# Patient Record
Sex: FEMALE | Race: WHITE | NOT HISPANIC OR LATINO | Employment: UNEMPLOYED | URBAN - METROPOLITAN AREA
[De-identification: names, ages, dates, MRNs, and addresses within clinical notes are randomized per-mention and may not be internally consistent; named-entity substitution may affect disease eponyms.]

---

## 2017-01-02 ENCOUNTER — AMBULATORY - HEALTHEAST (OUTPATIENT)
Dept: GERIATRICS | Facility: CLINIC | Age: 52
End: 2017-01-02

## 2017-01-05 ENCOUNTER — COMMUNICATION - HEALTHEAST (OUTPATIENT)
Dept: ADMINISTRATIVE | Facility: CLINIC | Age: 52
End: 2017-01-05

## 2017-01-06 ENCOUNTER — OFFICE VISIT - HEALTHEAST (OUTPATIENT)
Dept: GERIATRICS | Facility: CLINIC | Age: 52
End: 2017-01-06

## 2017-01-06 DIAGNOSIS — M06.9 CHRONIC RHEUMATIC ARTHRITIS (H): ICD-10-CM

## 2017-01-06 DIAGNOSIS — J02.9 ACUTE PHARYNGITIS: ICD-10-CM

## 2017-01-10 ENCOUNTER — AMBULATORY - HEALTHEAST (OUTPATIENT)
Dept: GERIATRICS | Facility: CLINIC | Age: 52
End: 2017-01-10

## 2017-01-24 ENCOUNTER — OFFICE VISIT - HEALTHEAST (OUTPATIENT)
Dept: GERIATRICS | Facility: CLINIC | Age: 52
End: 2017-01-24

## 2017-01-24 DIAGNOSIS — J06.9 UPPER RESPIRATORY INFECTION: ICD-10-CM

## 2017-01-24 DIAGNOSIS — R09.81 SINUS CONGESTION: ICD-10-CM

## 2017-01-25 ENCOUNTER — AMBULATORY - HEALTHEAST (OUTPATIENT)
Dept: GERIATRICS | Facility: CLINIC | Age: 52
End: 2017-01-25

## 2017-01-30 ENCOUNTER — AMBULATORY - HEALTHEAST (OUTPATIENT)
Dept: LAB | Facility: CLINIC | Age: 52
End: 2017-01-30

## 2017-01-30 DIAGNOSIS — M05.79 SEROPOSITIVE RHEUMATOID ARTHRITIS OF MULTIPLE SITES (H): ICD-10-CM

## 2017-01-30 LAB
ALT SERPL W P-5'-P-CCNC: 10 U/L (ref 0–45)
CREAT SERPL-MCNC: 0.73 MG/DL (ref 0.6–1.1)
GFR SERPL CREATININE-BSD FRML MDRD: >60 ML/MIN/1.73M2

## 2017-02-02 ENCOUNTER — AMBULATORY - HEALTHEAST (OUTPATIENT)
Dept: GERIATRICS | Facility: CLINIC | Age: 52
End: 2017-02-02

## 2017-02-13 ENCOUNTER — AMBULATORY - HEALTHEAST (OUTPATIENT)
Dept: GERIATRICS | Facility: CLINIC | Age: 52
End: 2017-02-13

## 2017-02-17 ENCOUNTER — OFFICE VISIT - HEALTHEAST (OUTPATIENT)
Dept: GERIATRICS | Facility: CLINIC | Age: 52
End: 2017-02-17

## 2017-02-17 DIAGNOSIS — L03.116 CELLULITIS OF LEFT HIP: ICD-10-CM

## 2017-02-17 DIAGNOSIS — M06.9 RHEUMATOID ARTHRITIS (H): ICD-10-CM

## 2017-02-17 DIAGNOSIS — R09.89 RESPIRATORY SYMPTOMS: ICD-10-CM

## 2017-02-28 ENCOUNTER — AMBULATORY - HEALTHEAST (OUTPATIENT)
Dept: GERIATRICS | Facility: CLINIC | Age: 52
End: 2017-02-28

## 2017-03-24 ENCOUNTER — AMBULATORY - HEALTHEAST (OUTPATIENT)
Dept: LAB | Facility: CLINIC | Age: 52
End: 2017-03-24

## 2017-03-24 ENCOUNTER — OFFICE VISIT - HEALTHEAST (OUTPATIENT)
Dept: GERIATRICS | Facility: CLINIC | Age: 52
End: 2017-03-24

## 2017-03-24 DIAGNOSIS — M05.79 SEROPOSITIVE RHEUMATOID ARTHRITIS OF MULTIPLE SITES (H): ICD-10-CM

## 2017-03-24 DIAGNOSIS — M17.11 RIGHT KNEE DJD: ICD-10-CM

## 2017-03-24 DIAGNOSIS — R52 PAIN MANAGEMENT: ICD-10-CM

## 2017-03-24 DIAGNOSIS — Z96.642 H/O TOTAL HIP ARTHROPLASTY, LEFT: ICD-10-CM

## 2017-03-24 DIAGNOSIS — R60.9 EDEMA: ICD-10-CM

## 2017-03-24 LAB
ALT SERPL W P-5'-P-CCNC: 12 U/L (ref 0–45)
CREAT SERPL-MCNC: 0.65 MG/DL (ref 0.6–1.1)
GFR SERPL CREATININE-BSD FRML MDRD: >60 ML/MIN/1.73M2

## 2017-03-28 ENCOUNTER — AMBULATORY - HEALTHEAST (OUTPATIENT)
Dept: GERIATRICS | Facility: CLINIC | Age: 52
End: 2017-03-28

## 2017-04-18 ENCOUNTER — OFFICE VISIT - HEALTHEAST (OUTPATIENT)
Dept: GERIATRICS | Facility: CLINIC | Age: 52
End: 2017-04-18

## 2017-04-18 ENCOUNTER — OFFICE VISIT - HEALTHEAST (OUTPATIENT)
Dept: RHEUMATOLOGY | Facility: CLINIC | Age: 52
End: 2017-04-18

## 2017-04-18 DIAGNOSIS — Z96.642 H/O TOTAL HIP ARTHROPLASTY, LEFT: ICD-10-CM

## 2017-04-18 DIAGNOSIS — M16.12 PRIMARY OSTEOARTHRITIS OF LEFT HIP: ICD-10-CM

## 2017-04-18 DIAGNOSIS — I10 ESSENTIAL HYPERTENSION WITH GOAL BLOOD PRESSURE LESS THAN 140/90: ICD-10-CM

## 2017-04-18 DIAGNOSIS — M05.79 SEROPOSITIVE RHEUMATOID ARTHRITIS OF MULTIPLE SITES (H): ICD-10-CM

## 2017-04-18 DIAGNOSIS — M17.11 PRIMARY OSTEOARTHRITIS OF RIGHT KNEE: ICD-10-CM

## 2017-04-18 DIAGNOSIS — R76.8 CYCLIC CITRULLINATED PEPTIDE (CCP) ANTIBODY POSITIVE: ICD-10-CM

## 2017-04-18 ASSESSMENT — MIFFLIN-ST. JEOR: SCORE: 2030.58

## 2017-04-25 ENCOUNTER — AMBULATORY - HEALTHEAST (OUTPATIENT)
Dept: GERIATRICS | Facility: CLINIC | Age: 52
End: 2017-04-25

## 2017-05-15 ENCOUNTER — OFFICE VISIT - HEALTHEAST (OUTPATIENT)
Dept: GERIATRICS | Facility: CLINIC | Age: 52
End: 2017-05-15

## 2017-05-15 DIAGNOSIS — M17.11 PRIMARY OSTEOARTHRITIS OF RIGHT KNEE: ICD-10-CM

## 2017-05-15 DIAGNOSIS — R52 PAIN MANAGEMENT: ICD-10-CM

## 2017-05-15 DIAGNOSIS — I10 ESSENTIAL HYPERTENSION WITH GOAL BLOOD PRESSURE LESS THAN 140/90: ICD-10-CM

## 2017-05-15 DIAGNOSIS — M16.12 PRIMARY OSTEOARTHRITIS OF LEFT HIP: ICD-10-CM

## 2017-05-22 ENCOUNTER — AMBULATORY - HEALTHEAST (OUTPATIENT)
Dept: GERIATRICS | Facility: CLINIC | Age: 52
End: 2017-05-22

## 2017-06-05 ENCOUNTER — COMMUNICATION - HEALTHEAST (OUTPATIENT)
Dept: RHEUMATOLOGY | Facility: CLINIC | Age: 52
End: 2017-06-05

## 2017-06-05 DIAGNOSIS — M05.79 SEROPOSITIVE RHEUMATOID ARTHRITIS OF MULTIPLE SITES (H): ICD-10-CM

## 2017-06-21 ENCOUNTER — AMBULATORY - HEALTHEAST (OUTPATIENT)
Dept: GERIATRICS | Facility: CLINIC | Age: 52
End: 2017-06-21

## 2017-06-27 ENCOUNTER — OFFICE VISIT - HEALTHEAST (OUTPATIENT)
Dept: GERIATRICS | Facility: CLINIC | Age: 52
End: 2017-06-27

## 2017-06-27 DIAGNOSIS — E66.9 OBESITY: ICD-10-CM

## 2017-06-27 DIAGNOSIS — M05.20 RHEUMATOID ARTERITIS (H): ICD-10-CM

## 2017-06-27 DIAGNOSIS — R05.9 COUGH: ICD-10-CM

## 2017-06-27 DIAGNOSIS — M17.11 PRIMARY OSTEOARTHRITIS OF RIGHT KNEE: ICD-10-CM

## 2017-07-19 ENCOUNTER — AMBULATORY - HEALTHEAST (OUTPATIENT)
Dept: GERIATRICS | Facility: CLINIC | Age: 52
End: 2017-07-19

## 2017-07-21 ENCOUNTER — OFFICE VISIT - HEALTHEAST (OUTPATIENT)
Dept: RHEUMATOLOGY | Facility: CLINIC | Age: 52
End: 2017-07-21

## 2017-07-21 DIAGNOSIS — M25.50 POLYARTHRALGIA: ICD-10-CM

## 2017-07-21 DIAGNOSIS — R76.8 CYCLIC CITRULLINATED PEPTIDE (CCP) ANTIBODY POSITIVE: ICD-10-CM

## 2017-07-21 DIAGNOSIS — M05.79 SEROPOSITIVE RHEUMATOID ARTHRITIS OF MULTIPLE SITES (H): ICD-10-CM

## 2017-07-28 ENCOUNTER — OFFICE VISIT - HEALTHEAST (OUTPATIENT)
Dept: GERIATRICS | Facility: CLINIC | Age: 52
End: 2017-07-28

## 2017-07-28 DIAGNOSIS — M17.11 PRIMARY OSTEOARTHRITIS OF RIGHT KNEE: ICD-10-CM

## 2017-07-28 DIAGNOSIS — I10 ESSENTIAL HYPERTENSION WITH GOAL BLOOD PRESSURE LESS THAN 140/90: ICD-10-CM

## 2017-07-28 DIAGNOSIS — L85.3 DRY SKIN: ICD-10-CM

## 2017-07-28 DIAGNOSIS — M25.50 POLYARTHRALGIA: ICD-10-CM

## 2017-08-07 ENCOUNTER — OFFICE VISIT - HEALTHEAST (OUTPATIENT)
Dept: GERIATRICS | Facility: CLINIC | Age: 52
End: 2017-08-07

## 2017-08-07 DIAGNOSIS — M17.11 PRIMARY OSTEOARTHRITIS OF RIGHT KNEE: ICD-10-CM

## 2017-08-07 DIAGNOSIS — M25.50 POLYARTHRALGIA: ICD-10-CM

## 2017-08-07 DIAGNOSIS — M16.12 PRIMARY OSTEOARTHRITIS OF LEFT HIP: ICD-10-CM

## 2017-08-07 DIAGNOSIS — I10 ESSENTIAL HYPERTENSION WITH GOAL BLOOD PRESSURE LESS THAN 140/90: ICD-10-CM

## 2017-08-16 ENCOUNTER — AMBULATORY - HEALTHEAST (OUTPATIENT)
Dept: GERIATRICS | Facility: CLINIC | Age: 52
End: 2017-08-16

## 2017-08-18 ENCOUNTER — AMBULATORY - HEALTHEAST (OUTPATIENT)
Dept: LAB | Facility: CLINIC | Age: 52
End: 2017-08-18

## 2017-08-18 DIAGNOSIS — M05.79 SEROPOSITIVE RHEUMATOID ARTHRITIS OF MULTIPLE SITES (H): ICD-10-CM

## 2017-08-18 LAB
ALT SERPL W P-5'-P-CCNC: 12 U/L (ref 0–45)
CREAT SERPL-MCNC: 0.7 MG/DL (ref 0.6–1.1)
GFR SERPL CREATININE-BSD FRML MDRD: >60 ML/MIN/1.73M2

## 2017-09-01 ENCOUNTER — COMMUNICATION - HEALTHEAST (OUTPATIENT)
Dept: RHEUMATOLOGY | Facility: CLINIC | Age: 52
End: 2017-09-01

## 2017-09-14 ENCOUNTER — AMBULATORY - HEALTHEAST (OUTPATIENT)
Dept: GERIATRICS | Facility: CLINIC | Age: 52
End: 2017-09-14

## 2017-09-20 ENCOUNTER — AMBULATORY - HEALTHEAST (OUTPATIENT)
Dept: LAB | Facility: CLINIC | Age: 52
End: 2017-09-20

## 2017-09-20 DIAGNOSIS — M05.79 SEROPOSITIVE RHEUMATOID ARTHRITIS OF MULTIPLE SITES (H): ICD-10-CM

## 2017-09-20 LAB
ALT SERPL W P-5'-P-CCNC: 18 U/L (ref 0–45)
CREAT SERPL-MCNC: 0.65 MG/DL (ref 0.6–1.1)
GFR SERPL CREATININE-BSD FRML MDRD: >60 ML/MIN/1.73M2

## 2017-09-22 ENCOUNTER — OFFICE VISIT - HEALTHEAST (OUTPATIENT)
Dept: GERIATRICS | Facility: CLINIC | Age: 52
End: 2017-09-22

## 2017-09-22 DIAGNOSIS — I82.403 DVT OF LOWER EXTREMITY, BILATERAL (H): ICD-10-CM

## 2017-09-22 DIAGNOSIS — E66.9 OBESITY: ICD-10-CM

## 2017-09-22 DIAGNOSIS — M19.90 INFLAMMATORY ARTHRITIS: ICD-10-CM

## 2017-09-25 ENCOUNTER — RECORDS - HEALTHEAST (OUTPATIENT)
Dept: ADMINISTRATIVE | Facility: OTHER | Age: 52
End: 2017-09-25

## 2017-09-25 ENCOUNTER — RECORDS - HEALTHEAST (OUTPATIENT)
Dept: GENERAL RADIOLOGY | Age: 52
End: 2017-09-25

## 2017-09-25 ENCOUNTER — OFFICE VISIT - HEALTHEAST (OUTPATIENT)
Dept: RHEUMATOLOGY | Facility: CLINIC | Age: 52
End: 2017-09-25

## 2017-09-25 DIAGNOSIS — M05.79 SEROPOSITIVE RHEUMATOID ARTHRITIS OF MULTIPLE SITES (H): ICD-10-CM

## 2017-09-25 DIAGNOSIS — M25.50 POLYARTHRALGIA: ICD-10-CM

## 2017-09-25 DIAGNOSIS — R76.8 CYCLIC CITRULLINATED PEPTIDE (CCP) ANTIBODY POSITIVE: ICD-10-CM

## 2017-09-25 DIAGNOSIS — M25.50 PAIN IN UNSPECIFIED JOINT: ICD-10-CM

## 2017-09-25 DIAGNOSIS — E66.01 MORBID OBESITY (H): ICD-10-CM

## 2017-09-25 DIAGNOSIS — M05.79 RHEUMATOID ARTHRITIS WITH RHEUMATOID FACTOR OF MULTIPLE SITES WITHOUT ORGAN OR SYSTEMS INVOLVEMENT (H): ICD-10-CM

## 2017-09-25 RX ORDER — HYDROCHLOROTHIAZIDE 25 MG/1
50 TABLET ORAL DAILY
Status: SHIPPED | COMMUNITY
Start: 2017-09-25

## 2017-09-26 ENCOUNTER — AMBULATORY - HEALTHEAST (OUTPATIENT)
Dept: RHEUMATOLOGY | Facility: CLINIC | Age: 52
End: 2017-09-26

## 2017-09-26 DIAGNOSIS — M05.9 SEROPOSITIVE RHEUMATOID ARTHRITIS (H): ICD-10-CM

## 2017-09-30 ENCOUNTER — AMBULATORY - HEALTHEAST (OUTPATIENT)
Dept: GERIATRICS | Facility: CLINIC | Age: 52
End: 2017-09-30

## 2017-10-25 ENCOUNTER — RECORDS - HEALTHEAST (OUTPATIENT)
Dept: ADMINISTRATIVE | Facility: OTHER | Age: 52
End: 2017-10-25

## 2017-10-26 ENCOUNTER — AMBULATORY - HEALTHEAST (OUTPATIENT)
Dept: GERIATRICS | Facility: CLINIC | Age: 52
End: 2017-10-26

## 2017-11-14 ENCOUNTER — HOSPITAL ENCOUNTER (OUTPATIENT)
Dept: MAMMOGRAPHY | Facility: CLINIC | Age: 52
Discharge: HOME | End: 2017-11-14
Attending: INTERNAL MEDICINE

## 2017-11-14 DIAGNOSIS — N64.4 BREAST PAIN, LEFT: ICD-10-CM

## 2017-11-14 DIAGNOSIS — N64.4 BREAST PAIN, RIGHT: ICD-10-CM

## 2017-11-27 ENCOUNTER — AMBULATORY - HEALTHEAST (OUTPATIENT)
Dept: LAB | Facility: CLINIC | Age: 52
End: 2017-11-27

## 2017-11-27 DIAGNOSIS — M05.9 SEROPOSITIVE RHEUMATOID ARTHRITIS (H): ICD-10-CM

## 2017-11-27 LAB
ALT SERPL W P-5'-P-CCNC: 17 U/L (ref 0–45)
CREAT SERPL-MCNC: 0.83 MG/DL (ref 0.6–1.1)
GFR SERPL CREATININE-BSD FRML MDRD: >60 ML/MIN/1.73M2

## 2017-11-28 ENCOUNTER — OFFICE VISIT - HEALTHEAST (OUTPATIENT)
Dept: GERIATRICS | Facility: CLINIC | Age: 52
End: 2017-11-28

## 2017-11-28 DIAGNOSIS — M25.50 POLYARTHRALGIA: ICD-10-CM

## 2017-11-28 DIAGNOSIS — R52 PAIN MANAGEMENT: ICD-10-CM

## 2017-11-28 DIAGNOSIS — R53.81 PHYSICAL DEBILITY: ICD-10-CM

## 2017-11-28 DIAGNOSIS — E66.01 MORBID OBESITY (H): ICD-10-CM

## 2017-11-30 ENCOUNTER — AMBULATORY - HEALTHEAST (OUTPATIENT)
Dept: GERIATRICS | Facility: CLINIC | Age: 52
End: 2017-11-30

## 2017-12-19 ENCOUNTER — OFFICE VISIT - HEALTHEAST (OUTPATIENT)
Dept: GERIATRICS | Facility: CLINIC | Age: 52
End: 2017-12-19

## 2017-12-19 DIAGNOSIS — M05.20 RHEUMATOID ARTERITIS (H): ICD-10-CM

## 2017-12-19 DIAGNOSIS — I10 ESSENTIAL HYPERTENSION WITH GOAL BLOOD PRESSURE LESS THAN 140/90: ICD-10-CM

## 2017-12-19 DIAGNOSIS — E66.01 MORBID OBESITY (H): ICD-10-CM

## 2017-12-19 DIAGNOSIS — M25.50 POLYARTHRALGIA: ICD-10-CM

## 2017-12-19 DIAGNOSIS — R53.81 PHYSICAL DEBILITY: ICD-10-CM

## 2017-12-19 DIAGNOSIS — E55.9 VITAMIN D DEFICIENCY DISEASE: ICD-10-CM

## 2017-12-28 ENCOUNTER — AMBULATORY - HEALTHEAST (OUTPATIENT)
Dept: GERIATRICS | Facility: CLINIC | Age: 52
End: 2017-12-28

## 2018-01-25 ENCOUNTER — COMMUNICATION - HEALTHEAST (OUTPATIENT)
Dept: RHEUMATOLOGY | Facility: CLINIC | Age: 53
End: 2018-01-25

## 2018-01-25 ENCOUNTER — AMBULATORY - HEALTHEAST (OUTPATIENT)
Dept: GERIATRICS | Facility: CLINIC | Age: 53
End: 2018-01-25

## 2018-01-25 ENCOUNTER — RECORDS - HEALTHEAST (OUTPATIENT)
Dept: LAB | Facility: CLINIC | Age: 53
End: 2018-01-25

## 2018-01-25 DIAGNOSIS — M05.79 SEROPOSITIVE RHEUMATOID ARTHRITIS OF MULTIPLE SITES (H): ICD-10-CM

## 2018-01-25 LAB — INR PPP: 2.05 (ref 0.9–1.1)

## 2018-01-29 ENCOUNTER — COMMUNICATION - HEALTHEAST (OUTPATIENT)
Dept: ADMINISTRATIVE | Facility: CLINIC | Age: 53
End: 2018-01-29

## 2018-02-09 ENCOUNTER — AMBULATORY - HEALTHEAST (OUTPATIENT)
Dept: LAB | Facility: CLINIC | Age: 53
End: 2018-02-09

## 2018-02-09 DIAGNOSIS — M05.9 SEROPOSITIVE RHEUMATOID ARTHRITIS (H): ICD-10-CM

## 2018-02-09 LAB
ALBUMIN SERPL-MCNC: 3.5 G/DL (ref 3.5–5)
ALT SERPL W P-5'-P-CCNC: 16 U/L (ref 0–45)
CREAT SERPL-MCNC: 0.68 MG/DL (ref 0.6–1.1)
ERYTHROCYTE [DISTWIDTH] IN BLOOD BY AUTOMATED COUNT: 13.2 % (ref 11–14.5)
GFR SERPL CREATININE-BSD FRML MDRD: >60 ML/MIN/1.73M2
HCT VFR BLD AUTO: 42.1 % (ref 35–47)
HGB BLD-MCNC: 13.7 G/DL (ref 12–16)
MCH RBC QN AUTO: 29 PG (ref 27–34)
MCHC RBC AUTO-ENTMCNC: 32.5 G/DL (ref 32–36)
MCV RBC AUTO: 89 FL (ref 80–100)
PLATELET # BLD AUTO: 225 THOU/UL (ref 140–440)
PMV BLD AUTO: 7.4 FL (ref 7–10)
RBC # BLD AUTO: 4.71 MILL/UL (ref 3.8–5.4)
WBC: 4.5 THOU/UL (ref 4–11)

## 2018-02-13 ENCOUNTER — OFFICE VISIT - HEALTHEAST (OUTPATIENT)
Dept: RHEUMATOLOGY | Facility: CLINIC | Age: 53
End: 2018-02-13

## 2018-02-13 DIAGNOSIS — M25.50 POLYARTHRALGIA: ICD-10-CM

## 2018-02-13 DIAGNOSIS — M05.79 SEROPOSITIVE RHEUMATOID ARTHRITIS OF MULTIPLE SITES (H): ICD-10-CM

## 2018-02-13 DIAGNOSIS — R76.8 CYCLIC CITRULLINATED PEPTIDE (CCP) ANTIBODY POSITIVE: ICD-10-CM

## 2018-02-13 DIAGNOSIS — Z79.899 HIGH RISK MEDICATION USE: ICD-10-CM

## 2018-02-13 ASSESSMENT — MIFFLIN-ST. JEOR: SCORE: 2130.37

## 2018-02-22 ENCOUNTER — AMBULATORY - HEALTHEAST (OUTPATIENT)
Dept: GERIATRICS | Facility: CLINIC | Age: 53
End: 2018-02-22

## 2018-02-23 ENCOUNTER — RECORDS - HEALTHEAST (OUTPATIENT)
Dept: LAB | Facility: CLINIC | Age: 53
End: 2018-02-23

## 2018-02-23 LAB — INR PPP: 2.21 (ref 0.9–1.1)

## 2018-03-09 ENCOUNTER — OFFICE VISIT - HEALTHEAST (OUTPATIENT)
Dept: GERIATRICS | Facility: CLINIC | Age: 53
End: 2018-03-09

## 2018-03-09 DIAGNOSIS — R53.81 PHYSICAL DEBILITY: ICD-10-CM

## 2018-03-09 DIAGNOSIS — R52 PAIN MANAGEMENT: ICD-10-CM

## 2018-03-09 DIAGNOSIS — M25.50 POLYARTHRALGIA: ICD-10-CM

## 2018-03-09 DIAGNOSIS — M05.79 SEROPOSITIVE RHEUMATOID ARTHRITIS OF MULTIPLE SITES (H): ICD-10-CM

## 2018-03-23 ENCOUNTER — AMBULATORY - HEALTHEAST (OUTPATIENT)
Dept: GERIATRICS | Facility: CLINIC | Age: 53
End: 2018-03-23

## 2018-03-23 ENCOUNTER — RECORDS - HEALTHEAST (OUTPATIENT)
Dept: LAB | Facility: CLINIC | Age: 53
End: 2018-03-23

## 2018-03-23 LAB — INR PPP: 2.83 (ref 0.9–1.1)

## 2018-04-20 ENCOUNTER — RECORDS - HEALTHEAST (OUTPATIENT)
Dept: LAB | Facility: CLINIC | Age: 53
End: 2018-04-20

## 2018-04-20 LAB — INR PPP: 2.81 (ref 0.9–1.1)

## 2018-04-27 ENCOUNTER — OFFICE VISIT - HEALTHEAST (OUTPATIENT)
Dept: GERIATRICS | Facility: CLINIC | Age: 53
End: 2018-04-27

## 2018-04-27 DIAGNOSIS — E66.9 OBESITY: ICD-10-CM

## 2018-04-27 DIAGNOSIS — I10 ESSENTIAL HYPERTENSION WITH GOAL BLOOD PRESSURE LESS THAN 140/90: ICD-10-CM

## 2018-04-27 DIAGNOSIS — M06.9 RHEUMATOID ARTHRITIS (H): ICD-10-CM

## 2018-04-27 DIAGNOSIS — R05.9 COUGH: ICD-10-CM

## 2018-04-27 DIAGNOSIS — I82.403 DVT OF LOWER EXTREMITY, BILATERAL (H): ICD-10-CM

## 2018-05-10 ENCOUNTER — AMBULATORY - HEALTHEAST (OUTPATIENT)
Dept: LAB | Facility: CLINIC | Age: 53
End: 2018-05-10

## 2018-05-10 DIAGNOSIS — M05.9 SEROPOSITIVE RHEUMATOID ARTHRITIS (H): ICD-10-CM

## 2018-05-10 LAB
ALBUMIN SERPL-MCNC: 3.4 G/DL (ref 3.5–5)
ALT SERPL W P-5'-P-CCNC: 16 U/L (ref 0–45)
CREAT SERPL-MCNC: 0.69 MG/DL (ref 0.6–1.1)
ERYTHROCYTE [DISTWIDTH] IN BLOOD BY AUTOMATED COUNT: 11.6 % (ref 11–14.5)
GFR SERPL CREATININE-BSD FRML MDRD: >60 ML/MIN/1.73M2
HCT VFR BLD AUTO: 42.6 % (ref 35–47)
HGB BLD-MCNC: 13.7 G/DL (ref 12–16)
MCH RBC QN AUTO: 29.6 PG (ref 27–34)
MCHC RBC AUTO-ENTMCNC: 32.1 G/DL (ref 32–36)
MCV RBC AUTO: 92 FL (ref 80–100)
PLATELET # BLD AUTO: 220 THOU/UL (ref 140–440)
PMV BLD AUTO: 7 FL (ref 7–10)
RBC # BLD AUTO: 4.63 MILL/UL (ref 3.8–5.4)
WBC: 4.4 THOU/UL (ref 4–11)

## 2018-05-14 ENCOUNTER — OFFICE VISIT - HEALTHEAST (OUTPATIENT)
Dept: RHEUMATOLOGY | Facility: CLINIC | Age: 53
End: 2018-05-14

## 2018-05-14 DIAGNOSIS — M25.50 POLYARTHRALGIA: ICD-10-CM

## 2018-05-14 DIAGNOSIS — R76.8 CYCLIC CITRULLINATED PEPTIDE (CCP) ANTIBODY POSITIVE: ICD-10-CM

## 2018-05-14 DIAGNOSIS — M05.79 SEROPOSITIVE RHEUMATOID ARTHRITIS OF MULTIPLE SITES (H): ICD-10-CM

## 2018-05-14 DIAGNOSIS — Z79.899 HIGH RISK MEDICATION USE: ICD-10-CM

## 2018-05-21 ENCOUNTER — RECORDS - HEALTHEAST (OUTPATIENT)
Dept: LAB | Facility: CLINIC | Age: 53
End: 2018-05-21

## 2018-05-21 ENCOUNTER — AMBULATORY - HEALTHEAST (OUTPATIENT)
Dept: GERIATRICS | Facility: CLINIC | Age: 53
End: 2018-05-21

## 2018-05-21 LAB — INR PPP: 2.63 (ref 0.9–1.1)

## 2018-06-11 ENCOUNTER — AMBULATORY - HEALTHEAST (OUTPATIENT)
Dept: LAB | Facility: CLINIC | Age: 53
End: 2018-06-11

## 2018-06-11 DIAGNOSIS — M05.9 SEROPOSITIVE RHEUMATOID ARTHRITIS (H): ICD-10-CM

## 2018-06-11 LAB
ALBUMIN SERPL-MCNC: 3.6 G/DL (ref 3.5–5)
ALT SERPL W P-5'-P-CCNC: 19 U/L (ref 0–45)
CREAT SERPL-MCNC: 0.99 MG/DL (ref 0.6–1.1)
ERYTHROCYTE [DISTWIDTH] IN BLOOD BY AUTOMATED COUNT: 12.1 % (ref 11–14.5)
GFR SERPL CREATININE-BSD FRML MDRD: 58 ML/MIN/1.73M2
HCT VFR BLD AUTO: 40.6 % (ref 35–47)
HGB BLD-MCNC: 13.4 G/DL (ref 12–16)
MCH RBC QN AUTO: 30.3 PG (ref 27–34)
MCHC RBC AUTO-ENTMCNC: 32.9 G/DL (ref 32–36)
MCV RBC AUTO: 92 FL (ref 80–100)
PLATELET # BLD AUTO: 220 THOU/UL (ref 140–440)
PMV BLD AUTO: 7.2 FL (ref 7–10)
RBC # BLD AUTO: 4.41 MILL/UL (ref 3.8–5.4)
WBC: 4.2 THOU/UL (ref 4–11)

## 2018-06-19 ENCOUNTER — AMBULATORY - HEALTHEAST (OUTPATIENT)
Dept: GERIATRICS | Facility: CLINIC | Age: 53
End: 2018-06-19

## 2018-06-19 ENCOUNTER — RECORDS - HEALTHEAST (OUTPATIENT)
Dept: LAB | Facility: CLINIC | Age: 53
End: 2018-06-19

## 2018-06-19 LAB — INR PPP: 2.38 (ref 0.9–1.1)

## 2018-06-29 ENCOUNTER — OFFICE VISIT - HEALTHEAST (OUTPATIENT)
Dept: GERIATRICS | Facility: CLINIC | Age: 53
End: 2018-06-29

## 2018-06-29 DIAGNOSIS — R52 PAIN MANAGEMENT: ICD-10-CM

## 2018-06-29 DIAGNOSIS — M05.79 SEROPOSITIVE RHEUMATOID ARTHRITIS OF MULTIPLE SITES (H): ICD-10-CM

## 2018-06-29 DIAGNOSIS — Z79.01 WARFARIN ANTICOAGULATION: ICD-10-CM

## 2018-06-29 DIAGNOSIS — M25.50 POLYARTHRALGIA: ICD-10-CM

## 2018-07-09 ENCOUNTER — AMBULATORY - HEALTHEAST (OUTPATIENT)
Dept: LAB | Facility: CLINIC | Age: 53
End: 2018-07-09

## 2018-07-09 DIAGNOSIS — M05.9 SEROPOSITIVE RHEUMATOID ARTHRITIS (H): ICD-10-CM

## 2018-07-09 LAB
ALBUMIN SERPL-MCNC: 3.7 G/DL (ref 3.5–5)
ALT SERPL W P-5'-P-CCNC: 16 U/L (ref 0–45)
CREAT SERPL-MCNC: 0.73 MG/DL (ref 0.6–1.1)
ERYTHROCYTE [DISTWIDTH] IN BLOOD BY AUTOMATED COUNT: 12.1 % (ref 11–14.5)
GFR SERPL CREATININE-BSD FRML MDRD: >60 ML/MIN/1.73M2
HCT VFR BLD AUTO: 42.1 % (ref 35–47)
HGB BLD-MCNC: 13.6 G/DL (ref 12–16)
MCH RBC QN AUTO: 30 PG (ref 27–34)
MCHC RBC AUTO-ENTMCNC: 32.3 G/DL (ref 32–36)
MCV RBC AUTO: 93 FL (ref 80–100)
PLATELET # BLD AUTO: 234 THOU/UL (ref 140–440)
PMV BLD AUTO: 7.4 FL (ref 7–10)
RBC # BLD AUTO: 4.52 MILL/UL (ref 3.8–5.4)
WBC: 4.9 THOU/UL (ref 4–11)

## 2018-07-10 ENCOUNTER — COMMUNICATION - HEALTHEAST (OUTPATIENT)
Dept: RHEUMATOLOGY | Facility: CLINIC | Age: 53
End: 2018-07-10

## 2018-07-10 DIAGNOSIS — M05.79 SEROPOSITIVE RHEUMATOID ARTHRITIS OF MULTIPLE SITES (H): ICD-10-CM

## 2018-07-17 ENCOUNTER — RECORDS - HEALTHEAST (OUTPATIENT)
Dept: LAB | Facility: CLINIC | Age: 53
End: 2018-07-17

## 2018-07-17 ENCOUNTER — COMMUNICATION - HEALTHEAST (OUTPATIENT)
Dept: GERIATRICS | Facility: CLINIC | Age: 53
End: 2018-07-17

## 2018-07-17 LAB — INR PPP: 2.27 (ref 0.9–1.1)

## 2018-07-27 ENCOUNTER — OFFICE VISIT - HEALTHEAST (OUTPATIENT)
Dept: GERIATRICS | Facility: CLINIC | Age: 53
End: 2018-07-27

## 2018-07-27 DIAGNOSIS — M05.79 SEROPOSITIVE RHEUMATOID ARTHRITIS OF MULTIPLE SITES (H): ICD-10-CM

## 2018-07-27 DIAGNOSIS — M17.11 PRIMARY OSTEOARTHRITIS OF RIGHT KNEE: ICD-10-CM

## 2018-07-27 DIAGNOSIS — E66.01 MORBID OBESITY (H): ICD-10-CM

## 2018-07-27 DIAGNOSIS — M25.50 POLYARTHRALGIA: ICD-10-CM

## 2018-08-14 ENCOUNTER — COMMUNICATION - HEALTHEAST (OUTPATIENT)
Dept: GERIATRICS | Facility: CLINIC | Age: 53
End: 2018-08-14

## 2018-08-16 ENCOUNTER — RECORDS - HEALTHEAST (OUTPATIENT)
Dept: LAB | Facility: CLINIC | Age: 53
End: 2018-08-16

## 2018-08-16 ENCOUNTER — COMMUNICATION - HEALTHEAST (OUTPATIENT)
Dept: GERIATRICS | Facility: CLINIC | Age: 53
End: 2018-08-16

## 2018-08-16 LAB — INR PPP: 2.04 (ref 0.9–1.1)

## 2018-08-31 ENCOUNTER — OFFICE VISIT - HEALTHEAST (OUTPATIENT)
Dept: GERIATRICS | Facility: CLINIC | Age: 53
End: 2018-08-31

## 2018-08-31 DIAGNOSIS — M25.50 POLYARTHRALGIA: ICD-10-CM

## 2018-08-31 DIAGNOSIS — R52 PAIN MANAGEMENT: ICD-10-CM

## 2018-08-31 DIAGNOSIS — E66.01 MORBID OBESITY (H): ICD-10-CM

## 2018-08-31 DIAGNOSIS — R53.81 PHYSICAL DEBILITY: ICD-10-CM

## 2018-09-13 ENCOUNTER — RECORDS - HEALTHEAST (OUTPATIENT)
Dept: LAB | Facility: CLINIC | Age: 53
End: 2018-09-13

## 2018-09-13 LAB
ANION GAP SERPL CALCULATED.3IONS-SCNC: 9 MMOL/L (ref 5–18)
BUN SERPL-MCNC: 16 MG/DL (ref 8–22)
CALCIUM SERPL-MCNC: 9.7 MG/DL (ref 8.5–10.5)
CHLORIDE BLD-SCNC: 101 MMOL/L (ref 98–107)
CO2 SERPL-SCNC: 34 MMOL/L (ref 22–31)
CREAT SERPL-MCNC: 0.75 MG/DL (ref 0.6–1.1)
GFR SERPL CREATININE-BSD FRML MDRD: >60 ML/MIN/1.73M2
GLUCOSE BLD-MCNC: 96 MG/DL (ref 70–125)
INR PPP: 2.39 (ref 0.9–1.1)
POTASSIUM BLD-SCNC: 4.3 MMOL/L (ref 3.5–5)
SODIUM SERPL-SCNC: 144 MMOL/L (ref 136–145)

## 2018-09-14 ENCOUNTER — OFFICE VISIT - HEALTHEAST (OUTPATIENT)
Dept: GERIATRICS | Facility: CLINIC | Age: 53
End: 2018-09-14

## 2018-09-14 ENCOUNTER — COMMUNICATION - HEALTHEAST (OUTPATIENT)
Dept: GERIATRICS | Facility: CLINIC | Age: 53
End: 2018-09-14

## 2018-09-14 DIAGNOSIS — E66.01 MORBID OBESITY (H): ICD-10-CM

## 2018-09-14 DIAGNOSIS — M06.9 RHEUMATOID ARTHRITIS (H): ICD-10-CM

## 2018-09-14 DIAGNOSIS — Z86.718 HISTORY OF DVT (DEEP VEIN THROMBOSIS): ICD-10-CM

## 2018-09-14 DIAGNOSIS — I10 HIGH BLOOD PRESSURE: ICD-10-CM

## 2018-09-14 DIAGNOSIS — M53.3 SACROILIAC JOINT DYSFUNCTION OF RIGHT SIDE: ICD-10-CM

## 2018-09-14 DIAGNOSIS — Z79.01 WARFARIN ANTICOAGULATION: ICD-10-CM

## 2018-09-20 ENCOUNTER — COMMUNICATION - HEALTHEAST (OUTPATIENT)
Dept: GERIATRICS | Facility: CLINIC | Age: 53
End: 2018-09-20

## 2018-09-24 ENCOUNTER — OFFICE VISIT - HEALTHEAST (OUTPATIENT)
Dept: GERIATRICS | Facility: CLINIC | Age: 53
End: 2018-09-24

## 2018-09-24 DIAGNOSIS — B02.9 SHINGLES OUTBREAK: ICD-10-CM

## 2018-09-24 DIAGNOSIS — B01.9 DISSEMINATED VARICELLA: ICD-10-CM

## 2018-10-05 ENCOUNTER — COMMUNICATION - HEALTHEAST (OUTPATIENT)
Dept: LAB | Facility: CLINIC | Age: 53
End: 2018-10-05

## 2018-10-11 ENCOUNTER — RECORDS - HEALTHEAST (OUTPATIENT)
Dept: LAB | Facility: CLINIC | Age: 53
End: 2018-10-11

## 2018-10-11 ENCOUNTER — COMMUNICATION - HEALTHEAST (OUTPATIENT)
Dept: GERIATRICS | Facility: CLINIC | Age: 53
End: 2018-10-11

## 2018-10-11 LAB — INR PPP: 2.29 (ref 0.9–1.1)

## 2018-10-15 ENCOUNTER — COMMUNICATION - HEALTHEAST (OUTPATIENT)
Dept: LAB | Facility: CLINIC | Age: 53
End: 2018-10-15

## 2018-10-15 ENCOUNTER — AMBULATORY - HEALTHEAST (OUTPATIENT)
Dept: RHEUMATOLOGY | Facility: CLINIC | Age: 53
End: 2018-10-15

## 2018-10-15 ENCOUNTER — AMBULATORY - HEALTHEAST (OUTPATIENT)
Dept: LAB | Facility: CLINIC | Age: 53
End: 2018-10-15

## 2018-10-15 DIAGNOSIS — M05.79 SEROPOSITIVE RHEUMATOID ARTHRITIS OF MULTIPLE SITES (H): ICD-10-CM

## 2018-10-15 LAB
ALBUMIN SERPL-MCNC: 3.6 G/DL (ref 3.5–5)
ALT SERPL W P-5'-P-CCNC: 16 U/L (ref 0–45)
CREAT SERPL-MCNC: 0.87 MG/DL (ref 0.6–1.1)
ERYTHROCYTE [DISTWIDTH] IN BLOOD BY AUTOMATED COUNT: 12.5 % (ref 11–14.5)
GFR SERPL CREATININE-BSD FRML MDRD: >60 ML/MIN/1.73M2
HCT VFR BLD AUTO: 41.2 % (ref 35–47)
HGB BLD-MCNC: 13.8 G/DL (ref 12–16)
MCH RBC QN AUTO: 31.6 PG (ref 27–34)
MCHC RBC AUTO-ENTMCNC: 33.6 G/DL (ref 32–36)
MCV RBC AUTO: 94 FL (ref 80–100)
PLATELET # BLD AUTO: 208 THOU/UL (ref 140–440)
PMV BLD AUTO: 7 FL (ref 7–10)
RBC # BLD AUTO: 4.38 MILL/UL (ref 3.8–5.4)
WBC: 4.2 THOU/UL (ref 4–11)

## 2018-10-18 ENCOUNTER — OFFICE VISIT - HEALTHEAST (OUTPATIENT)
Dept: RHEUMATOLOGY | Facility: CLINIC | Age: 53
End: 2018-10-18

## 2018-10-18 DIAGNOSIS — M77.8 RIGHT SHOULDER TENDINITIS: ICD-10-CM

## 2018-10-18 DIAGNOSIS — M17.11 PRIMARY OSTEOARTHRITIS OF RIGHT KNEE: ICD-10-CM

## 2018-10-18 DIAGNOSIS — M25.50 POLYARTHRALGIA: ICD-10-CM

## 2018-10-18 DIAGNOSIS — M05.79 SEROPOSITIVE RHEUMATOID ARTHRITIS OF MULTIPLE SITES (H): ICD-10-CM

## 2018-10-25 ENCOUNTER — AMBULATORY - HEALTHEAST (OUTPATIENT)
Dept: GERIATRICS | Facility: CLINIC | Age: 53
End: 2018-10-25

## 2018-11-08 ENCOUNTER — COMMUNICATION - HEALTHEAST (OUTPATIENT)
Dept: GERIATRICS | Facility: CLINIC | Age: 53
End: 2018-11-08

## 2018-11-08 ENCOUNTER — RECORDS - HEALTHEAST (OUTPATIENT)
Dept: LAB | Facility: CLINIC | Age: 53
End: 2018-11-08

## 2018-11-08 LAB — INR PPP: 3.37 (ref 0.9–1.1)

## 2018-11-15 ENCOUNTER — COMMUNICATION - HEALTHEAST (OUTPATIENT)
Dept: GERIATRICS | Facility: CLINIC | Age: 53
End: 2018-11-15

## 2018-11-15 ENCOUNTER — RECORDS - HEALTHEAST (OUTPATIENT)
Dept: LAB | Facility: CLINIC | Age: 53
End: 2018-11-15

## 2018-11-15 LAB — INR PPP: 2.44 (ref 0.9–1.1)

## 2018-11-27 ENCOUNTER — AMBULATORY - HEALTHEAST (OUTPATIENT)
Dept: GERIATRICS | Facility: CLINIC | Age: 53
End: 2018-11-27

## 2018-11-27 DIAGNOSIS — B02.9 SHINGLES OUTBREAK: ICD-10-CM

## 2018-11-28 ENCOUNTER — OFFICE VISIT - HEALTHEAST (OUTPATIENT)
Dept: GERIATRICS | Facility: CLINIC | Age: 53
End: 2018-11-28

## 2018-11-28 DIAGNOSIS — M05.79 SEROPOSITIVE RHEUMATOID ARTHRITIS OF MULTIPLE SITES (H): ICD-10-CM

## 2018-11-28 DIAGNOSIS — R53.81 PHYSICAL DEBILITY: ICD-10-CM

## 2018-11-28 DIAGNOSIS — M25.50 POLYARTHRALGIA: ICD-10-CM

## 2018-11-28 DIAGNOSIS — Z86.718 HISTORY OF DVT OF LOWER EXTREMITY: ICD-10-CM

## 2018-11-29 ENCOUNTER — RECORDS - HEALTHEAST (OUTPATIENT)
Dept: LAB | Facility: CLINIC | Age: 53
End: 2018-11-29

## 2018-11-29 LAB — INR PPP: 2.99 (ref 0.9–1.1)

## 2018-12-11 ENCOUNTER — COMMUNICATION - HEALTHEAST (OUTPATIENT)
Dept: GERIATRICS | Facility: CLINIC | Age: 53
End: 2018-12-11

## 2018-12-11 ENCOUNTER — RECORDS - HEALTHEAST (OUTPATIENT)
Dept: LAB | Facility: CLINIC | Age: 53
End: 2018-12-11

## 2018-12-11 LAB — INR PPP: 2.91 (ref 0.9–1.1)

## 2018-12-20 ENCOUNTER — RECORDS - HEALTHEAST (OUTPATIENT)
Dept: LAB | Facility: CLINIC | Age: 53
End: 2018-12-20

## 2018-12-20 LAB — INR PPP: 2.27 (ref 0.9–1.1)

## 2018-12-21 ENCOUNTER — OFFICE VISIT - HEALTHEAST (OUTPATIENT)
Dept: GERIATRICS | Facility: CLINIC | Age: 53
End: 2018-12-21

## 2018-12-21 DIAGNOSIS — R53.81 PHYSICAL DEBILITY: ICD-10-CM

## 2018-12-21 DIAGNOSIS — M17.11 PRIMARY OSTEOARTHRITIS OF RIGHT KNEE: ICD-10-CM

## 2018-12-21 DIAGNOSIS — E66.01 MORBID OBESITY (H): ICD-10-CM

## 2018-12-21 DIAGNOSIS — M25.50 POLYARTHRALGIA: ICD-10-CM

## 2018-12-27 ENCOUNTER — RECORDS - HEALTHEAST (OUTPATIENT)
Dept: LAB | Facility: CLINIC | Age: 53
End: 2018-12-27

## 2018-12-27 ENCOUNTER — COMMUNICATION - HEALTHEAST (OUTPATIENT)
Dept: GERIATRICS | Facility: CLINIC | Age: 53
End: 2018-12-27

## 2018-12-27 LAB — INR PPP: 2.21 (ref 0.9–1.1)

## 2019-01-08 ENCOUNTER — COMMUNICATION - HEALTHEAST (OUTPATIENT)
Dept: LAB | Facility: CLINIC | Age: 54
End: 2019-01-08

## 2019-01-08 DIAGNOSIS — M05.79 SEROPOSITIVE RHEUMATOID ARTHRITIS OF MULTIPLE SITES (H): ICD-10-CM

## 2019-01-10 ENCOUNTER — COMMUNICATION - HEALTHEAST (OUTPATIENT)
Dept: GERIATRICS | Facility: CLINIC | Age: 54
End: 2019-01-10

## 2019-01-11 ENCOUNTER — RECORDS - HEALTHEAST (OUTPATIENT)
Dept: LAB | Facility: CLINIC | Age: 54
End: 2019-01-11

## 2019-01-11 ENCOUNTER — COMMUNICATION - HEALTHEAST (OUTPATIENT)
Dept: GERIATRICS | Facility: CLINIC | Age: 54
End: 2019-01-11

## 2019-01-11 LAB — INR PPP: 3.02 (ref 0.9–1.1)

## 2019-01-24 ENCOUNTER — OFFICE VISIT - HEALTHEAST (OUTPATIENT)
Dept: GERIATRICS | Facility: CLINIC | Age: 54
End: 2019-01-24

## 2019-01-24 DIAGNOSIS — M05.79 SEROPOSITIVE RHEUMATOID ARTHRITIS OF MULTIPLE SITES (H): ICD-10-CM

## 2019-01-24 DIAGNOSIS — E66.01 MORBID OBESITY (H): ICD-10-CM

## 2019-01-24 DIAGNOSIS — R60.0 LOCALIZED EDEMA: ICD-10-CM

## 2019-01-24 DIAGNOSIS — E55.9 VITAMIN D DEFICIENCY: ICD-10-CM

## 2019-01-24 DIAGNOSIS — R53.81 PHYSICAL DEBILITY: ICD-10-CM

## 2019-01-24 DIAGNOSIS — Z79.01 WARFARIN ANTICOAGULATION: ICD-10-CM

## 2019-01-24 DIAGNOSIS — B02.9 SHINGLES OUTBREAK: ICD-10-CM

## 2019-01-25 ENCOUNTER — COMMUNICATION - HEALTHEAST (OUTPATIENT)
Dept: RHEUMATOLOGY | Facility: CLINIC | Age: 54
End: 2019-01-25

## 2019-01-25 DIAGNOSIS — M05.79 SEROPOSITIVE RHEUMATOID ARTHRITIS OF MULTIPLE SITES (H): ICD-10-CM

## 2019-02-07 ENCOUNTER — COMMUNICATION - HEALTHEAST (OUTPATIENT)
Dept: GERIATRICS | Facility: CLINIC | Age: 54
End: 2019-02-07

## 2019-02-07 ENCOUNTER — RECORDS - HEALTHEAST (OUTPATIENT)
Dept: LAB | Facility: CLINIC | Age: 54
End: 2019-02-07

## 2019-02-07 LAB — INR PPP: 2.54 (ref 0.9–1.1)

## 2019-03-07 ENCOUNTER — COMMUNICATION - HEALTHEAST (OUTPATIENT)
Dept: GERIATRICS | Facility: CLINIC | Age: 54
End: 2019-03-07

## 2019-03-07 ENCOUNTER — RECORDS - HEALTHEAST (OUTPATIENT)
Dept: LAB | Facility: CLINIC | Age: 54
End: 2019-03-07

## 2019-03-07 LAB — INR PPP: 2.22 (ref 0.9–1.1)

## 2019-03-08 ENCOUNTER — OFFICE VISIT - HEALTHEAST (OUTPATIENT)
Dept: GERIATRICS | Facility: CLINIC | Age: 54
End: 2019-03-08

## 2019-03-08 ENCOUNTER — COMMUNICATION - HEALTHEAST (OUTPATIENT)
Dept: GERIATRICS | Facility: CLINIC | Age: 54
End: 2019-03-08

## 2019-03-08 DIAGNOSIS — R52 PAIN MANAGEMENT: ICD-10-CM

## 2019-03-08 DIAGNOSIS — M25.50 POLYARTHRALGIA: ICD-10-CM

## 2019-03-08 DIAGNOSIS — M16.12 PRIMARY OSTEOARTHRITIS OF LEFT HIP: ICD-10-CM

## 2019-04-04 ENCOUNTER — RECORDS - HEALTHEAST (OUTPATIENT)
Dept: LAB | Facility: CLINIC | Age: 54
End: 2019-04-04

## 2019-04-04 LAB — INR PPP: 2.28 (ref 0.9–1.1)

## 2019-04-25 ENCOUNTER — OFFICE VISIT - HEALTHEAST (OUTPATIENT)
Dept: GERIATRICS | Facility: CLINIC | Age: 54
End: 2019-04-25

## 2019-04-25 DIAGNOSIS — M25.50 POLYARTHRALGIA: ICD-10-CM

## 2019-04-25 DIAGNOSIS — R53.81 PHYSICAL DEBILITY: ICD-10-CM

## 2019-04-25 DIAGNOSIS — M05.79 SEROPOSITIVE RHEUMATOID ARTHRITIS OF MULTIPLE SITES (H): ICD-10-CM

## 2019-04-25 DIAGNOSIS — R52 PAIN MANAGEMENT: ICD-10-CM

## 2019-04-25 DIAGNOSIS — E66.01 MORBID OBESITY (H): ICD-10-CM

## 2019-04-26 ENCOUNTER — AMBULATORY - HEALTHEAST (OUTPATIENT)
Dept: LAB | Facility: CLINIC | Age: 54
End: 2019-04-26

## 2019-04-26 ENCOUNTER — OFFICE VISIT - HEALTHEAST (OUTPATIENT)
Dept: RHEUMATOLOGY | Facility: CLINIC | Age: 54
End: 2019-04-26

## 2019-04-26 DIAGNOSIS — M15.0 PRIMARY OSTEOARTHRITIS INVOLVING MULTIPLE JOINTS: ICD-10-CM

## 2019-04-26 DIAGNOSIS — M05.79 SEROPOSITIVE RHEUMATOID ARTHRITIS OF MULTIPLE SITES (H): ICD-10-CM

## 2019-04-26 DIAGNOSIS — M25.50 POLYARTHRALGIA: ICD-10-CM

## 2019-04-26 DIAGNOSIS — Z79.899 HIGH RISK MEDICATION USE: ICD-10-CM

## 2019-04-26 DIAGNOSIS — R76.8 CYCLIC CITRULLINATED PEPTIDE (CCP) ANTIBODY POSITIVE: ICD-10-CM

## 2019-04-26 LAB
ALBUMIN SERPL-MCNC: 3.6 G/DL (ref 3.5–5)
ALT SERPL W P-5'-P-CCNC: 15 U/L (ref 0–45)
CREAT SERPL-MCNC: 0.79 MG/DL (ref 0.6–1.1)
ERYTHROCYTE [DISTWIDTH] IN BLOOD BY AUTOMATED COUNT: 12.3 % (ref 11–14.5)
GFR SERPL CREATININE-BSD FRML MDRD: >60 ML/MIN/1.73M2
HCT VFR BLD AUTO: 43.5 % (ref 35–47)
HGB BLD-MCNC: 13.9 G/DL (ref 12–16)
MCH RBC QN AUTO: 29.9 PG (ref 27–34)
MCHC RBC AUTO-ENTMCNC: 32 G/DL (ref 32–36)
MCV RBC AUTO: 93 FL (ref 80–100)
PLATELET # BLD AUTO: 279 THOU/UL (ref 140–440)
PMV BLD AUTO: 7.8 FL (ref 7–10)
RBC # BLD AUTO: 4.66 MILL/UL (ref 3.8–5.4)
WBC: 4.3 THOU/UL (ref 4–11)

## 2019-04-26 RX ORDER — ACETAMINOPHEN 325 MG/1
650 TABLET ORAL EVERY 6 HOURS PRN
Status: SHIPPED | COMMUNITY
Start: 2019-04-26 | End: 2022-11-16

## 2019-05-03 ENCOUNTER — COMMUNICATION - HEALTHEAST (OUTPATIENT)
Dept: GERIATRICS | Facility: CLINIC | Age: 54
End: 2019-05-03

## 2019-05-03 ENCOUNTER — RECORDS - HEALTHEAST (OUTPATIENT)
Dept: LAB | Facility: CLINIC | Age: 54
End: 2019-05-03

## 2019-05-03 LAB — INR PPP: 1.88 (ref 0.9–1.1)

## 2019-05-28 ENCOUNTER — OFFICE VISIT - HEALTHEAST (OUTPATIENT)
Dept: GERIATRICS | Facility: CLINIC | Age: 54
End: 2019-05-28

## 2019-05-28 DIAGNOSIS — Z79.899 HIGH RISK MEDICATION USE: ICD-10-CM

## 2019-05-28 DIAGNOSIS — Z96.649 HISTORY OF TOTAL HIP REPLACEMENT, UNSPECIFIED LATERALITY: ICD-10-CM

## 2019-05-28 DIAGNOSIS — Z79.01 WARFARIN ANTICOAGULATION: ICD-10-CM

## 2019-05-28 DIAGNOSIS — M05.79 SEROPOSITIVE RHEUMATOID ARTHRITIS OF MULTIPLE SITES (H): ICD-10-CM

## 2019-05-28 DIAGNOSIS — I10 ESSENTIAL HYPERTENSION WITH GOAL BLOOD PRESSURE LESS THAN 140/90: ICD-10-CM

## 2019-05-28 DIAGNOSIS — M15.0 PRIMARY OSTEOARTHRITIS INVOLVING MULTIPLE JOINTS: ICD-10-CM

## 2019-05-28 DIAGNOSIS — E55.9 VITAMIN D DEFICIENCY: ICD-10-CM

## 2019-05-28 DIAGNOSIS — M16.9 OSTEOARTHRITIS OF HIP, UNSPECIFIED LATERALITY, UNSPECIFIED OSTEOARTHRITIS TYPE: ICD-10-CM

## 2019-05-28 DIAGNOSIS — Z96.652 HISTORY OF TOTAL LEFT KNEE REPLACEMENT (TKR): ICD-10-CM

## 2019-05-28 DIAGNOSIS — R53.81 PHYSICAL DEBILITY: ICD-10-CM

## 2019-05-28 DIAGNOSIS — E66.01 MORBID OBESITY (H): ICD-10-CM

## 2019-05-30 ENCOUNTER — COMMUNICATION - HEALTHEAST (OUTPATIENT)
Dept: GERIATRICS | Facility: CLINIC | Age: 54
End: 2019-05-30

## 2019-05-30 ENCOUNTER — RECORDS - HEALTHEAST (OUTPATIENT)
Dept: LAB | Facility: CLINIC | Age: 54
End: 2019-05-30

## 2019-05-30 LAB — INR PPP: 2.2 (ref 0.9–1.1)

## 2019-06-25 ENCOUNTER — COMMUNICATION - HEALTHEAST (OUTPATIENT)
Dept: RHEUMATOLOGY | Facility: CLINIC | Age: 54
End: 2019-06-25

## 2019-06-25 DIAGNOSIS — M05.79 SEROPOSITIVE RHEUMATOID ARTHRITIS OF MULTIPLE SITES (H): ICD-10-CM

## 2019-06-27 ENCOUNTER — RECORDS - HEALTHEAST (OUTPATIENT)
Dept: LAB | Facility: CLINIC | Age: 54
End: 2019-06-27

## 2019-06-27 LAB — INR PPP: 2.78 (ref 0.9–1.1)

## 2019-06-28 ENCOUNTER — COMMUNICATION - HEALTHEAST (OUTPATIENT)
Dept: GERIATRICS | Facility: CLINIC | Age: 54
End: 2019-06-28

## 2019-07-16 ENCOUNTER — COMMUNICATION - HEALTHEAST (OUTPATIENT)
Dept: RHEUMATOLOGY | Facility: CLINIC | Age: 54
End: 2019-07-16

## 2019-07-17 ENCOUNTER — AMBULATORY - HEALTHEAST (OUTPATIENT)
Dept: ADMINISTRATIVE | Facility: CLINIC | Age: 54
End: 2019-07-17

## 2019-07-17 ENCOUNTER — OFFICE VISIT - HEALTHEAST (OUTPATIENT)
Dept: GERIATRICS | Facility: CLINIC | Age: 54
End: 2019-07-17

## 2019-07-17 ENCOUNTER — RECORDS - HEALTHEAST (OUTPATIENT)
Dept: ADMINISTRATIVE | Facility: OTHER | Age: 54
End: 2019-07-17

## 2019-07-17 ENCOUNTER — COMMUNICATION - HEALTHEAST (OUTPATIENT)
Dept: RHEUMATOLOGY | Facility: CLINIC | Age: 54
End: 2019-07-17

## 2019-07-17 ENCOUNTER — AMBULATORY - HEALTHEAST (OUTPATIENT)
Dept: GERIATRICS | Facility: CLINIC | Age: 54
End: 2019-07-17

## 2019-07-17 DIAGNOSIS — I10 ESSENTIAL HYPERTENSION WITH GOAL BLOOD PRESSURE LESS THAN 140/90: ICD-10-CM

## 2019-07-17 DIAGNOSIS — M15.0 PRIMARY OSTEOARTHRITIS INVOLVING MULTIPLE JOINTS: ICD-10-CM

## 2019-07-17 DIAGNOSIS — E55.9 VITAMIN D DEFICIENCY: ICD-10-CM

## 2019-07-17 DIAGNOSIS — Z96.642 HISTORY OF TOTAL LEFT HIP REPLACEMENT: ICD-10-CM

## 2019-07-17 DIAGNOSIS — Z96.652 HISTORY OF TOTAL LEFT KNEE REPLACEMENT (TKR): ICD-10-CM

## 2019-07-17 DIAGNOSIS — E66.01 MORBID OBESITY (H): ICD-10-CM

## 2019-07-17 DIAGNOSIS — Z79.899 HIGH RISK MEDICATION USE: ICD-10-CM

## 2019-07-17 DIAGNOSIS — M05.79 SEROPOSITIVE RHEUMATOID ARTHRITIS OF MULTIPLE SITES (H): ICD-10-CM

## 2019-07-17 DIAGNOSIS — Z79.01 WARFARIN ANTICOAGULATION: ICD-10-CM

## 2019-07-18 ENCOUNTER — COMMUNICATION - HEALTHEAST (OUTPATIENT)
Dept: GERIATRICS | Facility: CLINIC | Age: 54
End: 2019-07-18

## 2019-07-18 ENCOUNTER — RECORDS - HEALTHEAST (OUTPATIENT)
Dept: LAB | Facility: CLINIC | Age: 54
End: 2019-07-18

## 2019-07-18 LAB
ALP SERPL-CCNC: 85 U/L (ref 45–120)
ALT SERPL W P-5'-P-CCNC: 27 U/L (ref 0–45)
AST SERPL W P-5'-P-CCNC: 19 U/L (ref 0–40)
BASOPHILS # BLD AUTO: 0.1 THOU/UL (ref 0–0.2)
BASOPHILS NFR BLD AUTO: 1 % (ref 0–2)
BILIRUB SERPL-MCNC: 0.3 MG/DL (ref 0–1)
BUN SERPL-MCNC: 11 MG/DL (ref 8–22)
C REACTIVE PROTEIN LHE: 4.9 MG/DL (ref 0–0.8)
CREAT SERPL-MCNC: 0.7 MG/DL (ref 0.6–1.1)
EOSINOPHIL # BLD AUTO: 0.2 THOU/UL (ref 0–0.4)
EOSINOPHIL NFR BLD AUTO: 3 % (ref 0–6)
ERYTHROCYTE [DISTWIDTH] IN BLOOD BY AUTOMATED COUNT: 15 % (ref 11–14.5)
GFR SERPL CREATININE-BSD FRML MDRD: >60 ML/MIN/1.73M2
HCT VFR BLD AUTO: 40.1 % (ref 35–47)
HGB BLD-MCNC: 12.7 G/DL (ref 12–16)
INR PPP: 2.79 (ref 0.9–1.1)
LYMPHOCYTES # BLD AUTO: 1.9 THOU/UL (ref 0.8–4.4)
LYMPHOCYTES NFR BLD AUTO: 30 % (ref 20–40)
MCH RBC QN AUTO: 29.6 PG (ref 27–34)
MCHC RBC AUTO-ENTMCNC: 31.7 G/DL (ref 32–36)
MCV RBC AUTO: 94 FL (ref 80–100)
MONOCYTES # BLD AUTO: 0.5 THOU/UL (ref 0–0.9)
MONOCYTES NFR BLD AUTO: 7 % (ref 2–10)
NEUTROPHILS # BLD AUTO: 3.7 THOU/UL (ref 2–7.7)
NEUTROPHILS NFR BLD AUTO: 59 % (ref 50–70)
PLAT MORPH BLD: NORMAL
PLATELET # BLD AUTO: 257 THOU/UL (ref 140–440)
PMV BLD AUTO: 10.4 FL (ref 8.5–12.5)
POLYCHROMASIA BLD QL SMEAR: ABNORMAL
RBC # BLD AUTO: 4.29 MILL/UL (ref 3.8–5.4)
REACTIVE LYMPHS: ABNORMAL
WBC: 6.5 THOU/UL (ref 4–11)

## 2019-07-22 ENCOUNTER — COMMUNICATION - HEALTHEAST (OUTPATIENT)
Dept: GERIATRICS | Facility: CLINIC | Age: 54
End: 2019-07-22

## 2019-07-25 ENCOUNTER — RECORDS - HEALTHEAST (OUTPATIENT)
Dept: LAB | Facility: CLINIC | Age: 54
End: 2019-07-25

## 2019-07-25 ENCOUNTER — COMMUNICATION - HEALTHEAST (OUTPATIENT)
Dept: GERIATRICS | Facility: CLINIC | Age: 54
End: 2019-07-25

## 2019-07-25 LAB
ALP SERPL-CCNC: 69 U/L (ref 45–120)
ALT SERPL W P-5'-P-CCNC: 22 U/L (ref 0–45)
AST SERPL W P-5'-P-CCNC: 23 U/L (ref 0–40)
BASOPHILS # BLD AUTO: 0.1 THOU/UL (ref 0–0.2)
BASOPHILS NFR BLD AUTO: 2 % (ref 0–2)
BILIRUB DIRECT SERPL-MCNC: 0.1 MG/DL
BILIRUB SERPL-MCNC: 0.3 MG/DL (ref 0–1)
BUN SERPL-MCNC: 15 MG/DL (ref 8–22)
C REACTIVE PROTEIN LHE: 6.6 MG/DL (ref 0–0.8)
CREAT SERPL-MCNC: 0.63 MG/DL (ref 0.6–1.1)
EOSINOPHIL # BLD AUTO: 0.2 THOU/UL (ref 0–0.4)
EOSINOPHIL NFR BLD AUTO: 3 % (ref 0–6)
ERYTHROCYTE [DISTWIDTH] IN BLOOD BY AUTOMATED COUNT: 14.6 % (ref 11–14.5)
GFR SERPL CREATININE-BSD FRML MDRD: >60 ML/MIN/1.73M2
HCT VFR BLD AUTO: 41.1 % (ref 35–47)
HGB BLD-MCNC: 12.6 G/DL (ref 12–16)
INR PPP: 2.23 (ref 0.9–1.1)
LYMPHOCYTES # BLD AUTO: 1.8 THOU/UL (ref 0.8–4.4)
LYMPHOCYTES NFR BLD AUTO: 38 % (ref 20–40)
MCH RBC QN AUTO: 29.1 PG (ref 27–34)
MCHC RBC AUTO-ENTMCNC: 30.7 G/DL (ref 32–36)
MCV RBC AUTO: 95 FL (ref 80–100)
MONOCYTES # BLD AUTO: 0.5 THOU/UL (ref 0–0.9)
MONOCYTES NFR BLD AUTO: 11 % (ref 2–10)
NEUTROPHILS # BLD AUTO: 2.2 THOU/UL (ref 2–7.7)
NEUTROPHILS NFR BLD AUTO: 46 % (ref 50–70)
PLATELET # BLD AUTO: 321 THOU/UL (ref 140–440)
PMV BLD AUTO: 10 FL (ref 8.5–12.5)
RBC # BLD AUTO: 4.33 MILL/UL (ref 3.8–5.4)
WBC: 4.8 THOU/UL (ref 4–11)

## 2019-08-08 ENCOUNTER — COMMUNICATION - HEALTHEAST (OUTPATIENT)
Dept: GERIATRICS | Facility: CLINIC | Age: 54
End: 2019-08-08

## 2019-08-09 ENCOUNTER — COMMUNICATION - HEALTHEAST (OUTPATIENT)
Dept: GERIATRICS | Facility: CLINIC | Age: 54
End: 2019-08-09

## 2019-08-09 ENCOUNTER — RECORDS - HEALTHEAST (OUTPATIENT)
Dept: LAB | Facility: CLINIC | Age: 54
End: 2019-08-09

## 2019-08-09 LAB — INR PPP: 2.79 (ref 0.9–1.1)

## 2019-08-29 ENCOUNTER — OFFICE VISIT - HEALTHEAST (OUTPATIENT)
Dept: GERIATRICS | Facility: CLINIC | Age: 54
End: 2019-08-29

## 2019-08-29 DIAGNOSIS — I73.9 PAD (PERIPHERAL ARTERY DISEASE) (H): ICD-10-CM

## 2019-08-29 DIAGNOSIS — M16.9 OSTEOARTHRITIS OF HIP, UNSPECIFIED LATERALITY, UNSPECIFIED OSTEOARTHRITIS TYPE: ICD-10-CM

## 2019-08-29 DIAGNOSIS — M05.79 SEROPOSITIVE RHEUMATOID ARTHRITIS OF MULTIPLE SITES (H): ICD-10-CM

## 2019-08-29 DIAGNOSIS — M54.10 RADICULOPATHY OF LEG: ICD-10-CM

## 2019-08-29 DIAGNOSIS — M25.50 POLYARTHRALGIA: ICD-10-CM

## 2019-09-06 ENCOUNTER — COMMUNICATION - HEALTHEAST (OUTPATIENT)
Dept: GERIATRICS | Facility: CLINIC | Age: 54
End: 2019-09-06

## 2019-09-06 ENCOUNTER — RECORDS - HEALTHEAST (OUTPATIENT)
Dept: LAB | Facility: CLINIC | Age: 54
End: 2019-09-06

## 2019-09-06 LAB — INR PPP: 3.15 (ref 0.9–1.1)

## 2019-09-09 ENCOUNTER — RECORDS - HEALTHEAST (OUTPATIENT)
Dept: ADMINISTRATIVE | Facility: OTHER | Age: 54
End: 2019-09-09

## 2019-09-10 ENCOUNTER — AMBULATORY - HEALTHEAST (OUTPATIENT)
Dept: LAB | Facility: CLINIC | Age: 54
End: 2019-09-10

## 2019-09-10 DIAGNOSIS — M05.79 SEROPOSITIVE RHEUMATOID ARTHRITIS OF MULTIPLE SITES (H): ICD-10-CM

## 2019-09-10 LAB
ALBUMIN SERPL-MCNC: 3.4 G/DL (ref 3.5–5)
ALT SERPL W P-5'-P-CCNC: 20 U/L (ref 0–45)
CREAT SERPL-MCNC: 0.69 MG/DL (ref 0.6–1.1)
ERYTHROCYTE [DISTWIDTH] IN BLOOD BY AUTOMATED COUNT: 12.9 % (ref 11–14.5)
GFR SERPL CREATININE-BSD FRML MDRD: >60 ML/MIN/1.73M2
HCT VFR BLD AUTO: 41.6 % (ref 35–47)
HGB BLD-MCNC: 14 G/DL (ref 12–16)
MCH RBC QN AUTO: 29.6 PG (ref 27–34)
MCHC RBC AUTO-ENTMCNC: 33.6 G/DL (ref 32–36)
MCV RBC AUTO: 88 FL (ref 80–100)
PLATELET # BLD AUTO: 229 THOU/UL (ref 140–440)
PMV BLD AUTO: 7.6 FL (ref 7–10)
RBC # BLD AUTO: 4.72 MILL/UL (ref 3.8–5.4)
WBC: 4.7 THOU/UL (ref 4–11)

## 2019-09-12 ENCOUNTER — OFFICE VISIT - HEALTHEAST (OUTPATIENT)
Dept: RHEUMATOLOGY | Facility: CLINIC | Age: 54
End: 2019-09-12

## 2019-09-12 DIAGNOSIS — R76.8 CYCLIC CITRULLINATED PEPTIDE (CCP) ANTIBODY POSITIVE: ICD-10-CM

## 2019-09-12 DIAGNOSIS — M25.50 POLYARTHRALGIA: ICD-10-CM

## 2019-09-12 DIAGNOSIS — M05.79 SEROPOSITIVE RHEUMATOID ARTHRITIS OF MULTIPLE SITES (H): ICD-10-CM

## 2019-09-12 DIAGNOSIS — M15.0 PRIMARY OSTEOARTHRITIS INVOLVING MULTIPLE JOINTS: ICD-10-CM

## 2019-09-20 ENCOUNTER — COMMUNICATION - HEALTHEAST (OUTPATIENT)
Dept: GERIATRICS | Facility: CLINIC | Age: 54
End: 2019-09-20

## 2019-09-20 DIAGNOSIS — B02.9 SHINGLES OUTBREAK: ICD-10-CM

## 2019-09-21 RX ORDER — OXYCODONE HYDROCHLORIDE 5 MG/1
CAPSULE ORAL
Qty: 60 TABLET | Refills: 0 | Status: SHIPPED | OUTPATIENT
Start: 2019-09-21 | End: 2022-05-17

## 2019-09-23 ENCOUNTER — OFFICE VISIT - HEALTHEAST (OUTPATIENT)
Dept: GERIATRICS | Facility: CLINIC | Age: 54
End: 2019-09-23

## 2019-09-23 DIAGNOSIS — E66.01 MORBID OBESITY (H): ICD-10-CM

## 2019-09-23 DIAGNOSIS — M25.50 POLYARTHRALGIA: ICD-10-CM

## 2019-09-23 DIAGNOSIS — R52 PAIN MANAGEMENT: ICD-10-CM

## 2019-09-23 DIAGNOSIS — E55.9 VITAMIN D DEFICIENCY: ICD-10-CM

## 2019-09-23 DIAGNOSIS — I82.403 DEEP VEIN THROMBOSIS (DVT) OF BOTH LOWER EXTREMITIES, UNSPECIFIED CHRONICITY, UNSPECIFIED VEIN (H): ICD-10-CM

## 2019-09-23 DIAGNOSIS — M16.9 OSTEOARTHRITIS OF HIP, UNSPECIFIED LATERALITY, UNSPECIFIED OSTEOARTHRITIS TYPE: ICD-10-CM

## 2019-09-23 DIAGNOSIS — Z79.01 WARFARIN ANTICOAGULATION: ICD-10-CM

## 2019-09-23 DIAGNOSIS — M05.79 SEROPOSITIVE RHEUMATOID ARTHRITIS OF MULTIPLE SITES (H): ICD-10-CM

## 2019-09-23 DIAGNOSIS — Z79.899 HIGH RISK MEDICATION USE: ICD-10-CM

## 2019-09-23 DIAGNOSIS — I10 ESSENTIAL HYPERTENSION WITH GOAL BLOOD PRESSURE LESS THAN 140/90: ICD-10-CM

## 2019-09-24 RX ORDER — NYSTATIN 100000/G
POWDER (GRAM) TOPICAL
Qty: 15 G | Refills: 0 | Status: SHIPPED | COMMUNITY
Start: 2019-09-24 | End: 2022-11-16 | Stop reason: DRUGHIGH

## 2019-09-26 ENCOUNTER — OFFICE VISIT - HEALTHEAST (OUTPATIENT)
Dept: GERIATRICS | Facility: CLINIC | Age: 54
End: 2019-09-26

## 2019-09-26 DIAGNOSIS — M25.562 ACUTE PAIN OF LEFT KNEE: ICD-10-CM

## 2019-09-26 DIAGNOSIS — R29.898 WEAKNESS OF LEFT LOWER EXTREMITY: ICD-10-CM

## 2019-09-26 DIAGNOSIS — M25.462 KNEE EFFUSION, LEFT: ICD-10-CM

## 2019-09-26 DIAGNOSIS — M25.552 PAIN OF LEFT HIP JOINT: ICD-10-CM

## 2019-10-03 ENCOUNTER — COMMUNICATION - HEALTHEAST (OUTPATIENT)
Dept: GERIATRICS | Facility: CLINIC | Age: 54
End: 2019-10-03

## 2019-10-04 ENCOUNTER — RECORDS - HEALTHEAST (OUTPATIENT)
Dept: LAB | Facility: CLINIC | Age: 54
End: 2019-10-04

## 2019-10-04 LAB — INR PPP: 2.81 (ref 0.9–1.1)

## 2019-10-07 ENCOUNTER — RECORDS - HEALTHEAST (OUTPATIENT)
Dept: LAB | Facility: CLINIC | Age: 54
End: 2019-10-07

## 2019-10-07 LAB — INR PPP: 2.31 (ref 0.9–1.1)

## 2019-10-17 ENCOUNTER — OFFICE VISIT - HEALTHEAST (OUTPATIENT)
Dept: GERIATRICS | Facility: CLINIC | Age: 54
End: 2019-10-17

## 2019-10-17 DIAGNOSIS — R52 PAIN MANAGEMENT: ICD-10-CM

## 2019-10-17 DIAGNOSIS — R53.81 PHYSICAL DEBILITY: ICD-10-CM

## 2019-10-17 DIAGNOSIS — I10 ESSENTIAL HYPERTENSION WITH GOAL BLOOD PRESSURE LESS THAN 140/90: ICD-10-CM

## 2019-10-17 DIAGNOSIS — M25.50 POLYARTHRALGIA: ICD-10-CM

## 2019-11-07 ENCOUNTER — COMMUNICATION - HEALTHEAST (OUTPATIENT)
Dept: GERIATRICS | Facility: CLINIC | Age: 54
End: 2019-11-07

## 2019-11-08 ENCOUNTER — RECORDS - HEALTHEAST (OUTPATIENT)
Dept: LAB | Facility: CLINIC | Age: 54
End: 2019-11-08

## 2019-11-08 ENCOUNTER — COMMUNICATION - HEALTHEAST (OUTPATIENT)
Dept: GERIATRICS | Facility: CLINIC | Age: 54
End: 2019-11-08

## 2019-11-08 LAB — INR PPP: 2.5 (ref 0.9–1.1)

## 2019-11-15 ENCOUNTER — COMMUNICATION - HEALTHEAST (OUTPATIENT)
Dept: GERIATRICS | Facility: CLINIC | Age: 54
End: 2019-11-15

## 2019-11-26 ENCOUNTER — OFFICE VISIT - HEALTHEAST (OUTPATIENT)
Dept: GERIATRICS | Facility: CLINIC | Age: 54
End: 2019-11-26

## 2019-11-26 DIAGNOSIS — M25.50 POLYARTHRALGIA: ICD-10-CM

## 2019-11-26 DIAGNOSIS — I73.9 PAD (PERIPHERAL ARTERY DISEASE) (H): ICD-10-CM

## 2019-11-26 DIAGNOSIS — R52 PAIN MANAGEMENT: ICD-10-CM

## 2019-11-26 DIAGNOSIS — M05.79 SEROPOSITIVE RHEUMATOID ARTHRITIS OF MULTIPLE SITES (H): ICD-10-CM

## 2019-12-05 ENCOUNTER — AMBULATORY - HEALTHEAST (OUTPATIENT)
Dept: LAB | Facility: CLINIC | Age: 54
End: 2019-12-05

## 2019-12-05 ENCOUNTER — RECORDS - HEALTHEAST (OUTPATIENT)
Dept: LAB | Facility: CLINIC | Age: 54
End: 2019-12-05

## 2019-12-05 DIAGNOSIS — M05.79 SEROPOSITIVE RHEUMATOID ARTHRITIS OF MULTIPLE SITES (H): ICD-10-CM

## 2019-12-05 DIAGNOSIS — I82.409 DEEP VENOUS THROMBOSIS OF LOWER EXTREMITY (H): ICD-10-CM

## 2019-12-05 LAB
ALBUMIN SERPL-MCNC: 3.4 G/DL (ref 3.5–5)
ALT SERPL W P-5'-P-CCNC: 22 U/L (ref 0–45)
CREAT SERPL-MCNC: 0.75 MG/DL (ref 0.6–1.1)
ERYTHROCYTE [DISTWIDTH] IN BLOOD BY AUTOMATED COUNT: 14.5 % (ref 11–14.5)
FLUAV AG SPEC QL IA: NORMAL
FLUBV AG SPEC QL IA: NORMAL
GFR SERPL CREATININE-BSD FRML MDRD: >60 ML/MIN/1.73M2
HCT VFR BLD AUTO: 43.1 % (ref 35–47)
HGB BLD-MCNC: 13.9 G/DL (ref 12–16)
INR PPP: 2.62 (ref 0.9–1.1)
MCH RBC QN AUTO: 29 PG (ref 27–34)
MCHC RBC AUTO-ENTMCNC: 32.2 G/DL (ref 32–36)
MCV RBC AUTO: 90 FL (ref 80–100)
PLATELET # BLD AUTO: 193 THOU/UL (ref 140–440)
PMV BLD AUTO: 7.2 FL (ref 7–10)
RBC # BLD AUTO: 4.79 MILL/UL (ref 3.8–5.4)
WBC: 2.5 THOU/UL (ref 4–11)

## 2019-12-06 ENCOUNTER — COMMUNICATION - HEALTHEAST (OUTPATIENT)
Dept: GERIATRICS | Facility: CLINIC | Age: 54
End: 2019-12-06

## 2019-12-09 ENCOUNTER — COMMUNICATION - HEALTHEAST (OUTPATIENT)
Dept: RHEUMATOLOGY | Facility: CLINIC | Age: 54
End: 2019-12-09

## 2019-12-11 ENCOUNTER — COMMUNICATION - HEALTHEAST (OUTPATIENT)
Dept: ADMINISTRATIVE | Facility: CLINIC | Age: 54
End: 2019-12-11

## 2019-12-11 DIAGNOSIS — D72.819 LEUKOPENIA: ICD-10-CM

## 2019-12-12 ENCOUNTER — OFFICE VISIT - HEALTHEAST (OUTPATIENT)
Dept: RHEUMATOLOGY | Facility: CLINIC | Age: 54
End: 2019-12-12

## 2019-12-12 ENCOUNTER — AMBULATORY - HEALTHEAST (OUTPATIENT)
Dept: LAB | Facility: CLINIC | Age: 54
End: 2019-12-12

## 2019-12-12 DIAGNOSIS — M05.79 SEROPOSITIVE RHEUMATOID ARTHRITIS OF MULTIPLE SITES (H): ICD-10-CM

## 2019-12-12 DIAGNOSIS — M25.50 POLYARTHRALGIA: ICD-10-CM

## 2019-12-12 DIAGNOSIS — M15.0 PRIMARY OSTEOARTHRITIS INVOLVING MULTIPLE JOINTS: ICD-10-CM

## 2019-12-12 DIAGNOSIS — R76.8 CYCLIC CITRULLINATED PEPTIDE (CCP) ANTIBODY POSITIVE: ICD-10-CM

## 2019-12-12 DIAGNOSIS — Z79.899 HIGH RISK MEDICATION USE: ICD-10-CM

## 2019-12-12 DIAGNOSIS — D72.819 LEUKOPENIA: ICD-10-CM

## 2019-12-12 LAB
ERYTHROCYTE [DISTWIDTH] IN BLOOD BY AUTOMATED COUNT: 14.7 % (ref 11–14.5)
HCT VFR BLD AUTO: 44.2 % (ref 35–47)
HGB BLD-MCNC: 14.5 G/DL (ref 12–16)
MCH RBC QN AUTO: 29.2 PG (ref 27–34)
MCHC RBC AUTO-ENTMCNC: 32.8 G/DL (ref 32–36)
MCV RBC AUTO: 89 FL (ref 80–100)
PLATELET # BLD AUTO: 214 THOU/UL (ref 140–440)
PMV BLD AUTO: 7.4 FL (ref 7–10)
RBC # BLD AUTO: 4.97 MILL/UL (ref 3.8–5.4)
WBC: 4.3 THOU/UL (ref 4–11)

## 2019-12-12 RX ORDER — DULOXETIN HYDROCHLORIDE 20 MG/1
20 CAPSULE, DELAYED RELEASE ORAL DAILY
Qty: 90 CAPSULE | Refills: 2 | Status: SHIPPED | OUTPATIENT
Start: 2019-12-12 | End: 2024-06-25

## 2019-12-12 ASSESSMENT — MIFFLIN-ST. JEOR: SCORE: 2180.27

## 2019-12-26 ENCOUNTER — OFFICE VISIT - HEALTHEAST (OUTPATIENT)
Dept: GERIATRICS | Facility: CLINIC | Age: 54
End: 2019-12-26

## 2019-12-26 DIAGNOSIS — I10 ESSENTIAL HYPERTENSION WITH GOAL BLOOD PRESSURE LESS THAN 140/90: ICD-10-CM

## 2019-12-26 DIAGNOSIS — M15.0 PRIMARY OSTEOARTHRITIS INVOLVING MULTIPLE JOINTS: ICD-10-CM

## 2019-12-26 DIAGNOSIS — I73.9 PAD (PERIPHERAL ARTERY DISEASE) (H): ICD-10-CM

## 2019-12-26 DIAGNOSIS — R52 PAIN MANAGEMENT: ICD-10-CM

## 2020-01-02 ENCOUNTER — COMMUNICATION - HEALTHEAST (OUTPATIENT)
Dept: GERIATRICS | Facility: CLINIC | Age: 55
End: 2020-01-02

## 2020-01-03 ENCOUNTER — COMMUNICATION - HEALTHEAST (OUTPATIENT)
Dept: GERIATRICS | Facility: CLINIC | Age: 55
End: 2020-01-03

## 2020-01-06 ENCOUNTER — RECORDS - HEALTHEAST (OUTPATIENT)
Dept: LAB | Facility: CLINIC | Age: 55
End: 2020-01-06

## 2020-01-06 ENCOUNTER — COMMUNICATION - HEALTHEAST (OUTPATIENT)
Dept: GERIATRICS | Facility: CLINIC | Age: 55
End: 2020-01-06

## 2020-01-06 LAB — INR PPP: 1.87 (ref 0.9–1.1)

## 2020-01-13 ENCOUNTER — COMMUNICATION - HEALTHEAST (OUTPATIENT)
Dept: RHEUMATOLOGY | Facility: CLINIC | Age: 55
End: 2020-01-13

## 2020-01-13 DIAGNOSIS — M05.79 SEROPOSITIVE RHEUMATOID ARTHRITIS OF MULTIPLE SITES (H): ICD-10-CM

## 2020-01-14 ENCOUNTER — OFFICE VISIT - HEALTHEAST (OUTPATIENT)
Dept: GERIATRICS | Facility: CLINIC | Age: 55
End: 2020-01-14

## 2020-01-14 DIAGNOSIS — E66.01 MORBID OBESITY (H): ICD-10-CM

## 2020-01-14 DIAGNOSIS — M05.79 SEROPOSITIVE RHEUMATOID ARTHRITIS OF MULTIPLE SITES (H): ICD-10-CM

## 2020-01-14 DIAGNOSIS — R76.8 CYCLIC CITRULLINATED PEPTIDE (CCP) ANTIBODY POSITIVE: ICD-10-CM

## 2020-01-14 DIAGNOSIS — M15.0 PRIMARY OSTEOARTHRITIS INVOLVING MULTIPLE JOINTS: ICD-10-CM

## 2020-01-14 DIAGNOSIS — I82.403 DEEP VEIN THROMBOSIS (DVT) OF BOTH LOWER EXTREMITIES, UNSPECIFIED CHRONICITY, UNSPECIFIED VEIN (H): ICD-10-CM

## 2020-01-14 DIAGNOSIS — Z79.01 WARFARIN ANTICOAGULATION: ICD-10-CM

## 2020-01-15 ENCOUNTER — COMMUNICATION - HEALTHEAST (OUTPATIENT)
Dept: ADMINISTRATIVE | Facility: CLINIC | Age: 55
End: 2020-01-15

## 2020-01-23 ENCOUNTER — AMBULATORY - HEALTHEAST (OUTPATIENT)
Dept: LAB | Facility: CLINIC | Age: 55
End: 2020-01-23

## 2020-01-23 DIAGNOSIS — M05.79 SEROPOSITIVE RHEUMATOID ARTHRITIS OF MULTIPLE SITES (H): ICD-10-CM

## 2020-01-23 LAB
ALBUMIN SERPL-MCNC: 3.6 G/DL (ref 3.5–5)
ALT SERPL W P-5'-P-CCNC: 22 U/L (ref 0–45)
CREAT SERPL-MCNC: 0.7 MG/DL (ref 0.6–1.1)
ERYTHROCYTE [DISTWIDTH] IN BLOOD BY AUTOMATED COUNT: 13.2 % (ref 11–14.5)
GFR SERPL CREATININE-BSD FRML MDRD: >60 ML/MIN/1.73M2
HCT VFR BLD AUTO: 43.4 % (ref 35–47)
HGB BLD-MCNC: 14.1 G/DL (ref 12–16)
MCH RBC QN AUTO: 30.3 PG (ref 27–34)
MCHC RBC AUTO-ENTMCNC: 32.5 G/DL (ref 32–36)
MCV RBC AUTO: 93 FL (ref 80–100)
PLATELET # BLD AUTO: 215 THOU/UL (ref 140–440)
PMV BLD AUTO: 7.7 FL (ref 7–10)
RBC # BLD AUTO: 4.66 MILL/UL (ref 3.8–5.4)
WBC: 4.6 THOU/UL (ref 4–11)

## 2020-02-03 ENCOUNTER — COMMUNICATION - HEALTHEAST (OUTPATIENT)
Dept: GERIATRICS | Facility: CLINIC | Age: 55
End: 2020-02-03

## 2020-02-04 ENCOUNTER — COMMUNICATION - HEALTHEAST (OUTPATIENT)
Dept: GERIATRICS | Facility: CLINIC | Age: 55
End: 2020-02-04

## 2020-02-04 ENCOUNTER — RECORDS - HEALTHEAST (OUTPATIENT)
Dept: LAB | Facility: CLINIC | Age: 55
End: 2020-02-04

## 2020-02-04 LAB — INR PPP: 2.12 (ref 0.9–1.1)

## 2020-02-27 ENCOUNTER — OFFICE VISIT - HEALTHEAST (OUTPATIENT)
Dept: GERIATRICS | Facility: CLINIC | Age: 55
End: 2020-02-27

## 2020-02-27 DIAGNOSIS — I10 ESSENTIAL HYPERTENSION WITH GOAL BLOOD PRESSURE LESS THAN 140/90: ICD-10-CM

## 2020-02-27 DIAGNOSIS — R53.81 PHYSICAL DEBILITY: ICD-10-CM

## 2020-02-27 DIAGNOSIS — M15.0 PRIMARY OSTEOARTHRITIS INVOLVING MULTIPLE JOINTS: ICD-10-CM

## 2020-02-27 DIAGNOSIS — M05.79 SEROPOSITIVE RHEUMATOID ARTHRITIS OF MULTIPLE SITES (H): ICD-10-CM

## 2020-03-03 ENCOUNTER — COMMUNICATION - HEALTHEAST (OUTPATIENT)
Dept: GERIATRICS | Facility: CLINIC | Age: 55
End: 2020-03-03

## 2020-03-03 ENCOUNTER — RECORDS - HEALTHEAST (OUTPATIENT)
Dept: LAB | Facility: CLINIC | Age: 55
End: 2020-03-03

## 2020-03-03 LAB — INR PPP: 2.55 (ref 0.9–1.1)

## 2020-03-12 ENCOUNTER — AMBULATORY - HEALTHEAST (OUTPATIENT)
Dept: RHEUMATOLOGY | Facility: CLINIC | Age: 55
End: 2020-03-12

## 2020-03-12 ENCOUNTER — AMBULATORY - HEALTHEAST (OUTPATIENT)
Dept: LAB | Facility: CLINIC | Age: 55
End: 2020-03-12

## 2020-03-12 DIAGNOSIS — M05.79 SEROPOSITIVE RHEUMATOID ARTHRITIS OF MULTIPLE SITES (H): ICD-10-CM

## 2020-03-12 LAB
ALBUMIN SERPL-MCNC: 3.7 G/DL (ref 3.5–5)
ALT SERPL W P-5'-P-CCNC: 24 U/L (ref 0–45)
CREAT SERPL-MCNC: 0.82 MG/DL (ref 0.6–1.1)
ERYTHROCYTE [DISTWIDTH] IN BLOOD BY AUTOMATED COUNT: 12.8 % (ref 11–14.5)
GFR SERPL CREATININE-BSD FRML MDRD: >60 ML/MIN/1.73M2
HCT VFR BLD AUTO: 43.5 % (ref 35–47)
HGB BLD-MCNC: 14.8 G/DL (ref 12–16)
MCH RBC QN AUTO: 31.4 PG (ref 27–34)
MCHC RBC AUTO-ENTMCNC: 34 G/DL (ref 32–36)
MCV RBC AUTO: 92 FL (ref 80–100)
PLATELET # BLD AUTO: 220 THOU/UL (ref 140–440)
PMV BLD AUTO: 7.4 FL (ref 7–10)
RBC # BLD AUTO: 4.71 MILL/UL (ref 3.8–5.4)
WBC: 4.5 THOU/UL (ref 4–11)

## 2020-03-16 ENCOUNTER — COMMUNICATION - HEALTHEAST (OUTPATIENT)
Dept: ADMINISTRATIVE | Facility: CLINIC | Age: 55
End: 2020-03-16

## 2020-03-23 ENCOUNTER — OFFICE VISIT - HEALTHEAST (OUTPATIENT)
Dept: RHEUMATOLOGY | Facility: CLINIC | Age: 55
End: 2020-03-23

## 2020-03-23 ENCOUNTER — COMMUNICATION - HEALTHEAST (OUTPATIENT)
Dept: RHEUMATOLOGY | Facility: CLINIC | Age: 55
End: 2020-03-23

## 2020-03-23 DIAGNOSIS — M25.50 POLYARTHRALGIA: ICD-10-CM

## 2020-03-23 DIAGNOSIS — M15.0 PRIMARY OSTEOARTHRITIS INVOLVING MULTIPLE JOINTS: ICD-10-CM

## 2020-03-23 DIAGNOSIS — Z79.899 HIGH RISK MEDICATION USE: ICD-10-CM

## 2020-03-23 DIAGNOSIS — M05.79 SEROPOSITIVE RHEUMATOID ARTHRITIS OF MULTIPLE SITES (H): ICD-10-CM

## 2020-03-23 DIAGNOSIS — R76.8 CYCLIC CITRULLINATED PEPTIDE (CCP) ANTIBODY POSITIVE: ICD-10-CM

## 2020-03-23 RX ORDER — FOLIC ACID 1 MG/1
1 TABLET ORAL DAILY
Qty: 90 TABLET | Refills: 0 | Status: SHIPPED | OUTPATIENT
Start: 2020-03-23 | End: 2020-06-21

## 2020-04-01 ENCOUNTER — COMMUNICATION - HEALTHEAST (OUTPATIENT)
Dept: GERIATRICS | Facility: CLINIC | Age: 55
End: 2020-04-01

## 2020-04-02 ENCOUNTER — OFFICE VISIT - HEALTHEAST (OUTPATIENT)
Dept: GERIATRICS | Facility: CLINIC | Age: 55
End: 2020-04-02

## 2020-04-02 DIAGNOSIS — M25.50 POLYARTHRALGIA: ICD-10-CM

## 2020-04-02 DIAGNOSIS — M05.79 SEROPOSITIVE RHEUMATOID ARTHRITIS OF MULTIPLE SITES (H): ICD-10-CM

## 2020-04-02 DIAGNOSIS — R52 PAIN MANAGEMENT: ICD-10-CM

## 2020-04-02 DIAGNOSIS — Z79.01 WARFARIN ANTICOAGULATION: ICD-10-CM

## 2020-04-03 ENCOUNTER — RECORDS - HEALTHEAST (OUTPATIENT)
Dept: LAB | Facility: CLINIC | Age: 55
End: 2020-04-03

## 2020-04-03 ENCOUNTER — COMMUNICATION - HEALTHEAST (OUTPATIENT)
Dept: GERIATRICS | Facility: CLINIC | Age: 55
End: 2020-04-03

## 2020-04-03 LAB — INR PPP: 1.36 (ref 0.9–1.1)

## 2020-04-06 ENCOUNTER — AMBULATORY - HEALTHEAST (OUTPATIENT)
Dept: GERIATRICS | Facility: CLINIC | Age: 55
End: 2020-04-06

## 2020-04-20 ENCOUNTER — COMMUNICATION - HEALTHEAST (OUTPATIENT)
Dept: GERIATRICS | Facility: CLINIC | Age: 55
End: 2020-04-20

## 2020-04-30 ENCOUNTER — OFFICE VISIT - HEALTHEAST (OUTPATIENT)
Dept: GERIATRICS | Facility: CLINIC | Age: 55
End: 2020-04-30

## 2020-04-30 DIAGNOSIS — M05.79 SEROPOSITIVE RHEUMATOID ARTHRITIS OF MULTIPLE SITES (H): ICD-10-CM

## 2020-04-30 DIAGNOSIS — E55.9 VITAMIN D DEFICIENCY: ICD-10-CM

## 2020-04-30 DIAGNOSIS — M15.0 PRIMARY OSTEOARTHRITIS INVOLVING MULTIPLE JOINTS: ICD-10-CM

## 2020-04-30 DIAGNOSIS — M25.50 POLYARTHRALGIA: ICD-10-CM

## 2020-06-15 ENCOUNTER — COMMUNICATION - HEALTHEAST (OUTPATIENT)
Dept: GERIATRICS | Facility: CLINIC | Age: 55
End: 2020-06-15

## 2020-06-23 ENCOUNTER — COMMUNICATION - HEALTHEAST (OUTPATIENT)
Dept: ADMINISTRATIVE | Facility: CLINIC | Age: 55
End: 2020-06-23

## 2020-06-24 ENCOUNTER — OFFICE VISIT - HEALTHEAST (OUTPATIENT)
Dept: GERIATRICS | Facility: CLINIC | Age: 55
End: 2020-06-24

## 2020-06-24 DIAGNOSIS — E66.01 MORBID OBESITY (H): ICD-10-CM

## 2020-06-24 DIAGNOSIS — M05.79 SEROPOSITIVE RHEUMATOID ARTHRITIS OF MULTIPLE SITES (H): ICD-10-CM

## 2020-06-24 DIAGNOSIS — M25.50 POLYARTHRALGIA: ICD-10-CM

## 2020-06-24 DIAGNOSIS — I10 ESSENTIAL HYPERTENSION WITH GOAL BLOOD PRESSURE LESS THAN 140/90: ICD-10-CM

## 2020-06-24 DIAGNOSIS — R53.81 PHYSICAL DEBILITY: ICD-10-CM

## 2020-06-24 DIAGNOSIS — R52 PAIN MANAGEMENT: ICD-10-CM

## 2020-06-24 DIAGNOSIS — I82.403 DEEP VEIN THROMBOSIS (DVT) OF BOTH LOWER EXTREMITIES, UNSPECIFIED CHRONICITY, UNSPECIFIED VEIN (H): ICD-10-CM

## 2020-06-24 DIAGNOSIS — E55.9 VITAMIN D DEFICIENCY: ICD-10-CM

## 2020-06-25 ENCOUNTER — OFFICE VISIT - HEALTHEAST (OUTPATIENT)
Dept: RHEUMATOLOGY | Facility: CLINIC | Age: 55
End: 2020-06-25

## 2020-06-25 DIAGNOSIS — R76.8 CYCLIC CITRULLINATED PEPTIDE (CCP) ANTIBODY POSITIVE: ICD-10-CM

## 2020-06-25 DIAGNOSIS — M05.79 SEROPOSITIVE RHEUMATOID ARTHRITIS OF MULTIPLE SITES (H): ICD-10-CM

## 2020-06-25 DIAGNOSIS — M25.50 POLYARTHRALGIA: ICD-10-CM

## 2020-06-25 DIAGNOSIS — M15.0 PRIMARY OSTEOARTHRITIS INVOLVING MULTIPLE JOINTS: ICD-10-CM

## 2020-06-25 DIAGNOSIS — Z79.899 HIGH RISK MEDICATION USE: ICD-10-CM

## 2020-06-30 ENCOUNTER — COMMUNICATION - HEALTHEAST (OUTPATIENT)
Dept: RHEUMATOLOGY | Facility: CLINIC | Age: 55
End: 2020-06-30

## 2020-06-30 DIAGNOSIS — M05.79 SEROPOSITIVE RHEUMATOID ARTHRITIS OF MULTIPLE SITES (H): ICD-10-CM

## 2020-07-01 ENCOUNTER — AMBULATORY - HEALTHEAST (OUTPATIENT)
Dept: LAB | Facility: CLINIC | Age: 55
End: 2020-07-01

## 2020-07-01 DIAGNOSIS — M05.79 SEROPOSITIVE RHEUMATOID ARTHRITIS OF MULTIPLE SITES (H): ICD-10-CM

## 2020-07-01 LAB
ALBUMIN SERPL-MCNC: 3.3 G/DL (ref 3.5–5)
ALT SERPL W P-5'-P-CCNC: 19 U/L (ref 0–45)
CREAT SERPL-MCNC: 0.66 MG/DL (ref 0.6–1.1)
ERYTHROCYTE [DISTWIDTH] IN BLOOD BY AUTOMATED COUNT: 12.4 % (ref 11–14.5)
GFR SERPL CREATININE-BSD FRML MDRD: >60 ML/MIN/1.73M2
HCT VFR BLD AUTO: 43.3 % (ref 35–47)
HGB BLD-MCNC: 14.9 G/DL (ref 12–16)
MCH RBC QN AUTO: 31.7 PG (ref 27–34)
MCHC RBC AUTO-ENTMCNC: 34.3 G/DL (ref 32–36)
MCV RBC AUTO: 92 FL (ref 80–100)
PLATELET # BLD AUTO: 185 THOU/UL (ref 140–440)
PMV BLD AUTO: 8.1 FL (ref 7–10)
RBC # BLD AUTO: 4.69 MILL/UL (ref 3.8–5.4)
WBC: 4.5 THOU/UL (ref 4–11)

## 2020-08-18 ENCOUNTER — OFFICE VISIT - HEALTHEAST (OUTPATIENT)
Dept: GERIATRICS | Facility: CLINIC | Age: 55
End: 2020-08-18

## 2020-08-18 DIAGNOSIS — M25.50 POLYARTHRALGIA: ICD-10-CM

## 2020-08-18 DIAGNOSIS — R53.81 PHYSICAL DEBILITY: ICD-10-CM

## 2020-08-18 DIAGNOSIS — M05.79 SEROPOSITIVE RHEUMATOID ARTHRITIS OF MULTIPLE SITES (H): ICD-10-CM

## 2020-08-18 DIAGNOSIS — M15.0 PRIMARY OSTEOARTHRITIS INVOLVING MULTIPLE JOINTS: ICD-10-CM

## 2020-09-22 ENCOUNTER — OFFICE VISIT - HEALTHEAST (OUTPATIENT)
Dept: GERIATRICS | Facility: CLINIC | Age: 55
End: 2020-09-22

## 2020-09-22 DIAGNOSIS — M25.50 POLYARTHRALGIA: ICD-10-CM

## 2020-09-22 DIAGNOSIS — E66.01 MORBID OBESITY (H): ICD-10-CM

## 2020-09-22 DIAGNOSIS — M05.79 SEROPOSITIVE RHEUMATOID ARTHRITIS OF MULTIPLE SITES (H): ICD-10-CM

## 2020-09-22 DIAGNOSIS — R53.81 PHYSICAL DEBILITY: ICD-10-CM

## 2020-09-24 ENCOUNTER — COMMUNICATION - HEALTHEAST (OUTPATIENT)
Dept: ADMINISTRATIVE | Facility: CLINIC | Age: 55
End: 2020-09-24

## 2020-09-28 ENCOUNTER — AMBULATORY - HEALTHEAST (OUTPATIENT)
Dept: LAB | Facility: CLINIC | Age: 55
End: 2020-09-28

## 2020-09-28 DIAGNOSIS — M05.79 SEROPOSITIVE RHEUMATOID ARTHRITIS OF MULTIPLE SITES (H): ICD-10-CM

## 2020-09-28 LAB
ALBUMIN SERPL-MCNC: 3.6 G/DL (ref 3.5–5)
ALT SERPL W P-5'-P-CCNC: 23 U/L (ref 0–45)
CREAT SERPL-MCNC: 0.69 MG/DL (ref 0.6–1.1)
ERYTHROCYTE [DISTWIDTH] IN BLOOD BY AUTOMATED COUNT: 11.5 % (ref 11–14.5)
GFR SERPL CREATININE-BSD FRML MDRD: >60 ML/MIN/1.73M2
HCT VFR BLD AUTO: 46.7 % (ref 35–47)
HGB BLD-MCNC: 15.4 G/DL (ref 12–16)
MCH RBC QN AUTO: 31.5 PG (ref 27–34)
MCHC RBC AUTO-ENTMCNC: 33 G/DL (ref 32–36)
MCV RBC AUTO: 95 FL (ref 80–100)
PLATELET # BLD AUTO: 212 THOU/UL (ref 140–440)
PMV BLD AUTO: 7.7 FL (ref 7–10)
RBC # BLD AUTO: 4.9 MILL/UL (ref 3.8–5.4)
WBC: 5.2 THOU/UL (ref 4–11)

## 2020-10-01 ENCOUNTER — OFFICE VISIT - HEALTHEAST (OUTPATIENT)
Dept: RHEUMATOLOGY | Facility: CLINIC | Age: 55
End: 2020-10-01

## 2020-10-01 DIAGNOSIS — R76.8 CYCLIC CITRULLINATED PEPTIDE (CCP) ANTIBODY POSITIVE: ICD-10-CM

## 2020-10-01 DIAGNOSIS — M15.0 PRIMARY OSTEOARTHRITIS INVOLVING MULTIPLE JOINTS: ICD-10-CM

## 2020-10-01 DIAGNOSIS — M05.79 SEROPOSITIVE RHEUMATOID ARTHRITIS OF MULTIPLE SITES (H): ICD-10-CM

## 2020-10-01 DIAGNOSIS — Z79.899 HIGH RISK MEDICATION USE: ICD-10-CM

## 2020-10-27 ENCOUNTER — OFFICE VISIT - HEALTHEAST (OUTPATIENT)
Dept: GERIATRICS | Facility: CLINIC | Age: 55
End: 2020-10-27

## 2020-10-27 DIAGNOSIS — M15.0 PRIMARY OSTEOARTHRITIS INVOLVING MULTIPLE JOINTS: ICD-10-CM

## 2020-10-27 DIAGNOSIS — Z00.00 ROUTINE GENERAL MEDICAL EXAMINATION AT A HEALTH CARE FACILITY: ICD-10-CM

## 2020-10-27 DIAGNOSIS — M16.9 OSTEOARTHRITIS OF HIP, UNSPECIFIED LATERALITY, UNSPECIFIED OSTEOARTHRITIS TYPE: ICD-10-CM

## 2020-10-27 DIAGNOSIS — R52 PAIN MANAGEMENT: ICD-10-CM

## 2020-10-27 DIAGNOSIS — M05.79 SEROPOSITIVE RHEUMATOID ARTHRITIS OF MULTIPLE SITES (H): ICD-10-CM

## 2020-11-25 ENCOUNTER — RECORDS - HEALTHEAST (OUTPATIENT)
Dept: LAB | Facility: CLINIC | Age: 55
End: 2020-11-25

## 2020-11-25 ENCOUNTER — COMMUNICATION - HEALTHEAST (OUTPATIENT)
Dept: GERIATRICS | Facility: CLINIC | Age: 55
End: 2020-11-25

## 2020-11-25 LAB
FLUAV AG SPEC QL IA: NORMAL
FLUBV AG SPEC QL IA: NORMAL

## 2020-11-27 ENCOUNTER — COMMUNICATION - HEALTHEAST (OUTPATIENT)
Dept: GERIATRICS | Facility: CLINIC | Age: 55
End: 2020-11-27

## 2020-12-02 ENCOUNTER — COMMUNICATION - HEALTHEAST (OUTPATIENT)
Dept: ADMINISTRATIVE | Facility: CLINIC | Age: 55
End: 2020-12-02

## 2020-12-19 ENCOUNTER — RECORDS - HEALTHEAST (OUTPATIENT)
Dept: LAB | Facility: CLINIC | Age: 55
End: 2020-12-19

## 2020-12-22 LAB
BASOPHILS # BLD AUTO: 0.1 THOU/UL (ref 0–0.2)
BASOPHILS NFR BLD AUTO: 1 % (ref 0–2)
C REACTIVE PROTEIN LHE: 1.3 MG/DL (ref 0–0.8)
EOSINOPHIL # BLD AUTO: 0.2 THOU/UL (ref 0–0.4)
EOSINOPHIL NFR BLD AUTO: 5 % (ref 0–6)
ERYTHROCYTE [DISTWIDTH] IN BLOOD BY AUTOMATED COUNT: 13.6 % (ref 11–14.5)
HCT VFR BLD AUTO: 42.1 % (ref 35–47)
HGB BLD-MCNC: 13.6 G/DL (ref 12–16)
IMM GRANULOCYTES # BLD: 0 THOU/UL
IMM GRANULOCYTES NFR BLD: 0 %
LYMPHOCYTES # BLD AUTO: 1.6 THOU/UL (ref 0.8–4.4)
LYMPHOCYTES NFR BLD AUTO: 35 % (ref 20–40)
MCH RBC QN AUTO: 30.9 PG (ref 27–34)
MCHC RBC AUTO-ENTMCNC: 32.3 G/DL (ref 32–36)
MCV RBC AUTO: 96 FL (ref 80–100)
MONOCYTES # BLD AUTO: 0.4 THOU/UL (ref 0–0.9)
MONOCYTES NFR BLD AUTO: 9 % (ref 2–10)
NEUTROPHILS # BLD AUTO: 2.3 THOU/UL (ref 2–7.7)
NEUTROPHILS NFR BLD AUTO: 50 % (ref 50–70)
PLATELET # BLD AUTO: 184 THOU/UL (ref 140–440)
PMV BLD AUTO: 10 FL (ref 8.5–12.5)
RBC # BLD AUTO: 4.4 MILL/UL (ref 3.8–5.4)
WBC: 4.6 THOU/UL (ref 4–11)

## 2020-12-23 ENCOUNTER — COMMUNICATION - HEALTHEAST (OUTPATIENT)
Dept: GERIATRICS | Facility: CLINIC | Age: 55
End: 2020-12-23

## 2021-01-26 ENCOUNTER — OFFICE VISIT - HEALTHEAST (OUTPATIENT)
Dept: GERIATRICS | Facility: CLINIC | Age: 56
End: 2021-01-26

## 2021-01-26 DIAGNOSIS — M05.79 SEROPOSITIVE RHEUMATOID ARTHRITIS OF MULTIPLE SITES (H): ICD-10-CM

## 2021-01-26 DIAGNOSIS — M15.0 PRIMARY OSTEOARTHRITIS INVOLVING MULTIPLE JOINTS: ICD-10-CM

## 2021-01-26 DIAGNOSIS — I73.9 PAD (PERIPHERAL ARTERY DISEASE) (H): ICD-10-CM

## 2021-01-26 DIAGNOSIS — R53.81 PHYSICAL DEBILITY: ICD-10-CM

## 2021-01-27 ENCOUNTER — COMMUNICATION - HEALTHEAST (OUTPATIENT)
Dept: GERIATRICS | Facility: CLINIC | Age: 56
End: 2021-01-27

## 2021-02-19 ENCOUNTER — COMMUNICATION - HEALTHEAST (OUTPATIENT)
Dept: GERIATRICS | Facility: CLINIC | Age: 56
End: 2021-02-19

## 2021-03-09 ENCOUNTER — COMMUNICATION - HEALTHEAST (OUTPATIENT)
Dept: RHEUMATOLOGY | Facility: CLINIC | Age: 56
End: 2021-03-09

## 2021-03-09 DIAGNOSIS — M05.79 SEROPOSITIVE RHEUMATOID ARTHRITIS OF MULTIPLE SITES (H): ICD-10-CM

## 2021-03-11 ENCOUNTER — COMMUNICATION - HEALTHEAST (OUTPATIENT)
Dept: GERIATRICS | Facility: CLINIC | Age: 56
End: 2021-03-11

## 2021-03-12 ENCOUNTER — COMMUNICATION - HEALTHEAST (OUTPATIENT)
Dept: GERIATRICS | Facility: CLINIC | Age: 56
End: 2021-03-12

## 2021-03-31 ENCOUNTER — COMMUNICATION - HEALTHEAST (OUTPATIENT)
Dept: RHEUMATOLOGY | Facility: CLINIC | Age: 56
End: 2021-03-31

## 2021-04-19 ENCOUNTER — COMMUNICATION - HEALTHEAST (OUTPATIENT)
Dept: ADMINISTRATIVE | Facility: CLINIC | Age: 56
End: 2021-04-19

## 2021-04-19 ENCOUNTER — RECORDS - HEALTHEAST (OUTPATIENT)
Dept: LAB | Facility: CLINIC | Age: 56
End: 2021-04-19

## 2021-04-19 DIAGNOSIS — M05.79 SEROPOSITIVE RHEUMATOID ARTHRITIS OF MULTIPLE SITES (H): ICD-10-CM

## 2021-04-20 LAB
ALBUMIN SERPL-MCNC: 3 G/DL (ref 3.5–5)
ALT SERPL W P-5'-P-CCNC: 18 U/L (ref 0–45)
CREAT SERPL-MCNC: 0.71 MG/DL (ref 0.6–1.1)
ERYTHROCYTE [DISTWIDTH] IN BLOOD BY AUTOMATED COUNT: 13.9 % (ref 11–14.5)
GFR SERPL CREATININE-BSD FRML MDRD: >60 ML/MIN/1.73M2
HCT VFR BLD AUTO: 42.6 % (ref 35–47)
HGB BLD-MCNC: 13.9 G/DL (ref 12–16)
MCH RBC QN AUTO: 30.6 PG (ref 27–34)
MCHC RBC AUTO-ENTMCNC: 32.6 G/DL (ref 32–36)
MCV RBC AUTO: 94 FL (ref 80–100)
PLATELET # BLD AUTO: 194 THOU/UL (ref 140–440)
PMV BLD AUTO: 10.1 FL (ref 8.5–12.5)
RBC # BLD AUTO: 4.54 MILL/UL (ref 3.8–5.4)
WBC: 5.1 THOU/UL (ref 4–11)

## 2021-04-22 ENCOUNTER — COMMUNICATION - HEALTHEAST (OUTPATIENT)
Dept: ADMINISTRATIVE | Facility: CLINIC | Age: 56
End: 2021-04-22

## 2021-05-26 ENCOUNTER — OFFICE VISIT - HEALTHEAST (OUTPATIENT)
Dept: GERIATRICS | Facility: CLINIC | Age: 56
End: 2021-05-26

## 2021-05-26 VITALS
DIASTOLIC BLOOD PRESSURE: 80 MMHG | RESPIRATION RATE: 20 BRPM | OXYGEN SATURATION: 96 % | TEMPERATURE: 97.5 F | SYSTOLIC BLOOD PRESSURE: 170 MMHG | HEART RATE: 80 BPM

## 2021-05-26 DIAGNOSIS — M25.50 POLYARTHRALGIA: ICD-10-CM

## 2021-05-26 DIAGNOSIS — R53.81 PHYSICAL DEBILITY: ICD-10-CM

## 2021-05-26 DIAGNOSIS — M15.0 PRIMARY OSTEOARTHRITIS INVOLVING MULTIPLE JOINTS: ICD-10-CM

## 2021-05-26 DIAGNOSIS — M05.79 SEROPOSITIVE RHEUMATOID ARTHRITIS OF MULTIPLE SITES (H): ICD-10-CM

## 2021-05-27 VITALS
DIASTOLIC BLOOD PRESSURE: 71 MMHG | RESPIRATION RATE: 20 BRPM | SYSTOLIC BLOOD PRESSURE: 121 MMHG | OXYGEN SATURATION: 93 % | HEART RATE: 68 BPM | TEMPERATURE: 97.8 F

## 2021-05-28 NOTE — PROGRESS NOTES
[facility]    Facility:   [facility] [[id number]]   Code Status: FULL CODE      CHIEF COMPLAINT/REASON FOR VISIT:  Chief Complaint   Patient presents with     Review Of Multiple Medical Conditions       HISTORY:      HPI: María Elena is a 53 y.o. female who was seen secondary to monthly review of chronic medical conditions.  She did refuse to go out and get her laboratory studies performed yesterday because they did not bring the what she felt was the most appropriate transportation for her to undergo in a wheelchair but they did bring a gurney so she did not get her labs yesterday for her rheumatology visit that she is having tomorrow on April 26.  She does have polyarthritis as well as rheumatoid issues in multiple joints.  She usually stays isolated in her room.  She does have a number of pain medications and she usually takes about 1 oxycodone as needed for days she can also have Tylenol as needed usually takes about 1 dose per day and also can have ibuprofen and the last and only dose the last 2 weeks was on April 23.  She otherwise does need assistance with all ADLs.  She is getting her warfarin checks usually monthly.    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  RD reports that she is not willing to commit to the program outlined.  See scanned document.  12/15/2015 - Marcio Quinonez MD        Obesity - although she was not weighed today, she is clearly obese on inspection. 4/10/2015     LOPEZ (obstructive sleep apnea) - abnormal  overnight pulse oximetry 5/22/2015     Osteoarthritis      Peripheral vascular disease (H)      Pleural effusion      Pressure ulcer of foot      Rheumatoid arthritis (H) 12/30/2014    seronegative     Seropositive rheumatoid arthritis (H) 1/19/2016     Vitamin D deficiency 8/26/2015             Family History   Problem Relation Age of Onset     Multiple myeloma Father      Deep vein thrombosis Father      Cataracts Father      Snoring Father      Other Brother         Blood withdrawn from time to time.  Sounds like hemochromatosis.     Sleep apnea Brother      Cancer Mother         Uterine CA     Arthritis Mother      Cataracts Mother      Breast cancer Mother 54     No Medical Problems Sister      Rheum arthritis Maternal Grandmother      Breast cancer Maternal Grandmother 50     Heart disease Maternal Grandmother      Rheum arthritis Paternal Aunt      Breast cancer Maternal Aunt 54     Breast cancer Cousin      Clotting disorder Neg Hx      Social History     Socioeconomic History     Marital status: Single     Spouse name: Not on file     Number of children: Not on file     Years of education: Not on file     Highest education level: Not on file   Occupational History     Not on file   Social Needs     Financial resource strain: Not on file     Food insecurity:     Worry: Not on file     Inability: Not on file     Transportation needs:     Medical: Not on file     Non-medical: Not on file   Tobacco Use     Smoking status: Former Smoker     Packs/day: 1.00     Years: 30.00     Pack years: 30.00     Types: Cigarettes     Smokeless tobacco: Never Used   Substance and Sexual Activity     Alcohol use: Yes     Comment: 3-4 times a year she will have 1 or 2.     Drug use: No     Sexual activity: Never     Partners: Female   Lifestyle     Physical activity:     Days per week: Not on file     Minutes per session: Not on file     Stress: Not on file   Relationships     Social connections:     Talks on phone: Not on file      Gets together: Not on file     Attends Faith service: Not on file     Active member of club or organization: Not on file     Attends meetings of clubs or organizations: Not on file     Relationship status: Not on file     Intimate partner violence:     Fear of current or ex partner: Not on file     Emotionally abused: Not on file     Physically abused: Not on file     Forced sexual activity: Not on file   Other Topics Concern     Not on file   Social History Narrative    Long term  At [facility] to rehab to [facility] allowing surgery on deteriorated joints  LTC12/2015         Review of Systems  No reports of any URI symptoms postnasal drip fever chills fatigue flulike symptoms nausea vomiting diarrhea dysuria rashes stiff neck swollen glands. Does have bilateral DVT sleep apnea obesity edema vitamin D deficiency PAD rheumatoid arthritis multiple sites chronic pain.  .    Current Outpatient Medications:      artificial tears,hypromellose, (GENTEAL) 0.3 % Drop, Administer 1 drop to both eyes 4 (four) times a day as needed., Disp: , Rfl:      ascorbic acid, vitamin C, (ASCORBIC ACID WITH KAEL HIPS) 500 MG tablet, Take 500 mg by mouth 2 (two) times a day., Disp: , Rfl:      cholecalciferol, vitamin D3, (VITAMIN D3) 2,000 unit Tab, Take 6,000 Units by mouth daily., Disp: , Rfl:      folic acid (FOLVITE) 1 MG tablet, Take 1 tablet (1 mg total) by mouth daily., Disp: , Rfl: 0     HUMIRA PEN 40 mg/0.8 mL PnKt, INJECT 0.8 ML (40 MG TOTAL) UNDER THE SKIN EVERY 14 DAYS, Disp: 1 kit, Rfl: 4     hydroCHLOROthiazide (HYDRODIURIL) 25 MG tablet, Take 25 mg by mouth daily., Disp: , Rfl:      hydroxychloroquine (PLAQUENIL) 200 mg tablet, Take 200 mg by mouth 2 (two) times a day., Disp: , Rfl:      ibuprofen (ADVIL,MOTRIN) 200 MG tablet, Take 400 mg by mouth 3 (three) times a day as needed for pain. , Disp: , Rfl:      methotrexate 2.5 MG tablet, Take 4 tablets (10 mg total) by mouth once a week., Disp: 16 tablet, Rfl: 0      methotrexate 2.5 MG tablet, Take 10 mg by mouth once a week. q Friday, Disp: , Rfl:      multivitamin therapeutic (THERAGRAN) tablet, Take 1 tablet by mouth daily., Disp: , Rfl:      nystatin (MYCOSTATIN) powder, Apply 1 application topically 2 (two) times a day. Wash well between applications. Apply under breasts., Disp: , Rfl:      oxyCODONE (OXY-IR) 5 mg capsule, Take 1-2 tablets every 4 hours orally as needed for pain relief.  Use 1 tablet for 1-5/10 pain, take 2 tablets for 6-10/10 pain., Disp: , Rfl: 0     senna-docusate (SENNOSIDES-DOCUSATE SODIUM) 8.6-50 mg tablet, Take 1 tablet by mouth daily., Disp: , Rfl:      sulfaSALAzine (AZULFIDINE ENTAB) 500 MG EC tablet, Take 1,500 mg by mouth 2 (two) times a day., Disp: , Rfl:      WARFARIN SODIUM (WARFARIN ORAL), Take by mouth daily. 3/7/19 INR 2.22  Cont 9mg on Wed and Sun and 8mg AOD.  Next INR 4/4/19.   2/7/19 INR 2.54  Cont 9mg on Wed and Sun and 8mg AOD.  Next INR 3/7/19.   1/11/19 INR 3.02  Take 9mg on Wed and Sun and 8mg AOD.  Next INR 2/7/19.   12/11/18 INR 2.91 Cont 9mg M-W-F, 8mg AOD. 11/29/18 INR 2.99 9mg M-W-F, 8mg AOD. 11/15/18 INR 2.44 Cont 8mg M-W-F, 9mg AOD> Next INR 11/29 11/8/18 INR 3.37 Decrease to 8mg M-W-F, 9mg AOD. Next INR 11/15.   , Disp: , Rfl:     .There were no vitals filed for this visit.  Blood pressure has not been evaluated in well over a month.  Her last recorded weight on February 12 was 336 pounds.  Physical Exam  Cardiovascular: Normal rate, regular rhythm and normal heart sounds.   Chronic edema .   Pulmonary/Chest: Breath sounds normal.   inder   Abdominal.  Protuberant  Musculoskeletal:   Chronic pain. Chronic edema. Rheumatoid arthritis multiple sites including hands. Left knee scar. Left hip OA. Psychiatric: She has a normal mood and affect. Her behavior is normal.        LABS:     Lab Results   Component Value Date    WBC 4.2 10/15/2018    HGB 13.8 10/15/2018    HCT 41.2 10/15/2018    MCV 94 10/15/2018     10/15/2018      Lab Results   Component Value Date    ALT 16 10/15/2018    AST 16 05/15/2015    ALKPHOS 82 04/07/2015    BILITOT 0.3 04/07/2015       Results for orders placed or performed in visit on 09/13/18   Basic Metabolic Panel   Result Value Ref Range    Sodium 144 136 - 145 mmol/L    Potassium 4.3 3.5 - 5.0 mmol/L    Chloride 101 98 - 107 mmol/L    CO2 34 (H) 22 - 31 mmol/L    Anion Gap, Calculation 9 5 - 18 mmol/L    Glucose 96 70 - 125 mg/dL    Calcium 9.7 8.5 - 10.5 mg/dL    BUN 16 8 - 22 mg/dL    Creatinine 0.75 0.60 - 1.10 mg/dL    GFR MDRD Af Amer >60 >60 mL/min/1.73m2    GFR MDRD Non Af Amer >60 >60 mL/min/1.73m2         ASSESSMENT:      ICD-10-CM    1. Seropositive rheumatoid arthritis of multiple sites (H) M05.79    2. Physical debility R53.81    3. Polyarthralgia M25.50    4. Pain management R52    5. Morbid obesity (H) E66.01        PLAN:    She does have a rheumatology visit tomorrow.  She will need to obviously update her laboratory studies for the rheumatologist but otherwise per staff there is been no new physical or other health issues.      Electronically signed by: Michael Duane Johnson, CNP

## 2021-05-28 NOTE — PROGRESS NOTES
ASSESSMENT AND PLAN:  María Elena Saab 53 y.o. female is here for follow-up.  She has stable seropositive rheumatoid arthritis high titer anti-CCP antibody doing well with Humira hydroxychloroquine combination and methotrexate.  She is to discontinue sulfasalazine.  Her residual pain is very likely due to osteoarthritis primary and secondary to RA.  She is going to try duloxetine.  This would be low-dose at 20 mg daily.  She just did her labs.  In view of the above discontinue sulfasalazine.  Paperwork for nursing home completed.  Will meet here in 3 months.                Diagnoses and all orders for this visit:    Seropositive rheumatoid arthritis of multiple sites (H)    Cyclic citrullinated peptide (CCP) antibody positive    High risk medication use    Polyarthralgia    Primary osteoarthritis involving multiple joints  -     DULoxetine (CYMBALTA) 20 MG capsule; Take 1 capsule (20 mg total) by mouth daily.  Dispense: 30 capsule; Refill: 2           HISTORY OF PRESENTING ILLNESS:    María Elena Saab 53 y.o. female  is here for followup.  She has severe severe seropositive erosive deforming rheumatoid arthritis.  She had been doing better with the current combination of DMARDs and Humira.  She has noted pain.  This is in her right hand more so than others, she has noted pain in her knee on the right side she is status post replacement on the left.  She reports the pain level to be 5.0/10 typically worse in the morning for 15 minutes or so the more she uses the hands worse it gets particularly MCP 2 and 3 on the right side, x-rays in the past have shown significant erosions then.  She is back on Humira after having held it for the duration of herpes zoster duration..Further historical information, including ROS as noted in the multidimensional health assessment questionnaire scanned in the EMR and in the assessment and plan section. Rheumatoid Arthritis Disease Activity Measure used: RAPID3, reviewed  today and  "scanned in the chart.        Following is the excerpt from a recent note:    She has noted significant improvement in her joint pains she's been on Humira for the past of 4 doses, however recently she did have increased pain in her left hand which subsided since.  Now she has leukopenia.  Her decubitus ulcer is noted not to be infected anymore.  I have emphasized the importance of keeping close eye.  She is awaiting left hip replacement.  She is aware that I'm not able to give her the more \"aggressive\"  DMARD still her pressure ulcer heals and is no longer considered infected.  This patient has high titer anti-CCP antibody.  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  She is wheelchair-bound.  This happened this past year in fall.  She was due to have left hip replacement and then subsequent to that right knee replacement but before she could have that done she developed pneumonia and pulmonary embolism.  Prior to hospitalization she reports that she was able to ambulate with the help of a walker.  Since then she could simply not go back to ambulation and is now awaiting surgery at the nursing home.  This morning she told me she was informed that she has a infected decubitus ulcer.  Unless as she heals there the orthopedic surgeons obviously are not prepared to proceed with in the surgical intervention.  Meanwhile her rheumatoid arthritis is appearing to be a bit better today.  She noted that she could move her finger better.  She had trigger finger injection on her previous visit.  I have outlined to her that we will proceed with sulfasalazine.  I cannot at this point given her methotrexate or other more aggressive DMARD still her infected decubitus ulcer has been under control or healed.       has longstanding seropositive anti-CCP antibody severe deforming, erosive rheumatoid arthritis.   In the past she was said to have infected decubitus ulcer of the right lower extremity posteriorly, she understands " that that is now healed or at least not infected anymore.  Her recent evaluation of the vascular clinic shows that there is no residual active infection in her lower extremity ulceration.  In view of this and the fact that even with only hydroxychloroquine and sulfasalazine she has had intermittent neutropenia, she is an unlikely candidate for methotrexate, or leflunomide.    She is now on Humira for the past 2 months.  She is aware that it is going to be an important part of her observations at her primary physician at her home and herself to keep a close eye in case it is evidence of infection.  Will continue current implant.  Recent labs within acceptable range.  Her recent left hip arthroplasty, uneventfully she's not get back on Humira I have asked her to go back this will be 5 weeks out of surgery.  She did hold her Humira 2 weeks prior.  She also has a right ring finger triggering.  She would like to proceed with local injection.  20 mg of methylprednisolone and Marcaine injected in the flexor tendon sheath right ring finger flexor tendon.  Return for follow-up here in 4 months.  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  RD reports that she is not willing to commit to the program outlined.  See scanned document.  12/15/2015 - Marcio Quinonez MD        Obesity -  although she was not weighed today, she is clearly obese on inspection. 4/10/2015     LOPEZ (obstructive sleep apnea) - abnormal overnight pulse oximetry 5/22/2015     Osteoarthritis      Peripheral vascular disease (H)      Pleural effusion      Pressure ulcer of foot      Rheumatoid arthritis (H) 12/30/2014    seronegative     Seropositive rheumatoid arthritis (H) 1/19/2016     Vitamin D deficiency 8/26/2015     Social History     Tobacco Use   Smoking Status Former Smoker     Packs/day: 1.00     Years: 30.00     Pack years: 30.00     Types: Cigarettes   Smokeless Tobacco Never Used     Patient Active Problem List   Diagnosis     DVT of lower extremity, bilateral - residual clot disease on repeat US in NEW location - after six months of warfarin.     Morbid obesity (H)     Warfarin anticoagulation - long-term because of unresolved DVT that first appeared in October, 2014     LOPEZ (obstructive sleep apnea) - abnormal overnight pulse oximetry - SLEEP visit in November, 2015, she states.     Nocturnal hypoxemia - probable sleep apnea - had overnight pulse oximetry, April, 2015.     Edema - swelling of the legs after blood clots     Vitamin D deficiency     Cyclic citrullinated peptide (CCP) antibody positive     Right shoulder tendinitis     Seropositive rheumatoid arthritis of multiple sites (H)     PAD (peripheral artery disease) (H)     Pain management     Radiculopathy of leg     Sleep apnea     Degenerative joint disease (DJD) of hip     Essential hypertension with goal blood pressure less than 140/90     History of DVT of lower extremity     Anemia     H/O total hip arthroplasty     Right knee DJD     Trigger finger, right ring finger     Polyarthralgia     Physical debility     High risk medication use     Current Outpatient Prescriptions   Medication Sig Dispense Refill     ascorbic acid (VITAMIN C) 250 MG tablet Take 250 mg by mouth 2 (two) times a day.         enoxaparin (LOVENOX) 80 mg/0.8 mL Syrg Inject 80  mg under the skin 2 (two) times a day.         food supplement, lactose-free (BOOST BREEZE NUTRITIONAL) 0.04-1.05 gram-kcal/mL Liqd Use As Directed.         hydroxychloroquine (PLAQUENIL) 200 mg tablet Take 200 mg by mouth 2 (two) times a day.         ibuprofen (ADVIL,MOTRIN) 400 MG tablet Take 400 mg by mouth every 6 (six) hours as needed for pain.         loratadine (CLARITIN) 10 mg tablet Take 10 mg by mouth daily.         magnesium hydroxide (MAGNESIUM HYDROXIDE) 400 mg/5 mL Susp suspension Take by mouth as needed. (ML)         multivitamin Chew Chew 1 tablet daily.         nalOXone (NARCAN) 0.4 mg/mL injection Inject as directed as needed. (ML)         naproxen (NAPROSYN) 500 MG tablet Take 500 mg by mouth 2 (two) times a day as needed.         oxyCODONE (ROXICODONE) 5 MG immediate release tablet Take 5 mg by mouth 4 (four) times a day as needed.         senna-docusate (PERICOLACE) 8.6-50 mg tablet Take 0.5 tablets by mouth daily.          warfarin (COUMADIN) 10 MG tablet          No current facility-administered medications for this visit.     DETAILED EXAMINATION  04/26/19  :  Vitals:    04/26/19 1447   BP: 138/78   Pulse: 72     Alert oriented. Head including the face is examined for malar rash, heliotropes, scarring, lupus pernio. Eyes examined for redness such as in episcleritis/scleritis, periorbital lesions.   Neck/ Face examined for parotid gland swelling, range of motion of neck.  Left upper and lower and right upper and lower extremities examined for tenderness, swelling, warmth of the appendicular joints, range of motion, edema, rash.  Some of the important findings included: She is tender MCP 2 and 3 on the right side, scattered PIP tenderness, TK  LAB / IMAGING DATA:  ALT   Date Value Ref Range Status   10/15/2018 16 0 - 45 U/L Final   07/09/2018 16 0 - 45 U/L Final   06/11/2018 19 0 - 45 U/L Final     Albumin   Date Value Ref Range Status   10/15/2018 3.6 3.5 - 5.0 g/dL Final   07/09/2018 3.7 3.5  - 5.0 g/dL Final   06/11/2018 3.6 3.5 - 5.0 g/dL Final     Creatinine   Date Value Ref Range Status   10/15/2018 0.87 0.60 - 1.10 mg/dL Final   09/13/2018 0.75 0.60 - 1.10 mg/dL Final   07/09/2018 0.73 0.60 - 1.10 mg/dL Final       WBC   Date Value Ref Range Status   10/15/2018 4.2 4.0 - 11.0 thou/uL Final   07/09/2018 4.9 4.0 - 11.0 thou/uL Final   09/14/2015 4.0 4.0 - 11.0 thou/uL Final   06/30/2015 3.7 (L) 4.0 - 11.0 thou/uL Final     Hemoglobin   Date Value Ref Range Status   10/15/2018 13.8 12.0 - 16.0 g/dL Final   07/09/2018 13.6 12.0 - 16.0 g/dL Final   06/11/2018 13.4 12.0 - 16.0 g/dL Final     Platelets   Date Value Ref Range Status   10/15/2018 208 140 - 440 thou/uL Final   07/09/2018 234 140 - 440 thou/uL Final   06/11/2018 220 140 - 440 thou/uL Final       Lab Results   Component Value Date    RF 25.7 10/15/2014    SEDRATE 46 (H) 02/08/2017

## 2021-05-28 NOTE — TELEPHONE ENCOUNTER
[facility] Nurse Triage Anticoagulation Note      Provider: DELPHINE Perry  Facility: [facility]    Facility Type: [facility]    Caller: Toya  Call Back Number:  [phone]    Reason for call: INR    Today s INR: 1.88  Previous INR: 4/4 2.28(9mg Wed and Sun and 8mg AOD), 3/7 2.22(9mg Wed and Sun and 8mg AOD)    Diagnosis/Goal: DVT  Heparin/Lovenox: No   Currently on ABX: No  Other interacting Medications: None  Missed or refused doses: No    Verbal Order/Direction given by Provider: Warfarin 9mg M-W-F and 8mg all other days.  Next INR 5/30/19.      Provider giving order: DELPHINE Perry    Verbal order given to: Toya Easton RN

## 2021-05-29 ENCOUNTER — RECORDS - HEALTHEAST (OUTPATIENT)
Dept: ADMINISTRATIVE | Facility: CLINIC | Age: 56
End: 2021-05-29

## 2021-05-29 NOTE — PROGRESS NOTES
[facility]    Facility:   [facility] [[id number]]   Code Status: FULL CODE    Recent medical history/chief concerns: Patient is seen by me for review of multiple medical issues.  At time of exam, patient had no acute chief concerns.  She was seen by her rheumatologist on 04/26/2019, at which time her treatment using sulfasalazine was discontinued.  She was started on a new treatment using duloxetine.  She thinks her discomfort related to her arthritis is about the same with these changes.  She told me she uses her oxycodone usually only once daily at time of bedtime.  She thinks she is doing well overall.  She feels like her mood is good.  She is eating and drinking well.  No new problems with urination or bowel movements.  Her pain management on current medications is adequate.  She is walking a couple times per day in the hallway, which I saw her doing on day of exam.  She does still though needs significant assistance with her ADLs because of her arthritis.    Review of systems: See history of present illness, all others negative.     Current Outpatient Medications   Medication Sig Dispense Refill     acetaminophen (TYLENOL) 325 MG tablet Take 650 mg by mouth every 4 (four) hours as needed for pain.       artificial tears,hypromellose, (GENTEAL) 0.3 % Drop Administer 1 drop to both eyes 4 (four) times a day as needed.       ascorbic acid, vitamin C, (ASCORBIC ACID WITH KAEL HIPS) 500 MG tablet Take 500 mg by mouth 2 (two) times a day.       cephalexin (KEFLEX) 500 MG capsule Take 4 capsules (2,000 mg total) by mouth once for 1 dose. To be given 1 hour prior to dental appointments PRN. 4 capsule 0     cholecalciferol, vitamin D3, (VITAMIN D3) 2,000 unit Tab Take 6,000 Units by mouth daily.       DULoxetine (CYMBALTA) 20 MG capsule Take 1 capsule (20 mg total) by mouth daily. 30 capsule 2     folic acid (FOLVITE) 1 MG tablet Take 1 tablet (1 mg total) by mouth daily.  0      HUMIRA PEN 40 mg/0.8 mL PnKt INJECT 0.8 ML (40 MG TOTAL) UNDER THE SKIN EVERY 14 DAYS 1 kit 4     hydroCHLOROthiazide (HYDRODIURIL) 25 MG tablet Take 25 mg by mouth daily.       hydroxychloroquine (PLAQUENIL) 200 mg tablet Take 200 mg by mouth 2 (two) times a day.       ibuprofen (ADVIL,MOTRIN) 200 MG tablet Take 400 mg by mouth 3 (three) times a day as needed for pain.        methotrexate 2.5 MG tablet Take 4 tablets (10 mg total) by mouth once a week. 16 tablet 0     multivitamin therapeutic (THERAGRAN) tablet Take 1 tablet by mouth daily.       nystatin (MYCOSTATIN) powder Apply 1 application topically 2 (two) times a day. Wash well between applications. Apply under breasts.       oxyCODONE (OXY-IR) 5 mg capsule Take 1-2 tablets every 4 hours orally as needed for pain relief.  Use 1 tablet for 1-5/10 pain, take 2 tablets for 6-10/10 pain.  0     senna-docusate (SENNOSIDES-DOCUSATE SODIUM) 8.6-50 mg tablet Take 1 tablet by mouth daily.       WARFARIN SODIUM (WARFARIN ORAL) Take by mouth daily. 5/3/19 INR 1.88  Take 9mg M-W-F and 8mg AOD.  Next INR 5/30/19.    4/4/19 INR 2.28  Cont 9mg on Wed and Sun and 8mg AOD.  Next INR 5/3/19.  3/7/19 INR 2.22  Cont 9mg on Wed and Sun and 8mg AOD.  Next INR 4/4/19.    2/7/19 INR 2.54  Cont 9mg on Wed and Sun and 8mg AOD.  Next INR 3/7/19.    1/11/19 INR 3.02  Take 9mg on Wed and Sun and 8mg AOD.  Next INR 2/7/19.    12/11/18 INR 2.91 Cont 9mg M-W-F, 8mg AOD.  11/29/18 INR 2.99 9mg M-W-F, 8mg AOD.             No current facility-administered medications for this visit.        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  RD reports that she is not willing to commit to the program outlined.  See scanned document.  12/15/2015 - Marcio Quinonez MD        Obesity - although she was not weighed today, she is clearly obese on inspection. 4/10/2015     LOPEZ (obstructive sleep apnea) - abnormal overnight pulse oximetry 5/22/2015     Osteoarthritis      Peripheral vascular disease (H)      Pleural effusion      Pressure ulcer of foot      Rheumatoid arthritis (H) 12/30/2014    seronegative     Seropositive rheumatoid arthritis (H) 1/19/2016     Vitamin D deficiency 8/26/2015      Past Surgical History:   Procedure Laterality Date     JOINT REPLACEMENT      knee     TOTAL HIP ARTHROPLASTY Left 10/18/2016    Procedure: LEFT HIP TOTAL ARTHROPLASTY;  Surgeon: London Goss MD;  Location: [facility];  Service:      TOTAL KNEE ARTHROPLASTY Left 2006      Social History     Socioeconomic History     Marital status: Single     Spouse name: Not on file     Number of children: Not on file     Years of education: Not on file     Highest education level: Not on file   Occupational History     Not on file   Social Needs     Financial resource strain: Not on file     Food insecurity:     Worry: Not on file     Inability: Not on file     Transportation needs:     Medical: Not on file     Non-medical: Not on file   Tobacco Use     Smoking status: Former Smoker     Packs/day: 1.00     Years: 30.00     Pack years: 30.00     Types: Cigarettes     Smokeless tobacco: Never Used   Substance and Sexual Activity     Alcohol use: Yes     Comment: 3-4 times a year she will have 1 or 2.     Drug use: No     Sexual activity: Never     Partners: Female   Lifestyle     Physical activity:     Days per week: Not on file     Minutes per session: Not on file     Stress: Not on file   Relationships     Social connections:     Talks on phone: Not on file     Gets together: Not on file      Attends Christianity service: Not on file     Active member of club or organization: Not on file     Attends meetings of clubs or organizations: Not on file     Relationship status: Not on file     Intimate partner violence:     Fear of current or ex partner: Not on file     Emotionally abused: Not on file     Physically abused: Not on file     Forced sexual activity: Not on file   Other Topics Concern     Not on file   Social History Narrative    Long term  At [facility] to rehab to level allowing surgery on deteriorated joints  LTC12/2015       Social History     Tobacco Use   Smoking Status Former Smoker     Packs/day: 1.00     Years: 30.00     Pack years: 30.00     Types: Cigarettes   Smokeless Tobacco Never Used        Exam:   Vitals:    05/02/19 1419 05/26/19 0956   BP:  130/76   Pulse:  72   Resp:  18   Temp:  98.2  F (36.8  C)   SpO2:  96%   Weight: (!) 337 lb 6.4 oz (153 kg)        EXAM:   General: Vital signs reviewed.  Patient is in no acute appearing distress weight is a 30 room while she sat in her wheelchair.  Breathing appears nonlabored.  Patient is alert and oriented ×3.  HEENT exam: No abnormal nasal or ear drainage.  No intraoral plaques suggestive of oral Candida, or other abnormal lesions.  Patient has normal-appearing sclera and corneas are clear.  No pharyngeal injection.  Neck: Supple without JVD.  Heart: Heart rate is regular without murmur.  Lungs: Lungs are clear to auscultation with good airflow bilaterally.  Skin/extremities: Overall warm and dry.  The feet were examined, with no open skin areas noted.  Her prior left heel ulcer has intact thickened skin in this area with no erythema, or tenderness noted.    I reviewed the labs ordered by rheumatology this past April which included a CBC study, serum creatinine, ALT, and albumin level, all of which were normal.    Approximately 25 minutes was spent on patient management, with greater than 50% of this time being spent on medication  reconciliation between electronic medical records, review of past medical history and current medical management, and discussion of management with patient and care staff.  Assessment/Plan   1. Warfarin anticoagulation - long-term because of unresolved DVT that first appeared in October, 2014     2. Seropositive rheumatoid arthritis of multiple sites (H)     3. Morbid obesity (H)     4. Essential hypertension with goal blood pressure less than 140/90     5. Vitamin D deficiency     6. Osteoarthritis of hip, unspecified laterality, unspecified osteoarthritis type     7. History of total hip replacement, unspecified laterality     8. Physical debility     9. High risk medication use     10. Primary osteoarthritis involving multiple joints     11. History of total left knee replacement (TKR)         There are no Patient Instructions on file for this visit.   Luis Enrique Lindsey DO

## 2021-05-29 NOTE — TELEPHONE ENCOUNTER
[facility] Nurse Triage Anticoagulation Note      Provider: DELPHINE Perry  Facility: [facility] Type: [facility]    Caller: Rozina  Call Back Number:  [phone]    Reason for call: INR    Today s INR: 2.20  Previous INR: 5/3/19 INR 1.88 9mg M-W-F, 8mg AOD.    Diagnosis/Goal: DVT  Heparin/Lovenox: No   Currently on ABX: No  Other interacting Medications: None  Missed or refused doses: No    Verbal Order/Direction given by Provider: Continue 9mg M-W-F, 8mg AOD. Next INR 6/27.    Provider giving order: DELPHINE Perry    Verbal order given to: Rozina Worthy RN

## 2021-05-30 VITALS — BODY MASS INDEX: 44.41 KG/M2 | HEIGHT: 68 IN | WEIGHT: 293 LBS

## 2021-05-30 NOTE — PATIENT INSTRUCTIONS - HE
Add INR to lab draw for tomorrow.  I left the Humira and methotrexate on her [facility] electronic medical record medication list, due to this possibly being restarted after treatment of her acute illness.  If her stool should become more liquid, we will need to assess her for possible C. difficile infection.  I will speak to nursing staff about her antifungal treatment under her breasts, and between her legs, and doing appropriate in order for this treatment.  Otherwise, no change in her current management.

## 2021-05-30 NOTE — TELEPHONE ENCOUNTER
[facility] Nurse Triage Anticoagulation Note      Provider: DELPHINE Perry  Facility: [facility]    Facility Type: [facility]    Caller: Alysia  Call Back Number:  [phone]    Reason for call: INR    Today s INR: 2.23  Previous INR: 7/18 2.79(9mg M-W-F and 8mg AOD)    Diagnosis/Goal: DVT  Heparin/Lovenox: No   Currently on ABX: Yes; last dose of IV Rocephin today.  Other interacting Medications: None  Missed or refused doses: No    Verbal Order/Direction given by Provider: Continue Warfarin 9mg M-W-F and 8mg all other days.  Next INR 8/8/19.      Provider giving order: DELPHINE Perry    Verbal order given to: Alysia Easton RN

## 2021-05-30 NOTE — TELEPHONE ENCOUNTER
[facility] Nurse Triage Anticoagulation Note      Provider: Luis Enrique Lindsey DO  Facility: [facility]    Facility Type: [facility]    Caller: Theresa Van Patton  Call Back Number:  [phone]    Reason for call: INR    Today s INR: missed  Previous INR: 7/18 2.79(9mg M-W-F and 8mg AOD)    Diagnosis/Goal: DVT  Heparin/Lovenox: No   Currently on ABX: Yes; IV Rocephin  Other interacting Medications: None  Missed or refused doses: No    Verbal Order/Direction given by Provider: Continue Warfarin 9mg M-W-F and 8mg AOD.  Next INR 7/25/19 with other infectious disease labs.      Provider giving order: Luis Enrique Lindsey DO    Verbal order given to: Corbin Easton RN

## 2021-05-30 NOTE — TELEPHONE ENCOUNTER
[facility] Nurse Triage Anticoagulation Note      Provider: DELPHINE Perry  Facility: [facility] Type: [facility]    Caller: Silas   Call Back Number:  [phone]    Reason for call: INR    Today s INR: 6/27/19 2.78  Previous INR: 5/30/19 9mg M, W, F and 8mg AOD    Diagnosis/Goal: DVT  Heparin/Lovenox: No   Currently on ABX: No  Other interacting Medications: None  Missed or refused doses: No    Verbal Order/Direction given by Provider:9mg M, W, F and 8mg AOD Next INR 7/25    Provider giving order: DELPHINE Perry    Verbal order given to: Silas Blue RN

## 2021-05-30 NOTE — TELEPHONE ENCOUNTER
[facility] Nurse Triage Anticoagulation Note      Provider: DELPHINE Perry  Facility: [facility]    Facility Type: [facility]    Caller: Toya  Call Back Number:  [phone]    Reason for call: INR    Today s INR: 2.79  Previous INR:? More recent in hospital ? 6/28/19 INR 2.78 9mg M-W-F, 8mg AOD.    Diagnosis/Goal: DVT  Heparin/Lovenox: No   Currently on ABX: Yes  IV thru 7/25  Other interacting Medications: None  Missed or refused doses: No    Verbal Order/Direction given by Provider: Continue 9mg M-W-F, 8mg AOD. Next INR 7/22.    Provider giving order: DELPHINE Perry    Verbal order given to: Toya Worthy RN

## 2021-05-30 NOTE — TELEPHONE ENCOUNTER
Medication: Humira 40mg/0.8ml  Qty: 2 pens for 28 days  Effective Dates: 08/01/2019 - 07/16/2021  Pharmacy: Restricted to [facility]  Copay Amount: Unknown  Copay Assistance: NA  Patient Notified? Yes, pharmacy to notify  Pharmacy Notiftied: Yes, will phone call  Additional Information: PA case# [id number]

## 2021-05-31 VITALS — BODY MASS INDEX: 50.18 KG/M2 | WEIGHT: 293 LBS

## 2021-05-31 NOTE — TELEPHONE ENCOUNTER
[facility] Nurse Triage Anticoagulation Note      Provider: DELPHINE Perry  Facility: [facility] Type: [facility]    Caller: Darío   Call Back Number:  [phone]    Reason for call: INR    Today s INR: 2.79  Previous INR: 7/25 2.23 9mg M , W, F and 8mg AOD.     Diagnosis/Goal: AFIB  Heparin/Lovenox: No   Currently on ABX: No  Other interacting Medications: None  Missed or refused doses: No    Verbal Order/Direction given by Provider: 9mg M , W, F and 8mg AOD Next INR 9/6    Provider giving order: DELPHINE Perry    Verbal order given to: Darío Blue RN

## 2021-05-31 NOTE — PROGRESS NOTES
[facility]    Facility:   [facility]] [[id number]]   Code Status: FULL CODE      CHIEF COMPLAINT/REASON FOR VISIT:  Chief Complaint   Patient presents with     FVP Care Coordination - Regulatory       HISTORY:      HPI: María Elena is a 54 y.o. female who was seen secondary to routine/regulatory visit.  Seems to be in good spirits today.  Had a chance to talk about the chronic medical conditions.  She was hospitalized last July 11 through July 15 secondary to sepsis from group B strep bacteremia with right lower extremity cellulitis and a history of rheumatoid arthritis.  During that time her rheumatoid arthritis meds were put on hold and she does have a visit with rheumatology coming up in the next week or 2.  She also did participate with therapy services.  In looking through her medication list her moods have been stable and for her pain she can take oxycodone as needed usually takes about 1 dose per day but did not take a dose anywhere from August 23 August 25 also can have Tylenol as needed has not required any and also ibuprofen as needed has not required any.  Otherwise they do not do regular vital signs on the unit going all the way back to July 16 her blood pressure was 133 over 81 with a pulse of 70 respirations of 16.  Her last weight was 325 pounds on August 5.  She does need assistance with all ADLs.  Usually does stay in her room most of the time.    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  RD reports that she is not willing to commit to the program outlined.  See scanned document.  12/15/2015 - Marcio Quinonez MD        Obesity - although she was not weighed today, she is clearly obese on inspection. 4/10/2015     LOPEZ (obstructive sleep apnea) - abnormal overnight pulse oximetry 5/22/2015     Osteoarthritis      Peripheral vascular disease (H)      Pleural effusion      Pressure ulcer of foot      Rheumatoid arthritis (H) 12/30/2014    seronegative     Sepsis (H)      Seropositive rheumatoid arthritis (H) 1/19/2016     Vitamin D deficiency 8/26/2015             Family History   Problem Relation Age of Onset     Multiple myeloma Father      Deep vein thrombosis Father      Cataracts Father      Snoring Father      Other Brother         Blood withdrawn from time to time.  Sounds like hemochromatosis.     Sleep apnea Brother      Cancer Mother         Uterine CA     Arthritis Mother      Cataracts Mother      Breast cancer Mother 54     No Medical Problems Sister      Rheum arthritis Maternal Grandmother      Breast cancer Maternal Grandmother 50     Heart disease Maternal Grandmother      Rheum arthritis Paternal Aunt      Breast cancer Maternal Aunt 54     Breast cancer Cousin      Clotting disorder Neg Hx      Social History     Socioeconomic History     Marital status: Single     Spouse name: Not on file     Number of children: Not on file     Years of education: Not on file     Highest education level: Not on file   Occupational History     Not on file   Social Needs     Financial resource strain: Not on file     Food insecurity:     Worry: Not on file     Inability: Not on file     Transportation needs:     Medical: Not on file     Non-medical: Not on file   Tobacco Use     Smoking status: Former Smoker     Packs/day: 1.00     Years: 30.00     Pack years: 30.00     Types: Cigarettes     Smokeless tobacco: Never Used   Substance and Sexual Activity      Alcohol use: Yes     Comment: 3-4 times a year she will have 1 or 2.     Drug use: No     Sexual activity: Never     Partners: Female   Lifestyle     Physical activity:     Days per week: Not on file     Minutes per session: Not on file     Stress: Not on file   Relationships     Social connections:     Talks on phone: Not on file     Gets together: Not on file     Attends Scientology service: Not on file     Active member of club or organization: Not on file     Attends meetings of clubs or organizations: Not on file     Relationship status: Not on file     Intimate partner violence:     Fear of current or ex partner: Not on file     Emotionally abused: Not on file     Physically abused: Not on file     Forced sexual activity: Not on file   Other Topics Concern     Not on file   Social History Narrative    Long term  At [facility] to rehab to [facility] allowing surgery on deteriorated joints  LTC12/2015         Review of Systems  No reports of any URI symptoms postnasal drip fever chills fatigue flulike symptoms nausea vomiting diarrhea dysuria rashes stiff neck swollen glands. Does have bilateral DVT sleep apnea obesity edema vitamin D deficiency PAD rheumatoid arthritis multiple sites chronic pain.    Current Outpatient Medications   Medication Sig     acetaminophen (TYLENOL) 325 MG tablet Take 650 mg by mouth every 6 (six) hours as needed for pain.            ascorbic acid, vitamin C, (ASCORBIC ACID WITH KAEL HIPS) 500 MG tablet Take 500 mg by mouth 2 (two) times a day.     cholecalciferol, vitamin D3, (VITAMIN D3) 2,000 unit Tab Take 6,000 Units by mouth daily.     DULoxetine (CYMBALTA) 20 MG capsule Take 1 capsule (20 mg total) by mouth daily.     folic acid (FOLVITE) 1 MG tablet Take 1 tablet (1 mg total) by mouth daily.     hydroCHLOROthiazide (HYDRODIURIL) 25 MG tablet Take 25 mg by mouth daily.     hydroxychloroquine (PLAQUENIL) 200 mg tablet Take 200 mg by mouth 2 (two) times a day.     ibuprofen  (ADVIL,MOTRIN) 200 MG tablet Take 400 mg by mouth every 8 (eight) hours as needed for pain.            methotrexate 2.5 MG tablet Take 4 tablets (10 mg total) by mouth once a week.     multivitamin therapeutic (THERAGRAN) tablet Take 1 tablet by mouth daily.     oxyCODONE (OXY-IR) 5 mg capsule Take 1-2 tablets every 4 hours orally as needed for pain relief.  Use 1 tablet for 1-5/10 pain, take 2 tablets for 6-10/10 pain.     WARFARIN SODIUM (WARFARIN ORAL) Take by mouth daily. 8/9/19 INR 2.79 Take 9mg M, W, F and 8mg AOD Next INR 9/6 8/8/19 INR missed.  Give 8mg on 8/8.  Next INR 8/9/19.    7/25/19 INR 2.23  Cont 9mg M-W-F and 8mg AOD.  Next INR 8/8/19.    7/22/19 INR missed.  Cont 9mg M-W-F and 8mg AOD.  Next INR 7/25/19.    7/18/19 INR 2.79  Cont 9mg M-W-F and 8mg AOD.  Next INR 7/22/19 6/28/19 INR 2.78  Cont 9mg M-W-F, 8mg AOD Next INR 7/25 5/30/19  INR 2.20 Cont 9mg M-W-F, 8mg AOD. Next INR 6/27.             .There were no vitals filed for this visit.  No recent vital signs as listed above her last blood pressure was in July.  Physical Exam  Cardiovascular: Normal rate, regular rhythm and normal heart sounds.   Chronic edema .   Pulmonary/Chest: Breath sounds normal.   inder   Abdominal.  Protuberant  Musculoskeletal:   Chronic pain. Chronic edema. Rheumatoid arthritis multiple sites including hands. Left knee scar. Left hip OA. Psychiatric: She has a normal mood and affect. Her behavior is normal.      LABS:   Lab Results   Component Value Date    WBC 4.8 07/25/2019    HGB 12.6 07/25/2019    HCT 41.1 07/25/2019    MCV 95 07/25/2019     07/25/2019     Results for orders placed or performed in visit on 09/13/18   Basic Metabolic Panel   Result Value Ref Range    Sodium 144 136 - 145 mmol/L    Potassium 4.3 3.5 - 5.0 mmol/L    Chloride 101 98 - 107 mmol/L    CO2 34 (H) 22 - 31 mmol/L    Anion Gap, Calculation 9 5 - 18 mmol/L    Glucose 96 70 - 125 mg/dL    Calcium 9.7 8.5 - 10.5 mg/dL    BUN 16 8 - 22 mg/dL     Creatinine 0.75 0.60 - 1.10 mg/dL    GFR MDRD Af Amer >60 >60 mL/min/1.73m2    GFR MDRD Non Af Amer >60 >60 mL/min/1.73m2       Lab Results   Component Value Date    ALT 22 07/25/2019    AST 23 07/25/2019    ALKPHOS 69 07/25/2019    BILITOT 0.3 07/25/2019     No results found for: AMISHA  Lab Results   Component Value Date    CHOL 209 (H) 04/13/2015    CHOL 227 (H) 04/07/2015     Lab Results   Component Value Date    HDL 61 04/13/2015    HDL 65 04/07/2015     Lab Results   Component Value Date    LDLCALC 138 (H) 04/13/2015    LDLCALC 139 (H) 04/07/2015     Lab Results   Component Value Date    TRIG 48 04/13/2015    TRIG 113 04/07/2015     No components found for: CHOLHDL  Lab Results   Component Value Date    ALBUMIN 3.6 04/26/2019       Case Management:  I have reviewed the facility/SNF care plan/MDS which was done Today, including the falls risk, nutrition and pain screening. I also reviewed the current immunizations, and preventive care.. Future cancer screening indicated is Breast cancer and provider and pt will formulate a POC.   Patient's desire to return to the community is present, but is not able due to care needs .    Information reviewed:  Medications, vital signs, orders, and nursing notes.    ASSESSMENT:      ICD-10-CM    1. Seropositive rheumatoid arthritis of multiple sites (H) M05.79    2. Radiculopathy of leg M54.10    3. Polyarthralgia M25.50    4. PAD (peripheral artery disease) (H) I73.9    5. Osteoarthritis of hip, unspecified laterality, unspecified osteoarthritis type M16.9        PLAN:    She will continue to see her specialist regarding her polyarthritis and rheumatoid arthritis.  The meantime the staff do need to give her assistance with all ADLs.  She does have a visit with the rheumatologist coming up in another week or 2.  She did not have any other further questions.    .    Electronically signed by: Michael Duane Johnson, CNP

## 2021-05-31 NOTE — TELEPHONE ENCOUNTER
[facility] Nurse Triage Telephone Note      Provider: DELPHINE Perry  Facility: [facility]    Facility Type: [facility]    Caller: Theresa  Call Back Number:  [phone]    Allergies: Adhesive; Banana; Grape; Morphine (pf); Latex, natural rubber; and Other food allergy    Reason for call: Nurse reporting that INR was missed for today.  Previous INR on 7/25 was 2.23(9mg M-W-F and 8mg AOD).      Verbal Order/Direction given by Provider: Give 8mg today.  Next INR 8/9/19.      Provider giving order: DELPHINE Perry    Verbal order given to: Abraham Easton RN

## 2021-06-01 VITALS — HEIGHT: 68 IN | BODY MASS INDEX: 44.41 KG/M2 | WEIGHT: 293 LBS

## 2021-06-01 VITALS — BODY MASS INDEX: 50.18 KG/M2 | HEIGHT: 68 IN

## 2021-06-01 NOTE — PATIENT INSTRUCTIONS - HE
Will start weekly blood pressures. If BP remains elevated, we will need to consider adding something to her current HCTZ treatment.  Otherwise, continue current management.

## 2021-06-01 NOTE — PROGRESS NOTES
ASSESSMENT AND PLAN:  María Elena Saab 54 y.o. female is here for follow-up of seropositive severe rheumatoid arthritis, deforming, had been holding Humira and methotrexate since her July admission to the hospital for cellulitis leading to strep bacteremia, on hydroxychloroquine only.  She is experiencing increasing pain and has synovitis such as in her MCPs, elbow joints.  She is going to resume the combination of methotrexate and Humira as before along with hydroxychloroquine.  She is going to monitor her symptoms carefully for recurrence of infection.  Will meet here this time sooner in 3 months.         Diagnoses and all orders for this visit:    Seropositive rheumatoid arthritis of multiple sites (H)  -     methotrexate 2.5 MG tablet; Take 4 tablets (10 mg total) by mouth once a week.  Dispense: 16 tablet; Refill: 0    Primary osteoarthritis involving multiple joints    Cyclic citrullinated peptide (CCP) antibody positive    Polyarthralgia           HISTORY OF PRESENTING ILLNESS:    María Elena Saab 54 y.o. female  is here for followup.  She has severe severe seropositive erosive deforming rheumatoid arthritis.  She had been doing better with the current combination of DMARDs and Humira.  However she had to hold the methotrexate and Humira after hospitalization in July.  This was necessitated by lower extremity cellulitis, bacteremia.  Over the past 2 months has been only on hydroxychloroquine.  She is complaining of pain.  Is in the hands, elbows, moderately severe, interfering with some of the day-to-day activities, milder pain elsewhere as noted, stiffness in the morning 15 minutes.  No recurrence of fevers, weight loss, she has had no eye redness she has had mouth ulcers some cough no rash. .Further historical information, including ROS as noted in the multidimensional health assessment questionnaire scanned in the EMR and in the assessment and plan section. Rheumatoid Arthritis Disease Activity Measure  "used: RAPID3, reviewed  today and scanned in the chart.        Following is the excerpt from a recent note:    She has noted significant improvement in her joint pains she's been on Humira for the past of 4 doses, however recently she did have increased pain in her left hand which subsided since.  Now she has leukopenia.  Her decubitus ulcer is noted not to be infected anymore.  I have emphasized the importance of keeping close eye.  She is awaiting left hip replacement.  She is aware that I'm not able to give her the more \"aggressive\"  DMARD still her pressure ulcer heals and is no longer considered infected.  This patient has high titer anti-CCP antibody.  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  She is wheelchair-bound.  This happened this past year in fall.  She was due to have left hip replacement and then subsequent to that right knee replacement but before she could have that done she developed pneumonia and pulmonary embolism.  Prior to hospitalization she reports that she was able to ambulate with the help of a walker.  Since then she could simply not go back to ambulation and is now awaiting surgery at the nursing home.  This morning she told me she was informed that she has a infected decubitus ulcer.  Unless as she heals there the orthopedic surgeons obviously are not prepared to proceed with in the surgical intervention.  Meanwhile her rheumatoid arthritis is appearing to be a bit better today.  She noted that she could move her finger better.  She had trigger finger injection on her previous visit.  I have outlined to her that we will proceed with sulfasalazine.  I cannot at this point given her methotrexate or other more aggressive DMARD still her infected decubitus ulcer has been under control or healed.       has longstanding seropositive anti-CCP antibody severe deforming, erosive rheumatoid arthritis.   In the past she was said to have infected decubitus ulcer of the right lower " extremity posteriorly, she understands that that is now healed or at least not infected anymore.  Her recent evaluation of the vascular clinic shows that there is no residual active infection in her lower extremity ulceration.  In view of this and the fact that even with only hydroxychloroquine and sulfasalazine she has had intermittent neutropenia, she is an unlikely candidate for methotrexate, or leflunomide.    She is now on Humira for the past 2 months.  She is aware that it is going to be an important part of her observations at her primary physician at her home and herself to keep a close eye in case it is evidence of infection.  Will continue current implant.  Recent labs within acceptable range.  Her recent left hip arthroplasty, uneventfully she's not get back on Humira I have asked her to go back this will be 5 weeks out of surgery.  She did hold her Humira 2 weeks prior.  She also has a right ring finger triggering.  She would like to proceed with local injection.  20 mg of methylprednisolone and Marcaine injected in the flexor tendon sheath right ring finger flexor tendon.  Return for follow-up here in 4 months.  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  RD reports that she  is not willing to commit to the program outlined.  See scanned document.  12/15/2015 - Marcio Quinonez MD        Obesity - although she was not weighed today, she is clearly obese on inspection. 4/10/2015     LOPEZ (obstructive sleep apnea) - abnormal overnight pulse oximetry 5/22/2015     Osteoarthritis      Peripheral vascular disease (H)      Pleural effusion      Pressure ulcer of foot      Rheumatoid arthritis (H) 12/30/2014    seronegative     Sepsis (H)      Seropositive rheumatoid arthritis (H) 1/19/2016     Vitamin D deficiency 8/26/2015     Social History     Tobacco Use   Smoking Status Former Smoker     Packs/day: 1.00     Years: 30.00     Pack years: 30.00     Types: Cigarettes   Smokeless Tobacco Never Used     Patient Active Problem List   Diagnosis     DVT of lower extremity, bilateral - residual clot disease on repeat US in NEW location - after six months of warfarin.     Morbid obesity (H)     Warfarin anticoagulation - long-term because of unresolved DVT that first appeared in October, 2014     LOPEZ (obstructive sleep apnea) - abnormal overnight pulse oximetry - SLEEP visit in November, 2015, she states.     Nocturnal hypoxemia - probable sleep apnea - had overnight pulse oximetry, April, 2015.     Edema - swelling of the legs after blood clots     Vitamin D deficiency     Cyclic citrullinated peptide (CCP) antibody positive     Right shoulder tendinitis     Seropositive rheumatoid arthritis of multiple sites (H)     PAD (peripheral artery disease) (H)     Pain management     Radiculopathy of leg     Sleep apnea     Degenerative joint disease (DJD) of hip     Essential hypertension with goal blood pressure less than 140/90     History of DVT of lower extremity     Anemia     H/O total hip arthroplasty     Polyarthralgia     Physical debility     High risk medication use     Primary osteoarthritis involving multiple joints     History of total left knee replacement (TKR)     Current Outpatient  Prescriptions   Medication Sig Dispense Refill     ascorbic acid (VITAMIN C) 250 MG tablet Take 250 mg by mouth 2 (two) times a day.         enoxaparin (LOVENOX) 80 mg/0.8 mL Syrg Inject 80 mg under the skin 2 (two) times a day.         food supplement, lactose-free (BOOST BREEZE NUTRITIONAL) 0.04-1.05 gram-kcal/mL Liqd Use As Directed.         hydroxychloroquine (PLAQUENIL) 200 mg tablet Take 200 mg by mouth 2 (two) times a day.         ibuprofen (ADVIL,MOTRIN) 400 MG tablet Take 400 mg by mouth every 6 (six) hours as needed for pain.         loratadine (CLARITIN) 10 mg tablet Take 10 mg by mouth daily.         magnesium hydroxide (MAGNESIUM HYDROXIDE) 400 mg/5 mL Susp suspension Take by mouth as needed. (ML)         multivitamin Chew Chew 1 tablet daily.         nalOXone (NARCAN) 0.4 mg/mL injection Inject as directed as needed. (ML)         naproxen (NAPROSYN) 500 MG tablet Take 500 mg by mouth 2 (two) times a day as needed.         oxyCODONE (ROXICODONE) 5 MG immediate release tablet Take 5 mg by mouth 4 (four) times a day as needed.         senna-docusate (PERICOLACE) 8.6-50 mg tablet Take 0.5 tablets by mouth daily.          warfarin (COUMADIN) 10 MG tablet          No current facility-administered medications for this visit.     DETAILED EXAMINATION  09/12/19  :  Vitals:    09/12/19 1455   BP: 124/76   Patient Site: Right Arm   Patient Position: Sitting   Cuff Size: Adult Large   Pulse: 76   Weight: (!) 341 lb (154.7 kg)     Alert oriented. Head including the face is examined for malar rash, heliotropes, scarring, lupus pernio. Eyes examined for redness such as in episcleritis/scleritis, periorbital lesions.   Neck/ Face examined for parotid gland swelling, range of motion of neck.  Left upper and lower and right upper and lower extremities examined for tenderness, swelling, warmth of the appendicular joints, range of motion, edema, rash.  Some of the important findings included: She has synovitis in multiple  MCPs more so on the right side, synovitis of the elbows, left TKA.        LAB / IMAGING DATA:  ALT   Date Value Ref Range Status   09/10/2019 20 0 - 45 U/L Final   07/25/2019 22 0 - 45 U/L Final   07/18/2019 27 0 - 45 U/L Final     Albumin   Date Value Ref Range Status   09/10/2019 3.4 (L) 3.5 - 5.0 g/dL Final   04/26/2019 3.6 3.5 - 5.0 g/dL Final   10/15/2018 3.6 3.5 - 5.0 g/dL Final     Creatinine   Date Value Ref Range Status   09/10/2019 0.69 0.60 - 1.10 mg/dL Final   07/25/2019 0.63 0.60 - 1.10 mg/dL Final   07/18/2019 0.70 0.60 - 1.10 mg/dL Final       WBC   Date Value Ref Range Status   09/10/2019 4.7 4.0 - 11.0 thou/uL Final   07/25/2019 4.8 4.0 - 11.0 thou/uL Final   09/14/2015 4.0 4.0 - 11.0 thou/uL Final   06/30/2015 3.7 (L) 4.0 - 11.0 thou/uL Final     Hemoglobin   Date Value Ref Range Status   09/10/2019 14.0 12.0 - 16.0 g/dL Final   07/25/2019 12.6 12.0 - 16.0 g/dL Final   07/18/2019 12.7 12.0 - 16.0 g/dL Final     Platelets   Date Value Ref Range Status   09/10/2019 229 140 - 440 thou/uL Final   07/25/2019 321 140 - 440 thou/uL Final   07/18/2019 257 140 - 440 thou/uL Final       Lab Results   Component Value Date    RF 25.7 10/15/2014    SEDRATE 46 (H) 02/08/2017

## 2021-06-01 NOTE — PROGRESS NOTES
[facility]    [facility][id number][facility]   Code Status: FULL CODE    Recent medical history/chief concerns: Patient is seen by me for review of multiple medical issues.  Since my last visit with resident when I readmitted her to long-term care after hospitalization for sepsis treatment, patient has had a follow-up visit with her rheumatologist.  After her most recent hospitalization, she had her treatment for arthritis temporarily changed by discontinuing treatment with methotrexate and Humira.  She continued her treatment using hydroxychloroquine.  Her arthritis symptoms worsened, so she was restarted on her treatment using methotrexate and Humira.  Despite the worsening of arthritis symptoms, resident states her pain reliever medication management has been adequate, not needing modification.  At time of my visit with her, she had no new concerns.  She had me look at her left heel, which has a chronic area of tender tissue over the posterior heel region.  I suggested wearing a foam boot at night to keep pressure off of this area, which she declined.  She preferred to speak to nursing staff about putting a pillow underneath her left leg to keep weight off of the heel.  She denies having any recent fevers or chills, or problems with urination or bowel movements.  No new breathing problems, or problems with eating or drinking.  She feels like her mood is good.  My review of facility documentation showed no full set of vital signs have been done in over 1-1/2 months, and the nursing staff got these for me on day of my visit.    Review of systems: See history of present illness, all others negative.     Current Outpatient Medications   Medication Sig Dispense Refill     acetaminophen (TYLENOL) 325 MG tablet Take 650 mg by mouth every 6 (six) hours as needed for pain.              adalimumab (HUMIRA) 40 mg/0.8 mL injection Inject 0.8 mL (40 mg total) under the skin  every 14 (fourteen) days. 0.8 mL 0     ascorbic acid, vitamin C, (ASCORBIC ACID WITH KAEL HIPS) 500 MG tablet Take 500 mg by mouth 2 (two) times a day.       cholecalciferol, vitamin D3, (VITAMIN D3) 2,000 unit Tab Take 6,000 Units by mouth daily.       DULoxetine (CYMBALTA) 20 MG capsule Take 1 capsule (20 mg total) by mouth daily. 30 capsule 2     folic acid (FOLVITE) 1 MG tablet Take 1 tablet (1 mg total) by mouth daily.  0     hydroCHLOROthiazide (HYDRODIURIL) 25 MG tablet Take 25 mg by mouth daily.       hydroxychloroquine (PLAQUENIL) 200 mg tablet Take 200 mg by mouth 2 (two) times a day.       ibuprofen (ADVIL,MOTRIN) 200 MG tablet Take 400 mg by mouth every 8 (eight) hours as needed for pain.              methotrexate 2.5 MG tablet Take 4 tablets (10 mg total) by mouth once a week. 16 tablet 0     multivitamin therapeutic (THERAGRAN) tablet Take 1 tablet by mouth daily.       nystatin (MYCOSTATIN) powder Apply to rash areas twice daily. 15 g 0     oxyCODONE (OXY-IR) 5 mg capsule Take 1-2 tablets every 4 hours orally as needed for pain relief.  Use 1 tablet for 1-5/10 pain, take 2 tablets for 6-10/10 pain. 60 tablet 0     WARFARIN SODIUM (WARFARIN ORAL) Take by mouth daily. 9/6/19 INR 3.15 Decrease to 9mg M & TH, 8mg AOD. Next INR 10/3.  8/9/19 INR 2.79 Take 9mg M, W, F and 8mg AOD Next INR 9/6  8/8/19 INR missed.  Give 8mg on 8/8.  Next INR 8/9/19.    7/25/19 INR 2.23  Cont 9mg M-W-F and 8mg AOD.  Next INR 8/8/19.    7/22/19 INR missed.  Cont 9mg M-W-F and 8mg AOD.  Next INR 7/25/19.    7/18/19 INR 2.79  Cont 9mg M-W-F and 8mg AOD.  Next INR 7/22/19             No current facility-administered medications for this visit.        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  RD reports that she is not willing to commit to the program outlined.  See scanned document.  12/15/2015 - Marcio Quinonez MD        Obesity - although she was not weighed today, she is clearly obese on inspection. 4/10/2015     LOPEZ (obstructive sleep apnea) - abnormal overnight pulse oximetry 5/22/2015     Osteoarthritis      Peripheral vascular disease (H)      Pleural effusion      Pressure ulcer of foot      Rheumatoid arthritis (H) 12/30/2014    seronegative     Sepsis (H)      Seropositive rheumatoid arthritis (H) 1/19/2016     Vitamin D deficiency 8/26/2015      Past Surgical History:   Procedure Laterality Date     JOINT REPLACEMENT      knee     PERIPHERALLY INSERTED CENTRAL CATHETER INSERTION Right 07/14/2019    4fr/44cm/Rt Cephalic.lowSVC.MGlav     TOTAL HIP ARTHROPLASTY Left 10/18/2016    Procedure: LEFT HIP TOTAL ARTHROPLASTY;  Surgeon: London Goss MD;  Location: [facility];  Service:      TOTAL KNEE ARTHROPLASTY Left 2006      Social History     Socioeconomic History     Marital status: Single     Spouse name: Not on file     Number of children: Not on file     Years of education: Not on file     Highest education level: Not on file   Occupational History     Not on file   Social Needs     Financial resource strain: Not on file     Food insecurity:     Worry: Not on file     Inability: Not on file     Transportation needs:     Medical: Not on file     Non-medical: Not on file   Tobacco Use     Smoking status: Former Smoker     Packs/day: 1.00     Years: 30.00     Pack years: 30.00     Types: Cigarettes     Smokeless tobacco: Never Used   Substance and Sexual Activity     Alcohol use: Yes     Comment: 3-4 times  a year she will have 1 or 2.     Drug use: No     Sexual activity: Never     Partners: Female   Lifestyle     Physical activity:     Days per week: Not on file     Minutes per session: Not on file     Stress: Not on file   Relationships     Social connections:     Talks on phone: Not on file     Gets together: Not on file     Attends Sabianism service: Not on file     Active member of club or organization: Not on file     Attends meetings of clubs or organizations: Not on file     Relationship status: Not on file     Intimate partner violence:     Fear of current or ex partner: Not on file     Emotionally abused: Not on file     Physically abused: Not on file     Forced sexual activity: Not on file   Other Topics Concern     Not on file   Social History Narrative    Long term  At [facility] to rehab to level allowing surgery on deteriorated joints  LTC12/2015       Social History     Tobacco Use   Smoking Status Former Smoker     Packs/day: 1.00     Years: 30.00     Pack years: 30.00     Types: Cigarettes   Smokeless Tobacco Never Used        Exam:   Vitals:    09/23/19 1400   BP: 170/80   Pulse: 80   Resp: 20   Temp: 97.5  F (36.4  C)   SpO2: 96%       EXAM:   General: Vital signs reviewed.  She is noted to have a moderately elevated systolic blood pressure of 170.  Patient is in no acute appearing distress.  Breathing appears nonlabored.  Patient is alert and oriented ×3.  HEENT exam: No abnormal nasal or ear drainage.  No intraoral plaques, vesicles, or ulcers.  Dentition is good.  Neck: Supple  Heart: Heart rate is regular without murmur.  Lungs: Lungs are clear to auscultation with good airflow bilaterally.  Skin/extremities: Overall warm and dry, with no rashes noted.  Her lower extremity edema is about at her baseline amount, and she was wearing her bilateral compression garments on lower extremities.  Focal exam of left heel shows a callused area over her posterior heel, with no areas of erythema or  other discoloration, or increased temperature.  She is a little tender in this area of foot.    Recent studies: Several labs were done on 09/10/2019 through rheumatology.  Her albumin was slightly low at 3.4.  Otherwise, her CBC, ALT, and renal function were all good.  Patient is on warfarin therapy dosed with periodic INR tests, with her last INR being slightly above therapeutic range at 3.15, with the warfarin dosage slightly decreased in the next INR being scheduled for 10/03/2019..    Approximately 25 minutes was spent on patient management, with greater than 50% of this time being spent on discussion of concerns and management with resident, and care staff, involving her medication management of arthritis, and her blood pressure.  The remainder of the time was spent on medication and vital sign data reconciliation between electronic medical records, and review of past medical history and current medical management.  Assessment/Plan   1. Deep vein thrombosis (DVT) of both lower extremities, unspecified chronicity, unspecified vein (H)     2. Essential hypertension with goal blood pressure less than 140/90     3. Morbid obesity (H)     4. Warfarin anticoagulation - long-term because of unresolved DVT that first appeared in October, 2014     5. Vitamin D deficiency     6. Seropositive rheumatoid arthritis of multiple sites (H)     7. Pain management     8. Osteoarthritis of hip, unspecified laterality, unspecified osteoarthritis type     9. Polyarthralgia     10. High risk medication use         Patient Instructions   Will start weekly blood pressures. If BP remains elevated, we will need to consider adding something to her current HCTZ treatment.  Otherwise, continue current management.     Luis Enrique Lindsey,

## 2021-06-01 NOTE — TELEPHONE ENCOUNTER
[facility] Nurse Triage Anticoagulation Note      Provider: DELPHINE Perry  Facility: [facility] Type: [facility]    Caller: Stanley  Call Back Number:  [phone]    Reason for call: INR    Today s INR: 3.15  Previous INR: 8/9/19 2.7 9mg M-W-F, 8mg AOD.    Diagnosis/Goal: AFIB  Heparin/Lovenox: No   Currently on ABX: No  Other interacting Medications: None  Missed or refused doses: No    Verbal Order/Direction given by Provider: Decrease to 9mg M & TH, 8mg AOD. Next INR 10/3.    Provider giving order: DELPHINE Perry    Verbal order given to: Rozina Worthy RN

## 2021-06-01 NOTE — PROGRESS NOTES
[facility]    Facility:   [facility] [[id number]]   Code Status: FULL CODE      CHIEF COMPLAINT/REASON FOR VISIT:  Chief Complaint   Patient presents with     Problem Visit     asked to see       HISTORY:      HPI: María Elena is a 54 y.o. female who I was asked to see secondary to about a 2-day history of left leg pain without any injury.  Also today she was having some issues with standing and transferring using her left leg.  She does have a history of rheumatoid arthritis and she states that she was sitting in her wheelchair next to the bedside stand and had her knee bent at a 90 degree angle for an extended period of time probably an hour or so and then with that she did feel some uncomfortable feeling into the left hip area and then the following day had difficulty with flexion extension and inability to independently transfer from her wheelchair to the commode.  She normally does have edematous thighs I do not feel there is any issue right now with a clot or DVT as I am palpating her left hip around the trochanteric bursa area down through the IT band which she states is tender to palpation and that also she does have a history of left knee replacement she does have a slight effusion.  Negative to the popliteal area.  Does have her lymphedema wraps bilaterally.  We did talk about the the knee itself as well as the various tendons ligaments and connective tissue and how they do crossover joints and that she does not have any numbness or tingling and it was not an injury and she can have as were doing today a passive range of motion it is stiff but she is able to perform flexion and extension with the knee.  Quadricep is unremarkable to exam.  After talking further she does agree that it is more of a muscular issue and so therefore we did talk about various adjunct of cares including cool packs and stretching exercises.  She also does feel that her wheelchair cushion  needs to be reevaluated because it does seem to perhaps even contribute to the left leg issue today but she states that it is not wide enough for her chair and her buttock seems to sit on the crease of the pad rather than extend to the outside of the thigh.  Usually she does require assistance with basic ADLs including assistance on the commode as well as ADL care.  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  RD reports that she is not willing to commit to the program outlined.  See scanned document.  12/15/2015 - Marcio Quinonez MD        Obesity - although she was not weighed today, she is clearly obese on inspection. 4/10/2015     LOPEZ (obstructive sleep apnea) - abnormal overnight pulse oximetry 5/22/2015     Osteoarthritis      Peripheral vascular disease (H)      Pleural effusion      Pressure ulcer of foot      Rheumatoid arthritis (H) 12/30/2014    seronegative     Sepsis (H)      Seropositive rheumatoid arthritis (H) 1/19/2016     Vitamin D deficiency 8/26/2015             Family History   Problem Relation Age of Onset     Multiple myeloma Father      Deep vein thrombosis Father      Cataracts Father      Snoring Father      Other Brother         Blood withdrawn from time to time.  Sounds like hemochromatosis.     Sleep apnea Brother      Cancer Mother         Uterine CA     Arthritis Mother      Cataracts Mother      Breast cancer Mother 54     No Medical Problems Sister      Rheum arthritis  Maternal Grandmother      Breast cancer Maternal Grandmother 50     Heart disease Maternal Grandmother      Rheum arthritis Paternal Aunt      Breast cancer Maternal Aunt 54     Breast cancer Cousin      Clotting disorder Neg Hx      Social History     Socioeconomic History     Marital status: Single     Spouse name: Not on file     Number of children: Not on file     Years of education: Not on file     Highest education level: Not on file   Occupational History     Not on file   Social Needs     Financial resource strain: Not on file     Food insecurity:     Worry: Not on file     Inability: Not on file     Transportation needs:     Medical: Not on file     Non-medical: Not on file   Tobacco Use     Smoking status: Former Smoker     Packs/day: 1.00     Years: 30.00     Pack years: 30.00     Types: Cigarettes     Smokeless tobacco: Never Used   Substance and Sexual Activity     Alcohol use: Yes     Comment: 3-4 times a year she will have 1 or 2.     Drug use: No     Sexual activity: Never     Partners: Female   Lifestyle     Physical activity:     Days per week: Not on file     Minutes per session: Not on file     Stress: Not on file   Relationships     Social connections:     Talks on phone: Not on file     Gets together: Not on file     Attends Confucianist service: Not on file     Active member of club or organization: Not on file     Attends meetings of clubs or organizations: Not on file     Relationship status: Not on file     Intimate partner violence:     Fear of current or ex partner: Not on file     Emotionally abused: Not on file     Physically abused: Not on file     Forced sexual activity: Not on file   Other Topics Concern     Not on file   Social History Narrative    Long term  At [facility] to rehab to level allowing surgery on deteriorated joints  LTC12/2015         Review of Systems  No reports of any URI symptoms postnasal drip fever chills fatigue flulike symptoms nausea vomiting diarrhea  dysuria rashes stiff neck swollen glands. Does have bilateral DVT sleep apnea obesity edema vitamin D deficiency PAD rheumatoid arthritis multiple sites chronic pain.    Current Outpatient Medications:      acetaminophen (TYLENOL) 325 MG tablet, Take 650 mg by mouth every 6 (six) hours as needed for pain.    , Disp: , Rfl:      adalimumab (HUMIRA) 40 mg/0.8 mL injection, Inject 0.8 mL (40 mg total) under the skin every 14 (fourteen) days., Disp: 0.8 mL, Rfl: 0     ascorbic acid, vitamin C, (ASCORBIC ACID WITH KAEL HIPS) 500 MG tablet, Take 500 mg by mouth 2 (two) times a day., Disp: , Rfl:      cholecalciferol, vitamin D3, (VITAMIN D3) 2,000 unit Tab, Take 6,000 Units by mouth daily., Disp: , Rfl:      DULoxetine (CYMBALTA) 20 MG capsule, Take 1 capsule (20 mg total) by mouth daily., Disp: 30 capsule, Rfl: 2     folic acid (FOLVITE) 1 MG tablet, Take 1 tablet (1 mg total) by mouth daily., Disp: , Rfl: 0     hydroCHLOROthiazide (HYDRODIURIL) 25 MG tablet, Take 25 mg by mouth daily., Disp: , Rfl:      hydroxychloroquine (PLAQUENIL) 200 mg tablet, Take 200 mg by mouth 2 (two) times a day., Disp: , Rfl:      ibuprofen (ADVIL,MOTRIN) 200 MG tablet, Take 400 mg by mouth every 8 (eight) hours as needed for pain.    , Disp: , Rfl:      methotrexate 2.5 MG tablet, Take 4 tablets (10 mg total) by mouth once a week., Disp: 16 tablet, Rfl: 0     multivitamin therapeutic (THERAGRAN) tablet, Take 1 tablet by mouth daily., Disp: , Rfl:      nystatin (MYCOSTATIN) powder, Apply to rash areas twice daily., Disp: 15 g, Rfl: 0     oxyCODONE (OXY-IR) 5 mg capsule, Take 1-2 tablets every 4 hours orally as needed for pain relief.  Use 1 tablet for 1-5/10 pain, take 2 tablets for 6-10/10 pain., Disp: 60 tablet, Rfl: 0     WARFARIN SODIUM (WARFARIN ORAL), Take by mouth daily. 9/6/19 INR 3.15 Decrease to 9mg M & TH, 8mg AOD. Next INR 10/3. 8/9/19 INR 2.79 Take 9mg M, W, F and 8mg AOD Next INR 9/6 8/8/19 INR missed.  Give 8mg on 8/8.  Next  INR 8/9/19.   7/25/19 INR 2.23  Cont 9mg M-W-F and 8mg AOD.  Next INR 8/8/19.   7/22/19 INR missed.  Cont 9mg M-W-F and 8mg AOD.  Next INR 7/25/19.   7/18/19 INR 2.79  Cont 9mg M-W-F and 8mg AOD.  Next INR 7/22/19   , Disp: , Rfl:     There were no vitals filed for this visit.  Vital signs on September 23 of the only time this month blood pressure 170/80 pulse 80 respirations 20 temperature 97.5  Physical Exam  The left quadricep thigh trochanteric bursa and knee as mentioned above.  She already is on warfarin and she is getting a pro time coming up in the next couple of days.  She can have narcotics she did take an oxycodone on September 24 otherwise can have ibuprofen to which she does not take any as needed.  She can also have Tylenol as needed and has not received any.  She is on methotrexate by her rheumatologist.  LABS:   No results found for: HGBA1C  No results found for: TSH, I6KLVVJ, F8KXJHS, THYROIDAB  Lab Results   Component Value Date    CHOL 209 (H) 04/13/2015    CHOL 227 (H) 04/07/2015     Lab Results   Component Value Date    HDL 61 04/13/2015    HDL 65 04/07/2015     Lab Results   Component Value Date    LDLCALC 138 (H) 04/13/2015    LDLCALC 139 (H) 04/07/2015     Lab Results   Component Value Date    TRIG 48 04/13/2015    TRIG 113 04/07/2015     No components found for: CHOLHDL  Lab Results   Component Value Date    WBC 4.7 09/10/2019    HGB 14.0 09/10/2019    HCT 41.6 09/10/2019    MCV 88 09/10/2019     09/10/2019   \  Lab Results   Component Value Date    ALT 20 09/10/2019    AST 23 07/25/2019    ALKPHOS 69 07/25/2019    BILITOT 0.3 07/25/2019         ASSESSMENT:      ICD-10-CM    1. Acute pain of left knee M25.562    2. Pain of left hip joint M25.552    3. Knee effusion, left M25.462    4. Weakness of left lower extremity R29.898        PLAN:    Cool packs to the left knee as well as trochanteric bursa for the next few days as well as some passive stretching exercises and then also therapy  to look at her wheelchair question in her wheelchair positioning.  After further conversation she does agree that it is probably more muscular and soft tissue.  Therefore we will keep an INR that I also explained that to the staff member.  They will keep me updated for any changes problems or other issues.  She did not have any other concerns or questions with this visit.    For documentation purposes total visit 35 minutes which over 50% was spent with the patient going over injury the explanation the anatomy physiology of the quadricep as well as the knee but also treatment modalities and coordination of care.      Electronically signed by: Michael Duane Johnson, CNP

## 2021-06-02 VITALS — BODY MASS INDEX: 50.18 KG/M2 | WEIGHT: 293 LBS

## 2021-06-02 NOTE — PROGRESS NOTES
[facility]    Facility:   [facility] [[id number]]   Code Status: FULL CODE      CHIEF COMPLAINT/REASON FOR VISIT:  Chief Complaint   Patient presents with     Review Of Multiple Medical Conditions       HISTORY:      HPI: María Elena is a 54 y.o. female who was seen secondary to review of chronic medical conditions.  She does have a history of rheumatoid issues and chronic pain.  She has not taken any Tylenol as needed has only taken 2 ibuprofen this month and then oxycodone has not taken any PRN.  She is on Cymbalta 20 mg Plaquenil methotrexate and some multivitamins and minerals.  She is also on Coumadin which we are checking usually monthly.  She has been normotensive and afebrile.  Also last time I saw her I did order a wheelchair question for her work as she was having some posterior leg discomfort which now has been helpful for her with decreased leg discomfort.  She did not have any other questions.  Her next rheumatology visit is in December.    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  RD reports that she is not willing to commit to the program outlined.  See scanned document.  12/15/2015 - Marcio Quinonez MD        Obesity - although she was not weighed today, she is clearly obese on inspection. 4/10/2015     LOPEZ (obstructive sleep apnea) - abnormal overnight pulse oximetry 5/22/2015     Osteoarthritis      Peripheral vascular disease (H)       Pleural effusion      Pressure ulcer of foot      Rheumatoid arthritis (H) 12/30/2014    seronegative     Sepsis (H)      Seropositive rheumatoid arthritis (H) 1/19/2016     Vitamin D deficiency 8/26/2015             Family History   Problem Relation Age of Onset     Multiple myeloma Father      Deep vein thrombosis Father      Cataracts Father      Snoring Father      Other Brother         Blood withdrawn from time to time.  Sounds like hemochromatosis.     Sleep apnea Brother      Cancer Mother         Uterine CA     Arthritis Mother      Cataracts Mother      Breast cancer Mother 54     No Medical Problems Sister      Rheum arthritis Maternal Grandmother      Breast cancer Maternal Grandmother 50     Heart disease Maternal Grandmother      Rheum arthritis Paternal Aunt      Breast cancer Maternal Aunt 54     Breast cancer Cousin      Clotting disorder Neg Hx      Social History     Socioeconomic History     Marital status: Single     Spouse name: Not on file     Number of children: Not on file     Years of education: Not on file     Highest education level: Not on file   Occupational History     Not on file   Social Needs     Financial resource strain: Not on file     Food insecurity:     Worry: Not on file     Inability: Not on file     Transportation needs:     Medical: Not on file     Non-medical: Not on file   Tobacco Use     Smoking status: Former Smoker     Packs/day: 1.00     Years: 30.00     Pack years: 30.00     Types: Cigarettes     Smokeless tobacco: Never Used   Substance and Sexual Activity     Alcohol use: Yes     Comment: 3-4 times a year she will have 1 or 2.     Drug use: No     Sexual activity: Never     Partners: Female   Lifestyle     Physical activity:     Days per week: Not on file     Minutes per session: Not on file     Stress: Not on file   Relationships     Social connections:     Talks on phone: Not on file     Gets together: Not on file     Attends Shinto service: Not on file      Active member of club or organization: Not on file     Attends meetings of clubs or organizations: Not on file     Relationship status: Not on file     Intimate partner violence:     Fear of current or ex partner: Not on file     Emotionally abused: Not on file     Physically abused: Not on file     Forced sexual activity: Not on file   Other Topics Concern     Not on file   Social History Narrative    Long term  At [facility] to rehab to level allowing surgery on deteriorated joints  LTC12/2015         Review of Systems  No reports of any URI symptoms postnasal drip fever chills fatigue flulike symptoms nausea vomiting diarrhea dysuria rashes stiff neck swollen glands. Does have bilateral DVT sleep apnea obesity edema vitamin D deficiency PAD rheumatoid arthritis multiple sites chronic pain.    Current Outpatient Medications:      acetaminophen (TYLENOL) 325 MG tablet, Take 650 mg by mouth every 6 (six) hours as needed for pain.    , Disp: , Rfl:      adalimumab (HUMIRA) 40 mg/0.8 mL injection, Inject 0.8 mL (40 mg total) under the skin every 14 (fourteen) days., Disp: 0.8 mL, Rfl: 0     ascorbic acid, vitamin C, (ASCORBIC ACID WITH KAEL HIPS) 500 MG tablet, Take 500 mg by mouth 2 (two) times a day., Disp: , Rfl:      cholecalciferol, vitamin D3, (VITAMIN D3) 2,000 unit Tab, Take 6,000 Units by mouth daily., Disp: , Rfl:      DULoxetine (CYMBALTA) 20 MG capsule, Take 1 capsule (20 mg total) by mouth daily., Disp: 30 capsule, Rfl: 2     folic acid (FOLVITE) 1 MG tablet, Take 1 tablet (1 mg total) by mouth daily., Disp: , Rfl: 0     hydroCHLOROthiazide (HYDRODIURIL) 25 MG tablet, Take 25 mg by mouth daily., Disp: , Rfl:      hydroxychloroquine (PLAQUENIL) 200 mg tablet, Take 200 mg by mouth 2 (two) times a day., Disp: , Rfl:      ibuprofen (ADVIL,MOTRIN) 200 MG tablet, Take 400 mg by mouth every 8 (eight) hours as needed for pain.    , Disp: , Rfl:      methotrexate 2.5 MG tablet, Take 4 tablets (10 mg total) by  mouth once a week., Disp: 16 tablet, Rfl: 0     multivitamin therapeutic (THERAGRAN) tablet, Take 1 tablet by mouth daily., Disp: , Rfl:      nystatin (MYCOSTATIN) powder, Apply to rash areas twice daily., Disp: 15 g, Rfl: 0     oxyCODONE (OXY-IR) 5 mg capsule, Take 1-2 tablets every 4 hours orally as needed for pain relief.  Use 1 tablet for 1-5/10 pain, take 2 tablets for 6-10/10 pain., Disp: 60 tablet, Rfl: 0     WARFARIN SODIUM (WARFARIN ORAL), Take by mouth daily. 10/3/19 refused draw. Give 9mg tonight, Check INR tomorrow. 9/6/19 INR 3.15 Decrease to 9mg M & TH, 8mg AOD. Next INR 10/3. 8/9/19 INR 2.79 Take 9mg M, W, F and 8mg AOD Next INR 9/6 8/8/19 INR missed.  Give 8mg on 8/8.  Next INR 8/9/19.   7/25/19 INR 2.23  Cont 9mg M-W-F and 8mg AOD.  Next INR 8/8/19.   7/22/19 INR missed.  Cont 9mg M-W-F and 8mg AOD.  Next INR 7/25/19.   7/18/19 INR 2.79  Cont 9mg M-W-F and 8mg AOD.  Next INR 7/22/19   , Disp: , Rfl:     There were no vitals filed for this visit.  Blood pressure 135/81 pulse 83 respirations 18 temperature 98.5 saturation room air 95%  Physical Exam  Cardiovascular: Normal rate, regular rhythm and normal heart sounds.   Chronic edema .   Pulmonary/Chest: Breath sounds normal.   inder   Abdominal.  Protuberant  Musculoskeletal:   Chronic pain. Chronic edema. Rheumatoid arthritis multiple sites including hands. Left knee scar. Left hip OA. Psychiatric: She has a normal mood and affect. Her behavior is normal.        LABS:   Lab Results   Component Value Date    WBC 4.7 09/10/2019    HGB 14.0 09/10/2019    HCT 41.6 09/10/2019    MCV 88 09/10/2019     09/10/2019     Lab Results   Component Value Date    ALT 20 09/10/2019    AST 23 07/25/2019    ALKPHOS 69 07/25/2019    BILITOT 0.3 07/25/2019     Results for orders placed or performed in visit on 09/13/18   Basic Metabolic Panel   Result Value Ref Range    Sodium 144 136 - 145 mmol/L    Potassium 4.3 3.5 - 5.0 mmol/L    Chloride 101 98 - 107 mmol/L     CO2 34 (H) 22 - 31 mmol/L    Anion Gap, Calculation 9 5 - 18 mmol/L    Glucose 96 70 - 125 mg/dL    Calcium 9.7 8.5 - 10.5 mg/dL    BUN 16 8 - 22 mg/dL    Creatinine 0.75 0.60 - 1.10 mg/dL    GFR MDRD Af Amer >60 >60 mL/min/1.73m2    GFR MDRD Non Af Amer >60 >60 mL/min/1.73m2           ASSESSMENT:      ICD-10-CM    1. Physical debility R53.81    2. Polyarthralgia M25.50    3. Essential hypertension with goal blood pressure less than 140/90 I10    4. Pain management R52        PLAN:    No other changes at this time.  Staff do not have any particular concerns.  We did get her new wheelchair questions month.  Does see rheumatology the next month or 2.        Electronically signed by: Michael Duane Johnson, CNP

## 2021-06-02 NOTE — TELEPHONE ENCOUNTER
"[facility] Nurse Triage Telephone Note      Provider: DELPHINE Perry  Facility: [facility]    Facility Type: [facility]    Caller: Kathie  Call Back Number:  [phone]    Allergies: Adhesive; Banana; Grape; Morphine (pf); Latex, natural rubber; and Other food allergy    Reason for call: Pt refused lab draw INR this am \"to early 5-6am\" Last INR 9/6/19 3.15 on 9mg M & TH, 8mg AOD.     Verbal Order/Direction given by Provider: Give scheduled 9mg tonight, draw INR tomorrow & ask lab NOT to attempt so early, maybe wait til last draw at facility.    Provider giving order: DELPHINE Perry    Verbal order given to: Kathie Worthy RN      "

## 2021-06-03 VITALS — WEIGHT: 293 LBS | BODY MASS INDEX: 51.86 KG/M2

## 2021-06-03 VITALS — BODY MASS INDEX: 51.3 KG/M2 | WEIGHT: 293 LBS

## 2021-06-03 VITALS
HEART RATE: 76 BPM | SYSTOLIC BLOOD PRESSURE: 124 MMHG | BODY MASS INDEX: 51.85 KG/M2 | WEIGHT: 293 LBS | DIASTOLIC BLOOD PRESSURE: 76 MMHG

## 2021-06-03 NOTE — PROGRESS NOTES
[facility]    Facility:   [facility] [[id number]]   Code Status: FULL CODE      CHIEF COMPLAINT/REASON FOR VISIT:  Chief Complaint   Patient presents with     FVP Care Coordination - Regulatory       HISTORY:      HPI: María Elena is a 54 y.o. female who was seen secondary to review of chronic medical conditions.  She does see rheumatology coming up now in December for rheumatoid arthritis.  So far seems to be pretty well controlled.  Still needs assistance with all ADLs including transfers.  Did take 1 ibuprofen on November 23 is not taking any oxycodone as needed for the pain.  She has been normotensive and afebrile and also on room air.  Appetite is good.  We are also watching her warfarin not doing a pro time until the beginning of December.  In the middle of November she was seen secondary to left lower extremity cellulitis.  Looking at her left leg today besides having a history of PAD there is no obvious cellulitis.    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  RD reports that she is not willing to commit to the program outlined.  See scanned document.  12/15/2015 - Marcio Quinonez MD        Obesity - although she was not weighed today, she is clearly obese on inspection. 4/10/2015     LOPEZ (obstructive sleep apnea) - abnormal overnight pulse oximetry 5/22/2015     Osteoarthritis      Peripheral vascular disease (H)      Pleural effusion      Pressure  ulcer of foot      Rheumatoid arthritis (H) 12/30/2014    seronegative     Sepsis (H)      Seropositive rheumatoid arthritis (H) 1/19/2016     Vitamin D deficiency 8/26/2015             Family History   Problem Relation Age of Onset     Multiple myeloma Father      Deep vein thrombosis Father      Cataracts Father      Snoring Father      Other Brother         Blood withdrawn from time to time.  Sounds like hemochromatosis.     Sleep apnea Brother      Cancer Mother         Uterine CA     Arthritis Mother      Cataracts Mother      Breast cancer Mother 54     No Medical Problems Sister      Rheum arthritis Maternal Grandmother      Breast cancer Maternal Grandmother 50     Heart disease Maternal Grandmother      Rheum arthritis Paternal Aunt      Breast cancer Maternal Aunt 54     Breast cancer Cousin      Clotting disorder Neg Hx      Social History     Socioeconomic History     Marital status: Single     Spouse name: Not on file     Number of children: Not on file     Years of education: Not on file     Highest education level: Not on file   Occupational History     Not on file   Social Needs     Financial resource strain: Not on file     Food insecurity:     Worry: Not on file     Inability: Not on file     Transportation needs:     Medical: Not on file     Non-medical: Not on file   Tobacco Use     Smoking status: Former Smoker     Packs/day: 1.00     Years: 30.00     Pack years: 30.00     Types: Cigarettes     Smokeless tobacco: Never Used   Substance and Sexual Activity     Alcohol use: Yes     Comment: 3-4 times a year she will have 1 or 2.     Drug use: No     Sexual activity: Never     Partners: Female   Lifestyle     Physical activity:     Days per week: Not on file     Minutes per session: Not on file     Stress: Not on file   Relationships     Social connections:     Talks on phone: Not on file     Gets together: Not on file     Attends [employer] service: Not on file     Active member of club or  organization: Not on file     Attends meetings of clubs or organizations: Not on file     Relationship status: Not on file     Intimate partner violence:     Fear of current or ex partner: Not on file     Emotionally abused: Not on file     Physically abused: Not on file     Forced sexual activity: Not on file   Other Topics Concern     Not on file   Social History Narrative    Long term  At [facility] to rehab to level allowing surgery on deteriorated joints  LTC12/2015         Review of Systems  No reports of any URI symptoms postnasal drip fever chills fatigue flulike symptoms nausea vomiting diarrhea dysuria rashes stiff neck swollen glands. Does have bilateral DVT sleep apnea obesity edema vitamin D deficiency PAD rheumatoid arthritis multiple sites chronic pain.  Current Outpatient Medications   Medication Sig     acetaminophen (TYLENOL) 325 MG tablet Take 650 mg by mouth every 6 (six) hours as needed for pain.            adalimumab (HUMIRA) 40 mg/0.8 mL injection Inject 0.8 mL (40 mg total) under the skin every 14 (fourteen) days.     ascorbic acid, vitamin C, (ASCORBIC ACID WITH KAEL HIPS) 500 MG tablet Take 500 mg by mouth 2 (two) times a day.     cholecalciferol, vitamin D3, (VITAMIN D3) 2,000 unit Tab Take 6,000 Units by mouth daily.     DULoxetine (CYMBALTA) 20 MG capsule Take 1 capsule (20 mg total) by mouth daily.     folic acid (FOLVITE) 1 MG tablet Take 1 tablet (1 mg total) by mouth daily.     hydroCHLOROthiazide (HYDRODIURIL) 25 MG tablet Take 25 mg by mouth daily.     hydroxychloroquine (PLAQUENIL) 200 mg tablet Take 200 mg by mouth 2 (two) times a day.     ibuprofen (ADVIL,MOTRIN) 200 MG tablet Take 400 mg by mouth every 8 (eight) hours as needed for pain.            methotrexate 2.5 MG tablet Take 4 tablets (10 mg total) by mouth once a week.     multivitamin therapeutic (THERAGRAN) tablet Take 1 tablet by mouth daily.     nystatin (MYCOSTATIN) powder Apply to rash areas twice daily.      oxyCODONE (OXY-IR) 5 mg capsule Take 1-2 tablets every 4 hours orally as needed for pain relief.  Use 1 tablet for 1-5/10 pain, take 2 tablets for 6-10/10 pain.     WARFARIN SODIUM (WARFARIN ORAL) Take by mouth daily. 11/8/19 INR 2.50  Cont 9mg M-W-F and 8mg AOD.  Next INR 12/5/19.    11/7/19 INR missed.  Give 8mg tonight.  Check INR in AM.    10/3/19 refused draw. Give 9mg tonight, Check INR tomorrow.  9/6/19 INR 3.15 Decrease to 9mg M & TH, 8mg AOD. Next INR 10/3.  8/9/19 INR 2.79 Take 9mg M, W, F and 8mg AOD Next INR 9/6 8/8/19 INR missed.  Give 8mg on 8/8.  Next INR 8/9/19.    7/25/19 INR 2.23  Cont 9mg M-W-F and 8mg AOD.  Next INR 8/8/19.             There were no vitals filed for this visit.  Blood pressure on November 21, 2019 130/75 pulse 72 respirations 18 temperature 98.2  Physical Exam   Cardiovascular: Normal rate, regular rhythm and normal heart sounds.   Chronic edema .  Catie wraps.  Pulmonary/Chest: Breath sounds normal.   inder   Abdominal.  Protuberant  Musculoskeletal:   Chronic pain. Chronic edema. Rheumatoid arthritis multiple sites including hands. Left knee scar. Left hip OA. Psychiatric: She has a normal mood and affect. Her behavior is normal.         LABS:   Lab Results   Component Value Date    WBC 4.7 09/10/2019    HGB 14.0 09/10/2019    HCT 41.6 09/10/2019    MCV 88 09/10/2019     09/10/2019     Results for orders placed or performed in visit on 09/13/18   Basic Metabolic Panel   Result Value Ref Range    Sodium 144 136 - 145 mmol/L    Potassium 4.3 3.5 - 5.0 mmol/L    Chloride 101 98 - 107 mmol/L    CO2 34 (H) 22 - 31 mmol/L    Anion Gap, Calculation 9 5 - 18 mmol/L    Glucose 96 70 - 125 mg/dL    Calcium 9.7 8.5 - 10.5 mg/dL    BUN 16 8 - 22 mg/dL    Creatinine 0.75 0.60 - 1.10 mg/dL    GFR MDRD Af Amer >60 >60 mL/min/1.73m2    GFR MDRD Non Af Amer >60 >60 mL/min/1.73m2       Lab Results   Component Value Date    ALT 20 09/10/2019    AST 23 07/25/2019    ALKPHOS 69 07/25/2019     NICOLEITOT 0.3 07/25/2019       Case Management:  I have reviewed the facility/SNF care plan/MDS which was done Today, including the falls risk, nutrition and pain screening. I also reviewed the current immunizations, and preventive care.. Future cancer screening indicated is Breast and provider and pt will formulate a POC.   Patient's desire to return to the community is present, but is not able due to care needs .    Information reviewed:  Medications, vital signs, orders, and nursing notes.    ASSESSMENT:      ICD-10-CM    1. Seropositive rheumatoid arthritis of multiple sites (H) M05.79    2. Pain management R52    3. PAD (peripheral artery disease) (H) I73.9    4. Polyarthralgia M25.50        PLAN:    No further changes at this time.  Her left leg looks good she is on warfarin which were monitoring again the beginning of December.  She also does have an appoint with rheumatology in December.      Electronically signed by: Michael Duane Johnson, CNP

## 2021-06-03 NOTE — TELEPHONE ENCOUNTER
[facility] Nurse Triage Telephone Note      Provider: DELPHINE Perry  Facility: [facility]    Facility Type: [facility]    Caller: Silas  Call Back Number:  [phone]    Allergies: Adhesive; Banana; Grape; Morphine (pf); Latex, natural rubber; and Other food allergy    Reason for call: Nurse reporting that patient has a history of cellulitis.  Nurse states that patient has an area of redness on her left lateral calf which is about the size of an orange that is slightly raised and slightly red.  No warmth or pain noted.  Afebrile.       Verbal Order/Direction given by Provider: Outline the area of redness.  Call if redness goes beyond the outline or if further symptoms develop.      Provider giving order: DELPHINE Perry    Verbal order given to: Rozina Easton RN

## 2021-06-03 NOTE — TELEPHONE ENCOUNTER
[facility] Nurse Triage Telephone Note      Provider: DELPHINE Perry  Facility: [facility]    Facility Type: [facility]    Caller: Edgar  Call Back Number:  [phone]    Allergies: Adhesive; Banana; Grape; Morphine (pf); Latex, natural rubber; and Other food allergy    Reason for call: Patient's INR was missed today.  Last INR on 10/7/19 was 2.31(9mg M-W-F and 8mg AOD).       Verbal Order/Direction given by Provider: Give 8mg today.  Next INR tomorrow, 11/8/19.      Provider giving order: DELPHINE Perry    Verbal order given to: Edgar Easton RN

## 2021-06-03 NOTE — TELEPHONE ENCOUNTER
[facility] Nurse Triage Anticoagulation Note      Provider: DELPHINE Perry  Facility: [facility]    Facility Type: [facility]    Caller: Edgar  Call Back Number:  [phone]    Reason for call: INR    Today s INR: 2.50  Previous INR: 10/7 2.31(9mg M-W-F and 8mg AOD)    Diagnosis/Goal: DVT  Heparin/Lovenox: No   Currently on ABX: No  Other interacting Medications: None  Missed or refused doses: No    Verbal Order/Direction given by Provider: Continue Warfarin 9mg M-W-F and 8mg all other days.  Next INR 12/5/19.      Provider giving order: DELPHINE Perry    Verbal order given to: Edgar Easton RN

## 2021-06-04 VITALS
HEART RATE: 70 BPM | SYSTOLIC BLOOD PRESSURE: 129 MMHG | DIASTOLIC BLOOD PRESSURE: 75 MMHG | WEIGHT: 293 LBS | RESPIRATION RATE: 16 BRPM | OXYGEN SATURATION: 99 % | TEMPERATURE: 97.4 F | BODY MASS INDEX: 52.9 KG/M2

## 2021-06-04 VITALS
DIASTOLIC BLOOD PRESSURE: 82 MMHG | WEIGHT: 293 LBS | SYSTOLIC BLOOD PRESSURE: 124 MMHG | HEART RATE: 74 BPM | HEIGHT: 68 IN | BODY MASS INDEX: 44.41 KG/M2

## 2021-06-04 NOTE — TELEPHONE ENCOUNTER
----- Message from PEDRO Chen sent at 12/9/2019  5:36 PM CST -----  Please check labs again as soon as possible.(low white cell count)

## 2021-06-04 NOTE — TELEPHONE ENCOUNTER
Theresa altamirano nurse, María Elena lives with is requesting a order to have María Elena's white count checked tomorrow.  Please call her once the order is placed.

## 2021-06-04 NOTE — PROGRESS NOTES
ASSESSMENT AND PLAN:  María Elena Saab 54 y.o. female is here for follow-up.  She has severe seropositive rheumatoid arthritis deforming now under good control with Humira and methotrexate and that she is back on after having been off it for a few months when she developed cellulitis and bacteremia.  Hydroxychloroquine is being discontinued.  She is at [facility] and may be there long-term due to her inability to self-care, which she feels is secondary to bilateral knee pain where she is due to have knee replacement which evidently will be done only after she loses significant weight.  She had leukopenia repeat CBC shows normal white cell count now.  Stay the course except discontinuation of hydroxychloroquine.  Follow-up in 3 months labs every 6 weeks                Diagnoses and all orders for this visit:    Seropositive rheumatoid arthritis of multiple sites (H)  -     folic acid (FOLVITE) 1 MG tablet; Take 1 tablet (1 mg total) by mouth daily.  Dispense: 90 tablet; Refill: 0  -     methotrexate 2.5 MG tablet; Take 4 tablets (10 mg total) by mouth once a week.  Dispense: 48 tablet; Refill: 0    Primary osteoarthritis involving multiple joints  -     DULoxetine (CYMBALTA) 20 MG capsule; Take 1 capsule (20 mg total) by mouth daily.  Dispense: 90 capsule; Refill: 2    Cyclic citrullinated peptide (CCP) antibody positive    High risk medication use  -     folic acid (FOLVITE) 1 MG tablet; Take 1 tablet (1 mg total) by mouth daily.  Dispense: 90 tablet; Refill: 0    Polyarthralgia           HISTORY OF PRESENTING ILLNESS:    María Elena Saab 54 y.o. female  is here for followup.  She has  severe seropositive erosive deforming rheumatoid arthritis.  After resumption of her Humira and methotrexate she has noted improvement in pain.  She has residual pain such as the right shoulder left elbow and the knees and the neck lower back.  This interferes with her day-to-day activities.  She is at the long-term facility at  "[facility] care and expects to be there for long-term.  This is on account of not been able to her day-to-day activities independently.  Her knee replacement is been deferred till she loses weight which she finds hard to do given the kind of diet she feels they are fed.  On her previous visit she was only on hydroxychloroquine.  She was started back on methotrexate and Humira which were held because of bacteremia episode prior.  These were resumed back in September this year.  She has noted stiffness in the morning 15 minutes.  No recurrence of fevers, weight loss, she has had no eye redness she has had mouth ulcers some cough no rash. .Further historical information, including ROS as noted in the multidimensional health assessment questionnaire scanned in the EMR and in the assessment and plan section. Rheumatoid Arthritis Disease Activity Measure used: RAPID3, reviewed  today and scanned in the chart.        Following is the excerpt from a recent note:    She has noted significant improvement in her joint pains she's been on Humira for the past of 4 doses, however recently she did have increased pain in her left hand which subsided since.  Now she has leukopenia.  Her decubitus ulcer is noted not to be infected anymore.  I have emphasized the importance of keeping close eye.  She is awaiting left hip replacement.  She is aware that I'm not able to give her the more \"aggressive\"  DMARD still her pressure ulcer heals and is no longer considered infected.  This patient has high titer anti-CCP antibody.  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  She is wheelchair-bound.  This happened this past year in fall.  She was due to have left hip replacement and then subsequent to that right knee replacement but before she could have that done she developed pneumonia and pulmonary embolism.  Prior to hospitalization she reports that she was able to ambulate with the help of a walker.  Since then she could simply " not go back to ambulation and is now awaiting surgery at the nursing home.  This morning she told me she was informed that she has a infected decubitus ulcer.  Unless as she heals there the orthopedic surgeons obviously are not prepared to proceed with in the surgical intervention.  Meanwhile her rheumatoid arthritis is appearing to be a bit better today.  She noted that she could move her finger better.  She had trigger finger injection on her previous visit.  I have outlined to her that we will proceed with sulfasalazine.  I cannot at this point given her methotrexate or other more aggressive DMARD still her infected decubitus ulcer has been under control or healed.       has longstanding seropositive anti-CCP antibody severe deforming, erosive rheumatoid arthritis.   In the past she was said to have infected decubitus ulcer of the right lower extremity posteriorly, she understands that that is now healed or at least not infected anymore.  Her recent evaluation of the vascular clinic shows that there is no residual active infection in her lower extremity ulceration.  In view of this and the fact that even with only hydroxychloroquine and sulfasalazine she has had intermittent neutropenia, she is an unlikely candidate for methotrexate, or leflunomide.    She is now on Humira for the past 2 months.  She is aware that it is going to be an important part of her observations at her primary physician at her home and herself to keep a close eye in case it is evidence of infection.  Will continue current implant.  Recent labs within acceptable range.  Her recent left hip arthroplasty, uneventfully she's not get back on Humira I have asked her to go back this will be 5 weeks out of surgery.  She did hold her Humira 2 weeks prior.  She also has a right ring finger triggering.  She would like to proceed with local injection.  20 mg of methylprednisolone and Marcaine injected in the flexor tendon sheath right ring finger  flexor tendon.  Return for follow-up here in 4 months.  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  RD reports that she is not willing to commit to the program outlined.  See scanned document.  12/15/2015 - Marcio Quinonez MD        Obesity - although she was not weighed today, she is clearly obese on inspection. 4/10/2015     LOPEZ (obstructive sleep apnea) - abnormal overnight pulse oximetry 5/22/2015     Osteoarthritis      Peripheral vascular disease (H)      Pleural effusion      Pressure ulcer of foot      Rheumatoid arthritis (H) 12/30/2014    seronegative     Sepsis (H)      Seropositive rheumatoid arthritis (H) 1/19/2016     Vitamin D deficiency 8/26/2015     Social History     Tobacco Use   Smoking Status Former Smoker     Packs/day: 1.00     Years: 30.00     Pack years: 30.00     Types: Cigarettes   Smokeless Tobacco Never Used     Patient Active Problem List   Diagnosis     DVT of lower extremity, bilateral - residual clot disease on repeat US in NEW location - after six months of warfarin.     Morbid obesity (H)     Warfarin anticoagulation - long-term because of unresolved DVT that first appeared in October, 2014     LOPEZ (obstructive sleep apnea) - abnormal overnight pulse  oximetry - SLEEP visit in November, 2015, she states.     Nocturnal hypoxemia - probable sleep apnea - had overnight pulse oximetry, April, 2015.     Edema - swelling of the legs after blood clots     Vitamin D deficiency     Cyclic citrullinated peptide (CCP) antibody positive     Right shoulder tendinitis     Seropositive rheumatoid arthritis of multiple sites (H)     PAD (peripheral artery disease) (H)     Pain management     Radiculopathy of leg     Sleep apnea     Degenerative joint disease (DJD) of hip     Essential hypertension with goal blood pressure less than 140/90     History of DVT of lower extremity     Anemia     H/O total hip arthroplasty     Polyarthralgia     Physical debility     High risk medication use     Primary osteoarthritis involving multiple joints     History of total left knee replacement (TKR)     Current Outpatient Prescriptions   Medication Sig Dispense Refill     ascorbic acid (VITAMIN C) 250 MG tablet Take 250 mg by mouth 2 (two) times a day.         enoxaparin (LOVENOX) 80 mg/0.8 mL Syrg Inject 80 mg under the skin 2 (two) times a day.         food supplement, lactose-free (BOOST BREEZE NUTRITIONAL) 0.04-1.05 gram-kcal/mL Liqd Use As Directed.         hydroxychloroquine (PLAQUENIL) 200 mg tablet Take 200 mg by mouth 2 (two) times a day.         ibuprofen (ADVIL,MOTRIN) 400 MG tablet Take 400 mg by mouth every 6 (six) hours as needed for pain.         loratadine (CLARITIN) 10 mg tablet Take 10 mg by mouth daily.         magnesium hydroxide (MAGNESIUM HYDROXIDE) 400 mg/5 mL Susp suspension Take by mouth as needed. (ML)         multivitamin Chew Chew 1 tablet daily.         nalOXone (NARCAN) 0.4 mg/mL injection Inject as directed as needed. (ML)         naproxen (NAPROSYN) 500 MG tablet Take 500 mg by mouth 2 (two) times a day as needed.         oxyCODONE (ROXICODONE) 5 MG immediate release tablet Take 5 mg by mouth 4 (four) times a day as needed.         senna-docusate (PERICOLACE)  "8.6-50 mg tablet Take 0.5 tablets by mouth daily.          warfarin (COUMADIN) 10 MG tablet          No current facility-administered medications for this visit.     DETAILED EXAMINATION  12/12/19  :  Vitals:    12/12/19 1525   BP: 124/82   Patient Position: Sitting   Cuff Size: Adult Regular   Pulse: 74   Weight: (!) 341 lb (154.7 kg)   Height: 5' 8\" (1.727 m)     Alert oriented. Head including the face is examined for malar rash, heliotropes, scarring, lupus pernio. Eyes examined for redness such as in episcleritis/scleritis, periorbital lesions.   Neck/ Face examined for parotid gland swelling, range of motion of neck.  Left upper and lower and right upper and lower extremities examined for tenderness, swelling, warmth of the appendicular joints, range of motion, edema, rash.  Some of the important findings included: She no longer has a synovitis noted previously in the MCPs, where she has ulnar deviation, boutonniere, she has synovitis thickening however.  She has impingement of the shoulder especially on the left side, marked JLT of the knee on the right, left TKA.       LAB / IMAGING DATA:  ALT   Date Value Ref Range Status   12/05/2019 22 0 - 45 U/L Final   09/10/2019 20 0 - 45 U/L Final   07/25/2019 22 0 - 45 U/L Final     Albumin   Date Value Ref Range Status   12/05/2019 3.4 (L) 3.5 - 5.0 g/dL Final   09/10/2019 3.4 (L) 3.5 - 5.0 g/dL Final   04/26/2019 3.6 3.5 - 5.0 g/dL Final     Creatinine   Date Value Ref Range Status   12/05/2019 0.75 0.60 - 1.10 mg/dL Final   09/10/2019 0.69 0.60 - 1.10 mg/dL Final   07/25/2019 0.63 0.60 - 1.10 mg/dL Final       WBC   Date Value Ref Range Status   12/12/2019 4.3 4.0 - 11.0 thou/uL Final   12/05/2019 2.5 (L) 4.0 - 11.0 thou/uL Final   09/14/2015 4.0 4.0 - 11.0 thou/uL Final   06/30/2015 3.7 (L) 4.0 - 11.0 thou/uL Final     Hemoglobin   Date Value Ref Range Status   12/12/2019 14.5 12.0 - 16.0 g/dL Final   12/05/2019 13.9 12.0 - 16.0 g/dL Final   09/10/2019 14.0 12.0 - " 16.0 g/dL Final     Platelets   Date Value Ref Range Status   12/12/2019 214 140 - 440 thou/uL Final   12/05/2019 193 140 - 440 thou/uL Final   09/10/2019 229 140 - 440 thou/uL Final       Lab Results   Component Value Date    RF 25.7 10/15/2014    SEDRATE 46 (H) 02/08/2017

## 2021-06-04 NOTE — TELEPHONE ENCOUNTER
Medication Request  Medication name:    Disp Refills Start End    adalimumab (HUMIRA) 40 mg/0.8 mL injection 0.8 mL 0 9/24/2019     Sig - Route: Inject 0.8 mL (40 mg total) under the skin every 14 (fourteen) days. - Subcutaneous    Class: Historical Med        Pharmacy Name and Location: [facility]  Reason for request:   When did you use medication last?:  Couple days ago.   Patient offered appointment:  n/a  Okay to leave a detailed message: yes  [phone]

## 2021-06-04 NOTE — TELEPHONE ENCOUNTER
Talked to Arina's nurse Zeus    Relayed provider's message below and provided phone # to call back for making a lab appt.    Ambar Merchant CMA MPW Rheumatology 12/9/2019 6:19 PM

## 2021-06-04 NOTE — TELEPHONE ENCOUNTER
[facility] Nurse Triage Telephone Note      Provider: DELPHINE Perry  Facility: [facility]    Facility Type: LTC    Caller: Theresa   Call Back Number:  [phone]    Allergies: Adhesive; Banana; Grape; Morphine (pf); Latex, natural rubber; and Other food allergy    Reason for call: Pt has the nurse manager draw her INR and the facility is out of INR tubes and can't get any tubes until 1/7/20 and last INR was on 12/6/19 at 2.62 on 9mg M, W, F and 8mg AOD     Verbal Order/Direction given by Provider: cont same warfarin and recheck the INR on 1/7    Provider giving order: DELPHINE Perry    Verbal order given to: Lizzette Blue RN

## 2021-06-04 NOTE — PROGRESS NOTES
[facility]    Facility:   [facility] [[id number]]   Code Status: FULL CODE      CHIEF COMPLAINT/REASON FOR VISIT:  Chief Complaint   Patient presents with     Review Of Multiple Medical Conditions       HISTORY:      HPI: María Elena is a 54 y.o. female was seen secondary to review of chronic medical conditions.  Recently did see her rheumatologist and they did take her off the Plaquenil and continue with the Humira as well as methotrexate.  Her pain seems to be managed.  Not requiring any oxycodone as needed.  Appetite good.  Does need assistance with all ADLs.  Is on warfarin.  Rechecking a pro time at the beginning of the year.  She did not have any other questions or other concerns about her chronic medical conditions.    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  RD reports that she is not willing to commit to the program outlined.  See scanned document.  12/15/2015 - Marcio Quinonez MD        Obesity - although she was not weighed today, she is clearly obese on inspection. 4/10/2015     LOPEZ (obstructive sleep apnea) - abnormal overnight pulse oximetry 5/22/2015     Osteoarthritis      Peripheral vascular disease (H)      Pleural effusion      Pressure ulcer of foot      Rheumatoid arthritis (H) 12/30/2014    seronegative     Sepsis (H)      Seropositive rheumatoid arthritis (H) 1/19/2016     Vitamin D deficiency 8/26/2015             Family History    Problem Relation Age of Onset     Multiple myeloma Father      Deep vein thrombosis Father      Cataracts Father      Snoring Father      Other Brother         Blood withdrawn from time to time.  Sounds like hemochromatosis.     Sleep apnea Brother      Cancer Mother         Uterine CA     Arthritis Mother      Cataracts Mother      Breast cancer Mother 54     No Medical Problems Sister      Rheum arthritis Maternal Grandmother      Breast cancer Maternal Grandmother 50     Heart disease Maternal Grandmother      Rheum arthritis Paternal Aunt      Breast cancer Maternal Aunt 54     Breast cancer Cousin      Clotting disorder Neg Hx      Social History     Socioeconomic History     Marital status: Single     Spouse name: Not on file     Number of children: Not on file     Years of education: Not on file     Highest education level: Not on file   Occupational History     Not on file   Social Needs     Financial resource strain: Not on file     Food insecurity:     Worry: Not on file     Inability: Not on file     Transportation needs:     Medical: Not on file     Non-medical: Not on file   Tobacco Use     Smoking status: Former Smoker     Packs/day: 1.00     Years: 30.00     Pack years: 30.00     Types: Cigarettes     Smokeless tobacco: Never Used   Substance and Sexual Activity     Alcohol use: Yes     Comment: 3-4 times a year she will have 1 or 2.     Drug use: No     Sexual activity: Never     Partners: Female   Lifestyle     Physical activity:     Days per week: Not on file     Minutes per session: Not on file     Stress: Not on file   Relationships     Social connections:     Talks on phone: Not on file     Gets together: Not on file     Attends Sabianism service: Not on file     Active member of club or organization: Not on file     Attends meetings of clubs or organizations: Not on file     Relationship status: Not on file     Intimate partner violence:     Fear of current or ex partner: Not on file      Emotionally abused: Not on file     Physically abused: Not on file     Forced sexual activity: Not on file   Other Topics Concern     Not on file   Social History Narrative    Long term  At [facility] to [facility] to [facility] allowing surgery on deteriorated joints  LTC12/2015         Review of Systems  No reports of any URI symptoms postnasal drip fever chills fatigue flulike symptoms nausea vomiting diarrhea dysuria rashes stiff neck swollen glands. Does have bilateral DVT sleep apnea obesity edema vitamin D deficiency PAD rheumatoid arthritis multiple sites chronic pain.    Current Outpatient Medications   Medication Sig     acetaminophen (TYLENOL) 325 MG tablet Take 650 mg by mouth every 6 (six) hours as needed for pain.            adalimumab (HUMIRA) 40 mg/0.8 mL injection Inject 0.8 mL (40 mg total) under the skin every 14 (fourteen) days.     ascorbic acid, vitamin C, (ASCORBIC ACID WITH KAEL HIPS) 500 MG tablet Take 500 mg by mouth 2 (two) times a day.     cholecalciferol, vitamin D3, (VITAMIN D3) 2,000 unit Tab Take 6,000 Units by mouth daily.     DULoxetine (CYMBALTA) 20 MG capsule Take 1 capsule (20 mg total) by mouth daily.     folic acid (FOLVITE) 1 MG tablet Take 1 tablet (1 mg total) by mouth daily.     hydroCHLOROthiazide (HYDRODIURIL) 25 MG tablet Take 25 mg by mouth daily.     ibuprofen (ADVIL,MOTRIN) 200 MG tablet Take 400 mg by mouth every 8 (eight) hours as needed for pain.            methotrexate 2.5 MG tablet Take 4 tablets (10 mg total) by mouth once a week.     multivitamin therapeutic (THERAGRAN) tablet Take 1 tablet by mouth daily.     nystatin (MYCOSTATIN) powder Apply to rash areas twice daily.     oseltamivir (TAMIFLU) 75 MG capsule      oxyCODONE (OXY-IR) 5 mg capsule Take 1-2 tablets every 4 hours orally as needed for pain relief.  Use 1 tablet for 1-5/10 pain, take 2 tablets for 6-10/10 pain.     WARFARIN SODIUM (WARFARIN ORAL) Take by mouth daily. 12/5/19 INR 2.62 Take 9mg M, W, F and  8mg AOD. Next INR 1/2  11/8/19 INR 2.50  Cont 9mg M-W-F and 8mg AOD.  Next INR 12/5/19.    11/7/19 INR missed.  Give 8mg tonight.  Check INR in AM.    10/3/19 refused draw. Give 9mg tonight, Check INR tomorrow.  9/6/19 INR 3.15 Decrease to 9mg M & TH, 8mg AOD. Next INR 10/3.  8/9/19 INR 2.79 Take 9mg M, W, F and 8mg AOD Next INR 9/6       There were no vitals filed for this visit.  Blood pressure 125/73 pulse 69 respirations 18 temperature 98.0  Physical Exam  Cardiovascular: Normal rate, regular rhythm and normal heart sounds.   Chronic edema .  Catie wraps.  Pulmonary/Chest: Breath sounds normal.   inder   Abdominal.  Protuberant  Musculoskeletal:   Chronic pain. Chronic edema. Rheumatoid arthritis multiple sites including hands. Left knee scar. Left hip OA. Psychiatric: She has a normal mood and affect. Her behavior is normal.         LABS:   Lab Results   Component Value Date    WBC 4.3 12/12/2019    HGB 14.5 12/12/2019    HCT 44.2 12/12/2019    MCV 89 12/12/2019     12/12/2019         ASSESSMENT:      ICD-10-CM    1. Primary osteoarthritis involving multiple joints M15.0    2. Pain management R52    3. Essential hypertension with goal blood pressure less than 140/90 I10    4. PAD (peripheral artery disease) (H) I73.9        PLAN:    She does need assistance with ADLs.  Does have her legs and the Farrow wraps.  Recently did see rheumatology and they did discontinue her Plaquenil and continue with Humira as well as methotrexate.  She did not have any other questions or concerns.        Electronically signed by: Michael Duane Johnson, CNP

## 2021-06-04 NOTE — TELEPHONE ENCOUNTER
[facility] Nurse Triage Anticoagulation Note      Provider: DELPHINE Perry  Facility: [facility]    Facility Type: [facility]    Caller: Miguel  Call Back Number:  [phone]    Reason for call: INR    Today s INR: 12/5 2.62  Previous INR: 11/8 2.5 9mg M, W, F and 8mg AOD.     Diagnosis/Goal: DVT  Heparin/Lovenox: No   Currently on ABX: No  Other interacting Medications: None  Missed or refused doses: No    Verbal Order/Direction given by Provider: Cont 9mg M, W, F and 8mg AOD. Next INR 1/2    Provider giving order: DELPHINE Perry    Verbal order given to: Miguel Blue RN

## 2021-06-04 NOTE — TELEPHONE ENCOUNTER
[facility] Nurse Triage Telephone Note      Provider: DELPHINE Perry  Facility: [facility] Type: [facility]    Caller: Silvino  Call Back Number:  [phone]    Allergies: Adhesive; Banana; Grape; Morphine (pf); Latex, natural rubber; and Other food allergy    Reason for call: Due for weekly Methotrexate 10mg today, no med here to give.     Verbal Order/Direction given by Provider: Re-order from pharmacy & administer it tomorrow.    Provider giving order: DELPHINE Perry    Verbal order given to: Silvino Worthy RN

## 2021-06-04 NOTE — TELEPHONE ENCOUNTER
[facility] Nurse Triage Telephone Note      Provider: DELPHINE Perry  Facility: [facility] Type: [facility]    Caller: Silvino  Call Back Number:  [phone]    Allergies: Adhesive; Banana; Grape; Morphine (pf); Latex, natural rubber; and Other food allergy    Reason for call: Missed INR draw today, can we give usual dose & check in am?     Verbal Order/Direction given by Provider: Yes    Provider giving order: DELPHINE Perry    Verbal order given to: Silvino Worthy RN

## 2021-06-05 NOTE — PROGRESS NOTES
[facility]    Facility:   [facility] [[id number]]   Code Status: FULL CODE    Recent medical history/chief concerns: Patient is seen by me for review of multiple medical issues.  Since having hydroxychloroquine discontinued, she is thinking of using the ibuprofen a little more for pain relief. She has not been using the oxycodone.  She told me she has gained a little weight, and chart review shows she has gained about 6 pounds from December 2019.  Overall, she seems to be doing well, voicing no new concerns other than her recent weight gain to me.  She has occasional left heel pain.  I again offered to try to arrange to have a protective foam boot fitted for her to use at night to treat this, which she would prefer not to do at this time.  I reviewed the last visit note from her rheumatologist.  Treatment of her arthritis using hydroxychloroquine was discontinued.  She was continued on the methotrexate and Humira patient/resident told me she has not required any narcotic for pain relief.  She would like to keep her medication management the way it currently is.  She is trying to avoid using narcotics.  No recent fevers or chills.  She states she occasionally has a red area underneath her lymphedema wrapping in her left lower extremity, which seems to get better, then reappear.  No sustained areas of redness suggestive of recurrence of her cellulitis.  She is eating and drinking well as reflected in her weight gain.  No problems with bowel movements or urination.  She feels like her mood is good.    Review of systems: See history of present illness, all others negative.     Current Outpatient Medications   Medication Sig Dispense Refill     acetaminophen (TYLENOL) 325 MG tablet Take 650 mg by mouth every 6 (six) hours as needed for pain.              adalimumab (HUMIRA) 40 mg/0.8 mL injection Inject 0.8 mL (40 mg total) under the skin every 14 (fourteen) days. 1.6 mL 3      ascorbic acid, vitamin C, (ASCORBIC ACID WITH KAEL HIPS) 500 MG tablet Take 500 mg by mouth 2 (two) times a day.       cholecalciferol, vitamin D3, (VITAMIN D3) 2,000 unit Tab Take 6,000 Units by mouth daily.       DULoxetine (CYMBALTA) 20 MG capsule Take 1 capsule (20 mg total) by mouth daily. 90 capsule 2     folic acid (FOLVITE) 1 MG tablet Take 1 tablet (1 mg total) by mouth daily. 90 tablet 0     hydroCHLOROthiazide (HYDRODIURIL) 25 MG tablet Take 25 mg by mouth daily.       ibuprofen (ADVIL,MOTRIN) 200 MG tablet Take 400 mg by mouth every 8 (eight) hours as needed for pain.              methotrexate 2.5 MG tablet Take 4 tablets (10 mg total) by mouth once a week. 48 tablet 0     multivitamin therapeutic (THERAGRAN) tablet Take 1 tablet by mouth daily.       nystatin (MYCOSTATIN) powder Apply to rash areas twice daily. 15 g 0     oseltamivir (TAMIFLU) 75 MG capsule        oxyCODONE (OXY-IR) 5 mg capsule Take 1-2 tablets every 4 hours orally as needed for pain relief.  Use 1 tablet for 1-5/10 pain, take 2 tablets for 6-10/10 pain. 60 tablet 0     WARFARIN SODIUM (WARFARIN ORAL) Take by mouth daily. 1/6/20 INR 1.87  Take 8mg Tues-Thurs-Sat and 9mg AOD.  Next INR 2/3/20.    1/3/20 INR was to be done today but no INR tubes cont  9mg M, W, F and 8mg AOD INR 1/7 12/5/19 INR 2.62 Take 9mg M, W, F and 8mg AOD. Next INR 1/2 11/8/19 INR 2.50  Cont 9mg M-W-F and 8mg AOD.  Next INR 12/5/19.    11/7/19 INR missed.  Give 8mg tonight.  Check INR in AM.    10/3/19 refused draw. Give 9mg tonight, Check INR tomorrow.  9/6/19 INR 3.15 Decrease to 9mg M & TH, 8mg AOD. Next INR 10/3.  8/9/19 INR 2.79 Take 9mg M, W, F and 8mg AOD Next INR 9/6       No current facility-administered medications for this visit.        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.  RD reports that she is not willing to commit to the program outlined.  See scanned document.  12/15/2015 - Marcio Quinonez MD        Obesity - although she was not weighed today, she is clearly obese on inspection. 4/10/2015     LOPEZ (obstructive sleep apnea) - abnormal overnight pulse oximetry 5/22/2015     Osteoarthritis      Peripheral vascular disease (H)      Pleural effusion      Pressure ulcer of foot      Rheumatoid arthritis (H) 12/30/2014    seronegative     Sepsis (H)      Seropositive rheumatoid arthritis (H) 1/19/2016     Vitamin D deficiency 8/26/2015      Past Surgical History:   Procedure Laterality Date     JOINT REPLACEMENT      knee     PERIPHERALLY INSERTED CENTRAL CATHETER INSERTION Right 07/14/2019    4fr/44cm/Rt Cephalic.lowSVC.MGlav     TOTAL HIP ARTHROPLASTY Left 10/18/2016    Procedure: LEFT HIP TOTAL ARTHROPLASTY;  Surgeon: London Goss MD;  Location: [facility];  Service:      TOTAL KNEE ARTHROPLASTY Left 2006      Social History     Socioeconomic History     Marital status: Single     Spouse name: Not on file     Number of children: Not on file     Years of education: Not on file     Highest education level: Not on file   Occupational History     Not on file   Social Needs     Financial resource strain: Not on file     Food insecurity:     Worry: Not on file     Inability: Not on file     Transportation needs:     Medical: Not on file     Non-medical: Not on file   Tobacco Use     Smoking status: Former Smoker     Packs/day: 1.00     Years: 30.00     Pack years: 30.00     Types: Cigarettes     Smokeless tobacco: Never Used    Substance and Sexual Activity     Alcohol use: Yes     Comment: 3-4 times a year she will have 1 or 2.     Drug use: No     Sexual activity: Never     Partners: Female   Lifestyle     Physical activity:     Days per week: Not on file     Minutes per session: Not on file     Stress: Not on file   Relationships     Social connections:     Talks on phone: Not on file     Gets together: Not on file     Attends Judaism service: Not on file     Active member of club or organization: Not on file     Attends meetings of clubs or organizations: Not on file     Relationship status: Not on file     Intimate partner violence:     Fear of current or ex partner: Not on file     Emotionally abused: Not on file     Physically abused: Not on file     Forced sexual activity: Not on file   Other Topics Concern     Not on file   Social History Narrative    Long term  At [facility] to rehab to [facility] allowing surgery on deteriorated joints  LTC12/2015       Social History     Tobacco Use   Smoking Status Former Smoker     Packs/day: 1.00     Years: 30.00     Pack years: 30.00     Types: Cigarettes   Smokeless Tobacco Never Used        Exam:   Vitals:    01/01/20 1058 01/09/20 2202   BP:  129/75   Pulse:  70   Resp:  16   Temp:  97.4  F (36.3  C)   SpO2:  99%   Weight: (!) 347 lb 14.4 oz (157.8 kg)        EXAM:   General: Vital signs reviewed.  Patient is in no acute appearing distress.  Breathing appears nonlabored.  Patient is alert and oriented ×3.  She is very pleasant, with clear fluid speech.  She makes good eye contact.  She was sitting in her wheelchair at time of my exam.  HEENT exam: No scleral discoloration.  No abnormal ear drainage or rhinorrhea.  No abnormal intraoral plaques or other lesions.  Neck: Supple with no JVD.  Heart: Heart rate is regular without murmur.  Lungs: Lungs are clear to auscultation with good airflow bilaterally.  Skin/extremities: Warm and dry.  I left her lymphedema wraps in place on her  lower extremities.    Recent studies: INR from 1/6/2020 was near therapeutic range at 1.87.  CBC from 12/12/2019 showed a normal hemoglobin and platelet level, and normal overall white blood cell count.  ALT and serum creatinine were normal on 12/5/2019.    Assessment/Plan   1. Deep vein thrombosis (DVT) of both lower extremities, unspecified chronicity, unspecified vein (H)     2. Morbid obesity (H)     3. Warfarin anticoagulation - long-term because of unresolved DVT that first appeared in October, 2014     4. Cyclic citrullinated peptide (CCP) antibody positive     5. Seropositive rheumatoid arthritis of multiple sites (H)     6. Primary osteoarthritis involving multiple joints         Patient Instructions   No change in patient management.  We have discussed her issues with obesity in the past.  Exercise is very difficult for her to facilitate weight loss.  I think she is getting ideal management at this time.  She definitely benefits from being in the skilled nursing facility given her significant physical debility from her arthritis.     Luis Enrique Lindsey, DO

## 2021-06-05 NOTE — PATIENT INSTRUCTIONS - HE
No change in patient management.  We have discussed her issues with obesity in the past.  Exercise is very difficult for her to facilitate weight loss.  I think she is getting ideal management at this time.  She definitely benefits from being in the skilled nursing facility given her significant physical debility from her arthritis.

## 2021-06-05 NOTE — TELEPHONE ENCOUNTER
Called [facility] pharmacy to clarify rx regarding Humira pen or prefilled syringe, pt has received pens in the past, pharmacy will send Humira pens to pt.

## 2021-06-05 NOTE — TELEPHONE ENCOUNTER
Kam    In regards of:adalimumab (HUMIRA) 40 mg/0.8 mL injection     [facility] calling for med clarification please call them back @ [phone] option 0

## 2021-06-05 NOTE — TELEPHONE ENCOUNTER
[facility] Nurse Triage Telephone Note      Provider: DELPHINE Perry  Facility: [facility]    Facility Type: [facility]    Caller: Zeus  Call Back Number:  [phone]    Allergies: Adhesive; Banana; Grape; Morphine (pf); Latex, natural rubber; and Other food allergy    Reason for call: Nurse reporting that patient missed her INR today.  Last INR was on 1/6/20 and it was 1.87 and she was ordered 8mg Tues-Thurs-Sat and 9mg all other days with an INR due for today.       Verbal Order/Direction given by Provider: Take regular dose for today(9mg) and check INR on 2/4/20.      Provider giving order: DELPHINE Perry    Verbal order given to: Zeus Easton RN

## 2021-06-05 NOTE — TELEPHONE ENCOUNTER
[facility] Nurse Triage Anticoagulation Note      Provider: DELPHINE Perry  Facility: [facility] Type: [facility]    Caller: Zeus  Call Back Number:  [phone]    Reason for call: INR    Today s INR: 2.12  Previous INR: 1/6/2020  8mg T-Th-Sat, 9mg AOD.    Diagnosis/Goal: DVT  Heparin/Lovenox: No   Currently on ABX: No  Other interacting Medications: None  Missed or refused doses: No    Verbal Order/Direction given by Provider: Cont 8mg T-Th-Sat, 9mg AOD. Next INR 3/3.    Provider giving order: DELPHINE Perry    Verbal order given to: Zeus Worthy RN

## 2021-06-06 NOTE — PROGRESS NOTES
[facility]    Facility:   [facility] [[id number]]   Code Status: FULL CODE      CHIEF COMPLAINT/REASON FOR VISIT:  Chief Complaint   Patient presents with     Review Of Multiple Medical Conditions       HISTORY:      HPI: María Elena is a 54 y.o. female who was seen secondary to review of chronic medical conditions.  Our last visit together was in December 2019.  She does have an upcoming visit with rheumatology in March.  She does need assistance with all ADLs.  Does have multiple sites of osteoarthrosis.  She can for the pain take Tylenol as needed ibuprofen and oxycodone to which she is not taking any of those 3 medications as needed.  She is on Humira and methotrexate.  She also is on warfarin rechecking her pro time next week.  She has been normotensive and afebrile.  Appetite good.  Denies any colds or flus.  For mood she like to stay on the Cymbalta 20 mg.  Otherwise we did talk about some current events as well as some of the old time we have that she likes to watch including ghost busters.    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  RD reports that she is not willing to commit to the program outlined.  See scanned document.  12/15/2015 - Marcio Quinonez MD        Obesity - although she was not weighed today, she is clearly obese on inspection. 4/10/2015     LOPEZ (obstructive sleep apnea) - abnormal overnight pulse oximetry 5/22/2015      Osteoarthritis      Peripheral vascular disease (H)      Pleural effusion      Pressure ulcer of foot      Rheumatoid arthritis (H) 12/30/2014    seronegative     Sepsis (H)      Seropositive rheumatoid arthritis (H) 1/19/2016     Vitamin D deficiency 8/26/2015             Family History   Problem Relation Age of Onset     Multiple myeloma Father      Deep vein thrombosis Father      Cataracts Father      Snoring Father      Other Brother         Blood withdrawn from time to time.  Sounds like hemochromatosis.     Sleep apnea Brother      Cancer Mother         Uterine CA     Arthritis Mother      Cataracts Mother      Breast cancer Mother 54     No Medical Problems Sister      Rheum arthritis Maternal Grandmother      Breast cancer Maternal Grandmother 50     Heart disease Maternal Grandmother      Rheum arthritis Paternal Aunt      Breast cancer Maternal Aunt 54     Breast cancer Cousin      Clotting disorder Neg Hx      Social History     Socioeconomic History     Marital status: Single     Spouse name: Not on file     Number of children: Not on file     Years of education: Not on file     Highest education level: Not on file   Occupational History     Not on file   Social Needs     Financial resource strain: Not on file     Food insecurity:     Worry: Not on file     Inability: Not on file     Transportation needs:     Medical: Not on file     Non-medical: Not on file   Tobacco Use     Smoking status: Former Smoker     Packs/day: 1.00     Years: 30.00     Pack years: 30.00     Types: Cigarettes     Smokeless tobacco: Never Used   Substance and Sexual Activity     Alcohol use: Yes     Comment: 3-4 times a year she will have 1 or 2.     Drug use: No     Sexual activity: Never     Partners: Female   Lifestyle     Physical activity:     Days per week: Not on file     Minutes per session: Not on file     Stress: Not on file   Relationships     Social connections:     Talks on phone: Not on file     Gets together: Not  on file     Attends Cheondoism service: Not on file     Active member of club or organization: Not on file     Attends meetings of clubs or organizations: Not on file     Relationship status: Not on file     Intimate partner violence:     Fear of current or ex partner: Not on file     Emotionally abused: Not on file     Physically abused: Not on file     Forced sexual activity: Not on file   Other Topics Concern     Not on file   Social History Narrative    Long term  At [facility] to rehab to level allowing surgery on deteriorated joints  LTC12/2015         Review of Systems  No reports of any URI symptoms postnasal drip fever chills fatigue flulike symptoms nausea vomiting diarrhea dysuria rashes stiff neck swollen glands. Does have bilateral DVT sleep apnea obesity edema vitamin D deficiency PAD rheumatoid arthritis multiple sites chronic pain.      Current Outpatient Medications:      acetaminophen (TYLENOL) 325 MG tablet, Take 650 mg by mouth every 6 (six) hours as needed for pain.    , Disp: , Rfl:      adalimumab (HUMIRA) 40 mg/0.8 mL injection, Inject 0.8 mL (40 mg total) under the skin every 14 (fourteen) days., Disp: 1.6 mL, Rfl: 3     ascorbic acid, vitamin C, (ASCORBIC ACID WITH KAEL HIPS) 500 MG tablet, Take 500 mg by mouth 2 (two) times a day., Disp: , Rfl:      cholecalciferol, vitamin D3, (VITAMIN D3) 2,000 unit Tab, Take 6,000 Units by mouth daily., Disp: , Rfl:      DULoxetine (CYMBALTA) 20 MG capsule, Take 1 capsule (20 mg total) by mouth daily., Disp: 90 capsule, Rfl: 2     folic acid (FOLVITE) 1 MG tablet, Take 1 tablet (1 mg total) by mouth daily., Disp: 90 tablet, Rfl: 0     hydroCHLOROthiazide (HYDRODIURIL) 25 MG tablet, Take 25 mg by mouth daily., Disp: , Rfl:      ibuprofen (ADVIL,MOTRIN) 200 MG tablet, Take 400 mg by mouth every 8 (eight) hours as needed for pain.    , Disp: , Rfl:      methotrexate 2.5 MG tablet, Take 4 tablets (10 mg total) by mouth once a week., Disp: 48 tablet,  Rfl: 0     multivitamin therapeutic (THERAGRAN) tablet, Take 1 tablet by mouth daily., Disp: , Rfl:      nystatin (MYCOSTATIN) powder, Apply to rash areas twice daily., Disp: 15 g, Rfl: 0     oseltamivir (TAMIFLU) 75 MG capsule, , Disp: , Rfl:      oxyCODONE (OXY-IR) 5 mg capsule, Take 1-2 tablets every 4 hours orally as needed for pain relief.  Use 1 tablet for 1-5/10 pain, take 2 tablets for 6-10/10 pain., Disp: 60 tablet, Rfl: 0     WARFARIN SODIUM (WARFARIN ORAL), Take by mouth daily. 2/4/20 INR 2.12 Cont 8mg T-Th-Sat, 9mg AOD. Next INR 3/3. 1/6/20 INR 1.87  Take 8mg Tues-Thurs-Sat and 9mg AOD.  Next INR 2/3/20.   1/3/20 INR was to be done today but no INR tubes cont  9mg M, W, F and 8mg AOD INR 1/7 12/5/19 INR 2.62 Take 9mg M, W, F and 8mg AOD. Next INR 1/2 11/8/19 INR 2.50  Cont 9mg M-W-F and 8mg AOD.  Next INR 12/5/19.   11/7/19 INR missed.  Give 8mg tonight.  Check INR in AM.   10/3/19 refused draw. Give 9mg tonight, Check INR tomorrow. 9/6/19 INR 3.15 Decrease to 9mg M & TH, 8mg AOD. Next INR 10/3. 8/9/19 INR 2.79 Take 9mg M, W, F and 8mg AOD Next INR 9/6, Disp: , Rfl:     There were no vitals filed for this visit.  Vital signs on February 20 blood pressure is 136/69 pulse 72 respirations 20 temperature 97.2  Physical Exam  Cardiovascular: Normal rate, regular rhythm and normal heart sounds.   Chronic edema .  Catie wraps.  Pulmonary/Chest: Breath sounds normal.   inder   Abdominal.  Protuberant  Musculoskeletal:   Chronic pain. Chronic edema. Rheumatoid arthritis multiple sites including hands. Left knee scar. Left hip OA. Psychiatric: She has a normal mood and affect. Her behavior is normal.           LABS:   Lab Results   Component Value Date    WBC 4.6 01/23/2020    HGB 14.1 01/23/2020    HCT 43.4 01/23/2020    MCV 93 01/23/2020     01/23/2020     Results for orders placed or performed in visit on 09/13/18   Basic Metabolic Panel   Result Value Ref Range    Sodium 144 136 - 145 mmol/L     Potassium 4.3 3.5 - 5.0 mmol/L    Chloride 101 98 - 107 mmol/L    CO2 34 (H) 22 - 31 mmol/L    Anion Gap, Calculation 9 5 - 18 mmol/L    Glucose 96 70 - 125 mg/dL    Calcium 9.7 8.5 - 10.5 mg/dL    BUN 16 8 - 22 mg/dL    Creatinine 0.75 0.60 - 1.10 mg/dL    GFR MDRD Af Amer >60 >60 mL/min/1.73m2    GFR MDRD Non Af Amer >60 >60 mL/min/1.73m2           ASSESSMENT:      ICD-10-CM    1. Primary osteoarthritis involving multiple joints M15.0    2. Seropositive rheumatoid arthritis of multiple sites (H) M05.79    3. Physical debility R53.81    4. Essential hypertension with goal blood pressure less than 140/90 I10        PLAN:    No other new changes at this time.  She does have an upcoming visit with rheumatology in March.  Otherwise has been quite stable.  Continue to manage and follow.      Electronically signed by: Michael Duane Johnson, CNP

## 2021-06-06 NOTE — TELEPHONE ENCOUNTER
Date: 3/23/2020 Status: Salty   Time: 12:20 PM Length: 20   Visit Type: TELEPHONE VISIT [[id number]] Copay: $0.00   Provider: Indigo Humphrey MBBS Department: [facility]   Referring Provider: JOHNSON, MICHAEL DUANE CSN: [id number]   Notes: phone visit, call @ [phone], Follow Up, (noitified to be available from 11am to Noon)

## 2021-06-06 NOTE — TELEPHONE ENCOUNTER
Dr Humphrey    Had to cancel appt today due to no transportation. Will reschedule, but wondering if it needs to be in person, telephone, or push out?    Please call [facility] @ [phone]

## 2021-06-06 NOTE — TELEPHONE ENCOUNTER
[facility] Nurse Triage Anticoagulation Note      Provider: DELPHINE Perry  Facility: [facility]    Facility Type: [facility]    Caller: Yohan  Call Back Number:  [phone]    Reason for call: INR    Today s INR: 2.55  Previous INR: 2/4/20 INR 2.12  9mg M-W-F-Cruz, 8mg Tu-Th-Sat    Diagnosis/Goal: DVT  Heparin/Lovenox: No   Currently on ABX: No  Other interacting Medications: None  Missed or refused doses: No    Verbal Order/Direction given by Provider: Cont 9mg M-W-F-Cruz, 8mg Tu-Th-Sat. Next INR 3/31    Provider giving order: DELPHINE Perry    Verbal order given to: Yohan Worthy RN

## 2021-06-07 ENCOUNTER — COMMUNICATION - HEALTHEAST (OUTPATIENT)
Dept: GERIATRICS | Facility: CLINIC | Age: 56
End: 2021-06-07

## 2021-06-07 RX ORDER — DOXYCYCLINE HYCLATE 100 MG
100 TABLET ORAL 2 TIMES DAILY
Status: SHIPPED | COMMUNITY
Start: 2021-06-07 | End: 2021-06-17

## 2021-06-07 NOTE — PROGRESS NOTES
"María Elena Saab is a 54 y.o. female who is being evaluated via a billable telephone visit.      The patient has been notified of following:     \"This telephone visit will be conducted via a call between you and your physician/provider. We have found that certain health care needs can be provided without the need for a physical exam.  This service lets us provide the care you need with a short phone conversation.  If a prescription is necessary we can send it directly to your pharmacy.  If lab work is needed we can place an order for that and you can then stop by our lab to have the test done at a later time.    If during the course of the call the physician/provider feels a telephone visit is not appropriate, you will not be charged for this service.\"     María Elena Saab complains of    Chief Complaint   Patient presents with     Follow-up       I have reviewed and updated the patient's Past Medical History, Social History, Family History and Medication List.    ALLERGIES  Adhesive; Banana; Grape; Morphine (pf); Latex, natural rubber; and Other food allergy    Steve Merchant [facility]      ASSESSMENT AND PLAN:    Diagnoses and all orders for this visit:    Seropositive rheumatoid arthritis of multiple sites (H)  -     adalimumab (HUMIRA) 40 mg/0.8 mL injection; Inject 0.8 mL (40 mg total) under the skin every 14 (fourteen) days.  Dispense: 1.6 mL; Refill: 3  -     methotrexate 2.5 MG tablet; Take 4 tablets (10 mg total) by mouth once a week.  Dispense: 48 tablet; Refill: 0  -     folic acid (FOLVITE) 1 MG tablet; Take 1 tablet (1 mg total) by mouth daily.  Dispense: 90 tablet; Refill: 0    High risk medication use  -     folic acid (FOLVITE) 1 MG tablet; Take 1 tablet (1 mg total) by mouth daily.  Dispense: 90 tablet; Refill: 0    Primary osteoarthritis involving multiple joints    Cyclic citrullinated peptide (CCP) antibody positive    Polyarthralgia          HISTORY OF PRESENTING ILLNESS:  María Elena Saab 54 y.o. " is evaluated here via phone visit.  She is a resident of the local nursing home, her rheumatoid arthritis is not flared up.  There are no visitors allowed anymore with the current pandemic.  Doctor visits have been curtailed.  The local staff is been monitored carefully for fever.  She feels that her hand especially the right side troubles her when she tries to use it.  First thing in the morning she does not have any significant pain or stiffness.  During the day such activities as putting a  on her tablet for example pushing it in can be painful for her right middle and index finger.  We discussed the findings of erosions.  We discussed options.  For now she would like to stay the course.  Follow-up in person in 3 to 4 months.  Recent labs reviewed.  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  RD reports that she is not willing to commit to the program outlined.  See scanned document.  12/15/2015 - Marcio Quinonez MD        Obesity - although she was not weighed today, she is clearly obese on inspection. 4/10/2015     LOPEZ (obstructive sleep apnea) - abnormal overnight pulse oximetry 5/22/2015     Osteoarthritis      Peripheral vascular disease (H)      Pleural effusion      Pressure  ulcer of foot      Rheumatoid arthritis (H) 12/30/2014    seronegative     Sepsis (H)      Seropositive rheumatoid arthritis (H) 1/19/2016     Vitamin D deficiency 8/26/2015     Social History     Tobacco Use   Smoking Status Former Smoker     Packs/day: 1.00     Years: 30.00     Pack years: 30.00     Types: Cigarettes   Smokeless Tobacco Never Used     Patient Active Problem List   Diagnosis     DVT of lower extremity, bilateral - residual clot disease on repeat US in NEW location - after six months of warfarin.     Morbid obesity (H)     Warfarin anticoagulation - long-term because of unresolved DVT that first appeared in October, 2014     LOPEZ (obstructive sleep apnea) - abnormal overnight pulse oximetry - SLEEP visit in November, 2015, she states.     Nocturnal hypoxemia - probable sleep apnea - had overnight pulse oximetry, April, 2015.     Edema - swelling of the legs after blood clots     Vitamin D deficiency     Cyclic citrullinated peptide (CCP) antibody positive     Right shoulder tendinitis     Seropositive rheumatoid arthritis of multiple sites (H)     PAD (peripheral artery disease) (H)     Pain management     Radiculopathy of leg     Sleep apnea     Degenerative joint disease (DJD) of hip     Essential hypertension with goal blood pressure less than 140/90     History of DVT of lower extremity     Anemia     H/O total hip arthroplasty     Polyarthralgia     Physical debility     High risk medication use     Primary osteoarthritis involving multiple joints     History of total left knee replacement (TKR)     Current Outpatient Medications   Medication Sig Dispense Refill     acetaminophen (TYLENOL) 325 MG tablet Take 650 mg by mouth every 6 (six) hours as needed for pain.              adalimumab (HUMIRA) 40 mg/0.8 mL injection Inject 0.8 mL (40 mg total) under the skin every 14 (fourteen) days. 1.6 mL 3     ascorbic acid, vitamin C, (ASCORBIC ACID WITH KAEL HIPS) 500 MG tablet Take 500 mg by mouth 2  (two) times a day.       cholecalciferol, vitamin D3, (VITAMIN D3) 2,000 unit Tab Take 6,000 Units by mouth daily.       hydroCHLOROthiazide (HYDRODIURIL) 25 MG tablet Take 25 mg by mouth daily.       ibuprofen (ADVIL,MOTRIN) 200 MG tablet Take 400 mg by mouth every 8 (eight) hours as needed for pain.              multivitamin therapeutic (THERAGRAN) tablet Take 1 tablet by mouth daily.       nystatin (MYCOSTATIN) powder Apply to rash areas twice daily. 15 g 0     oxyCODONE (OXY-IR) 5 mg capsule Take 1-2 tablets every 4 hours orally as needed for pain relief.  Use 1 tablet for 1-5/10 pain, take 2 tablets for 6-10/10 pain. 60 tablet 0     WARFARIN SODIUM (WARFARIN ORAL) Take by mouth daily. 3/3/20 INR 2.55 9mg M-W-F-Cruz, 8mg Tu-Th-Sat. Next INR 3/31  2/4/20 INR 2.12 Cont 8mg T-Th-Sat, 9mg AOD. Next INR 3/3.  1/6/20 INR 1.87  Take 8mg Tues-Thurs-Sat and 9mg AOD.  Next INR 2/3/20.    1/3/20 INR was to be done today but no INR tubes cont  9mg M, W, F and 8mg AOD INR 1/7 12/5/19 INR 2.62 Take 9mg M, W, F and 8mg AOD. Next INR 1/2  11/8/19 INR 2.50  Cont 9mg M-W-F and 8mg AOD.  Next INR 12/5/19.    11/7/19 INR missed.  Give 8mg tonight.  Check INR in AM.    10/3/19 refused draw. Give 9mg tonight, Check INR tomorrow.  9/6/19 INR 3.15 Decrease to 9mg M & TH, 8mg AOD. Next INR 10/3.  8/9/19 INR 2.79 Take 9mg M, W, F and 8mg AOD Next INR 9/6       DULoxetine (CYMBALTA) 20 MG capsule Take 1 capsule (20 mg total) by mouth daily. 90 capsule 2     folic acid (FOLVITE) 1 MG tablet Take 1 tablet (1 mg total) by mouth daily. 90 tablet 0     methotrexate 2.5 MG tablet Take 4 tablets (10 mg total) by mouth once a week. 48 tablet 0     oseltamivir (TAMIFLU) 75 MG capsule        No current facility-administered medications for this visit.          EXAMINATION: None, phone visit.      LAB / IMAGING DATA:  ALT   Date Value Ref Range Status   03/12/2020 24 0 - 45 U/L Final   01/23/2020 22 0 - 45 U/L Final   12/05/2019 22 0 - 45 U/L Final      Albumin   Date Value Ref Range Status   03/12/2020 3.7 3.5 - 5.0 g/dL Final   01/23/2020 3.6 3.5 - 5.0 g/dL Final   12/05/2019 3.4 (L) 3.5 - 5.0 g/dL Final     Creatinine   Date Value Ref Range Status   03/12/2020 0.82 0.60 - 1.10 mg/dL Final   01/23/2020 0.70 0.60 - 1.10 mg/dL Final   12/05/2019 0.75 0.60 - 1.10 mg/dL Final       WBC   Date Value Ref Range Status   03/12/2020 4.5 4.0 - 11.0 thou/uL Final   01/23/2020 4.6 4.0 - 11.0 thou/uL Final   09/14/2015 4.0 4.0 - 11.0 thou/uL Final   06/30/2015 3.7 (L) 4.0 - 11.0 thou/uL Final     Hemoglobin   Date Value Ref Range Status   03/12/2020 14.8 12.0 - 16.0 g/dL Final   01/23/2020 14.1 12.0 - 16.0 g/dL Final   12/12/2019 14.5 12.0 - 16.0 g/dL Final     Platelets   Date Value Ref Range Status   03/12/2020 220 140 - 440 thou/uL Final   01/23/2020 215 140 - 440 thou/uL Final   12/12/2019 214 140 - 440 thou/uL Final       Lab Results   Component Value Date    RF 25.7 10/15/2014    SEDRATE 46 (H) 02/08/2017     Duration of the call: 7: 02 minutes

## 2021-06-07 NOTE — TELEPHONE ENCOUNTER
[facility] Nurse Triage Telephone Note      Provider: DELPHINE Perry  Facility: [facility]    Facility Type: [facility]      Call Back Number:  [phone]    Allergies: Adhesive; Banana; Grape; Morphine (pf); Pseudoephedrine cold/allergy [chlorpheniramine-pseudoephed]; Latex, natural rubber; and Other food allergy    Reason for call: Upon inquiry of the staff, patient's INR was not drawn today.  Warfarin has been on hold the last 2 days for possible transition to Eliquis.  Last INR was on 3/3/20 and it was 2.55 and was ordered to continue 9mg M-W-F-Sun and 8mg all other days.       Verbal Order/Direction given by Provider: DC Warfarin.  Do an INR STAT today.  If INR is greater than or equal to 2.5, hold Warfarin x 3 days and check INR on 4/6/20.  If INR is 2.49 to 2.00, hold Warfarin x 2 day and start Eliquis 2.5mg two times a day on 4/5/20.  If INR is less than 2.0, start Eliquis 2.5mg two times a day.      Provider giving order: DELPHINE Perry    Verbal order given to: Theresa/Consuelo Easton RN

## 2021-06-07 NOTE — PROGRESS NOTES
[facility]    Facility:   [facility] [[id number]]   Code Status: FULL CODE      CHIEF COMPLAINT/REASON FOR VISIT:  Chief Complaint   Patient presents with     FVP Care Coordination - Regulatory       HISTORY:      HPI: María Elena is a 54 y.o. female who I had the opportunity to visit with secondary to review of her chronic medical conditions.  Regarding nursing notes and any other issues there have been no new concerns or other issues.  She did start Eliquis which eventually got switched to Xarelto due to insurance coverage a couple of weeks ago secondary to her history of DVT and we can take her off the warfarin.  At any rate no problems.  She has been normotensive and afebrile and also on room air.  Does have rheumatoid arthritis along with osteoarthrosis of major joints has not required any ibuprofen oxycodone or Tylenol as needed.  She is on Cymbalta 20 mg and does see dermatology.  She does spend most the time in her room.  Does need assistance with basic ADLs.    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  RD reports that she is not willing to commit to the program outlined.  See scanned document.  12/15/2015 - Marcio Quinonez MD        Obesity - although she was not weighed today, she is clearly obese on inspection. 4/10/2015     LOPEZ (obstructive sleep apnea) - abnormal overnight pulse oximetry 5/22/2015     Osteoarthritis      Peripheral vascular  disease (H)      Pleural effusion      Pressure ulcer of foot      Rheumatoid arthritis (H) 12/30/2014    seronegative     Sepsis (H)      Seropositive rheumatoid arthritis (H) 1/19/2016     Vitamin D deficiency 8/26/2015             Family History   Problem Relation Age of Onset     Multiple myeloma Father      Deep vein thrombosis Father      Cataracts Father      Snoring Father      Other Brother         Blood withdrawn from time to time.  Sounds like hemochromatosis.     Sleep apnea Brother      Cancer Mother         Uterine CA     Arthritis Mother      Cataracts Mother      Breast cancer Mother 54     No Medical Problems Sister      Rheum arthritis Maternal Grandmother      Breast cancer Maternal Grandmother 50     Heart disease Maternal Grandmother      Rheum arthritis Paternal Aunt      Breast cancer Maternal Aunt 54     Breast cancer Cousin      Clotting disorder Neg Hx      Social History     Socioeconomic History     Marital status: Single     Spouse name: Not on file     Number of children: Not on file     Years of education: Not on file     Highest education level: Not on file   Occupational History     Not on file   Social Needs     Financial resource strain: Not on file     Food insecurity     Worry: Not on file     Inability: Not on file     Transportation needs     Medical: Not on file     Non-medical: Not on file   Tobacco Use     Smoking status: Former Smoker     Packs/day: 1.00     Years: 30.00     Pack years: 30.00     Types: Cigarettes     Smokeless tobacco: Never Used   Substance and Sexual Activity     Alcohol use: Yes     Comment: 3-4 times a year she will have 1 or 2.     Drug use: No     Sexual activity: Never     Partners: Female   Lifestyle     Physical activity     Days per week: Not on file     Minutes per session: Not on file     Stress: Not on file   Relationships     Social connections     Talks on phone: Not on file     Gets together: Not on file     Attends [employer] service: Not  on file     Active member of club or organization: Not on file     Attends meetings of clubs or organizations: Not on file     Relationship status: Not on file     Intimate partner violence     Fear of current or ex partner: Not on file     Emotionally abused: Not on file     Physically abused: Not on file     Forced sexual activity: Not on file   Other Topics Concern     Not on file   Social History Narrative    Long term  At [facility] to rehab to level allowing surgery on deteriorated joints  LTC12/2015         Review of Systems  No reports of any URI symptoms postnasal drip fever chills fatigue flulike symptoms nausea vomiting diarrhea dysuria rashes stiff neck swollen glands. Does have bilateral DVT sleep apnea obesity edema vitamin D deficiency PAD rheumatoid arthritis multiple sites chronic pain.     Current Outpatient Medications   Medication Sig     acetaminophen (TYLENOL) 325 MG tablet Take 650 mg by mouth every 6 (six) hours as needed for pain.            adalimumab (HUMIRA) 40 mg/0.8 mL injection Inject 0.8 mL (40 mg total) under the skin every 14 (fourteen) days.     ascorbic acid, vitamin C, (ASCORBIC ACID WITH KAEL HIPS) 500 MG tablet Take 500 mg by mouth 2 (two) times a day.     cholecalciferol, vitamin D3, (VITAMIN D3) 2,000 unit Tab Take 6,000 Units by mouth daily.     DULoxetine (CYMBALTA) 20 MG capsule Take 1 capsule (20 mg total) by mouth daily.     folic acid (FOLVITE) 1 MG tablet Take 1 tablet (1 mg total) by mouth daily.     hydroCHLOROthiazide (HYDRODIURIL) 25 MG tablet Take 25 mg by mouth daily.     ibuprofen (ADVIL,MOTRIN) 200 MG tablet Take 400 mg by mouth every 8 (eight) hours as needed for pain.            methotrexate 2.5 MG tablet Take 4 tablets (10 mg total) by mouth once a week.     multivitamin therapeutic (THERAGRAN) tablet Take 1 tablet by mouth daily.     nystatin (MYCOSTATIN) powder Apply to rash areas twice daily.     oseltamivir (TAMIFLU) 75 MG capsule      oxyCODONE  (OXY-IR) 5 mg capsule Take 1-2 tablets every 4 hours orally as needed for pain relief.  Use 1 tablet for 1-5/10 pain, take 2 tablets for 6-10/10 pain.     rivaroxaban ANTICOAGULANT (XARELTO) 10 mg tablet Take 10 mg by mouth daily.       There were no vitals filed for this visit.  Blood pressure 143/78 pulse 68 respirations 18 temperature 97.8 saturation room air 96%  Physical Exam: Head is normocephalic.  Cardiovascular: Normal rate, regular rhythm and normal heart sounds.   Chronic edema .  Catie wraps.  Pulmonary/Chest: Breath sounds normal.   inder   Abdominal.  Protuberant  Musculoskeletal:   Chronic pain. Chronic edema. Rheumatoid arthritis multiple sites including hands. Left knee scar. Left hip OA. Psychiatric: She has a normal mood and affect. Her behavior is normal.         LABS:   Lab Results   Component Value Date    WBC 4.5 03/12/2020    HGB 14.8 03/12/2020    HCT 43.5 03/12/2020    MCV 92 03/12/2020     03/12/2020     Vitamin D, Total (25-Hydroxy)   Date Value Ref Range Status   12/28/2017 48.3 30.0 - 80.0 ng/mL Final     Results for orders placed or performed in visit on 09/13/18   Basic Metabolic Panel   Result Value Ref Range    Sodium 144 136 - 145 mmol/L    Potassium 4.3 3.5 - 5.0 mmol/L    Chloride 101 98 - 107 mmol/L    CO2 34 (H) 22 - 31 mmol/L    Anion Gap, Calculation 9 5 - 18 mmol/L    Glucose 96 70 - 125 mg/dL    Calcium 9.7 8.5 - 10.5 mg/dL    BUN 16 8 - 22 mg/dL    Creatinine 0.75 0.60 - 1.10 mg/dL    GFR MDRD Af Amer >60 >60 mL/min/1.73m2    GFR MDRD Non Af Amer >60 >60 mL/min/1.73m2         Case Management:  I have reviewed the facility/SNF care plan/MDS which was done Today, including the falls risk, nutrition and pain screening. I also reviewed the current immunizations, and preventive care.. Future cancer screening is not clinically indicated secondary to age/goals of care.   Patient's desire to return to the community is present, but is not able due to care needs  .    Information reviewed:  Medications, vital signs, orders, and nursing notes.    ASSESSMENT:      ICD-10-CM    1. Seropositive rheumatoid arthritis of multiple sites (H)  M05.79    2. Vitamin D deficiency  E55.9    3. Primary osteoarthritis involving multiple joints  M15.0    4. Polyarthralgia  M25.50        PLAN:    She is due for vitamin D level since she is on vitamin D 6000 units daily.  Her pain is also well managed.  Her appetite is good.  Has remained cold and flu free..  Continue to manage and follow her other chronic medical conditions.      Electronically signed by: Michael Duane Johnson, CNP

## 2021-06-07 NOTE — TELEPHONE ENCOUNTER
[facility] Nurse Triage Telephone Note      Provider: DELPHINE Perry  Facility: [facility]    Facility Type: [facility]    Caller: Aixa  Call Back Number:  [phone]    Allergies: Adhesive; Banana; Grape; Morphine (pf); Pseudoephedrine cold/allergy [chlorpheniramine-pseudoephed]; Latex, natural rubber; and Other food allergy    Reason for call: Nurse reporting that patient's Eliquis is not covered by insurance.  Patient has been receiving it as the facility has been paying for it up until now.       Verbal Order/Direction given by Provider: SAMREEN Eliquis.  Start Xarelto 10mg daily.      Provider giving order: DELPHINE Perry    Verbal order given to: Aixa Easton RN

## 2021-06-07 NOTE — PROGRESS NOTES
[facility]    Facility:   [facility] [[id number]]   Code Status: FULL CODE      CHIEF COMPLAINT/REASON FOR VISIT:  Chief Complaint   Patient presents with     Problem Visit     asked to see. warfarin vs eliquis, RA, labs,        HISTORY:      HPI: María Elena is a 54 y.o. female who I was asked to see secondary to being on warfarin and switching overEliquis.  She has been on warfarin for quite some time secondary to an acute embolism and thrombus for unresolved DVT of the lower extremity despite having a repeat ultrasound.  She also is the most part wheelchair-bound needing assistance with all ADLs.  She also has a history of polyarthralgia and osteoarthrosis involving multiple joints but also rheumatoid arthritis and is being seen by rheumatology.  She has been on warfarin and there are many times where she has refused the laboratory studies and eventually will allow the lab to draw her pro time with appropriate INR treatment sometimes it could be delayed up to a few days.  Had a chance to address Eliquis with her as well as perhaps getting her off warfarin altogether.  We did talk about any potential side effects but also the effectiveness of the Eliquis but also no further blood values and laboratory studies.  She is willing to give the Eliquis and discontinue the warfarin.  We will wait for her warfarin to go below 2 and so we will try to reach check her out tomorrow of her level since she is been on hold for 2 days and perhaps even on Monday we can start Eliquis.  Otherwise she has been cold and flu 3 she has been normotensive and afebrile and also on room air.  For the chronic pain she is also being treated has not required any oxycodone as needed.    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  RD reports that she is not willing to commit to the program outlined.  See scanned document.  12/15/2015 - Marcio Quinonez MD        Obesity - although she was not weighed today, she is clearly obese on inspection. 4/10/2015     LOPEZ (obstructive sleep apnea) - abnormal overnight pulse oximetry 5/22/2015     Osteoarthritis      Peripheral vascular disease (H)      Pleural effusion      Pressure ulcer of foot      Rheumatoid arthritis (H) 12/30/2014    seronegative     Sepsis (H)      Seropositive rheumatoid arthritis (H) 1/19/2016     Vitamin D deficiency 8/26/2015             Family History   Problem Relation Age of Onset     Multiple myeloma Father      Deep vein thrombosis Father      Cataracts Father      Snoring Father      Other Brother         Blood withdrawn from time to time.  Sounds like hemochromatosis.     Sleep apnea Brother      Cancer Mother         Uterine CA     Arthritis Mother      Cataracts Mother      Breast cancer Mother 54     No Medical Problems Sister      Rheum arthritis Maternal Grandmother      Breast cancer Maternal Grandmother 50     Heart disease Maternal Grandmother      Rheum arthritis Paternal Aunt      Breast cancer Maternal Aunt 54     Breast cancer Cousin      Clotting disorder Neg Hx      Social History     Socioeconomic History     Marital status: Single     Spouse name: Not on file     Number of children: Not on file     Years of education: Not on file     Highest education level: Not on file   Occupational History     Not on file   Social Needs     Financial resource strain: Not on file     Food insecurity     Worry: Not on file      Inability: Not on file     Transportation needs     Medical: Not on file     Non-medical: Not on file   Tobacco Use     Smoking status: Former Smoker     Packs/day: 1.00     Years: 30.00     Pack years: 30.00     Types: Cigarettes     Smokeless tobacco: Never Used   Substance and Sexual Activity     Alcohol use: Yes     Comment: 3-4 times a year she will have 1 or 2.     Drug use: No     Sexual activity: Never     Partners: Female   Lifestyle     Physical activity     Days per week: Not on file     Minutes per session: Not on file     Stress: Not on file   Relationships     Social connections     Talks on phone: Not on file     Gets together: Not on file     Attends [employer] service: Not on file     Active member of club or organization: Not on file     Attends meetings of clubs or organizations: Not on file     Relationship status: Not on file     Intimate partner violence     Fear of current or ex partner: Not on file     Emotionally abused: Not on file     Physically abused: Not on file     Forced sexual activity: Not on file   Other Topics Concern     Not on file   Social History Narrative    Long term  At [facility] to rehab to [facility] allowing surgery on deteriorated joints  LTC12/2015         Review of Systems  No reports of any URI symptoms postnasal drip fever chills fatigue flulike symptoms nausea vomiting diarrhea dysuria rashes stiff neck swollen glands. Does have bilateral DVT sleep apnea obesity edema vitamin D deficiency PAD rheumatoid arthritis multiple sites chronic pain.      Current Outpatient Medications:      acetaminophen (TYLENOL) 325 MG tablet, Take 650 mg by mouth every 6 (six) hours as needed for pain.    , Disp: , Rfl:      adalimumab (HUMIRA) 40 mg/0.8 mL injection, Inject 0.8 mL (40 mg total) under the skin every 14 (fourteen) days., Disp: 1.6 mL, Rfl: 3     ascorbic acid, vitamin C, (ASCORBIC ACID WITH KAEL HIPS) 500 MG tablet, Take 500 mg by mouth 2 (two) times a day., Disp: , Rfl:       cholecalciferol, vitamin D3, (VITAMIN D3) 2,000 unit Tab, Take 6,000 Units by mouth daily., Disp: , Rfl:      DULoxetine (CYMBALTA) 20 MG capsule, Take 1 capsule (20 mg total) by mouth daily., Disp: 90 capsule, Rfl: 2     folic acid (FOLVITE) 1 MG tablet, Take 1 tablet (1 mg total) by mouth daily., Disp: 90 tablet, Rfl: 0     hydroCHLOROthiazide (HYDRODIURIL) 25 MG tablet, Take 25 mg by mouth daily., Disp: , Rfl:      ibuprofen (ADVIL,MOTRIN) 200 MG tablet, Take 400 mg by mouth every 8 (eight) hours as needed for pain.    , Disp: , Rfl:      methotrexate 2.5 MG tablet, Take 4 tablets (10 mg total) by mouth once a week., Disp: 48 tablet, Rfl: 0     multivitamin therapeutic (THERAGRAN) tablet, Take 1 tablet by mouth daily., Disp: , Rfl:      nystatin (MYCOSTATIN) powder, Apply to rash areas twice daily., Disp: 15 g, Rfl: 0     oseltamivir (TAMIFLU) 75 MG capsule, , Disp: , Rfl:      oxyCODONE (OXY-IR) 5 mg capsule, Take 1-2 tablets every 4 hours orally as needed for pain relief.  Use 1 tablet for 1-5/10 pain, take 2 tablets for 6-10/10 pain., Disp: 60 tablet, Rfl: 0     WARFARIN SODIUM (WARFARIN ORAL), Take by mouth daily. 4/1/20 INR missed on 3/31.  Hold x 2 days.  Next INR 4/3/20 with plans to change to Eliquis when INR is <2.   3/3/20 INR 2.55 9mg M-W-F-Cruz, 8mg Tu-Th-Sat. Next INR 3/31 2/4/20 INR 2.12 Cont 8mg T-Th-Sat, 9mg AOD. Next INR 3/3. 1/6/20 INR 1.87  Take 8mg Tues-Thurs-Sat and 9mg AOD.  Next INR 2/3/20.   1/3/20 INR was to be done today but no INR tubes cont  9mg M, W, F and 8mg AOD INR 1/7 12/5/19 INR 2.62 Take 9mg M, W, F and 8mg AOD. Next INR 1/2 11/8/19 INR 2.50  Cont 9mg M-W-F and 8mg AOD.  Next INR 12/5/19.   11/7/19 INR missed.  Give 8mg tonight.  Check INR in AM.   10/3/19 refused draw. Give 9mg tonight, Check INR tomorrow. 9/6/19 INR 3.15 Decrease to 9mg M & TH, 8mg AOD. Next INR 10/3. 8/9/19 INR 2.79 Take 9mg M, W, F and 8mg AOD Next INR 9/6, Disp: , Rfl:     There were no vitals filed for  this visit.  Blood pressure 130/80 pulse 78 respirations 18 temperature 97.4 saturation room air 96%  Physical Exam  She is in no acute distress.  Does have chronic lower extremity edema.  Lung sounds are clear.  Gastrointestinal protuberant.  History of rheumatoid arthritis in multiple sites.  LABS:   Lab Results   Component Value Date    WBC 4.5 03/12/2020    HGB 14.8 03/12/2020    HCT 43.5 03/12/2020    MCV 92 03/12/2020     03/12/2020     Results for orders placed or performed in visit on 09/13/18   Basic Metabolic Panel   Result Value Ref Range    Sodium 144 136 - 145 mmol/L    Potassium 4.3 3.5 - 5.0 mmol/L    Chloride 101 98 - 107 mmol/L    CO2 34 (H) 22 - 31 mmol/L    Anion Gap, Calculation 9 5 - 18 mmol/L    Glucose 96 70 - 125 mg/dL    Calcium 9.7 8.5 - 10.5 mg/dL    BUN 16 8 - 22 mg/dL    Creatinine 0.75 0.60 - 1.10 mg/dL    GFR MDRD Af Amer >60 >60 mL/min/1.73m2    GFR MDRD Non Af Amer >60 >60 mL/min/1.73m2           ASSESSMENT:      ICD-10-CM    1. Warfarin anticoagulation - long-term because of unresolved DVT that first appeared in October, 2014  Z79.01    2. Seropositive rheumatoid arthritis of multiple sites (H)  M05.79    3. Polyarthralgia  M25.50    4. Pain management  R52        PLAN:    We will check her pro time again tomorrow and then perhaps by Monday we can put her on Eliquis pending her kidney function tests.  I think this will benefit her as well as preventing any further laboratory sticks and laboratory studies.  Continue to manage and follow.        Electronically signed by: Michael Duane Johnson, CNP

## 2021-06-07 NOTE — TELEPHONE ENCOUNTER
[facility] Nurse Triage Telephone Note      Provider: DELPHINE Perry  Facility: [facility]    Facility Type: [facility]    Caller: Zeus  Call Back Number:  [phone]    Allergies: Adhesive; Banana; Grape; Morphine (pf); Pseudoephedrine cold/allergy [chlorpheniramine-pseudoephed]; Latex, natural rubber; and Other food allergy    Reason for call: Nurse reporting that patient was supposed to have an INR yesterday, but it wasn't done.  Patient refuses regular lab draw by the phlebotomist because they come too early in the morning, therefore the nurse manager normally draws her later in the day.  The nurse manager wasn't at work yesterday and staff didn't follow up on INR draw.  Staff also did not call to let anyone know that the INR was missed and they also gave Warfarin per her usual schedule, without consulting a provider.  Patient is on Warfarin due to a history of DVT/PE.           Verbal Order/Direction given by Provider: Hold Warfarin x 2 days and check INR on 4/3/20.  The plan is to transition to Eliquis once INR is less than 2.      Provider giving order: DELPHINE Perry    Verbal order given to: Zeus Easton RN

## 2021-06-08 NOTE — TELEPHONE ENCOUNTER
[facility] Nurse Triage Telephone Note      Provider: DELPHINE Perry  Facility: [facility]    Facility Type: [facility]    Caller: Aixa  Call Back Number:  [phone]    Allergies: Adhesive; Banana; Grape; Morphine (pf); Pseudoephedrine cold/allergy [chlorpheniramine-pseudoephed]; Latex, natural rubber; and Other food allergy    Reason for call: Left leg has area of redness & warmth. (We marked area). No C/O pain behind knee, minimal pain in calf. Is on coumadin.     Verbal Order/Direction given by Provider: She has PAD & it depends on if she is elevating her legs or if dependant.    Provider giving order: DELPHINE Perry    Verbal order given to: Aixa Worthy RN

## 2021-06-08 NOTE — PROGRESS NOTES
[facility]    Facility:   [facility] [[id number]]   Code Status: FULL CODE      CHIEF COMPLAINT/REASON FOR VISIT:  Chief Complaint   Patient presents with     Problem Visit     upper respiratory symtpoms       HISTORY:      HPI: María Elena is a 51 y.o. female was seen today as requested by RN staff.   María Elena has been sick for 8 days. It started with Right ear pain for 1-2 days, then went on to affect the left ear as well. This was accompanied by sore throat and fevers, upto 101 on patient's account but highest temp noted in EHR was 99. RN staff has verified however that there was one day where she had the fevers and did not look well.   She has been having cough, with slight sputum. Headaches as well. SInuses are not congested. No SOB. No chills, no nausea or vomiting.   She requested Ibuprofen because tylenol has historically not worked for her.   She has been refusing her Humira bec of her illness. Last admin 3 weeks ago.    Appetite is fair.   Not liking Cherry flavored Robitussin.   Continues to work with PT     Past Medical History   Diagnosis Date     Aseptic necrosis of femoral head      LEFT     DJD (degenerative joint disease)      DVT, bilateral lower limbs 10/2014     Edema - swelling of the legs after blood clots 5/22/2015     Essential hypertension with goal blood pressure less than 140/90      Failure to thrive      History of blood clots      Hypokalemia 10/15/2014     Lung mass 10/18/2014     Morbid obesity 4/10/2015     Had formal nutrition consult at [facility].  RD reports that she is not willing to commit to the program outlined.  See scanned document.  12/15/2015 - Marcio Quinonez MD        Obesity - although she was not weighed today, she is clearly obese on inspection. 4/10/2015     LOPEZ (obstructive sleep apnea) - abnormal overnight pulse oximetry 5/22/2015     Osteoarthritis      Peripheral vascular disease      Pleural effusion      Pressure ulcer of  foot      Rheumatoid arthritis 12/30/2014     seronegative     Seropositive rheumatoid arthritis 1/19/2016     Vitamin D deficiency 8/26/2015             Family History   Problem Relation Age of Onset     Multiple myeloma Father      Deep vein thrombosis Father      Cataracts Father      Snoring Father      Other Brother      Blood withdrawn from time to time.  Sounds like hemochromatosis.     Sleep apnea Brother      Breast cancer Mother      Cancer Mother      Uterine CA     Arthritis Mother      Cataracts Mother      No Medical Problems Sister      Rheum arthritis Maternal Grandmother      Breast cancer Maternal Grandmother 50     Heart disease Maternal Grandmother      Rheum arthritis Paternal Aunt      Clotting disorder Neg Hx      Social History     Social History     Marital status: Single     Spouse name: N/A     Number of children: N/A     Years of education: N/A     Social History Main Topics     Smoking status: Former Smoker     Packs/day: 1.00     Years: 30.00     Types: Cigarettes     Smokeless tobacco: Never Used     Alcohol use Yes      Comment: 3-4 times a year she will have 1 or 2.     Drug use: No     Sexual activity: No     Other Topics Concern     Not on file     Social History Narrative    Long term  At [facility] to rehab to [facility] allowing surgery on deteriorated joints  LTC12/2015         Review of Systems  As above otherwise neg  .There were no vitals filed for this visit.    Physical Exam    VS noted. Afebrile  Rest is stable.   Saturation 96% RA HR 87  Patient is alert, awake, oriented to time, place and person, not in acute cardiorespiratory distress  Skin: Warm, and moist,  no lesions,   Head: atraumatic, normocephalic,   R EAC clear, non inflammed. L is inflammed though, no tragal tenderness but slightly tender on insertion of otoscope, reddish EAC  Erythema noted on pharynx. No exudates. No cervical lymph nodes noted.   Eyes: EOM's intact and conjugate, pink palpebral conjunctivae,  anicteric sclerae, pupils reactive  Lungs : Clear to auscultation , no crackles, wheezes or rhonchi  Heart : Nornal first and second heart sounds, No murmurs, heaves, or thrills  Abdomen: Soft, non tender, non distended, no hepatosplenomegaly, no ascites  Extremities: No edema, pulses are full and equal      LABS:       ASSESSMENT:      ICD-10-CM    1. Acute pharyngitis J02.9    2. Chronic rheumatic arthritis M06.9        PLAN:    8 day duratrion not improved. LIkely turning bacterial.   Ok for Ibup upto 400 mg BID with food.   Start amox 500 TID for 5 days  She refused to take mucinex or tessalon perles for cough, refused RObitussin bec of flavor  Suggested Cepacol for soothing effect  Hold Humira until recovered.         Total 25 minutes of which 55% was spent counseling and coordination of care of the above plan.    Electronically signed by: Uvaldo Brunner MD

## 2021-06-08 NOTE — PROGRESS NOTES
[facility]    Facility:   [facility] [[id number]]   Code Status: POLST AVAILABLE      CHIEF COMPLAINT/REASON FOR VISIT:  Chief Complaint   Patient presents with     Review Of Multiple Medical Conditions       HISTORY:      HPI: María Elena is a 51 y.o. female who was seen today in close ff up of her multiple recent issues.   SHe has been having respiratory symptoms since CHristmas. WAS mildly febrile, had Bilat ear pain, and severe sore throat. ALso had a nagging cough with hard to expectorate phlegm. Evals include bloodwork, CXR and throat swabs. Her TMs were cloudy and Red virgie on L. . Pharynx was very red. No tonsillar enlargement. SHe had 1 course of Amoxicilin. It took a long while before symptoms start to get get better. SHe took ibuprofen for fevers and pain. Took cough syrup. SLowly getting better/   Held her HUmira while sick. Jus tas she was gtting ready to get back to [facility] , she develpoped redness, swelling,and itching on her recent ZAHRA hip.   Went to see her Ortho Doctor the next day.   US done, no abscess. No fluid. NO dehiscense. No drainage.   Was given antibiotic for which she is close to finishing .   Hip is doing better.   SHe is back to walking about 175 feet with some difficulty.   Still not on Humira, Joints are still manageable.   Appetite back.   No other symptoms.     Past Medical History:   Diagnosis Date     Aseptic necrosis of femoral head     LEFT     DJD (degenerative joint disease)      DVT, bilateral lower limbs 10/2014     Edema - swelling of the legs after blood clots 5/22/2015     Essential hypertension with goal blood pressure less than 140/90      Failure to thrive      History of blood clots      Hypokalemia 10/15/2014     Lung mass 10/18/2014     Morbid obesity 4/10/2015    Had formal nutrition consult at [facility].  RD reports that she is not willing to commit to the program outlined.  See scanned document.  12/15/2015 - Marcio Quinonez,  MD        Obesity - although she was not weighed today, she is clearly obese on inspection. 4/10/2015     LOPEZ (obstructive sleep apnea) - abnormal overnight pulse oximetry 5/22/2015     Osteoarthritis      Peripheral vascular disease      Pleural effusion      Pressure ulcer of foot      Rheumatoid arthritis 12/30/2014    seronegative     Seropositive rheumatoid arthritis 1/19/2016     Vitamin D deficiency 8/26/2015             Family History   Problem Relation Age of Onset     Multiple myeloma Father      Deep vein thrombosis Father      Cataracts Father      Snoring Father      Other Brother      Blood withdrawn from time to time.  Sounds like hemochromatosis.     Sleep apnea Brother      Breast cancer Mother      Cancer Mother      Uterine CA     Arthritis Mother      Cataracts Mother      No Medical Problems Sister      Rheum arthritis Maternal Grandmother      Breast cancer Maternal Grandmother 50     Heart disease Maternal Grandmother      Rheum arthritis Paternal Aunt      Clotting disorder Neg Hx      Social History     Social History     Marital status: Single     Spouse name: N/A     Number of children: N/A     Years of education: N/A     Social History Main Topics     Smoking status: Former Smoker     Packs/day: 1.00     Years: 30.00     Types: Cigarettes     Smokeless tobacco: Never Used     Alcohol use Yes      Comment: 3-4 times a year she will have 1 or 2.     Drug use: No     Sexual activity: No     Other Topics Concern     Not on file     Social History Narrative    Long term  At [facility] to rehab to [place] allowing surgery on deteriorated joints  LTC12/2015         Review of Systems  As above  .There were no vitals filed for this visit.    Physical Exam    No fever, T 97-98, BP stable.   Voice is back to baseline, not hoarse anymore.   No rash, no jaundice. No coryza.   Edema is at baseline  LABS:       ASSESSMENT:      ICD-10-CM    1. Cellulitis of left hip L03.116    2. Rheumatoid arthritis  M06.9    3. Respiratory symptoms R09.89        PLAN:    This visit was essentially a close check on her symptoms, and to ask her how she is feeling/doing. She is not feeling well enough yet tto restart HUmira. I support her decision.   I recommended to continue with her walking program.   Cntinue to drink lots of water.   Ibuprofen PRN, and DC when able  Let me know if any of the symptoms recur.   She has close ff up with her Rheum .           Electronically signed by: Uvaldo Brunner MD

## 2021-06-09 NOTE — PROGRESS NOTES
[facility]    Facility:   [facility] [[id number]]   Code Status: FULL CODE      CHIEF COMPLAINT/REASON FOR VISIT:  Chief Complaint   Patient presents with     Review Of Multiple Medical Conditions       HISTORY:      HPI: María Elena is a 51 y.o. female who was seen secondary to monthly review of chronic medical conditions.  She has had issues with her left hip including surgery.  Does complain of right knee pain including degenerative joint disease.  She wants to walk with staff but she states it hurts.  She wants to get an appointment with the surgeon to get her right knee work done next.  She also has chronic back pain.  She is on a number of medications for pain.  She is also being treated for rheumatoid arthritis.  She has been normotensive and afebrile.  Oxycodone as needed usually about 1 a day.  Not requiring any Vistaril will discontinue that.  She is getting some ibuprofen type products.  She is also on Coumadin.  Normotensive.  She does sit in her wheelchair most of the day.  Usually isolates herself in her room.. Chronic edema.    Past Medical History:   Diagnosis Date     Aseptic necrosis of femoral head     LEFT     DJD (degenerative joint disease)      DVT, bilateral lower limbs 10/2014     Edema - swelling of the legs after blood clots 5/22/2015     Essential hypertension with goal blood pressure less than 140/90      Failure to thrive      History of blood clots      Hypokalemia 10/15/2014     Lung mass 10/18/2014     Morbid obesity 4/10/2015    Had formal nutrition consult at [facility].  RD reports that she is not willing to commit to the program outlined.  See scanned document.  12/15/2015 - Marcio Quinonez MD        Obesity - although she was not weighed today, she is clearly obese on inspection. 4/10/2015     LOPEZ (obstructive sleep apnea) - abnormal overnight pulse oximetry 5/22/2015     Osteoarthritis      Peripheral vascular disease      Pleural  effusion      Pressure ulcer of foot      Rheumatoid arthritis 12/30/2014    seronegative     Seropositive rheumatoid arthritis 1/19/2016     Vitamin D deficiency 8/26/2015             Family History   Problem Relation Age of Onset     Multiple myeloma Father      Deep vein thrombosis Father      Cataracts Father      Snoring Father      Other Brother      Blood withdrawn from time to time.  Sounds like hemochromatosis.     Sleep apnea Brother      Breast cancer Mother      Cancer Mother      Uterine CA     Arthritis Mother      Cataracts Mother      No Medical Problems Sister      Rheum arthritis Maternal Grandmother      Breast cancer Maternal Grandmother 50     Heart disease Maternal Grandmother      Rheum arthritis Paternal Aunt      Clotting disorder Neg Hx      Social History     Social History     Marital status: Single     Spouse name: N/A     Number of children: N/A     Years of education: N/A     Social History Main Topics     Smoking status: Former Smoker     Packs/day: 1.00     Years: 30.00     Types: Cigarettes     Smokeless tobacco: Never Used     Alcohol use Yes      Comment: 3-4 times a year she will have 1 or 2.     Drug use: No     Sexual activity: No     Other Topics Concern     Not on file     Social History Narrative    Long term  At [facility] to rehab to [facility] allowing surgery on deteriorated joints  LTC12/2015         Review of Systems  Denies any URI symptoms postnasal drip fever chills fatigue flulike symptoms nausea vomiting diarrhea dysuria rashes stiff neck swollen glands. Does have bilateral DVT sleep apnea obesity edema vitamin D deficiency PAD rheumatoid arthritis multiple sites chronic pain.      Current Outpatient Prescriptions:      adalimumab (HUMIRA PEN) 40 mg/0.8 mL PnKt, Inject 40 mg under the skin every 14 (fourteen) days., Disp: 1 kit, Rfl: 11     artificial tears,hypromellose, (GENTEAL) 0.3 % Drop, Administer 1 drop to both eyes 4 (four) times a day as needed., Disp: ,  Rfl:      ascorbic acid, vitamin C, (ASCORBIC ACID WITH KAEL HIPS) 500 MG tablet, Take 500 mg by mouth 2 (two) times a day., Disp: , Rfl:      cholecalciferol, vitamin D3, (VITAMIN D3) 2,000 unit Tab, Take 6,000 Units by mouth daily., Disp: , Rfl:      ibuprofen (ADVIL,MOTRIN) 200 MG tablet, Take 400 mg by mouth 3 (three) times a day as needed for pain. , Disp: , Rfl:      multivitamin therapeutic (THERAGRAN) tablet, Take 1 tablet by mouth daily., Disp: , Rfl:      nystatin (MYCOSTATIN) powder, Apply 1 application topically 2 (two) times a day. Wash well between applications. Apply under breasts., Disp: , Rfl:      oxyCODONE (ROXICODONE) 5 MG immediate release tablet, Take 5 mg by mouth every 4 (four) hours as needed for pain., Disp: , Rfl:      sulfaSALAzine (AZULFIDINE) 500 mg tablet, Take 1,500 mg by mouth 2 (two) times a day. , Disp: , Rfl:      WARFARIN SODIUM (WARFARIN ORAL), Take by mouth daily. 2/28/17 INR 2.47. Take 8mg Mon, Thur and 9mg all other days of the week. Next INR 3/28/17 2/13/17 INR 2.39  Continue taking 8mg on Mondays and Thursdays and 9mg all other days.  Next INR 2/28/17. 2/1/17 INR 2.47 Cont 8mg M & TH, 9 mg all other days. Next INR 2/16. 1/25/17 INR 2.59  Continue taking 8mg on Mondays and Thursdays and 9mg all other days.  Next INR 2/1/17., Disp: , Rfl:     .  Vitals:    03/24/17 1608   BP: 113/80   Pulse: 79   Resp: 16   Temp: 98  F (36.7  C)   SpO2: 94%       Physical Exam  Constitutional: She is oriented to person, place, and time. She appears well-developed and well-nourished. No distress.   obese   HENT:   Head: Normocephalic.   Eyes: Pupils are equal, round, and reactive to light.   Neck: Neck supple. No thyromegaly present.   Cardiovascular: Normal rate, regular rhythm and normal heart sounds.   Chronic edema   Pulmonary/Chest: Breath sounds normal.   inder   Abdominal: Bowel sounds are normal. There is no tenderness. There is no guarding.   Musculoskeletal:   Chronic pain. Chronic  edema. Rheumatoid arthritis multiple sites including hands. Left knee scar. Left hip OA. Left hip scar  Lymphadenopathy:   She has no cervical adenopathy.   Neurological: She is oriented to person, place, and time.   Skin: Skin is warm and dry. No rash noted.   Psychiatric: She has a normal mood and affect. Her behavior is normal.       LABS:   Results for orders placed or performed in visit on 10/11/16   Basic Metabolic Panel   Result Value Ref Range    Sodium 143 136 - 145 mmol/L    Potassium 5.1 (H) 3.5 - 5.0 mmol/L    Chloride 105 98 - 107 mmol/L    CO2 28 22 - 31 mmol/L    Anion Gap, Calculation 10 5 - 18 mmol/L    Glucose 89 70 - 125 mg/dL    Calcium 9.3 8.5 - 10.5 mg/dL    BUN 13 8 - 22 mg/dL    Creatinine 0.80 0.60 - 1.10 mg/dL    GFR MDRD Af Amer >60 >60 mL/min/1.73m2    GFR MDRD Non Af Amer >60 >60 mL/min/1.73m2       Lab Results   Component Value Date    WBC 10.0 03/24/2017    HGB 12.2 03/24/2017    HCT 38.0 03/24/2017    MCV 77 (L) 03/24/2017     03/24/2017         ASSESSMENT:      ICD-10-CM    1. Right knee DJD M17.9    2. H/O total hip arthroplasty, left Z96.642    3. Pain management R52    4. Edema R60.9        PLAN:    At this time no further intervention is necessary.  She wants to take the steps to have another surgery otherwise only ambulates that she can talk a staff member and doing that otherwise is on a number of pain medications.      Electronically signed by: Michael Duane Johnson, CNP

## 2021-06-09 NOTE — TELEPHONE ENCOUNTER
Lab orders printed and faxed to # given below. Fax successfully went via.    DES[facility] 6/25/2020 8:26 AM

## 2021-06-09 NOTE — PROGRESS NOTES
Talked to Aixa-  Staff @ [facility]. She said pt is not ready for a pre-call at this moment. Will try again shortly     Ambar Merchant CMA [facility] 6/25/2020 1:21 PM

## 2021-06-09 NOTE — TELEPHONE ENCOUNTER
Consuelo greer/ [facility] [place] called.     She had called last week requesting for lab orders, but did not receive any orders.  Please fax lab orders asap.     Patient has an appointment with Dr. Humphrey scheduled for next Thursday 06.25.2020    Fax @ [phone]    Phone @ [phone]

## 2021-06-09 NOTE — PROGRESS NOTES
"María Elena Saab is a 55 y.o. female who is being evaluated via a billable telephone visit.      The patient has been notified of following:     \"This telephone visit will be conducted via a call between you and your physician/provider. We have found that certain health care needs can be provided without the need for a physical exam.  This service lets us provide the care you need with a short phone conversation.  If a prescription is necessary we can send it directly to your pharmacy.  If lab work is needed we can place an order for that and you can then stop by our lab to have the test done at a later time.    Telephone visits are billed at different rates depending on your insurance coverage. During this emergency period, for some insurers they may be billed the same as an in-person visit.  Please reach out to your insurance provider with any questions.    If during the course of the call the physician/provider feels a telephone visit is not appropriate, you will not be charged for this service.\"    Patient has given verbal consent to a Telephone visit? Yes    What phone number would you like to be contacted at? [phone]    Patient would like to receive their AVS by AVS Preference: Alex. declined       ASSESSMENT AND PLAN:    Diagnoses and all orders for this visit:    Seropositive rheumatoid arthritis of multiple sites (H)    Primary osteoarthritis involving multiple joints    Polyarthralgia    High risk medication use    Cyclic citrullinated peptide (CCP) antibody positive          HISTORY OF PRESENTING ILLNESS:  María Elena Saab 55 y.o. is evaluated here via phone link.  She is currently a resident of the local home.  She is evaluate today for follow-up for rheumatoid arthritis.  She has fortunately not had flareup.  The phone was connected finally after some effort as we were playing phone tag with each other, in the evening.  She is due for labs most recently done in March.  She plans to come in the " next few days.  Orders are put in.  She reports that overall she has felt so much better compared to the previous several months.  She continues to get Humira every 2 weeks, she is on methotrexate 10 mg once a week.  She is to stay the course.  ROS enquiry held for fever, ocular symptoms, rash, headache,  GI issues.  We will meet here in 3 months or sooner with labs in the next few days and prior to her next visit.  Today we also discussed the issues related to the current pandemic, the pros and cons of the current treatment plan, the CDC guidelines such as social distancing washing the hands covering the cough.  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  RD reports that she is not willing to commit to the program outlined.  See scanned document.  12/15/2015 - Marcio Quinonez MD        Obesity - although she was not weighed today, she is clearly obese on inspection. 4/10/2015     LOPEZ (obstructive sleep apnea) - abnormal overnight pulse oximetry 5/22/2015     Osteoarthritis      Peripheral vascular disease (H)      Pleural effusion      Pressure ulcer of foot      Rheumatoid arthritis (H) 12/30/2014    seronegative     Sepsis (H)      Seropositive rheumatoid arthritis (H) 1/19/2016      Vitamin D deficiency 8/26/2015     Social History     Tobacco Use   Smoking Status Former Smoker     Packs/day: 1.00     Years: 30.00     Pack years: 30.00     Types: Cigarettes   Smokeless Tobacco Never Used     Patient Active Problem List   Diagnosis     DVT of lower extremity, bilateral - residual clot disease on repeat US in NEW location - after six months of warfarin.     Morbid obesity (H)     Warfarin anticoagulation - long-term because of unresolved DVT that first appeared in October, 2014     LOPEZ (obstructive sleep apnea) - abnormal overnight pulse oximetry - SLEEP visit in November, 2015, she states.     Nocturnal hypoxemia - probable sleep apnea - had overnight pulse oximetry, April, 2015.     Edema - swelling of the legs after blood clots     Vitamin D deficiency     Cyclic citrullinated peptide (CCP) antibody positive     Right shoulder tendinitis     Seropositive rheumatoid arthritis of multiple sites (H)     PAD (peripheral artery disease) (H)     Pain management     Radiculopathy of leg     Sleep apnea     Degenerative joint disease (DJD) of hip     Essential hypertension with goal blood pressure less than 140/90     History of DVT of lower extremity     Anemia     H/O total hip arthroplasty     Polyarthralgia     Physical debility     High risk medication use     Primary osteoarthritis involving multiple joints     History of total left knee replacement (TKR)     Current Outpatient Medications   Medication Sig Dispense Refill     acetaminophen (TYLENOL) 325 MG tablet Take 650 mg by mouth every 6 (six) hours as needed for pain.              adalimumab (HUMIRA) 40 mg/0.8 mL injection Inject 0.8 mL (40 mg total) under the skin every 14 (fourteen) days. 1.6 mL 3     ascorbic acid, vitamin C, (ASCORBIC ACID WITH KAEL HIPS) 500 MG tablet Take 500 mg by mouth 2 (two) times a day.       cholecalciferol, vitamin D3, (VITAMIN D3) 2,000 unit Tab Take 6,000 Units by mouth daily.       hydroCHLOROthiazide  (HYDRODIURIL) 25 MG tablet Take 25 mg by mouth daily.       ibuprofen (ADVIL,MOTRIN) 200 MG tablet Take 400 mg by mouth every 8 (eight) hours as needed for pain.              methotrexate 10 MG tablet Take 10 mg by mouth once a week.       multivitamin therapeutic (THERAGRAN) tablet Take 1 tablet by mouth daily.       nystatin (MYCOSTATIN) powder Apply to rash areas twice daily. 15 g 0     oxyCODONE (OXY-IR) 5 mg capsule Take 1-2 tablets every 4 hours orally as needed for pain relief.  Use 1 tablet for 1-5/10 pain, take 2 tablets for 6-10/10 pain. 60 tablet 0     rivaroxaban ANTICOAGULANT (XARELTO) 10 mg tablet Take 10 mg by mouth daily.       DULoxetine (CYMBALTA) 20 MG capsule Take 1 capsule (20 mg total) by mouth daily. 90 capsule 2     folic acid (FOLVITE) 1 MG tablet Take 1 tablet (1 mg total) by mouth daily. 90 tablet 0     methotrexate 2.5 MG tablet Take 4 tablets (10 mg total) by mouth once a week. 48 tablet 0     No current facility-administered medications for this visit.          EXAMINATION: Sounded comfortable on the phone, fluent speech, alert oriented.    LAB / IMAGING DATA:  ALT   Date Value Ref Range Status   03/12/2020 24 0 - 45 U/L Final   01/23/2020 22 0 - 45 U/L Final   12/05/2019 22 0 - 45 U/L Final     Albumin   Date Value Ref Range Status   03/12/2020 3.7 3.5 - 5.0 g/dL Final   01/23/2020 3.6 3.5 - 5.0 g/dL Final   12/05/2019 3.4 (L) 3.5 - 5.0 g/dL Final     Creatinine   Date Value Ref Range Status   03/12/2020 0.82 0.60 - 1.10 mg/dL Final   01/23/2020 0.70 0.60 - 1.10 mg/dL Final   12/05/2019 0.75 0.60 - 1.10 mg/dL Final       WBC   Date Value Ref Range Status   03/12/2020 4.5 4.0 - 11.0 thou/uL Final   01/23/2020 4.6 4.0 - 11.0 thou/uL Final   09/14/2015 4.0 4.0 - 11.0 thou/uL Final   06/30/2015 3.7 (L) 4.0 - 11.0 thou/uL Final     Hemoglobin   Date Value Ref Range Status   03/12/2020 14.8 12.0 - 16.0 g/dL Final   01/23/2020 14.1 12.0 - 16.0 g/dL Final   12/12/2019 14.5 12.0 - 16.0 g/dL  Final     Platelets   Date Value Ref Range Status   03/12/2020 220 140 - 440 thou/uL Final   01/23/2020 215 140 - 440 thou/uL Final   12/12/2019 214 140 - 440 thou/uL Final       Lab Results   Component Value Date    RF 25.7 10/15/2014    SEDRATE 46 (H) 02/08/2017     Duration of the call:8  Minutes  Call start: 816  pm  Call end:   824 pm

## 2021-06-10 NOTE — PROGRESS NOTES
[facility]    Facility:   [facility] [[id number]]   Code Status: FULL CODE      CHIEF COMPLAINT/REASON FOR VISIT:  Chief Complaint   Patient presents with     Review Of Multiple Medical Conditions       HISTORY:      HPI: María Elena is a 51 y.o. female who actually seems to be doing pretty good today.  No major complaints.  Did have a complete blood count February see results below.  She does see her rheumatologist.  Also being treated.  She is on Coumadin next pro time is May 23.  No ibuprofen as needed.  Her bowels have been regular no senna as needed.  For oxycodone she can have that as needed and she did take one dose on the 10th another dose on the 14th but otherwise not regularly.  She is on a few vitamins and minerals.  Also regarding her knee she is supposed to see orthopedics in June possibly for another evaluation and she had leg surgery secondary to degenerative joint disease.  Appetite good.  Normotensive and afebrile.    Past Medical History:   Diagnosis Date     Aseptic necrosis of femoral head     LEFT     DJD (degenerative joint disease)      DVT, bilateral lower limbs 10/2014     Edema - swelling of the legs after blood clots 5/22/2015     Essential hypertension with goal blood pressure less than 140/90      Failure to thrive      History of blood clots      Hypokalemia 10/15/2014     Lung mass 10/18/2014     Morbid obesity 4/10/2015    Had formal nutrition consult at [facility].  RD reports that she is not willing to commit to the program outlined.  See scanned document.  12/15/2015 - Marcio Quinonez MD        Obesity - although she was not weighed today, she is clearly obese on inspection. 4/10/2015     LOPEZ (obstructive sleep apnea) - abnormal overnight pulse oximetry 5/22/2015     Osteoarthritis      Peripheral vascular disease      Pleural effusion      Pressure ulcer of foot      Rheumatoid arthritis 12/30/2014    seronegative     Seropositive rheumatoid  arthritis 1/19/2016     Vitamin D deficiency 8/26/2015             Family History   Problem Relation Age of Onset     Multiple myeloma Father      Deep vein thrombosis Father      Cataracts Father      Snoring Father      Other Brother      Blood withdrawn from time to time.  Sounds like hemochromatosis.     Sleep apnea Brother      Breast cancer Mother      Cancer Mother      Uterine CA     Arthritis Mother      Cataracts Mother      No Medical Problems Sister      Rheum arthritis Maternal Grandmother      Breast cancer Maternal Grandmother 50     Heart disease Maternal Grandmother      Rheum arthritis Paternal Aunt      Clotting disorder Neg Hx      Social History     Social History     Marital status: Single     Spouse name: N/A     Number of children: N/A     Years of education: N/A     Social History Main Topics     Smoking status: Former Smoker     Packs/day: 1.00     Years: 30.00     Types: Cigarettes     Smokeless tobacco: Never Used     Alcohol use Yes      Comment: 3-4 times a year she will have 1 or 2.     Drug use: No     Sexual activity: No     Other Topics Concern     Not on file     Social History Narrative    Long term  At [facility] to rehab to level allowing surgery on deteriorated joints  LTC12/2015         Review of Systems  Denies any URI symptoms postnasal drip fever chills fatigue flulike symptoms nausea vomiting diarrhea dysuria rashes stiff neck swollen glands. Does have bilateral DVT sleep apnea obesity edema vitamin D deficiency PAD rheumatoid arthritis multiple sites chronic pain.         Current Outpatient Prescriptions:      adalimumab (HUMIRA PEN) 40 mg/0.8 mL PnKt, Inject 40 mg under the skin every 14 (fourteen) days., Disp: 1 kit, Rfl: 11     artificial tears,hypromellose, (GENTEAL) 0.3 % Drop, Administer 1 drop to both eyes 4 (four) times a day as needed., Disp: , Rfl:      ascorbic acid, vitamin C, (ASCORBIC ACID WITH KAEL HIPS) 500 MG tablet, Take 500 mg by mouth 2 (two)  times a day., Disp: , Rfl:      cholecalciferol, vitamin D3, (VITAMIN D3) 2,000 unit Tab, Take 6,000 Units by mouth daily., Disp: , Rfl:      ibuprofen (ADVIL,MOTRIN) 200 MG tablet, Take 400 mg by mouth 3 (three) times a day as needed for pain. , Disp: , Rfl:      multivitamin therapeutic (THERAGRAN) tablet, Take 1 tablet by mouth daily., Disp: , Rfl:      nystatin (MYCOSTATIN) powder, Apply 1 application topically 2 (two) times a day. Wash well between applications. Apply under breasts., Disp: , Rfl:      oxyCODONE (ROXICODONE) 5 MG immediate release tablet, Take 5 mg by mouth every 4 (four) hours as needed for pain., Disp: , Rfl:      sulfaSALAzine (AZULFIDINE) 500 mg tablet, Take 1,500 mg by mouth 2 (two) times a day. , Disp: , Rfl:      WARFARIN SODIUM (WARFARIN ORAL), Take by mouth daily. 4/24/17 INR 2.0 Cont 8mg Mon, Thur, 9mg all other days of the week. Next INR 5/23 3/28/17 INR 2.72 Cont 8mg M & TH, 9mg all others. Next INR 4/25/17. 2/28/17 INR 2.47. Take 8mg Mon, Thur and 9mg all other days of the week. Next INR 3/28/17 2/13/17 INR 2.39  Continue taking 8mg on Mondays and Thursdays and 9mg all other days.  Next INR 2/28/17. 2/1/17 INR 2.47 Cont 8mg M & TH, 9 mg all other days. Next INR 2/16., Disp: , Rfl:     .  Vitals:    05/15/17 1332   BP: 137/82   Pulse: 82   Resp: 16       Physical Exam   Constitutional: She is oriented to person, place, and time. She appears well-developed and well-nourished. No distress.   obese   HENT:   Head: Normocephalic.   Eyes: Pupils are equal, round, and reactive to light.   Neck: Neck supple. No thyromegaly present.   Cardiovascular: Normal rate, regular rhythm and normal heart sounds.   Chronic edema . Farrow wraps  Pulmonary/Chest: Breath sounds normal.   inder   Abdominal: Bowel sounds are normal. There is no tenderness. There is no guarding.   Musculoskeletal:   Chronic pain. Chronic edema. Rheumatoid arthritis multiple sites including hands. Left knee scar. Left hip OA.  Left hip scar  Lymphadenopathy:   She has no cervical adenopathy.   Neurological: She is oriented to person, place, and time.   Skin: Skin is warm and dry. No rash noted.   Psychiatric: She has a normal mood and affect. Her behavior is normal.      LABS:   Lab Results   Component Value Date    WBC 10.0 03/24/2017    HGB 12.2 03/24/2017    HCT 38.0 03/24/2017    MCV 77 (L) 03/24/2017     03/24/2017     Lab Results   Component Value Date    CHOL 209 (H) 04/13/2015    CHOL 227 (H) 04/07/2015     Lab Results   Component Value Date    HDL 61 04/13/2015    HDL 65 04/07/2015     Lab Results   Component Value Date    LDLCALC 138 (H) 04/13/2015    LDLCALC 139 (H) 04/07/2015     Lab Results   Component Value Date    TRIG 48 04/13/2015    TRIG 113 04/07/2015     No components found for: CHOLHDL  Results for orders placed or performed in visit on 10/11/16   Basic Metabolic Panel   Result Value Ref Range    Sodium 143 136 - 145 mmol/L    Potassium 5.1 (H) 3.5 - 5.0 mmol/L    Chloride 105 98 - 107 mmol/L    CO2 28 22 - 31 mmol/L    Anion Gap, Calculation 10 5 - 18 mmol/L    Glucose 89 70 - 125 mg/dL    Calcium 9.3 8.5 - 10.5 mg/dL    BUN 13 8 - 22 mg/dL    Creatinine 0.80 0.60 - 1.10 mg/dL    GFR MDRD Af Amer >60 >60 mL/min/1.73m2    GFR MDRD Non Af Amer >60 >60 mL/min/1.73m2           ASSESSMENT:      ICD-10-CM    1. Primary osteoarthritis of left hip M16.12    2. Essential hypertension with goal blood pressure less than 140/90 I10    3. Pain management R52    4. Primary osteoarthritis of right knee M17.11        PLAN:    She does see her rheumatologist.  Currently being treated.  In June she like to follow-up with orthopedics possibly to have that left knee looked at.  And also May 23 is the next pro time.      Electronically signed by: Michael Duane Johnson, CNP

## 2021-06-10 NOTE — PROGRESS NOTES
[facility]    Facility:   [facility] [[id number]]   Code Status: FULL CODE      CHIEF COMPLAINT/REASON FOR VISIT:  Chief Complaint   Patient presents with     Review Of Multiple Medical Conditions       HISTORY:      HPI: María Elena is a 51 y.o. female who I had a chance to visit with secondary to monthly review of chronic medical conditions.  She is going to see her rheumatologist today.  Did have a complete blood count performed in February.  His chronic edema does have her Farrow wraps.  Does sit in a wheelchair most of the day.  A history of hip surgery also have multiple sites arthropathy.  She does complain of gaining weight and not doing any exercise we did talk about her portion sizes.  For pain has not required any ibuprofen is as needed.  For the oxycodone as needed does take about 1 or 2 doses per day.  Also on Coumadin rechecking her pro time on the 25th.  Normotensive and afebrile.    Past Medical History:   Diagnosis Date     Aseptic necrosis of femoral head     LEFT     DJD (degenerative joint disease)      DVT, bilateral lower limbs 10/2014     Edema - swelling of the legs after blood clots 5/22/2015     Essential hypertension with goal blood pressure less than 140/90      Failure to thrive      History of blood clots      Hypokalemia 10/15/2014     Lung mass 10/18/2014     Morbid obesity 4/10/2015    Had formal nutrition consult at [facility].  RD reports that she is not willing to commit to the program outlined.  See scanned document.  12/15/2015 - Marcio Quinonez MD        Obesity - although she was not weighed today, she is clearly obese on inspection. 4/10/2015     LOPEZ (obstructive sleep apnea) - abnormal overnight pulse oximetry 5/22/2015     Osteoarthritis      Peripheral vascular disease      Pleural effusion      Pressure ulcer of foot      Rheumatoid arthritis 12/30/2014    seronegative     Seropositive rheumatoid arthritis 1/19/2016     Vitamin D  deficiency 8/26/2015             Family History   Problem Relation Age of Onset     Multiple myeloma Father      Deep vein thrombosis Father      Cataracts Father      Snoring Father      Other Brother      Blood withdrawn from time to time.  Sounds like hemochromatosis.     Sleep apnea Brother      Breast cancer Mother      Cancer Mother      Uterine CA     Arthritis Mother      Cataracts Mother      No Medical Problems Sister      Rheum arthritis Maternal Grandmother      Breast cancer Maternal Grandmother 50     Heart disease Maternal Grandmother      Rheum arthritis Paternal Aunt      Clotting disorder Neg Hx      Social History     Social History     Marital status: Single     Spouse name: N/A     Number of children: N/A     Years of education: N/A     Social History Main Topics     Smoking status: Former Smoker     Packs/day: 1.00     Years: 30.00     Types: Cigarettes     Smokeless tobacco: Never Used     Alcohol use Yes      Comment: 3-4 times a year she will have 1 or 2.     Drug use: No     Sexual activity: No     Other Topics Concern     Not on file     Social History Narrative    Long term  At [facility] to rehab to level allowing surgery on deteriorated joints  LTC12/2015         Review of Systems  Denies any URI symptoms postnasal drip fever chills fatigue flulike symptoms nausea vomiting diarrhea dysuria rashes stiff neck swollen glands. Does have bilateral DVT sleep apnea obesity edema vitamin D deficiency PAD rheumatoid arthritis multiple sites chronic pain.      Current Outpatient Prescriptions:      adalimumab (HUMIRA PEN) 40 mg/0.8 mL PnKt, Inject 40 mg under the skin every 14 (fourteen) days., Disp: 1 kit, Rfl: 11     artificial tears,hypromellose, (GENTEAL) 0.3 % Drop, Administer 1 drop to both eyes 4 (four) times a day as needed., Disp: , Rfl:      ascorbic acid, vitamin C, (ASCORBIC ACID WITH KAEL HIPS) 500 MG tablet, Take 500 mg by mouth 2 (two) times a day., Disp: , Rfl:       cholecalciferol, vitamin D3, (VITAMIN D3) 2,000 unit Tab, Take 6,000 Units by mouth daily., Disp: , Rfl:      ibuprofen (ADVIL,MOTRIN) 200 MG tablet, Take 400 mg by mouth 3 (three) times a day as needed for pain. , Disp: , Rfl:      multivitamin therapeutic (THERAGRAN) tablet, Take 1 tablet by mouth daily., Disp: , Rfl:      nystatin (MYCOSTATIN) powder, Apply 1 application topically 2 (two) times a day. Wash well between applications. Apply under breasts., Disp: , Rfl:      oxyCODONE (ROXICODONE) 5 MG immediate release tablet, Take 5 mg by mouth every 4 (four) hours as needed for pain., Disp: , Rfl:      sulfaSALAzine (AZULFIDINE) 500 mg tablet, Take 1,500 mg by mouth 2 (two) times a day. , Disp: , Rfl:      WARFARIN SODIUM (WARFARIN ORAL), Take by mouth daily. 3/28/17 INR 2.72 Cont 8mg M & TH, 9mg all others. Next INR 4/25/17. 2/28/17 INR 2.47. Take 8mg Mon, Thur and 9mg all other days of the week. Next INR 3/28/17 2/13/17 INR 2.39  Continue taking 8mg on Mondays and Thursdays and 9mg all other days.  Next INR 2/28/17. 2/1/17 INR 2.47 Cont 8mg M & TH, 9 mg all other days. Next INR 2/16. 1/25/17 INR 2.59  Continue taking 8mg on Mondays and Thursdays and 9mg all other days.  Next INR 2/1/17., Disp: , Rfl:     .  Vitals:    04/18/17 1603   BP: 168/80   Pulse: 83   Resp: 18   Temp: 98.9  F (37.2  C)       Physical Exam  Constitutional: She is oriented to person, place, and time. She appears well-developed and well-nourished. No distress.   obese   HENT:   Head: Normocephalic.   Eyes: Pupils are equal, round, and reactive to light.   Neck: Neck supple. No thyromegaly present.   Cardiovascular: Normal rate, regular rhythm and normal heart sounds.   Chronic edema . Farrow wraps  Pulmonary/Chest: Breath sounds normal.   inder   Abdominal: Bowel sounds are normal. There is no tenderness. There is no guarding.   Musculoskeletal:   Chronic pain. Chronic edema. Rheumatoid arthritis multiple sites including hands. Left knee  scar. Left hip OA. Left hip scar  Lymphadenopathy:   She has no cervical adenopathy.   Neurological: She is oriented to person, place, and time.   Skin: Skin is warm and dry. No rash noted.   Psychiatric: She has a normal mood and affect. Her behavior is normal.       LABS:   Lab Results   Component Value Date    WBC 10.0 03/24/2017    HGB 12.2 03/24/2017    HCT 38.0 03/24/2017    MCV 77 (L) 03/24/2017     03/24/2017     Results for orders placed or performed in visit on 10/11/16   Basic Metabolic Panel   Result Value Ref Range    Sodium 143 136 - 145 mmol/L    Potassium 5.1 (H) 3.5 - 5.0 mmol/L    Chloride 105 98 - 107 mmol/L    CO2 28 22 - 31 mmol/L    Anion Gap, Calculation 10 5 - 18 mmol/L    Glucose 89 70 - 125 mg/dL    Calcium 9.3 8.5 - 10.5 mg/dL    BUN 13 8 - 22 mg/dL    Creatinine 0.80 0.60 - 1.10 mg/dL    GFR MDRD Af Amer >60 >60 mL/min/1.73m2    GFR MDRD Non Af Amer >60 >60 mL/min/1.73m2       Lab Results   Component Value Date    ALBUMIN 3.3 (L) 03/24/2017         ASSESSMENT:      ICD-10-CM    1. Seropositive rheumatoid arthritis of multiple sites M05.79    2. Primary osteoarthritis of left hip M16.12    3. H/O total hip arthroplasty, left Z96.642    4. Essential hypertension with goal blood pressure less than 140/90 I10        PLAN:    Talked about her obesity and her diet and portion sizes also try to get in some exercise.  At some point she states that she now wants to get her left knee work done with an arthroplasty.  Otherwise no other new changes      Electronically signed by: Michael Duane Johnson, CNP

## 2021-06-10 NOTE — PROGRESS NOTES
[facility]    Facility:   [facility] [[id number]]   Code Status: FULL CODE      CHIEF COMPLAINT/REASON FOR VISIT:  Chief Complaint   Patient presents with     FVP Care Coordination - Regulatory       HISTORY:      HPI: María Elena is a 55 y.o. female who is in a long-term care facility secondary to multiple chronic conditions.  Primarily to follow-up with rheumatology secondary to rheumatoid arthritis and are causing her pain and also performing her laboratory studies.  She has been normotensive and afebrile and on room air.  Her appetite is good.  Pain she does take methotrexate.  Does not taking and no oxycodone as needed and did take 1 dose ibuprofen on August 15.  She also did have a care conference on August 13.  She does need assistance with all ADLs.  Does spend the days in her room.  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  RD reports that she is not willing to commit to the program outlined.  See scanned document.  12/15/2015 - Marcio Quinonez MD        Obesity - although she was not weighed today, she is clearly obese on inspection. 4/10/2015     LOPEZ (obstructive sleep apnea) - abnormal overnight pulse oximetry 5/22/2015     Osteoarthritis      Peripheral vascular disease (H)      Pleural effusion      Pressure ulcer of foot      Rheumatoid arthritis (H) 12/30/2014    seronegative     Sepsis (H)      Seropositive rheumatoid arthritis (H) 1/19/2016      Vitamin D deficiency 8/26/2015             Family History   Problem Relation Age of Onset     Multiple myeloma Father      Deep vein thrombosis Father      Cataracts Father      Snoring Father      Other Brother         Blood withdrawn from time to time.  Sounds like hemochromatosis.     Sleep apnea Brother      Cancer Mother         Uterine CA     Arthritis Mother      Cataracts Mother      Breast cancer Mother 54     No Medical Problems Sister      Rheum arthritis Maternal Grandmother      Breast cancer Maternal Grandmother 50     Heart disease Maternal Grandmother      Rheum arthritis Paternal Aunt      Breast cancer Maternal Aunt 54     Breast cancer Cousin      Clotting disorder Neg Hx      Social History     Socioeconomic History     Marital status: Single     Spouse name: Not on file     Number of children: Not on file     Years of education: Not on file     Highest education level: Not on file   Occupational History     Not on file   Social Needs     Financial resource strain: Not on file     Food insecurity     Worry: Not on file     Inability: Not on file     Transportation needs     Medical: Not on file     Non-medical: Not on file   Tobacco Use     Smoking status: Former Smoker     Packs/day: 1.00     Years: 30.00     Pack years: 30.00     Types: Cigarettes     Smokeless tobacco: Never Used   Substance and Sexual Activity     Alcohol use: Yes     Comment: 3-4 times a year she will have 1 or 2.     Drug use: No     Sexual activity: Never     Partners: Female   Lifestyle     Physical activity     Days per week: Not on file     Minutes per session: Not on file     Stress: Not on file   Relationships     Social connections     Talks on phone: Not on file     Gets together: Not on file     Attends Hinduism service: Not on file     Active member of club or organization: Not on file     Attends meetings of clubs or organizations: Not on file     Relationship status: Not on file     Intimate partner  violence     Fear of current or ex partner: Not on file     Emotionally abused: Not on file     Physically abused: Not on file     Forced sexual activity: Not on file   Other Topics Concern     Not on file   Social History Narrative    Long term  At [facility] to rehab to level allowing surgery on deteriorated joints  LTC12/2015         Review of Systems  No reports of any URI symptoms postnasal drip fever chills fatigue flulike symptoms nausea vomiting diarrhea dysuria rashes stiff neck swollen glands. Does have bilateral DVT sleep apnea obesity edema vitamin D deficiency PAD rheumatoid arthritis multiple sites chronic pain.    Current Outpatient Medications   Medication Sig Note     acetaminophen (TYLENOL) 325 MG tablet Take 650 mg by mouth every 6 (six) hours as needed for pain.             adalimumab (HUMIRA) 40 mg/0.8 mL injection Inject 0.8 mL (40 mg total) under the skin every 14 (fourteen) days.      ascorbic acid, vitamin C, (ASCORBIC ACID WITH KAEL HIPS) 500 MG tablet Take 500 mg by mouth 2 (two) times a day.      cholecalciferol, vitamin D3, (VITAMIN D3) 2,000 unit Tab Take 6,000 Units by mouth daily.      DULoxetine (CYMBALTA) 20 MG capsule Take 1 capsule (20 mg total) by mouth daily. 6/24/2020: Currently takeing     folic acid (FOLVITE) 1 MG tablet Take 1 tablet (1 mg total) by mouth daily. 6/24/2020: Currently taking     HUMIRA PEN 40 mg/0.8 mL PnKt INJECT 0.8 ML (40 MG) UNDER THE SKIN EVERY 14 DAYS      hydroCHLOROthiazide (HYDRODIURIL) 25 MG tablet Take 25 mg by mouth daily.      ibuprofen (ADVIL,MOTRIN) 200 MG tablet Take 400 mg by mouth every 8 (eight) hours as needed for pain.             methotrexate 10 MG tablet Take 10 mg by mouth once a week.      methotrexate 2.5 MG tablet Take 4 tablets (10 mg total) by mouth once a week.      multivitamin therapeutic (THERAGRAN) tablet Take 1 tablet by mouth daily.      nystatin (MYCOSTATIN) powder Apply to rash areas twice daily.      oxyCODONE  (OXY-IR) 5 mg capsule Take 1-2 tablets every 4 hours orally as needed for pain relief.  Use 1 tablet for 1-5/10 pain, take 2 tablets for 6-10/10 pain.      rivaroxaban ANTICOAGULANT (XARELTO) 10 mg tablet Take 10 mg by mouth daily.        There were no vitals filed for this visit.  Blood pressure 121/81, pulse 68, respirations 20, temperature 97.9, saturation room air 94%  Physical Exam  Head is normocephalic.  Cardiovascular: Normal rate, regular rhythm and normal heart sounds.   Chronic edema .  Catie wraps.  Pulmonary/Chest: Breath sounds normal.   inder   Abdominal.  Protuberant  Musculoskeletal:   Chronic pain. Chronic edema. Rheumatoid arthritis multiple sites including hands. Left knee scar. Left hip OA. Psychiatric: She has a normal mood and affect. Her behavior is normal.           LABS:   Lab Results   Component Value Date    WBC 4.5 07/01/2020    HGB 14.9 07/01/2020    HCT 43.3 07/01/2020    MCV 92 07/01/2020     07/01/2020     Results for orders placed or performed in visit on 09/13/18   Basic Metabolic Panel   Result Value Ref Range    Sodium 144 136 - 145 mmol/L    Potassium 4.3 3.5 - 5.0 mmol/L    Chloride 101 98 - 107 mmol/L    CO2 34 (H) 22 - 31 mmol/L    Anion Gap, Calculation 9 5 - 18 mmol/L    Glucose 96 70 - 125 mg/dL    Calcium 9.7 8.5 - 10.5 mg/dL    BUN 16 8 - 22 mg/dL    Creatinine 0.75 0.60 - 1.10 mg/dL    GFR MDRD Af Amer >60 >60 mL/min/1.73m2    GFR MDRD Non Af Amer >60 >60 mL/min/1.73m2       Lab Results   Component Value Date    SEDRATE 46 (H) 02/08/2017     Lab Results   Component Value Date    ALT 19 07/01/2020    AST 23 07/25/2019    ALKPHOS 69 07/25/2019    BILITOT 0.3 07/25/2019       Case Management:  I have reviewed the facility/SNF care plan/MDS which was done Today, including the falls risk, nutrition and pain screening. I also reviewed the current immunizations, and preventive care.. Future cancer screening is not clinically indicated secondary to age/goals of care.    Patient's desire to return to the community is present, but is not able due to care needs .    Information reviewed:  Medications, vital signs, orders, and nursing notes.    ASSESSMENT:      ICD-10-CM    1. Seropositive rheumatoid arthritis of multiple sites (H)  M05.79    2. Primary osteoarthritis involving multiple joints  M89.49    3. Polyarthralgia  M25.50    4. Physical debility  R53.81        PLAN:    At this time no other changes are needed.  She is getting her laboratory studies done by the rheumatology group.  Did have a care conference performed on August 13.  Pain is managed.  .    Electronically signed by: Michael Duane Johnson, CNP

## 2021-06-10 NOTE — PROGRESS NOTES
ASSESSMENT AND PLAN:  María Elena Saab 51 y.o. female is here for follow-up for severe seropositive high titer anti-CCP,  antibody rheumatoid arthritis.  She has not had Humira for nearly 3 months as one after the other she had several respiratory tract infections.  She has started just 2 doses ago again.  She took prior to that before Christmas couple of doses.  I have asked her to continue Humira.  She is to discontinue sulfasalazine.  She is not on methotrexate/leflunomide.  For now we will leave her on hydroxychloroquine.  I have asked her to return for follow-up here in 3 months.  Since she is not on sulfasalazine the frequency of the labs will be less now.  Diagnoses and all orders for this visit:    Seropositive rheumatoid arthritis of multiple sites    Primary osteoarthritis of right knee    H/O total hip arthroplasty, left    Cyclic citrullinated peptide (CCP) antibody positive           HISTORY OF PRESENTING ILLNESS:    María Elena Saab 51 y.o. female  is here for followup of sero-positive Rheumatoid Arthritis.  She is not doing well.  She is hurting especially in her hands.  She had one infection after the other as she puts it since Christmas.  She had been off Humira.  She rates the pain as moderately severe to severe.  4.5/10.  Interfering with a variety of day-to-day activities.  She has restarted Humira 1 month ago.Further historical information, including ROS as noted in the multidimensional health assessment questionnaire scanned in the EMR and in the assessment and plan section. Rheumatoid Arthritis Disease Activity Measure used: RAPID3, reviewed  today and scanned in the chart.        Following is the excerpt from a recent note:    She has noted significant improvement in her joint pains she's been on Humira for the past of 4 doses, however recently she did have increased pain in her left hand which subsided since.  Now she has leukopenia.  Her decubitus ulcer is noted not to be infected anymore.   "I have emphasized the importance of keeping close eye.  She is awaiting left hip replacement.  She is aware that I'm not able to give her the more \"aggressive\"  DMARD still her pressure ulcer heals and is no longer considered infected.  This patient has high titer anti-CCP antibody.  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  She is wheelchair-bound.  This happened this past year in fall.  She was due to have left hip replacement and then subsequent to that right knee replacement but before she could have that done she developed pneumonia and pulmonary embolism.  Prior to hospitalization she reports that she was able to ambulate with the help of a walker.  Since then she could simply not go back to ambulation and is now awaiting surgery at the nursing home.  This morning she told me she was informed that she has a infected decubitus ulcer.  Unless as she heals there the orthopedic surgeons obviously are not prepared to proceed with in the surgical intervention.  Meanwhile her rheumatoid arthritis is appearing to be a bit better today.  She noted that she could move her finger better.  She had trigger finger injection on her previous visit.  I have outlined to her that we will proceed with sulfasalazine.  I cannot at this point given her methotrexate or other more aggressive DMARD still her infected decubitus ulcer has been under control or healed.       has longstanding seropositive anti-CCP antibody severe deforming, erosive rheumatoid arthritis.   In the past she was said to have infected decubitus ulcer of the right lower extremity posteriorly, she understands that that is now healed or at least not infected anymore.  Her recent evaluation of the vascular clinic shows that there is no residual active infection in her lower extremity ulceration.  In view of this and the fact that even with only hydroxychloroquine and sulfasalazine she has had intermittent neutropenia, she is an unlikely candidate for " methotrexate, or leflunomide.    She is now on Humira for the past 2 months.  She is aware that it is going to be an important part of her observations at her primary physician at her home and herself to keep a close eye in case it is evidence of infection.  Will continue current implant.  Recent labs within acceptable range.  Her recent left hip arthroplasty, uneventfully she's not get back on Humira I have asked her to go back this will be 5 weeks out of surgery.  She did hold her Humira 2 weeks prior.  She also has a right ring finger triggering.  She would like to proceed with local injection.  20 mg of methylprednisolone and Marcaine injected in the flexor tendon sheath right ring finger flexor tendon.  Return for follow-up here in 4 months.  See back here in 3 months and regular the  likely this  ALLERGIES:Adhesive; Banana; Grape; Morphine (pf); Other food allergy; and Latex, natural rubber  PAST MEDICAL/ACTIVE PROBLEMS/MEDICATION/SOCIAL DATA  Past Medical History:   Diagnosis Date     Aseptic necrosis of femoral head     LEFT     DJD (degenerative joint disease)      DVT, bilateral lower limbs 10/2014     Edema - swelling of the legs after blood clots 5/22/2015     Essential hypertension with goal blood pressure less than 140/90      Failure to thrive      History of blood clots      Hypokalemia 10/15/2014     Lung mass 10/18/2014     Morbid obesity 4/10/2015    Had formal nutrition consult at [facility].  RD reports that she is not willing to commit to the program outlined.  See scanned document.  12/15/2015 - Marcio Quinonez MD        Obesity - although she was not weighed today, she is clearly obese on inspection. 4/10/2015     LOPEZ (obstructive sleep apnea) - abnormal overnight pulse oximetry 5/22/2015     Osteoarthritis      Peripheral vascular disease      Pleural effusion      Pressure ulcer of foot      Rheumatoid arthritis 12/30/2014    seronegative     Seropositive rheumatoid arthritis  1/19/2016     Vitamin D deficiency 8/26/2015     History   Smoking Status     Former Smoker     Packs/day: 1.00     Years: 30.00     Types: Cigarettes   Smokeless Tobacco     Never Used     Patient Active Problem List   Diagnosis     DVT of lower extremity, bilateral - residual clot disease on repeat US in NEW location - after six months of warfarin.     Morbid obesity     Warfarin anticoagulation - long-term because of unresolved DVT that first appeared in October, 2014     LOPEZ (obstructive sleep apnea) - abnormal overnight pulse oximetry - SLEEP visit in November, 2015, she states.     Nocturnal hypoxemia - probable sleep apnea - had overnight pulse oximetry, April, 2015.     Edema - swelling of the legs after blood clots     Vitamin D deficiency     Cyclic citrullinated peptide (CCP) antibody positive     Right shoulder tendinitis     Seropositive rheumatoid arthritis of multiple sites     PAD (peripheral artery disease)     Pain management     Radiculopathy of leg     Sleep apnea     Degenerative joint disease (DJD) of hip     Essential hypertension with goal blood pressure less than 140/90     History of DVT of lower extremity     Anemia     H/O total hip arthroplasty     Right knee DJD     Trigger finger, right ring finger     Current Outpatient Prescriptions   Medication Sig Dispense Refill     ascorbic acid (VITAMIN C) 250 MG tablet Take 250 mg by mouth 2 (two) times a day.         enoxaparin (LOVENOX) 80 mg/0.8 mL Syrg Inject 80 mg under the skin 2 (two) times a day.         food supplement, lactose-free (BOOST BREEZE NUTRITIONAL) 0.04-1.05 gram-kcal/mL Liqd Use As Directed.         hydroxychloroquine (PLAQUENIL) 200 mg tablet Take 200 mg by mouth 2 (two) times a day.         ibuprofen (ADVIL,MOTRIN) 400 MG tablet Take 400 mg by mouth every 6 (six) hours as needed for pain.         loratadine (CLARITIN) 10 mg tablet Take 10 mg by mouth daily.         magnesium hydroxide (MAGNESIUM HYDROXIDE) 400 mg/5 mL  "Susp suspension Take by mouth as needed. (ML)         multivitamin Chew Chew 1 tablet daily.         nalOXone (NARCAN) 0.4 mg/mL injection Inject as directed as needed. (ML)         naproxen (NAPROSYN) 500 MG tablet Take 500 mg by mouth 2 (two) times a day as needed.         oxyCODONE (ROXICODONE) 5 MG immediate release tablet Take 5 mg by mouth 4 (four) times a day as needed.         senna-docusate (PERICOLACE) 8.6-50 mg tablet Take 0.5 tablets by mouth daily.          warfarin (COUMADIN) 10 MG tablet          No current facility-administered medications for this visit.     DETAILED EXAMINATION  04/18/17  :  Vitals:    04/18/17 1455   Pulse: 80   Weight: (!) 308 lb (139.7 kg)   Height: 5' 8\" (1.727 m)     Alert oriented. Head including the face is examined for malar rash, heliotropes, scarring, lupus pernio. Eyes examined for redness such as in episcleritis/scleritis, periorbital lesions.   Neck examined  for lymph nodes, range of motion Both upper and lower extremities (all four) examined for swollen, warm &/or  tender joints, range of motion, rash, muscle weakness, edema. The salient normal / abnormal findings are appended.   Ulnar deviation in both her hands worse on the right side, where she has have active synovitis in several of the MCPs and PIPs JLT and warmth of the right knee, status post TKA of the left knee, flexion deformities both her elbows, tenderness and swelling in her wrists has improved, ankles with edema bilaterally. She does not have rheumatoid nodules.    She has subluxation at the MCP #2 and 3 right side more so than the left.  She has persistent impingement of the right shoulder able to abduct around 90   .  Her right ring finger has triggering with a 1 nodularity tenderness.  She is in a wheelchair.      LAB / IMAGING DATA:  ALT   Date Value Ref Range Status   03/24/2017 12 0 - 45 U/L Final   01/30/2017 10 0 - 45 U/L Final   11/25/2016 10 0 - 45 U/L Final     Albumin   Date Value Ref Range " Status   03/24/2017 3.3 (L) 3.5 - 5.0 g/dL Final   01/30/2017 3.2 (L) 3.5 - 5.0 g/dL Final   11/25/2016 3.4 (L) 3.5 - 5.0 g/dL Final     Creatinine   Date Value Ref Range Status   03/24/2017 0.65 0.60 - 1.10 mg/dL Final   01/30/2017 0.73 0.60 - 1.10 mg/dL Final   11/25/2016 0.60 0.60 - 1.10 mg/dL Final       WBC   Date Value Ref Range Status   03/24/2017 10.0 4.0 - 11.0 thou/uL Final   02/08/2017 4.6 4.0 - 11.0 thou/uL Final   09/14/2015 4.0 4.0 - 11.0 thou/uL Final   06/30/2015 3.7 (L) 4.0 - 11.0 thou/uL Final     Hemoglobin   Date Value Ref Range Status   03/24/2017 12.2 12.0 - 16.0 g/dL Final   02/08/2017 11.5 (L) 12.0 - 16.0 g/dL Final   01/30/2017 11.1 (L) 12.0 - 16.0 g/dL Final     Platelets   Date Value Ref Range Status   03/24/2017 295 140 - 440 thou/uL Final   02/08/2017 370 140 - 440 thou/uL Final   01/30/2017 388 140 - 440 thou/uL Final       Lab Results   Component Value Date    RF 25.7 10/15/2014    SEDRATE 46 (H) 02/08/2017

## 2021-06-11 NOTE — TELEPHONE ENCOUNTER
Please fax lab orders to [facility] attention Consuelo @ [phone].

## 2021-06-11 NOTE — TELEPHONE ENCOUNTER
Lab orders printed and faxed to # given below. Fax successfully went via.    DES[facility] 9/24/2020 3:15 PM

## 2021-06-11 NOTE — PROGRESS NOTES
[facility]    Facility:   [facility] [[id number]]   Code Status: FULL CODE      CHIEF COMPLAINT/REASON FOR VISIT:  Chief Complaint   Patient presents with     Problem Visit     candie flores, RA       HISTORY:      HPI: María Elena is a 55 y.o. female who was seen secondary to medication management of multiple chronic medical conditions.  She does see rheumatology and they also perform her laboratory studies.  She has not required any oxycodone as needed.  No Tylenol as needed and no ibuprofen.  She has been normotensive and afebrile and also on room air.  Spends most the time in her room.  Then assistance with all ADLs.  Appetite good.  Denies any bowel or bladder problems.    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  RD reports that she is not willing to commit to the program outlined.  See scanned document.  12/15/2015 - Marcio Quinonez MD        Obesity - although she was not weighed today, she is clearly obese on inspection. 4/10/2015     LOPEZ (obstructive sleep apnea) - abnormal overnight pulse oximetry 5/22/2015     Osteoarthritis      Peripheral vascular disease (H)      Pleural effusion      Pressure ulcer of foot      Rheumatoid arthritis (H) 12/30/2014    seronegative     Sepsis (H)      Seropositive rheumatoid arthritis (H) 1/19/2016     Vitamin D deficiency 8/26/2015             Family History   Problem Relation Age of Onset     Multiple myeloma  Father      Deep vein thrombosis Father      Cataracts Father      Snoring Father      Other Brother         Blood withdrawn from time to time.  Sounds like hemochromatosis.     Sleep apnea Brother      Cancer Mother         Uterine CA     Arthritis Mother      Cataracts Mother      Breast cancer Mother 54     No Medical Problems Sister      Rheum arthritis Maternal Grandmother      Breast cancer Maternal Grandmother 50     Heart disease Maternal Grandmother      Rheum arthritis Paternal Aunt      Breast cancer Maternal Aunt 54     Breast cancer Cousin      Clotting disorder Neg Hx      Social History     Socioeconomic History     Marital status: Single     Spouse name: Not on file     Number of children: Not on file     Years of education: Not on file     Highest education level: Not on file   Occupational History     Not on file   Social Needs     Financial resource strain: Not on file     Food insecurity     Worry: Not on file     Inability: Not on file     Transportation needs     Medical: Not on file     Non-medical: Not on file   Tobacco Use     Smoking status: Former Smoker     Packs/day: 1.00     Years: 30.00     Pack years: 30.00     Types: Cigarettes     Smokeless tobacco: Never Used   Substance and Sexual Activity     Alcohol use: Yes     Comment: 3-4 times a year she will have 1 or 2.     Drug use: No     Sexual activity: Never     Partners: Female   Lifestyle     Physical activity     Days per week: Not on file     Minutes per session: Not on file     Stress: Not on file   Relationships     Social connections     Talks on phone: Not on file     Gets together: Not on file     Attends Yarsanism service: Not on file     Active member of club or organization: Not on file     Attends meetings of clubs or organizations: Not on file     Relationship status: Not on file     Intimate partner violence     Fear of current or ex partner: Not on file     Emotionally abused: Not on file     Physically abused: Not on  file     Forced sexual activity: Not on file   Other Topics Concern     Not on file   Social History Narrative    Long term  At [facility] to rehab to level allowing surgery on deteriorated joints  LTC12/2015         Review of Systems  No reports of any URI symptoms postnasal drip fever chills fatigue flulike symptoms nausea vomiting diarrhea dysuria rashes stiff neck swollen glands. Does have bilateral DVT sleep apnea obesity edema vitamin D deficiency PAD rheumatoid arthritis multiple sites chronic pain.       Current Outpatient Medications:      acetaminophen (TYLENOL) 325 MG tablet, Take 650 mg by mouth every 6 (six) hours as needed for pain.    , Disp: , Rfl:      adalimumab (HUMIRA) 40 mg/0.8 mL injection, Inject 0.8 mL (40 mg total) under the skin every 14 (fourteen) days., Disp: 1.6 mL, Rfl: 3     ascorbic acid, vitamin C, (ASCORBIC ACID WITH KAEL HIPS) 500 MG tablet, Take 500 mg by mouth 2 (two) times a day., Disp: , Rfl:      cholecalciferol, vitamin D3, (VITAMIN D3) 2,000 unit Tab, Take 6,000 Units by mouth daily., Disp: , Rfl:      DULoxetine (CYMBALTA) 20 MG capsule, Take 1 capsule (20 mg total) by mouth daily., Disp: 90 capsule, Rfl: 2     folic acid (FOLVITE) 1 MG tablet, Take 1 tablet (1 mg total) by mouth daily., Disp: 90 tablet, Rfl: 0     HUMIRA PEN 40 mg/0.8 mL PnKt, INJECT 0.8 ML (40 MG) UNDER THE SKIN EVERY 14 DAYS, Disp: 1 kit, Rfl: 5     hydroCHLOROthiazide (HYDRODIURIL) 25 MG tablet, Take 25 mg by mouth daily., Disp: , Rfl:      ibuprofen (ADVIL,MOTRIN) 200 MG tablet, Take 400 mg by mouth every 8 (eight) hours as needed for pain.    , Disp: , Rfl:      methotrexate 10 MG tablet, Take 10 mg by mouth once a week., Disp: , Rfl:      methotrexate 2.5 MG tablet, Take 4 tablets (10 mg total) by mouth once a week., Disp: 48 tablet, Rfl: 0     multivitamin therapeutic (THERAGRAN) tablet, Take 1 tablet by mouth daily., Disp: , Rfl:      nystatin (MYCOSTATIN) powder, Apply to rash areas twice  daily., Disp: 15 g, Rfl: 0     oxyCODONE (OXY-IR) 5 mg capsule, Take 1-2 tablets every 4 hours orally as needed for pain relief.  Use 1 tablet for 1-5/10 pain, take 2 tablets for 6-10/10 pain., Disp: 60 tablet, Rfl: 0     rivaroxaban ANTICOAGULANT (XARELTO) 10 mg tablet, Take 10 mg by mouth daily., Disp: , Rfl:     There were no vitals filed for this visit.  Blood pressure 128/71, pulse 72, respirations 18, temperature 98.2  Physical Exam   Head is normocephalic.  Cardiovascular: Normal rate, regular rhythm and normal heart sounds.   Chronic edema .  Catie wraps.  Pulmonary/Chest: Breath sounds normal.   inder   Abdominal.  Protuberant  Musculoskeletal:   Chronic pain. Chronic edema. Rheumatoid arthritis multiple sites including hands. Left knee scar. Left hip OA. Psychiatric: She has a normal mood and affect. Her behavior is normal.            LABS:   Lab Results   Component Value Date    WBC 4.5 07/01/2020    HGB 14.9 07/01/2020    HCT 43.3 07/01/2020    MCV 92 07/01/2020     07/01/2020     Results for orders placed or performed in visit on 09/13/18   Basic Metabolic Panel   Result Value Ref Range    Sodium 144 136 - 145 mmol/L    Potassium 4.3 3.5 - 5.0 mmol/L    Chloride 101 98 - 107 mmol/L    CO2 34 (H) 22 - 31 mmol/L    Anion Gap, Calculation 9 5 - 18 mmol/L    Glucose 96 70 - 125 mg/dL    Calcium 9.7 8.5 - 10.5 mg/dL    BUN 16 8 - 22 mg/dL    Creatinine 0.75 0.60 - 1.10 mg/dL    GFR MDRD Af Amer >60 >60 mL/min/1.73m2    GFR MDRD Non Af Amer >60 >60 mL/min/1.73m2       Lab Results   Component Value Date    CHOL 209 (H) 04/13/2015    CHOL 227 (H) 04/07/2015     Lab Results   Component Value Date    HDL 61 04/13/2015    HDL 65 04/07/2015     Lab Results   Component Value Date    LDLCALC 138 (H) 04/13/2015    LDLCALC 139 (H) 04/07/2015     Lab Results   Component Value Date    TRIG 48 04/13/2015    TRIG 113 04/07/2015     No components found for: CHOLHDL  Lab Results   Component Value Date    ALT 19  07/01/2020    AST 23 07/25/2019    ALKPHOS 69 07/25/2019    BILITOT 0.3 07/25/2019         ASSESSMENT:      ICD-10-CM    1. Seropositive rheumatoid arthritis of multiple sites (H)  M05.79    2. Polyarthralgia  M25.50    3. Physical debility  R53.81    4. Morbid obesity (H)  E66.01        PLAN:    Her pain seems to be fairly well controlled with her current medications and does see rheumatology for her medications as well as her laboratory studies.  She did not have any other questions.      Electronically signed by: Michael Duane Johnson, CNP

## 2021-06-11 NOTE — PROGRESS NOTES
"María Elena Saab is a 55 y.o. female who is being evaluated via a billable telephone visit.      The patient has been notified of following:     \"This telephone visit will be conducted via a call between you and your physician/provider. We have found that certain health care needs can be provided without the need for a physical exam.  This service lets us provide the care you need with a short phone conversation.  If a prescription is necessary we can send it directly to your pharmacy.  If lab work is needed we can place an order for that and you can then stop by our lab to have the test done at a later time.    Telephone visits are billed at different rates depending on your insurance coverage. During this emergency period, for some insurers they may be billed the same as an in-person visit.  Please reach out to your insurance provider with any questions.    If during the course of the call the physician/provider feels a telephone visit is not appropriate, you will not be charged for this service.\"    Patient has given verbal consent to a Telephone visit? Yes    What phone number would you like to be contacted at? [phone]    Patient would like to receive their AVS by AVS Preference: Alex.        ASSESSMENT AND PLAN:    Diagnoses and all orders for this visit:    Seropositive rheumatoid arthritis of multiple sites (H)  -     methotrexate 10 MG tablet; Take 1 tablet (10 mg total) by mouth once a week.  Dispense: 16 tablet; Refill: 0    Primary osteoarthritis involving multiple joints    Cyclic citrullinated peptide (CCP) antibody positive    High risk medication use          HISTORY OF PRESENTING ILLNESS:  María Elena Saab 55 y.o. is evaluated here via phone link.  This is for follow-up.  She has  rheumatoid arthritis, longstanding, deforming, seropositive, anti-CCP antibody positive,,, Humira, methotrexate with folic acid.  Not on prednisone.  In the past she was on sulfasalazine as well.  She has noted " worsening pain.  This is in her right hand, she wonders if the right knee has been swollen and she thinks the MCP areas look like that not so on the left side, there is mild to moderate discomfort, no pain in the right knee is more significant moderately severe to severe.  She is status post left TKA.  Because of her weight related comorbidities she has been deemed not a candidate for arthroplasty of the right knee detail and the weight is at the level.  She has noted this pain to be moderately severe worse with activity.  She has crepitus.  She is not sure if it is swollen.  We discussed options.  This will get together in person.  Meanwhile continue, methotrexate, consider going back on sulfasalazine if she has evidence of active synovitis on her hands.  Another option would be to change a biologic.  ROS enquiry held for fever, ocular symptoms, rash, headache,  GI issues.  Recent labs are reviewed within normal range.  Today we also discussed the issues related to the current pandemic, the pros and cons of the current treatment plan, the CDC guidelines such as social distancing washing the hands covering the cough.  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  RD reports that she is not  willing to commit to the program outlined.  See scanned document.  12/15/2015 - Marcio Quinonez MD        Obesity - although she was not weighed today, she is clearly obese on inspection. 4/10/2015     LOPEZ (obstructive sleep apnea) - abnormal overnight pulse oximetry 5/22/2015     Osteoarthritis      Peripheral vascular disease (H)      Pleural effusion      Pressure ulcer of foot      Rheumatoid arthritis (H) 12/30/2014    seronegative     Sepsis (H)      Seropositive rheumatoid arthritis (H) 1/19/2016     Vitamin D deficiency 8/26/2015     Social History     Tobacco Use   Smoking Status Former Smoker     Packs/day: 1.00     Years: 30.00     Pack years: 30.00     Types: Cigarettes   Smokeless Tobacco Never Used     Patient Active Problem List   Diagnosis     DVT of lower extremity, bilateral - residual clot disease on repeat US in NEW location - after six months of warfarin.     Morbid obesity (H)     Warfarin anticoagulation - long-term because of unresolved DVT that first appeared in October, 2014     LOPEZ (obstructive sleep apnea) - abnormal overnight pulse oximetry - SLEEP visit in November, 2015, she states.     Nocturnal hypoxemia - probable sleep apnea - had overnight pulse oximetry, April, 2015.     Edema - swelling of the legs after blood clots     Vitamin D deficiency     Cyclic citrullinated peptide (CCP) antibody positive     Right shoulder tendinitis     Seropositive rheumatoid arthritis of multiple sites (H)     PAD (peripheral artery disease) (H)     Pain management     Radiculopathy of leg     Sleep apnea     Degenerative joint disease (DJD) of hip     Essential hypertension with goal blood pressure less than 140/90     History of DVT of lower extremity     Anemia     H/O total hip arthroplasty     Polyarthralgia     Physical debility     High risk medication use     Primary osteoarthritis involving multiple joints     History of total left knee replacement (TKR)     Current Outpatient Medications    Medication Sig Dispense Refill     acetaminophen (TYLENOL) 325 MG tablet Take 650 mg by mouth every 6 (six) hours as needed for pain.              adalimumab (HUMIRA) 40 mg/0.8 mL injection Inject 0.8 mL (40 mg total) under the skin every 14 (fourteen) days. 1.6 mL 3     ascorbic acid, vitamin C, (ASCORBIC ACID WITH KAEL HIPS) 500 MG tablet Take 500 mg by mouth 2 (two) times a day.       cholecalciferol, vitamin D3, (VITAMIN D3) 2,000 unit Tab Take 6,000 Units by mouth daily.       HUMIRA PEN 40 mg/0.8 mL PnKt INJECT 0.8 ML (40 MG) UNDER THE SKIN EVERY 14 DAYS 1 kit 5     hydroCHLOROthiazide (HYDRODIURIL) 25 MG tablet Take 25 mg by mouth daily.       ibuprofen (ADVIL,MOTRIN) 200 MG tablet Take 400 mg by mouth every 8 (eight) hours as needed for pain.              methotrexate 10 MG tablet Take 10 mg by mouth once a week.       multivitamin therapeutic (THERAGRAN) tablet Take 1 tablet by mouth daily.       multivitamin/folic acid/zinc (MULTIVITAMIN-FA-ZINC ORAL) Take 1 tablet by mouth.       nystatin (MYCOSTATIN) powder Apply to rash areas twice daily. 15 g 0     oxyCODONE (OXY-IR) 5 mg capsule Take 1-2 tablets every 4 hours orally as needed for pain relief.  Use 1 tablet for 1-5/10 pain, take 2 tablets for 6-10/10 pain. 60 tablet 0     rivaroxaban ANTICOAGULANT (XARELTO) 10 mg tablet Take 10 mg by mouth daily.       DULoxetine (CYMBALTA) 20 MG capsule Take 1 capsule (20 mg total) by mouth daily. 90 capsule 2     folic acid (FOLVITE) 1 MG tablet Take 1 tablet (1 mg total) by mouth daily. 90 tablet 0     methotrexate 2.5 MG tablet Take 4 tablets (10 mg total) by mouth once a week. 48 tablet 0     No current facility-administered medications for this visit.          EXAMINATION: She sounded comfortable, alert, oriented, her speech was fluent, she did not sound that she was in pain.  Her memory and recall appeared intact.      LAB / IMAGING DATA:  ALT   Date Value Ref Range Status   09/28/2020 23 0 - 45 U/L Final    07/01/2020 19 0 - 45 U/L Final   03/12/2020 24 0 - 45 U/L Final     Albumin   Date Value Ref Range Status   09/28/2020 3.6 3.5 - 5.0 g/dL Final   07/01/2020 3.3 (L) 3.5 - 5.0 g/dL Final   03/12/2020 3.7 3.5 - 5.0 g/dL Final     Creatinine   Date Value Ref Range Status   09/28/2020 0.69 0.60 - 1.10 mg/dL Final   07/01/2020 0.66 0.60 - 1.10 mg/dL Final   03/12/2020 0.82 0.60 - 1.10 mg/dL Final       WBC   Date Value Ref Range Status   09/28/2020 5.2 4.0 - 11.0 thou/uL Final   07/01/2020 4.5 4.0 - 11.0 thou/uL Final   09/14/2015 4.0 4.0 - 11.0 thou/uL Final   06/30/2015 3.7 (L) 4.0 - 11.0 thou/uL Final     Hemoglobin   Date Value Ref Range Status   09/28/2020 15.4 12.0 - 16.0 g/dL Final   07/01/2020 14.9 12.0 - 16.0 g/dL Final   03/12/2020 14.8 12.0 - 16.0 g/dL Final     Platelets   Date Value Ref Range Status   09/28/2020 212 140 - 440 thou/uL Final   07/01/2020 185 140 - 440 thou/uL Final   03/12/2020 220 140 - 440 thou/uL Final       Lab Results   Component Value Date    RF 25.7 10/15/2014    SEDRATE 46 (H) 02/08/2017     Duration of the call:7  Minutes  Call start: 325  pm  Call end:   332pm

## 2021-06-11 NOTE — PROGRESS NOTES
[facility]    Facility:   [facility] [[id number]]   Code Status: FULL CODE      CHIEF COMPLAINT/REASON FOR VISIT:  Chief Complaint   Patient presents with     Review Of Multiple Medical Conditions       HISTORY:      HPI: María Elena is a 52 y.o. female was seen today in the management of multiple medical issues.  She has medical history significant for inflammatory arthropathy CCP positive, osteoarthritis and several sites status post left hip arthroplasty in October 2016 history of DVT on anticoagulation and obstructive sleep apnea not on CPAP.  She saw Dr. Indigo Humphrey rheumatologist on April 18, at that point sulfasalazine was permanently discontinued and she was maintained on Humira and hydroxychloroquine 200 mg twice daily for her inflammatory arthropathy.  She notes that over time, her hand joints are starting to become more deformed not necessarily more painful however.  She is not on methotrexate nor leflunomide.  She is to see Dr. Indigo Humprhey again in 4-6 months.  Recently saw orthopedic doctor Dr. Goss on June 5 for a follow-up of her recent left hip arthroplasty.  Surgeon was happy with her progress.  Arina brought up her increasing pain in the right knee and wants to have it replaced as well.  She is being asked to lose more weight before another surgery could be considered.  She will be seeing him back in a few months.  She is on a functional maintenance program of ambulation with assistance at least twice a day.  I had a chance to see her walk along the hallway but was not able to make that same distance walk back to her room.  She says she has her good days and bad days.  Now mainly affecting her right knee.  She is trying to lose weight but then also finds it very difficult to be more active because of worsening right knee pain.  There is water therapy at [facility] location and staff is working to get her in to the program.    She has been  coughing for the last 1-1/2-2 weeks now.  No fevers no chills.  Some coryza.  No phlegm.  She has been sucking on hard candy.  Really does not like cough syrup because of the taste.  Denies any ear pain denies any sore throat no sinus congestion.  Her last INR was therapeutic and she remains on her constant/regular dose of Coumadin.    Past Medical History:   Diagnosis Date     Aseptic necrosis of femoral head     LEFT     DJD (degenerative joint disease)      DVT, bilateral lower limbs 10/2014     Edema - swelling of the legs after blood clots 5/22/2015     Essential hypertension with goal blood pressure less than 140/90      Failure to thrive      History of blood clots      Hypokalemia 10/15/2014     Lung mass 10/18/2014     Morbid obesity 4/10/2015    Had formal nutrition consult at [facility].  RD reports that she is not willing to commit to the program outlined.  See scanned document.  12/15/2015 - Marcio Quinonez MD        Obesity - although she was not weighed today, she is clearly obese on inspection. 4/10/2015     LOPEZ (obstructive sleep apnea) - abnormal overnight pulse oximetry 5/22/2015     Osteoarthritis      Peripheral vascular disease      Pleural effusion      Pressure ulcer of foot      Rheumatoid arthritis 12/30/2014    seronegative     Seropositive rheumatoid arthritis 1/19/2016     Vitamin D deficiency 8/26/2015             Family History   Problem Relation Age of Onset     Multiple myeloma Father      Deep vein thrombosis Father      Cataracts Father      Snoring Father      Other Brother      Blood withdrawn from time to time.  Sounds like hemochromatosis.     Sleep apnea Brother      Breast cancer Mother      Cancer Mother      Uterine CA     Arthritis Mother      Cataracts Mother      No Medical Problems Sister      Rheum arthritis Maternal Grandmother      Breast cancer Maternal Grandmother 50     Heart disease Maternal Grandmother      Rheum arthritis Paternal Aunt      Clotting disorder Neg  Hx      Social History     Social History     Marital status: Single     Spouse name: N/A     Number of children: N/A     Years of education: N/A     Social History Main Topics     Smoking status: Former Smoker     Packs/day: 1.00     Years: 30.00     Types: Cigarettes     Smokeless tobacco: Never Used     Alcohol use Yes      Comment: 3-4 times a year she will have 1 or 2.     Drug use: No     Sexual activity: No     Other Topics Concern     Not on file     Social History Narrative    Long term  At [facility] to rehab to level allowing surgery on deteriorated joints  LTC12/2015         Review of Systems  Unremarkable except for those above  .There were no vitals filed for this visit.    Physical Exam  Vital signs noted and stable.  Weight has been stable.  Patient is alert, awake, oriented to time, place and person, not in acute cardiorespiratory distress  Skin: Warm, and moist,  no lesions,   Head: atraumatic, normocephalic,   Eyes: EOM's intact and conjugate, pink palpebral conjunctivae, anicteric sclerae, pupils reactive  Lungs : Decreased but clear to auscultation , no crackles, wheezes or rhonchi  Heart : Nornal first and second heart sounds, No murmurs, heaves, or thrills  Abdomen: Soft, non tender, non distended, no hepatosplenomegaly, no ascites  Extremities: No change in bipedal edema, pulses are full and equal      LABS:   Lab Results   Component Value Date    WBC 10.0 03/24/2017    HGB 12.2 03/24/2017    HCT 38.0 03/24/2017    MCV 77 (L) 03/24/2017     03/24/2017     Results for orders placed or performed in visit on 10/11/16   Basic Metabolic Panel   Result Value Ref Range    Sodium 143 136 - 145 mmol/L    Potassium 5.1 (H) 3.5 - 5.0 mmol/L    Chloride 105 98 - 107 mmol/L    CO2 28 22 - 31 mmol/L    Anion Gap, Calculation 10 5 - 18 mmol/L    Glucose 89 70 - 125 mg/dL    Calcium 9.3 8.5 - 10.5 mg/dL    BUN 13 8 - 22 mg/dL    Creatinine 0.80 0.60 - 1.10 mg/dL    GFR MDRD Af Amer >60 >60  mL/min/1.73m2    GFR MDRD Non Af Amer >60 >60 mL/min/1.73m2           ASSESSMENT:      ICD-10-CM    1. Cough R05    2. Rheumatoid arteritis I00    3. Primary osteoarthritis of right knee M17.11    4. Obesity E66.9        PLAN:    We can try Mucinex on 600 mg p.o. twice daily for 10 days.  Her cough is a dry hacking nonproductive she is a previous smoker but not anymore.  I wonder if she is reacting to a specific allergen in the ear.  She also have the same set of symptoms back in December  Continue with hydroxychloroquine and Humira every 2 weeks.  Follow-up with rheumatology.  I agree with water therapy at [facility].  She is contemplating having her right knee replaced.  It will be a bad idea to spend a few months at [facility] as that she will also be able to use the water therapy more frequently and with less hassle.  Her ultimate plan is still to leave the facility to move to her own home, she says she will wait until after her right knee is replaced.  We also talked about seeing an endocrinologist in the outpatient setting for formal workup for osteoporosis.  She would like to hold off on this 1 but does realize the need    Total 25 minutes of which 55% was spent counseling and coordination of care of the above plan.    Electronically signed by: Uvaldo Brunner MD

## 2021-06-12 NOTE — PROGRESS NOTES
[facility]    Facility:   [facility] [[id number]]   Code Status: FULL CODE      CHIEF COMPLAINT/REASON FOR VISIT:  Chief Complaint   Patient presents with     Review Of Multiple Medical Conditions       HISTORY:      HPI: María Elena is a 52 y.o. female who I had a chance to visit with secondary to monthly review of chronic medical conditions.  Really no changes since her last visit.  Next Coumadin and INR is August 16.  She does not regularly get vital signs the last was on July 30 blood pressure 112/65.  She occasionally does take ibuprofen for hip and knee pain her last dose was on August 6 but otherwise not regularly.  She does have polyarthritis and does take Humira.  Not requiring any oxycodone as needed but she does not want to taken off her medication list she wants it just in case.  No bowel changes no stool softeners as needed.  Appetite good.  Pretty much stays in her room all day.  She was currently playing computer games when I visited with her today.  Last month she did have some dry skin the Aquaphor not covered they wanted to give her a petroleum jelly-based lotion but she does not want it she will decide at some point if she wants to treat her dry skin or not.    Past Medical History:   Diagnosis Date     Aseptic necrosis of femoral head     LEFT     DJD (degenerative joint disease)      DVT, bilateral lower limbs 10/2014     Edema - swelling of the legs after blood clots 5/22/2015     Essential hypertension with goal blood pressure less than 140/90      Failure to thrive      History of blood clots      Hypokalemia 10/15/2014     Lung mass 10/18/2014     Morbid obesity 4/10/2015    Had formal nutrition consult at [facility].  RD reports that she is not willing to commit to the program outlined.  See scanned document.  12/15/2015 - Marcio Quinonez MD        Obesity - although she was not weighed today, she is clearly obese on inspection. 4/10/2015     LOPEZ  (obstructive sleep apnea) - abnormal overnight pulse oximetry 5/22/2015     Osteoarthritis      Peripheral vascular disease      Pleural effusion      Pressure ulcer of foot      Rheumatoid arthritis 12/30/2014    seronegative     Seropositive rheumatoid arthritis 1/19/2016     Vitamin D deficiency 8/26/2015             Family History   Problem Relation Age of Onset     Multiple myeloma Father      Deep vein thrombosis Father      Cataracts Father      Snoring Father      Other Brother      Blood withdrawn from time to time.  Sounds like hemochromatosis.     Sleep apnea Brother      Breast cancer Mother      Cancer Mother      Uterine CA     Arthritis Mother      Cataracts Mother      No Medical Problems Sister      Rheum arthritis Maternal Grandmother      Breast cancer Maternal Grandmother 50     Heart disease Maternal Grandmother      Rheum arthritis Paternal Aunt      Clotting disorder Neg Hx      Social History     Social History     Marital status: Single     Spouse name: N/A     Number of children: N/A     Years of education: N/A     Social History Main Topics     Smoking status: Former Smoker     Packs/day: 1.00     Years: 30.00     Types: Cigarettes     Smokeless tobacco: Never Used     Alcohol use Yes      Comment: 3-4 times a year she will have 1 or 2.     Drug use: No     Sexual activity: No     Other Topics Concern     Not on file     Social History Narrative    Long term  At [facility] to rehab to [facility] allowing surgery on deteriorated joints  LTC12/2015         Review of Systems  Denies any URI symptoms postnasal drip fever chills fatigue flulike symptoms nausea vomiting diarrhea dysuria rashes stiff neck swollen glands. Does have bilateral DVT sleep apnea obesity edema vitamin D deficiency PAD rheumatoid arthritis multiple sites chronic pain.              Current Outpatient Prescriptions:      adalimumab (HUMIRA PEN) 40 mg/0.8 mL PnKt, Inject 40 mg under the skin every 14 (fourteen) days., Disp:  1 kit, Rfl: 11     artificial tears,hypromellose, (GENTEAL) 0.3 % Drop, Administer 1 drop to both eyes 4 (four) times a day as needed., Disp: , Rfl:      ascorbic acid, vitamin C, (ASCORBIC ACID WITH KAEL HIPS) 500 MG tablet, Take 500 mg by mouth 2 (two) times a day., Disp: , Rfl:      cholecalciferol, vitamin D3, (VITAMIN D3) 2,000 unit Tab, Take 6,000 Units by mouth daily., Disp: , Rfl:      ibuprofen (ADVIL,MOTRIN) 200 MG tablet, Take 400 mg by mouth 3 (three) times a day as needed for pain. , Disp: , Rfl:      multivitamin therapeutic (THERAGRAN) tablet, Take 1 tablet by mouth daily., Disp: , Rfl:      nystatin (MYCOSTATIN) powder, Apply 1 application topically 2 (two) times a day. Wash well between applications. Apply under breasts., Disp: , Rfl:      oxyCODONE (ROXICODONE) 5 MG immediate release tablet, Take 5 mg by mouth every 4 (four) hours as needed for pain., Disp: , Rfl:      sulfaSALAzine (AZULFIDINE) 500 mg tablet, Take 1,500 mg by mouth 2 (two) times a day. , Disp: , Rfl:      WARFARIN SODIUM (WARFARIN ORAL), Take by mouth daily. 4/24/17 INR 2.0 Cont 8mg Mon, Thur, 9mg all other days of the week. Next INR 5/23 3/28/17 INR 2.72 Cont 8mg M & TH, 9mg all others. Next INR 4/25/17. 2/28/17 INR 2.47. Take 8mg Mon, Thur and 9mg all other days of the week. Next INR 3/28/17 2/13/17 INR 2.39  Continue taking 8mg on Mondays and Thursdays and 9mg all other days.  Next INR 2/28/17. 2/1/17 INR 2.47 Cont 8mg M & TH, 9 mg all other days. Next INR 2/16., Disp: , Rfl:   .There were no vitals filed for this visit.  July 30 blood pressure 112/65 pulse 81  Physical Exam   Constitutional: She is oriented to person, place, and time. She appears well-developed and well-nourished. No distress.   obese   HENT:   Head: Normocephalic.   Eyes: Pupils are equal, round, and reactive to light.   Neck: Neck supple. No thyromegaly present.   Cardiovascular: Normal rate, regular rhythm and normal heart sounds.   Chronic edema . Farrow  wraps  Pulmonary/Chest: Breath sounds normal.   inder   Abdominal: Bowel sounds are normal. There is no tenderness. There is no guarding.   Musculoskeletal:   Chronic pain. Chronic edema. Rheumatoid arthritis multiple sites including hands. Left knee scar. Left hip OA. Left hip scar  Lymphadenopathy:   She has no cervical adenopathy.   Neurological: She is oriented to person, place, and time.   Skin: Skin is warm and dry. No rash noted.   Psychiatric: She has a normal mood and affect. Her behavior is normal.        LABS:   Lab Results   Component Value Date    WBC 10.0 03/24/2017    HGB 12.2 03/24/2017    HCT 38.0 03/24/2017    MCV 77 (L) 03/24/2017     03/24/2017     Results for orders placed or performed in visit on 10/11/16   Basic Metabolic Panel   Result Value Ref Range    Sodium 143 136 - 145 mmol/L    Potassium 5.1 (H) 3.5 - 5.0 mmol/L    Chloride 105 98 - 107 mmol/L    CO2 28 22 - 31 mmol/L    Anion Gap, Calculation 10 5 - 18 mmol/L    Glucose 89 70 - 125 mg/dL    Calcium 9.3 8.5 - 10.5 mg/dL    BUN 13 8 - 22 mg/dL    Creatinine 0.80 0.60 - 1.10 mg/dL    GFR MDRD Af Amer >60 >60 mL/min/1.73m2    GFR MDRD Non Af Amer >60 >60 mL/min/1.73m2           ASSESSMENT:      ICD-10-CM    1. Polyarthralgia M25.50    2. Primary osteoarthritis of left hip M16.12    3. Essential hypertension with goal blood pressure less than 140/90 I10    4. Primary osteoarthritis of right knee M17.11        PLAN:    No labs are necessary with this visit.  Currently on Coumadin rechecking that INR again on August 16.  Not requiring any as needed narcotics but once again she like to keep with the medication on her MAR just in case she might need it some day.    Electronically signed by: Michael Duane Johnson, CNP

## 2021-06-12 NOTE — PROGRESS NOTES
[facility]    Facility:   [facility] [[id number]]   Code Status: FULL CODE      CHIEF COMPLAINT/REASON FOR VISIT:  Chief Complaint   Patient presents with     Review Of Multiple Medical Conditions       HISTORY:      HPI: María Elena is a 52 y.o. female who was seen secondary to monthly review of chronic medical conditions.  Last month when she was seen we were going over her rheumatoid arthritis as well as her knees and hips and all her specialists follows.  She did end up with a cough and that did resolve.  At this time regarding her knees she does have right knee degenerative joint disease she wants to get that worked sometime in the future.  Recently had a left hip arthroplasty.  She does sit in the wheelchair most of the day.  Usually stays in her room.  Has been normotensive and afebrile.  Not having any pain in particular has not required any oxycodone as needed and also regarding ibuprofen did have a dose on the 24th on the 26th.  His also seeing a rheumatologist.  She is on Coumadin her next pro time is August 15.  She also does visit with rheumatologist at regular intervals.  She did not have any other particular issues except that she does have some dry skin we did talk about lotion as well as body cleansing and hygiene and then adding some Aquaphor as needed.    Past Medical History:   Diagnosis Date     Aseptic necrosis of femoral head     LEFT     DJD (degenerative joint disease)      DVT, bilateral lower limbs 10/2014     Edema - swelling of the legs after blood clots 5/22/2015     Essential hypertension with goal blood pressure less than 140/90      Failure to thrive      History of blood clots      Hypokalemia 10/15/2014     Lung mass 10/18/2014     Morbid obesity 4/10/2015    Had formal nutrition consult at [facility].  RD reports that she is not willing to commit to the program outlined.  See scanned document.  12/15/2015 - Marcio Quinonez MD        Obesity  - although she was not weighed today, she is clearly obese on inspection. 4/10/2015     LOPEZ (obstructive sleep apnea) - abnormal overnight pulse oximetry 5/22/2015     Osteoarthritis      Peripheral vascular disease      Pleural effusion      Pressure ulcer of foot      Rheumatoid arthritis 12/30/2014    seronegative     Seropositive rheumatoid arthritis 1/19/2016     Vitamin D deficiency 8/26/2015             Family History   Problem Relation Age of Onset     Multiple myeloma Father      Deep vein thrombosis Father      Cataracts Father      Snoring Father      Other Brother      Blood withdrawn from time to time.  Sounds like hemochromatosis.     Sleep apnea Brother      Breast cancer Mother      Cancer Mother      Uterine CA     Arthritis Mother      Cataracts Mother      No Medical Problems Sister      Rheum arthritis Maternal Grandmother      Breast cancer Maternal Grandmother 50     Heart disease Maternal Grandmother      Rheum arthritis Paternal Aunt      Clotting disorder Neg Hx      Social History     Social History     Marital status: Single     Spouse name: N/A     Number of children: N/A     Years of education: N/A     Social History Main Topics     Smoking status: Former Smoker     Packs/day: 1.00     Years: 30.00     Types: Cigarettes     Smokeless tobacco: Never Used     Alcohol use Yes      Comment: 3-4 times a year she will have 1 or 2.     Drug use: No     Sexual activity: No     Other Topics Concern     Not on file     Social History Narrative    Long term  At [facility] to rehab to level allowing surgery on deteriorated joints  LTC12/2015         Review of Systems  Denies any URI symptoms postnasal drip fever chills fatigue flulike symptoms nausea vomiting diarrhea dysuria rashes stiff neck swollen glands. Does have bilateral DVT sleep apnea obesity edema vitamin D deficiency PAD rheumatoid arthritis multiple sites chronic pain.            Current Outpatient Prescriptions:      adalimumab  (HUMIRA PEN) 40 mg/0.8 mL PnKt, Inject 40 mg under the skin every 14 (fourteen) days., Disp: 1 kit, Rfl: 11     artificial tears,hypromellose, (GENTEAL) 0.3 % Drop, Administer 1 drop to both eyes 4 (four) times a day as needed., Disp: , Rfl:      ascorbic acid, vitamin C, (ASCORBIC ACID WITH KAEL HIPS) 500 MG tablet, Take 500 mg by mouth 2 (two) times a day., Disp: , Rfl:      cholecalciferol, vitamin D3, (VITAMIN D3) 2,000 unit Tab, Take 6,000 Units by mouth daily., Disp: , Rfl:      ibuprofen (ADVIL,MOTRIN) 200 MG tablet, Take 400 mg by mouth 3 (three) times a day as needed for pain. , Disp: , Rfl:      multivitamin therapeutic (THERAGRAN) tablet, Take 1 tablet by mouth daily., Disp: , Rfl:      nystatin (MYCOSTATIN) powder, Apply 1 application topically 2 (two) times a day. Wash well between applications. Apply under breasts., Disp: , Rfl:      oxyCODONE (ROXICODONE) 5 MG immediate release tablet, Take 5 mg by mouth every 4 (four) hours as needed for pain., Disp: , Rfl:      sulfaSALAzine (AZULFIDINE) 500 mg tablet, Take 1,500 mg by mouth 2 (two) times a day. , Disp: , Rfl:      WARFARIN SODIUM (WARFARIN ORAL), Take by mouth daily. 4/24/17 INR 2.0 Cont 8mg Mon, Thur, 9mg all other days of the week. Next INR 5/23 3/28/17 INR 2.72 Cont 8mg M & TH, 9mg all others. Next INR 4/25/17. 2/28/17 INR 2.47. Take 8mg Mon, Thur and 9mg all other days of the week. Next INR 3/28/17 2/13/17 INR 2.39  Continue taking 8mg on Mondays and Thursdays and 9mg all other days.  Next INR 2/28/17. 2/1/17 INR 2.47 Cont 8mg M & TH, 9 mg all other days. Next INR 2/16., Disp: , Rfl:   .There were no vitals filed for this visit.  Blood pressure 134/72 pulse 80 respirations 18 temperature 97.0 saturation room air 99%  Physical Exam   Constitutional: She is oriented to person, place, and time. She appears well-developed and well-nourished. No distress.   obese   HENT:   Head: Normocephalic.   Eyes: Pupils are equal, round, and reactive to light.    Neck: Neck supple. No thyromegaly present.   Cardiovascular: Normal rate, regular rhythm and normal heart sounds.   Chronic edema . Farrow wraps  Pulmonary/Chest: Breath sounds normal.   inder   Abdominal: Bowel sounds are normal. There is no tenderness. There is no guarding.   Musculoskeletal:   Chronic pain. Chronic edema. Rheumatoid arthritis multiple sites including hands. Left knee scar. Left hip OA. Left hip scar  Lymphadenopathy:   She has no cervical adenopathy.   Neurological: She is oriented to person, place, and time.   Skin: Skin is warm and dry. No rash noted.   Psychiatric: She has a normal mood and affect. Her behavior is normal.       LABS:   Lab Results   Component Value Date    WBC 10.0 03/24/2017    HGB 12.2 03/24/2017    HCT 38.0 03/24/2017    MCV 77 (L) 03/24/2017     03/24/2017     Results for orders placed or performed in visit on 10/11/16   Basic Metabolic Panel   Result Value Ref Range    Sodium 143 136 - 145 mmol/L    Potassium 5.1 (H) 3.5 - 5.0 mmol/L    Chloride 105 98 - 107 mmol/L    CO2 28 22 - 31 mmol/L    Anion Gap, Calculation 10 5 - 18 mmol/L    Glucose 89 70 - 125 mg/dL    Calcium 9.3 8.5 - 10.5 mg/dL    BUN 13 8 - 22 mg/dL    Creatinine 0.80 0.60 - 1.10 mg/dL    GFR MDRD Af Amer >60 >60 mL/min/1.73m2    GFR MDRD Non Af Amer >60 >60 mL/min/1.73m2       Lab Results   Component Value Date    CRP 2.7 (H) 02/08/2017         ASSESSMENT:      ICD-10-CM    1. Polyarthralgia M25.50    2. Dry skin L85.3    3. Essential hypertension with goal blood pressure less than 140/90 I10    4. Primary osteoarthritis of right knee M17.11        PLAN:    At this time no further changes except for giving her Aquaphor for the dry skin along with hygiene products for assistance and cleansing.  Otherwise she will have her other common appointments with her rheumatologist and orthopedic doctor.      Electronically signed by: Michael Duane Johnson, CNP

## 2021-06-12 NOTE — PROGRESS NOTES
Catarina moise call pt to inform recent labs are abnormal and ask the pt to rechk the labs within 2 weeks

## 2021-06-12 NOTE — PATIENT INSTRUCTIONS - HE
Patient Education   Personalized Prevention Plan  You are due for the preventive services outlined below.  Your care team is available to assist you in scheduling these services.  If you have already completed any of these items, please share that information with your care team to update in your medical record.  Health Maintenance   Topic Date Due     HIV SCREENING  06/17/1978     ADVANCE CARE PLANNING  06/17/1981     COLORECTAL CANCER SCREENING  06/17/1981     PAP SMEAR  06/17/1984     ZOSTER VACCINES (1 of 2) 06/17/2013     MAMMOGRAM  11/14/2019     LIPID  04/13/2020     INFLUENZA VACCINE RULE BASED (1) 08/01/2020     PREVENTIVE CARE VISIT  10/27/2021     TD 18+ HE  04/10/2025     HEPATITIS C SCREENING  Completed     TDAP ADULT ONE TIME DOSE  Completed     Pneumococcal Vaccine: Pediatrics (0 to 5 Years) and At-Risk Patients (6 to 64 Years)  Aged Out     HEPATITIS B VACCINES  Aged Out

## 2021-06-12 NOTE — PROGRESS NOTES
ASSESSMENT AND PLAN:  María Elena Saab 52 y.o. female who is a resident at [facility] on [address] is here for follow-up of severe seropositive high titer anti-CCP antibody rheumatoid arthritis.  She is not doing well.  She is hurting in her elbows and hands.  On her previous visit she had been doing so much better and then the decision we took was to discontinue sulfasalazine as she had started Humira.  This is been associated with increasing pain and stiffness.  Will go going to go back on sulfa with Humira and hydroxychloroquine.  She has we know has history of decubitus ulcers part of the reason she is not on methotrexate as well and this combination.  Written instructions for the [facility] provided.  Orders for every 4 lab for albumin ALT creatinine and CBC are put in for the facility.  I have asked her to return for follow-up at this time sooner in 2 months.      Diagnoses and all orders for this visit:    Seropositive rheumatoid arthritis of multiple sites  -     sulfaSALAzine (AZULFIDINE) 500 mg tablet; Take 3 tablets (1,500 mg total) by mouth 2 (two) times a day.  Dispense: 180 tablet; Refill: 0    Cyclic citrullinated peptide (CCP) antibody positive    Polyarthralgia  -     sulfaSALAzine (AZULFIDINE) 500 mg tablet; Take 3 tablets (1,500 mg total) by mouth 2 (two) times a day.  Dispense: 180 tablet; Refill: 0           HISTORY OF PRESENTING ILLNESS:    María Elena Saab 52 y.o. female  is here for followup of sero-positive Rheumatoid Arthritis.  She is hurting.  She reports that since cutting back on stopping sulfasalazine of which she was taking 1.5 g twice daily and the pains have gotten worse.  Is especially true for the right side, right elbow is bothersome more than the left.  She is also had pain in the right hand more than the left.  She rates as moderately severe.  She also feels that the the activity of walking also contributes to it.  Although it is hard for her to say if there  "was a truly a diurnal variation.  She rated the pain at 4.5/10.  Interfering with a variety of day-to-day activities as noted.  Her morning stiffness he noted was 30 minutes.  She has not had weight loss she has had blurry vision of there is no eye redness she describes no nausea she has history of cough.  She had a right knee injection done at orthopedics in the did not find that helpful she wonders if that may have aggravated her pain there.. .Further historical information, including ROS as noted in the multidimensional health assessment questionnaire scanned in the EMR and in the assessment and plan section. Rheumatoid Arthritis Disease Activity Measure used: RAPID3, reviewed  today and scanned in the chart.        Following is the excerpt from a recent note:    She has noted significant improvement in her joint pains she's been on Humira for the past of 4 doses, however recently she did have increased pain in her left hand which subsided since.  Now she has leukopenia.  Her decubitus ulcer is noted not to be infected anymore.  I have emphasized the importance of keeping close eye.  She is awaiting left hip replacement.  She is aware that I'm not able to give her the more \"aggressive\"  DMARD still her pressure ulcer heals and is no longer considered infected.  This patient has high titer anti-CCP antibody.  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  She is wheelchair-bound.  This happened this past year in fall.  She was due to have left hip replacement and then subsequent to that right knee replacement but before she could have that done she developed pneumonia and pulmonary embolism.  Prior to hospitalization she reports that she was able to ambulate with the help of a walker.  Since then she could simply not go back to ambulation and is now awaiting surgery at the nursing home.  This morning she told me she was informed that she has a infected decubitus ulcer.  Unless as she heals there the " orthopedic surgeons obviously are not prepared to proceed with in the surgical intervention.  Meanwhile her rheumatoid arthritis is appearing to be a bit better today.  She noted that she could move her finger better.  She had trigger finger injection on her previous visit.  I have outlined to her that we will proceed with sulfasalazine.  I cannot at this point given her methotrexate or other more aggressive DMARD still her infected decubitus ulcer has been under control or healed.       has longstanding seropositive anti-CCP antibody severe deforming, erosive rheumatoid arthritis.   In the past she was said to have infected decubitus ulcer of the right lower extremity posteriorly, she understands that that is now healed or at least not infected anymore.  Her recent evaluation of the vascular clinic shows that there is no residual active infection in her lower extremity ulceration.  In view of this and the fact that even with only hydroxychloroquine and sulfasalazine she has had intermittent neutropenia, she is an unlikely candidate for methotrexate, or leflunomide.    She is now on Humira for the past 2 months.  She is aware that it is going to be an important part of her observations at her primary physician at her home and herself to keep a close eye in case it is evidence of infection.  Will continue current implant.  Recent labs within acceptable range.  Her recent left hip arthroplasty, uneventfully she's not get back on Humira I have asked her to go back this will be 5 weeks out of surgery.  She did hold her Humira 2 weeks prior.  She also has a right ring finger triggering.  She would like to proceed with local injection.  20 mg of methylprednisolone and Marcaine injected in the flexor tendon sheath right ring finger flexor tendon.  Return for follow-up here in 4 months.  See back here in 3 months and regular the  likely this  ALLERGIES:Adhesive; Banana; Grape; Morphine (pf); Other food allergy;  and Latex, natural rubber  PAST MEDICAL/ACTIVE PROBLEMS/MEDICATION/SOCIAL DATA  Past Medical History:   Diagnosis Date     Aseptic necrosis of femoral head     LEFT     DJD (degenerative joint disease)      DVT, bilateral lower limbs 10/2014     Edema - swelling of the legs after blood clots 5/22/2015     Essential hypertension with goal blood pressure less than 140/90      Failure to thrive      History of blood clots      Hypokalemia 10/15/2014     Lung mass 10/18/2014     Morbid obesity 4/10/2015    Had formal nutrition consult at [facility].  RD reports that she is not willing to commit to the program outlined.  See scanned document.  12/15/2015 - Marcio Quinonez MD        Obesity - although she was not weighed today, she is clearly obese on inspection. 4/10/2015     LOPEZ (obstructive sleep apnea) - abnormal overnight pulse oximetry 5/22/2015     Osteoarthritis      Peripheral vascular disease      Pleural effusion      Pressure ulcer of foot      Rheumatoid arthritis 12/30/2014    seronegative     Seropositive rheumatoid arthritis 1/19/2016     Vitamin D deficiency 8/26/2015     History   Smoking Status     Former Smoker     Packs/day: 1.00     Years: 30.00     Types: Cigarettes   Smokeless Tobacco     Never Used     Patient Active Problem List   Diagnosis     DVT of lower extremity, bilateral - residual clot disease on repeat US in NEW location - after six months of warfarin.     Morbid obesity     Warfarin anticoagulation - long-term because of unresolved DVT that first appeared in October, 2014     LOPEZ (obstructive sleep apnea) - abnormal overnight pulse oximetry - SLEEP visit in November, 2015, she states.     Nocturnal hypoxemia - probable sleep apnea - had overnight pulse oximetry, April, 2015.     Edema - swelling of the legs after blood clots     Vitamin D deficiency     Cyclic citrullinated peptide (CCP) antibody positive     Right shoulder tendinitis     Seropositive rheumatoid arthritis of multiple  sites     PAD (peripheral artery disease)     Pain management     Radiculopathy of leg     Sleep apnea     Degenerative joint disease (DJD) of hip     Essential hypertension with goal blood pressure less than 140/90     History of DVT of lower extremity     Anemia     H/O total hip arthroplasty     Right knee DJD     Trigger finger, right ring finger     Current Outpatient Prescriptions   Medication Sig Dispense Refill     ascorbic acid (VITAMIN C) 250 MG tablet Take 250 mg by mouth 2 (two) times a day.         enoxaparin (LOVENOX) 80 mg/0.8 mL Syrg Inject 80 mg under the skin 2 (two) times a day.         food supplement, lactose-free (BOOST BREEZE NUTRITIONAL) 0.04-1.05 gram-kcal/mL Liqd Use As Directed.         hydroxychloroquine (PLAQUENIL) 200 mg tablet Take 200 mg by mouth 2 (two) times a day.         ibuprofen (ADVIL,MOTRIN) 400 MG tablet Take 400 mg by mouth every 6 (six) hours as needed for pain.         loratadine (CLARITIN) 10 mg tablet Take 10 mg by mouth daily.         magnesium hydroxide (MAGNESIUM HYDROXIDE) 400 mg/5 mL Susp suspension Take by mouth as needed. (ML)         multivitamin Chew Chew 1 tablet daily.         nalOXone (NARCAN) 0.4 mg/mL injection Inject as directed as needed. (ML)         naproxen (NAPROSYN) 500 MG tablet Take 500 mg by mouth 2 (two) times a day as needed.         oxyCODONE (ROXICODONE) 5 MG immediate release tablet Take 5 mg by mouth 4 (four) times a day as needed.         senna-docusate (PERICOLACE) 8.6-50 mg tablet Take 0.5 tablets by mouth daily.          warfarin (COUMADIN) 10 MG tablet          No current facility-administered medications for this visit.     DETAILED EXAMINATION  07/21/17  :  Vitals:    07/21/17 1453   BP: 118/68   Pulse: 68   Weight: (!) 330 lb (149.7 kg)     Alert oriented. Head including the face is examined for malar rash, heliotropes, scarring, lupus pernio. Eyes examined for redness such as in episcleritis/scleritis, periorbital lesions.   Neck  examined  for lymph nodes, range of motion Both upper and lower extremities (all four) examined for swollen, warm &/or  tender joints, range of motion, rash, muscle weakness, edema. The salient normal / abnormal findings are appended.  She has synovitis in some of the MCPs of the right hand, she has flexion deformity as noted before in both her elbows, ulnar deviation and the digits.  She has bilateral 110  abduction of the shoulder joints.  She has left TKA scar.  She has edema in the lower extremities..  She is in a wheelchair.      LAB / IMAGING DATA:  ALT   Date Value Ref Range Status   03/24/2017 12 0 - 45 U/L Final   01/30/2017 10 0 - 45 U/L Final   11/25/2016 10 0 - 45 U/L Final     Albumin   Date Value Ref Range Status   03/24/2017 3.3 (L) 3.5 - 5.0 g/dL Final   01/30/2017 3.2 (L) 3.5 - 5.0 g/dL Final   11/25/2016 3.4 (L) 3.5 - 5.0 g/dL Final     Creatinine   Date Value Ref Range Status   03/24/2017 0.65 0.60 - 1.10 mg/dL Final   01/30/2017 0.73 0.60 - 1.10 mg/dL Final   11/25/2016 0.60 0.60 - 1.10 mg/dL Final       WBC   Date Value Ref Range Status   03/24/2017 10.0 4.0 - 11.0 thou/uL Final   02/08/2017 4.6 4.0 - 11.0 thou/uL Final   09/14/2015 4.0 4.0 - 11.0 thou/uL Final   06/30/2015 3.7 (L) 4.0 - 11.0 thou/uL Final     Hemoglobin   Date Value Ref Range Status   03/24/2017 12.2 12.0 - 16.0 g/dL Final   02/08/2017 11.5 (L) 12.0 - 16.0 g/dL Final   01/30/2017 11.1 (L) 12.0 - 16.0 g/dL Final     Platelets   Date Value Ref Range Status   03/24/2017 295 140 - 440 thou/uL Final   02/08/2017 370 140 - 440 thou/uL Final   01/30/2017 388 140 - 440 thou/uL Final       Lab Results   Component Value Date    RF 25.7 10/15/2014    SEDRATE 46 (H) 02/08/2017

## 2021-06-12 NOTE — PROGRESS NOTES
"ASSESSMENT AND PLAN:  María Elena Saab 52 y.o. female is here for follow-up for severe seropositive high titer anti-CCP,  antibody rheumatoid arthritis.  She has not had Humira for nearly 3 months as one after the other she had several respiratory tract infections.  She has started just 2 doses ago again.  She took prior to that before [place] couple of doses.  I have asked her to continue Humira.  She is to discontinue sulfasalazine.  She is not on methotrexate/leflunomide.  For now we will leave her on hydroxychloroquine.  I have asked her to return for follow-up here in 3 months.  Since she is not on sulfasalazine the frequency of the labs will be less now.  There are no diagnoses linked to this encounter.       HISTORY OF PRESENTING ILLNESS:    María Elena Saab 52 y.o. female  is here for followup of sero-positive Rheumatoid Arthritis.  She is not doing well.  She is hurting especially in her hands.  She had one infection after the other as she puts it since Christmas.  She had been off Humira.  She rates the pain as moderately severe to severe.  4.5/10.  Interfering with a variety of day-to-day activities.  She has restarted Humira 1 month ago.Further historical information, including ROS as noted in the multidimensional health assessment questionnaire scanned in the EMR and in the assessment and plan section. Rheumatoid Arthritis Disease Activity Measure used: RAPID3, reviewed  today and scanned in the chart.        Following is the excerpt from a recent note:    She has noted significant improvement in her joint pains she's been on Humira for the past of 4 doses, however recently she did have increased pain in her left hand which subsided since.  Now she has leukopenia.  Her decubitus ulcer is noted not to be infected anymore.  I have emphasized the importance of keeping close eye.  She is awaiting left hip replacement.  She is aware that I'm not able to give her the more \"aggressive\"  DMARD still her " pressure ulcer heals and is no longer considered infected.  This patient has high titer anti-CCP antibody.  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  She is wheelchair-bound.  This happened this past year in fall.  She was due to have left hip replacement and then subsequent to that right knee replacement but before she could have that done she developed pneumonia and pulmonary embolism.  Prior to hospitalization she reports that she was able to ambulate with the help of a walker.  Since then she could simply not go back to ambulation and is now awaiting surgery at the nursing home.  This morning she told me she was informed that she has a infected decubitus ulcer.  Unless as she heals there the orthopedic surgeons obviously are not prepared to proceed with in the surgical intervention.  Meanwhile her rheumatoid arthritis is appearing to be a bit better today.  She noted that she could move her finger better.  She had trigger finger injection on her previous visit.  I have outlined to her that we will proceed with sulfasalazine.  I cannot at this point given her methotrexate or other more aggressive DMARD still her infected decubitus ulcer has been under control or healed.       has longstanding seropositive anti-CCP antibody severe deforming, erosive rheumatoid arthritis.   In the past she was said to have infected decubitus ulcer of the right lower extremity posteriorly, she understands that that is now healed or at least not infected anymore.  Her recent evaluation of the vascular clinic shows that there is no residual active infection in her lower extremity ulceration.  In view of this and the fact that even with only hydroxychloroquine and sulfasalazine she has had intermittent neutropenia, she is an unlikely candidate for methotrexate, or leflunomide.    She is now on Humira for the past 2 months.  She is aware that it is going to be an important part of her observations at her primary physician at  her home and herself to keep a close eye in case it is evidence of infection.  Will continue current implant.  Recent labs within acceptable range.  Her recent left hip arthroplasty, uneventfully she's not get back on Humira I have asked her to go back this will be 5 weeks out of surgery.  She did hold her Humira 2 weeks prior.  She also has a right ring finger triggering.  She would like to proceed with local injection.  20 mg of methylprednisolone and Marcaine injected in the flexor tendon sheath right ring finger flexor tendon.  Return for follow-up here in 4 months.  See back here in 3 months and regular the  likely this  ALLERGIES:Adhesive; Banana; Grape; Morphine (pf); Other food allergy; and Latex, natural rubber  PAST MEDICAL/ACTIVE PROBLEMS/MEDICATION/SOCIAL DATA  Past Medical History:   Diagnosis Date     Aseptic necrosis of femoral head     LEFT     DJD (degenerative joint disease)      DVT, bilateral lower limbs 10/2014     Edema - swelling of the legs after blood clots 5/22/2015     Essential hypertension with goal blood pressure less than 140/90      Failure to thrive      History of blood clots      Hypokalemia 10/15/2014     Lung mass 10/18/2014     Morbid obesity 4/10/2015    Had formal nutrition consult at [facility].  RD reports that she is not willing to commit to the program outlined.  See scanned document.  12/15/2015 - Marcio Quinonez MD        Obesity - although she was not weighed today, she is clearly obese on inspection. 4/10/2015     LOPEZ (obstructive sleep apnea) - abnormal overnight pulse oximetry 5/22/2015     Osteoarthritis      Peripheral vascular disease      Pleural effusion      Pressure ulcer of foot      Rheumatoid arthritis 12/30/2014    seronegative     Seropositive rheumatoid arthritis 1/19/2016     Vitamin D deficiency 8/26/2015     History   Smoking Status     Former Smoker     Packs/day: 1.00     Years: 30.00     Types: Cigarettes   Smokeless Tobacco     Never Used      Patient Active Problem List   Diagnosis     DVT of lower extremity, bilateral - residual clot disease on repeat US in NEW location - after six months of warfarin.     Morbid obesity     Warfarin anticoagulation - long-term because of unresolved DVT that first appeared in October, 2014     LOPEZ (obstructive sleep apnea) - abnormal overnight pulse oximetry - SLEEP visit in November, 2015, she states.     Nocturnal hypoxemia - probable sleep apnea - had overnight pulse oximetry, April, 2015.     Edema - swelling of the legs after blood clots     Vitamin D deficiency     Cyclic citrullinated peptide (CCP) antibody positive     Right shoulder tendinitis     Seropositive rheumatoid arthritis of multiple sites     PAD (peripheral artery disease)     Pain management     Radiculopathy of leg     Sleep apnea     Degenerative joint disease (DJD) of hip     Essential hypertension with goal blood pressure less than 140/90     History of DVT of lower extremity     Anemia     H/O total hip arthroplasty     Right knee DJD     Trigger finger, right ring finger     Current Outpatient Prescriptions   Medication Sig Dispense Refill     ascorbic acid (VITAMIN C) 250 MG tablet Take 250 mg by mouth 2 (two) times a day.         enoxaparin (LOVENOX) 80 mg/0.8 mL Syrg Inject 80 mg under the skin 2 (two) times a day.         food supplement, lactose-free (BOOST BREEZE NUTRITIONAL) 0.04-1.05 gram-kcal/mL Liqd Use As Directed.         hydroxychloroquine (PLAQUENIL) 200 mg tablet Take 200 mg by mouth 2 (two) times a day.         ibuprofen (ADVIL,MOTRIN) 400 MG tablet Take 400 mg by mouth every 6 (six) hours as needed for pain.         loratadine (CLARITIN) 10 mg tablet Take 10 mg by mouth daily.         magnesium hydroxide (MAGNESIUM HYDROXIDE) 400 mg/5 mL Susp suspension Take by mouth as needed. (ML)         multivitamin Chew Chew 1 tablet daily.         nalOXone (NARCAN) 0.4 mg/mL injection Inject as directed as needed. (ML)          naproxen (NAPROSYN) 500 MG tablet Take 500 mg by mouth 2 (two) times a day as needed.         oxyCODONE (ROXICODONE) 5 MG immediate release tablet Take 5 mg by mouth 4 (four) times a day as needed.         senna-docusate (PERICOLACE) 8.6-50 mg tablet Take 0.5 tablets by mouth daily.          warfarin (COUMADIN) 10 MG tablet          No current facility-administered medications for this visit.     DETAILED EXAMINATION  07/21/17  :  Vitals:    07/21/17 1453   BP: 118/68   Pulse: 68   Weight: (!) 330 lb (149.7 kg)     Alert oriented. Head including the face is examined for malar rash, heliotropes, scarring, lupus pernio. Eyes examined for redness such as in episcleritis/scleritis, periorbital lesions.   Neck examined  for lymph nodes, range of motion Both upper and lower extremities (all four) examined for swollen, warm &/or  tender joints, range of motion, rash, muscle weakness, edema. The salient normal / abnormal findings are appended.   Ulnar deviation in both her hands worse on the right side, where she has have active synovitis in several of the MCPs and PIPs JLT and warmth of the right knee, status post TKA of the left knee, flexion deformities both her elbows, tenderness and swelling in her wrists has improved, ankles with edema bilaterally. She does not have rheumatoid nodules.    She has subluxation at the MCP #2 and 3 right side more so than the left.  She has persistent impingement of the right shoulder able to abduct around 90   .  Her right ring finger has triggering with a 1 nodularity tenderness.  She is in a wheelchair.      LAB / IMAGING DATA:  ALT   Date Value Ref Range Status   03/24/2017 12 0 - 45 U/L Final   01/30/2017 10 0 - 45 U/L Final   11/25/2016 10 0 - 45 U/L Final     Albumin   Date Value Ref Range Status   03/24/2017 3.3 (L) 3.5 - 5.0 g/dL Final   01/30/2017 3.2 (L) 3.5 - 5.0 g/dL Final   11/25/2016 3.4 (L) 3.5 - 5.0 g/dL Final     Creatinine   Date Value Ref Range Status   03/24/2017 0.65  0.60 - 1.10 mg/dL Final   01/30/2017 0.73 0.60 - 1.10 mg/dL Final   11/25/2016 0.60 0.60 - 1.10 mg/dL Final       WBC   Date Value Ref Range Status   03/24/2017 10.0 4.0 - 11.0 thou/uL Final   02/08/2017 4.6 4.0 - 11.0 thou/uL Final   09/14/2015 4.0 4.0 - 11.0 thou/uL Final   06/30/2015 3.7 (L) 4.0 - 11.0 thou/uL Final     Hemoglobin   Date Value Ref Range Status   03/24/2017 12.2 12.0 - 16.0 g/dL Final   02/08/2017 11.5 (L) 12.0 - 16.0 g/dL Final   01/30/2017 11.1 (L) 12.0 - 16.0 g/dL Final     Platelets   Date Value Ref Range Status   03/24/2017 295 140 - 440 thou/uL Final   02/08/2017 370 140 - 440 thou/uL Final   01/30/2017 388 140 - 440 thou/uL Final       Lab Results   Component Value Date    RF 25.7 10/15/2014    SEDRATE 46 (H) 02/08/2017

## 2021-06-12 NOTE — PROGRESS NOTES
Assessment and Plan:   Arina is a 55-year-old female who does reside in the long-term care facility secondary to chronicle conditions and assistance with ADLs.  She does have a history of rheumatoid arthritis and does see rheumatology and does receive her monthly laboratory studies.  She does have a history of chronic joint pain including her hips and knees.  She did need to miss one of the appointments to rheumatologist.  Also recently did have some cough and cold symptoms but the Covid test did come out negative.  Patient has been advised of split billing requirements and indicates understanding: No  1. Primary osteoarthritis involving multiple joints  Chronic    2. Seropositive rheumatoid arthritis of multiple sites (H)  Does see rheumatology    3. Pain management  Controlled    4. Osteoarthritis of hip, unspecified laterality, unspecified osteoarthritis type  Stable    5. Routine general medical examination at a health care facility  Routine    The patient's current medical problems were reviewed.    The following are part of a depression follow up plan for the patient:  mental health care management  The following health maintenance schedule was reviewed with the patient and provided in printed form in the after visit summary:   Health Maintenance   Topic Date Due     HIV SCREENING  06/17/1978     ADVANCE CARE PLANNING  06/17/1981     COLORECTAL CANCER SCREENING  06/17/1981     PAP SMEAR  06/17/1984     ZOSTER VACCINES (1 of 2) 06/17/2013     MAMMOGRAM  11/14/2019     LIPID  04/13/2020     INFLUENZA VACCINE RULE BASED (1) 08/01/2020     PREVENTIVE CARE VISIT  10/27/2021     TD 18+ HE  04/10/2025     HEPATITIS C SCREENING  Completed     TDAP ADULT ONE TIME DOSE  Completed     Pneumococcal Vaccine: Pediatrics (0 to 5 Years) and At-Risk Patients (6 to 64 Years)  Aged Out     HEPATITIS B VACCINES  Aged Out        Subjective:   Chief Complaint: María Elena Saab is an 55 y.o. female here for an Annual Wellness  visit.   HPI: María Elena is a 55-year-old female who I was asked to visit with secondary to a monthly review of her chronic medical conditions as well as a wellness exam.  Had a chance to go over her medications and have no new changes to her medications and does see rheumatology secondary to her rheumatoid arthritis to which she states at times in particular her hands seem to swell up because her and her some good days as well.  She did miss her Humira injection due to the cold situation otherwise is on methotrexate weekly on Fridays.  She normally does not take any oxycodone as needed for pain.  Her diet is regular.  Denies any bowel or bladder problems.  Does need assistance with ADLs.  She has been normotensive and afebrile and also on room air.    Review of Systems  No reports of any URI symptoms postnasal drip fever chills fatigue flulike symptoms nausea vomiting diarrhea dysuria rashes stiff neck swollen glands. Does have bilateral DVT sleep apnea obesity edema vitamin D deficiency PAD rheumatoid arthritis multiple sites chronic pain.    : Patient Care Team:  Johnson, Michael Duane, CNP as PCP - General (Internal Medicine)  [facility] as Nursing Home Facility (Generic Provider)  Johnson, Michael Duane, CNP as Assigned PCP     Patient Active Problem List   Diagnosis     DVT of lower extremity, bilateral - residual clot disease on repeat US in NEW location - after six months of warfarin.     Morbid obesity (H)     Warfarin anticoagulation - long-term because of unresolved DVT that first appeared in October, 2014     LOPEZ (obstructive sleep apnea) - abnormal overnight pulse oximetry - SLEEP visit in November, 2015, she states.     Nocturnal hypoxemia - probable sleep apnea - had overnight pulse oximetry, April, 2015.     Edema - swelling of the legs after blood clots     Vitamin D deficiency     Cyclic citrullinated peptide (CCP) antibody positive     Right shoulder tendinitis      Seropositive rheumatoid arthritis of multiple sites (H)     PAD (peripheral artery disease) (H)     Pain management     Radiculopathy of leg     Sleep apnea     Degenerative joint disease (DJD) of hip     Essential hypertension with goal blood pressure less than 140/90     History of DVT of lower extremity     Anemia     H/O total hip arthroplasty     Polyarthralgia     Physical debility     High risk medication use     Primary osteoarthritis involving multiple joints     History of total left knee replacement (TKR)     Past Medical History:   Diagnosis Date     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  RD reports that she is not willing to commit to the program outlined.  See scanned document.  12/15/2015 - Marcio Quinonez MD        Obesity - although she was not weighed today, she is clearly obese on inspection. 4/10/2015     LOPEZ (obstructive sleep apnea) - abnormal overnight pulse oximetry 5/22/2015     Osteoarthritis      Peripheral vascular disease (H)      Pleural effusion      Pressure ulcer of foot      Rheumatoid arthritis (H) 12/30/2014    seronegative     Sepsis (H)      Seropositive rheumatoid arthritis (H) 1/19/2016     Vitamin D deficiency 8/26/2015      Past Surgical History:   Procedure Laterality Date     JOINT REPLACEMENT      knee     PERIPHERALLY INSERTED CENTRAL CATHETER INSERTION Right 07/14/2019    4fr/44cm/Rt Cephalic.lowSVC.MGlav     TOTAL HIP ARTHROPLASTY Left 10/18/2016    Procedure: LEFT HIP TOTAL ARTHROPLASTY;   Surgeon: London Goss MD;  Location: [facility] Main OR;  Service:      TOTAL KNEE ARTHROPLASTY Left 2006      Family History   Problem Relation Age of Onset     Multiple myeloma Father      Deep vein thrombosis Father      Cataracts Father      Snoring Father      Other Brother         Blood withdrawn from time to time.  Sounds like hemochromatosis.     Sleep apnea Brother      Cancer Mother         Uterine CA     Arthritis Mother      Cataracts Mother      Breast cancer Mother 54     No Medical Problems Sister      Rheum arthritis Maternal Grandmother      Breast cancer Maternal Grandmother 50     Heart disease Maternal Grandmother      Rheum arthritis Paternal Aunt      Breast cancer Maternal Aunt 54     Breast cancer Cousin      Clotting disorder Neg Hx       Social History     Socioeconomic History     Marital status: Single     Spouse name: Not on file     Number of children: Not on file     Years of education: Not on file     Highest education level: Not on file   Occupational History     Not on file   Social Needs     Financial resource strain: Not on file     Food insecurity     Worry: Not on file     Inability: Not on file     Transportation needs     Medical: Not on file     Non-medical: Not on file   Tobacco Use     Smoking status: Former Smoker     Packs/day: 1.00     Years: 30.00     Pack years: 30.00     Types: Cigarettes     Smokeless tobacco: Never Used   Substance and Sexual Activity     Alcohol use: Yes     Comment: 3-4 times a year she will have 1 or 2.     Drug use: No     Sexual activity: Never     Partners: Female   Lifestyle     Physical activity     Days per week: Not on file     Minutes per session: Not on file     Stress: Not on file   Relationships     Social connections     Talks on phone: Not on file     Gets together: Not on file     Attends [employer] service: Not on file     Active member of club or organization: Not on file     Attends meetings of clubs or organizations: Not on  file     Relationship status: Not on file     Intimate partner violence     Fear of current or ex partner: Not on file     Emotionally abused: Not on file     Physically abused: Not on file     Forced sexual activity: Not on file   Other Topics Concern     Not on file   Social History Narrative    Long term  At [facility] to rehab to level allowing surgery on deteriorated joints  LTC12/2015      Current Outpatient Medications   Medication Sig Dispense Refill     acetaminophen (TYLENOL) 325 MG tablet Take 650 mg by mouth every 6 (six) hours as needed for pain.              adalimumab (HUMIRA) 40 mg/0.8 mL injection Inject 0.8 mL (40 mg total) under the skin every 14 (fourteen) days. 1.6 mL 3     ascorbic acid, vitamin C, (ASCORBIC ACID WITH KAEL HIPS) 500 MG tablet Take 500 mg by mouth 2 (two) times a day.       cholecalciferol, vitamin D3, (VITAMIN D3) 2,000 unit Tab Take 6,000 Units by mouth daily.       DULoxetine (CYMBALTA) 20 MG capsule Take 1 capsule (20 mg total) by mouth daily. 90 capsule 2     folic acid (FOLVITE) 1 MG tablet Take 1 tablet (1 mg total) by mouth daily. 90 tablet 0     HUMIRA PEN 40 mg/0.8 mL PnKt INJECT 0.8 ML (40 MG) UNDER THE SKIN EVERY 14 DAYS 1 kit 5     hydroCHLOROthiazide (HYDRODIURIL) 25 MG tablet Take 25 mg by mouth daily.       ibuprofen (ADVIL,MOTRIN) 200 MG tablet Take 400 mg by mouth every 8 (eight) hours as needed for pain.              methotrexate 10 MG tablet Take 1 tablet (10 mg total) by mouth once a week. 16 tablet 0     methotrexate 2.5 MG tablet Take 4 tablets (10 mg total) by mouth once a week. 48 tablet 0     multivitamin therapeutic (THERAGRAN) tablet Take 1 tablet by mouth daily.       multivitamin/folic acid/zinc (MULTIVITAMIN-FA-ZINC ORAL) Take 1 tablet by mouth.       nystatin (MYCOSTATIN) powder Apply to rash areas twice daily. 15 g 0     oxyCODONE (OXY-IR) 5 mg capsule Take 1-2 tablets every 4 hours orally as needed for pain relief.  Use 1 tablet for 1-5/10  pain, take 2 tablets for 6-10/10 pain. 60 tablet 0     rivaroxaban ANTICOAGULANT (XARELTO) 10 mg tablet Take 10 mg by mouth daily.       No current facility-administered medications for this visit.       Objective:   Vital Signs:   Visit Vitals  [facility] 06/09/2006      Blood pressure 139/81, pulse 72, respirations 20, temperature 97.8    VisionScreening:  Annual facility eye exam.    PHYSICAL EXAM  Head is normocephalic.  Cardiovascular: Normal rate, regular rhythm and normal heart sounds.   Chronic edema .  Catie wraps.  Pulmonary/Chest: Breath sounds normal.   inder   Abdominal.  Protuberant  Musculoskeletal:   Chronic pain. Chronic edema. Rheumatoid arthritis multiple sites including hands. Left knee scar. Left hip OA. Psychiatric: She has a normal mood and affect. Her behavior is normal.          No flowsheet data found.  A Mini-Cog score of 0-2 suggests the possibility of dementia, score of 3-5 suggests no dementia  Cognitive Screen not completed due to mental handicap.    She does not feel the mental screening is necessary.  Fall Risk not completed for medical reasons.   She does receive assistance with all ADLs including a Gume lift at times to get out of bed.  She does need ascitic ADLs.  Nonambulatory.  She does sit in a wheelchair.    Identified Health Risks:   She does receive assistance with all ADLs.  Blood pressures are stable.  Pain is managed.  She does get her monthly labs with rheumatology as well as her medications injections.  She did not have any other questions.    IMELDA Perry

## 2021-06-13 NOTE — PROGRESS NOTES
[facility]    Facility:   [facility] [[id number]]   Code Status: FULL CODE      CHIEF COMPLAINT/REASON FOR VISIT:  Chief Complaint   Patient presents with     Review Of Multiple Medical Conditions       HISTORY:      HPI: María Elena is a 52 y.o. female Was seen today in follow-up of her multiple issues.  Arina has a history of bilateral DVT, obesity, inflammatory arthropathy seeing Dr. Quinn rheumatology, obstructive sleep apnea not tolerating CPAP, vitamin D deficiency and chronic pain syndrome.  She also has osteoarthritis in several joints most recently had undergone left hip arthroplasty.  She is currently working with Dr. Ramesh and plans to have her right knee replaced as well in the near future.  She however needs to lose more weight before the surgery can be done.  She is still with therapy.  She is able to walk 1 part of the hallway with the use of a walker.   Does use ibuprofen as needed for pain reserves oxycodone for very rare instances.    On September 14, she went on leave to visit her dying mother.  When she came back late that night, she took all her pills including her Coumadin.  Her INR remains therapeutic.    She has been having some swelling in both legs.  She also notices some increased discomfort when walking the last couple of days.  Her back also has been aching likely from compensating with recent changes in her gait.  No shortness of breath no chest discomfort no pressures.  No nausea no vomiting.  Hand pain is chronic.  She has been maintained on sulfasalazine and hydroxychloroquine and Humira.      Past Medical History:   Diagnosis Date     Aseptic necrosis of femoral head     LEFT     DJD (degenerative joint disease)      DVT, bilateral lower limbs 10/2014     Edema - swelling of the legs after blood clots 5/22/2015     Essential hypertension with goal blood pressure less than 140/90      Failure to thrive      History of blood clots       Hypokalemia 10/15/2014     Lung mass 10/18/2014     Morbid obesity 4/10/2015    Had formal nutrition consult at [facility].  RD reports that she is not willing to commit to the program outlined.  See scanned document.  12/15/2015 - Marcio Quinonez MD        Obesity - although she was not weighed today, she is clearly obese on inspection. 4/10/2015     LOPEZ (obstructive sleep apnea) - abnormal overnight pulse oximetry 5/22/2015     Osteoarthritis      Peripheral vascular disease      Pleural effusion      Pressure ulcer of foot      Rheumatoid arthritis 12/30/2014    seronegative     Seropositive rheumatoid arthritis 1/19/2016     Vitamin D deficiency 8/26/2015             Family History   Problem Relation Age of Onset     Multiple myeloma Father      Deep vein thrombosis Father      Cataracts Father      Snoring Father      Other Brother      Blood withdrawn from time to time.  Sounds like hemochromatosis.     Sleep apnea Brother      Breast cancer Mother      Cancer Mother      Uterine CA     Arthritis Mother      Cataracts Mother      No Medical Problems Sister      Rheum arthritis Maternal Grandmother      Breast cancer Maternal Grandmother 50     Heart disease Maternal Grandmother      Rheum arthritis Paternal Aunt      Clotting disorder Neg Hx      Social History     Social History     Marital status: Single     Spouse name: N/A     Number of children: N/A     Years of education: N/A     Social History Main Topics     Smoking status: Former Smoker     Packs/day: 1.00     Years: 30.00     Types: Cigarettes     Smokeless tobacco: Never Used     Alcohol use Yes      Comment: 3-4 times a year she will have 1 or 2.     Drug use: No     Sexual activity: No     Other Topics Concern     Not on file     Social History Narrative    Long term  At [facility] to rehab to level allowing surgery on deteriorated joints  LTC12/2015         Review of Systems  Unremarkable  .There were no vitals filed for this  visit.    Physical Exam  Vital signs noted and stable.  Blood pressures however are little on the higher side 138/85.  Patient is alert, awake, oriented to time, place and person, not in acute cardiorespiratory distress  Skin: Warm, and moist,  no lesions,   Head: atraumatic, normocephalic,   Eyes: EOM's intact and conjugate, pink palpebral conjunctivae, anicteric sclerae, pupils reactive  Lungs : Clear to auscultation , no crackles, wheezes or rhonchi  Heart : Nornal first and second heart sounds, No murmurs, heaves, or thrills  Abdomen: Soft, non tender, non distended, no hepatosplenomegaly, no ascites  Extremities: Positive for bipedal slightly pitting edema, pulses are full and equal  Some tenderness noted in the intrascapular region on upper thoracic spine.    LABS:   Lab Results   Component Value Date    WBC 4.0 09/20/2017    HGB 12.7 09/20/2017    HCT 39.7 09/20/2017    MCV 83 09/20/2017     09/20/2017     No results found for this or any previous visit.      ASSESSMENT:      ICD-10-CM    1. Elevated blood pressure I10    2. Obesity E66.9    3. Inflammatory arthritis M19.90    4. DVT of lower extremity, bilateral - residual clot disease on repeat US in NEW location - after six months of warfarin. I82.403        PLAN:    Starting low-dose of hydrochlorothiazide 25 mg p.o. daily.  Check electrolytes and kidney function within 7-10 days.  This will help with lowering blood pressure as well as targeting some of the edema.  Her edema is secondary to multifactorial causes.  But a big part of it is secondary to poor diet in the nursing home.  She complains of highly processed and salty food available in the nursing home.  Almost no availability for fresh produce and low-salt diet.  Continue with current regimen for rheumatoid arthritis.  She sees Dr. Holguin, Monday.  Lab Results   Component Value Date    INR 2.39 (H) 09/13/2017    INR 2.43 (H) 08/16/2017    INR 2.06 (H) 07/19/2017     She is on 8 mg of  Coumadin.  Dietary advice given.  Back pain likely secondary to musculoskeletal strain nonpharmacologic methods advised the patient.  Such as warm packs, massages, etc.      Total 25 minutes of which 55% was spent counseling and coordination of care of the above plan.    Electronically signed by: Uvaldo Brunner MD

## 2021-06-13 NOTE — TELEPHONE ENCOUNTER
"[facility] Nurse Triage Telephone Note      Provider: DELPHINE Perry  Facility: [facility]    Facility Type: [facility]    Caller: Codie  Call Back Number:  [phone]    Allergies: Adhesive; Banana; Grape; Morphine (pf); Pseudoephedrine cold/allergy [chlorpheniramine-pseudoephed]; Latex, natural rubber; and Other food allergy    Reason for call: Nurse reporting that over the night, nurse stated that patient c/o \"body aches and headache\".  Patient had a fever of 102.4.  Patient received Ibuprofen 400mg at midnight.  Today, patient is stating that her pain is on her outer right ankle.  Nurse assessed the area to be a slightly more red and warm and somewhat more swollen than normal.  She states her pain is constantly a 5-8/10 and achy in nature.  VS today:  T=98.7, P=81, R=16, JQ=513/98, O2=96%RA.  No cough or shortness of breath noted.  Lung sounds are CTA.  Patient had a rapid covid test done today which was negative.  A Covid PCR test was sent yesterday.  Nursing staff sent an influenza nasal swab in today per [facility] protocols.  Notable meds:  Xarelto 10mg daily, Tylenol 650mg Q 6 hours PRN, Hydrochlorothiazide 25mg daily, Methotrexate 10mg Q Friday, Humira injection Q 2 weeks(due today), Ibuprofen 400mg Q 8 hours PRN.      Verbal Order/Direction given by Provider: Elevate legs as much as possible.  Update when pending lab results are back.      Provider giving order: DELPHINE Perry    Verbal order given to: Codie Easton RN      "

## 2021-06-13 NOTE — TELEPHONE ENCOUNTER
Per Dr Humphrey- pt should hold Humira until finished with antibiotics and no more symptoms of Cellulitis. Pt can continue methotrexate. Nurse at [facility] notified

## 2021-06-13 NOTE — PROGRESS NOTES
"ASSESSMENT AND PLAN:  María Elena Saab 52 y.o. female who is a resident at [facility] on [address] is here for follow-up of severe, deforming,  erosive, seropositive high titer anti-CCP antibody rheumatoid arthritis.  At one point she was on combination of Humira sulfasalazine and hydroxychloroquine and sulfasalazine were discontinued she had worsening of pain.  She has now both on board along with the Humira.  She has residual pain in her hands.  She wonders if the right fingers are \"drifting\" towards the ulnar side.  X-rays were taken today.  Once again we discussed this at length that should she have evidence of infection in her lower extremity ulceration the Humira would need to be stopped forth with.  I have asked her, as before, to continue to inform of this important advice to all her caregivers.  Check x-rays of the hands today.  Follow-up in 4 months or sooner.        Diagnoses and all orders for this visit:    Seropositive rheumatoid arthritis of multiple sites  -     XR Hands Bilateral 3 or More VWS; Future; Expected date: 9/25/17    Cyclic citrullinated peptide (CCP) antibody positive    Polyarthralgia  -     XR Hands Bilateral 3 or More VWS; Future; Expected date: 9/25/17    Morbid obesity         HISTORY OF PRESENTING ILLNESS:    María Elena Saab 52 y.o. female  is here for followup of sero-positive Rheumatoid Arthritis.  She has residual pain such as in her forearms hands.  She feels of the right hand fingers are drifting more toward the ulnar side.  She has not had recurrence of infection in her leg ulcer as far as she is aware of.  She reports no fever or chills.  She is back on sulfasalazine.  She is on Humira and hydroxychloroquine.  And the sulfasalazine was withheld her joint symptoms have gotten worse.  She has tolerated the resumption of sulfasalazine along with hydroxychloroquine and Humira.  Her pains get worse with weather change.  She has pain in her mid forearm radiating up and " "down the upper extremities. .  She rates the pain still as moderately severe.  She also feels that the the activity of walking also contributes to it.  Although it is hard for her to say if there was a truly a diurnal variation.  She rated the pain at 4.5/10.  Interfering with a variety of day-to-day activities as noted.  Her morning stiffness he noted was 30 minutes.  She has not had weight loss she has had blurry vision of there is no eye redness she describes no nausea she has history of cough.  She had a right knee injection done at orthopedics in the did not find that helpful she wonders if that may have aggravated her pain there.. .Further historical information, including ROS as noted in the multidimensional health assessment questionnaire scanned in the EMR and in the assessment and plan section. Rheumatoid Arthritis Disease Activity Measure used: RAPID3, reviewed  today and scanned in the chart.        Following is the excerpt from a recent note:    She has noted significant improvement in her joint pains she's been on Humira for the past of 4 doses, however recently she did have increased pain in her left hand which subsided since.  Now she has leukopenia.  Her decubitus ulcer is noted not to be infected anymore.  I have emphasized the importance of keeping close eye.  She is awaiting left hip replacement.  She is aware that I'm not able to give her the more \"aggressive\"  DMARD still her pressure ulcer heals and is no longer considered infected.  This patient has high titer anti-CCP antibody.  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  She is wheelchair-bound.  This happened this past year in fall.  She was due to have left hip replacement and then subsequent to that right knee replacement but before she could have that done she developed pneumonia and pulmonary embolism.  Prior to hospitalization she reports that she was able to ambulate with the help of a walker.  Since then she could simply not " go back to ambulation and is now awaiting surgery at the nursing home.  This morning she told me she was informed that she has a infected decubitus ulcer.  Unless as she heals there the orthopedic surgeons obviously are not prepared to proceed with in the surgical intervention.  Meanwhile her rheumatoid arthritis is appearing to be a bit better today.  She noted that she could move her finger better.  She had trigger finger injection on her previous visit.  I have outlined to her that we will proceed with sulfasalazine.  I cannot at this point given her methotrexate or other more aggressive DMARD still her infected decubitus ulcer has been under control or healed.       has longstanding seropositive anti-CCP antibody severe deforming, erosive rheumatoid arthritis.   In the past she was said to have infected decubitus ulcer of the right lower extremity posteriorly, she understands that that is now healed or at least not infected anymore.  Her recent evaluation of the vascular clinic shows that there is no residual active infection in her lower extremity ulceration.  In view of this and the fact that even with only hydroxychloroquine and sulfasalazine she has had intermittent neutropenia, she is an unlikely candidate for methotrexate, or leflunomide.    She is now on Humira for the past 2 months.  She is aware that it is going to be an important part of her observations at her primary physician at her home and herself to keep a close eye in case it is evidence of infection.  Will continue current implant.  Recent labs within acceptable range.  Her recent left hip arthroplasty, uneventfully she's not get back on Humira I have asked her to go back this will be 5 weeks out of surgery.  She did hold her Humira 2 weeks prior.  She also has a right ring finger triggering.  She would like to proceed with local injection.  20 mg of methylprednisolone and Marcaine injected in the flexor tendon sheath right ring finger flexor  tendon.  Return for follow-up here in 4 months.  See back here in 3 months and regular the  likely this  ALLERGIES:Adhesive; Banana; Grape; Morphine (pf); Other food allergy; and Latex, natural rubber  PAST MEDICAL/ACTIVE PROBLEMS/MEDICATION/SOCIAL DATA  Past Medical History:   Diagnosis Date     Aseptic necrosis of femoral head     LEFT     DJD (degenerative joint disease)      DVT, bilateral lower limbs 10/2014     Edema - swelling of the legs after blood clots 5/22/2015     Essential hypertension with goal blood pressure less than 140/90      Failure to thrive      History of blood clots      Hypokalemia 10/15/2014     Lung mass 10/18/2014     Morbid obesity 4/10/2015    Had formal nutrition consult at [facility].  RD reports that she is not willing to commit to the program outlined.  See scanned document.  12/15/2015 - Marcio Quinonez MD        Obesity - although she was not weighed today, she is clearly obese on inspection. 4/10/2015     LOPEZ (obstructive sleep apnea) - abnormal overnight pulse oximetry 5/22/2015     Osteoarthritis      Peripheral vascular disease      Pleural effusion      Pressure ulcer of foot      Rheumatoid arthritis 12/30/2014    seronegative     Seropositive rheumatoid arthritis 1/19/2016     Vitamin D deficiency 8/26/2015     History   Smoking Status     Former Smoker     Packs/day: 1.00     Years: 30.00     Types: Cigarettes   Smokeless Tobacco     Never Used     Patient Active Problem List   Diagnosis     DVT of lower extremity, bilateral - residual clot disease on repeat US in NEW location - after six months of warfarin.     Morbid obesity     Warfarin anticoagulation - long-term because of unresolved DVT that first appeared in October, 2014     LOPEZ (obstructive sleep apnea) - abnormal overnight pulse oximetry - SLEEP visit in November, 2015, she states.     Nocturnal hypoxemia - probable sleep apnea - had overnight pulse oximetry, April, 2015.     Edema - swelling of the  legs after blood clots     Vitamin D deficiency     Cyclic citrullinated peptide (CCP) antibody positive     Right shoulder tendinitis     Seropositive rheumatoid arthritis of multiple sites     PAD (peripheral artery disease)     Pain management     Radiculopathy of leg     Sleep apnea     Degenerative joint disease (DJD) of hip     Essential hypertension with goal blood pressure less than 140/90     History of DVT of lower extremity     Anemia     H/O total hip arthroplasty     Right knee DJD     Trigger finger, right ring finger     Polyarthralgia     Current Outpatient Prescriptions   Medication Sig Dispense Refill     ascorbic acid (VITAMIN C) 250 MG tablet Take 250 mg by mouth 2 (two) times a day.         enoxaparin (LOVENOX) 80 mg/0.8 mL Syrg Inject 80 mg under the skin 2 (two) times a day.         food supplement, lactose-free (BOOST BREEZE NUTRITIONAL) 0.04-1.05 gram-kcal/mL Liqd Use As Directed.         hydroxychloroquine (PLAQUENIL) 200 mg tablet Take 200 mg by mouth 2 (two) times a day.         ibuprofen (ADVIL,MOTRIN) 400 MG tablet Take 400 mg by mouth every 6 (six) hours as needed for pain.         loratadine (CLARITIN) 10 mg tablet Take 10 mg by mouth daily.         magnesium hydroxide (MAGNESIUM HYDROXIDE) 400 mg/5 mL Susp suspension Take by mouth as needed. (ML)         multivitamin Chew Chew 1 tablet daily.         nalOXone (NARCAN) 0.4 mg/mL injection Inject as directed as needed. (ML)         naproxen (NAPROSYN) 500 MG tablet Take 500 mg by mouth 2 (two) times a day as needed.         oxyCODONE (ROXICODONE) 5 MG immediate release tablet Take 5 mg by mouth 4 (four) times a day as needed.         senna-docusate (PERICOLACE) 8.6-50 mg tablet Take 0.5 tablets by mouth daily.          warfarin (COUMADIN) 10 MG tablet          No current facility-administered medications for this visit.     DETAILED EXAMINATION  09/25/17  :  There were no vitals filed for this visit.  Alert oriented. Head including the  face is examined for malar rash, heliotropes, scarring, lupus pernio. Eyes examined for redness such as in episcleritis/scleritis, periorbital lesions.   Neck examined  for lymph nodes, range of motion Both upper and lower extremities (all four) examined for swollen, warm &/or  tender joints, range of motion, rash, muscle weakness, edema. The salient normal / abnormal findings are appended.  She has synovial thickening in some of the MCPs of the right hand, she has flexion deformity as noted before in both her elbows which is worse on the right side with crepitation, ulnar deviation and the digits.  She has bilateral 110  abduction of the shoulder joints.  She has left TKA scar.  She has edema in the lower extremities..  She is in a wheelchair.      LAB / IMAGING DATA:  ALT   Date Value Ref Range Status   09/20/2017 18 0 - 45 U/L Final   08/18/2017 12 0 - 45 U/L Final   03/24/2017 12 0 - 45 U/L Final     Albumin   Date Value Ref Range Status   09/20/2017 3.5 3.5 - 5.0 g/dL Final   08/18/2017 3.3 (L) 3.5 - 5.0 g/dL Final   03/24/2017 3.3 (L) 3.5 - 5.0 g/dL Final     Creatinine   Date Value Ref Range Status   09/20/2017 0.65 0.60 - 1.10 mg/dL Final   08/18/2017 0.70 0.60 - 1.10 mg/dL Final   03/24/2017 0.65 0.60 - 1.10 mg/dL Final       WBC   Date Value Ref Range Status   09/20/2017 4.0 4.0 - 11.0 thou/uL Final   08/18/2017 2.9 (L) 4.0 - 11.0 thou/uL Final   09/14/2015 4.0 4.0 - 11.0 thou/uL Final   06/30/2015 3.7 (L) 4.0 - 11.0 thou/uL Final     Hemoglobin   Date Value Ref Range Status   09/20/2017 12.7 12.0 - 16.0 g/dL Final   08/18/2017 12.5 12.0 - 16.0 g/dL Final   03/24/2017 12.2 12.0 - 16.0 g/dL Final     Platelets   Date Value Ref Range Status   09/20/2017 242 140 - 440 thou/uL Final   08/18/2017 207 140 - 440 thou/uL Final   03/24/2017 295 140 - 440 thou/uL Final       Lab Results   Component Value Date    RF 25.7 10/15/2014    SEDRATE 46 (H) 02/08/2017

## 2021-06-13 NOTE — TELEPHONE ENCOUNTER
[facility] Nurse Triage Telephone Note      Provider: DELPHINE Perry  Facility: [facility]    Facility Type: [facility]    Caller: Codie  Call Back Number:  [phone]    Allergies: Adhesive; Banana; Grape; Morphine (pf); Pseudoephedrine cold/allergy [chlorpheniramine-pseudoephed]; Latex, natural rubber; and Other food allergy    Reason for call: Pt has a very red, swollen, painful and warm to touch left ankle that also is starting to go into the top of the foot.   Pt has had low grade fevers since 11/25/20.     Verbal Order/Direction given by Provider: Keflex 500mg three times a day x 7 days.     Provider giving order: DELPHINE Perry    Verbal order given to: Codie Blue RN

## 2021-06-13 NOTE — TELEPHONE ENCOUNTER
The patient has an active infection. They started her on antibiotic, Keflex on 11/27 and she will complete that on 12/5. Patient is on Humira and methotrexate. They are wondering if they should hold both for this week.

## 2021-06-14 NOTE — TELEPHONE ENCOUNTER
[facility] Nurse Triage Telephone Note      Provider: DELPHINE Perry  Facility: [facility]    Facility Type: [facility]    Caller: Codie  Call Back Number:  [phone]    Allergies: Adhesive; Banana; Grape; Morphine (pf); Pseudoephedrine cold/allergy [chlorpheniramine-pseudoephed]; Latex, natural rubber; and Other food allergy    Reason for call: Nurse reporting Heme 1 and CRP results from 12/21.  Patient was treated with antibiotics for LE cellulitis.       Verbal Order/Direction given by Provider: No new orders.      Provider giving order: Og Connelly DO    Verbal order given to: Codie Easton RN

## 2021-06-14 NOTE — PROGRESS NOTES
[facility]    Facility:   [facility] [[id number]]   Code Status: FULL CODE      CHIEF COMPLAINT/REASON FOR VISIT:  Chief Complaint   Patient presents with     Review Of Multiple Medical Conditions       HISTORY:      HPI: María Elena is a 52 y.o. female who I am seeing today in review of her multiple medical problems.  Arina was seen in her room, as always.  She is not fond to socialize.  She states in her room all day.  She does walk with an 8 twice a day.  Has been walking along the hallways.  Some difficulty with walking because of osteoarthritic knees.  She is obese.  Weight has stayed about the same.  She has been trying to lose weight however is limited by her severely arthritic joints and her opacity.  She went to [facility] in October.  She received hyaluronic acid injections however they have not been helpful.    The last time I saw her, her blood pressure had been running high.  Also she had been having trouble with keeping her legs from being swollen.  She was started on hydrochlorothiazide.  Blood work shortly after showed stable levels including potassium and creatinine.    The swelling has gone down a little.  She also has Velcro wraps on both legs which keep her legs comfortable.  She is concerned about possible vitamin D in toxicity.  She has been on 6000 units of vitamin D for about a year and a half and wonders what her level is light.    She denies any recent respiratory infection denies any recent fevers.  She saw rheumatology in September maintained on hydroxychloroquine and sulfasalazine and Humira every 2 weeks.  I believe she missed a dose of Humira 2 weeks ago.      Past Medical History:   Diagnosis Date     Aseptic necrosis of femoral head     LEFT     DJD (degenerative joint disease)      DVT, bilateral lower limbs 10/2014     Edema - swelling of the legs after blood clots 5/22/2015     Essential hypertension with goal blood pressure less  than 140/90      Failure to thrive      History of blood clots      Hypokalemia 10/15/2014     Lung mass 10/18/2014     Morbid obesity 4/10/2015    Had formal nutrition consult at [facility].  RD reports that she is not willing to commit to the program outlined.  See scanned document.  12/15/2015 - Marcio Quinonez MD        Obesity - although she was not weighed today, she is clearly obese on inspection. 4/10/2015     LOPEZ (obstructive sleep apnea) - abnormal overnight pulse oximetry 5/22/2015     Osteoarthritis      Peripheral vascular disease      Pleural effusion      Pressure ulcer of foot      Rheumatoid arthritis 12/30/2014    seronegative     Seropositive rheumatoid arthritis 1/19/2016     Vitamin D deficiency 8/26/2015             Family History   Problem Relation Age of Onset     Multiple myeloma Father      Deep vein thrombosis Father      Cataracts Father      Snoring Father      Other Brother      Blood withdrawn from time to time.  Sounds like hemochromatosis.     Sleep apnea Brother      Cancer Mother      Uterine CA     Arthritis Mother      Cataracts Mother      Breast cancer Mother 54     No Medical Problems Sister      Rheum arthritis Maternal Grandmother      Breast cancer Maternal Grandmother 50     Heart disease Maternal Grandmother      Rheum arthritis Paternal Aunt      Breast cancer Maternal Aunt 54     Breast cancer Cousin      Clotting disorder Neg Hx      Social History     Social History     Marital status: Single     Spouse name: N/A     Number of children: N/A     Years of education: N/A     Social History Main Topics     Smoking status: Former Smoker     Packs/day: 1.00     Years: 30.00     Types: Cigarettes     Smokeless tobacco: Never Used     Alcohol use Yes      Comment: 3-4 times a year she will have 1 or 2.     Drug use: No     Sexual activity: No     Other Topics Concern     Not on file     Social History Narrative    Long term  At [facility] to rehab to [facility] allowing  surgery on deteriorated joints  LTC12/2015         Review of Systems  Unremarkable otherwise  .There were no vitals filed for this visit.    Physical Exam  Vital signs noted and stable.  Blood pressure anywhere from 119-166/82-87  Patient is alert, awake, oriented to time, place and person, not in acute cardiorespiratory distress  Skin: Warm, and moist,  no lesions,   Head: atraumatic, normocephalic,   Eyes: EOM's intact and conjugate, pink palpebral conjunctivae, anicteric sclerae, pupils reactive  Lungs : Clear to auscultation , no crackles, wheezes or rhonchi  Heart : Nornal first and second heart sounds, No murmurs, heaves, or thrills  Abdomen: Soft, non tender, non distended, no hepatosplenomegaly, no ascites  Extremities: Bipedal nonpitting edema, pulses are full and equal      LABS:   No results found for this or any previous visit (from the past 72 hour(s)).  Results for orders placed or performed in visit on 10/05/17   Basic Metabolic Panel   Result Value Ref Range    Sodium 140 136 - 145 mmol/L    Potassium 4.7 3.5 - 5.0 mmol/L    Chloride 98 98 - 107 mmol/L    CO2 30 22 - 31 mmol/L    Anion Gap, Calculation 12 5 - 18 mmol/L    Glucose 97 70 - 125 mg/dL    Calcium 10.3 8.5 - 10.5 mg/dL    BUN 14 8 - 22 mg/dL    Creatinine 0.70 0.60 - 1.10 mg/dL    GFR MDRD Af Amer >60 >60 mL/min/1.73m2    GFR MDRD Non Af Amer >60 >60 mL/min/1.73m2       Lab Results   Component Value Date    WBC 4.9 11/27/2017    HGB 13.7 11/27/2017    HCT 41.8 11/27/2017    MCV 87 11/27/2017     11/27/2017         ASSESSMENT:      ICD-10-CM    1. Polyarthralgia M25.50    2. Morbid obesity E66.01    3. Physical debility R53.81    4. Vitamin D deficiency disease E55.9    5. Rheumatoid arteritis I00    6. Essential hypertension with goal blood pressure less than 140/90 I10        PLAN:    Continue with current regimen set forth by rheumatology.  She does take ibuprofen about 3-4 times a week once a day mostly at bedtime  He did have a  long conversation about good food choices and avoidance of processed food and sugar food in general.  Unfortunately, she is limited by her lack of finances to go by food from outside that has better quality in the facility itself does not provide the highest quality of food that most nursing home residence so need.  We are somewhat limited to what we could do to help her lose the weight.  She denies voracious appetite as the issue.  She will try to walk a little bit more.    Check vitamin D level.  Continue hydrochlorothiazide for blood pressure  Keep ibuprofen to a minimum discussed about possible nephrotoxicity    Total 25 minutes of which 55% was spent counseling and coordination of care of the above plan.    Electronically signed by: Uvaldo Brunner MD

## 2021-06-14 NOTE — PROGRESS NOTES
[facility]    Facility:   [facility] [[id number]]   Code Status: FULL CODE      CHIEF COMPLAINT/REASON FOR VISIT:  Chief Complaint   Patient presents with     Review Of Multiple Medical Conditions       HISTORY:      HPI: María Elena is a 52 y.o. female who I had a chance to visit with secondary to monthly review of chronic medical conditions.  She did not have any other particular complaints today.  This past month she did get a mammogram which was negative for any malignancy.  She does not want to make any changes to her current medications.  Is being treated by her rheumatologist for chronic pain and RA.  Not requiring any ibuprofen not requiring any oxycodone as needed.  Has been normotensive and afebrile.  Is on Coumadin.  Really likes to pretty much stay in her room all day.  Does not participate in group activities.  She does have obesity.  Being treated for constipation.    Past Medical History:   Diagnosis Date     Aseptic necrosis of femoral head     LEFT     DJD (degenerative joint disease)      DVT, bilateral lower limbs 10/2014     Edema - swelling of the legs after blood clots 5/22/2015     Essential hypertension with goal blood pressure less than 140/90      Failure to thrive      History of blood clots      Hypokalemia 10/15/2014     Lung mass 10/18/2014     Morbid obesity 4/10/2015    Had formal nutrition consult at [facility].  RD reports that she is not willing to commit to the program outlined.  See scanned document.  12/15/2015 - Marcio Quinonez MD        Obesity - although she was not weighed today, she is clearly obese on inspection. 4/10/2015     LOPEZ (obstructive sleep apnea) - abnormal overnight pulse oximetry 5/22/2015     Osteoarthritis      Peripheral vascular disease      Pleural effusion      Pressure ulcer of foot      Rheumatoid arthritis 12/30/2014    seronegative     Seropositive rheumatoid arthritis 1/19/2016     Vitamin D deficiency  8/26/2015             Family History   Problem Relation Age of Onset     Multiple myeloma Father      Deep vein thrombosis Father      Cataracts Father      Snoring Father      Other Brother      Blood withdrawn from time to time.  Sounds like hemochromatosis.     Sleep apnea Brother      Cancer Mother      Uterine CA     Arthritis Mother      Cataracts Mother      Breast cancer Mother 54     No Medical Problems Sister      Rheum arthritis Maternal Grandmother      Breast cancer Maternal Grandmother 50     Heart disease Maternal Grandmother      Rheum arthritis Paternal Aunt      Breast cancer Maternal Aunt 54     Breast cancer Cousin      Clotting disorder Neg Hx      Social History     Social History     Marital status: Single     Spouse name: N/A     Number of children: N/A     Years of education: N/A     Social History Main Topics     Smoking status: Former Smoker     Packs/day: 1.00     Years: 30.00     Types: Cigarettes     Smokeless tobacco: Never Used     Alcohol use Yes      Comment: 3-4 times a year she will have 1 or 2.     Drug use: No     Sexual activity: No     Other Topics Concern     Not on file     Social History Narrative    Long term  At [facility] to rehab to level allowing surgery on deteriorated joints  LTC12/2015         Review of Systems  Denies any URI symptoms postnasal drip fever chills fatigue flulike symptoms nausea vomiting diarrhea dysuria rashes stiff neck swollen glands. Does have bilateral DVT sleep apnea obesity edema vitamin D deficiency PAD rheumatoid arthritis multiple sites chronic pain.      Current Outpatient Prescriptions:      artificial tears,hypromellose, (GENTEAL) 0.3 % Drop, Administer 1 drop to both eyes 4 (four) times a day as needed., Disp: , Rfl:      ascorbic acid, vitamin C, (ASCORBIC ACID WITH KAEL HIPS) 500 MG tablet, Take 500 mg by mouth 2 (two) times a day., Disp: , Rfl:      cholecalciferol, vitamin D3, (VITAMIN D3) 2,000 unit Tab, Take 6,000 Units by  mouth daily., Disp: , Rfl:      HUMIRA 40 mg/0.8 mL injection, INJECT THE CONTENTS OF 1 SYRINGE (40MG) SUBCUTANEOUSLY EVERY 14 DAYS.., Disp: 2 each, Rfl: 11     hydroCHLOROthiazide (HYDRODIURIL) 25 MG tablet, Take 25 mg by mouth daily., Disp: , Rfl:      hydroxychloroquine (PLAQUENIL) 200 mg tablet, Take 200 mg by mouth 2 (two) times a day., Disp: , Rfl:      ibuprofen (ADVIL,MOTRIN) 200 MG tablet, Take 400 mg by mouth 3 (three) times a day as needed for pain. , Disp: , Rfl:      multivitamin therapeutic (THERAGRAN) tablet, Take 1 tablet by mouth daily., Disp: , Rfl:      nystatin (MYCOSTATIN) powder, Apply 1 application topically 2 (two) times a day. Wash well between applications. Apply under breasts., Disp: , Rfl:      oxyCODONE (ROXICODONE) 5 MG immediate release tablet, Take 5 mg by mouth every 4 (four) hours as needed for pain., Disp: , Rfl:      senna-docusate (SENNOSIDES-DOCUSATE SODIUM) 8.6-50 mg tablet, Take 1 tablet by mouth daily., Disp: , Rfl:      sulfaSALAzine (AZULFIDINE) 500 mg tablet, Take 3 tablets (1,500 mg total) by mouth 2 (two) times a day., Disp: 180 tablet, Rfl: 0     WARFARIN SODIUM (WARFARIN ORAL), Take by mouth daily. 10/26/17 INR 2.42  Continue taking 8mg on Mondays and Thursdays and 9mg all other days.  Next INR 11/30/17. 9/28/17 INR 2.27 Cont 8mg M & TH, 9mg all other days. Next INR 10/26. 9/13/17 INR 2.39  (missed Warfarin on 9/13)  Continue taking 8mg on Mondays and Thursdays and 9mg all other days.  Next INR 10/12/17. 8/16/17 INR 2.43 Cont 8mg M & Th, 9mg all others. Next INR 9/13., Disp: , Rfl:     .There were no vitals filed for this visit.  Blood pressure 133/84 pulse 81 respirations 20 temperature 98.1  Physical Exam    Constitutional: She is oriented to person, place, and time. She appears well-developed and well-nourished. No distress.   obese   HENT:   Head: Normocephalic.    Neck: Neck supple. No thyromegaly present.   Cardiovascular: Normal rate, regular rhythm and normal  heart sounds.   Chronic edema . Farrow wraps  Pulmonary/Chest: Breath sounds normal.   inder   Abdominal. There is no tenderness. There is no guarding.   Musculoskeletal:   Chronic pain. Chronic edema. Rheumatoid arthritis multiple sites including hands. Left knee scar. Left hip OA. Left hip scar  Lymphadenopathy:   She has no cervical adenopathy.   Neurological: She is oriented to person, place, and time.   Skin: Skin is warm and dry. No rash noted.   Psychiatric: She has a normal mood and affect. Her behavior is normal.         LABS:   Lab Results   Component Value Date    WBC 4.9 11/27/2017    HGB 13.7 11/27/2017    HCT 41.8 11/27/2017    MCV 87 11/27/2017     11/27/2017     Results for orders placed or performed in visit on 10/05/17   Basic Metabolic Panel   Result Value Ref Range    Sodium 140 136 - 145 mmol/L    Potassium 4.7 3.5 - 5.0 mmol/L    Chloride 98 98 - 107 mmol/L    CO2 30 22 - 31 mmol/L    Anion Gap, Calculation 12 5 - 18 mmol/L    Glucose 97 70 - 125 mg/dL    Calcium 10.3 8.5 - 10.5 mg/dL    BUN 14 8 - 22 mg/dL    Creatinine 0.70 0.60 - 1.10 mg/dL    GFR MDRD Af Amer >60 >60 mL/min/1.73m2    GFR MDRD Non Af Amer >60 >60 mL/min/1.73m2           ASSESSMENT:      ICD-10-CM    1. Polyarthralgia M25.50    2. Pain management R52    3. Morbid obesity E66.01    4. Physical debility R53.81        PLAN:    No other laboratory studies are necessary.  Is on a number of medications for chronic pain and RA.  Is wheelchair bound.  Does need assistance with all ADLs.  .    Electronically signed by: Michael Duane Johnson, CNP

## 2021-06-14 NOTE — PROGRESS NOTES
[facility]    Facility:   [facility] [[id number]]   Code Status: FULL CODE      CHIEF COMPLAINT/REASON FOR VISIT:  Chief Complaint   Patient presents with     FVP Care Coordination - Regulatory       HISTORY:      HPI: María Elena is a 55 y.o. female who is being seen today secondary to a regulatory visit.  She seems to be in pretty good spirits overall.  Does see rheumatology secondary to her rheumatoid arthritis.  Also gets her laboratory studies done through the rheumatology clinic.  Had a chance to go over her current medications and there have been no recent changes.  She does not need any oxycodone as needed for pain and no Tylenol as needed for pain and has only taken 1 ibuprofen as needed for pain.  Her appetite is good.  She does have chronic lower extremity edema.  Sometimes her legs do bother her.  She does in the wheelchair most of the day.  Is on a regular diet.  She did ask about getting her leg wraps and am not terribly sure that that has been ordered so I will try to check with staff to see if they did order her leg wraps.  She has been without colds or flus and pretty much stays in the room throughout the day and night.    Past Medical History:   Diagnosis Date     Aseptic necrosis of femoral head (H)     LEFT     Bacteremia due to group B Streptococcus      Cellulitis of right lower extremity      DJD (degenerative joint disease)      DVT, bilateral lower limbs (H) 10/2014     Edema - swelling of the legs after blood clots 5/22/2015     Essential hypertension with goal blood pressure less than 140/90      Failure to thrive      History of blood clots      Hypokalemia 10/15/2014     Lung mass 10/18/2014     Morbid obesity (H) 4/10/2015    Had formal nutrition consult at [facility].  RD reports that she is not willing to commit to the program outlined.  See scanned document.  12/15/2015 - Marcio Quinonez MD        Obesity - although she was not weighed today,  she is clearly obese on inspection. 4/10/2015     LOPEZ (obstructive sleep apnea) - abnormal overnight pulse oximetry 5/22/2015     Osteoarthritis      Peripheral vascular disease (H)      Pleural effusion      Pressure ulcer of foot      Rheumatoid arthritis (H) 12/30/2014    seronegative     Sepsis (H)      Seropositive rheumatoid arthritis (H) 1/19/2016     Vitamin D deficiency 8/26/2015             Family History   Problem Relation Age of Onset     Multiple myeloma Father      Deep vein thrombosis Father      Cataracts Father      Snoring Father      Other Brother         Blood withdrawn from time to time.  Sounds like hemochromatosis.     Sleep apnea Brother      Cancer Mother         Uterine CA     Arthritis Mother      Cataracts Mother      Breast cancer Mother 54     No Medical Problems Sister      Rheum arthritis Maternal Grandmother      Breast cancer Maternal Grandmother 50     Heart disease Maternal Grandmother      Rheum arthritis Paternal Aunt      Breast cancer Maternal Aunt 54     Breast cancer Cousin      Clotting disorder Neg Hx      Social History     Socioeconomic History     Marital status: Single     Spouse name: Not on file     Number of children: Not on file     Years of education: Not on file     Highest education level: Not on file   Occupational History     Not on file   Social Needs     Financial resource strain: Not on file     Food insecurity     Worry: Not on file     Inability: Not on file     Transportation needs     Medical: Not on file     Non-medical: Not on file   Tobacco Use     Smoking status: Former Smoker     Packs/day: 1.00     Years: 30.00     Pack years: 30.00     Types: Cigarettes     Smokeless tobacco: Never Used   Substance and Sexual Activity     Alcohol use: Yes     Comment: 3-4 times a year she will have 1 or 2.     Drug use: No     Sexual activity: Never     Partners: Female   Lifestyle     Physical activity     Days per week: Not on file     Minutes per session: Not  on file     Stress: Not on file   Relationships     Social connections     Talks on phone: Not on file     Gets together: Not on file     Attends [employer] service: Not on file     Active member of club or organization: Not on file     Attends meetings of clubs or organizations: Not on file     Relationship status: Not on file     Intimate partner violence     Fear of current or ex partner: Not on file     Emotionally abused: Not on file     Physically abused: Not on file     Forced sexual activity: Not on file   Other Topics Concern     Not on file   Social History Narrative    Long term  At [facility] to rehab to level allowing surgery on deteriorated joints  LTC12/2015         Review of Systems  No reports of any URI symptoms postnasal drip fever chills fatigue flulike symptoms nausea vomiting diarrhea dysuria rashes stiff neck swollen glands. Does have bilateral DVT sleep apnea obesity edema vitamin D deficiency PAD rheumatoid arthritis multiple sites chronic pain.  Current Outpatient Medications   Medication Sig Note     acetaminophen (TYLENOL) 325 MG tablet Take 650 mg by mouth every 6 (six) hours as needed for pain.             adalimumab (HUMIRA) 40 mg/0.8 mL injection Inject 0.8 mL (40 mg total) under the skin every 14 (fourteen) days.      ascorbic acid, vitamin C, (ASCORBIC ACID WITH KAEL HIPS) 500 MG tablet Take 500 mg by mouth 2 (two) times a day.      cholecalciferol, vitamin D3, (VITAMIN D3) 2,000 unit Tab Take 6,000 Units by mouth daily.      DULoxetine (CYMBALTA) 20 MG capsule Take 1 capsule (20 mg total) by mouth daily. 6/24/2020: Currently takeing     folic acid (FOLVITE) 1 MG tablet Take 1 tablet (1 mg total) by mouth daily. 6/24/2020: Currently taking     HUMIRA PEN 40 mg/0.8 mL PnKt INJECT 0.8 ML (40 MG) UNDER THE SKIN EVERY 14 DAYS      hydroCHLOROthiazide (HYDRODIURIL) 25 MG tablet Take 25 mg by mouth daily.      ibuprofen (ADVIL,MOTRIN) 200 MG tablet Take 400 mg by mouth every 8 (eight)  hours as needed for pain.             methotrexate 10 MG tablet Take 1 tablet (10 mg total) by mouth once a week.      methotrexate 2.5 MG tablet Take 4 tablets (10 mg total) by mouth once a week.      multivitamin therapeutic (THERAGRAN) tablet Take 1 tablet by mouth daily.      nystatin (MYCOSTATIN) powder Apply to rash areas twice daily.      oxyCODONE (OXY-IR) 5 mg capsule Take 1-2 tablets every 4 hours orally as needed for pain relief.  Use 1 tablet for 1-5/10 pain, take 2 tablets for 6-10/10 pain.      rivaroxaban ANTICOAGULANT (XARELTO) 10 mg tablet Take 10 mg by mouth daily.        There were no vitals filed for this visit.  Blood pressure 136/69, pulse 74, respirations 18, temperature 97.8  Physical Exam  Head is normocephalic.  Cardiovascular: Normal rate, regular rhythm and normal heart sounds.    Chronic edema .  Catie wraps.  Pulmonary/Chest: Breath sounds normal.   inder   Abdominal.  Protuberant  Musculoskeletal:   Chronic pain. Chronic edema. Rheumatoid arthritis multiple sites including hands. Left knee scar. Left hip OA. Psychiatric: She has a normal mood and affect. Her behavior is normal.         LABS:   Lab Results   Component Value Date    WBC 4.6 12/21/2020    HGB 13.6 12/21/2020    HCT 42.1 12/21/2020    MCV 96 12/21/2020     12/21/2020     Results for orders placed or performed in visit on 09/13/18   Basic Metabolic Panel   Result Value Ref Range    Sodium 144 136 - 145 mmol/L    Potassium 4.3 3.5 - 5.0 mmol/L    Chloride 101 98 - 107 mmol/L    CO2 34 (H) 22 - 31 mmol/L    Anion Gap, Calculation 9 5 - 18 mmol/L    Glucose 96 70 - 125 mg/dL    Calcium 9.7 8.5 - 10.5 mg/dL    BUN 16 8 - 22 mg/dL    Creatinine 0.75 0.60 - 1.10 mg/dL    GFR MDRD Af Amer >60 >60 mL/min/1.73m2    GFR MDRD Non Af Amer >60 >60 mL/min/1.73m2       Lab Results   Component Value Date    ALT 23 09/28/2020    AST 23 07/25/2019    ALKPHOS 69 07/25/2019    BILITOT 0.3 07/25/2019     Lab Results   Component Value  Date    ALBUMIN 3.6 09/28/2020       Case Management:  I have reviewed the facility/SNF care plan/MDS which was done Today, including the falls risk, nutrition and pain screening. I also reviewed the current immunizations, and preventive care.. Future cancer screening is not clinically indicated secondary to age/goals of care.   Patient's desire to return to the community is present, but is not able due to care needs .    Information reviewed:  Medications, vital signs, orders, and nursing notes.    ASSESSMENT:      ICD-10-CM    1. Primary osteoarthritis involving multiple joints  M89.49    2. Seropositive rheumatoid arthritis of multiple sites (H)  M05.79    3. PAD (peripheral artery disease) (H)  I73.9    4. Physical debility  R53.81        PLAN:    She does receive her laboratory studies performed through the rheumatology clinic.  I will see and check on her lymphedema wraps.  Otherwise she did not have any other new issues to discuss today.  Staff also did not have any other particular concerns.  Continue to manage and follow.      Electronically signed by: Michael Duane Johnson, CNP

## 2021-06-14 NOTE — TELEPHONE ENCOUNTER
[facility] Nurse Triage Telephone Note      Provider: DELPHINE Perry  Facility: [facility]    Facility Type: [facility]    Caller: Sol   Call Back Number:  [phone]    Allergies: Adhesive; Banana; Grape; Morphine (pf); Pseudoephedrine cold/allergy [chlorpheniramine-pseudoephed]; Latex, natural rubber; and Other food allergy    Reason for call: The pt has a new order for pyridium and nursing staff thinks that this was written in the wrong chart. The medication has not been administered.     Verbal Order/Direction given by Provider: Yes, this was meant for another pt, discontinue the pyridium.     Provider giving order: DELPHINE Perry    Verbal order given to: Sierra Blue RN

## 2021-06-15 NOTE — TELEPHONE ENCOUNTER
[facility] Nurse Triage Telephone Note      Provider: DELPHINE Perry  Facility: [facility]    Facility Type: [facility]    Caller: Ava  Call Back Number:  [phone]    Allergies: Adhesive; Banana; Grape; Morphine (pf); Pseudoephedrine cold/allergy [chlorpheniramine-pseudoephed]; Latex, natural rubber; and Other food allergy    Reason for call: Three weeks ago podiatrist started pt on Terbinafine1% cr for fungus on feet, it is really drying her feet. Want to dicontinue it & start Eucerin two times a day to bilateral feet instead.     Verbal Order/Direction given by Provider: OK    Provider giving order: DELPHINE Perry    Verbal order given to: Ava Worthy RN

## 2021-06-15 NOTE — TELEPHONE ENCOUNTER
"[facility] Nurse Triage Telephone Note      Provider: DELPHINE Perry  Facility: [facility]    Facility Type: [facility]    Caller: Joy   Call Back Number:  [phone]    Allergies: Adhesive; Banana; Grape; Morphine (pf); Pseudoephedrine cold/allergy [chlorpheniramine-pseudoephed]; Latex, natural rubber; and Other food allergy    Reason for call: Nursing is calling stating that the pt has a new red rash on the left leg mid calf, no swelling, no warmth, no itching, no pustules.   The pt usually wears certain stockings at night and last night she used different ones, the pt states, it fells like \"stretching.\"     Verbal Order/Direction given by Provider: Continue to monitor the area over the weekend and call if any changes in symptoms or worsening.     Provider giving order: DELPHINE Perry    Verbal order given to: Joy Blue RN      "

## 2021-06-15 NOTE — TELEPHONE ENCOUNTER
[facility] Nurse Triage Telephone Note      Provider: DELPHINE Perry  Facility: [facility]    Facility Type: [facility]    Caller: Westley  Call Back Number:  [phone]    Allergies: Adhesive; Banana; Grape; Morphine (pf); Pseudoephedrine cold/allergy [chlorpheniramine-pseudoephed]; Latex, natural rubber; and Other food allergy    Reason for call: Nurse calling to request for OT for lymphedema wraps.  Patient wears Juxtalite wraps to bilateral LE's and has to reorder new wraps every years.  LE's are now too big for the new Juxtalite wraps, so the DON is requesting OT to eval and treat for lymphedema wraps so that patient can eventually wear her Juxtalite wraps.        Verbal Order/Direction given by Provider: Ok for OT to eval and treat for lymphedema wraps    Provider giving order: DELPHINE Perry    Verbal order given to: Westley Easton RN

## 2021-06-16 PROBLEM — M15.0 PRIMARY OSTEOARTHRITIS INVOLVING MULTIPLE JOINTS: Status: ACTIVE | Noted: 2019-04-26

## 2021-06-16 PROBLEM — Z96.652 HISTORY OF TOTAL LEFT KNEE REPLACEMENT (TKR): Status: ACTIVE | Noted: 2019-04-26

## 2021-06-16 PROBLEM — M25.50 POLYARTHRALGIA: Status: ACTIVE | Noted: 2017-07-21

## 2021-06-16 PROBLEM — Z79.899 HIGH RISK MEDICATION USE: Status: ACTIVE | Noted: 2018-02-13

## 2021-06-16 PROBLEM — R53.81 PHYSICAL DEBILITY: Status: ACTIVE | Noted: 2017-11-28

## 2021-06-16 NOTE — TELEPHONE ENCOUNTER
Telephone Encounter by Desi Bee at 7/17/2019 11:55 AM     Author: Desi Bee Service: -- Author Type: Financial Resource Guide    Filed: 7/17/2019 12:00 PM Encounter Date: 7/17/2019 Status: Signed    : Desi Bee (Financial Resource Guide)       Medication: Humira 40mg/0.8ml  Qty: 2 pens for 28 days  Effective Dates: 7/16/2019 - 7/16/2021  Pharmacy: Restricted to [facility]  Copay Amount: Unknnown  Copay Assistance: Not eligible due to PMAP plan  Patient Notified? Yes, pharmacy to notify

## 2021-06-16 NOTE — TELEPHONE ENCOUNTER
Telephone Encounter by Desi Bee at 3/23/2020  1:39 PM     Author: Desi Bee Service: -- Author Type: Financial Resource Guide    Filed: 3/23/2020  1:41 PM Encounter Date: 3/23/2020 Status: Signed    : Desi Bee (Financial Resource Guide)       Medication: Humira 40mg/0.8ml  Qty: 2 pens for 28 days  [facility]  Test Claim: refill too soon until 03/25/2020  Pharmacy: [facility]  Additional Information: patient lives at an assisted care memory facility so I do not feel it would be appropriate to try to convert this patient.

## 2021-06-16 NOTE — TELEPHONE ENCOUNTER
Telephone Encounter by Desi Bee at 7/16/2019 12:08 PM     Author: Desi Bee Service: -- Author Type: Financial Resource Guide    Filed: 7/16/2019 12:29 PM Encounter Date: 7/16/2019 Status: Signed    : Desi Bee (Financial Resource Guide)       Medication: Humira 40mg/0.8ml  QTY: 2 pens for 28 days  Insurance: [employer]  Additional Information: ePA didn't work so initiated over phone case# [id number]

## 2021-06-16 NOTE — TELEPHONE ENCOUNTER
Labs were taken today and [facility] will fax them to us today.    Patient has a question for Dr Humphrey: Patient was offered a Covid vaccine next week and is wondering if she can still take her Humira tomorrow and then her Methotrexate next week prior to receiving vaccine. Please call Codie at [facility] at [phone] - she will relay messages to patient.

## 2021-06-16 NOTE — TELEPHONE ENCOUNTER
Telephone Encounter by Deis Bee at 1/13/2020 12:34 PM     Author: Desi Bee Service: -- Author Type: Financial Resource Guide    Filed: 1/13/2020 12:40 PM Encounter Date: 1/13/2020 Status: Signed    : Desi Bee (Financial Resource Guide)       Medication: Humira 40mg/0.8ml  Qty: 2 pens for 28 days  Insurance: [employer]  Test Claim: $0  Pharmacy: [facility]   Additional Information: patient lives at an assisted care memory facility so I do not feel it would be appropriate to try to convert this patient.

## 2021-06-16 NOTE — TELEPHONE ENCOUNTER
Dilma RN called back from [facility], labs were not drawn, so she will have labs drawn on pt this week. Pt was due for [facility] last week. I gave verbal lab orders from Dr Humphrey to Dilma and she will fax lab results to us when they are back.     30 day supply sent to [facility] pharmacy to get pt by until we can get lab results. Pt has f/u appt in June with Dr Humphrey

## 2021-06-16 NOTE — TELEPHONE ENCOUNTER
----- Message from Sunita Zavala sent at 3/18/2021  1:28 PM CDT -----  Radha Sofia,     I spoke to a nurse at this patient's care facility. They said they have access to Epic and would rather have labs drawn within their facility to limit the need for transporting patient, and then fax the results back to us here. She will call back to schedule the follow up appointment after speaking with María Elena.     Thanks,  Sunita    ----- Message -----  From: Kimberlyn Chacon, JOY  Sent: 3/11/2021   8:18 AM CDT  To: Rheumatology Scheduling Registration Pool    Please call pt to schedule lab only appt and follow up with Dr Humphrey.

## 2021-06-16 NOTE — TELEPHONE ENCOUNTER
Telephone Encounter by Desi Bee at 6/25/2019 11:54 AM     Author: Desi Bee Service: -- Author Type: Financial Resource Guide    Filed: 6/25/2019 11:55 AM Encounter Date: 6/25/2019 Status: Signed    : Desi Bee (Financial Resource Guide)       Medication: Humira 40mg/0.8ml Pen  Qty: 2 pens for 28 days  Insurance: [employer]  Test Claim: Restricted to [facility]

## 2021-06-16 NOTE — TELEPHONE ENCOUNTER
Patient is received her Covid vaccine today. Patient stated she will not take Humira today because of this. She missed her previous dose as well so, she will have missed 2 weeks. [facility] center would like to confirm this is okay with Dr. Humphrey

## 2021-06-16 NOTE — TELEPHONE ENCOUNTER
Ava, RN from [facility] called re pt's Humira prescription. [facility] rejected the refill until pt is seen by Dr Humphrey. Pt has an appt scheduled for 6/2/21. Ava would like to speak with Dr Humphrey's RN re this pt.  [phone]    Thank you

## 2021-06-16 NOTE — TELEPHONE ENCOUNTER
Left message at number in message for nursing staff at [facility] that pt is past due for labs for refills. Asked for them to call back with any questions or if they need lab orders faxed to them, also left our fax number if pt has had labs drawn more recently to fax us these results

## 2021-06-16 NOTE — PROGRESS NOTES
[facility]    Facility:   [facility] [[id number]]   Code Status: full      CHIEF COMPLAINT/REASON FOR VISIT:  Chief Complaint   Patient presents with     Review Of Multiple Medical Conditions       HISTORY:      HPI: María Elena is a 52 y.o. female who is in a long-term care facility secondary to chronic medical conditions as well as a current review of her medications.  Looking through her medications she has not required any albuterol nebs.  She is on warfarin for next pro time is March 22 she is alternating 8 and 9 mg.  She is on Humira secondary to rheumatoid arthritis.  Rarely takes a ibuprofen product.  Does not need any stool softeners.  She does need assistance with basic ADLs.  She has been normotensive afebrile and also on room air.  Overall she does not care for the facility or at staff but she does continue to reside at the long-term care area.  She really does not get out and socialize stays in room.    Past Medical History:   Diagnosis Date     Aseptic necrosis of femoral head     LEFT     DJD (degenerative joint disease)      DVT, bilateral lower limbs 10/2014     Edema - swelling of the legs after blood clots 5/22/2015     Essential hypertension with goal blood pressure less than 140/90      Failure to thrive      History of blood clots      Hypokalemia 10/15/2014     Lung mass 10/18/2014     Morbid obesity 4/10/2015    Had formal nutrition consult at [facility].  RD reports that she is not willing to commit to the program outlined.  See scanned document.  12/15/2015 - Marcio Quinonez MD        Obesity - although she was not weighed today, she is clearly obese on inspection. 4/10/2015     LOPEZ (obstructive sleep apnea) - abnormal overnight pulse oximetry 5/22/2015     Osteoarthritis      Peripheral vascular disease      Pleural effusion      Pressure ulcer of foot      Rheumatoid arthritis 12/30/2014    seronegative     Seropositive rheumatoid arthritis  1/19/2016     Vitamin D deficiency 8/26/2015             Family History   Problem Relation Age of Onset     Multiple myeloma Father      Deep vein thrombosis Father      Cataracts Father      Snoring Father      Other Brother      Blood withdrawn from time to time.  Sounds like hemochromatosis.     Sleep apnea Brother      Cancer Mother      Uterine CA     Arthritis Mother      Cataracts Mother      Breast cancer Mother 54     No Medical Problems Sister      Rheum arthritis Maternal Grandmother      Breast cancer Maternal Grandmother 50     Heart disease Maternal Grandmother      Rheum arthritis Paternal Aunt      Breast cancer Maternal Aunt 54     Breast cancer Cousin      Clotting disorder Neg Hx      Social History     Social History     Marital status: Single     Spouse name: N/A     Number of children: N/A     Years of education: N/A     Social History Main Topics     Smoking status: Former Smoker     Packs/day: 1.00     Years: 30.00     Types: Cigarettes     Smokeless tobacco: Never Used     Alcohol use Yes      Comment: 3-4 times a year she will have 1 or 2.     Drug use: No     Sexual activity: No     Other Topics Concern     Not on file     Social History Narrative    Long term  At [facility] to rehab to [facility] allowing surgery on deteriorated joints  LTC12/2015         Review of Systems  Denies any URI symptoms postnasal drip fever chills fatigue flulike symptoms nausea vomiting diarrhea dysuria rashes stiff neck swollen glands. Does have bilateral DVT sleep apnea obesity edema vitamin D deficiency PAD rheumatoid arthritis multiple sites chronic pain.        Current Outpatient Prescriptions:      artificial tears,hypromellose, (GENTEAL) 0.3 % Drop, Administer 1 drop to both eyes 4 (four) times a day as needed., Disp: , Rfl:      ascorbic acid, vitamin C, (ASCORBIC ACID WITH KAEL HIPS) 500 MG tablet, Take 500 mg by mouth 2 (two) times a day., Disp: , Rfl:      cholecalciferol, vitamin D3, (VITAMIN D3)  2,000 unit Tab, Take 6,000 Units by mouth daily., Disp: , Rfl:      HUMIRA 40 mg/0.8 mL injection, INJECT THE CONTENTS OF 1 SYRINGE (40MG) SUBCUTANEOUSLY EVERY 14 DAYS.., Disp: 2 each, Rfl: 11     hydroCHLOROthiazide (HYDRODIURIL) 25 MG tablet, Take 25 mg by mouth daily., Disp: , Rfl:      hydroxychloroquine (PLAQUENIL) 200 mg tablet, Take 200 mg by mouth 2 (two) times a day., Disp: , Rfl:      ibuprofen (ADVIL,MOTRIN) 200 MG tablet, Take 400 mg by mouth 3 (three) times a day as needed for pain. , Disp: , Rfl:      multivitamin therapeutic (THERAGRAN) tablet, Take 1 tablet by mouth daily., Disp: , Rfl:      nystatin (MYCOSTATIN) powder, Apply 1 application topically 2 (two) times a day. Wash well between applications. Apply under breasts., Disp: , Rfl:      oxyCODONE (ROXICODONE) 5 MG immediate release tablet, Take 5 mg by mouth every 4 (four) hours as needed for pain., Disp: , Rfl:      senna-docusate (SENNOSIDES-DOCUSATE SODIUM) 8.6-50 mg tablet, Take 1 tablet by mouth daily., Disp: , Rfl:      sulfaSALAzine (AZULFIDINE) 500 mg tablet, Take 3 tablets (1,500 mg total) by mouth 2 (two) times a day., Disp: 180 tablet, Rfl: 0     WARFARIN SODIUM (WARFARIN ORAL), Take by mouth daily. 10/26/17 INR 2.42  Continue taking 8mg on Mondays and Thursdays and 9mg all other days.  Next INR 11/30/17. 9/28/17 INR 2.27 Cont 8mg M & TH, 9mg all other days. Next INR 10/26. 9/13/17 INR 2.39  (missed Warfarin on 9/13)  Continue taking 8mg on Mondays and Thursdays and 9mg all other days.  Next INR 10/12/17. 8/16/17 INR 2.43 Cont 8mg M & Th, 9mg all others. Next INR 9/13., Disp: , Rfl:   .There were no vitals filed for this visit.  Blood pressure 148/82 pulse 75 respirations 18 temperature 98.8  Physical Exam    Constitutional: She is oriented to person, place, and time. She appears well-developed and well-nourished. No distress.   obese .    Neck: Neck supple. No thyromegaly present.   Cardiovascular: Normal rate, regular rhythm and  normal heart sounds.   Chronic edema . Farrow wraps  Pulmonary/Chest: Breath sounds normal.   inder   Abdominal. There is no tenderness. There is no guarding.   Musculoskeletal:   Chronic pain. Chronic edema. Rheumatoid arthritis multiple sites including hands. Left knee scar. Left hip OA. Left hip scar  Neurological: She is oriented to person, place, and time.   Skin: Skin is warm and dry. No rash noted.   Psychiatric: She has a normal mood and affect. Her behavior is normal.       LABS:   Lab Results   Component Value Date    WBC 4.5 02/09/2018    HGB 13.7 02/09/2018    HCT 42.1 02/09/2018    MCV 89 02/09/2018     02/09/2018     Results for orders placed or performed in visit on 10/05/17   Basic Metabolic Panel   Result Value Ref Range    Sodium 140 136 - 145 mmol/L    Potassium 4.7 3.5 - 5.0 mmol/L    Chloride 98 98 - 107 mmol/L    CO2 30 22 - 31 mmol/L    Anion Gap, Calculation 12 5 - 18 mmol/L    Glucose 97 70 - 125 mg/dL    Calcium 10.3 8.5 - 10.5 mg/dL    BUN 14 8 - 22 mg/dL    Creatinine 0.70 0.60 - 1.10 mg/dL    GFR MDRD Af Amer >60 >60 mL/min/1.73m2    GFR MDRD Non Af Amer >60 >60 mL/min/1.73m2           ASSESSMENT:      ICD-10-CM    1. Polyarthralgia M25.50    2. Physical debility R53.81    3. Pain management R52    4. Seropositive rheumatoid arthritis of multiple sites M05.79        PLAN:    No further changes are necessary.  Does see a rheumatologist.  We do adjust her Coumadin once again being between 8 and 9 mg alternating every other day and the next pro time is March 22.      Electronically signed by: Michael Duane Johnson, CNP

## 2021-06-16 NOTE — PROGRESS NOTES
ASSESSMENT AND PLAN:  María Elena Saab 52 y.o. female is here for follow-up.  She is a resident at [facility] [address] here for follow-up of severe, deforming,  erosive, seropositive high titer anti-CCP antibody rheumatoid arthritis.  She is undergoing a flareup.  She has not been able to get Humira for some time there is concern around latex allergy.  Now she has the glass syringe and expects to get regular supply.  We discussed options.  She is inclined to go for a short course of prednisone.  7.5 mg daily due to side effects number ocular metabolic bone related discussed.  She has not had recurrence of an infectious episode in her lower extremity ulceration.  She is aware to keep very close eye there.  Recent labs are within normal range.  She is to follow-up here in 2 months or sooner.        Diagnoses and all orders for this visit:    Seropositive rheumatoid arthritis of multiple sites  -     predniSONE (DELTASONE) 2.5 MG tablet; Take 7.5 mg/d prednisone PO for 3 weeks then stop.  Dispense: 90 tablet; Refill: 0    Cyclic citrullinated peptide (CCP) antibody positive    Polyarthralgia    High risk medication use         HISTORY OF PRESENTING ILLNESS:    María Elena Saab 52 y.o. female  is here for followup.  She has severe severe seropositive erosive deforming rheumatoid arthritis.  She has noted worsening of symptoms.  This is affected her upper extremities.  Especially in the right side.  Affecting her right elbow and wrist and hands.  She is painful at rest this is moderately severe to severe.  Range of motion of the elbow is diminished further.  There was intermittent interruption and her getting the Humira.  Initially a pharmacy change and then a concern around latex allergy and change over to the glass ranges.  All in all there has been quite erratic intake of Humira while she is continued take hydroxychloroquine and sulfasalazine.  Recent labs are within acceptable range.  As well as she is  "aware there is been no recurrence of infection in her lower extremity ulceration.  She is well aware of the risk involved with the current regimen of Humira and the DMARD's... .Further historical information, including ROS as noted in the multidimensional health assessment questionnaire scanned in the EMR and in the assessment and plan section. Rheumatoid Arthritis Disease Activity Measure used: RAPID3, reviewed  today and scanned in the chart.        Following is the excerpt from a recent note:    She has noted significant improvement in her joint pains she's been on Humira for the past of 4 doses, however recently she did have increased pain in her left hand which subsided since.  Now she has leukopenia.  Her decubitus ulcer is noted not to be infected anymore.  I have emphasized the importance of keeping close eye.  She is awaiting left hip replacement.  She is aware that I'm not able to give her the more \"aggressive\"  DMARD still her pressure ulcer heals and is no longer considered infected.  This patient has high titer anti-CCP antibody.  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  She is wheelchair-bound.  This happened this past year in fall.  She was due to have left hip replacement and then subsequent to that right knee replacement but before she could have that done she developed pneumonia and pulmonary embolism.  Prior to hospitalization she reports that she was able to ambulate with the help of a walker.  Since then she could simply not go back to ambulation and is now awaiting surgery at the nursing home.  This morning she told me she was informed that she has a infected decubitus ulcer.  Unless as she heals there the orthopedic surgeons obviously are not prepared to proceed with in the surgical intervention.  Meanwhile her rheumatoid arthritis is appearing to be a bit better today.  She noted that she could move her finger better.  She had trigger finger injection on her previous visit.  I have " outlined to her that we will proceed with sulfasalazine.  I cannot at this point given her methotrexate or other more aggressive DMARD still her infected decubitus ulcer has been under control or healed.       has longstanding seropositive anti-CCP antibody severe deforming, erosive rheumatoid arthritis.   In the past she was said to have infected decubitus ulcer of the right lower extremity posteriorly, she understands that that is now healed or at least not infected anymore.  Her recent evaluation of the vascular clinic shows that there is no residual active infection in her lower extremity ulceration.  In view of this and the fact that even with only hydroxychloroquine and sulfasalazine she has had intermittent neutropenia, she is an unlikely candidate for methotrexate, or leflunomide.    She is now on Humira for the past 2 months.  She is aware that it is going to be an important part of her observations at her primary physician at her home and herself to keep a close eye in case it is evidence of infection.  Will continue current implant.  Recent labs within acceptable range.  Her recent left hip arthroplasty, uneventfully she's not get back on Humira I have asked her to go back this will be 5 weeks out of surgery.  She did hold her Humira 2 weeks prior.  She also has a right ring finger triggering.  She would like to proceed with local injection.  20 mg of methylprednisolone and Marcaine injected in the flexor tendon sheath right ring finger flexor tendon.  Return for follow-up here in 4 months.  See back here in 3 months and regular the  likely this  ALLERGIES:Adhesive; Banana; Grape; Morphine (pf); Other food allergy; and Latex, natural rubber  PAST MEDICAL/ACTIVE PROBLEMS/MEDICATION/SOCIAL DATA  Past Medical History:   Diagnosis Date     Aseptic necrosis of femoral head     LEFT     DJD (degenerative joint disease)      DVT, bilateral lower limbs 10/2014     Edema - swelling of the legs after  blood clots 5/22/2015     Essential hypertension with goal blood pressure less than 140/90      Failure to thrive      History of blood clots      Hypokalemia 10/15/2014     Lung mass 10/18/2014     Morbid obesity 4/10/2015    Had formal nutrition consult at [facility].  RD reports that she is not willing to commit to the program outlined.  See scanned document.  12/15/2015 - Marcio Quinonez MD        Obesity - although she was not weighed today, she is clearly obese on inspection. 4/10/2015     LOPEZ (obstructive sleep apnea) - abnormal overnight pulse oximetry 5/22/2015     Osteoarthritis      Peripheral vascular disease      Pleural effusion      Pressure ulcer of foot      Rheumatoid arthritis 12/30/2014    seronegative     Seropositive rheumatoid arthritis 1/19/2016     Vitamin D deficiency 8/26/2015     History   Smoking Status     Former Smoker     Packs/day: 1.00     Years: 30.00     Types: Cigarettes   Smokeless Tobacco     Never Used     Patient Active Problem List   Diagnosis     DVT of lower extremity, bilateral - residual clot disease on repeat US in NEW location - after six months of warfarin.     Morbid obesity     Warfarin anticoagulation - long-term because of unresolved DVT that first appeared in October, 2014     LOPEZ (obstructive sleep apnea) - abnormal overnight pulse oximetry - SLEEP visit in November, 2015, she states.     Nocturnal hypoxemia - probable sleep apnea - had overnight pulse oximetry, April, 2015.     Edema - swelling of the legs after blood clots     Vitamin D deficiency     Cyclic citrullinated peptide (CCP) antibody positive     Right shoulder tendinitis     Seropositive rheumatoid arthritis of multiple sites     PAD (peripheral artery disease)     Pain management     Radiculopathy of leg     Sleep apnea     Degenerative joint disease (DJD) of hip     Essential hypertension with goal blood pressure less than 140/90     History of DVT of lower extremity     Anemia     H/O total hip  "arthroplasty     Right knee DJD     Trigger finger, right ring finger     Polyarthralgia     Physical debility     Current Outpatient Prescriptions   Medication Sig Dispense Refill     ascorbic acid (VITAMIN C) 250 MG tablet Take 250 mg by mouth 2 (two) times a day.         enoxaparin (LOVENOX) 80 mg/0.8 mL Syrg Inject 80 mg under the skin 2 (two) times a day.         food supplement, lactose-free (BOOST BREEZE NUTRITIONAL) 0.04-1.05 gram-kcal/mL Liqd Use As Directed.         hydroxychloroquine (PLAQUENIL) 200 mg tablet Take 200 mg by mouth 2 (two) times a day.         ibuprofen (ADVIL,MOTRIN) 400 MG tablet Take 400 mg by mouth every 6 (six) hours as needed for pain.         loratadine (CLARITIN) 10 mg tablet Take 10 mg by mouth daily.         magnesium hydroxide (MAGNESIUM HYDROXIDE) 400 mg/5 mL Susp suspension Take by mouth as needed. (ML)         multivitamin Chew Chew 1 tablet daily.         nalOXone (NARCAN) 0.4 mg/mL injection Inject as directed as needed. (ML)         naproxen (NAPROSYN) 500 MG tablet Take 500 mg by mouth 2 (two) times a day as needed.         oxyCODONE (ROXICODONE) 5 MG immediate release tablet Take 5 mg by mouth 4 (four) times a day as needed.         senna-docusate (PERICOLACE) 8.6-50 mg tablet Take 0.5 tablets by mouth daily.          warfarin (COUMADIN) 10 MG tablet          No current facility-administered medications for this visit.     DETAILED EXAMINATION  02/13/18  :  Vitals:    02/13/18 1033   Pulse: 72   Weight: (!) 330 lb (149.7 kg)   Height: 5' 8\" (1.727 m)     Alert oriented. Head including the face is examined for malar rash, heliotropes, scarring, lupus pernio. Eyes examined for redness such as in episcleritis/scleritis, periorbital lesions.   Neck/ Face examined for parotid gland swelling, range of motion of neck.  Left upper and lower and right upper and lower extremities examined for tenderness, swelling, warmth of the appendicular joints, range of motion, edema, rash.  " Some of the important findings included: She has synovitis in multiple joints including the right second third MCP right wrist and right elbow.  She has previously noted flexion deformity of both sides.  Left TKA scar.  She is in a wheelchair.  She has edema of the ankles.       LAB / IMAGING DATA:  ALT   Date Value Ref Range Status   02/09/2018 16 0 - 45 U/L Final   11/27/2017 17 0 - 45 U/L Final   09/20/2017 18 0 - 45 U/L Final     Albumin   Date Value Ref Range Status   02/09/2018 3.5 3.5 - 5.0 g/dL Final   11/27/2017 3.6 3.5 - 5.0 g/dL Final   09/20/2017 3.5 3.5 - 5.0 g/dL Final     Creatinine   Date Value Ref Range Status   02/09/2018 0.68 0.60 - 1.10 mg/dL Final   11/27/2017 0.83 0.60 - 1.10 mg/dL Final   10/05/2017 0.70 0.60 - 1.10 mg/dL Final       WBC   Date Value Ref Range Status   02/09/2018 4.5 4.0 - 11.0 thou/uL Final   11/27/2017 4.9 4.0 - 11.0 thou/uL Final   09/14/2015 4.0 4.0 - 11.0 thou/uL Final   06/30/2015 3.7 (L) 4.0 - 11.0 thou/uL Final     Hemoglobin   Date Value Ref Range Status   02/09/2018 13.7 12.0 - 16.0 g/dL Final   11/27/2017 13.7 12.0 - 16.0 g/dL Final   09/20/2017 12.7 12.0 - 16.0 g/dL Final     Platelets   Date Value Ref Range Status   02/09/2018 225 140 - 440 thou/uL Final   11/27/2017 239 140 - 440 thou/uL Final   09/20/2017 242 140 - 440 thou/uL Final       Lab Results   Component Value Date    RF 25.7 10/15/2014    SEDRATE 46 (H) 02/08/2017

## 2021-06-16 NOTE — TELEPHONE ENCOUNTER
Received labs results. [facility] as we just sent 30 day supply this week, to avoid pharmacy confusion.

## 2021-06-16 NOTE — TELEPHONE ENCOUNTER
Yoselyn HAMILTON at [facility] notified that Dr Humphrey does not recommend holding meds prior to or after receiving COVID vaccine.

## 2021-06-17 NOTE — TELEPHONE ENCOUNTER
Telephone Encounter by Desi Bee at 6/30/2020  9:11 AM     Author: Desi Bee Service: -- Author Type: Financial Resource Guide    Filed: 6/30/2020  9:15 AM Encounter Date: 6/30/2020 Status: Signed    : Desi Bee (Financial Resource Guide)       Medication: Humira 40mg/0.8ml  Qty: 2 pens for 28 days  Insurance: [employer] ([employer])  Test Claim: refill too soon until   Pharmacy: restricted to [facility]   Additional Information: NA

## 2021-06-17 NOTE — TELEPHONE ENCOUNTER
Humira refill sent to [facility] pharmacy. Pt has f/u appt on 6/2/21 with Dr Humphrey

## 2021-06-18 NOTE — LETTER
Letter by Johnson, Michael Duane, CNP at      Author: Johnson, Michael Duane, CNP Service: -- Author Type: --    Filed:  Encounter Date: 3/8/2019 Status: (Other)         Patient: María Elena Saab   MR Number: [id number]   YOB: 1965   Date of Visit: 3/8/2019     [facility]    Facility:   [facility] [[id number]]   Code Status: FULL CODE      CHIEF COMPLAINT/REASON FOR VISIT:  Chief Complaint   Patient presents with   ? Review Of Multiple Medical Conditions       HISTORY:      HPI: María Elena is a 53 y.o. female who was seen secondary to monthly review of chronic medical conditions.  She apparently missed her rheumatology visit due to transport issues in February she does have a visit coming up again in April.  For her chronic pain he does have ibuprofen as needed has not taken any.  Otherwise can have Tylenol as needed and her last dose was on March 1.  She otherwise is on methotrexate sulfasalazine along with Humira and also warfarin.  She otherwise has been normotensive and afebrile and also being treated.  She usually keeps to herself.  Does not participate in group activities.  She did not have any other questions.    Past Medical History:   Diagnosis Date   ? 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 DJD (degenerative joint disease)    ? DVT, bilateral lower limbs (H) 10/2014   ? Edema - swelling of the legs after blood clots 5/22/2015   ? Essential hypertension with goal blood pressure less than 140/90    ? Failure to thrive    ? History of blood clots    ? Hypokalemia 10/15/2014   ? Lung mass 10/18/2014   ? Morbid obesity (H) 4/10/2015    Had formal nutrition consult at [facility].  RD reports that she is not willing to commit to the program outlined.  See scanned document.  12/15/2015 - Marcio Quinonez MD      ? Obesity - although she was not weighed today, she is clearly obese on inspection. 4/10/2015   ? LOPEZ (obstructive sleep apnea) - abnormal  overnight pulse oximetry 5/22/2015   ? Osteoarthritis    ? Peripheral vascular disease (H)    ? Pleural effusion    ? Pressure ulcer of foot    ? Rheumatoid arthritis (H) 12/30/2014    seronegative   ? Seropositive rheumatoid arthritis (H) 1/19/2016   ? Vitamin D deficiency 8/26/2015             Family History   Problem Relation Age of Onset   ? Multiple myeloma Father    ? Deep vein thrombosis Father    ? Cataracts Father    ? Snoring Father    ? Other Brother         Blood withdrawn from time to time.  Sounds like hemochromatosis.   ? Sleep apnea Brother    ? Cancer Mother         Uterine CA   ? Arthritis Mother    ? Cataracts Mother    ? Breast cancer Mother 54   ? No Medical Problems Sister    ? Rheum arthritis Maternal Grandmother    ? Breast cancer Maternal Grandmother 50   ? Heart disease Maternal Grandmother    ? Rheum arthritis Paternal Aunt    ? Breast cancer Maternal Aunt 54   ? Breast cancer Cousin    ? Clotting disorder Neg Hx      Social History     Socioeconomic History   ? Marital status: Single     Spouse name: Not on file   ? Number of children: Not on file   ? Years of education: Not on file   ? Highest education level: Not on file   Occupational History   ? Not on file   Social Needs   ? Financial resource strain: Not on file   ? Food insecurity:     Worry: Not on file     Inability: Not on file   ? Transportation needs:     Medical: Not on file     Non-medical: Not on file   Tobacco Use   ? Smoking status: Former Smoker     Packs/day: 1.00     Years: 30.00     Pack years: 30.00     Types: Cigarettes   ? Smokeless tobacco: Never Used   Substance and Sexual Activity   ? Alcohol use: Yes     Comment: 3-4 times a year she will have 1 or 2.   ? Drug use: No   ? Sexual activity: No     Partners: Female   Lifestyle   ? Physical activity:     Days per week: Not on file     Minutes per session: Not on file   ? Stress: Not on file   Relationships   ? Social connections:     Talks on phone: Not on file      Gets together: Not on file     Attends Anglican service: Not on file     Active member of club or organization: Not on file     Attends meetings of clubs or organizations: Not on file     Relationship status: Not on file   ? Intimate partner violence:     Fear of current or ex partner: Not on file     Emotionally abused: Not on file     Physically abused: Not on file     Forced sexual activity: Not on file   Other Topics Concern   ? Not on file   Social History Narrative    Long term  At [facility] to rehab to level allowing surgery on deteriorated joints  LTC12/2015         Review of Systems  Denies any URI symptoms postnasal drip fever chills fatigue flulike symptoms nausea vomiting diarrhea dysuria rashes stiff neck swollen glands. Does have bilateral DVT sleep apnea obesity edema vitamin D deficiency PAD rheumatoid arthritis multiple sites chronic pain.  .There were no vitals filed for this visit.  Vital signs on March 4 blood pressure 136/76 pulse 70 respirations 18 temperature 97.6  Physical Exam  Cardiovascular: Normal rate, regular rhythm and normal heart sounds.   Chronic edema .   Pulmonary/Chest: Breath sounds normal.   inder   Abdominal.  Protuberant  Musculoskeletal:   Chronic pain. Chronic edema. Rheumatoid arthritis multiple sites including hands. Left knee scar. Left hip OA. Psychiatric: She has a normal mood and affect. Her behavior is normal.    LABS:   .  Lab Results   Component Value Date    WBC 4.2 10/15/2018    HGB 13.8 10/15/2018    HCT 41.2 10/15/2018    MCV 94 10/15/2018     10/15/2018     Lab Results   Component Value Date    ALT 16 10/15/2018    AST 16 05/15/2015    ALKPHOS 82 04/07/2015    BILITOT 0.3 04/07/2015     .  Results for orders placed or performed in visit on 09/13/18   Basic Metabolic Panel   Result Value Ref Range    Sodium 144 136 - 145 mmol/L    Potassium 4.3 3.5 - 5.0 mmol/L    Chloride 101 98 - 107 mmol/L    CO2 34 (H) 22 - 31 mmol/L    Anion Gap, Calculation 9  5 - 18 mmol/L    Glucose 96 70 - 125 mg/dL    Calcium 9.7 8.5 - 10.5 mg/dL    BUN 16 8 - 22 mg/dL    Creatinine 0.75 0.60 - 1.10 mg/dL    GFR MDRD Af Amer >60 >60 mL/min/1.73m2    GFR MDRD Non Af Amer >60 >60 mL/min/1.73m2         ASSESSMENT:      ICD-10-CM    1. Polyarthralgia M25.50    2. Pain management R52    3. Primary osteoarthritis of left hip M16.12        PLAN:    No further changes at this time.  She does see rheumatology they do her normal labs.  Apparently did miss in February so she will try to get there in April.  She did not have any other issues.      Electronically signed by: Michael Duane Johnson, CNP

## 2021-06-18 NOTE — PROGRESS NOTES
ASSESSMENT AND PLAN:  María Elena Saab 52 y.o. female is here for follow-up.  She has severe seropositive rheumatoid arthritis in the background of multiple comorbidities.  She has osteoarthritis.  She has noted a significant improvement with a modest dose of prednisone.  She is on sulfasalazine and hydroxychloroquine and Humira.  He is going to add methotrexate at a modest dose 10 mg per week.  She is aware that should she have recurrence of the ulcer that precluded using some of her medications in the past she would discontinue methotrexate and Humira and report back sooner otherwise return for follow-up in 3 months with labs every 4 week.         Diagnoses and all orders for this visit:    Seropositive rheumatoid arthritis of multiple sites  -     methotrexate 2.5 MG tablet; Take 4 tablets (10 mg total) by mouth once a week.  Dispense: 16 tablet; Refill: 0    Polyarthralgia    High risk medication use    Cyclic citrullinated peptide (CCP) antibody positive           HISTORY OF PRESENTING ILLNESS:    María Elena Saab 52 y.o. female  is here for followup.  She has severe severe seropositive erosive deforming rheumatoid arthritis.  She has noted improvement of symptoms at the prednisone.  Of symptoms.  This is affected her upper extremities.  Especially in the right side.  Affecting her right elbow and wrist and hands.  She is painful at rest this is moderately severe to severe.  Range of motion of the elbow is diminished further.  There was intermittent interruption and her getting the Humira.  Initially a pharmacy change and then a concern around latex allergy and change over to the glass ranges.  All in all there has been quite erratic intake of Humira while she is continued take hydroxychloroquine and sulfasalazine.  Recent labs are within acceptable range.  As well as she is aware there is been no recurrence of infection in her lower extremity ulceration.  She is well aware of the risk involved with the current  "regimen of Humira and the DMARD's.Further historical information, including ROS as noted in the multidimensional health assessment questionnaire scanned in the EMR and in the assessment and plan section. Rheumatoid Arthritis Disease Activity Measure used: RAPID3, reviewed  today and scanned in the chart.        Following is the excerpt from a recent note:    She has noted significant improvement in her joint pains she's been on Humira for the past of 4 doses, however recently she did have increased pain in her left hand which subsided since.  Now she has leukopenia.  Her decubitus ulcer is noted not to be infected anymore.  I have emphasized the importance of keeping close eye.  She is awaiting left hip replacement.  She is aware that I'm not able to give her the more \"aggressive\"  DMARD still her pressure ulcer heals and is no longer considered infected.  This patient has high titer anti-CCP antibody.  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  She is wheelchair-bound.  This happened this past year in fall.  She was due to have left hip replacement and then subsequent to that right knee replacement but before she could have that done she developed pneumonia and pulmonary embolism.  Prior to hospitalization she reports that she was able to ambulate with the help of a walker.  Since then she could simply not go back to ambulation and is now awaiting surgery at the nursing home.  This morning she told me she was informed that she has a infected decubitus ulcer.  Unless as she heals there the orthopedic surgeons obviously are not prepared to proceed with in the surgical intervention.  Meanwhile her rheumatoid arthritis is appearing to be a bit better today.  She noted that she could move her finger better.  She had trigger finger injection on her previous visit.  I have outlined to her that we will proceed with sulfasalazine.  I cannot at this point given her methotrexate or other more aggressive DMARD still " her infected decubitus ulcer has been under control or healed.       has longstanding seropositive anti-CCP antibody severe deforming, erosive rheumatoid arthritis.   In the past she was said to have infected decubitus ulcer of the right lower extremity posteriorly, she understands that that is now healed or at least not infected anymore.  Her recent evaluation of the vascular clinic shows that there is no residual active infection in her lower extremity ulceration.  In view of this and the fact that even with only hydroxychloroquine and sulfasalazine she has had intermittent neutropenia, she is an unlikely candidate for methotrexate, or leflunomide.    She is now on Humira for the past 2 months.  She is aware that it is going to be an important part of her observations at her primary physician at her home and herself to keep a close eye in case it is evidence of infection.  Will continue current implant.  Recent labs within acceptable range.  Her recent left hip arthroplasty, uneventfully she's not get back on Humira I have asked her to go back this will be 5 weeks out of surgery.  She did hold her Humira 2 weeks prior.  She also has a right ring finger triggering.  She would like to proceed with local injection.  20 mg of methylprednisolone and Marcaine injected in the flexor tendon sheath right ring finger flexor tendon.  Return for follow-up here in 4 months.  See back here in 3 months and regular the  likely this  ALLERGIES:Adhesive; Banana; Grape; Morphine (pf); Other food allergy; and Latex, natural rubber  PAST MEDICAL/ACTIVE PROBLEMS/MEDICATION/SOCIAL DATA  Past Medical History:   Diagnosis Date     Aseptic necrosis of femoral head     LEFT     DJD (degenerative joint disease)      DVT, bilateral lower limbs 10/2014     Edema - swelling of the legs after blood clots 5/22/2015     Essential hypertension with goal blood pressure less than 140/90      Failure to thrive      History of blood clots       Hypokalemia 10/15/2014     Lung mass 10/18/2014     Morbid obesity 4/10/2015    Had formal nutrition consult at [facility].  RD reports that she is not willing to commit to the program outlined.  See scanned document.  12/15/2015 - Marcio Quinonez MD        Obesity - although she was not weighed today, she is clearly obese on inspection. 4/10/2015     LOPEZ (obstructive sleep apnea) - abnormal overnight pulse oximetry 5/22/2015     Osteoarthritis      Peripheral vascular disease      Pleural effusion      Pressure ulcer of foot      Rheumatoid arthritis 12/30/2014    seronegative     Seropositive rheumatoid arthritis 1/19/2016     Vitamin D deficiency 8/26/2015     History   Smoking Status     Former Smoker     Packs/day: 1.00     Years: 30.00     Types: Cigarettes   Smokeless Tobacco     Never Used     Patient Active Problem List   Diagnosis     DVT of lower extremity, bilateral - residual clot disease on repeat US in NEW location - after six months of warfarin.     Morbid obesity     Warfarin anticoagulation - long-term because of unresolved DVT that first appeared in October, 2014     LOPEZ (obstructive sleep apnea) - abnormal overnight pulse oximetry - SLEEP visit in November, 2015, she states.     Nocturnal hypoxemia - probable sleep apnea - had overnight pulse oximetry, April, 2015.     Edema - swelling of the legs after blood clots     Vitamin D deficiency     Cyclic citrullinated peptide (CCP) antibody positive     Right shoulder tendinitis     Seropositive rheumatoid arthritis of multiple sites     PAD (peripheral artery disease)     Pain management     Radiculopathy of leg     Sleep apnea     Degenerative joint disease (DJD) of hip     Essential hypertension with goal blood pressure less than 140/90     History of DVT of lower extremity     Anemia     H/O total hip arthroplasty     Right knee DJD     Trigger finger, right ring finger     Polyarthralgia     Physical debility     High risk medication use  "    Current Outpatient Prescriptions   Medication Sig Dispense Refill     ascorbic acid (VITAMIN C) 250 MG tablet Take 250 mg by mouth 2 (two) times a day.         enoxaparin (LOVENOX) 80 mg/0.8 mL Syrg Inject 80 mg under the skin 2 (two) times a day.         food supplement, lactose-free (BOOST BREEZE NUTRITIONAL) 0.04-1.05 gram-kcal/mL Liqd Use As Directed.         hydroxychloroquine (PLAQUENIL) 200 mg tablet Take 200 mg by mouth 2 (two) times a day.         ibuprofen (ADVIL,MOTRIN) 400 MG tablet Take 400 mg by mouth every 6 (six) hours as needed for pain.         loratadine (CLARITIN) 10 mg tablet Take 10 mg by mouth daily.         magnesium hydroxide (MAGNESIUM HYDROXIDE) 400 mg/5 mL Susp suspension Take by mouth as needed. (ML)         multivitamin Chew Chew 1 tablet daily.         nalOXone (NARCAN) 0.4 mg/mL injection Inject as directed as needed. (ML)         naproxen (NAPROSYN) 500 MG tablet Take 500 mg by mouth 2 (two) times a day as needed.         oxyCODONE (ROXICODONE) 5 MG immediate release tablet Take 5 mg by mouth 4 (four) times a day as needed.         senna-docusate (PERICOLACE) 8.6-50 mg tablet Take 0.5 tablets by mouth daily.          warfarin (COUMADIN) 10 MG tablet          No current facility-administered medications for this visit.     DETAILED EXAMINATION  05/14/18  :  Vitals:    05/14/18 1351   BP: 128/76   Resp: 16   Height: 5' 8\" (1.727 m)     Alert oriented. Head including the face is examined for malar rash, heliotropes, scarring, lupus pernio. Eyes examined for redness such as in episcleritis/scleritis, periorbital lesions.   Neck/ Face examined for parotid gland swelling, range of motion of neck.  Left upper and lower and right upper and lower extremities examined for tenderness, swelling, warmth of the appendicular joints, range of motion, edema, rash.  Some of the important findings included: She has synovitis thickening and swelling of the right MCPs #2 3 and 4, right wrist.  She is " in wheelchair, edema of the ankles bilaterally, left TKA.    LAB / IMAGING DATA:  ALT   Date Value Ref Range Status   05/10/2018 16 0 - 45 U/L Final   02/09/2018 16 0 - 45 U/L Final   11/27/2017 17 0 - 45 U/L Final     Albumin   Date Value Ref Range Status   05/10/2018 3.4 (L) 3.5 - 5.0 g/dL Final   02/09/2018 3.5 3.5 - 5.0 g/dL Final   11/27/2017 3.6 3.5 - 5.0 g/dL Final     Creatinine   Date Value Ref Range Status   05/10/2018 0.69 0.60 - 1.10 mg/dL Final   02/09/2018 0.68 0.60 - 1.10 mg/dL Final   11/27/2017 0.83 0.60 - 1.10 mg/dL Final       WBC   Date Value Ref Range Status   05/10/2018 4.4 4.0 - 11.0 thou/uL Final   02/09/2018 4.5 4.0 - 11.0 thou/uL Final   09/14/2015 4.0 4.0 - 11.0 thou/uL Final   06/30/2015 3.7 (L) 4.0 - 11.0 thou/uL Final     Hemoglobin   Date Value Ref Range Status   05/10/2018 13.7 12.0 - 16.0 g/dL Final   02/09/2018 13.7 12.0 - 16.0 g/dL Final   11/27/2017 13.7 12.0 - 16.0 g/dL Final     Platelets   Date Value Ref Range Status   05/10/2018 220 140 - 440 thou/uL Final   02/09/2018 225 140 - 440 thou/uL Final   11/27/2017 239 140 - 440 thou/uL Final       Lab Results   Component Value Date    RF 25.7 10/15/2014    SEDRATE 46 (H) 02/08/2017

## 2021-06-18 NOTE — LETTER
Letter by Luis Enrique Lindsey DO at      Author: Luis Enrique Lindsey DO Service: -- Author Type: --    Filed:  Encounter Date: 1/24/2019 Status: (Other)         Patient: María Elena Saab   MR Number: [id number]   YOB: 1965   Date of Visit: 1/24/2019     [facility]    Facility:   [facility] [[id number]]   Code Status: FULL CODE    Recent medical history/chief concerns: Patient is seen by me for review of multiple medical issues.  At time of exam, patient has no chief concerns.  At the time of my exam, patient had no acute chief concern.  She did not make her last appointment with the rheumatologist due to transportation problems.  She feels her current medication management for treatment of her discomfort related to arthritis is adequate.  She has pain especially in her knees with ambulation.  This pain, along with her other areas of discomfort, continues to make assistance with her ADLs necessary, and she thinks she does still need the services provided in the long-term care facility, especially in the mornings and evenings.  She does not have any acute symptoms of illness at exam, though her most recent set of vital signs does show a slight temperature elevation and lower SPO2.  The set of vital signs before the most recent set showed a normal temperature and SPO2.  She denies having any problems with breathing at exam, and no recent fevers or chills.  No new problems with urination or bowel movements.  She showed me her hand digit small joint deformities related to her arthritis.  She talked to me about her prior problems with her left hip also.  We talked about her room color and how she redecorated when she moved into the facility.  I talked about her getting out for some activities but for now, she prefers to mostly stay in her room during the day.  She keeps busy on her laptop computer, and tablet.    Review of systems: See history of present illness, all  others negative.     Current Outpatient Medications   Medication Sig Dispense Refill   ? artificial tears,hypromellose, (GENTEAL) 0.3 % Drop Administer 1 drop to both eyes 4 (four) times a day as needed.     ? ascorbic acid, vitamin C, (ASCORBIC ACID WITH KAEL HIPS) 500 MG tablet Take 500 mg by mouth 2 (two) times a day.     ? cholecalciferol, vitamin D3, (VITAMIN D3) 2,000 unit Tab Take 6,000 Units by mouth daily.     ? folic acid (FOLVITE) 1 MG tablet Take 1 tablet (1 mg total) by mouth daily.  0   ? HUMIRA PEN 40 mg/0.8 mL PnKt INJECT 0.8 ML (40 MG TOTAL) UNDER THE SKIN EVERY 14 DAYS 1 kit 4   ? hydroCHLOROthiazide (HYDRODIURIL) 25 MG tablet Take 25 mg by mouth daily.     ? hydroxychloroquine (PLAQUENIL) 200 mg tablet Take 200 mg by mouth 2 (two) times a day.     ? ibuprofen (ADVIL,MOTRIN) 200 MG tablet Take 400 mg by mouth 3 (three) times a day as needed for pain.      ? methotrexate 2.5 MG tablet Take 4 tablets (10 mg total) by mouth once a week. 16 tablet 0   ? methotrexate 2.5 MG tablet Take 10 mg by mouth once a week. q Friday     ? multivitamin therapeutic (THERAGRAN) tablet Take 1 tablet by mouth daily.     ? nystatin (MYCOSTATIN) powder Apply 1 application topically 2 (two) times a day. Wash well between applications. Apply under breasts.     ? oxyCODONE (OXY-IR) 5 mg capsule Take 1-2 tablets every 4 hours orally as needed for pain relief.  Use 1 tablet for 1-5/10 pain, take 2 tablets for 6-10/10 pain.  0   ? senna-docusate (SENNOSIDES-DOCUSATE SODIUM) 8.6-50 mg tablet Take 1 tablet by mouth daily.     ? sulfaSALAzine (AZULFIDINE ENTAB) 500 MG EC tablet Take 1,500 mg by mouth 2 (two) times a day.     ? WARFARIN SODIUM (WARFARIN ORAL) Take by mouth daily. 1/11/19 INR 3.02  Take 9mg on Wed and Sun and 8mg AOD.  Next INR 2/7/19.    12/11/18 INR 2.91 Cont 9mg M-W-F, 8mg AOD.  11/29/18 INR 2.99 9mg M-W-F, 8mg AOD.  11/15/18 INR 2.44 Cont 8mg M-W-F, 9mg AOD> Next INR 11/29 11/8/18 INR 3.37 Decrease to 8mg  M-W-F, 9mg AOD. Next INR 11/15.  10/11/18 INR 2.29  Cont 8mg Mon and Thurs and 9mg AOD.  Next INR 11/8/18.    9/14/18 INR 2.39 Cont 8mg M & TH, 9mg AOD. Next INR 10/11.             No current facility-administered medications for this visit.        Past Medical History:   Diagnosis Date   ? 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 DJD (degenerative joint disease)    ? DVT, bilateral lower limbs (H) 10/2014   ? Edema - swelling of the legs after blood clots 5/22/2015   ? Essential hypertension with goal blood pressure less than 140/90    ? Failure to thrive    ? History of blood clots    ? Hypokalemia 10/15/2014   ? Lung mass 10/18/2014   ? Morbid obesity (H) 4/10/2015    Had formal nutrition consult at [facility].  RD reports that she is not willing to commit to the program outlined.  See scanned document.  12/15/2015 - Marcio Quinonez MD      ? Obesity - although she was not weighed today, she is clearly obese on inspection. 4/10/2015   ? LOPEZ (obstructive sleep apnea) - abnormal overnight pulse oximetry 5/22/2015   ? Osteoarthritis    ? Peripheral vascular disease (H)    ? Pleural effusion    ? Pressure ulcer of foot    ? Rheumatoid arthritis (H) 12/30/2014    seronegative   ? Seropositive rheumatoid arthritis (H) 1/19/2016   ? Vitamin D deficiency 8/26/2015      Past Surgical History:   Procedure Laterality Date   ? JOINT REPLACEMENT      knee   ? TOTAL HIP ARTHROPLASTY Left 10/18/2016    Procedure: LEFT HIP TOTAL ARTHROPLASTY;  Surgeon: London Goss MD;  Location: [facility] OR;  Service:    ? TOTAL KNEE ARTHROPLASTY Left 2006      Social History     Socioeconomic History   ? Marital status: Single     Spouse name: Not on file   ? Number of children: Not on file   ? Years of education: Not on file   ? Highest education level: Not on file   Social Needs   ? Financial resource strain: Not on file   ? Food insecurity - worry: Not on file   ? Food insecurity - inability: Not on file   ? Transportation  needs - medical: Not on file   ? Transportation needs - non-medical: Not on file   Occupational History   ? Not on file   Tobacco Use   ? Smoking status: Former Smoker     Packs/day: 1.00     Years: 30.00     Pack years: 30.00     Types: Cigarettes   ? Smokeless tobacco: Never Used   Substance and Sexual Activity   ? Alcohol use: Yes     Comment: 3-4 times a year she will have 1 or 2.   ? Drug use: No   ? Sexual activity: No     Partners: Female   Other Topics Concern   ? Not on file   Social History Narrative    Long term  At [facility] to rehab to [facility] allowing surgery on deteriorated joints  LTC12/2015       Social History     Tobacco Use   Smoking Status Former Smoker   ? Packs/day: 1.00   ? Years: 30.00   ? Pack years: 30.00   ? Types: Cigarettes   Smokeless Tobacco Never Used        Exam:   Vitals:    01/04/19 1450 01/20/19 1602   BP:  109/76   Pulse:  76   Resp:  20   Temp:  99.1  F (37.3  C)   SpO2:  92%   Weight: (!) 330 lb (149.7 kg)        EXAM:   General: Vital signs reviewed.  Patient is in no acute appearing distress.  Breathing appears nonlabored.  Patient is alert and oriented ×3.  ENT exam: No rhinorrhea or abnormal ear drainage.  No intraoral plaques, vesicles, or ulcers.  Eyes: No scleral discoloration, corneas are clear.  No mattering.  Neck: Supple  Heart: Heart rate is regular without murmur.  Lungs: Lungs are clear to auscultation with good airflow bilaterally.  Skin/extremities: Skin is warm and dry.  She had some lower extremity Velcro compression wraps on.  I partially removed the one on her left lower extremity, and removed her sock to examine an area of skin irritation on her left heel she has had recently.  This is healed up well, with no open skin areas, areas of discoloration, or areas of increased skin temperature.  Neuro: No acute focal abnormalities/deficits.  Psych: Patient had a very pleasant affect making good eye contact during exam.  Her speech was clear and fluent.  She  stayed on conversation topics well.    Recent studies: Last INR on 01/11/2019 was 3.02, near ideal therapeutic range.    Approximately 25 minutes was spent on patient management, with greater than 50% of this time being spent on medication reconciliation between electronic medical records, review of past medical history and current medical management, and discussion of management with patient and care staff.  Assessment/Plan   1. Morbid obesity (H)     2. Warfarin anticoagulation - long-term because of unresolved DVT that first appeared in October, 2014     3. Localized edema     4. Seropositive rheumatoid arthritis of multiple sites (H)     5. Vitamin D deficiency     6. Physical debility     7. Shingles outbreak  oxyCODONE (OXY-IR) 5 mg capsule       Patient Instructions   No change in current management needed for now.  Patient will try to see her rheumatologist again as soon as possible.  She seems to be doing well overall with her current living arrangement.     Luis Enrique Lindsey, DO

## 2021-06-19 NOTE — LETTER
Letter by Luis Enrique Lindsey DO at      Author: Luis Enrique Lindsey DO Service: -- Author Type: --    Filed:  Encounter Date: 5/28/2019 Status: (Other)         Patient: María Elena Saab   MR Number: [id number]   YOB: 1965   Date of Visit: 5/28/2019     [facility]    Facility:   [facility] [[id number]]   Code Status: FULL CODE    Recent medical history/chief concerns: Patient is seen by me for review of multiple medical issues.  At time of exam, patient had no acute chief concerns.  She was seen by her rheumatologist on 04/26/2019, at which time her treatment using sulfasalazine was discontinued.  She was started on a new treatment using duloxetine.  She thinks her discomfort related to her arthritis is about the same with these changes.  She told me she uses her oxycodone usually only once daily at time of bedtime.  She thinks she is doing well overall.  She feels like her mood is good.  She is eating and drinking well.  No new problems with urination or bowel movements.  Her pain management on current medications is adequate.  She is walking a couple times per day in the hallway, which I saw her doing on day of exam.  She does still though needs significant assistance with her ADLs because of her arthritis.    Review of systems: See history of present illness, all others negative.     Current Outpatient Medications   Medication Sig Dispense Refill   ? acetaminophen (TYLENOL) 325 MG tablet Take 650 mg by mouth every 4 (four) hours as needed for pain.     ? artificial tears,hypromellose, (GENTEAL) 0.3 % Drop Administer 1 drop to both eyes 4 (four) times a day as needed.     ? ascorbic acid, vitamin C, (ASCORBIC ACID WITH KAEL HIPS) 500 MG tablet Take 500 mg by mouth 2 (two) times a day.     ? cephalexin (KEFLEX) 500 MG capsule Take 4 capsules (2,000 mg total) by mouth once for 1 dose. To be given 1 hour prior to dental appointments PRN. 4 capsule 0   ?  cholecalciferol, vitamin D3, (VITAMIN D3) 2,000 unit Tab Take 6,000 Units by mouth daily.     ? DULoxetine (CYMBALTA) 20 MG capsule Take 1 capsule (20 mg total) by mouth daily. 30 capsule 2   ? folic acid (FOLVITE) 1 MG tablet Take 1 tablet (1 mg total) by mouth daily.  0   ? HUMIRA PEN 40 mg/0.8 mL PnKt INJECT 0.8 ML (40 MG TOTAL) UNDER THE SKIN EVERY 14 DAYS 1 kit 4   ? hydroCHLOROthiazide (HYDRODIURIL) 25 MG tablet Take 25 mg by mouth daily.     ? hydroxychloroquine (PLAQUENIL) 200 mg tablet Take 200 mg by mouth 2 (two) times a day.     ? ibuprofen (ADVIL,MOTRIN) 200 MG tablet Take 400 mg by mouth 3 (three) times a day as needed for pain.      ? methotrexate 2.5 MG tablet Take 4 tablets (10 mg total) by mouth once a week. 16 tablet 0   ? multivitamin therapeutic (THERAGRAN) tablet Take 1 tablet by mouth daily.     ? nystatin (MYCOSTATIN) powder Apply 1 application topically 2 (two) times a day. Wash well between applications. Apply under breasts.     ? oxyCODONE (OXY-IR) 5 mg capsule Take 1-2 tablets every 4 hours orally as needed for pain relief.  Use 1 tablet for 1-5/10 pain, take 2 tablets for 6-10/10 pain.  0   ? senna-docusate (SENNOSIDES-DOCUSATE SODIUM) 8.6-50 mg tablet Take 1 tablet by mouth daily.     ? WARFARIN SODIUM (WARFARIN ORAL) Take by mouth daily. 5/3/19 INR 1.88  Take 9mg M-W-F and 8mg AOD.  Next INR 5/30/19.    4/4/19 INR 2.28  Cont 9mg on Wed and Sun and 8mg AOD.  Next INR 5/3/19.  3/7/19 INR 2.22  Cont 9mg on Wed and Sun and 8mg AOD.  Next INR 4/4/19.    2/7/19 INR 2.54  Cont 9mg on Wed and Sun and 8mg AOD.  Next INR 3/7/19.    1/11/19 INR 3.02  Take 9mg on Wed and Sun and 8mg AOD.  Next INR 2/7/19.    12/11/18 INR 2.91 Cont 9mg M-W-F, 8mg AOD.  11/29/18 INR 2.99 9mg M-W-F, 8mg AOD.             No current facility-administered medications for this visit.        Past Medical History:   Diagnosis Date   ? 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 DJD (degenerative joint disease)    ?  DVT, bilateral lower limbs (H) 10/2014   ? Edema - swelling of the legs after blood clots 5/22/2015   ? Essential hypertension with goal blood pressure less than 140/90    ? Failure to thrive    ? History of blood clots    ? Hypokalemia 10/15/2014   ? Lung mass 10/18/2014   ? Morbid obesity (H) 4/10/2015    Had formal nutrition consult at [facility].  RD reports that she is not willing to commit to the program outlined.  See scanned document.  12/15/2015 - Marcio Quinonez MD      ? Obesity - although she was not weighed today, she is clearly obese on inspection. 4/10/2015   ? LOPEZ (obstructive sleep apnea) - abnormal overnight pulse oximetry 5/22/2015   ? Osteoarthritis    ? Peripheral vascular disease (H)    ? Pleural effusion    ? Pressure ulcer of foot    ? Rheumatoid arthritis (H) 12/30/2014    seronegative   ? Seropositive rheumatoid arthritis (H) 1/19/2016   ? Vitamin D deficiency 8/26/2015      Past Surgical History:   Procedure Laterality Date   ? JOINT REPLACEMENT      knee   ? TOTAL HIP ARTHROPLASTY Left 10/18/2016    Procedure: LEFT HIP TOTAL ARTHROPLASTY;  Surgeon: London Goss MD;  Location: [facility] OR;  Service:    ? TOTAL KNEE ARTHROPLASTY Left 2006      Social History     Socioeconomic History   ? Marital status: Single     Spouse name: Not on file   ? Number of children: Not on file   ? Years of education: Not on file   ? Highest education level: Not on file   Occupational History   ? Not on file   Social Needs   ? Financial resource strain: Not on file   ? Food insecurity:     Worry: Not on file     Inability: Not on file   ? Transportation needs:     Medical: Not on file     Non-medical: Not on file   Tobacco Use   ? Smoking status: Former Smoker     Packs/day: 1.00     Years: 30.00     Pack years: 30.00     Types: Cigarettes   ? Smokeless tobacco: Never Used   Substance and Sexual Activity   ? Alcohol use: Yes     Comment: 3-4 times a year she will have 1 or 2.   ? Drug use: No   ?  Sexual activity: Never     Partners: Female   Lifestyle   ? Physical activity:     Days per week: Not on file     Minutes per session: Not on file   ? Stress: Not on file   Relationships   ? Social connections:     Talks on phone: Not on file     Gets together: Not on file     Attends Manda[employer] service: Not on file     Active member of club or organization: Not on file     Attends meetings of clubs or organizations: Not on file     Relationship status: Not on file   ? Intimate partner violence:     Fear of current or ex partner: Not on file     Emotionally abused: Not on file     Physically abused: Not on file     Forced sexual activity: Not on file   Other Topics Concern   ? Not on file   Social History Narrative    Long term  At [facility] to rehab to level allowing surgery on deteriorated joints  LTC12/2015       Social History     Tobacco Use   Smoking Status Former Smoker   ? Packs/day: 1.00   ? Years: 30.00   ? Pack years: 30.00   ? Types: Cigarettes   Smokeless Tobacco Never Used        Exam:   Vitals:    05/02/19 1419 05/26/19 0956   BP:  130/76   Pulse:  72   Resp:  18   Temp:  98.2  F (36.8  C)   SpO2:  96%   Weight: (!) 337 lb 6.4 oz (153 kg)        EXAM:   General: Vital signs reviewed.  Patient is in no acute appearing distress weight is a 30 room while she sat in her wheelchair.  Breathing appears nonlabored.  Patient is alert and oriented ×3.  HEENT exam: No abnormal nasal or ear drainage.  No intraoral plaques suggestive of oral Candida, or other abnormal lesions.  Patient has normal-appearing sclera and corneas are clear.  No pharyngeal injection.  Neck: Supple without JVD.  Heart: Heart rate is regular without murmur.  Lungs: Lungs are clear to auscultation with good airflow bilaterally.  Skin/extremities: Overall warm and dry.  The feet were examined, with no open skin areas noted.  Her prior left heel ulcer has intact thickened skin in this area with no erythema, or tenderness noted.    I  reviewed the labs ordered by rheumatology this past April which included a CBC study, serum creatinine, ALT, and albumin level, all of which were normal.    Approximately 25 minutes was spent on patient management, with greater than 50% of this time being spent on medication reconciliation between electronic medical records, review of past medical history and current medical management, and discussion of management with patient and care staff.  Assessment/Plan   1. Warfarin anticoagulation - long-term because of unresolved DVT that first appeared in October, 2014     2. Seropositive rheumatoid arthritis of multiple sites (H)     3. Morbid obesity (H)     4. Essential hypertension with goal blood pressure less than 140/90     5. Vitamin D deficiency     6. Osteoarthritis of hip, unspecified laterality, unspecified osteoarthritis type     7. History of total hip replacement, unspecified laterality     8. Physical debility     9. High risk medication use     10. Primary osteoarthritis involving multiple joints     11. History of total left knee replacement (TKR)         There are no Patient Instructions on file for this visit.   Luis Enrique Lindsey DO

## 2021-06-19 NOTE — LETTER
Letter by Johnson, Michael Duane, CNP at      Author: Johnson, Michael Duane, CNP Service: -- Author Type: --    Filed:  Encounter Date: 9/26/2019 Status: Signed         Patient: María Elena Saab   MR Number: [id number]   YOB: 1965   Date of Visit: 9/26/2019     [facility]    Facility:   [facility] [[id number]]   Code Status: FULL CODE      CHIEF COMPLAINT/REASON FOR VISIT:  Chief Complaint   Patient presents with   ? Problem Visit     asked to see       HISTORY:      HPI: María Elena is a 54 y.o. female who I was asked to see secondary to about a 2-day history of left leg pain without any injury.  Also today she was having some issues with standing and transferring using her left leg.  She does have a history of rheumatoid arthritis and she states that she was sitting in her wheelchair next to the bedside stand and had her knee bent at a 90 degree angle for an extended period of time probably an hour or so and then with that she did feel some uncomfortable feeling into the left hip area and then the following day had difficulty with flexion extension and inability to independently transfer from her wheelchair to the commode.  She normally does have edematous thighs I do not feel there is any issue right now with a clot or DVT as I am palpating her left hip around the trochanteric bursa area down through the IT band which she states is tender to palpation and that also she does have a history of left knee replacement she does have a slight effusion.  Negative to the popliteal area.  Does have her lymphedema wraps bilaterally.  We did talk about the the knee itself as well as the various tendons ligaments and connective tissue and how they do crossover joints and that she does not have any numbness or tingling and it was not an injury and she can have as were doing today a passive range of motion it is stiff but she is able to perform flexion and extension with  the knee.  Quadricep is unremarkable to exam.  After talking further she does agree that it is more of a muscular issue and so therefore we did talk about various adjunct of cares including cool packs and stretching exercises.  She also does feel that her wheelchair cushion needs to be reevaluated because it does seem to perhaps even contribute to the left leg issue today but she states that it is not wide enough for her chair and her buttock seems to sit on the crease of the pad rather than extend to the outside of the thigh.  Usually she does require assistance with basic ADLs including assistance on the commode as well as ADL care.  Past Medical History:   Diagnosis Date   ? 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 Bacteremia due to group B Streptococcus    ? Cellulitis of right lower extremity    ? DJD (degenerative joint disease)    ? DVT, bilateral lower limbs (H) 10/2014   ? Edema - swelling of the legs after blood clots 5/22/2015   ? Essential hypertension with goal blood pressure less than 140/90    ? Failure to thrive    ? History of blood clots    ? Hypokalemia 10/15/2014   ? Lung mass 10/18/2014   ? Morbid obesity (H) 4/10/2015    Had formal nutrition consult at [facility].  RD reports that she is not willing to commit to the program outlined.  See scanned document.  12/15/2015 - Marcio Quinonez MD      ? Obesity - although she was not weighed today, she is clearly obese on inspection. 4/10/2015   ? LOPEZ (obstructive sleep apnea) - abnormal overnight pulse oximetry 5/22/2015   ? Osteoarthritis    ? Peripheral vascular disease (H)    ? Pleural effusion    ? Pressure ulcer of foot    ? Rheumatoid arthritis (H) 12/30/2014    seronegative   ? Sepsis (H)    ? Seropositive rheumatoid arthritis (H) 1/19/2016   ? Vitamin D deficiency 8/26/2015             Family History   Problem Relation Age of Onset   ? Multiple myeloma Father    ? Deep vein thrombosis Father    ? Cataracts Father    ? Snoring Father     ? Other Brother         Blood withdrawn from time to time.  Sounds like hemochromatosis.   ? Sleep apnea Brother    ? Cancer Mother         Uterine CA   ? Arthritis Mother    ? Cataracts Mother    ? Breast cancer Mother 54   ? No Medical Problems Sister    ? Rheum arthritis Maternal Grandmother    ? Breast cancer Maternal Grandmother 50   ? Heart disease Maternal Grandmother    ? Rheum arthritis Paternal Aunt    ? Breast cancer Maternal Aunt 54   ? Breast cancer Cousin    ? Clotting disorder Neg Hx      Social History     Socioeconomic History   ? Marital status: Single     Spouse name: Not on file   ? Number of children: Not on file   ? Years of education: Not on file   ? Highest education level: Not on file   Occupational History   ? Not on file   Social Needs   ? Financial resource strain: Not on file   ? Food insecurity:     Worry: Not on file     Inability: Not on file   ? Transportation needs:     Medical: Not on file     Non-medical: Not on file   Tobacco Use   ? Smoking status: Former Smoker     Packs/day: 1.00     Years: 30.00     Pack years: 30.00     Types: Cigarettes   ? Smokeless tobacco: Never Used   Substance and Sexual Activity   ? Alcohol use: Yes     Comment: 3-4 times a year she will have 1 or 2.   ? Drug use: No   ? Sexual activity: Never     Partners: Female   Lifestyle   ? Physical activity:     Days per week: Not on file     Minutes per session: Not on file   ? Stress: Not on file   Relationships   ? Social connections:     Talks on phone: Not on file     Gets together: Not on file     Attends Druze service: Not on file     Active member of club or organization: Not on file     Attends meetings of clubs or organizations: Not on file     Relationship status: Not on file   ? Intimate partner violence:     Fear of current or ex partner: Not on file     Emotionally abused: Not on file     Physically abused: Not on file     Forced sexual activity: Not on file   Other Topics Concern   ? Not on  file   Social History Narrative    Long term  At [facility] to rehab to level allowing surgery on deteriorated joints  LTC12/2015         Review of Systems  No reports of any URI symptoms postnasal drip fever chills fatigue flulike symptoms nausea vomiting diarrhea dysuria rashes stiff neck swollen glands. Does have bilateral DVT sleep apnea obesity edema vitamin D deficiency PAD rheumatoid arthritis multiple sites chronic pain.    Current Outpatient Medications:   ?  acetaminophen (TYLENOL) 325 MG tablet, Take 650 mg by mouth every 6 (six) hours as needed for pain.    , Disp: , Rfl:   ?  adalimumab (HUMIRA) 40 mg/0.8 mL injection, Inject 0.8 mL (40 mg total) under the skin every 14 (fourteen) days., Disp: 0.8 mL, Rfl: 0  ?  ascorbic acid, vitamin C, (ASCORBIC ACID WITH KAEL HIPS) 500 MG tablet, Take 500 mg by mouth 2 (two) times a day., Disp: , Rfl:   ?  cholecalciferol, vitamin D3, (VITAMIN D3) 2,000 unit Tab, Take 6,000 Units by mouth daily., Disp: , Rfl:   ?  DULoxetine (CYMBALTA) 20 MG capsule, Take 1 capsule (20 mg total) by mouth daily., Disp: 30 capsule, Rfl: 2  ?  folic acid (FOLVITE) 1 MG tablet, Take 1 tablet (1 mg total) by mouth daily., Disp: , Rfl: 0  ?  hydroCHLOROthiazide (HYDRODIURIL) 25 MG tablet, Take 25 mg by mouth daily., Disp: , Rfl:   ?  hydroxychloroquine (PLAQUENIL) 200 mg tablet, Take 200 mg by mouth 2 (two) times a day., Disp: , Rfl:   ?  ibuprofen (ADVIL,MOTRIN) 200 MG tablet, Take 400 mg by mouth every 8 (eight) hours as needed for pain.    , Disp: , Rfl:   ?  methotrexate 2.5 MG tablet, Take 4 tablets (10 mg total) by mouth once a week., Disp: 16 tablet, Rfl: 0  ?  multivitamin therapeutic (THERAGRAN) tablet, Take 1 tablet by mouth daily., Disp: , Rfl:   ?  nystatin (MYCOSTATIN) powder, Apply to rash areas twice daily., Disp: 15 g, Rfl: 0  ?  oxyCODONE (OXY-IR) 5 mg capsule, Take 1-2 tablets every 4 hours orally as needed for pain relief.  Use 1 tablet for 1-5/10 pain, take 2  tablets for 6-10/10 pain., Disp: 60 tablet, Rfl: 0  ?  WARFARIN SODIUM (WARFARIN ORAL), Take by mouth daily. 9/6/19 INR 3.15 Decrease to 9mg M & TH, 8mg AOD. Next INR 10/3. 8/9/19 INR 2.79 Take 9mg M, W, F and 8mg AOD Next INR 9/6 8/8/19 INR missed.  Give 8mg on 8/8.  Next INR 8/9/19.   7/25/19 INR 2.23  Cont 9mg M-W-F and 8mg AOD.  Next INR 8/8/19.   7/22/19 INR missed.  Cont 9mg M-W-F and 8mg AOD.  Next INR 7/25/19.   7/18/19 INR 2.79  Cont 9mg M-W-F and 8mg AOD.  Next INR 7/22/19   , Disp: , Rfl:     There were no vitals filed for this visit.  Vital signs on September 23 of the only time this month blood pressure 170/80 pulse 80 respirations 20 temperature 97.5  Physical Exam  The left quadricep thigh trochanteric bursa and knee as mentioned above.  She already is on warfarin and she is getting a pro time coming up in the next couple of days.  She can have narcotics she did take an oxycodone on September 24 otherwise can have ibuprofen to which she does not take any as needed.  She can also have Tylenol as needed and has not received any.  She is on methotrexate by her rheumatologist.  LABS:   No results found for: HGBA1C  No results found for: TSH, W5RPSOV, H8POUHE, THYROIDAB  Lab Results   Component Value Date    CHOL 209 (H) 04/13/2015    CHOL 227 (H) 04/07/2015     Lab Results   Component Value Date    HDL 61 04/13/2015    HDL 65 04/07/2015     Lab Results   Component Value Date    LDLCALC 138 (H) 04/13/2015    LDLCALC 139 (H) 04/07/2015     Lab Results   Component Value Date    TRIG 48 04/13/2015    TRIG 113 04/07/2015     No components found for: CHOLHDL  Lab Results   Component Value Date    WBC 4.7 09/10/2019    HGB 14.0 09/10/2019    HCT 41.6 09/10/2019    MCV 88 09/10/2019     09/10/2019   \  Lab Results   Component Value Date    ALT 20 09/10/2019    AST 23 07/25/2019    ALKPHOS 69 07/25/2019    BILITOT 0.3 07/25/2019         ASSESSMENT:      ICD-10-CM    1. Acute pain of left knee M25.562    2.  Pain of left hip joint M25.552    3. Knee effusion, left M25.462    4. Weakness of left lower extremity R29.898        PLAN:    Cool packs to the left knee as well as trochanteric bursa for the next few days as well as some passive stretching exercises and then also therapy to look at her wheelchair question in her wheelchair positioning.  After further conversation she does agree that it is probably more muscular and soft tissue.  Therefore we will keep an INR that I also explained that to the staff member.  They will keep me updated for any changes problems or other issues.  She did not have any other concerns or questions with this visit.    For documentation purposes total visit 35 minutes which over 50% was spent with the patient going over injury the explanation the anatomy physiology of the quadricep as well as the knee but also treatment modalities and coordination of care.      Electronically signed by: Michael Duane Johnson, CNP

## 2021-06-19 NOTE — LETTER
Letter by Johnson, Michael Duane, CNP at      Author: Johnson, Michael Duane, CNP Service: -- Author Type: --    Filed:  Encounter Date: 4/25/2019 Status: (Other)         Patient: María Elena Saab   MR Number: [id number]   YOB: 1965   Date of Visit: 4/25/2019     [facility]    Facility:   [facility] [[id number]]   Code Status: FULL CODE      CHIEF COMPLAINT/REASON FOR VISIT:  Chief Complaint   Patient presents with   ? Review Of Multiple Medical Conditions       HISTORY:      HPI: María Elena is a 53 y.o. female who was seen secondary to monthly review of chronic medical conditions.  She did refuse to go out and get her laboratory studies performed yesterday because they did not bring the what she felt was the most appropriate transportation for her to undergo in a wheelchair but they did bring a gurney so she did not get her labs yesterday for her rheumatology visit that she is having tomorrow on April 26.  She does have polyarthritis as well as rheumatoid issues in multiple joints.  She usually stays isolated in her room.  She does have a number of pain medications and she usually takes about 1 oxycodone as needed for days she can also have Tylenol as needed usually takes about 1 dose per day and also can have ibuprofen and the last and only dose the last 2 weeks was on April 23.  She otherwise does need assistance with all ADLs.  She is getting her warfarin checks usually monthly.    Past Medical History:   Diagnosis Date   ? 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 DJD (degenerative joint disease)    ? DVT, bilateral lower limbs (H) 10/2014   ? Edema - swelling of the legs after blood clots 5/22/2015   ? Essential hypertension with goal blood pressure less than 140/90    ? Failure to thrive    ? History of blood clots    ? Hypokalemia 10/15/2014   ? Lung mass 10/18/2014   ? Morbid obesity (H) 4/10/2015    Had formal nutrition consult at [facility].   RD reports that she is not willing to commit to the program outlined.  See scanned document.  12/15/2015 - Marcio Quinonez MD      ? Obesity - although she was not weighed today, she is clearly obese on inspection. 4/10/2015   ? LOPEZ (obstructive sleep apnea) - abnormal overnight pulse oximetry 5/22/2015   ? Osteoarthritis    ? Peripheral vascular disease (H)    ? Pleural effusion    ? Pressure ulcer of foot    ? Rheumatoid arthritis (H) 12/30/2014    seronegative   ? Seropositive rheumatoid arthritis (H) 1/19/2016   ? Vitamin D deficiency 8/26/2015             Family History   Problem Relation Age of Onset   ? Multiple myeloma Father    ? Deep vein thrombosis Father    ? Cataracts Father    ? Snoring Father    ? Other Brother         Blood withdrawn from time to time.  Sounds like hemochromatosis.   ? Sleep apnea Brother    ? Cancer Mother         Uterine CA   ? Arthritis Mother    ? Cataracts Mother    ? Breast cancer Mother 54   ? No Medical Problems Sister    ? Rheum arthritis Maternal Grandmother    ? Breast cancer Maternal Grandmother 50   ? Heart disease Maternal Grandmother    ? Rheum arthritis Paternal Aunt    ? Breast cancer Maternal Aunt 54   ? Breast cancer Cousin    ? Clotting disorder Neg Hx      Social History     Socioeconomic History   ? Marital status: Single     Spouse name: Not on file   ? Number of children: Not on file   ? Years of education: Not on file   ? Highest education level: Not on file   Occupational History   ? Not on file   Social Needs   ? Financial resource strain: Not on file   ? Food insecurity:     Worry: Not on file     Inability: Not on file   ? Transportation needs:     Medical: Not on file     Non-medical: Not on file   Tobacco Use   ? Smoking status: Former Smoker     Packs/day: 1.00     Years: 30.00     Pack years: 30.00     Types: Cigarettes   ? Smokeless tobacco: Never Used   Substance and Sexual Activity   ? Alcohol use: Yes     Comment: 3-4 times a year she will have 1  or 2.   ? Drug use: No   ? Sexual activity: Never     Partners: Female   Lifestyle   ? Physical activity:     Days per week: Not on file     Minutes per session: Not on file   ? Stress: Not on file   Relationships   ? Social connections:     Talks on phone: Not on file     Gets together: Not on file     Attends Christianity service: Not on file     Active member of club or organization: Not on file     Attends meetings of clubs or organizations: Not on file     Relationship status: Not on file   ? Intimate partner violence:     Fear of current or ex partner: Not on file     Emotionally abused: Not on file     Physically abused: Not on file     Forced sexual activity: Not on file   Other Topics Concern   ? Not on file   Social History Narrative    Long term  At [facility] to rehab to [facility] allowing surgery on deteriorated joints  LTC12/2015         Review of Systems  No reports of any URI symptoms postnasal drip fever chills fatigue flulike symptoms nausea vomiting diarrhea dysuria rashes stiff neck swollen glands. Does have bilateral DVT sleep apnea obesity edema vitamin D deficiency PAD rheumatoid arthritis multiple sites chronic pain.  .    Current Outpatient Medications:   ?  artificial tears,hypromellose, (GENTEAL) 0.3 % Drop, Administer 1 drop to both eyes 4 (four) times a day as needed., Disp: , Rfl:   ?  ascorbic acid, vitamin C, (ASCORBIC ACID WITH KAEL HIPS) 500 MG tablet, Take 500 mg by mouth 2 (two) times a day., Disp: , Rfl:   ?  cholecalciferol, vitamin D3, (VITAMIN D3) 2,000 unit Tab, Take 6,000 Units by mouth daily., Disp: , Rfl:   ?  folic acid (FOLVITE) 1 MG tablet, Take 1 tablet (1 mg total) by mouth daily., Disp: , Rfl: 0  ?  HUMIRA PEN 40 mg/0.8 mL PnKt, INJECT 0.8 ML (40 MG TOTAL) UNDER THE SKIN EVERY 14 DAYS, Disp: 1 kit, Rfl: 4  ?  hydroCHLOROthiazide (HYDRODIURIL) 25 MG tablet, Take 25 mg by mouth daily., Disp: , Rfl:   ?  hydroxychloroquine (PLAQUENIL) 200 mg tablet, Take 200 mg by mouth 2  (two) times a day., Disp: , Rfl:   ?  ibuprofen (ADVIL,MOTRIN) 200 MG tablet, Take 400 mg by mouth 3 (three) times a day as needed for pain. , Disp: , Rfl:   ?  methotrexate 2.5 MG tablet, Take 4 tablets (10 mg total) by mouth once a week., Disp: 16 tablet, Rfl: 0  ?  methotrexate 2.5 MG tablet, Take 10 mg by mouth once a week. q Friday, Disp: , Rfl:   ?  multivitamin therapeutic (THERAGRAN) tablet, Take 1 tablet by mouth daily., Disp: , Rfl:   ?  nystatin (MYCOSTATIN) powder, Apply 1 application topically 2 (two) times a day. Wash well between applications. Apply under breasts., Disp: , Rfl:   ?  oxyCODONE (OXY-IR) 5 mg capsule, Take 1-2 tablets every 4 hours orally as needed for pain relief.  Use 1 tablet for 1-5/10 pain, take 2 tablets for 6-10/10 pain., Disp: , Rfl: 0  ?  senna-docusate (SENNOSIDES-DOCUSATE SODIUM) 8.6-50 mg tablet, Take 1 tablet by mouth daily., Disp: , Rfl:   ?  sulfaSALAzine (AZULFIDINE ENTAB) 500 MG EC tablet, Take 1,500 mg by mouth 2 (two) times a day., Disp: , Rfl:   ?  WARFARIN SODIUM (WARFARIN ORAL), Take by mouth daily. 3/7/19 INR 2.22  Cont 9mg on Wed and Sun and 8mg AOD.  Next INR 4/4/19.   2/7/19 INR 2.54  Cont 9mg on Wed and Sun and 8mg AOD.  Next INR 3/7/19.   1/11/19 INR 3.02  Take 9mg on Wed and Sun and 8mg AOD.  Next INR 2/7/19.   12/11/18 INR 2.91 Cont 9mg M-W-F, 8mg AOD. 11/29/18 INR 2.99 9mg M-W-F, 8mg AOD. 11/15/18 INR 2.44 Cont 8mg M-W-F, 9mg AOD> Next INR 11/29 11/8/18 INR 3.37 Decrease to 8mg M-W-F, 9mg AOD. Next INR 11/15.   , Disp: , Rfl:     .There were no vitals filed for this visit.  Blood pressure has not been evaluated in well over a month.  Her last recorded weight on February 12 was 336 pounds.  Physical Exam  Cardiovascular: Normal rate, regular rhythm and normal heart sounds.   Chronic edema .   Pulmonary/Chest: Breath sounds normal.   inder   Abdominal.  Protuberant  Musculoskeletal:   Chronic pain. Chronic edema. Rheumatoid arthritis multiple sites including  hands. Left knee scar. Left hip OA. Psychiatric: She has a normal mood and affect. Her behavior is normal.        LABS:     Lab Results   Component Value Date    WBC 4.2 10/15/2018    HGB 13.8 10/15/2018    HCT 41.2 10/15/2018    MCV 94 10/15/2018     10/15/2018     Lab Results   Component Value Date    ALT 16 10/15/2018    AST 16 05/15/2015    ALKPHOS 82 04/07/2015    BILITOT 0.3 04/07/2015       Results for orders placed or performed in visit on 09/13/18   Basic Metabolic Panel   Result Value Ref Range    Sodium 144 136 - 145 mmol/L    Potassium 4.3 3.5 - 5.0 mmol/L    Chloride 101 98 - 107 mmol/L    CO2 34 (H) 22 - 31 mmol/L    Anion Gap, Calculation 9 5 - 18 mmol/L    Glucose 96 70 - 125 mg/dL    Calcium 9.7 8.5 - 10.5 mg/dL    BUN 16 8 - 22 mg/dL    Creatinine 0.75 0.60 - 1.10 mg/dL    GFR MDRD Af Amer >60 >60 mL/min/1.73m2    GFR MDRD Non Af Amer >60 >60 mL/min/1.73m2         ASSESSMENT:      ICD-10-CM    1. Seropositive rheumatoid arthritis of multiple sites (H) M05.79    2. Physical debility R53.81    3. Polyarthralgia M25.50    4. Pain management R52    5. Morbid obesity (H) E66.01        PLAN:    She does have a rheumatology visit tomorrow.  She will need to obviously update her laboratory studies for the rheumatologist but otherwise per staff there is been no new physical or other health issues.      Electronically signed by: Michael Duane Johnson, LUNA

## 2021-06-19 NOTE — LETTER
Letter by Johnson, Michael Duane, CNP at      Author: Johnson, Michael Duane, CNP Service: -- Author Type: --    Filed:  Encounter Date: 10/17/2019 Status: Signed         Patient: María Elena Saab   MR Number: [id number]   YOB: 1965   Date of Visit: 10/17/2019     [facility]    Facility:   [facility] [[id number]]   Code Status: FULL CODE      CHIEF COMPLAINT/REASON FOR VISIT:  Chief Complaint   Patient presents with   ? Review Of Multiple Medical Conditions       HISTORY:      HPI: María Elena is a 54 y.o. female who was seen secondary to review of chronic medical conditions.  She does have a history of rheumatoid issues and chronic pain.  She has not taken any Tylenol as needed has only taken 2 ibuprofen this month and then oxycodone has not taken any PRN.  She is on Cymbalta 20 mg Plaquenil methotrexate and some multivitamins and minerals.  She is also on Coumadin which we are checking usually monthly.  She has been normotensive and afebrile.  Also last time I saw her I did order a wheelchair question for her work as she was having some posterior leg discomfort which now has been helpful for her with decreased leg discomfort.  She did not have any other questions.  Her next rheumatology visit is in December.    Past Medical History:   Diagnosis Date   ? 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 Bacteremia due to group B Streptococcus    ? Cellulitis of right lower extremity    ? DJD (degenerative joint disease)    ? DVT, bilateral lower limbs (H) 10/2014   ? Edema - swelling of the legs after blood clots 5/22/2015   ? Essential hypertension with goal blood pressure less than 140/90    ? Failure to thrive    ? History of blood clots    ? Hypokalemia 10/15/2014   ? Lung mass 10/18/2014   ? Morbid obesity (H) 4/10/2015    Had formal nutrition consult at [facility].  RD reports that she is not willing to commit to the program outlined.  See scanned  document.  12/15/2015 - Marcio Quinonez MD      ? Obesity - although she was not weighed today, she is clearly obese on inspection. 4/10/2015   ? LOPEZ (obstructive sleep apnea) - abnormal overnight pulse oximetry 5/22/2015   ? Osteoarthritis    ? Peripheral vascular disease (H)    ? Pleural effusion    ? Pressure ulcer of foot    ? Rheumatoid arthritis (H) 12/30/2014    seronegative   ? Sepsis (H)    ? Seropositive rheumatoid arthritis (H) 1/19/2016   ? Vitamin D deficiency 8/26/2015             Family History   Problem Relation Age of Onset   ? Multiple myeloma Father    ? Deep vein thrombosis Father    ? Cataracts Father    ? Snoring Father    ? Other Brother         Blood withdrawn from time to time.  Sounds like hemochromatosis.   ? Sleep apnea Brother    ? Cancer Mother         Uterine CA   ? Arthritis Mother    ? Cataracts Mother    ? Breast cancer Mother 54   ? No Medical Problems Sister    ? Rheum arthritis Maternal Grandmother    ? Breast cancer Maternal Grandmother 50   ? Heart disease Maternal Grandmother    ? Rheum arthritis Paternal Aunt    ? Breast cancer Maternal Aunt 54   ? Breast cancer Cousin    ? Clotting disorder Neg Hx      Social History     Socioeconomic History   ? Marital status: Single     Spouse name: Not on file   ? Number of children: Not on file   ? Years of education: Not on file   ? Highest education level: Not on file   Occupational History   ? Not on file   Social Needs   ? Financial resource strain: Not on file   ? Food insecurity:     Worry: Not on file     Inability: Not on file   ? Transportation needs:     Medical: Not on file     Non-medical: Not on file   Tobacco Use   ? Smoking status: Former Smoker     Packs/day: 1.00     Years: 30.00     Pack years: 30.00     Types: Cigarettes   ? Smokeless tobacco: Never Used   Substance and Sexual Activity   ? Alcohol use: Yes     Comment: 3-4 times a year she will have 1 or 2.   ? Drug use: No   ? Sexual activity: Never     Partners:  Female   Lifestyle   ? Physical activity:     Days per week: Not on file     Minutes per session: Not on file   ? Stress: Not on file   Relationships   ? Social connections:     Talks on phone: Not on file     Gets together: Not on file     Attends Orthodox service: Not on file     Active member of club or organization: Not on file     Attends meetings of clubs or organizations: Not on file     Relationship status: Not on file   ? Intimate partner violence:     Fear of current or ex partner: Not on file     Emotionally abused: Not on file     Physically abused: Not on file     Forced sexual activity: Not on file   Other Topics Concern   ? Not on file   Social History Narrative    Long term  At [facility] to rehab to level allowing surgery on deteriorated joints  LTC12/2015         Review of Systems  No reports of any URI symptoms postnasal drip fever chills fatigue flulike symptoms nausea vomiting diarrhea dysuria rashes stiff neck swollen glands. Does have bilateral DVT sleep apnea obesity edema vitamin D deficiency PAD rheumatoid arthritis multiple sites chronic pain.    Current Outpatient Medications:   ?  acetaminophen (TYLENOL) 325 MG tablet, Take 650 mg by mouth every 6 (six) hours as needed for pain.    , Disp: , Rfl:   ?  adalimumab (HUMIRA) 40 mg/0.8 mL injection, Inject 0.8 mL (40 mg total) under the skin every 14 (fourteen) days., Disp: 0.8 mL, Rfl: 0  ?  ascorbic acid, vitamin C, (ASCORBIC ACID WITH KAEL HIPS) 500 MG tablet, Take 500 mg by mouth 2 (two) times a day., Disp: , Rfl:   ?  cholecalciferol, vitamin D3, (VITAMIN D3) 2,000 unit Tab, Take 6,000 Units by mouth daily., Disp: , Rfl:   ?  DULoxetine (CYMBALTA) 20 MG capsule, Take 1 capsule (20 mg total) by mouth daily., Disp: 30 capsule, Rfl: 2  ?  folic acid (FOLVITE) 1 MG tablet, Take 1 tablet (1 mg total) by mouth daily., Disp: , Rfl: 0  ?  hydroCHLOROthiazide (HYDRODIURIL) 25 MG tablet, Take 25 mg by mouth daily., Disp: , Rfl:   ?   hydroxychloroquine (PLAQUENIL) 200 mg tablet, Take 200 mg by mouth 2 (two) times a day., Disp: , Rfl:   ?  ibuprofen (ADVIL,MOTRIN) 200 MG tablet, Take 400 mg by mouth every 8 (eight) hours as needed for pain.    , Disp: , Rfl:   ?  methotrexate 2.5 MG tablet, Take 4 tablets (10 mg total) by mouth once a week., Disp: 16 tablet, Rfl: 0  ?  multivitamin therapeutic (THERAGRAN) tablet, Take 1 tablet by mouth daily., Disp: , Rfl:   ?  nystatin (MYCOSTATIN) powder, Apply to rash areas twice daily., Disp: 15 g, Rfl: 0  ?  oxyCODONE (OXY-IR) 5 mg capsule, Take 1-2 tablets every 4 hours orally as needed for pain relief.  Use 1 tablet for 1-5/10 pain, take 2 tablets for 6-10/10 pain., Disp: 60 tablet, Rfl: 0  ?  WARFARIN SODIUM (WARFARIN ORAL), Take by mouth daily. 10/3/19 refused draw. Give 9mg tonight, Check INR tomorrow. 9/6/19 INR 3.15 Decrease to 9mg M & TH, 8mg AOD. Next INR 10/3. 8/9/19 INR 2.79 Take 9mg M, W, F and 8mg AOD Next INR 9/6 8/8/19 INR missed.  Give 8mg on 8/8.  Next INR 8/9/19.   7/25/19 INR 2.23  Cont 9mg M-W-F and 8mg AOD.  Next INR 8/8/19.   7/22/19 INR missed.  Cont 9mg M-W-F and 8mg AOD.  Next INR 7/25/19.   7/18/19 INR 2.79  Cont 9mg M-W-F and 8mg AOD.  Next INR 7/22/19   , Disp: , Rfl:     There were no vitals filed for this visit.  Blood pressure 135/81 pulse 83 respirations 18 temperature 98.5 saturation room air 95%  Physical Exam  Cardiovascular: Normal rate, regular rhythm and normal heart sounds.   Chronic edema .   Pulmonary/Chest: Breath sounds normal.   inder   Abdominal.  Protuberant  Musculoskeletal:   Chronic pain. Chronic edema. Rheumatoid arthritis multiple sites including hands. Left knee scar. Left hip OA. Psychiatric: She has a normal mood and affect. Her behavior is normal.        LABS:   Lab Results   Component Value Date    WBC 4.7 09/10/2019    HGB 14.0 09/10/2019    HCT 41.6 09/10/2019    MCV 88 09/10/2019     09/10/2019     Lab Results   Component Value Date    ALT 20  09/10/2019    AST 23 07/25/2019    ALKPHOS 69 07/25/2019    BILITOT 0.3 07/25/2019     Results for orders placed or performed in visit on 09/13/18   Basic Metabolic Panel   Result Value Ref Range    Sodium 144 136 - 145 mmol/L    Potassium 4.3 3.5 - 5.0 mmol/L    Chloride 101 98 - 107 mmol/L    CO2 34 (H) 22 - 31 mmol/L    Anion Gap, Calculation 9 5 - 18 mmol/L    Glucose 96 70 - 125 mg/dL    Calcium 9.7 8.5 - 10.5 mg/dL    BUN 16 8 - 22 mg/dL    Creatinine 0.75 0.60 - 1.10 mg/dL    GFR MDRD Af Amer >60 >60 mL/min/1.73m2    GFR MDRD Non Af Amer >60 >60 mL/min/1.73m2           ASSESSMENT:      ICD-10-CM    1. Physical debility R53.81    2. Polyarthralgia M25.50    3. Essential hypertension with goal blood pressure less than 140/90 I10    4. Pain management R52        PLAN:    No other changes at this time.  Staff do not have any particular concerns.  We did get her new wheelchair questions month.  Does see rheumatology the next month or 2.        Electronically signed by: Michael Duane Johnson, CNP

## 2021-06-19 NOTE — LETTER
Letter by Johnson, Michael Duane, CNP at      Author: Johnson, Michael Duane, CNP Service: -- Author Type: --    Filed:  Encounter Date: 8/29/2019 Status: (Other)         Patient: María Elena Saab   MR Number: [id number]   YOB: 1965   Date of Visit: 8/29/2019     [facility]    Facility:   [facility] [[id number]]   Code Status: FULL CODE      CHIEF COMPLAINT/REASON FOR VISIT:  Chief Complaint   Patient presents with   ? FVP Care Coordination - Regulatory       HISTORY:      HPI: María Elena is a 54 y.o. female who was seen secondary to routine/regulatory visit.  Seems to be in good spirits today.  Had a chance to talk about the chronic medical conditions.  She was hospitalized last July 11 through July 15 secondary to sepsis from group B strep bacteremia with right lower extremity cellulitis and a history of rheumatoid arthritis.  During that time her rheumatoid arthritis meds were put on hold and she does have a visit with rheumatology coming up in the next week or 2.  She also did participate with therapy services.  In looking through her medication list her moods have been stable and for her pain she can take oxycodone as needed usually takes about 1 dose per day but did not take a dose anywhere from August 23 August 25 also can have Tylenol as needed has not required any and also ibuprofen as needed has not required any.  Otherwise they do not do regular vital signs on the unit going all the way back to July 16 her blood pressure was 133 over 81 with a pulse of 70 respirations of 16.  Her last weight was 325 pounds on August 5.  She does need assistance with all ADLs.  Usually does stay in her room most of the time.    Past Medical History:   Diagnosis Date   ? 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 Bacteremia due to group B Streptococcus    ? Cellulitis of right lower extremity    ? DJD (degenerative joint disease)    ? DVT, bilateral lower limbs  (H) 10/2014   ? Edema - swelling of the legs after blood clots 5/22/2015   ? Essential hypertension with goal blood pressure less than 140/90    ? Failure to thrive    ? History of blood clots    ? Hypokalemia 10/15/2014   ? Lung mass 10/18/2014   ? Morbid obesity (H) 4/10/2015    Had formal nutrition consult at [facility].  RD reports that she is not willing to commit to the program outlined.  See scanned document.  12/15/2015 - Marcio Quinonez MD      ? Obesity - although she was not weighed today, she is clearly obese on inspection. 4/10/2015   ? LOPEZ (obstructive sleep apnea) - abnormal overnight pulse oximetry 5/22/2015   ? Osteoarthritis    ? Peripheral vascular disease (H)    ? Pleural effusion    ? Pressure ulcer of foot    ? Rheumatoid arthritis (H) 12/30/2014    seronegative   ? Sepsis (H)    ? Seropositive rheumatoid arthritis (H) 1/19/2016   ? Vitamin D deficiency 8/26/2015             Family History   Problem Relation Age of Onset   ? Multiple myeloma Father    ? Deep vein thrombosis Father    ? Cataracts Father    ? Snoring Father    ? Other Brother         Blood withdrawn from time to time.  Sounds like hemochromatosis.   ? Sleep apnea Brother    ? Cancer Mother         Uterine CA   ? Arthritis Mother    ? Cataracts Mother    ? Breast cancer Mother 54   ? No Medical Problems Sister    ? Rheum arthritis Maternal Grandmother    ? Breast cancer Maternal Grandmother 50   ? Heart disease Maternal Grandmother    ? Rheum arthritis Paternal Aunt    ? Breast cancer Maternal Aunt 54   ? Breast cancer Cousin    ? Clotting disorder Neg Hx      Social History     Socioeconomic History   ? Marital status: Single     Spouse name: Not on file   ? Number of children: Not on file   ? Years of education: Not on file   ? Highest education level: Not on file   Occupational History   ? Not on file   Social Needs   ? Financial resource strain: Not on file   ? Food insecurity:     Worry: Not on file     Inability: Not on file    ? Transportation needs:     Medical: Not on file     Non-medical: Not on file   Tobacco Use   ? Smoking status: Former Smoker     Packs/day: 1.00     Years: 30.00     Pack years: 30.00     Types: Cigarettes   ? Smokeless tobacco: Never Used   Substance and Sexual Activity   ? Alcohol use: Yes     Comment: 3-4 times a year she will have 1 or 2.   ? Drug use: No   ? Sexual activity: Never     Partners: Female   Lifestyle   ? Physical activity:     Days per week: Not on file     Minutes per session: Not on file   ? Stress: Not on file   Relationships   ? Social connections:     Talks on phone: Not on file     Gets together: Not on file     Attends Congregation service: Not on file     Active member of club or organization: Not on file     Attends meetings of clubs or organizations: Not on file     Relationship status: Not on file   ? Intimate partner violence:     Fear of current or ex partner: Not on file     Emotionally abused: Not on file     Physically abused: Not on file     Forced sexual activity: Not on file   Other Topics Concern   ? Not on file   Social History Narrative    Long term  At [facility] to rehab to [facility] allowing surgery on deteriorated joints  LTC12/2015         Review of Systems  No reports of any URI symptoms postnasal drip fever chills fatigue flulike symptoms nausea vomiting diarrhea dysuria rashes stiff neck swollen glands. Does have bilateral DVT sleep apnea obesity edema vitamin D deficiency PAD rheumatoid arthritis multiple sites chronic pain.    Current Outpatient Medications   Medication Sig   ? acetaminophen (TYLENOL) 325 MG tablet Take 650 mg by mouth every 6 (six) hours as needed for pain.          ? ascorbic acid, vitamin C, (ASCORBIC ACID WITH KAEL HIPS) 500 MG tablet Take 500 mg by mouth 2 (two) times a day.   ? cholecalciferol, vitamin D3, (VITAMIN D3) 2,000 unit Tab Take 6,000 Units by mouth daily.   ? DULoxetine (CYMBALTA) 20 MG capsule Take 1 capsule (20 mg total) by mouth  daily.   ? folic acid (FOLVITE) 1 MG tablet Take 1 tablet (1 mg total) by mouth daily.   ? hydroCHLOROthiazide (HYDRODIURIL) 25 MG tablet Take 25 mg by mouth daily.   ? hydroxychloroquine (PLAQUENIL) 200 mg tablet Take 200 mg by mouth 2 (two) times a day.   ? ibuprofen (ADVIL,MOTRIN) 200 MG tablet Take 400 mg by mouth every 8 (eight) hours as needed for pain.          ? methotrexate 2.5 MG tablet Take 4 tablets (10 mg total) by mouth once a week.   ? multivitamin therapeutic (THERAGRAN) tablet Take 1 tablet by mouth daily.   ? oxyCODONE (OXY-IR) 5 mg capsule Take 1-2 tablets every 4 hours orally as needed for pain relief.  Use 1 tablet for 1-5/10 pain, take 2 tablets for 6-10/10 pain.   ? WARFARIN SODIUM (WARFARIN ORAL) Take by mouth daily. 8/9/19 INR 2.79 Take 9mg M, W, F and 8mg AOD Next INR 9/6 8/8/19 INR missed.  Give 8mg on 8/8.  Next INR 8/9/19.    7/25/19 INR 2.23  Cont 9mg M-W-F and 8mg AOD.  Next INR 8/8/19.    7/22/19 INR missed.  Cont 9mg M-W-F and 8mg AOD.  Next INR 7/25/19.    7/18/19 INR 2.79  Cont 9mg M-W-F and 8mg AOD.  Next INR 7/22/19 6/28/19 INR 2.78  Cont 9mg M-W-F, 8mg AOD Next INR 7/25 5/30/19  INR 2.20 Cont 9mg M-W-F, 8mg AOD. Next INR 6/27.             .There were no vitals filed for this visit.  No recent vital signs as listed above her last blood pressure was in July.  Physical Exam  Cardiovascular: Normal rate, regular rhythm and normal heart sounds.   Chronic edema .   Pulmonary/Chest: Breath sounds normal.   inder   Abdominal.  Protuberant  Musculoskeletal:   Chronic pain. Chronic edema. Rheumatoid arthritis multiple sites including hands. Left knee scar. Left hip OA. Psychiatric: She has a normal mood and affect. Her behavior is normal.      LABS:   Lab Results   Component Value Date    WBC 4.8 07/25/2019    HGB 12.6 07/25/2019    HCT 41.1 07/25/2019    MCV 95 07/25/2019     07/25/2019     Results for orders placed or performed in visit on 09/13/18   Basic Metabolic Panel    Result Value Ref Range    Sodium 144 136 - 145 mmol/L    Potassium 4.3 3.5 - 5.0 mmol/L    Chloride 101 98 - 107 mmol/L    CO2 34 (H) 22 - 31 mmol/L    Anion Gap, Calculation 9 5 - 18 mmol/L    Glucose 96 70 - 125 mg/dL    Calcium 9.7 8.5 - 10.5 mg/dL    BUN 16 8 - 22 mg/dL    Creatinine 0.75 0.60 - 1.10 mg/dL    GFR MDRD Af Amer >60 >60 mL/min/1.73m2    GFR MDRD Non Af Amer >60 >60 mL/min/1.73m2       Lab Results   Component Value Date    ALT 22 07/25/2019    AST 23 07/25/2019    ALKPHOS 69 07/25/2019    BILITOT 0.3 07/25/2019     No results found for: AMISHA  Lab Results   Component Value Date    CHOL 209 (H) 04/13/2015    CHOL 227 (H) 04/07/2015     Lab Results   Component Value Date    HDL 61 04/13/2015    HDL 65 04/07/2015     Lab Results   Component Value Date    LDLCALC 138 (H) 04/13/2015    LDLCALC 139 (H) 04/07/2015     Lab Results   Component Value Date    TRIG 48 04/13/2015    TRIG 113 04/07/2015     No components found for: CHOLHDL  Lab Results   Component Value Date    ALBUMIN 3.6 04/26/2019       Case Management:  I have reviewed the facility/SNF care plan/MDS which was done Today, including the falls risk, nutrition and pain screening. I also reviewed the current immunizations, and preventive care.. Future cancer screening indicated is Breast cancer and provider and pt will formulate a POC.   Patient's desire to return to the community is present, but is not able due to care needs .    Information reviewed:  Medications, vital signs, orders, and nursing notes.    ASSESSMENT:      ICD-10-CM    1. Seropositive rheumatoid arthritis of multiple sites (H) M05.79    2. Radiculopathy of leg M54.10    3. Polyarthralgia M25.50    4. PAD (peripheral artery disease) (H) I73.9    5. Osteoarthritis of hip, unspecified laterality, unspecified osteoarthritis type M16.9        PLAN:    She will continue to see her specialist regarding her polyarthritis and rheumatoid arthritis.  The meantime the staff do need to give  her assistance with all ADLs.  She does have a visit with the rheumatologist coming up in another week or 2.  She did not have any other further questions.    .    Electronically signed by: Michael Duane Johnson, CNP

## 2021-06-19 NOTE — LETTER
Letter by Luis Enrique Lindsey DO at      Author: Luis Enrique Lindsey DO Service: -- Author Type: --    Filed:  Encounter Date: 9/23/2019 Status: Signed         Patient: María Elena Saab   MR Number: [id number]   YOB: 1965   Date of Visit: 9/23/2019     [facility]    Facility:   [facility] [[id number]]   Code Status: FULL CODE    Recent medical history/chief concerns: Patient is seen by me for review of multiple medical issues.  Since my last visit with resident when I readmitted her to long-term care after hospitalization for sepsis treatment, patient has had a follow-up visit with her rheumatologist.  After her most recent hospitalization, she had her treatment for arthritis temporarily changed by discontinuing treatment with methotrexate and Humira.  She continued her treatment using hydroxychloroquine.  Her arthritis symptoms worsened, so she was restarted on her treatment using methotrexate and Humira.  Despite the worsening of arthritis symptoms, resident states her pain reliever medication management has been adequate, not needing modification.  At time of my visit with her, she had no new concerns.  She had me look at her left heel, which has a chronic area of tender tissue over the posterior heel region.  I suggested wearing a foam boot at night to keep pressure off of this area, which she declined.  She preferred to speak to nursing staff about putting a pillow underneath her left leg to keep weight off of the heel.  She denies having any recent fevers or chills, or problems with urination or bowel movements.  No new breathing problems, or problems with eating or drinking.  She feels like her mood is good.  My review of facility documentation showed no full set of vital signs have been done in over 1-1/2 months, and the nursing staff got these for me on day of my visit.    Review of systems: See history of present illness, all others negative.     Current  Outpatient Medications   Medication Sig Dispense Refill   ? acetaminophen (TYLENOL) 325 MG tablet Take 650 mg by mouth every 6 (six) hours as needed for pain.            ? adalimumab (HUMIRA) 40 mg/0.8 mL injection Inject 0.8 mL (40 mg total) under the skin every 14 (fourteen) days. 0.8 mL 0   ? ascorbic acid, vitamin C, (ASCORBIC ACID WITH KAEL HIPS) 500 MG tablet Take 500 mg by mouth 2 (two) times a day.     ? cholecalciferol, vitamin D3, (VITAMIN D3) 2,000 unit Tab Take 6,000 Units by mouth daily.     ? DULoxetine (CYMBALTA) 20 MG capsule Take 1 capsule (20 mg total) by mouth daily. 30 capsule 2   ? folic acid (FOLVITE) 1 MG tablet Take 1 tablet (1 mg total) by mouth daily.  0   ? hydroCHLOROthiazide (HYDRODIURIL) 25 MG tablet Take 25 mg by mouth daily.     ? hydroxychloroquine (PLAQUENIL) 200 mg tablet Take 200 mg by mouth 2 (two) times a day.     ? ibuprofen (ADVIL,MOTRIN) 200 MG tablet Take 400 mg by mouth every 8 (eight) hours as needed for pain.            ? methotrexate 2.5 MG tablet Take 4 tablets (10 mg total) by mouth once a week. 16 tablet 0   ? multivitamin therapeutic (THERAGRAN) tablet Take 1 tablet by mouth daily.     ? nystatin (MYCOSTATIN) powder Apply to rash areas twice daily. 15 g 0   ? oxyCODONE (OXY-IR) 5 mg capsule Take 1-2 tablets every 4 hours orally as needed for pain relief.  Use 1 tablet for 1-5/10 pain, take 2 tablets for 6-10/10 pain. 60 tablet 0   ? WARFARIN SODIUM (WARFARIN ORAL) Take by mouth daily. 9/6/19 INR 3.15 Decrease to 9mg M & TH, 8mg AOD. Next INR 10/3.  8/9/19 INR 2.79 Take 9mg M, W, F and 8mg AOD Next INR 9/6 8/8/19 INR missed.  Give 8mg on 8/8.  Next INR 8/9/19.    7/25/19 INR 2.23  Cont 9mg M-W-F and 8mg AOD.  Next INR 8/8/19.    7/22/19 INR missed.  Cont 9mg M-W-F and 8mg AOD.  Next INR 7/25/19.    7/18/19 INR 2.79  Cont 9mg M-W-F and 8mg AOD.  Next INR 7/22/19             No current facility-administered medications for this visit.        Past Medical History:    Diagnosis Date   ? 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 Bacteremia due to group B Streptococcus    ? Cellulitis of right lower extremity    ? DJD (degenerative joint disease)    ? DVT, bilateral lower limbs (H) 10/2014   ? Edema - swelling of the legs after blood clots 5/22/2015   ? Essential hypertension with goal blood pressure less than 140/90    ? Failure to thrive    ? History of blood clots    ? Hypokalemia 10/15/2014   ? Lung mass 10/18/2014   ? Morbid obesity (H) 4/10/2015    Had formal nutrition consult at [facility].  RD reports that she is not willing to commit to the program outlined.  See scanned document.  12/15/2015 - Marcio Quinonez MD      ? Obesity - although she was not weighed today, she is clearly obese on inspection. 4/10/2015   ? LOPEZ (obstructive sleep apnea) - abnormal overnight pulse oximetry 5/22/2015   ? Osteoarthritis    ? Peripheral vascular disease (H)    ? Pleural effusion    ? Pressure ulcer of foot    ? Rheumatoid arthritis (H) 12/30/2014    seronegative   ? Sepsis (H)    ? Seropositive rheumatoid arthritis (H) 1/19/2016   ? Vitamin D deficiency 8/26/2015      Past Surgical History:   Procedure Laterality Date   ? JOINT REPLACEMENT      knee   ? PERIPHERALLY INSERTED CENTRAL CATHETER INSERTION Right 07/14/2019    4fr/44cm/Rt Cephalic.lowSVC.MGlav   ? TOTAL HIP ARTHROPLASTY Left 10/18/2016    Procedure: LEFT HIP TOTAL ARTHROPLASTY;  Surgeon: London Goss MD;  Location: [facility] OR;  Service:    ? TOTAL KNEE ARTHROPLASTY Left 2006      Social History     Socioeconomic History   ? Marital status: Single     Spouse name: Not on file   ? Number of children: Not on file   ? Years of education: Not on file   ? Highest education level: Not on file   Occupational History   ? Not on file   Social Needs   ? Financial resource strain: Not on file   ? Food insecurity:     Worry: Not on file     Inability: Not on file   ? Transportation needs:     Medical: Not on file      Non-medical: Not on file   Tobacco Use   ? Smoking status: Former Smoker     Packs/day: 1.00     Years: 30.00     Pack years: 30.00     Types: Cigarettes   ? Smokeless tobacco: Never Used   Substance and Sexual Activity   ? Alcohol use: Yes     Comment: 3-4 times a year she will have 1 or 2.   ? Drug use: No   ? Sexual activity: Never     Partners: Female   Lifestyle   ? Physical activity:     Days per week: Not on file     Minutes per session: Not on file   ? Stress: Not on file   Relationships   ? Social connections:     Talks on phone: Not on file     Gets together: Not on file     Attends Church service: Not on file     Active member of club or organization: Not on file     Attends meetings of clubs or organizations: Not on file     Relationship status: Not on file   ? Intimate partner violence:     Fear of current or ex partner: Not on file     Emotionally abused: Not on file     Physically abused: Not on file     Forced sexual activity: Not on file   Other Topics Concern   ? Not on file   Social History Narrative    Long term  At [facility] to rehab to [facility] allowing surgery on deteriorated joints  LTC12/2015       Social History     Tobacco Use   Smoking Status Former Smoker   ? Packs/day: 1.00   ? Years: 30.00   ? Pack years: 30.00   ? Types: Cigarettes   Smokeless Tobacco Never Used        Exam:   Vitals:    09/23/19 1400   BP: 170/80   Pulse: 80   Resp: 20   Temp: 97.5  F (36.4  C)   SpO2: 96%       EXAM:   General: Vital signs reviewed.  She is noted to have a moderately elevated systolic blood pressure of 170.  Patient is in no acute appearing distress.  Breathing appears nonlabored.  Patient is alert and oriented ×3.  HEENT exam: No abnormal nasal or ear drainage.  No intraoral plaques, vesicles, or ulcers.  Dentition is good.  Neck: Supple  Heart: Heart rate is regular without murmur.  Lungs: Lungs are clear to auscultation with good airflow bilaterally.  Skin/extremities: Overall warm and dry,  with no rashes noted.  Her lower extremity edema is about at her baseline amount, and she was wearing her bilateral compression garments on lower extremities.  Focal exam of left heel shows a callused area over her posterior heel, with no areas of erythema or other discoloration, or increased temperature.  She is a little tender in this area of foot.    Recent studies: Several labs were done on 09/10/2019 through rheumatology.  Her albumin was slightly low at 3.4.  Otherwise, her CBC, ALT, and renal function were all good.  Patient is on warfarin therapy dosed with periodic INR tests, with her last INR being slightly above therapeutic range at 3.15, with the warfarin dosage slightly decreased in the next INR being scheduled for 10/03/2019..    Approximately 25 minutes was spent on patient management, with greater than 50% of this time being spent on discussion of concerns and management with resident, and care staff, involving her medication management of arthritis, and her blood pressure.  The remainder of the time was spent on medication and vital sign data reconciliation between electronic medical records, and review of past medical history and current medical management.  Assessment/Plan   1. Deep vein thrombosis (DVT) of both lower extremities, unspecified chronicity, unspecified vein (H)     2. Essential hypertension with goal blood pressure less than 140/90     3. Morbid obesity (H)     4. Warfarin anticoagulation - long-term because of unresolved DVT that first appeared in October, 2014     5. Vitamin D deficiency     6. Seropositive rheumatoid arthritis of multiple sites (H)     7. Pain management     8. Osteoarthritis of hip, unspecified laterality, unspecified osteoarthritis type     9. Polyarthralgia     10. High risk medication use         Patient Instructions   Will start weekly blood pressures. If BP remains elevated, we will need to consider adding something to her current HCTZ treatment.  Otherwise,  continue current management.     Luis Enrique Lindsey DO

## 2021-06-19 NOTE — PROGRESS NOTES
[facility]    Facility:   [facility] [[id number]]   Code Status: FULL CODE      CHIEF COMPLAINT/REASON FOR VISIT:  Chief Complaint   Patient presents with     Review Of Multiple Medical Conditions       HISTORY:      HPI: María Elena is a 53 y.o. female who was seen secondary to a monthly review of chronic medical conditions although her last visit together was in March 2018.  She has been normotensive and afebrile and also on room air.  She is on warfarin.  She has been quite stable.  She does have rheumatoid arthritis also being seen by a rheumatologist.  Also a number of other medications.  She occasionally does have a flareup of the rheumatoid arthritic issues in multiple sites she does have ibuprofen as needed 400 mg 3 times a day as needed she is only taken 1 dose in the month of June and will be June 20.  She otherwise needs assistance with all ADLs pretty much stays and keeps to herself in her room.  She does have a healthy appetite.  She is not having any respiratory issues.  No nebs or inhalers as needed.  She did not have any particular other questions or other concerns at this time.    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  RD reports that she is not willing to commit to the program outlined.  See scanned document.  12/15/2015 - Marcio Quinonez MD        Obesity - although she was not weighed today, she is clearly obese on inspection. 4/10/2015     LOPEZ (obstructive sleep apnea) - abnormal overnight pulse oximetry 5/22/2015      Osteoarthritis      Peripheral vascular disease (H)      Pleural effusion      Pressure ulcer of foot      Rheumatoid arthritis (H) 12/30/2014    seronegative     Seropositive rheumatoid arthritis (H) 1/19/2016     Vitamin D deficiency 8/26/2015             Family History   Problem Relation Age of Onset     Multiple myeloma Father      Deep vein thrombosis Father      Cataracts Father      Snoring Father      Other Brother      Blood withdrawn from time to time.  Sounds like hemochromatosis.     Sleep apnea Brother      Cancer Mother      Uterine CA     Arthritis Mother      Cataracts Mother      Breast cancer Mother 54     No Medical Problems Sister      Rheum arthritis Maternal Grandmother      Breast cancer Maternal Grandmother 50     Heart disease Maternal Grandmother      Rheum arthritis Paternal Aunt      Breast cancer Maternal Aunt 54     Breast cancer Cousin      Clotting disorder Neg Hx      Social History     Social History     Marital status: Single     Spouse name: N/A     Number of children: N/A     Years of education: N/A     Social History Main Topics     Smoking status: Former Smoker     Packs/day: 1.00     Years: 30.00     Types: Cigarettes     Smokeless tobacco: Never Used     Alcohol use Yes      Comment: 3-4 times a year she will have 1 or 2.     Drug use: No     Sexual activity: No     Other Topics Concern     Not on file     Social History Narrative    Long term  At [facility] to rehab to [facility] allowing surgery on deteriorated joints  LTC12/2015         Review of Systems  Denies any URI symptoms postnasal drip fever chills fatigue flulike symptoms nausea vomiting diarrhea dysuria rashes stiff neck swollen glands. Does have bilateral DVT sleep apnea obesity edema vitamin D deficiency PAD rheumatoid arthritis multiple sites chronic pain.    Current Outpatient Prescriptions   Medication Sig     adalimumab (HUMIRA) 40 mg/0.8 mL injection Inject 0.8 mL (40 mg total) under the skin every 14  (fourteen) days.     artificial tears,hypromellose, (GENTEAL) 0.3 % Drop Administer 1 drop to both eyes 4 (four) times a day as needed.     ascorbic acid, vitamin C, (ASCORBIC ACID WITH KAEL HIPS) 500 MG tablet Take 500 mg by mouth 2 (two) times a day.     cholecalciferol, vitamin D3, (VITAMIN D3) 2,000 unit Tab Take 6,000 Units by mouth daily.     hydroCHLOROthiazide (HYDRODIURIL) 25 MG tablet Take 25 mg by mouth daily.     hydroxychloroquine (PLAQUENIL) 200 mg tablet Take 200 mg by mouth 2 (two) times a day.     ibuprofen (ADVIL,MOTRIN) 200 MG tablet Take 400 mg by mouth 3 (three) times a day as needed for pain.      methotrexate 2.5 MG tablet Take 4 tablets (10 mg total) by mouth once a week.     multivitamin therapeutic (THERAGRAN) tablet Take 1 tablet by mouth daily.     nystatin (MYCOSTATIN) powder Apply 1 application topically 2 (two) times a day. Wash well between applications. Apply under breasts.     oxyCODONE (ROXICODONE) 5 MG immediate release tablet Take 5 mg by mouth every 4 (four) hours as needed for pain.     predniSONE (DELTASONE) 2.5 MG tablet Take 7.5 mg/d prednisone PO for 3 weeks then stop.     senna-docusate (SENNOSIDES-DOCUSATE SODIUM) 8.6-50 mg tablet Take 1 tablet by mouth daily.     sulfaSALAzine (AZULFIDINE) 500 mg tablet Take 3 tablets (1,500 mg total) by mouth 2 (two) times a day.     WARFARIN SODIUM (WARFARIN ORAL) Take by mouth daily. 6/19/18 INR 2.38  Cont 8mg Mon and Thurs and 9mg all other days.  Next INR 7/17/18.  5/21/18 INR 2.63 Cont 8mg M & TH, 9mg all other days. Next INR 6/18.  4/20/18 INR 2.81 Cont 8mg M & TH, 9mg all other days.  3/23/18 INR 2.83 Cont 8mg M & TH, 9mg all other days. Next INR 4/20 2/23/18 INR 2.21 Take 8mg M & TH, 9mg all other days. Next INR 3/23.  2/22/18 INR missed on 2/22.  Take 8mg on 2/22.  Next INR 2/23/18.       .There were no vitals filed for this visit.  Blood pressure 136/77 pulse 78 respirations 20  Physical Exam     Constitutional: She is  oriented to person, place, and time. She appears well-developed and well-nourished. No distress.   obese .    Neck: Neck supple. No thyromegaly present.   Cardiovascular: Normal rate, regular rhythm and normal heart sounds.   Chronic edema . Farrow wraps  Pulmonary/Chest: Breath sounds normal.   inder   Abdominal.  Protuberant  Musculoskeletal:   Chronic pain. Chronic edema. Rheumatoid arthritis multiple sites including hands. Left knee scar. Left hip OA. Left hip scar  Neurological: She is oriented to person, place, and time.   Skin: Skin is warm and dry. No rash noted.   Psychiatric: She has a normal mood and affect. Her behavior is normal.        LABS:   Lab Results   Component Value Date    WBC 4.2 06/11/2018    HGB 13.4 06/11/2018    HCT 40.6 06/11/2018    MCV 92 06/11/2018     06/11/2018     Results for orders placed or performed in visit on 10/05/17   Basic Metabolic Panel   Result Value Ref Range    Sodium 140 136 - 145 mmol/L    Potassium 4.7 3.5 - 5.0 mmol/L    Chloride 98 98 - 107 mmol/L    CO2 30 22 - 31 mmol/L    Anion Gap, Calculation 12 5 - 18 mmol/L    Glucose 97 70 - 125 mg/dL    Calcium 10.3 8.5 - 10.5 mg/dL    BUN 14 8 - 22 mg/dL    Creatinine 0.70 0.60 - 1.10 mg/dL    GFR MDRD Af Amer >60 >60 mL/min/1.73m2    GFR MDRD Non Af Amer >60 >60 mL/min/1.73m2       No results found for: HGBA1C  Lab Results   Component Value Date    CHOL 209 (H) 04/13/2015    CHOL 227 (H) 04/07/2015     Lab Results   Component Value Date    HDL 61 04/13/2015    HDL 65 04/07/2015     Lab Results   Component Value Date    LDLCALC 138 (H) 04/13/2015    LDLCALC 139 (H) 04/07/2015     Lab Results   Component Value Date    TRIG 48 04/13/2015    TRIG 113 04/07/2015     No components found for: CHOLHDL      ASSESSMENT:      ICD-10-CM    1. Seropositive rheumatoid arthritis of multiple sites (H) M05.79    2. Warfarin anticoagulation - long-term because of unresolved DVT that first appeared in October, 2014 Z79.01    3.  Polyarthralgia M25.50    4. Pain management R52        PLAN:    No laboratory studies are necessary.  We are checking her warfarin and her pro time.  At any rate she does have visits with the rheumatologist and currently also being treated for her other chronic medical conditions.  She had no further questions.        Electronically signed by: Michael Duane Johnson, CNP

## 2021-06-19 NOTE — LETTER
Letter by Johnson, Michael Duane, CNP at      Author: Johnson, Michael Duane, CNP Service: -- Author Type: --    Filed:  Encounter Date: 11/26/2019 Status: Signed         Patient: María Elena Saab   MR Number: [id number]   YOB: 1965   Date of Visit: 11/26/2019     [facility]    Facility:   [facility] [[id number]]   Code Status: FULL CODE      CHIEF COMPLAINT/REASON FOR VISIT:  Chief Complaint   Patient presents with   ? FVP Care Coordination - Regulatory       HISTORY:      HPI: María Elena is a 54 y.o. female who was seen secondary to review of chronic medical conditions.  She does see rheumatology coming up now in December for rheumatoid arthritis.  So far seems to be pretty well controlled.  Still needs assistance with all ADLs including transfers.  Did take 1 ibuprofen on November 23 is not taking any oxycodone as needed for the pain.  She has been normotensive and afebrile and also on room air.  Appetite is good.  We are also watching her warfarin not doing a pro time until the beginning of December.  In the middle of November she was seen secondary to left lower extremity cellulitis.  Looking at her left leg today besides having a history of PAD there is no obvious cellulitis.    Past Medical History:   Diagnosis Date   ? 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 Bacteremia due to group B Streptococcus    ? Cellulitis of right lower extremity    ? DJD (degenerative joint disease)    ? DVT, bilateral lower limbs (H) 10/2014   ? Edema - swelling of the legs after blood clots 5/22/2015   ? Essential hypertension with goal blood pressure less than 140/90    ? Failure to thrive    ? History of blood clots    ? Hypokalemia 10/15/2014   ? Lung mass 10/18/2014   ? Morbid obesity (H) 4/10/2015    Had formal nutrition consult at [facility].  RD reports that she is not willing to commit to the program outlined.  See scanned document.  12/15/2015 - Marcio CANTU  MD Devi      ? Obesity - although she was not weighed today, she is clearly obese on inspection. 4/10/2015   ? LOPEZ (obstructive sleep apnea) - abnormal overnight pulse oximetry 5/22/2015   ? Osteoarthritis    ? Peripheral vascular disease (H)    ? Pleural effusion    ? Pressure ulcer of foot    ? Rheumatoid arthritis (H) 12/30/2014    seronegative   ? Sepsis (H)    ? Seropositive rheumatoid arthritis (H) 1/19/2016   ? Vitamin D deficiency 8/26/2015             Family History   Problem Relation Age of Onset   ? Multiple myeloma Father    ? Deep vein thrombosis Father    ? Cataracts Father    ? Snoring Father    ? Other Brother         Blood withdrawn from time to time.  Sounds like hemochromatosis.   ? Sleep apnea Brother    ? Cancer Mother         Uterine CA   ? Arthritis Mother    ? Cataracts Mother    ? Breast cancer Mother 54   ? No Medical Problems Sister    ? Rheum arthritis Maternal Grandmother    ? Breast cancer Maternal Grandmother 50   ? Heart disease Maternal Grandmother    ? Rheum arthritis Paternal Aunt    ? Breast cancer Maternal Aunt 54   ? Breast cancer Cousin    ? Clotting disorder Neg Hx      Social History     Socioeconomic History   ? Marital status: Single     Spouse name: Not on file   ? Number of children: Not on file   ? Years of education: Not on file   ? Highest education level: Not on file   Occupational History   ? Not on file   Social Needs   ? Financial resource strain: Not on file   ? Food insecurity:     Worry: Not on file     Inability: Not on file   ? Transportation needs:     Medical: Not on file     Non-medical: Not on file   Tobacco Use   ? Smoking status: Former Smoker     Packs/day: 1.00     Years: 30.00     Pack years: 30.00     Types: Cigarettes   ? Smokeless tobacco: Never Used   Substance and Sexual Activity   ? Alcohol use: Yes     Comment: 3-4 times a year she will have 1 or 2.   ? Drug use: No   ? Sexual activity: Never     Partners: Female   Lifestyle   ? Physical  activity:     Days per week: Not on file     Minutes per session: Not on file   ? Stress: Not on file   Relationships   ? Social connections:     Talks on phone: Not on file     Gets together: Not on file     Attends Protestant service: Not on file     Active member of club or organization: Not on file     Attends meetings of clubs or organizations: Not on file     Relationship status: Not on file   ? Intimate partner violence:     Fear of current or ex partner: Not on file     Emotionally abused: Not on file     Physically abused: Not on file     Forced sexual activity: Not on file   Other Topics Concern   ? Not on file   Social History Narrative    Long term  At [facility] to rehab to level allowing surgery on deteriorated joints  LTC12/2015         Review of Systems  No reports of any URI symptoms postnasal drip fever chills fatigue flulike symptoms nausea vomiting diarrhea dysuria rashes stiff neck swollen glands. Does have bilateral DVT sleep apnea obesity edema vitamin D deficiency PAD rheumatoid arthritis multiple sites chronic pain.  Current Outpatient Medications   Medication Sig   ? acetaminophen (TYLENOL) 325 MG tablet Take 650 mg by mouth every 6 (six) hours as needed for pain.          ? adalimumab (HUMIRA) 40 mg/0.8 mL injection Inject 0.8 mL (40 mg total) under the skin every 14 (fourteen) days.   ? ascorbic acid, vitamin C, (ASCORBIC ACID WITH KAEL HIPS) 500 MG tablet Take 500 mg by mouth 2 (two) times a day.   ? cholecalciferol, vitamin D3, (VITAMIN D3) 2,000 unit Tab Take 6,000 Units by mouth daily.   ? DULoxetine (CYMBALTA) 20 MG capsule Take 1 capsule (20 mg total) by mouth daily.   ? folic acid (FOLVITE) 1 MG tablet Take 1 tablet (1 mg total) by mouth daily.   ? hydroCHLOROthiazide (HYDRODIURIL) 25 MG tablet Take 25 mg by mouth daily.   ? hydroxychloroquine (PLAQUENIL) 200 mg tablet Take 200 mg by mouth 2 (two) times a day.   ? ibuprofen (ADVIL,MOTRIN) 200 MG tablet Take 400 mg by mouth  every 8 (eight) hours as needed for pain.          ? methotrexate 2.5 MG tablet Take 4 tablets (10 mg total) by mouth once a week.   ? multivitamin therapeutic (THERAGRAN) tablet Take 1 tablet by mouth daily.   ? nystatin (MYCOSTATIN) powder Apply to rash areas twice daily.   ? oxyCODONE (OXY-IR) 5 mg capsule Take 1-2 tablets every 4 hours orally as needed for pain relief.  Use 1 tablet for 1-5/10 pain, take 2 tablets for 6-10/10 pain.   ? WARFARIN SODIUM (WARFARIN ORAL) Take by mouth daily. 11/8/19 INR 2.50  Cont 9mg M-W-F and 8mg AOD.  Next INR 12/5/19.    11/7/19 INR missed.  Give 8mg tonight.  Check INR in AM.    10/3/19 refused draw. Give 9mg tonight, Check INR tomorrow.  9/6/19 INR 3.15 Decrease to 9mg M & TH, 8mg AOD. Next INR 10/3.  8/9/19 INR 2.79 Take 9mg M, W, F and 8mg AOD Next INR 9/6 8/8/19 INR missed.  Give 8mg on 8/8.  Next INR 8/9/19.    7/25/19 INR 2.23  Cont 9mg M-W-F and 8mg AOD.  Next INR 8/8/19.             There were no vitals filed for this visit.  Blood pressure on November 21, 2019 130/75 pulse 72 respirations 18 temperature 98.2  Physical Exam   Cardiovascular: Normal rate, regular rhythm and normal heart sounds.   Chronic edema .  Catie wraps.  Pulmonary/Chest: Breath sounds normal.   inder   Abdominal.  Protuberant  Musculoskeletal:   Chronic pain. Chronic edema. Rheumatoid arthritis multiple sites including hands. Left knee scar. Left hip OA. Psychiatric: She has a normal mood and affect. Her behavior is normal.          LABS:   Lab Results   Component Value Date    WBC 4.7 09/10/2019    HGB 14.0 09/10/2019    HCT 41.6 09/10/2019    MCV 88 09/10/2019     09/10/2019     Results for orders placed or performed in visit on 09/13/18   Basic Metabolic Panel   Result Value Ref Range    Sodium 144 136 - 145 mmol/L    Potassium 4.3 3.5 - 5.0 mmol/L    Chloride 101 98 - 107 mmol/L    CO2 34 (H) 22 - 31 mmol/L    Anion Gap, Calculation 9 5 - 18 mmol/L    Glucose 96 70 - 125 mg/dL     Calcium 9.7 8.5 - 10.5 mg/dL    BUN 16 8 - 22 mg/dL    Creatinine 0.75 0.60 - 1.10 mg/dL    GFR MDRD Af Amer >60 >60 mL/min/1.73m2    GFR MDRD Non Af Amer >60 >60 mL/min/1.73m2       Lab Results   Component Value Date    ALT 20 09/10/2019    AST 23 07/25/2019    ALKPHOS 69 07/25/2019    BILITOT 0.3 07/25/2019       Case Management:  I have reviewed the facility/SNF care plan/MDS which was done Today, including the falls risk, nutrition and pain screening. I also reviewed the current immunizations, and preventive care.. Future cancer screening indicated is Breast and provider and pt will formulate a POC.   Patient's desire to return to the community is present, but is not able due to care needs .    Information reviewed:  Medications, vital signs, orders, and nursing notes.    ASSESSMENT:      ICD-10-CM    1. Seropositive rheumatoid arthritis of multiple sites (H) M05.79    2. Pain management R52    3. PAD (peripheral artery disease) (H) I73.9    4. Polyarthralgia M25.50        PLAN:    No further changes at this time.  Her left leg looks good she is on warfarin which were monitoring again the beginning of December.  She also does have an appoint with rheumatology in December.      Electronically signed by: Michael Duane Johnson, CNP

## 2021-06-19 NOTE — LETTER
Letter by Luis Enrique Lindsey DO at      Author: Luis Enrique Lindsey DO Service: -- Author Type: --    Filed:  Encounter Date: 7/17/2019 Status: (Other)         Patient: María Elena Saab   MR Number: [id number]   YOB: 1965   Date of Visit: 7/17/2019     [facility]    Facility:   [facility] [[id number]]   Code Status: FULL CODE    Chief concern: Patient is seen by me today for admission to the [facility].      Reason for admission: Patient was recently discharged from [facility], where she was treated for group B streptococcal bacteremia, and sepsis related to a cellulitis infection of her right lower extremity.  After initial treatment using broad-spectrum antibiotics, her treatment was narrowed down to using ceftriaxone, which she will continue to get via a PICC line in her right upper extremity until 07/25/2019.  Since time of readmission to the [facility], patient relates she continues to do better from when she was initially hospitalized.  She reports still having a low-grade fever, with review of long-term care facility documentation showing a temperature as high as 100.3  F on 07/16/2019.  No worsening of erythema or edema of her right lower extremity reported by staff or patient since readmission to facility.  She denies having any new problems with breathing, eating or drinking, or urination.  Her stools have been looser than normal since starting antibiotic treatment in the hospital, but they have not been liquid stools.  She has some skin irritation in her bilateral proximal inner thighs, and underneath her breast, which she told me has been getting treated using an antifungal powder underneath her breasts, and over her inner thighs.  I do not see that in the orders using the facility electronic medical record.  I was speak to nursing staff about this discrepancy.  She has multiple labs scheduled for day of exam, and I  "added an INR study to this.  Her INR has been within the ideal therapeutic range recently per review of hospital labs.  Her initial elevated total white blood cell count went back down to the normal range with treatment given in hospital.  Her renal function is good.  Besides her Rocephin antibiotic treatment started in hospital, other significant medication changes includes the discontinuation for now of her methotrexate and Humira treatment for rheumatoid arthritis due to the immunosuppressive effects of these medications.  At time of my exam, her pain was well controlled on her prescribed medications.    Review of systems: See history of present illness, all others negative.     The medication list from the nursing home orders in the [facility] EMR, and the medications listed in the Epic EMR were reconciled, and any new medications to include in Epic are listed as a \"historical medication\".    Current Outpatient Medications   Medication Sig Dispense Refill   ? acetaminophen (TYLENOL) 325 MG tablet Take 650 mg by mouth every 6 (six) hours as needed for pain.            ? ascorbic acid, vitamin C, (ASCORBIC ACID WITH KAEL HIPS) 500 MG tablet Take 500 mg by mouth 2 (two) times a day.     ? cefTRIAXone 2 gram SolR Infuse 2,000 mg into a venous catheter daily.     ? cholecalciferol, vitamin D3, (VITAMIN D3) 2,000 unit Tab Take 6,000 Units by mouth daily.     ? DULoxetine (CYMBALTA) 20 MG capsule Take 1 capsule (20 mg total) by mouth daily. 30 capsule 2   ? folic acid (FOLVITE) 1 MG tablet Take 1 tablet (1 mg total) by mouth daily.  0   ? hydroCHLOROthiazide (HYDRODIURIL) 25 MG tablet Take 25 mg by mouth daily.     ? hydroxychloroquine (PLAQUENIL) 200 mg tablet Take 200 mg by mouth 2 (two) times a day.     ? ibuprofen (ADVIL,MOTRIN) 200 MG tablet Take 400 mg by mouth every 8 (eight) hours as needed for pain.            ? methotrexate 2.5 MG tablet Take 4 tablets (10 mg total) by mouth once a week. 16 tablet 0   ? " multivitamin therapeutic (THERAGRAN) tablet Take 1 tablet by mouth daily.     ? oxyCODONE (OXY-IR) 5 mg capsule Take 1-2 tablets every 4 hours orally as needed for pain relief.  Use 1 tablet for 1-5/10 pain, take 2 tablets for 6-10/10 pain.  0   ? SODIUM CHLORIDE 0.9 %, FLUSH, INJ Infuse 10 mL into a venous catheter. Before and after medications or lab drawn     ? WARFARIN SODIUM (WARFARIN ORAL) Take by mouth daily. 6/28/19 INR 2.78  Cont 9mg M-W-F, 8mg AOD Next INR 7/25 5/30/19  INR 2.20 Cont 9mg M-W-F, 8mg AOD. Next INR 6/27.  5/3/19 INR 1.88  Take 9mg M-W-F and 8mg AOD.  Next INR 5/30/19.    4/4/19 INR 2.28  Cont 9mg on Wed and Sun and 8mg AOD.  Next INR 5/3/19.  3/7/19 INR 2.22  Cont 9mg on Wed and Sun and 8mg AOD.  Next INR 4/4/19.             No current facility-administered medications for this visit.      Past Medical History:   Diagnosis Date   ? 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 Bacteremia due to group B Streptococcus    ? Cellulitis of right lower extremity    ? DJD (degenerative joint disease)    ? DVT, bilateral lower limbs (H) 10/2014   ? Edema - swelling of the legs after blood clots 5/22/2015   ? Essential hypertension with goal blood pressure less than 140/90    ? Failure to thrive    ? History of blood clots    ? Hypokalemia 10/15/2014   ? Lung mass 10/18/2014   ? Morbid obesity (H) 4/10/2015    Had formal nutrition consult at [facility].  RD reports that she is not willing to commit to the program outlined.  See scanned document.  12/15/2015 - Marcio Quinonez MD      ? Obesity - although she was not weighed today, she is clearly obese on inspection. 4/10/2015   ? LOPEZ (obstructive sleep apnea) - abnormal overnight pulse oximetry 5/22/2015   ? Osteoarthritis    ? Peripheral vascular disease (H)    ? Pleural effusion    ? Pressure ulcer of foot    ? Rheumatoid arthritis (H) 12/30/2014    seronegative   ? Sepsis (H)    ? Seropositive rheumatoid arthritis (H) 1/19/2016   ? Vitamin D  deficiency 8/26/2015      Past Surgical History:   Procedure Laterality Date   ? JOINT REPLACEMENT      knee   ? PERIPHERALLY INSERTED CENTRAL CATHETER INSERTION Right 07/14/2019    4fr/44cm/Rt Cephalic.lowSVC.MGlav   ? TOTAL HIP ARTHROPLASTY Left 10/18/2016    Procedure: LEFT HIP TOTAL ARTHROPLASTY;  Surgeon: London Goss MD;  Location: [facility] Main OR;  Service:    ? TOTAL KNEE ARTHROPLASTY Left 2006      Social History     Tobacco Use   ? Smoking status: Former Smoker     Packs/day: 1.00     Years: 30.00     Pack years: 30.00     Types: Cigarettes   ? Smokeless tobacco: Never Used   Substance Use Topics   ? Alcohol use: Yes     Comment: 3-4 times a year she will have 1 or 2.   ? Drug use: No        Family History   Problem Relation Age of Onset   ? Multiple myeloma Father    ? Deep vein thrombosis Father    ? Cataracts Father    ? Snoring Father    ? Other Brother         Blood withdrawn from time to time.  Sounds like hemochromatosis.   ? Sleep apnea Brother    ? Cancer Mother         Uterine CA   ? Arthritis Mother    ? Cataracts Mother    ? Breast cancer Mother 54   ? No Medical Problems Sister    ? Rheum arthritis Maternal Grandmother    ? Breast cancer Maternal Grandmother 50   ? Heart disease Maternal Grandmother    ? Rheum arthritis Paternal Aunt    ? Breast cancer Maternal Aunt 54   ? Breast cancer Cousin    ? Clotting disorder Neg Hx        Vitals:    07/15/19 0203 07/16/19 2325   BP:  133/81   Pulse:  70   Resp:  18   Temp:  98.5  F (36.9  C)   SpO2:  94%   Weight: (!) 341 lb 1.6 oz (154.7 kg)        EXAM:   General: Vital signs reviewed.  Patient is in no acute appearing distress.  Breathing appears nonlabored.  Patient is alert and oriented ×3.  HEENT: No scleral discoloration.  No abnormal nasal or ear drainage.  No intraoral plaques suggestive of oral Candida, and no abnormal intraoral lesions such as vesicles or ulcers.  No pharyngeal injection.  Neck: Supple with no JVD.  Heart: Heart rate  is regular without murmur.  Lungs: Lungs are clear to auscultation with good airflow bilaterally.  Abdomen:  Abdomen is soft, nontender.  No palpable abnormal masses or organomegaly.  Skin/extremities: Overall warm and dry, except for increased skin temperature noted in right lower extremity going proximally to the mid knee level.  She had appropriate compression garments on her bilateral lower extremities which I did not remove.  There is slight erythema extending proximally to about the mid knee region, with no open skin areas noted in the visible skin areas.  See picture included documentation for further reference.  I looked at her PIC site in her right upper extremity, and it looks good, with no abnormal drainage, or discoloration.  Neuro: No acute focal problems.  Psych: Patient has a very pleasant affect, smiling often and making good eye contact exam.  Speech is clear and fluent.  Stays on conversation topics well.        Recent studies: None since the time of readmission.    Approximately 45 minutes was spent on patient management, with greater than 50% of this time being spent on discussion of concerns and management with resident, and care staff.  The remainder of the time was spent on medication and vital sign data reconciliation between electronic medical records, and review of past medical history and current medical management.  Assessment/Plan   1. Essential hypertension with goal blood pressure less than 140/90     2. Seropositive rheumatoid arthritis of multiple sites (H)     3. Morbid obesity (H)     4. Warfarin anticoagulation - long-term because of unresolved DVT that first appeared in October, 2014     5. Vitamin D deficiency     6. History of total left hip replacement     7. History of total left knee replacement (TKR)     8. Primary osteoarthritis involving multiple joints     9. High risk medication use         Patient Instructions   Add INR to lab draw for tomorrow.  I left the Humira and  methotrexate on her [facility] electronic medical record medication list, due to this possibly being restarted after treatment of her acute illness.  If her stool should become more liquid, we will need to assess her for possible C. difficile infection.  I will speak to nursing staff about her antifungal treatment under her breasts, and between her legs, and doing appropriate in order for this treatment.  Otherwise, no change in her current management.     Luis Enrique Lindsey, DO          Back contusion

## 2021-06-19 NOTE — PROGRESS NOTES
[facility]    Facility:   [facility] [[id number]]   Code Status: FULL CODE      CHIEF COMPLAINT/REASON FOR VISIT:  Chief Complaint   Patient presents with     Review Of Multiple Medical Conditions       HISTORY:      HPI: María Elena is a 53 y.o. female who was seen secondary to review of chronic medical conditions.  She does have polyarthritis and rheumatoid arthritis.  She recently did see rheumatology did have her laboratory studies in May she is revisiting with them again in August and her next set of labs is August 13.  In July she did refuse her vision and refused her dental visits.  She also is on Coumadin 9 mg rechecking a pro time again on August 14.  She does have a history of DVT.  She otherwise has been normotensive and afebrile.  Rarely uses any extra ibuprofen as needed.  She is not requiring any albuterol in no respiratory issues so we will discontinue that as needed medication.  She pretty much stays within the room.  At this point nonambulatory.  She does get out she usually does self propel her wheelchair throughout the facility also in her room.  Does need assistance with basic ADLs.    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  RD reports that she is not willing to commit to the program outlined.  See scanned document.  12/15/2015 - Marcio Quinonez MD        Obesity - although she was not weighed today, she is clearly obese on inspection. 4/10/2015     LOPEZ (obstructive sleep apnea) - abnormal overnight pulse oximetry  5/22/2015     Osteoarthritis      Peripheral vascular disease (H)      Pleural effusion      Pressure ulcer of foot      Rheumatoid arthritis (H) 12/30/2014    seronegative     Seropositive rheumatoid arthritis (H) 1/19/2016     Vitamin D deficiency 8/26/2015             Family History   Problem Relation Age of Onset     Multiple myeloma Father      Deep vein thrombosis Father      Cataracts Father      Snoring Father      Other Brother      Blood withdrawn from time to time.  Sounds like hemochromatosis.     Sleep apnea Brother      Cancer Mother      Uterine CA     Arthritis Mother      Cataracts Mother      Breast cancer Mother 54     No Medical Problems Sister      Rheum arthritis Maternal Grandmother      Breast cancer Maternal Grandmother 50     Heart disease Maternal Grandmother      Rheum arthritis Paternal Aunt      Breast cancer Maternal Aunt 54     Breast cancer Cousin      Clotting disorder Neg Hx      Social History     Social History     Marital status: Single     Spouse name: N/A     Number of children: N/A     Years of education: N/A     Social History Main Topics     Smoking status: Former Smoker     Packs/day: 1.00     Years: 30.00     Types: Cigarettes     Smokeless tobacco: Never Used     Alcohol use Yes      Comment: 3-4 times a year she will have 1 or 2.     Drug use: No     Sexual activity: No     Other Topics Concern     Not on file     Social History Narrative    Long term  At [facility] to [place] allowing surgery on deteriorated joints  LTC12/2015         Review of Systems  Denies any URI symptoms postnasal drip fever chills fatigue flulike symptoms nausea vomiting diarrhea dysuria rashes stiff neck swollen glands. Does have bilateral DVT sleep apnea obesity edema vitamin D deficiency PAD rheumatoid arthritis multiple sites chronic pain.          Current Outpatient Prescriptions   Medication Sig     adalimumab (HUMIRA) 40 mg/0.8 mL injection Inject 0.8 mL (40 mg total) under  the skin every 14 (fourteen) days.     artificial tears,hypromellose, (GENTEAL) 0.3 % Drop Administer 1 drop to both eyes 4 (four) times a day as needed.     ascorbic acid, vitamin C, (ASCORBIC ACID WITH KAEL HIPS) 500 MG tablet Take 500 mg by mouth 2 (two) times a day.     cholecalciferol, vitamin D3, (VITAMIN D3) 2,000 unit Tab Take 6,000 Units by mouth daily.     hydroCHLOROthiazide (HYDRODIURIL) 25 MG tablet Take 25 mg by mouth daily.     hydroxychloroquine (PLAQUENIL) 200 mg tablet Take 200 mg by mouth 2 (two) times a day.     ibuprofen (ADVIL,MOTRIN) 200 MG tablet Take 400 mg by mouth 3 (three) times a day as needed for pain.      methotrexate 2.5 MG tablet Take 4 tablets (10 mg total) by mouth once a week.     multivitamin therapeutic (THERAGRAN) tablet Take 1 tablet by mouth daily.     nystatin (MYCOSTATIN) powder Apply 1 application topically 2 (two) times a day. Wash well between applications. Apply under breasts.     oxyCODONE (ROXICODONE) 5 MG immediate release tablet Take 5 mg by mouth every 4 (four) hours as needed for pain.     predniSONE (DELTASONE) 2.5 MG tablet Take 7.5 mg/d prednisone PO for 3 weeks then stop.     senna-docusate (SENNOSIDES-DOCUSATE SODIUM) 8.6-50 mg tablet Take 1 tablet by mouth daily.     sulfaSALAzine (AZULFIDINE) 500 mg tablet Take 3 tablets (1,500 mg total) by mouth 2 (two) times a day.     WARFARIN SODIUM (WARFARIN ORAL) Take by mouth daily. 6/19/18 INR 2.38  Cont 8mg Mon and Thurs and 9mg all other days.  Next INR 7/17/18.  5/21/18 INR 2.63 Cont 8mg M & TH, 9mg all other days. Next INR 6/18.  4/20/18 INR 2.81 Cont 8mg M & TH, 9mg all other days.  3/23/18 INR 2.83 Cont 8mg M & TH, 9mg all other days. Next INR 4/20 2/23/18 INR 2.21 Take 8mg M & TH, 9mg all other days. Next INR 3/23.  2/22/18 INR missed on 2/22.  Take 8mg on 2/22.  Next INR 2/23/18.       .There were no vitals filed for this visit.  Blood pressure 153/78 pulse 79 respirations 20 temperature 98.8 weight 347  pounds  Physical Exam      Constitutional: She is oriented to person, place, and time. She appears well-developed and well-nourished. No distress.   obese .  .   Cardiovascular: Normal rate, regular rhythm and normal heart sounds.   Chronic edema . Farrow wraps  Pulmonary/Chest: Breath sounds normal.   inder   Abdominal.  Protuberant  Musculoskeletal:   Chronic pain. Chronic edema. Rheumatoid arthritis multiple sites including hands. Left knee scar. Left hip OA. Left hip scar  Neurological: She is oriented to person, place, and time.   Skin: Skin is warm and dry. No rash noted.   Psychiatric: She has a normal mood and affect. Her behavior is normal.          LABS:   Lab Results   Component Value Date    WBC 4.9 07/09/2018    HGB 13.6 07/09/2018    HCT 42.1 07/09/2018    MCV 93 07/09/2018     07/09/2018     Results for orders placed or performed in visit on 10/05/17   Basic Metabolic Panel   Result Value Ref Range    Sodium 140 136 - 145 mmol/L    Potassium 4.7 3.5 - 5.0 mmol/L    Chloride 98 98 - 107 mmol/L    CO2 30 22 - 31 mmol/L    Anion Gap, Calculation 12 5 - 18 mmol/L    Glucose 97 70 - 125 mg/dL    Calcium 10.3 8.5 - 10.5 mg/dL    BUN 14 8 - 22 mg/dL    Creatinine 0.70 0.60 - 1.10 mg/dL    GFR MDRD Af Amer >60 >60 mL/min/1.73m2    GFR MDRD Non Af Amer >60 >60 mL/min/1.73m2           ASSESSMENT:      ICD-10-CM    1. Seropositive rheumatoid arthritis of multiple sites (H) M05.79    2. Primary osteoarthritis of right knee M17.11    3. Polyarthralgia M25.50    4. Morbid obesity (H) E66.01        PLAN:    She did refuse vision and dental in July.  Currently on Coumadin 9 mg rechecking a pro time August 14.  Did see rheumatology for labs on May 14.  Rechecking the labs again for rheumatology on August 13.        Electronically signed by: Michael Duane Johnson, CNP

## 2021-06-20 NOTE — PROGRESS NOTES
[facility]    Facility:   [facility] [[id number]]   Code Status: FULL CODE      CHIEF COMPLAINT/REASON FOR VISIT:  Chief Complaint   Patient presents with     Review Of Multiple Medical Conditions       HISTORY:      HPI: María Elena is a 53 y.o. female who had a review of her multiple chronic medical conditions.  She recently did have a pro time/INR we will continue on and checking that monthly.  She did see rheumatology in May she also did have a follow-up April 16.  She has been normotensive and afebrile.  She has been very much staying in her room most the time.  She does need assistance with all ADLs.  Does have multiple joint osteoarthrosis along with rheumatoid arthritis multiple sites.    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  RD reports that she is not willing to commit to the program outlined.  See scanned document.  12/15/2015 - Marcio Quinonez MD        Obesity - although she was not weighed today, she is clearly obese on inspection. 4/10/2015     LOPEZ (obstructive sleep apnea) - abnormal overnight pulse oximetry 5/22/2015     Osteoarthritis      Peripheral vascular disease (H)      Pleural effusion      Pressure ulcer of foot      Rheumatoid arthritis (H) 12/30/2014    seronegative     Seropositive rheumatoid arthritis (H) 1/19/2016     Vitamin D deficiency 8/26/2015             Family History   Problem Relation Age of Onset     Multiple myeloma Father      Deep vein thrombosis Father      Cataracts Father      Snoring Father       Other Brother      Blood withdrawn from time to time.  Sounds like hemochromatosis.     Sleep apnea Brother      Cancer Mother      Uterine CA     Arthritis Mother      Cataracts Mother      Breast cancer Mother 54     No Medical Problems Sister      Rheum arthritis Maternal Grandmother      Breast cancer Maternal Grandmother 50     Heart disease Maternal Grandmother      Rheum arthritis Paternal Aunt      Breast cancer Maternal Aunt 54     Breast cancer Cousin      Clotting disorder Neg Hx      Social History     Social History     Marital status: Single     Spouse name: N/A     Number of children: N/A     Years of education: N/A     Social History Main Topics     Smoking status: Former Smoker     Packs/day: 1.00     Years: 30.00     Types: Cigarettes     Smokeless tobacco: Never Used     Alcohol use Yes      Comment: 3-4 times a year she will have 1 or 2.     Drug use: No     Sexual activity: No     Other Topics Concern     Not on file     Social History Narrative    Long term  At [facility] to rehab to [place] allowing surgery on deteriorated joints  LTC12/2015         Review of Systems  Denies any URI symptoms postnasal drip fever chills fatigue flulike symptoms nausea vomiting diarrhea dysuria rashes stiff neck swollen glands. Does have bilateral DVT sleep apnea obesity edema vitamin D deficiency PAD rheumatoid arthritis multiple sites chronic pain.              Current Outpatient Prescriptions   Medication Sig     adalimumab (HUMIRA) 40 mg/0.8 mL injection Inject 0.8 mL (40 mg total) under the skin every 14 (fourteen) days.     artificial tears,hypromellose, (GENTEAL) 0.3 % Drop Administer 1 drop to both eyes 4 (four) times a day as needed.     ascorbic acid, vitamin C, (ASCORBIC ACID WITH KAEL HIPS) 500 MG tablet Take 500 mg by mouth 2 (two) times a day.     cholecalciferol, vitamin D3, (VITAMIN D3) 2,000 unit Tab Take 6,000 Units by mouth daily.     hydroCHLOROthiazide (HYDRODIURIL) 25 MG tablet Take  25 mg by mouth daily.     hydroxychloroquine (PLAQUENIL) 200 mg tablet Take 200 mg by mouth 2 (two) times a day.     ibuprofen (ADVIL,MOTRIN) 200 MG tablet Take 400 mg by mouth 3 (three) times a day as needed for pain.      methotrexate 2.5 MG tablet Take 4 tablets (10 mg total) by mouth once a week.     multivitamin therapeutic (THERAGRAN) tablet Take 1 tablet by mouth daily.     nystatin (MYCOSTATIN) powder Apply 1 application topically 2 (two) times a day. Wash well between applications. Apply under breasts.     oxyCODONE (ROXICODONE) 5 MG immediate release tablet Take 5 mg by mouth every 4 (four) hours as needed for pain.     predniSONE (DELTASONE) 2.5 MG tablet Take 7.5 mg/d prednisone PO for 3 weeks then stop.     senna-docusate (SENNOSIDES-DOCUSATE SODIUM) 8.6-50 mg tablet Take 1 tablet by mouth daily.     sulfaSALAzine (AZULFIDINE) 500 mg tablet Take 3 tablets (1,500 mg total) by mouth 2 (two) times a day.     WARFARIN SODIUM (WARFARIN ORAL) Take by mouth daily. 6/19/18 INR 2.38  Cont 8mg Mon and Thurs and 9mg all other days.  Next INR 7/17/18.  5/21/18 INR 2.63 Cont 8mg M & TH, 9mg all other days. Next INR 6/18.  4/20/18 INR 2.81 Cont 8mg M & TH, 9mg all other days.  3/23/18 INR 2.83 Cont 8mg M & TH, 9mg all other days. Next INR 4/20 2/23/18 INR 2.21 Take 8mg M & TH, 9mg all other days. Next INR 3/23.  2/22/18 INR missed on 2/22.  Take 8mg on 2/22.  Next INR 2/23/18.       .There were no vitals filed for this visit.  Blood pressure 130/86 pulse 72 respirations 18  Physical Exam  obese .  .   Cardiovascular: Normal rate, regular rhythm and normal heart sounds.   Chronic edema . Farrow wraps  Pulmonary/Chest: Breath sounds normal.   inder   Abdominal.  Protuberant  Musculoskeletal:   Chronic pain. Chronic edema. Rheumatoid arthritis multiple sites including hands. Left knee scar. Left hip OA. Left hip scar\Psychiatric: She has a normal mood and affect. Her behavior is normal.          LABS:   Lab Results    Component Value Date    WBC 4.9 07/09/2018    HGB 13.6 07/09/2018    HCT 42.1 07/09/2018    MCV 93 07/09/2018     07/09/2018     Results for orders placed or performed in visit on 10/05/17   Basic Metabolic Panel   Result Value Ref Range    Sodium 140 136 - 145 mmol/L    Potassium 4.7 3.5 - 5.0 mmol/L    Chloride 98 98 - 107 mmol/L    CO2 30 22 - 31 mmol/L    Anion Gap, Calculation 12 5 - 18 mmol/L    Glucose 97 70 - 125 mg/dL    Calcium 10.3 8.5 - 10.5 mg/dL    BUN 14 8 - 22 mg/dL    Creatinine 0.70 0.60 - 1.10 mg/dL    GFR MDRD Af Amer >60 >60 mL/min/1.73m2    GFR MDRD Non Af Amer >60 >60 mL/min/1.73m2           ASSESSMENT:      ICD-10-CM    1. Polyarthralgia M25.50    2. Physical debility R53.81    3. Pain management R52    4. Morbid obesity (H) E66.01        PLAN:    She will continue to follow-up with rheumatology as previously arranged.  Otherwise she did not have any other questions we will continue to follow up with her INR plus did encourage a BMP for a recheck.      Electronically signed by: Michael Duane Johnson, CNP

## 2021-06-20 NOTE — LETTER
Letter by Og Connelly DO at      Author: Og Connelly DO Service: -- Author Type: --    Filed:  Encounter Date: 6/24/2020 Status: (Other)         Patient: María Elena Saab   MR Number: [id number]   YOB: 1965   Date of Visit: 6/24/2020     [facility]    Facility:   [facility] [[id number]]   Code Status: UNKNOWN      CHIEF COMPLAINT/REASON FOR VISIT:  Chief Complaint   Patient presents with   ? Review Of Multiple Medical Conditions     History of unresolved DVT currently on Eliquis which recently switched from Coumadin, polyarthralgia, morbid obesity, history of aseptic necrosis of the femoral head, bacteremia in the past, cellulitis lower extremity in the past, lymphedema, essential hypertension, history of hypokalemia, obstructive sleep apnea.       HISTORY:      HPI: María Elena is a 55 y.o. female who resides here at the [facility] for multiple medical problems.  She currently has a history of DVT which is chronic and she was on chronic anticoagulation recently switched to Xarelto which I do agree with.  She does have polyarthralgia and osteoarthritis which seems to be doing okay and severe lower extremity edema.  Her weight is stayed relatively stable at this time is unclear because it does fluctuate.  Her recent COVID test was negative according to the charts and she also has a history of aseptic necrosis of the femoral head and bacteremia due to B Streptococcus.  She also has recurrent cellulitis lower extremity but she claims none at this time.    Patient states she is doing okay and has no real new complaints at this time.  She does not appreciate the humidity at this time but otherwise she is doing okay.    I did review her chart and since she is not a geriatric patient she is 57 years old she would qualify for cancer screening maintenance which include colonoscopy Pap smear OB/GYN consult as well as breast exam and pelvic  exam.  I do not see those in the chart that they have been done.    Nurses have no new concerns at this time.    Past Medical History:   Diagnosis Date   ? 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 Bacteremia due to group B Streptococcus    ? Cellulitis of right lower extremity    ? DJD (degenerative joint disease)    ? DVT, bilateral lower limbs (H) 10/2014   ? Edema - swelling of the legs after blood clots 5/22/2015   ? Essential hypertension with goal blood pressure less than 140/90    ? Failure to thrive    ? History of blood clots    ? Hypokalemia 10/15/2014   ? Lung mass 10/18/2014   ? Morbid obesity (H) 4/10/2015    Had formal nutrition consult at [facility].  RD reports that she is not willing to commit to the program outlined.  See scanned document.  12/15/2015 - Marcio Quinonez MD      ? Obesity - although she was not weighed today, she is clearly obese on inspection. 4/10/2015   ? LOPEZ (obstructive sleep apnea) - abnormal overnight pulse oximetry 5/22/2015   ? Osteoarthritis    ? Peripheral vascular disease (H)    ? Pleural effusion    ? Pressure ulcer of foot    ? Rheumatoid arthritis (H) 12/30/2014    seronegative   ? Sepsis (H)    ? Seropositive rheumatoid arthritis (H) 1/19/2016   ? Vitamin D deficiency 8/26/2015             Family History   Problem Relation Age of Onset   ? Multiple myeloma Father    ? Deep vein thrombosis Father    ? Cataracts Father    ? Snoring Father    ? Other Brother         Blood withdrawn from time to time.  Sounds like hemochromatosis.   ? Sleep apnea Brother    ? Cancer Mother         Uterine CA   ? Arthritis Mother    ? Cataracts Mother    ? Breast cancer Mother 54   ? No Medical Problems Sister    ? Rheum arthritis Maternal Grandmother    ? Breast cancer Maternal Grandmother 50   ? Heart disease Maternal Grandmother    ? Rheum arthritis Paternal Aunt    ? Breast cancer Maternal Aunt 54   ? Breast cancer Cousin    ? Clotting disorder Neg Hx      Social History      Socioeconomic History   ? Marital status: Single     Spouse name: None   ? Number of children: None   ? Years of education: None   ? Highest education level: None   Occupational History   ? None   Social Needs   ? Financial resource strain: None   ? Food insecurity     Worry: None     Inability: None   ? Transportation needs     Medical: None     Non-medical: None   Tobacco Use   ? Smoking status: Former Smoker     Packs/day: 1.00     Years: 30.00     Pack years: 30.00     Types: Cigarettes   ? Smokeless tobacco: Never Used   Substance and Sexual Activity   ? Alcohol use: Yes     Comment: 3-4 times a year she will have 1 or 2.   ? Drug use: No   ? Sexual activity: Never     Partners: Female   Lifestyle   ? Physical activity     Days per week: None     Minutes per session: None   ? Stress: None   Relationships   ? Social connections     Talks on phone: None     Gets together: None     Attends [employer] service: None     Active member of club or organization: None     Attends meetings of clubs or organizations: None     Relationship status: None   ? Intimate partner violence     Fear of current or ex partner: None     Emotionally abused: None     Physically abused: None     Forced sexual activity: None   Other Topics Concern   ? None   Social History Narrative    Long term  At [facility] to rehab to [facility] allowing surgery on deteriorated joints  LTC12/2015         Review of Systems   Constitutional:        Patient denies any pain fevers chills nausea vomit diarrhea change in vision hearing taste or smell weakness 1 side of the chest pain shortness of breath.  She denies incontinence stool polyphagia polydipsia polyuria depression or anxiety and the remainder review of systems is negative.       Vitals:    06/24/20 1113   BP: 121/71   Pulse: 68   Resp: 20   Temp: 97.8  F (36.6  C)   SpO2: 93%       Physical Exam  Constitutional:       General: She is not in acute distress.  HENT:      Head: Normocephalic and  atraumatic.   Eyes:      General:         Right eye: No discharge.         Left eye: No discharge.   Pulmonary:      Effort: Pulmonary effort is normal. No respiratory distress.   Abdominal:      General: There is distension.   Musculoskeletal:      Right lower leg: Edema present.      Left lower leg: Edema present.   Neurological:      Mental Status: She is alert. Mental status is at baseline.   Psychiatric:         Mood and Affect: Mood normal.         Behavior: Behavior normal.           LABS: Reviewed.      ASSESSMENT:      ICD-10-CM    1. Seropositive rheumatoid arthritis of multiple sites (H)  M05.79    2. Deep vein thrombosis (DVT) of both lower extremities, unspecified chronicity, unspecified vein (H)  I82.403    3. Vitamin D deficiency  E55.9    4. Pain management  R52    5. Morbid obesity (H)  E66.01    6. Physical debility  R53.81    7. Essential hypertension with goal blood pressure less than 140/90  I10    8. Polyarthralgia  M25.50        PLAN: Plan at this time I do agree with the Xarelto she is doing okay on this at this time and taken her medications.  I did med reconciliation and updated best I could at this point.  I did add the methotrexate to our epic medications.    Reviewing her health maintenance it is indicated that she be treated less of a geriatric patient here and more as an adult medicine.  Which require her to have a colonoscopy I do not see 1 on the chart at this time and also pelvic exam with Pap smear as well as thorough breast exam.    I will continue to monitor above medical problems and no other changes to care plan at this time.  Given the fact that patient has no sense of COVID-19 and no need to test.  Given that we are still in a health care facility patient was seen with myself wearing the appropriate PPE which mean face shield as well as mask and handwashing entering the room and exiting.  Physical exam was not done at this time with the exception of observation and review of  systems was done.  No other changes to care plan at this time.      Total  minutes of which % was spent counseling and coordination of care of the above plan.    Electronically signed by: Og Connelly DO

## 2021-06-20 NOTE — PROGRESS NOTES
[facility]    Facility:   [facility] [[id number]]   Code Status: FULL CODE    Recent medical history/chief concerns: Patient is seen for review of multiple long-term medical problems.  Chief concern of patient she tells me is pain in her right lower back near her right SI joint pain. She has to lean over to left to relieve it.  She does not recall any injury or strain to this area.  She has one leg which is shorter than the other she states, which she thinks may be related to her discomfort.  Otherwise, patient has no other acute concerns.  I visited with her inside of her bedroom while she was sitting in her wheelchair.  She told me she does get up to walk with her walker a couple of times per day.  Her pain is limited by her right lower back discomfort and right hip discomfort.  She told me that she needs another knee replacement, but first needs to lose some weight.  She told me about how high calories of food at the [facility] facility as.  I suggested that she discuss her diet with a dietitian, which she declined.  I spoke to nursing staff about her eating habits, and was told that she does sometimes choose to eat larger than average portions.  Further review of systems shows patient has not had any recent fevers or chills, no problems with urination or bowel movements, she denies having any acute breathing problems.  She told me she missed her most recent rheumatology appointment, and hopes to make up for this missed appointment later this fall.    Review of systems: See history of present illness, otherwise negative.     Current Outpatient Prescriptions   Medication Sig Dispense Refill     adalimumab (HUMIRA) 40 mg/0.8 mL injection Inject 0.8 mL (40 mg total) under the skin every 14 (fourteen) days. 1.6 mL 5     artificial tears,hypromellose, (GENTEAL) 0.3 % Drop Administer 1 drop to both eyes 4 (four) times a day as needed.       ascorbic acid, vitamin C,  (ASCORBIC ACID WITH KAEL HIPS) 500 MG tablet Take 500 mg by mouth 2 (two) times a day.       cholecalciferol, vitamin D3, (VITAMIN D3) 2,000 unit Tab Take 6,000 Units by mouth daily.       HUMIRA PEN 40 mg/0.8 mL PnKt INJECT 0.8 ML (40 MG TOTAL) UNDER THE SKIN EVERY 14 DAYS 1 kit 5     hydroCHLOROthiazide (HYDRODIURIL) 25 MG tablet Take 25 mg by mouth daily.       hydroxychloroquine (PLAQUENIL) 200 mg tablet Take 200 mg by mouth 2 (two) times a day.       ibuprofen (ADVIL,MOTRIN) 200 MG tablet Take 400 mg by mouth 3 (three) times a day as needed for pain.        methotrexate 2.5 MG tablet Take 4 tablets (10 mg total) by mouth once a week. 16 tablet 0     multivitamin therapeutic (THERAGRAN) tablet Take 1 tablet by mouth daily.       nystatin (MYCOSTATIN) powder Apply 1 application topically 2 (two) times a day. Wash well between applications. Apply under breasts.       predniSONE (DELTASONE) 2.5 MG tablet Take 7.5 mg/d prednisone PO for 3 weeks then stop. 90 tablet 0     senna-docusate (SENNOSIDES-DOCUSATE SODIUM) 8.6-50 mg tablet Take 1 tablet by mouth daily.       sulfaSALAzine (AZULFIDINE) 500 mg tablet Take 3 tablets (1,500 mg total) by mouth 2 (two) times a day. 180 tablet 0     WARFARIN SODIUM (WARFARIN ORAL) Take by mouth daily. 9/14/18 INR 2.39 Cont 8mg M & TH, 9mg AOD. Next INR 10/11.  8/16/18 INR 2.04  Take 8mg Mon and Thurs and 9mg AOD.  Next INR 9/13/18.  7/17/18 INR 2.27 Take 8mg M, Th and 9mg AOD. Next INR 8       No current facility-administered medications for this visit.        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  RD reports that she is not willing to commit to the program outlined.  See scanned document.  12/15/2015 - Marcio Quinonez MD        Obesity - although she was not weighed today, she is clearly obese on inspection. 4/10/2015     LOPEZ (obstructive sleep apnea) - abnormal overnight pulse oximetry 5/22/2015     Osteoarthritis      Peripheral vascular disease (H)      Pleural effusion      Pressure ulcer of foot      Rheumatoid arthritis (H) 12/30/2014    seronegative     Seropositive rheumatoid arthritis (H) 1/19/2016     Vitamin D deficiency 8/26/2015      Past Surgical History:   Procedure Laterality Date     JOINT REPLACEMENT      knee     TOTAL HIP ARTHROPLASTY Left 10/18/2016    Procedure: LEFT HIP TOTAL ARTHROPLASTY;  Surgeon: London Goss MD;  Location: [facility];  Service:      TOTAL KNEE ARTHROPLASTY Left 2006      Social History     Social History     Marital status: Single     Spouse name: N/A     Number of children: N/A     Years of education: N/A     Social History Main Topics     Smoking status: Former Smoker     Packs/day: 1.00     Years: 30.00     Types: Cigarettes     Smokeless tobacco: Never Used     Alcohol use Yes      Comment: 3-4 times a year she will have 1 or 2.     Drug use: No     Sexual activity: No     Other Topics Concern     Not on file     Social History Narrative    Long term  At [facility] to rehab to [facility] allowing surgery on deteriorated joints  LTC12/2015       History   Smoking Status     Former Smoker     Packs/day: 1.00     Years: 30.00     Types: Cigarettes   Smokeless Tobacco     Never Used        Exam:   Vitals:    09/14/18 0025   BP: 152/81   Pulse: 89   Resp: 20   Temp: 97.5  F (36.4  C)   SpO2: 93%       EXAM:   General: Vital signs reviewed.  Patient is in no acute appearing distress.  Breathing appears nonlabored.  Patient is alert and oriented ×3.  ENT: No abnormal ear or nasal drainage.  No  intraoral lesions or plaques.    Heart: Heart rate is regular without murmur.  Lungs: Lungs are clear to auscultation with good airflow bilaterally.  Skin/extremities: Warm and dry.  Patient has chronic significant lower extremity edema, which was being treated with elastic wraps bilaterally at time of exam.  Back: Patient has tenderness over her right SI joint area.  No palpable abnormality noted.  Labs: INR from 09/13/2018 is in therapeutic range at 2.39.  Approximately 25 minutes was spent on patient management, with greater than 50% of this time being spent on medication reconciliation between electronic medical records, review of past medical history and current medical management, and discussion of management with patient and care staff.  Assessment/Plan   1. Sacroiliac joint dysfunction of right side     2. History of DVT (deep vein thrombosis)     3. Warfarin anticoagulation     4. Rheumatoid arthritis (H)     5. Morbid obesity (H)     6. High blood pressure         Patient Instructions   Order written for patient to be evaluated and treated by physical therapy for management of right hip discomfort.  Hopefully, this will improve her ambulation, and aid in controlling her weight so that she may one day be able to get a knee replacement like she hopes to.  Otherwise, no new treatments prescribed.  Her next INR is scheduled for 10/11/2018.  I reviewed her blood pressures over the past month.  The readings have been a combination of being near normal, and mildly elevated.  I think further monitoring of the blood pressures as best for now, in case discomfort is contributing to her blood pressure elevations periodically.  If we can decrease her discomfort, hopefully her blood pressure readings will improve.  I discussed pain management with patient, and she prefers to stay on her current medication therapies.     Luis Enrique Lindsey, DO

## 2021-06-20 NOTE — LETTER
Letter by Johnson, Michael Duane, CNP at      Author: Johnson, Michael Duane, CNP Service: -- Author Type: --    Filed:  Encounter Date: 2/27/2020 Status: (Other)         Patient: María Elena Saab   MR Number: [id number]   YOB: 1965   Date of Visit: 2/27/2020     [facility]    Facility:   [facility] [[id number]]   Code Status: FULL CODE      CHIEF COMPLAINT/REASON FOR VISIT:  Chief Complaint   Patient presents with   ? Review Of Multiple Medical Conditions       HISTORY:      HPI: María Elena is a 54 y.o. female who was seen secondary to review of chronic medical conditions.  Our last visit together was in December 2019.  She does have an upcoming visit with rheumatology in March.  She does need assistance with all ADLs.  Does have multiple sites of osteoarthrosis.  She can for the pain take Tylenol as needed ibuprofen and oxycodone to which she is not taking any of those 3 medications as needed.  She is on Humira and methotrexate.  She also is on warfarin rechecking her pro time next week.  She has been normotensive and afebrile.  Appetite good.  Denies any colds or flus.  For mood she like to stay on the Cymbalta 20 mg.  Otherwise we did talk about some current events as well as some of the old time we have that she likes to watch including ghost busters.    Past Medical History:   Diagnosis Date   ? 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 Bacteremia due to group B Streptococcus    ? Cellulitis of right lower extremity    ? DJD (degenerative joint disease)    ? DVT, bilateral lower limbs (H) 10/2014   ? Edema - swelling of the legs after blood clots 5/22/2015   ? Essential hypertension with goal blood pressure less than 140/90    ? Failure to thrive    ? History of blood clots    ? Hypokalemia 10/15/2014   ? Lung mass 10/18/2014   ? Morbid obesity (H) 4/10/2015    Had formal nutrition consult at [facility].  RD reports that she is not willing to  commit to the program outlined.  See scanned document.  12/15/2015 - Marcio Quinonez MD      ? Obesity - although she was not weighed today, she is clearly obese on inspection. 4/10/2015   ? LOPEZ (obstructive sleep apnea) - abnormal overnight pulse oximetry 5/22/2015   ? Osteoarthritis    ? Peripheral vascular disease (H)    ? Pleural effusion    ? Pressure ulcer of foot    ? Rheumatoid arthritis (H) 12/30/2014    seronegative   ? Sepsis (H)    ? Seropositive rheumatoid arthritis (H) 1/19/2016   ? Vitamin D deficiency 8/26/2015             Family History   Problem Relation Age of Onset   ? Multiple myeloma Father    ? Deep vein thrombosis Father    ? Cataracts Father    ? Snoring Father    ? Other Brother         Blood withdrawn from time to time.  Sounds like hemochromatosis.   ? Sleep apnea Brother    ? Cancer Mother         Uterine CA   ? Arthritis Mother    ? Cataracts Mother    ? Breast cancer Mother 54   ? No Medical Problems Sister    ? Rheum arthritis Maternal Grandmother    ? Breast cancer Maternal Grandmother 50   ? Heart disease Maternal Grandmother    ? Rheum arthritis Paternal Aunt    ? Breast cancer Maternal Aunt 54   ? Breast cancer Cousin    ? Clotting disorder Neg Hx      Social History     Socioeconomic History   ? Marital status: Single     Spouse name: Not on file   ? Number of children: Not on file   ? Years of education: Not on file   ? Highest education level: Not on file   Occupational History   ? Not on file   Social Needs   ? Financial resource strain: Not on file   ? Food insecurity:     Worry: Not on file     Inability: Not on file   ? Transportation needs:     Medical: Not on file     Non-medical: Not on file   Tobacco Use   ? Smoking status: Former Smoker     Packs/day: 1.00     Years: 30.00     Pack years: 30.00     Types: Cigarettes   ? Smokeless tobacco: Never Used   Substance and Sexual Activity   ? Alcohol use: Yes     Comment: 3-4 times a year she will have 1 or 2.   ? Drug use:  No   ? Sexual activity: Never     Partners: Female   Lifestyle   ? Physical activity:     Days per week: Not on file     Minutes per session: Not on file   ? Stress: Not on file   Relationships   ? Social connections:     Talks on phone: Not on file     Gets together: Not on file     Attends [employer] service: Not on file     Active member of club or organization: Not on file     Attends meetings of clubs or organizations: Not on file     Relationship status: Not on file   ? Intimate partner violence:     Fear of current or ex partner: Not on file     Emotionally abused: Not on file     Physically abused: Not on file     Forced sexual activity: Not on file   Other Topics Concern   ? Not on file   Social History Narrative    Long term  At [facility] to rehab to level allowing surgery on deteriorated joints  LTC12/2015         Review of Systems  No reports of any URI symptoms postnasal drip fever chills fatigue flulike symptoms nausea vomiting diarrhea dysuria rashes stiff neck swollen glands. Does have bilateral DVT sleep apnea obesity edema vitamin D deficiency PAD rheumatoid arthritis multiple sites chronic pain.      Current Outpatient Medications:   ?  acetaminophen (TYLENOL) 325 MG tablet, Take 650 mg by mouth every 6 (six) hours as needed for pain.    , Disp: , Rfl:   ?  adalimumab (HUMIRA) 40 mg/0.8 mL injection, Inject 0.8 mL (40 mg total) under the skin every 14 (fourteen) days., Disp: 1.6 mL, Rfl: 3  ?  ascorbic acid, vitamin C, (ASCORBIC ACID WITH KAEL HIPS) 500 MG tablet, Take 500 mg by mouth 2 (two) times a day., Disp: , Rfl:   ?  cholecalciferol, vitamin D3, (VITAMIN D3) 2,000 unit Tab, Take 6,000 Units by mouth daily., Disp: , Rfl:   ?  DULoxetine (CYMBALTA) 20 MG capsule, Take 1 capsule (20 mg total) by mouth daily., Disp: 90 capsule, Rfl: 2  ?  folic acid (FOLVITE) 1 MG tablet, Take 1 tablet (1 mg total) by mouth daily., Disp: 90 tablet, Rfl: 0  ?  hydroCHLOROthiazide (HYDRODIURIL) 25 MG tablet,  Take 25 mg by mouth daily., Disp: , Rfl:   ?  ibuprofen (ADVIL,MOTRIN) 200 MG tablet, Take 400 mg by mouth every 8 (eight) hours as needed for pain.    , Disp: , Rfl:   ?  methotrexate 2.5 MG tablet, Take 4 tablets (10 mg total) by mouth once a week., Disp: 48 tablet, Rfl: 0  ?  multivitamin therapeutic (THERAGRAN) tablet, Take 1 tablet by mouth daily., Disp: , Rfl:   ?  nystatin (MYCOSTATIN) powder, Apply to rash areas twice daily., Disp: 15 g, Rfl: 0  ?  oseltamivir (TAMIFLU) 75 MG capsule, , Disp: , Rfl:   ?  oxyCODONE (OXY-IR) 5 mg capsule, Take 1-2 tablets every 4 hours orally as needed for pain relief.  Use 1 tablet for 1-5/10 pain, take 2 tablets for 6-10/10 pain., Disp: 60 tablet, Rfl: 0  ?  WARFARIN SODIUM (WARFARIN ORAL), Take by mouth daily. 2/4/20 INR 2.12 Cont 8mg T-Th-Sat, 9mg AOD. Next INR 3/3. 1/6/20 INR 1.87  Take 8mg Tues-Thurs-Sat and 9mg AOD.  Next INR 2/3/20.   1/3/20 INR was to be done today but no INR tubes cont  9mg M, W, F and 8mg AOD INR 1/7 12/5/19 INR 2.62 Take 9mg M, W, F and 8mg AOD. Next INR 1/2 11/8/19 INR 2.50  Cont 9mg M-W-F and 8mg AOD.  Next INR 12/5/19.   11/7/19 INR missed.  Give 8mg tonight.  Check INR in AM.   10/3/19 refused draw. Give 9mg tonight, Check INR tomorrow. 9/6/19 INR 3.15 Decrease to 9mg M & TH, 8mg AOD. Next INR 10/3. 8/9/19 INR 2.79 Take 9mg M, W, F and 8mg AOD Next INR 9/6, Disp: , Rfl:     There were no vitals filed for this visit.  Vital signs on February 20 blood pressure is 136/69 pulse 72 respirations 20 temperature 97.2  Physical Exam  Cardiovascular: Normal rate, regular rhythm and normal heart sounds.   Chronic edema .  Catie wraps.  Pulmonary/Chest: Breath sounds normal.   inder   Abdominal.  Protuberant  Musculoskeletal:   Chronic pain. Chronic edema. Rheumatoid arthritis multiple sites including hands. Left knee scar. Left hip OA. Psychiatric: She has a normal mood and affect. Her behavior is normal.           LABS:   Lab Results   Component Value  Date    WBC 4.6 01/23/2020    HGB 14.1 01/23/2020    HCT 43.4 01/23/2020    MCV 93 01/23/2020     01/23/2020     Results for orders placed or performed in visit on 09/13/18   Basic Metabolic Panel   Result Value Ref Range    Sodium 144 136 - 145 mmol/L    Potassium 4.3 3.5 - 5.0 mmol/L    Chloride 101 98 - 107 mmol/L    CO2 34 (H) 22 - 31 mmol/L    Anion Gap, Calculation 9 5 - 18 mmol/L    Glucose 96 70 - 125 mg/dL    Calcium 9.7 8.5 - 10.5 mg/dL    BUN 16 8 - 22 mg/dL    Creatinine 0.75 0.60 - 1.10 mg/dL    GFR MDRD Af Amer >60 >60 mL/min/1.73m2    GFR MDRD Non Af Amer >60 >60 mL/min/1.73m2           ASSESSMENT:      ICD-10-CM    1. Primary osteoarthritis involving multiple joints M15.0    2. Seropositive rheumatoid arthritis of multiple sites (H) M05.79    3. Physical debility R53.81    4. Essential hypertension with goal blood pressure less than 140/90 I10        PLAN:    No other new changes at this time.  She does have an upcoming visit with rheumatology in March.  Otherwise has been quite stable.  Continue to manage and follow.      Electronically signed by: Michael Duane Johnson, CNP

## 2021-06-20 NOTE — LETTER
Letter by Luis Enrique Lindsey DO at      Author: Luis Enrique Lindsey DO Service: -- Author Type: --    Filed:  Encounter Date: 1/14/2020 Status: Signed         Patient: María Elena Saab   MR Number: [id number]   YOB: 1965   Date of Visit: 1/14/2020     [facility]    Facility:   [facility] [[id number]]   Code Status: FULL CODE    Recent medical history/chief concerns: Patient is seen by me for review of multiple medical issues.  Since having hydroxychloroquine discontinued, she is thinking of using the ibuprofen a little more for pain relief. She has not been using the oxycodone.  She told me she has gained a little weight, and chart review shows she has gained about 6 pounds from December 2019.  Overall, she seems to be doing well, voicing no new concerns other than her recent weight gain to me.  She has occasional left heel pain.  I again offered to try to arrange to have a protective foam boot fitted for her to use at night to treat this, which she would prefer not to do at this time.  I reviewed the last visit note from her rheumatologist.  Treatment of her arthritis using hydroxychloroquine was discontinued.  She was continued on the methotrexate and Humira patient/resident told me she has not required any narcotic for pain relief.  She would like to keep her medication management the way it currently is.  She is trying to avoid using narcotics.  No recent fevers or chills.  She states she occasionally has a red area underneath her lymphedema wrapping in her left lower extremity, which seems to get better, then reappear.  No sustained areas of redness suggestive of recurrence of her cellulitis.  She is eating and drinking well as reflected in her weight gain.  No problems with bowel movements or urination.  She feels like her mood is good.    Review of systems: See history of present illness, all others negative.     Current Outpatient Medications   Medication  Sig Dispense Refill   ? acetaminophen (TYLENOL) 325 MG tablet Take 650 mg by mouth every 6 (six) hours as needed for pain.            ? adalimumab (HUMIRA) 40 mg/0.8 mL injection Inject 0.8 mL (40 mg total) under the skin every 14 (fourteen) days. 1.6 mL 3   ? ascorbic acid, vitamin C, (ASCORBIC ACID WITH KAEL HIPS) 500 MG tablet Take 500 mg by mouth 2 (two) times a day.     ? cholecalciferol, vitamin D3, (VITAMIN D3) 2,000 unit Tab Take 6,000 Units by mouth daily.     ? DULoxetine (CYMBALTA) 20 MG capsule Take 1 capsule (20 mg total) by mouth daily. 90 capsule 2   ? folic acid (FOLVITE) 1 MG tablet Take 1 tablet (1 mg total) by mouth daily. 90 tablet 0   ? hydroCHLOROthiazide (HYDRODIURIL) 25 MG tablet Take 25 mg by mouth daily.     ? ibuprofen (ADVIL,MOTRIN) 200 MG tablet Take 400 mg by mouth every 8 (eight) hours as needed for pain.            ? methotrexate 2.5 MG tablet Take 4 tablets (10 mg total) by mouth once a week. 48 tablet 0   ? multivitamin therapeutic (THERAGRAN) tablet Take 1 tablet by mouth daily.     ? nystatin (MYCOSTATIN) powder Apply to rash areas twice daily. 15 g 0   ? oseltamivir (TAMIFLU) 75 MG capsule      ? oxyCODONE (OXY-IR) 5 mg capsule Take 1-2 tablets every 4 hours orally as needed for pain relief.  Use 1 tablet for 1-5/10 pain, take 2 tablets for 6-10/10 pain. 60 tablet 0   ? WARFARIN SODIUM (WARFARIN ORAL) Take by mouth daily. 1/6/20 INR 1.87  Take 8mg Tues-Thurs-Sat and 9mg AOD.  Next INR 2/3/20.    1/3/20 INR was to be done today but no INR tubes cont  9mg M, W, F and 8mg AOD INR 1/7 12/5/19 INR 2.62 Take 9mg M, W, F and 8mg AOD. Next INR 1/2 11/8/19 INR 2.50  Cont 9mg M-W-F and 8mg AOD.  Next INR 12/5/19.    11/7/19 INR missed.  Give 8mg tonight.  Check INR in AM.    10/3/19 refused draw. Give 9mg tonight, Check INR tomorrow.  9/6/19 INR 3.15 Decrease to 9mg M & TH, 8mg AOD. Next INR 10/3.  8/9/19 INR 2.79 Take 9mg M, W, F and 8mg AOD Next INR 9/6       No current  facility-administered medications for this visit.        Past Medical History:   Diagnosis Date   ? 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 Bacteremia due to group B Streptococcus    ? Cellulitis of right lower extremity    ? DJD (degenerative joint disease)    ? DVT, bilateral lower limbs (H) 10/2014   ? Edema - swelling of the legs after blood clots 5/22/2015   ? Essential hypertension with goal blood pressure less than 140/90    ? Failure to thrive    ? History of blood clots    ? Hypokalemia 10/15/2014   ? Lung mass 10/18/2014   ? Morbid obesity (H) 4/10/2015    Had formal nutrition consult at [facility].  RD reports that she is not willing to commit to the program outlined.  See scanned document.  12/15/2015 - Marcio Quinonez MD      ? Obesity - although she was not weighed today, she is clearly obese on inspection. 4/10/2015   ? LOPEZ (obstructive sleep apnea) - abnormal overnight pulse oximetry 5/22/2015   ? Osteoarthritis    ? Peripheral vascular disease (H)    ? Pleural effusion    ? Pressure ulcer of foot    ? Rheumatoid arthritis (H) 12/30/2014    seronegative   ? Sepsis (H)    ? Seropositive rheumatoid arthritis (H) 1/19/2016   ? Vitamin D deficiency 8/26/2015      Past Surgical History:   Procedure Laterality Date   ? JOINT REPLACEMENT      knee   ? PERIPHERALLY INSERTED CENTRAL CATHETER INSERTION Right 07/14/2019    4fr/44cm/Rt Cephalic.lowSVC.MGlav   ? TOTAL HIP ARTHROPLASTY Left 10/18/2016    Procedure: LEFT HIP TOTAL ARTHROPLASTY;  Surgeon: London Goss MD;  Location: [facility];  Service:    ? TOTAL KNEE ARTHROPLASTY Left 2006      Social History     Socioeconomic History   ? Marital status: Single     Spouse name: Not on file   ? Number of children: Not on file   ? Years of education: Not on file   ? Highest education level: Not on file   Occupational History   ? Not on file   Social Needs   ? Financial resource strain: Not on file   ? Food insecurity:     Worry: Not on file      Inability: Not on file   ? Transportation needs:     Medical: Not on file     Non-medical: Not on file   Tobacco Use   ? Smoking status: Former Smoker     Packs/day: 1.00     Years: 30.00     Pack years: 30.00     Types: Cigarettes   ? Smokeless tobacco: Never Used   Substance and Sexual Activity   ? Alcohol use: Yes     Comment: 3-4 times a year she will have 1 or 2.   ? Drug use: No   ? Sexual activity: Never     Partners: Female   Lifestyle   ? Physical activity:     Days per week: Not on file     Minutes per session: Not on file   ? Stress: Not on file   Relationships   ? Social connections:     Talks on phone: Not on file     Gets together: Not on file     Attends Mandaeism service: Not on file     Active member of club or organization: Not on file     Attends meetings of clubs or organizations: Not on file     Relationship status: Not on file   ? Intimate partner violence:     Fear of current or ex partner: Not on file     Emotionally abused: Not on file     Physically abused: Not on file     Forced sexual activity: Not on file   Other Topics Concern   ? Not on file   Social History Narrative    Long term  At [facility] to rehab to level allowing surgery on deteriorated joints  LTC12/2015       Social History     Tobacco Use   Smoking Status Former Smoker   ? Packs/day: 1.00   ? Years: 30.00   ? Pack years: 30.00   ? Types: Cigarettes   Smokeless Tobacco Never Used        Exam:   Vitals:    01/01/20 1058 01/09/20 2202   BP:  129/75   Pulse:  70   Resp:  16   Temp:  97.4  F (36.3  C)   SpO2:  99%   Weight: (!) 347 lb 14.4 oz (157.8 kg)        EXAM:   General: Vital signs reviewed.  Patient is in no acute appearing distress.  Breathing appears nonlabored.  Patient is alert and oriented ×3.  She is very pleasant, with clear fluid speech.  She makes good eye contact.  She was sitting in her wheelchair at time of my exam.  HEENT exam: No scleral discoloration.  No abnormal ear drainage or rhinorrhea.  No  abnormal intraoral plaques or other lesions.  Neck: Supple with no JVD.  Heart: Heart rate is regular without murmur.  Lungs: Lungs are clear to auscultation with good airflow bilaterally.  Skin/extremities: Warm and dry.  I left her lymphedema wraps in place on her lower extremities.    Recent studies: INR from 1/6/2020 was near therapeutic range at 1.87.  CBC from 12/12/2019 showed a normal hemoglobin and platelet level, and normal overall white blood cell count.  ALT and serum creatinine were normal on 12/5/2019.    Assessment/Plan   1. Deep vein thrombosis (DVT) of both lower extremities, unspecified chronicity, unspecified vein (H)     2. Morbid obesity (H)     3. Warfarin anticoagulation - long-term because of unresolved DVT that first appeared in October, 2014     4. Cyclic citrullinated peptide (CCP) antibody positive     5. Seropositive rheumatoid arthritis of multiple sites (H)     6. Primary osteoarthritis involving multiple joints         Patient Instructions   No change in patient management.  We have discussed her issues with obesity in the past.  Exercise is very difficult for her to facilitate weight loss.  I think she is getting ideal management at this time.  She definitely benefits from being in the skilled nursing facility given her significant physical debility from her arthritis.     Luis Enrique Lindsey, DO

## 2021-06-20 NOTE — LETTER
Letter by Johnson, Michael Duane, CNP at      Author: Johnson, Michael Duane, CNP Service: -- Author Type: --    Filed:  Encounter Date: 9/22/2020 Status: (Other)         Patient: María Elena Saab   MR Number: [id number]   YOB: 1965   Date of Visit: 9/22/2020     [facility]    Facility:   [facility] [[id number]]   Code Status: FULL CODE      CHIEF COMPLAINT/REASON FOR VISIT:  Chief Complaint   Patient presents with   ? Problem Visit     med mgmt, RA       HISTORY:      HPI: María Elena is a 55 y.o. female who was seen secondary to medication management of multiple chronic medical conditions.  She does see rheumatology and they also perform her laboratory studies.  She has not required any oxycodone as needed.  No Tylenol as needed and no ibuprofen.  She has been normotensive and afebrile and also on room air.  Spends most the time in her room.  Then assistance with all ADLs.  Appetite good.  Denies any bowel or bladder problems.    Past Medical History:   Diagnosis Date   ? 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 Bacteremia due to group B Streptococcus    ? Cellulitis of right lower extremity    ? DJD (degenerative joint disease)    ? DVT, bilateral lower limbs (H) 10/2014   ? Edema - swelling of the legs after blood clots 5/22/2015   ? Essential hypertension with goal blood pressure less than 140/90    ? Failure to thrive    ? History of blood clots    ? Hypokalemia 10/15/2014   ? Lung mass 10/18/2014   ? Morbid obesity (H) 4/10/2015    Had formal nutrition consult at [facility].  RD reports that she is not willing to commit to the program outlined.  See scanned document.  12/15/2015 - Marcio Quinonez MD      ? Obesity - although she was not weighed today, she is clearly obese on inspection. 4/10/2015   ? LOPEZ (obstructive sleep apnea) - abnormal overnight pulse oximetry 5/22/2015   ? Osteoarthritis    ? Peripheral vascular disease (H)    ? Pleural  effusion    ? Pressure ulcer of foot    ? Rheumatoid arthritis (H) 12/30/2014    seronegative   ? Sepsis (H)    ? Seropositive rheumatoid arthritis (H) 1/19/2016   ? Vitamin D deficiency 8/26/2015             Family History   Problem Relation Age of Onset   ? Multiple myeloma Father    ? Deep vein thrombosis Father    ? Cataracts Father    ? Snoring Father    ? Other Brother         Blood withdrawn from time to time.  Sounds like hemochromatosis.   ? Sleep apnea Brother    ? Cancer Mother         Uterine CA   ? Arthritis Mother    ? Cataracts Mother    ? Breast cancer Mother 54   ? No Medical Problems Sister    ? Rheum arthritis Maternal Grandmother    ? Breast cancer Maternal Grandmother 50   ? Heart disease Maternal Grandmother    ? Rheum arthritis Paternal Aunt    ? Breast cancer Maternal Aunt 54   ? Breast cancer Cousin    ? Clotting disorder Neg Hx      Social History     Socioeconomic History   ? Marital status: Single     Spouse name: Not on file   ? Number of children: Not on file   ? Years of education: Not on file   ? Highest education level: Not on file   Occupational History   ? Not on file   Social Needs   ? Financial resource strain: Not on file   ? Food insecurity     Worry: Not on file     Inability: Not on file   ? Transportation needs     Medical: Not on file     Non-medical: Not on file   Tobacco Use   ? Smoking status: Former Smoker     Packs/day: 1.00     Years: 30.00     Pack years: 30.00     Types: Cigarettes   ? Smokeless tobacco: Never Used   Substance and Sexual Activity   ? Alcohol use: Yes     Comment: 3-4 times a year she will have 1 or 2.   ? Drug use: No   ? Sexual activity: Never     Partners: Female   Lifestyle   ? Physical activity     Days per week: Not on file     Minutes per session: Not on file   ? Stress: Not on file   Relationships   ? Social connections     Talks on phone: Not on file     Gets together: Not on file     Attends Hinduism service: Not on file     Active member  of club or organization: Not on file     Attends meetings of clubs or organizations: Not on file     Relationship status: Not on file   ? Intimate partner violence     Fear of current or ex partner: Not on file     Emotionally abused: Not on file     Physically abused: Not on file     Forced sexual activity: Not on file   Other Topics Concern   ? Not on file   Social History Narrative    Long term  At [facility] to rehab to level allowing surgery on deteriorated joints  LTC12/2015         Review of Systems  No reports of any URI symptoms postnasal drip fever chills fatigue flulike symptoms nausea vomiting diarrhea dysuria rashes stiff neck swollen glands. Does have bilateral DVT sleep apnea obesity edema vitamin D deficiency PAD rheumatoid arthritis multiple sites chronic pain.       Current Outpatient Medications:   ?  acetaminophen (TYLENOL) 325 MG tablet, Take 650 mg by mouth every 6 (six) hours as needed for pain.    , Disp: , Rfl:   ?  adalimumab (HUMIRA) 40 mg/0.8 mL injection, Inject 0.8 mL (40 mg total) under the skin every 14 (fourteen) days., Disp: 1.6 mL, Rfl: 3  ?  ascorbic acid, vitamin C, (ASCORBIC ACID WITH KAEL HIPS) 500 MG tablet, Take 500 mg by mouth 2 (two) times a day., Disp: , Rfl:   ?  cholecalciferol, vitamin D3, (VITAMIN D3) 2,000 unit Tab, Take 6,000 Units by mouth daily., Disp: , Rfl:   ?  DULoxetine (CYMBALTA) 20 MG capsule, Take 1 capsule (20 mg total) by mouth daily., Disp: 90 capsule, Rfl: 2  ?  folic acid (FOLVITE) 1 MG tablet, Take 1 tablet (1 mg total) by mouth daily., Disp: 90 tablet, Rfl: 0  ?  HUMIRA PEN 40 mg/0.8 mL PnKt, INJECT 0.8 ML (40 MG) UNDER THE SKIN EVERY 14 DAYS, Disp: 1 kit, Rfl: 5  ?  hydroCHLOROthiazide (HYDRODIURIL) 25 MG tablet, Take 25 mg by mouth daily., Disp: , Rfl:   ?  ibuprofen (ADVIL,MOTRIN) 200 MG tablet, Take 400 mg by mouth every 8 (eight) hours as needed for pain.    , Disp: , Rfl:   ?  methotrexate 10 MG tablet, Take 10 mg by mouth once a week.,  Disp: , Rfl:   ?  methotrexate 2.5 MG tablet, Take 4 tablets (10 mg total) by mouth once a week., Disp: 48 tablet, Rfl: 0  ?  multivitamin therapeutic (THERAGRAN) tablet, Take 1 tablet by mouth daily., Disp: , Rfl:   ?  nystatin (MYCOSTATIN) powder, Apply to rash areas twice daily., Disp: 15 g, Rfl: 0  ?  oxyCODONE (OXY-IR) 5 mg capsule, Take 1-2 tablets every 4 hours orally as needed for pain relief.  Use 1 tablet for 1-5/10 pain, take 2 tablets for 6-10/10 pain., Disp: 60 tablet, Rfl: 0  ?  rivaroxaban ANTICOAGULANT (XARELTO) 10 mg tablet, Take 10 mg by mouth daily., Disp: , Rfl:     There were no vitals filed for this visit.  Blood pressure 128/71, pulse 72, respirations 18, temperature 98.2  Physical Exam   Head is normocephalic.  Cardiovascular: Normal rate, regular rhythm and normal heart sounds.   Chronic edema .  Catie wraps.  Pulmonary/Chest: Breath sounds normal.   inder   Abdominal.  Protuberant  Musculoskeletal:   Chronic pain. Chronic edema. Rheumatoid arthritis multiple sites including hands. Left knee scar. Left hip OA. Psychiatric: She has a normal mood and affect. Her behavior is normal.            LABS:   Lab Results   Component Value Date    WBC 4.5 07/01/2020    HGB 14.9 07/01/2020    HCT 43.3 07/01/2020    MCV 92 07/01/2020     07/01/2020     Results for orders placed or performed in visit on 09/13/18   Basic Metabolic Panel   Result Value Ref Range    Sodium 144 136 - 145 mmol/L    Potassium 4.3 3.5 - 5.0 mmol/L    Chloride 101 98 - 107 mmol/L    CO2 34 (H) 22 - 31 mmol/L    Anion Gap, Calculation 9 5 - 18 mmol/L    Glucose 96 70 - 125 mg/dL    Calcium 9.7 8.5 - 10.5 mg/dL    BUN 16 8 - 22 mg/dL    Creatinine 0.75 0.60 - 1.10 mg/dL    GFR MDRD Af Amer >60 >60 mL/min/1.73m2    GFR MDRD Non Af Amer >60 >60 mL/min/1.73m2       Lab Results   Component Value Date    CHOL 209 (H) 04/13/2015    CHOL 227 (H) 04/07/2015     Lab Results   Component Value Date    HDL 61 04/13/2015    HDL 65  04/07/2015     Lab Results   Component Value Date    LDLCALC 138 (H) 04/13/2015    LDLCALC 139 (H) 04/07/2015     Lab Results   Component Value Date    TRIG 48 04/13/2015    TRIG 113 04/07/2015     No components found for: CHOLHDL  Lab Results   Component Value Date    ALT 19 07/01/2020    AST 23 07/25/2019    ALKPHOS 69 07/25/2019    BILITOT 0.3 07/25/2019         ASSESSMENT:      ICD-10-CM    1. Seropositive rheumatoid arthritis of multiple sites (H)  M05.79    2. Polyarthralgia  M25.50    3. Physical debility  R53.81    4. Morbid obesity (H)  E66.01        PLAN:    Her pain seems to be fairly well controlled with her current medications and does see rheumatology for her medications as well as her laboratory studies.  She did not have any other questions.      Electronically signed by: Michael Duane Johnson, CNP

## 2021-06-20 NOTE — LETTER
Letter by Johnson, Michael Duane, CNP at      Author: Johnson, Michael Duane, CNP Service: -- Author Type: --    Filed:  Encounter Date: 4/2/2020 Status: (Other)         Patient: María Elena Saab   MR Number: [id number]   YOB: 1965   Date of Visit: 4/2/2020     [facility]    Facility:   [facility] [[id number]]   Code Status: FULL CODE      CHIEF COMPLAINT/REASON FOR VISIT:  Chief Complaint   Patient presents with   ? Problem Visit     asked to see. warfarin vs eliquis, RA, labs,        HISTORY:      HPI: María Elena is a 54 y.o. female who I was asked to see secondary to being on warfarin and switching overEliquis.  She has been on warfarin for quite some time secondary to an acute embolism and thrombus for unresolved DVT of the lower extremity despite having a repeat ultrasound.  She also is the most part wheelchair-bound needing assistance with all ADLs.  She also has a history of polyarthralgia and osteoarthrosis involving multiple joints but also rheumatoid arthritis and is being seen by rheumatology.  She has been on warfarin and there are many times where she has refused the laboratory studies and eventually will allow the lab to draw her pro time with appropriate INR treatment sometimes it could be delayed up to a few days.  Had a chance to address Eliquis with her as well as perhaps getting her off warfarin altogether.  We did talk about any potential side effects but also the effectiveness of the Eliquis but also no further blood values and laboratory studies.  She is willing to give the Eliquis and discontinue the warfarin.  We will wait for her warfarin to go below 2 and so we will try to reach check her out tomorrow of her level since she is been on hold for 2 days and perhaps even on Monday we can start Eliquis.  Otherwise she has been cold and flu 3 she has been normotensive and afebrile and also on room air.  For the chronic pain she is also  being treated has not required any oxycodone as needed.    Past Medical History:   Diagnosis Date   ? 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 Bacteremia due to group B Streptococcus    ? Cellulitis of right lower extremity    ? DJD (degenerative joint disease)    ? DVT, bilateral lower limbs (H) 10/2014   ? Edema - swelling of the legs after blood clots 5/22/2015   ? Essential hypertension with goal blood pressure less than 140/90    ? Failure to thrive    ? History of blood clots    ? Hypokalemia 10/15/2014   ? Lung mass 10/18/2014   ? Morbid obesity (H) 4/10/2015    Had formal nutrition consult at [facility].  RD reports that she is not willing to commit to the program outlined.  See scanned document.  12/15/2015 - Marcio Quinonez MD      ? Obesity - although she was not weighed today, she is clearly obese on inspection. 4/10/2015   ? LOPEZ (obstructive sleep apnea) - abnormal overnight pulse oximetry 5/22/2015   ? Osteoarthritis    ? Peripheral vascular disease (H)    ? Pleural effusion    ? Pressure ulcer of foot    ? Rheumatoid arthritis (H) 12/30/2014    seronegative   ? Sepsis (H)    ? Seropositive rheumatoid arthritis (H) 1/19/2016   ? Vitamin D deficiency 8/26/2015             Family History   Problem Relation Age of Onset   ? Multiple myeloma Father    ? Deep vein thrombosis Father    ? Cataracts Father    ? Snoring Father    ? Other Brother         Blood withdrawn from time to time.  Sounds like hemochromatosis.   ? Sleep apnea Brother    ? Cancer Mother         Uterine CA   ? Arthritis Mother    ? Cataracts Mother    ? Breast cancer Mother 54   ? No Medical Problems Sister    ? Rheum arthritis Maternal Grandmother    ? Breast cancer Maternal Grandmother 50   ? Heart disease Maternal Grandmother    ? Rheum arthritis Paternal Aunt    ? Breast cancer Maternal Aunt 54   ? Breast cancer Cousin    ? Clotting disorder Neg Hx      Social History     Socioeconomic History   ? Marital status: Single      Spouse name: Not on file   ? Number of children: Not on file   ? Years of education: Not on file   ? Highest education level: Not on file   Occupational History   ? Not on file   Social Needs   ? Financial resource strain: Not on file   ? Food insecurity     Worry: Not on file     Inability: Not on file   ? Transportation needs     Medical: Not on file     Non-medical: Not on file   Tobacco Use   ? Smoking status: Former Smoker     Packs/day: 1.00     Years: 30.00     Pack years: 30.00     Types: Cigarettes   ? Smokeless tobacco: Never Used   Substance and Sexual Activity   ? Alcohol use: Yes     Comment: 3-4 times a year she will have 1 or 2.   ? Drug use: No   ? Sexual activity: Never     Partners: Female   Lifestyle   ? Physical activity     Days per week: Not on file     Minutes per session: Not on file   ? Stress: Not on file   Relationships   ? Social connections     Talks on phone: Not on file     Gets together: Not on file     Attends Jew service: Not on file     Active member of club or organization: Not on file     Attends meetings of clubs or organizations: Not on file     Relationship status: Not on file   ? Intimate partner violence     Fear of current or ex partner: Not on file     Emotionally abused: Not on file     Physically abused: Not on file     Forced sexual activity: Not on file   Other Topics Concern   ? Not on file   Social History Narrative    Long term  At [facility] to rehab to level allowing surgery on deteriorated joints  LTC12/2015         Review of Systems  No reports of any URI symptoms postnasal drip fever chills fatigue flulike symptoms nausea vomiting diarrhea dysuria rashes stiff neck swollen glands. Does have bilateral DVT sleep apnea obesity edema vitamin D deficiency PAD rheumatoid arthritis multiple sites chronic pain.      Current Outpatient Medications:   ?  acetaminophen (TYLENOL) 325 MG tablet, Take 650 mg by mouth every 6 (six) hours as needed for pain.    ,  Disp: , Rfl:   ?  adalimumab (HUMIRA) 40 mg/0.8 mL injection, Inject 0.8 mL (40 mg total) under the skin every 14 (fourteen) days., Disp: 1.6 mL, Rfl: 3  ?  ascorbic acid, vitamin C, (ASCORBIC ACID WITH KAEL HIPS) 500 MG tablet, Take 500 mg by mouth 2 (two) times a day., Disp: , Rfl:   ?  cholecalciferol, vitamin D3, (VITAMIN D3) 2,000 unit Tab, Take 6,000 Units by mouth daily., Disp: , Rfl:   ?  DULoxetine (CYMBALTA) 20 MG capsule, Take 1 capsule (20 mg total) by mouth daily., Disp: 90 capsule, Rfl: 2  ?  folic acid (FOLVITE) 1 MG tablet, Take 1 tablet (1 mg total) by mouth daily., Disp: 90 tablet, Rfl: 0  ?  hydroCHLOROthiazide (HYDRODIURIL) 25 MG tablet, Take 25 mg by mouth daily., Disp: , Rfl:   ?  ibuprofen (ADVIL,MOTRIN) 200 MG tablet, Take 400 mg by mouth every 8 (eight) hours as needed for pain.    , Disp: , Rfl:   ?  methotrexate 2.5 MG tablet, Take 4 tablets (10 mg total) by mouth once a week., Disp: 48 tablet, Rfl: 0  ?  multivitamin therapeutic (THERAGRAN) tablet, Take 1 tablet by mouth daily., Disp: , Rfl:   ?  nystatin (MYCOSTATIN) powder, Apply to rash areas twice daily., Disp: 15 g, Rfl: 0  ?  oseltamivir (TAMIFLU) 75 MG capsule, , Disp: , Rfl:   ?  oxyCODONE (OXY-IR) 5 mg capsule, Take 1-2 tablets every 4 hours orally as needed for pain relief.  Use 1 tablet for 1-5/10 pain, take 2 tablets for 6-10/10 pain., Disp: 60 tablet, Rfl: 0  ?  WARFARIN SODIUM (WARFARIN ORAL), Take by mouth daily. 4/1/20 INR missed on 3/31.  Hold x 2 days.  Next INR 4/3/20 with plans to change to Eliquis when INR is <2.   3/3/20 INR 2.55 9mg M-W-F-Cruz, 8mg Tu-Th-Sat. Next INR 3/31 2/4/20 INR 2.12 Cont 8mg T-Th-Sat, 9mg AOD. Next INR 3/3. 1/6/20 INR 1.87  Take 8mg Tues-Thurs-Sat and 9mg AOD.  Next INR 2/3/20.   1/3/20 INR was to be done today but no INR tubes cont  9mg M, W, F and 8mg AOD INR 1/7 12/5/19 INR 2.62 Take 9mg M, W, F and 8mg AOD. Next INR 1/2 11/8/19 INR 2.50  Cont 9mg M-W-F and 8mg AOD.  Next INR 12/5/19.    11/7/19 INR missed.  Give 8mg tonight.  Check INR in AM.   10/3/19 refused draw. Give 9mg tonight, Check INR tomorrow. 9/6/19 INR 3.15 Decrease to 9mg M & TH, 8mg AOD. Next INR 10/3. 8/9/19 INR 2.79 Take 9mg M, W, F and 8mg AOD Next INR 9/6, Disp: , Rfl:     There were no vitals filed for this visit.  Blood pressure 130/80 pulse 78 respirations 18 temperature 97.4 saturation room air 96%  Physical Exam  She is in no acute distress.  Does have chronic lower extremity edema.  Lung sounds are clear.  Gastrointestinal protuberant.  History of rheumatoid arthritis in multiple sites.  LABS:   Lab Results   Component Value Date    WBC 4.5 03/12/2020    HGB 14.8 03/12/2020    HCT 43.5 03/12/2020    MCV 92 03/12/2020     03/12/2020     Results for orders placed or performed in visit on 09/13/18   Basic Metabolic Panel   Result Value Ref Range    Sodium 144 136 - 145 mmol/L    Potassium 4.3 3.5 - 5.0 mmol/L    Chloride 101 98 - 107 mmol/L    CO2 34 (H) 22 - 31 mmol/L    Anion Gap, Calculation 9 5 - 18 mmol/L    Glucose 96 70 - 125 mg/dL    Calcium 9.7 8.5 - 10.5 mg/dL    BUN 16 8 - 22 mg/dL    Creatinine 0.75 0.60 - 1.10 mg/dL    GFR MDRD Af Amer >60 >60 mL/min/1.73m2    GFR MDRD Non Af Amer >60 >60 mL/min/1.73m2           ASSESSMENT:      ICD-10-CM    1. Warfarin anticoagulation - long-term because of unresolved DVT that first appeared in October, 2014  Z79.01    2. Seropositive rheumatoid arthritis of multiple sites (H)  M05.79    3. Polyarthralgia  M25.50    4. Pain management  R52        PLAN:    We will check her pro time again tomorrow and then perhaps by Monday we can put her on Eliquis pending her kidney function tests.  I think this will benefit her as well as preventing any further laboratory sticks and laboratory studies.  Continue to manage and follow.      Electronically signed by: Michael Duane Johnson, CNP

## 2021-06-20 NOTE — LETTER
Letter by Johnson, Michael Duane, CNP at      Author: Johnson, Michael Duane, CNP Service: -- Author Type: --    Filed:  Encounter Date: 12/26/2019 Status: Signed         Patient: María Elena Saab   MR Number: [id number]   YOB: 1965   Date of Visit: 12/26/2019     [facility]    Facility:   [facility] [[id number]]   Code Status: FULL CODE      CHIEF COMPLAINT/REASON FOR VISIT:  Chief Complaint   Patient presents with   ? Review Of Multiple Medical Conditions       HISTORY:      HPI: María Elena is a 54 y.o. female was seen secondary to review of chronic medical conditions.  Recently did see her rheumatologist and they did take her off the Plaquenil and continue with the Humira as well as methotrexate.  Her pain seems to be managed.  Not requiring any oxycodone as needed.  Appetite good.  Does need assistance with all ADLs.  Is on warfarin.  Rechecking a pro time at the beginning of the year.  She did not have any other questions or other concerns about her chronic medical conditions.    Past Medical History:   Diagnosis Date   ? 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 Bacteremia due to group B Streptococcus    ? Cellulitis of right lower extremity    ? DJD (degenerative joint disease)    ? DVT, bilateral lower limbs (H) 10/2014   ? Edema - swelling of the legs after blood clots 5/22/2015   ? Essential hypertension with goal blood pressure less than 140/90    ? Failure to thrive    ? History of blood clots    ? Hypokalemia 10/15/2014   ? Lung mass 10/18/2014   ? Morbid obesity (H) 4/10/2015    Had formal nutrition consult at [facility].  RD reports that she is not willing to commit to the program outlined.  See scanned document.  12/15/2015 - Marcio Quinonez MD      ? Obesity - although she was not weighed today, she is clearly obese on inspection. 4/10/2015   ? LOPEZ (obstructive sleep apnea) - abnormal overnight pulse oximetry 5/22/2015   ?  Osteoarthritis    ? Peripheral vascular disease (H)    ? Pleural effusion    ? Pressure ulcer of foot    ? Rheumatoid arthritis (H) 12/30/2014    seronegative   ? Sepsis (H)    ? Seropositive rheumatoid arthritis (H) 1/19/2016   ? Vitamin D deficiency 8/26/2015             Family History   Problem Relation Age of Onset   ? Multiple myeloma Father    ? Deep vein thrombosis Father    ? Cataracts Father    ? Snoring Father    ? Other Brother         Blood withdrawn from time to time.  Sounds like hemochromatosis.   ? Sleep apnea Brother    ? Cancer Mother         Uterine CA   ? Arthritis Mother    ? Cataracts Mother    ? Breast cancer Mother 54   ? No Medical Problems Sister    ? Rheum arthritis Maternal Grandmother    ? Breast cancer Maternal Grandmother 50   ? Heart disease Maternal Grandmother    ? Rheum arthritis Paternal Aunt    ? Breast cancer Maternal Aunt 54   ? Breast cancer Cousin    ? Clotting disorder Neg Hx      Social History     Socioeconomic History   ? Marital status: Single     Spouse name: Not on file   ? Number of children: Not on file   ? Years of education: Not on file   ? Highest education level: Not on file   Occupational History   ? Not on file   Social Needs   ? Financial resource strain: Not on file   ? Food insecurity:     Worry: Not on file     Inability: Not on file   ? Transportation needs:     Medical: Not on file     Non-medical: Not on file   Tobacco Use   ? Smoking status: Former Smoker     Packs/day: 1.00     Years: 30.00     Pack years: 30.00     Types: Cigarettes   ? Smokeless tobacco: Never Used   Substance and Sexual Activity   ? Alcohol use: Yes     Comment: 3-4 times a year she will have 1 or 2.   ? Drug use: No   ? Sexual activity: Never     Partners: Female   Lifestyle   ? Physical activity:     Days per week: Not on file     Minutes per session: Not on file   ? Stress: Not on file   Relationships   ? Social connections:     Talks on phone: Not on file     Gets together: Not  on file     Attends [employer] service: Not on file     Active member of club or organization: Not on file     Attends meetings of clubs or organizations: Not on file     Relationship status: Not on file   ? Intimate partner violence:     Fear of current or ex partner: Not on file     Emotionally abused: Not on file     Physically abused: Not on file     Forced sexual activity: Not on file   Other Topics Concern   ? Not on file   Social History Narrative    Long term  At [facility] to rehab to level allowing surgery on deteriorated joints  LTC12/2015         Review of Systems  No reports of any URI symptoms postnasal drip fever chills fatigue flulike symptoms nausea vomiting diarrhea dysuria rashes stiff neck swollen glands. Does have bilateral DVT sleep apnea obesity edema vitamin D deficiency PAD rheumatoid arthritis multiple sites chronic pain.    Current Outpatient Medications   Medication Sig   ? acetaminophen (TYLENOL) 325 MG tablet Take 650 mg by mouth every 6 (six) hours as needed for pain.          ? adalimumab (HUMIRA) 40 mg/0.8 mL injection Inject 0.8 mL (40 mg total) under the skin every 14 (fourteen) days.   ? ascorbic acid, vitamin C, (ASCORBIC ACID WITH KAEL HIPS) 500 MG tablet Take 500 mg by mouth 2 (two) times a day.   ? cholecalciferol, vitamin D3, (VITAMIN D3) 2,000 unit Tab Take 6,000 Units by mouth daily.   ? DULoxetine (CYMBALTA) 20 MG capsule Take 1 capsule (20 mg total) by mouth daily.   ? folic acid (FOLVITE) 1 MG tablet Take 1 tablet (1 mg total) by mouth daily.   ? hydroCHLOROthiazide (HYDRODIURIL) 25 MG tablet Take 25 mg by mouth daily.   ? ibuprofen (ADVIL,MOTRIN) 200 MG tablet Take 400 mg by mouth every 8 (eight) hours as needed for pain.          ? methotrexate 2.5 MG tablet Take 4 tablets (10 mg total) by mouth once a week.   ? multivitamin therapeutic (THERAGRAN) tablet Take 1 tablet by mouth daily.   ? nystatin (MYCOSTATIN) powder Apply to rash areas twice daily.   ? oseltamivir  (TAMIFLU) 75 MG capsule    ? oxyCODONE (OXY-IR) 5 mg capsule Take 1-2 tablets every 4 hours orally as needed for pain relief.  Use 1 tablet for 1-5/10 pain, take 2 tablets for 6-10/10 pain.   ? WARFARIN SODIUM (WARFARIN ORAL) Take by mouth daily. 12/5/19 INR 2.62 Take 9mg M, W, F and 8mg AOD. Next INR 1/2  11/8/19 INR 2.50  Cont 9mg M-W-F and 8mg AOD.  Next INR 12/5/19.    11/7/19 INR missed.  Give 8mg tonight.  Check INR in AM.    10/3/19 refused draw. Give 9mg tonight, Check INR tomorrow.  9/6/19 INR 3.15 Decrease to 9mg M & TH, 8mg AOD. Next INR 10/3.  8/9/19 INR 2.79 Take 9mg M, W, F and 8mg AOD Next INR 9/6       There were no vitals filed for this visit.  Blood pressure 125/73 pulse 69 respirations 18 temperature 98.0  Physical Exam  Cardiovascular: Normal rate, regular rhythm and normal heart sounds.   Chronic edema .  Catie wraps.  Pulmonary/Chest: Breath sounds normal.   inder   Abdominal.  Protuberant  Musculoskeletal:   Chronic pain. Chronic edema. Rheumatoid arthritis multiple sites including hands. Left knee scar. Left hip OA. Psychiatric: She has a normal mood and affect. Her behavior is normal.         LABS:   Lab Results   Component Value Date    WBC 4.3 12/12/2019    HGB 14.5 12/12/2019    HCT 44.2 12/12/2019    MCV 89 12/12/2019     12/12/2019         ASSESSMENT:      ICD-10-CM    1. Primary osteoarthritis involving multiple joints M15.0    2. Pain management R52    3. Essential hypertension with goal blood pressure less than 140/90 I10    4. PAD (peripheral artery disease) (H) I73.9        PLAN:    She does need assistance with ADLs.  Does have her legs and the [place] wraps.  Recently did see rheumatology and they did discontinue her Plaquenil and continue with Humira as well as methotrexate.  She did not have any other questions or concerns.        Electronically signed by: Michael Duane Johnson, LUNA

## 2021-06-20 NOTE — LETTER
Letter by Johnson, Michael Duane, CNP at      Author: Johnson, Michael Duane, CNP Service: -- Author Type: --    Filed:  Encounter Date: 4/30/2020 Status: (Other)         Patient: María Elena Saab   MR Number: [id number]   YOB: 1965   Date of Visit: 4/30/2020     [facility]    Facility:   [facility] [place] [[id number]]   Code Status: FULL CODE      CHIEF COMPLAINT/REASON FOR VISIT:  Chief Complaint   Patient presents with   ? FVP Care Coordination - Regulatory       HISTORY:      HPI: María Elena is a 54 y.o. female who I had the opportunity to visit with secondary to review of her chronic medical conditions.  Regarding nursing notes and any other issues there have been no new concerns or other issues.  She did start Eliquis which eventually got switched to Xarelto due to insurance coverage a couple of weeks ago secondary to her history of DVT and we can take her off the warfarin.  At any rate no problems.  She has been normotensive and afebrile and also on room air.  Does have rheumatoid arthritis along with osteoarthrosis of major joints has not required any ibuprofen oxycodone or Tylenol as needed.  She is on Cymbalta 20 mg and does see dermatology.  She does spend most the time in her room.  Does need assistance with basic ADLs.    Past Medical History:   Diagnosis Date   ? 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 Bacteremia due to group B Streptococcus    ? Cellulitis of right lower extremity    ? DJD (degenerative joint disease)    ? DVT, bilateral lower limbs (H) 10/2014   ? Edema - swelling of the legs after blood clots 5/22/2015   ? Essential hypertension with goal blood pressure less than 140/90    ? Failure to thrive    ? History of blood clots    ? Hypokalemia 10/15/2014   ? Lung mass 10/18/2014   ? Morbid obesity (H) 4/10/2015    Had formal nutrition consult at [facility].  RD reports that she is not willing to commit to the program outlined.  See  scanned document.  12/15/2015 - Marcio Quinonez MD      ? Obesity - although she was not weighed today, she is clearly obese on inspection. 4/10/2015   ? LOPEZ (obstructive sleep apnea) - abnormal overnight pulse oximetry 5/22/2015   ? Osteoarthritis    ? Peripheral vascular disease (H)    ? Pleural effusion    ? Pressure ulcer of foot    ? Rheumatoid arthritis (H) 12/30/2014    seronegative   ? Sepsis (H)    ? Seropositive rheumatoid arthritis (H) 1/19/2016   ? Vitamin D deficiency 8/26/2015             Family History   Problem Relation Age of Onset   ? Multiple myeloma Father    ? Deep vein thrombosis Father    ? Cataracts Father    ? Snoring Father    ? Other Brother         Blood withdrawn from time to time.  Sounds like hemochromatosis.   ? Sleep apnea Brother    ? Cancer Mother         Uterine CA   ? Arthritis Mother    ? Cataracts Mother    ? Breast cancer Mother 54   ? No Medical Problems Sister    ? Rheum arthritis Maternal Grandmother    ? Breast cancer Maternal Grandmother 50   ? Heart disease Maternal Grandmother    ? Rheum arthritis Paternal Aunt    ? Breast cancer Maternal Aunt 54   ? Breast cancer Cousin    ? Clotting disorder Neg Hx      Social History     Socioeconomic History   ? Marital status: Single     Spouse name: Not on file   ? Number of children: Not on file   ? Years of education: Not on file   ? Highest education level: Not on file   Occupational History   ? Not on file   Social Needs   ? Financial resource strain: Not on file   ? Food insecurity     Worry: Not on file     Inability: Not on file   ? Transportation needs     Medical: Not on file     Non-medical: Not on file   Tobacco Use   ? Smoking status: Former Smoker     Packs/day: 1.00     Years: 30.00     Pack years: 30.00     Types: Cigarettes   ? Smokeless tobacco: Never Used   Substance and Sexual Activity   ? Alcohol use: Yes     Comment: 3-4 times a year she will have 1 or 2.   ? Drug use: No   ? Sexual activity: Never      Partners: Female   Lifestyle   ? Physical activity     Days per week: Not on file     Minutes per session: Not on file   ? Stress: Not on file   Relationships   ? Social connections     Talks on phone: Not on file     Gets together: Not on file     Attends Latter day service: Not on file     Active member of club or organization: Not on file     Attends meetings of clubs or organizations: Not on file     Relationship status: Not on file   ? Intimate partner violence     Fear of current or ex partner: Not on file     Emotionally abused: Not on file     Physically abused: Not on file     Forced sexual activity: Not on file   Other Topics Concern   ? Not on file   Social History Narrative    Long term  At [facility] to rehab to level allowing surgery on deteriorated joints  LTC12/2015         Review of Systems  No reports of any URI symptoms postnasal drip fever chills fatigue flulike symptoms nausea vomiting diarrhea dysuria rashes stiff neck swollen glands. Does have bilateral DVT sleep apnea obesity edema vitamin D deficiency PAD rheumatoid arthritis multiple sites chronic pain.     Current Outpatient Medications   Medication Sig   ? acetaminophen (TYLENOL) 325 MG tablet Take 650 mg by mouth every 6 (six) hours as needed for pain.          ? adalimumab (HUMIRA) 40 mg/0.8 mL injection Inject 0.8 mL (40 mg total) under the skin every 14 (fourteen) days.   ? ascorbic acid, vitamin C, (ASCORBIC ACID WITH KAEL HIPS) 500 MG tablet Take 500 mg by mouth 2 (two) times a day.   ? cholecalciferol, vitamin D3, (VITAMIN D3) 2,000 unit Tab Take 6,000 Units by mouth daily.   ? DULoxetine (CYMBALTA) 20 MG capsule Take 1 capsule (20 mg total) by mouth daily.   ? folic acid (FOLVITE) 1 MG tablet Take 1 tablet (1 mg total) by mouth daily.   ? hydroCHLOROthiazide (HYDRODIURIL) 25 MG tablet Take 25 mg by mouth daily.   ? ibuprofen (ADVIL,MOTRIN) 200 MG tablet Take 400 mg by mouth every 8 (eight) hours as needed for pain.          ?  methotrexate 2.5 MG tablet Take 4 tablets (10 mg total) by mouth once a week.   ? multivitamin therapeutic (THERAGRAN) tablet Take 1 tablet by mouth daily.   ? nystatin (MYCOSTATIN) powder Apply to rash areas twice daily.   ? oseltamivir (TAMIFLU) 75 MG capsule    ? oxyCODONE (OXY-IR) 5 mg capsule Take 1-2 tablets every 4 hours orally as needed for pain relief.  Use 1 tablet for 1-5/10 pain, take 2 tablets for 6-10/10 pain.   ? rivaroxaban ANTICOAGULANT (XARELTO) 10 mg tablet Take 10 mg by mouth daily.       There were no vitals filed for this visit.  Blood pressure 143/78 pulse 68 respirations 18 temperature 97.8 saturation room air 96%  Physical Exam: Head is normocephalic.  Cardiovascular: Normal rate, regular rhythm and normal heart sounds.   Chronic edema .  Catie wraps.  Pulmonary/Chest: Breath sounds normal.   inder   Abdominal.  Protuberant  Musculoskeletal:   Chronic pain. Chronic edema. Rheumatoid arthritis multiple sites including hands. Left knee scar. Left hip OA. Psychiatric: She has a normal mood and affect. Her behavior is normal.         LABS:   Lab Results   Component Value Date    WBC 4.5 03/12/2020    HGB 14.8 03/12/2020    HCT 43.5 03/12/2020    MCV 92 03/12/2020     03/12/2020     Vitamin D, Total (25-Hydroxy)   Date Value Ref Range Status   12/28/2017 48.3 30.0 - 80.0 ng/mL Final     Results for orders placed or performed in visit on 09/13/18   Basic Metabolic Panel   Result Value Ref Range    Sodium 144 136 - 145 mmol/L    Potassium 4.3 3.5 - 5.0 mmol/L    Chloride 101 98 - 107 mmol/L    CO2 34 (H) 22 - 31 mmol/L    Anion Gap, Calculation 9 5 - 18 mmol/L    Glucose 96 70 - 125 mg/dL    Calcium 9.7 8.5 - 10.5 mg/dL    BUN 16 8 - 22 mg/dL    Creatinine 0.75 0.60 - 1.10 mg/dL    GFR MDRD Af Amer >60 >60 mL/min/1.73m2    GFR MDRD Non Af Amer >60 >60 mL/min/1.73m2         Case Management:  I have reviewed the facility/SNF care plan/MDS which was done Today, including the falls risk,  nutrition and pain screening. I also reviewed the current immunizations, and preventive care.. Future cancer screening is not clinically indicated secondary to age/goals of care.   Patient's desire to return to the community is present, but is not able due to care needs .    Information reviewed:  Medications, vital signs, orders, and nursing notes.    ASSESSMENT:      ICD-10-CM    1. Seropositive rheumatoid arthritis of multiple sites (H)  M05.79    2. Vitamin D deficiency  E55.9    3. Primary osteoarthritis involving multiple joints  M15.0    4. Polyarthralgia  M25.50        PLAN:    She is due for vitamin D level since she is on vitamin D 6000 units daily.  Her pain is also well managed.  Her appetite is good.  Has remained cold and flu free..  Continue to manage and follow her other chronic medical conditions.      Electronically signed by: Michael Duane Johnson, CNP

## 2021-06-20 NOTE — PROGRESS NOTES
[facility]    Facility:   [facility] [[id number]]   Code Status: FULL CODE    Recent medical history/chief concerns: patient has significant pain in her right mid back region, just above a vesicular, splotchy erythematous rash which was noted on 09/21/18. She was seen two days prior to the rash being noted per nursing home staff, going to [facility] on 09/19/2018 to assess the same type of back pain I saw her for today. She was discharged with # 8 tablets of oxycodone 5 mg immediate release. She has been out of these for 3 days, and relates she is in some significant pain.  Her main rash area is over the right T10 T12 area, and she also has a small amount of erythema with a couple clear vesicles measuring up to 2 mm diameter in the center in the left hand over the webspace between the first and second digits, which I feel could also be of disseminated varicella viral origin along with her main rash area given that she is immunocompromised due to treatment using Humira.  The larger rash area has numerous vesicles, ranging in size from 0.5 mm to 2 mm and they are clear and intact.    Review of systems: See history of present illness, otherwise negative.     Current Outpatient Prescriptions   Medication Sig Dispense Refill     adalimumab (HUMIRA) 40 mg/0.8 mL injection Inject 0.8 mL (40 mg total) under the skin every 14 (fourteen) days. 1.6 mL 5     artificial tears,hypromellose, (GENTEAL) 0.3 % Drop Administer 1 drop to both eyes 4 (four) times a day as needed.       ascorbic acid, vitamin C, (ASCORBIC ACID WITH KAEL HIPS) 500 MG tablet Take 500 mg by mouth 2 (two) times a day.       cholecalciferol, vitamin D3, (VITAMIN D3) 2,000 unit Tab Take 6,000 Units by mouth daily.       HUMIRA PEN 40 mg/0.8 mL PnKt INJECT 0.8 ML (40 MG TOTAL) UNDER THE SKIN EVERY 14 DAYS 1 kit 5     hydroCHLOROthiazide (HYDRODIURIL) 25 MG tablet Take 25 mg by mouth daily.        hydroxychloroquine (PLAQUENIL) 200 mg tablet Take 200 mg by mouth 2 (two) times a day.       ibuprofen (ADVIL,MOTRIN) 200 MG tablet Take 400 mg by mouth 3 (three) times a day as needed for pain.        methotrexate 2.5 MG tablet Take 4 tablets (10 mg total) by mouth once a week. 16 tablet 0     multivitamin therapeutic (THERAGRAN) tablet Take 1 tablet by mouth daily.       nystatin (MYCOSTATIN) powder Apply 1 application topically 2 (two) times a day. Wash well between applications. Apply under breasts.       oxyCODONE (OXY-IR) 5 mg capsule Take 1 capsule (5 mg total) by mouth every 4 (four) hours as needed. 20 capsule 0     predniSONE (DELTASONE) 2.5 MG tablet Take 7.5 mg/d prednisone PO for 3 weeks then stop. 90 tablet 0     senna-docusate (SENNOSIDES-DOCUSATE SODIUM) 8.6-50 mg tablet Take 1 tablet by mouth daily.       sulfaSALAzine (AZULFIDINE) 500 mg tablet Take 3 tablets (1,500 mg total) by mouth 2 (two) times a day. 180 tablet 0     valACYclovir (VALTREX) 1000 MG tablet Take 1 tablet (1,000 mg total) by mouth 3 (three) times a day. 21 tablet 0     WARFARIN SODIUM (WARFARIN ORAL) Take by mouth daily. 9/14/18 INR 2.39 Cont 8mg M & TH, 9mg AOD. Next INR 10/11.  8/16/18 INR 2.04  Take 8mg Mon and Thurs and 9mg AOD.  Next INR 9/13/18.  7/17/18 INR 2.27 Take 8mg M, Th and 9mg AOD. Next INR 8       No current facility-administered medications for this visit.        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  RD reports that she is not willing to commit to the program outlined.  See  scanned document.  12/15/2015 - Marcio Quinonez MD        Obesity - although she was not weighed today, she is clearly obese on inspection. 4/10/2015     LOPEZ (obstructive sleep apnea) - abnormal overnight pulse oximetry 5/22/2015     Osteoarthritis      Peripheral vascular disease (H)      Pleural effusion      Pressure ulcer of foot      Rheumatoid arthritis (H) 12/30/2014    seronegative     Seropositive rheumatoid arthritis (H) 1/19/2016     Vitamin D deficiency 8/26/2015      Past Surgical History:   Procedure Laterality Date     JOINT REPLACEMENT      knee     TOTAL HIP ARTHROPLASTY Left 10/18/2016    Procedure: LEFT HIP TOTAL ARTHROPLASTY;  Surgeon: London Goss MD;  Location: [facility] OR;  Service:      TOTAL KNEE ARTHROPLASTY Left 2006      Social History     Social History     Marital status: Single     Spouse name: N/A     Number of children: N/A     Years of education: N/A     Social History Main Topics     Smoking status: Former Smoker     Packs/day: 1.00     Years: 30.00     Types: Cigarettes     Smokeless tobacco: Never Used     Alcohol use Yes      Comment: 3-4 times a year she will have 1 or 2.     Drug use: No     Sexual activity: No     Other Topics Concern     Not on file     Social History Narrative    Long term  At [facility] to rehab to [facility] allowing surgery on deteriorated joints  LTC12/2015       History   Smoking Status     Former Smoker     Packs/day: 1.00     Years: 30.00     Types: Cigarettes   Smokeless Tobacco     Never Used        Exam:   Vitals:    09/23/18 2053   BP: 137/83   Pulse: 84   Resp: 16   Temp: 99.3  F (37.4  C)   SpO2: 94%       EXAM:   General: Vital signs reviewed.  She is noted to have a mild fever yesterday.  Patient is in some distress due to right lower thoracic back discomfort. Breathing is non labored appearing. Patient is alert and oriented x 3.   Recent studies: I reviewed the lab results from [facility] last week, with the CBC having an  overall white blood cell count in the lower end of the normal range.  Approximately 25 minutes was spent on patient management, with greater than 50% of this time being spent on medication reconciliation between electronic medical records, review of past medical history and current medical management, and discussion of management with patient and care staff.  Assessment/Plan   1. Disseminated varicella  valACYclovir (VALTREX) 1000 MG tablet   2. Shingles outbreak  valACYclovir (VALTREX) 1000 MG tablet    oxyCODONE (OXY-IR) 5 mg capsule       Patient Instructions   I represcribed oxycodone 5 mg immediate release in the form of capsules, to be taken 1 every 4 hours as needed for severe pain relief.  I also prescribed valacyclovir 1 g p.o. 3 times daily ×7 days.  Depending on how she responds to treatment, she may need additional narcotic pain reliever prescribed.  I suspect this is going to be a worse than average case of shingles, with possibly more severe pain.     Luis Enrique Lindsey, DO

## 2021-06-20 NOTE — LETTER
Letter by Johnson, Michael Duane, CNP at      Author: Johnson, Michael Duane, CNP Service: -- Author Type: --    Filed:  Encounter Date: 8/18/2020 Status: (Other)         Patient: María Elena Saab   MR Number: [id number]   YOB: 1965   Date of Visit: 8/18/2020     [facility]    Facility:   [facility] [[id number]]   Code Status: FULL CODE      CHIEF COMPLAINT/REASON FOR VISIT:  Chief Complaint   Patient presents with   ? FVP Care Coordination - Regulatory       HISTORY:      HPI: María Elena is a 55 y.o. female who is in a long-term care facility secondary to multiple chronic conditions.  Primarily to follow-up with rheumatology secondary to rheumatoid arthritis and are causing her pain and also performing her laboratory studies.  She has been normotensive and afebrile and on room air.  Her appetite is good.  Pain she does take methotrexate.  Does not taking and no oxycodone as needed and did take 1 dose ibuprofen on August 15.  She also did have a care conference on August 13.  She does need assistance with all ADLs.  Does spend the days in her room.  Past Medical History:   Diagnosis Date   ? 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 Bacteremia due to group B Streptococcus    ? Cellulitis of right lower extremity    ? DJD (degenerative joint disease)    ? DVT, bilateral lower limbs (H) 10/2014   ? Edema - swelling of the legs after blood clots 5/22/2015   ? Essential hypertension with goal blood pressure less than 140/90    ? Failure to thrive    ? History of blood clots    ? Hypokalemia 10/15/2014   ? Lung mass 10/18/2014   ? Morbid obesity (H) 4/10/2015    Had formal nutrition consult at [facility].  RD reports that she is not willing to commit to the program outlined.  See scanned document.  12/15/2015 - Marcio Quinonez MD      ? Obesity - although she was not weighed today, she is clearly obese on inspection. 4/10/2015   ? LOPEZ (obstructive sleep  apnea) - abnormal overnight pulse oximetry 5/22/2015   ? Osteoarthritis    ? Peripheral vascular disease (H)    ? Pleural effusion    ? Pressure ulcer of foot    ? Rheumatoid arthritis (H) 12/30/2014    seronegative   ? Sepsis (H)    ? Seropositive rheumatoid arthritis (H) 1/19/2016   ? Vitamin D deficiency 8/26/2015             Family History   Problem Relation Age of Onset   ? Multiple myeloma Father    ? Deep vein thrombosis Father    ? Cataracts Father    ? Snoring Father    ? Other Brother         Blood withdrawn from time to time.  Sounds like hemochromatosis.   ? Sleep apnea Brother    ? Cancer Mother         Uterine CA   ? Arthritis Mother    ? Cataracts Mother    ? Breast cancer Mother 54   ? No Medical Problems Sister    ? Rheum arthritis Maternal Grandmother    ? Breast cancer Maternal Grandmother 50   ? Heart disease Maternal Grandmother    ? Rheum arthritis Paternal Aunt    ? Breast cancer Maternal Aunt 54   ? Breast cancer Cousin    ? Clotting disorder Neg Hx      Social History     Socioeconomic History   ? Marital status: Single     Spouse name: Not on file   ? Number of children: Not on file   ? Years of education: Not on file   ? Highest education level: Not on file   Occupational History   ? Not on file   Social Needs   ? Financial resource strain: Not on file   ? Food insecurity     Worry: Not on file     Inability: Not on file   ? Transportation needs     Medical: Not on file     Non-medical: Not on file   Tobacco Use   ? Smoking status: Former Smoker     Packs/day: 1.00     Years: 30.00     Pack years: 30.00     Types: Cigarettes   ? Smokeless tobacco: Never Used   Substance and Sexual Activity   ? Alcohol use: Yes     Comment: 3-4 times a year she will have 1 or 2.   ? Drug use: No   ? Sexual activity: Never     Partners: Female   Lifestyle   ? Physical activity     Days per week: Not on file     Minutes per session: Not on file   ? Stress: Not on file   Relationships   ? Social connections      Talks on phone: Not on file     Gets together: Not on file     Attends [employer]ist service: Not on file     Active member of club or organization: Not on file     Attends meetings of clubs or organizations: Not on file     Relationship status: Not on file   ? Intimate partner violence     Fear of current or ex partner: Not on file     Emotionally abused: Not on file     Physically abused: Not on file     Forced sexual activity: Not on file   Other Topics Concern   ? Not on file   Social History Narrative    Long term  At [facility] to rehab to level allowing surgery on deteriorated joints  LTC12/2015         Review of Systems  No reports of any URI symptoms postnasal drip fever chills fatigue flulike symptoms nausea vomiting diarrhea dysuria rashes stiff neck swollen glands. Does have bilateral DVT sleep apnea obesity edema vitamin D deficiency PAD rheumatoid arthritis multiple sites chronic pain.    Current Outpatient Medications   Medication Sig Note   ? acetaminophen (TYLENOL) 325 MG tablet Take 650 mg by mouth every 6 (six) hours as needed for pain.           ? adalimumab (HUMIRA) 40 mg/0.8 mL injection Inject 0.8 mL (40 mg total) under the skin every 14 (fourteen) days.    ? ascorbic acid, vitamin C, (ASCORBIC ACID WITH KAEL HIPS) 500 MG tablet Take 500 mg by mouth 2 (two) times a day.    ? cholecalciferol, vitamin D3, (VITAMIN D3) 2,000 unit Tab Take 6,000 Units by mouth daily.    ? DULoxetine (CYMBALTA) 20 MG capsule Take 1 capsule (20 mg total) by mouth daily. 6/24/2020: Currently takeing   ? folic acid (FOLVITE) 1 MG tablet Take 1 tablet (1 mg total) by mouth daily. 6/24/2020: Currently taking   ? HUMIRA PEN 40 mg/0.8 mL PnKt INJECT 0.8 ML (40 MG) UNDER THE SKIN EVERY 14 DAYS    ? hydroCHLOROthiazide (HYDRODIURIL) 25 MG tablet Take 25 mg by mouth daily.    ? ibuprofen (ADVIL,MOTRIN) 200 MG tablet Take 400 mg by mouth every 8 (eight) hours as needed for pain.           ? methotrexate 10 MG tablet Take 10  mg by mouth once a week.    ? methotrexate 2.5 MG tablet Take 4 tablets (10 mg total) by mouth once a week.    ? multivitamin therapeutic (THERAGRAN) tablet Take 1 tablet by mouth daily.    ? nystatin (MYCOSTATIN) powder Apply to rash areas twice daily.    ? oxyCODONE (OXY-IR) 5 mg capsule Take 1-2 tablets every 4 hours orally as needed for pain relief.  Use 1 tablet for 1-5/10 pain, take 2 tablets for 6-10/10 pain.    ? rivaroxaban ANTICOAGULANT (XARELTO) 10 mg tablet Take 10 mg by mouth daily.        There were no vitals filed for this visit.  Blood pressure 121/81, pulse 68, respirations 20, temperature 97.9, saturation room air 94%  Physical Exam  Head is normocephalic.  Cardiovascular: Normal rate, regular rhythm and normal heart sounds.   Chronic edema .  Catie wraps.  Pulmonary/Chest: Breath sounds normal.   inder   Abdominal.  Protuberant  Musculoskeletal:   Chronic pain. Chronic edema. Rheumatoid arthritis multiple sites including hands. Left knee scar. Left hip OA. Psychiatric: She has a normal mood and affect. Her behavior is normal.           LABS:   Lab Results   Component Value Date    WBC 4.5 07/01/2020    HGB 14.9 07/01/2020    HCT 43.3 07/01/2020    MCV 92 07/01/2020     07/01/2020     Results for orders placed or performed in visit on 09/13/18   Basic Metabolic Panel   Result Value Ref Range    Sodium 144 136 - 145 mmol/L    Potassium 4.3 3.5 - 5.0 mmol/L    Chloride 101 98 - 107 mmol/L    CO2 34 (H) 22 - 31 mmol/L    Anion Gap, Calculation 9 5 - 18 mmol/L    Glucose 96 70 - 125 mg/dL    Calcium 9.7 8.5 - 10.5 mg/dL    BUN 16 8 - 22 mg/dL    Creatinine 0.75 0.60 - 1.10 mg/dL    GFR MDRD Af Amer >60 >60 mL/min/1.73m2    GFR MDRD Non Af Amer >60 >60 mL/min/1.73m2       Lab Results   Component Value Date    SEDRATE 46 (H) 02/08/2017     Lab Results   Component Value Date    ALT 19 07/01/2020    AST 23 07/25/2019    ALKPHOS 69 07/25/2019    BILITOT 0.3 07/25/2019       Case Management:  I have  reviewed the facility/SNF care plan/MDS which was done Today, including the falls risk, nutrition and pain screening. I also reviewed the current immunizations, and preventive care.. Future cancer screening is not clinically indicated secondary to age/goals of care.   Patient's desire to return to the community is present, but is not able due to care needs .    Information reviewed:  Medications, vital signs, orders, and nursing notes.    ASSESSMENT:      ICD-10-CM    1. Seropositive rheumatoid arthritis of multiple sites (H)  M05.79    2. Primary osteoarthritis involving multiple joints  M89.49    3. Polyarthralgia  M25.50    4. Physical debility  R53.81        PLAN:    At this time no other changes are needed.  She is getting her laboratory studies done by the rheumatology group.  Did have a care conference performed on August 13.  Pain is managed.  .    Electronically signed by: Michael Duane Johnson, CNP

## 2021-06-21 NOTE — LETTER
Letter by Johnson, Michael Duane, CNP at      Author: Johnson, Michael Duane, CNP Service: -- Author Type: --    Filed:  Encounter Date: 1/26/2021 Status: (Other)         Patient: María Elena Saab   MR Number: [id number]   YOB: 1965   Date of Visit: 1/26/2021     [facility]    Facility:   [facility] [[id number]]   Code Status: FULL CODE      CHIEF COMPLAINT/REASON FOR VISIT:  Chief Complaint   Patient presents with   ? FVP Care Coordination - Regulatory       HISTORY:      HPI: María Elena is a 55 y.o. female who is being seen today secondary to a regulatory visit.  She seems to be in pretty good spirits overall.  Does see rheumatology secondary to her rheumatoid arthritis.  Also gets her laboratory studies done through the rheumatology clinic.  Had a chance to go over her current medications and there have been no recent changes.  She does not need any oxycodone as needed for pain and no Tylenol as needed for pain and has only taken 1 ibuprofen as needed for pain.  Her appetite is good.  She does have chronic lower extremity edema.  Sometimes her legs do bother her.  She does in the wheelchair most of the day.  Is on a regular diet.  She did ask about getting her leg wraps and am not terribly sure that that has been ordered so I will try to check with staff to see if they did order her leg wraps.  She has been without colds or flus and pretty much stays in the room throughout the day and night.    Past Medical History:   Diagnosis Date   ? 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 Bacteremia due to group B Streptococcus    ? Cellulitis of right lower extremity    ? DJD (degenerative joint disease)    ? DVT, bilateral lower limbs (H) 10/2014   ? Edema - swelling of the legs after blood clots 5/22/2015   ? Essential hypertension with goal blood pressure less than 140/90    ? Failure to thrive    ? History of blood clots    ? Hypokalemia 10/15/2014   ?  Lung mass 10/18/2014   ? Morbid obesity (H) 4/10/2015    Had formal nutrition consult at [facility].  RD reports that she is not willing to commit to the program outlined.  See scanned document.  12/15/2015 - Marcio Quinonez MD      ? Obesity - although she was not weighed today, she is clearly obese on inspection. 4/10/2015   ? LOPEZ (obstructive sleep apnea) - abnormal overnight pulse oximetry 5/22/2015   ? Osteoarthritis    ? Peripheral vascular disease (H)    ? Pleural effusion    ? Pressure ulcer of foot    ? Rheumatoid arthritis (H) 12/30/2014    seronegative   ? Sepsis (H)    ? Seropositive rheumatoid arthritis (H) 1/19/2016   ? Vitamin D deficiency 8/26/2015             Family History   Problem Relation Age of Onset   ? Multiple myeloma Father    ? Deep vein thrombosis Father    ? Cataracts Father    ? Snoring Father    ? Other Brother         Blood withdrawn from time to time.  Sounds like hemochromatosis.   ? Sleep apnea Brother    ? Cancer Mother         Uterine CA   ? Arthritis Mother    ? Cataracts Mother    ? Breast cancer Mother 54   ? No Medical Problems Sister    ? Rheum arthritis Maternal Grandmother    ? Breast cancer Maternal Grandmother 50   ? Heart disease Maternal Grandmother    ? Rheum arthritis Paternal Aunt    ? Breast cancer Maternal Aunt 54   ? Breast cancer Cousin    ? Clotting disorder Neg Hx      Social History     Socioeconomic History   ? Marital status: Single     Spouse name: Not on file   ? Number of children: Not on file   ? Years of education: Not on file   ? Highest education level: Not on file   Occupational History   ? Not on file   Social Needs   ? Financial resource strain: Not on file   ? Food insecurity     Worry: Not on file     Inability: Not on file   ? Transportation needs     Medical: Not on file     Non-medical: Not on file   Tobacco Use   ? Smoking status: Former Smoker     Packs/day: 1.00     Years: 30.00     Pack years: 30.00     Types: Cigarettes   ? Smokeless  tobacco: Never Used   Substance and Sexual Activity   ? Alcohol use: Yes     Comment: 3-4 times a year she will have 1 or 2.   ? Drug use: No   ? Sexual activity: Never     Partners: Female   Lifestyle   ? Physical activity     Days per week: Not on file     Minutes per session: Not on file   ? Stress: Not on file   Relationships   ? Social connections     Talks on phone: Not on file     Gets together: Not on file     Attends Yazidism service: Not on file     Active member of club or organization: Not on file     Attends meetings of clubs or organizations: Not on file     Relationship status: Not on file   ? Intimate partner violence     Fear of current or ex partner: Not on file     Emotionally abused: Not on file     Physically abused: Not on file     Forced sexual activity: Not on file   Other Topics Concern   ? Not on file   Social History Narrative    Long term  At [facility] to rehab to [facility] allowing surgery on deteriorated joints  LTC12/2015         Review of Systems  No reports of any URI symptoms postnasal drip fever chills fatigue flulike symptoms nausea vomiting diarrhea dysuria rashes stiff neck swollen glands. Does have bilateral DVT sleep apnea obesity edema vitamin D deficiency PAD rheumatoid arthritis multiple sites chronic pain.  Current Outpatient Medications   Medication Sig Note   ? acetaminophen (TYLENOL) 325 MG tablet Take 650 mg by mouth every 6 (six) hours as needed for pain.           ? adalimumab (HUMIRA) 40 mg/0.8 mL injection Inject 0.8 mL (40 mg total) under the skin every 14 (fourteen) days.    ? ascorbic acid, vitamin C, (ASCORBIC ACID WITH KAEL HIPS) 500 MG tablet Take 500 mg by mouth 2 (two) times a day.    ? cholecalciferol, vitamin D3, (VITAMIN D3) 2,000 unit Tab Take 6,000 Units by mouth daily.    ? DULoxetine (CYMBALTA) 20 MG capsule Take 1 capsule (20 mg total) by mouth daily. 6/24/2020: Currently takeing   ? folic acid (FOLVITE) 1 MG tablet Take 1 tablet (1 mg total) by  mouth daily. 6/24/2020: Currently taking   ? HUMIRA PEN 40 mg/0.8 mL PnKt INJECT 0.8 ML (40 MG) UNDER THE SKIN EVERY 14 DAYS    ? hydroCHLOROthiazide (HYDRODIURIL) 25 MG tablet Take 25 mg by mouth daily.    ? ibuprofen (ADVIL,MOTRIN) 200 MG tablet Take 400 mg by mouth every 8 (eight) hours as needed for pain.           ? methotrexate 10 MG tablet Take 1 tablet (10 mg total) by mouth once a week.    ? methotrexate 2.5 MG tablet Take 4 tablets (10 mg total) by mouth once a week.    ? multivitamin therapeutic (THERAGRAN) tablet Take 1 tablet by mouth daily.    ? nystatin (MYCOSTATIN) powder Apply to rash areas twice daily.    ? oxyCODONE (OXY-IR) 5 mg capsule Take 1-2 tablets every 4 hours orally as needed for pain relief.  Use 1 tablet for 1-5/10 pain, take 2 tablets for 6-10/10 pain.    ? rivaroxaban ANTICOAGULANT (XARELTO) 10 mg tablet Take 10 mg by mouth daily.        There were no vitals filed for this visit.  Blood pressure 136/69, pulse 74, respirations 18, temperature 97.8  Physical Exam  Head is normocephalic.  Cardiovascular: Normal rate, regular rhythm and normal heart sounds.    Chronic edema .  Catie wraps.  Pulmonary/Chest: Breath sounds normal.   inder   Abdominal.  Protuberant  Musculoskeletal:   Chronic pain. Chronic edema. Rheumatoid arthritis multiple sites including hands. Left knee scar. Left hip OA. Psychiatric: She has a normal mood and affect. Her behavior is normal.         LABS:   Lab Results   Component Value Date    WBC 4.6 12/21/2020    HGB 13.6 12/21/2020    HCT 42.1 12/21/2020    MCV 96 12/21/2020     12/21/2020     Results for orders placed or performed in visit on 09/13/18   Basic Metabolic Panel   Result Value Ref Range    Sodium 144 136 - 145 mmol/L    Potassium 4.3 3.5 - 5.0 mmol/L    Chloride 101 98 - 107 mmol/L    CO2 34 (H) 22 - 31 mmol/L    Anion Gap, Calculation 9 5 - 18 mmol/L    Glucose 96 70 - 125 mg/dL    Calcium 9.7 8.5 - 10.5 mg/dL    BUN 16 8 - 22 mg/dL     Creatinine 0.75 0.60 - 1.10 mg/dL    GFR MDRD Af Amer >60 >60 mL/min/1.73m2    GFR MDRD Non Af Amer >60 >60 mL/min/1.73m2       Lab Results   Component Value Date    ALT 23 09/28/2020    AST 23 07/25/2019    ALKPHOS 69 07/25/2019    BILITOT 0.3 07/25/2019     Lab Results   Component Value Date    ALBUMIN 3.6 09/28/2020       Case Management:  I have reviewed the facility/SNF care plan/MDS which was done Today, including the falls risk, nutrition and pain screening. I also reviewed the current immunizations, and preventive care.. Future cancer screening is not clinically indicated secondary to age/goals of care.   Patient's desire to return to the community is present, but is not able due to care needs .    Information reviewed:  Medications, vital signs, orders, and nursing notes.    ASSESSMENT:      ICD-10-CM    1. Primary osteoarthritis involving multiple joints  M89.49    2. Seropositive rheumatoid arthritis of multiple sites (H)  M05.79    3. PAD (peripheral artery disease) (H)  I73.9    4. Physical debility  R53.81        PLAN:    She does receive her laboratory studies performed through the rheumatology clinic.  I will see and check on her lymphedema wraps.  Otherwise she did not have any other new issues to discuss today.  Staff also did not have any other particular concerns.  Continue to manage and follow.      Electronically signed by: Michael Duane Johnson, CNP

## 2021-06-21 NOTE — PROGRESS NOTES
"ASSESSMENT AND PLAN:  María Elena Saab 53 y.o. female is here for follow-up of severe seropositive rheumatoid arthritis, osteoarthritis, and the background of multiple comorbidities.  She is not had Humira for some time recently because of outbreak of shingles.  She thinks it may be 2 doses or possibly 3.  Consequently her RA is not under as well control.  She is hurting more in her right hand where she has synovitis.  Since the shingles area has healed she can now resume Humira.  Recent labs are normal.  Reminded of her eye examination for HC Q.  Return for follow-up in 3 months labs prior.             Diagnoses and all orders for this visit:    Seropositive rheumatoid arthritis of multiple sites (H)    Primary osteoarthritis of right knee    Right shoulder tendinitis    Polyarthralgia           HISTORY OF PRESENTING ILLNESS:    María Elena Saab 53 y.o. female  is here for followup.  She has severe severe seropositive erosive deforming rheumatoid arthritis.  She had been doing better with the current combination when she had to hold Humira this was secondary to shingles eruption affecting her right lumbar thoracic region this resulted in 2 or 3 cycles not injected.  She is hurting in her right hand.  Moderately severe.  This does not \"move as well\".  She has noted swelling.  She has had labs drawn recently which are within normal range.  We reminded of HC Q related ocular toxicity monitoring.  And she has not had recurrence of leg ulcer infections.  She is well aware of the risk involved with the current regimen of Humira and the DMARD's.Further historical information, including ROS as noted in the multidimensional health assessment questionnaire scanned in the EMR and in the assessment and plan section. Rheumatoid Arthritis Disease Activity Measure used: RAPID3, reviewed  today and scanned in the chart.        Following is the excerpt from a recent note:    She has noted significant improvement in her joint pains " "she's been on Humira for the past of 4 doses, however recently she did have increased pain in her left hand which subsided since.  Now she has leukopenia.  Her decubitus ulcer is noted not to be infected anymore.  I have emphasized the importance of keeping close eye.  She is awaiting left hip replacement.  She is aware that I'm not able to give her the more \"aggressive\"  DMARD still her pressure ulcer heals and is no longer considered infected.  This patient has high titer anti-CCP antibody.  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  She is wheelchair-bound.  This happened this past year in fall.  She was due to have left hip replacement and then subsequent to that right knee replacement but before she could have that done she developed pneumonia and pulmonary embolism.  Prior to hospitalization she reports that she was able to ambulate with the help of a walker.  Since then she could simply not go back to ambulation and is now awaiting surgery at the nursing home.  This morning she told me she was informed that she has a infected decubitus ulcer.  Unless as she heals there the orthopedic surgeons obviously are not prepared to proceed with in the surgical intervention.  Meanwhile her rheumatoid arthritis is appearing to be a bit better today.  She noted that she could move her finger better.  She had trigger finger injection on her previous visit.  I have outlined to her that we will proceed with sulfasalazine.  I cannot at this point given her methotrexate or other more aggressive DMARD still her infected decubitus ulcer has been under control or healed.       has longstanding seropositive anti-CCP antibody severe deforming, erosive rheumatoid arthritis.   In the past she was said to have infected decubitus ulcer of the right lower extremity posteriorly, she understands that that is now healed or at least not infected anymore.  Her recent evaluation of the vascular clinic shows that there is no residual " active infection in her lower extremity ulceration.  In view of this and the fact that even with only hydroxychloroquine and sulfasalazine she has had intermittent neutropenia, she is an unlikely candidate for methotrexate, or leflunomide.    She is now on Humira for the past 2 months.  She is aware that it is going to be an important part of her observations at her primary physician at her home and herself to keep a close eye in case it is evidence of infection.  Will continue current implant.  Recent labs within acceptable range.  Her recent left hip arthroplasty, uneventfully she's not get back on Humira I have asked her to go back this will be 5 weeks out of surgery.  She did hold her Humira 2 weeks prior.  She also has a right ring finger triggering.  She would like to proceed with local injection.  20 mg of methylprednisolone and Marcaine injected in the flexor tendon sheath right ring finger flexor tendon.  Return for follow-up here in 4 months.  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  RD reports that she is not willing to commit to the program outlined.  See scanned document.  12/15/2015 - Marcio Quinonez MD        Obesity - although she was not weighed today, she is clearly obese on inspection. 4/10/2015     LOPEZ (obstructive sleep apnea) - abnormal overnight  pulse oximetry 5/22/2015     Osteoarthritis      Peripheral vascular disease (H)      Pleural effusion      Pressure ulcer of foot      Rheumatoid arthritis (H) 12/30/2014    seronegative     Seropositive rheumatoid arthritis (H) 1/19/2016     Vitamin D deficiency 8/26/2015     History   Smoking Status     Former Smoker     Packs/day: 1.00     Years: 30.00     Types: Cigarettes   Smokeless Tobacco     Never Used     Patient Active Problem List   Diagnosis     DVT of lower extremity, bilateral - residual clot disease on repeat US in NEW location - after six months of warfarin.     Morbid obesity (H)     Warfarin anticoagulation - long-term because of unresolved DVT that first appeared in October, 2014     LOPEZ (obstructive sleep apnea) - abnormal overnight pulse oximetry - SLEEP visit in November, 2015, she states.     Nocturnal hypoxemia - probable sleep apnea - had overnight pulse oximetry, April, 2015.     Edema - swelling of the legs after blood clots     Vitamin D deficiency     Cyclic citrullinated peptide (CCP) antibody positive     Right shoulder tendinitis     Seropositive rheumatoid arthritis of multiple sites (H)     PAD (peripheral artery disease) (H)     Pain management     Radiculopathy of leg     Sleep apnea     Degenerative joint disease (DJD) of hip     Essential hypertension with goal blood pressure less than 140/90     History of DVT of lower extremity     Anemia     H/O total hip arthroplasty     Right knee DJD     Trigger finger, right ring finger     Polyarthralgia     Physical debility     High risk medication use     Current Outpatient Prescriptions   Medication Sig Dispense Refill     ascorbic acid (VITAMIN C) 250 MG tablet Take 250 mg by mouth 2 (two) times a day.         enoxaparin (LOVENOX) 80 mg/0.8 mL Syrg Inject 80 mg under the skin 2 (two) times a day.         food supplement, lactose-free (BOOST BREEZE NUTRITIONAL) 0.04-1.05 gram-kcal/mL Liqd Use As Directed.         hydroxychloroquine  (PLAQUENIL) 200 mg tablet Take 200 mg by mouth 2 (two) times a day.         ibuprofen (ADVIL,MOTRIN) 400 MG tablet Take 400 mg by mouth every 6 (six) hours as needed for pain.         loratadine (CLARITIN) 10 mg tablet Take 10 mg by mouth daily.         magnesium hydroxide (MAGNESIUM HYDROXIDE) 400 mg/5 mL Susp suspension Take by mouth as needed. (ML)         multivitamin Chew Chew 1 tablet daily.         nalOXone (NARCAN) 0.4 mg/mL injection Inject as directed as needed. (ML)         naproxen (NAPROSYN) 500 MG tablet Take 500 mg by mouth 2 (two) times a day as needed.         oxyCODONE (ROXICODONE) 5 MG immediate release tablet Take 5 mg by mouth 4 (four) times a day as needed.         senna-docusate (PERICOLACE) 8.6-50 mg tablet Take 0.5 tablets by mouth daily.          warfarin (COUMADIN) 10 MG tablet          No current facility-administered medications for this visit.     DETAILED EXAMINATION  10/18/18  :  Vitals:    10/18/18 1444   BP: 128/84   Patient Site: Right Arm   Patient Position: Sitting   Cuff Size: Adult Regular   Pulse: 76     Alert oriented. Head including the face is examined for malar rash, heliotropes, scarring, lupus pernio. Eyes examined for redness such as in episcleritis/scleritis, periorbital lesions.   Neck/ Face examined for parotid gland swelling, range of motion of neck.  Left upper and lower and right upper and lower extremities examined for tenderness, swelling, warmth of the appendicular joints, range of motion, edema, rash.  Some of the important findings included: She has synovitis in her right hand MCP #2 3 and 4.  Scattered tenderness of the PIPs.  JLT of the knees bilaterally.  LAB / IMAGING DATA:  ALT   Date Value Ref Range Status   10/15/2018 16 0 - 45 U/L Final   07/09/2018 16 0 - 45 U/L Final   06/11/2018 19 0 - 45 U/L Final     Albumin   Date Value Ref Range Status   10/15/2018 3.6 3.5 - 5.0 g/dL Final   07/09/2018 3.7 3.5 - 5.0 g/dL Final   06/11/2018 3.6 3.5 - 5.0 g/dL  Final     Creatinine   Date Value Ref Range Status   10/15/2018 0.87 0.60 - 1.10 mg/dL Final   09/13/2018 0.75 0.60 - 1.10 mg/dL Final   07/09/2018 0.73 0.60 - 1.10 mg/dL Final       WBC   Date Value Ref Range Status   10/15/2018 4.2 4.0 - 11.0 thou/uL Final   07/09/2018 4.9 4.0 - 11.0 thou/uL Final   09/14/2015 4.0 4.0 - 11.0 thou/uL Final   06/30/2015 3.7 (L) 4.0 - 11.0 thou/uL Final     Hemoglobin   Date Value Ref Range Status   10/15/2018 13.8 12.0 - 16.0 g/dL Final   07/09/2018 13.6 12.0 - 16.0 g/dL Final   06/11/2018 13.4 12.0 - 16.0 g/dL Final     Platelets   Date Value Ref Range Status   10/15/2018 208 140 - 440 thou/uL Final   07/09/2018 234 140 - 440 thou/uL Final   06/11/2018 220 140 - 440 thou/uL Final       Lab Results   Component Value Date    RF 25.7 10/15/2014    SEDRATE 46 (H) 02/08/2017

## 2021-06-21 NOTE — PROGRESS NOTES
[facility]      I spoke to nursing staff about use of oxycodone by patient. She is only using it once/24 hours, usually at night. Prescription adjusted to daily prn, and medication refilled.    Electronically signed by: Luis Enrique Lindsey DO

## 2021-06-21 NOTE — LETTER
Letter by Johnson, Michael Duane, CNP at      Author: Johnson, Michael Duane, CNP Service: -- Author Type: --    Filed:  Encounter Date: 10/27/2020 Status: (Other)         Patient: María Elena Saab   MR Number: [id number]   YOB: 1965   Date of Visit: 10/27/2020       Assessment and Plan:   Arina is a 55-year-old female who does reside in the long-term care facility secondary to chronicle conditions and assistance with ADLs.  She does have a history of rheumatoid arthritis and does see rheumatology and does receive her monthly laboratory studies.  She does have a history of chronic joint pain including her hips and knees.  She did need to miss one of the appointments to rheumatologist.  Also recently did have some cough and cold symptoms but the Covid test did come out negative.  Patient has been advised of split billing requirements and indicates understanding: No  1. Primary osteoarthritis involving multiple joints  Chronic    2. Seropositive rheumatoid arthritis of multiple sites (H)  Does see rheumatology    3. Pain management  Controlled    4. Osteoarthritis of hip, unspecified laterality, unspecified osteoarthritis type  Stable    5. Routine general medical examination at a health care facility  Routine    The patient's current medical problems were reviewed.    The following are part of a depression follow up plan for the patient:  mental health care management  The following health maintenance schedule was reviewed with the patient and provided in printed form in the after visit summary:   Health Maintenance   Topic Date Due   ? HIV SCREENING  06/17/1978   ? ADVANCE CARE PLANNING  06/17/1981   ? COLORECTAL CANCER SCREENING  06/17/1981   ? PAP SMEAR  06/17/1984   ? ZOSTER VACCINES (1 of 2) 06/17/2013   ? MAMMOGRAM  11/14/2019   ? LIPID  04/13/2020   ? INFLUENZA VACCINE RULE BASED (1) 08/01/2020   ? PREVENTIVE CARE VISIT  10/27/2021   ? TD 18+ HE  04/10/2025   ? HEPATITIS C SCREENING  Completed    ? TDAP ADULT ONE TIME DOSE  Completed   ? Pneumococcal Vaccine: Pediatrics (0 to 5 Years) and At-Risk Patients (6 to 64 Years)  Aged Out   ? HEPATITIS B VACCINES  Aged Out        Subjective:   Chief Complaint: María Elena Saab is an 55 y.o. female here for an Annual Wellness visit.   HPI: María Elena is a 55-year-old female who I was asked to visit with secondary to a monthly review of her chronic medical conditions as well as a wellness exam.  Had a chance to go over her medications and have no new changes to her medications and does see rheumatology secondary to her rheumatoid arthritis to which she states at times in particular her hands seem to swell up because her and her some good days as well.  She did miss her Humira injection due to the cold situation otherwise is on methotrexate weekly on Fridays.  She normally does not take any oxycodone as needed for pain.  Her diet is regular.  Denies any bowel or bladder problems.  Does need assistance with ADLs.  She has been normotensive and afebrile and also on room air.    Review of Systems  No reports of any URI symptoms postnasal drip fever chills fatigue flulike symptoms nausea vomiting diarrhea dysuria rashes stiff neck swollen glands. Does have bilateral DVT sleep apnea obesity edema vitamin D deficiency PAD rheumatoid arthritis multiple sites chronic pain.    : Patient Care Team:  Johnson, Michael Duane, CNP as PCP - General (Internal Medicine)  [facility] (Generic Provider)  Johnson, Michael Duane, CNP as Assigned PCP     Patient Active Problem List   Diagnosis   ? DVT of lower extremity, bilateral - residual clot disease on repeat US in NEW location - after six months of warfarin.   ? Morbid obesity (H)   ? Warfarin anticoagulation - long-term because of unresolved DVT that first appeared in October, 2014   ? LOPEZ (obstructive sleep apnea) - abnormal overnight pulse oximetry - SLEEP visit in November,  2015, she states.   ? Nocturnal hypoxemia - probable sleep apnea - had overnight pulse oximetry, April, 2015.   ? Edema - swelling of the legs after blood clots   ? Vitamin D deficiency   ? Cyclic citrullinated peptide (CCP) antibody positive   ? Right shoulder tendinitis   ? Seropositive rheumatoid arthritis of multiple sites (H)   ? PAD (peripheral artery disease) (H)   ? Pain management   ? Radiculopathy of leg   ? Sleep apnea   ? Degenerative joint disease (DJD) of hip   ? Essential hypertension with goal blood pressure less than 140/90   ? History of DVT of lower extremity   ? Anemia   ? H/O total hip arthroplasty   ? Polyarthralgia   ? Physical debility   ? High risk medication use   ? Primary osteoarthritis involving multiple joints   ? History of total left knee replacement (TKR)     Past Medical History:   Diagnosis Date   ? 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 Bacteremia due to group B Streptococcus    ? Cellulitis of right lower extremity    ? DJD (degenerative joint disease)    ? DVT, bilateral lower limbs (H) 10/2014   ? Edema - swelling of the legs after blood clots 5/22/2015   ? Essential hypertension with goal blood pressure less than 140/90    ? Failure to thrive    ? History of blood clots    ? Hypokalemia 10/15/2014   ? Lung mass 10/18/2014   ? Morbid obesity (H) 4/10/2015    Had formal nutrition consult at [facility].  RD reports that she is not willing to commit to the program outlined.  See scanned document.  12/15/2015 - Marcio Quinonez MD      ? Obesity - although she was not weighed today, she is clearly obese on inspection. 4/10/2015   ? LOPEZ (obstructive sleep apnea) - abnormal overnight pulse oximetry 5/22/2015   ? Osteoarthritis    ? Peripheral vascular disease (H)    ? Pleural effusion    ? Pressure ulcer of foot    ? Rheumatoid arthritis (H) 12/30/2014    seronegative   ? Sepsis (H)    ? Seropositive rheumatoid arthritis (H) 1/19/2016   ? Vitamin D deficiency 8/26/2015       Past Surgical History:   Procedure Laterality Date   ? JOINT REPLACEMENT      knee   ? PERIPHERALLY INSERTED CENTRAL CATHETER INSERTION Right 07/14/2019    4fr/44cm/Rt Cephalic.lowSVC.MGlav   ? TOTAL HIP ARTHROPLASTY Left 10/18/2016    Procedure: LEFT HIP TOTAL ARTHROPLASTY;  Surgeon: London Goss MD;  Location: [facility] Main OR;  Service:    ? TOTAL KNEE ARTHROPLASTY Left 2006      Family History   Problem Relation Age of Onset   ? Multiple myeloma Father    ? Deep vein thrombosis Father    ? Cataracts Father    ? Snoring Father    ? Other Brother         Blood withdrawn from time to time.  Sounds like hemochromatosis.   ? Sleep apnea Brother    ? Cancer Mother         Uterine CA   ? Arthritis Mother    ? Cataracts Mother    ? Breast cancer Mother 54   ? No Medical Problems Sister    ? Rheum arthritis Maternal Grandmother    ? Breast cancer Maternal Grandmother 50   ? Heart disease Maternal Grandmother    ? Rheum arthritis Paternal Aunt    ? Breast cancer Maternal Aunt 54   ? Breast cancer Cousin    ? Clotting disorder Neg Hx       Social History     Socioeconomic History   ? Marital status: Single     Spouse name: Not on file   ? Number of children: Not on file   ? Years of education: Not on file   ? Highest education level: Not on file   Occupational History   ? Not on file   Social Needs   ? Financial resource strain: Not on file   ? Food insecurity     Worry: Not on file     Inability: Not on file   ? Transportation needs     Medical: Not on file     Non-medical: Not on file   Tobacco Use   ? Smoking status: Former Smoker     Packs/day: 1.00     Years: 30.00     Pack years: 30.00     Types: Cigarettes   ? Smokeless tobacco: Never Used   Substance and Sexual Activity   ? Alcohol use: Yes     Comment: 3-4 times a year she will have 1 or 2.   ? Drug use: No   ? Sexual activity: Never     Partners: Female   Lifestyle   ? Physical activity     Days per week: Not on file     Minutes per session: Not on file    ? Stress: Not on file   Relationships   ? Social connections     Talks on phone: Not on file     Gets together: Not on file     Attends Tenriism service: Not on file     Active member of club or organization: Not on file     Attends meetings of clubs or organizations: Not on file     Relationship status: Not on file   ? Intimate partner violence     Fear of current or ex partner: Not on file     Emotionally abused: Not on file     Physically abused: Not on file     Forced sexual activity: Not on file   Other Topics Concern   ? Not on file   Social History Narrative    Long term  At [facility] to rehab to level allowing surgery on deteriorated joints  LTC12/2015      Current Outpatient Medications   Medication Sig Dispense Refill   ? acetaminophen (TYLENOL) 325 MG tablet Take 650 mg by mouth every 6 (six) hours as needed for pain.            ? adalimumab (HUMIRA) 40 mg/0.8 mL injection Inject 0.8 mL (40 mg total) under the skin every 14 (fourteen) days. 1.6 mL 3   ? ascorbic acid, vitamin C, (ASCORBIC ACID WITH KAEL HIPS) 500 MG tablet Take 500 mg by mouth 2 (two) times a day.     ? cholecalciferol, vitamin D3, (VITAMIN D3) 2,000 unit Tab Take 6,000 Units by mouth daily.     ? DULoxetine (CYMBALTA) 20 MG capsule Take 1 capsule (20 mg total) by mouth daily. 90 capsule 2   ? folic acid (FOLVITE) 1 MG tablet Take 1 tablet (1 mg total) by mouth daily. 90 tablet 0   ? HUMIRA PEN 40 mg/0.8 mL PnKt INJECT 0.8 ML (40 MG) UNDER THE SKIN EVERY 14 DAYS 1 kit 5   ? hydroCHLOROthiazide (HYDRODIURIL) 25 MG tablet Take 25 mg by mouth daily.     ? ibuprofen (ADVIL,MOTRIN) 200 MG tablet Take 400 mg by mouth every 8 (eight) hours as needed for pain.            ? methotrexate 10 MG tablet Take 1 tablet (10 mg total) by mouth once a week. 16 tablet 0   ? methotrexate 2.5 MG tablet Take 4 tablets (10 mg total) by mouth once a week. 48 tablet 0   ? multivitamin therapeutic (THERAGRAN) tablet Take 1 tablet by mouth daily.     ?  multivitamin/folic acid/zinc (MULTIVITAMIN-FA-ZINC ORAL) Take 1 tablet by mouth.     ? nystatin (MYCOSTATIN) powder Apply to rash areas twice daily. 15 g 0   ? oxyCODONE (OXY-IR) 5 mg capsule Take 1-2 tablets every 4 hours orally as needed for pain relief.  Use 1 tablet for 1-5/10 pain, take 2 tablets for 6-10/10 pain. 60 tablet 0   ? rivaroxaban ANTICOAGULANT (XARELTO) 10 mg tablet Take 10 mg by mouth daily.       No current facility-administered medications for this visit.       Objective:   Vital Signs:   Visit Vitals  [facility] 06/09/2006      Blood pressure 139/81, pulse 72, respirations 20, temperature 97.8    VisionScreening:  Annual facility eye exam.    PHYSICAL EXAM  Head is normocephalic.  Cardiovascular: Normal rate, regular rhythm and normal heart sounds.   Chronic edema .  Catie wraps.  Pulmonary/Chest: Breath sounds normal.   inder   Abdominal.  Protuberant  Musculoskeletal:   Chronic pain. Chronic edema. Rheumatoid arthritis multiple sites including hands. Left knee scar. Left hip OA. Psychiatric: She has a normal mood and affect. Her behavior is normal.          No flowsheet data found.  A Mini-Cog score of 0-2 suggests the possibility of dementia, score of 3-5 suggests no dementia  Cognitive Screen not completed due to mental handicap.    She does not feel the mental screening is necessary.  Fall Risk not completed for medical reasons.   She does receive assistance with all ADLs including a Gume lift at times to get out of bed.  She does need ascitic ADLs.  Nonambulatory.  She does sit in a wheelchair.    Identified Health Risks:   She does receive assistance with all ADLs.  Blood pressures are stable.  Pain is managed.  She does get her monthly labs with rheumatology as well as her medications injections.  She did not have any other questions.

## 2021-06-22 NOTE — PROGRESS NOTES
[facility]    Facility:   [facility] [[id number]]   Code Status: FULL CODE      CHIEF COMPLAINT/REASON FOR VISIT:  Chief Complaint   Patient presents with     Review Of Multiple Medical Conditions       HISTORY:      HPI: María Elena is a 53 y.o. female who I was able to visit with secondary to review of her chronic medical conditions.  Our last visit together was in August 2018.  She has been normotensive and afebrile and also on room air.  She does need assistance with all ADLs.  Level for diet thin liquids.  She does have a history of PE and is on warfarin her next pro time is December 12.  She does have rheumatoid arthritis does see a rheumatologist she is on a number of medications and overall pretty well controlled has not required any ibuprofen or Tylenol or oxycodone although with the Tylenol she may take 1 day or sometimes just a couple of weeks.  There is been no bowel or bladder problems.  She does pretty much sit in the room by herself most of the day does not do any social activities.  And she did not have any other questions.    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  RD reports that she is not willing to commit to the program outlined.  See scanned document.  12/15/2015 - Marcio Quinonez MD        Obesity - although she was not weighed today, she is clearly obese on inspection. 4/10/2015     LOPEZ (obstructive sleep apnea) - abnormal overnight pulse oximetry 5/22/2015     Osteoarthritis      Peripheral vascular disease (H)       Pleural effusion      Pressure ulcer of foot      Rheumatoid arthritis (H) 12/30/2014    seronegative     Seropositive rheumatoid arthritis (H) 1/19/2016     Vitamin D deficiency 8/26/2015             Family History   Problem Relation Age of Onset     Multiple myeloma Father      Deep vein thrombosis Father      Cataracts Father      Snoring Father      Other Brother         Blood withdrawn from time to time.  Sounds like hemochromatosis.     Sleep apnea Brother      Cancer Mother         Uterine CA     Arthritis Mother      Cataracts Mother      Breast cancer Mother 54     No Medical Problems Sister      Rheum arthritis Maternal Grandmother      Breast cancer Maternal Grandmother 50     Heart disease Maternal Grandmother      Rheum arthritis Paternal Aunt      Breast cancer Maternal Aunt 54     Breast cancer Cousin      Clotting disorder Neg Hx      Social History     Socioeconomic History     Marital status: Single     Spouse name: Not on file     Number of children: Not on file     Years of education: Not on file     Highest education level: Not on file   Social Needs     Financial resource strain: Not on file     Food insecurity - worry: Not on file     Food insecurity - inability: Not on file     Transportation needs - medical: Not on file     Transportation needs - non-medical: Not on file   Occupational History     Not on file   Tobacco Use     Smoking status: Former Smoker     Packs/day: 1.00     Years: 30.00     Pack years: 30.00     Types: Cigarettes     Smokeless tobacco: Never Used   Substance and Sexual Activity     Alcohol use: Yes     Comment: 3-4 times a year she will have 1 or 2.     Drug use: No     Sexual activity: No     Partners: Female   Other Topics Concern     Not on file   Social History Narrative    Long term  At [facility] to rehab to level allowing surgery on deteriorated joints  LTC12/2015         Review of Systems  Denies any URI symptoms postnasal drip fever chills fatigue flulike  symptoms nausea vomiting diarrhea dysuria rashes stiff neck swollen glands. Does have bilateral DVT sleep apnea obesity edema vitamin D deficiency PAD rheumatoid arthritis multiple sites chronic pain.        Current Outpatient Medications   Medication Sig     adalimumab (HUMIRA) 40 mg/0.8 mL injection Inject 0.8 mL (40 mg total) under the skin every 14 (fourteen) days.     artificial tears,hypromellose, (GENTEAL) 0.3 % Drop Administer 1 drop to both eyes 4 (four) times a day as needed.     ascorbic acid, vitamin C, (ASCORBIC ACID WITH KALE HIPS) 500 MG tablet Take 500 mg by mouth 2 (two) times a day.     cholecalciferol, vitamin D3, (VITAMIN D3) 2,000 unit Tab Take 6,000 Units by mouth daily.     HUMIRA PEN 40 mg/0.8 mL PnKt INJECT 0.8 ML (40 MG TOTAL) UNDER THE SKIN EVERY 14 DAYS     hydroCHLOROthiazide (HYDRODIURIL) 25 MG tablet Take 25 mg by mouth daily.     hydroxychloroquine (PLAQUENIL) 200 mg tablet Take 200 mg by mouth 2 (two) times a day.     ibuprofen (ADVIL,MOTRIN) 200 MG tablet Take 400 mg by mouth 3 (three) times a day as needed for pain.      methotrexate 2.5 MG tablet Take 4 tablets (10 mg total) by mouth once a week.     methotrexate 2.5 MG tablet Take 10 mg by mouth once a week. q Friday     multivitamin therapeutic (THERAGRAN) tablet Take 1 tablet by mouth daily.     nystatin (MYCOSTATIN) powder Apply 1 application topically 2 (two) times a day. Wash well between applications. Apply under breasts.     oxyCODONE (OXY-IR) 5 mg capsule Take 1 capsule (5 mg total) by mouth daily as needed.     senna-docusate (SENNOSIDES-DOCUSATE SODIUM) 8.6-50 mg tablet Take 1 tablet by mouth daily.     sulfaSALAzine (AZULFIDINE ENTAB) 500 MG EC tablet Take 1,500 mg by mouth 2 (two) times a day.     sulfaSALAzine (AZULFIDINE) 500 mg tablet Take 3 tablets (1,500 mg total) by mouth 2 (two) times a day.     WARFARIN SODIUM (WARFARIN ORAL) Take by mouth daily 11/15/18 INR 2.44 Cont 8mg M-W-F, 9mg AOD> Next INR  11/29 11/8/18 INR 3.37 Decrease to 8mg M-W-F, 9mg AOD. Next INR 11/15.  10/11/18 INR 2.29  Cont 8mg Mon and Thurs and 9mg AOD.  Next INR 11/8/18.    9/14/18 INR 2.39 Cont 8mg M & TH, 9mg AOD. Next INR 10/11..             .There were no vitals filed for this visit.  Blood pressure 134/74 pulse 85 respirations 20 temperature 98.0  Physical Exam   obese .  .   Cardiovascular: Normal rate, regular rhythm and normal heart sounds.   Chronic edema .   Pulmonary/Chest: Breath sounds normal.   inder   Abdominal.  Protuberant  Musculoskeletal:   Chronic pain. Chronic edema. Rheumatoid arthritis multiple sites including hands. Left knee scar. Left hip OA. Psychiatric: She has a normal mood and affect. Her behavior is normal.           LABS:   Lab Results   Component Value Date    WBC 4.2 10/15/2018    HGB 13.8 10/15/2018    HCT 41.2 10/15/2018    MCV 94 10/15/2018     10/15/2018     Results for orders placed or performed in visit on 09/13/18   Basic Metabolic Panel   Result Value Ref Range    Sodium 144 136 - 145 mmol/L    Potassium 4.3 3.5 - 5.0 mmol/L    Chloride 101 98 - 107 mmol/L    CO2 34 (H) 22 - 31 mmol/L    Anion Gap, Calculation 9 5 - 18 mmol/L    Glucose 96 70 - 125 mg/dL    Calcium 9.7 8.5 - 10.5 mg/dL    BUN 16 8 - 22 mg/dL    Creatinine 0.75 0.60 - 1.10 mg/dL    GFR MDRD Af Amer >60 >60 mL/min/1.73m2    GFR MDRD Non Af Amer >60 >60 mL/min/1.73m2       Lab Results   Component Value Date    ALT 16 10/15/2018    AST 16 05/15/2015    ALKPHOS 82 04/07/2015    BILITOT 0.3 04/07/2015         ASSESSMENT:      ICD-10-CM    1. Polyarthralgia M25.50    2. Physical debility R53.81    3. Seropositive rheumatoid arthritis of multiple sites (H) M05.79    4. History of DVT of lower extremity Z86.718        PLAN:    No further changes at this time.  Continue to manage and follow.  Once again does see a rheumatologist and her next pro time is December 12.      Electronically signed by: Michael Duane Johnson, CNP

## 2021-06-22 NOTE — PROGRESS NOTES
[facility]    Facility:   [facility] [[id number]]   Code Status: FULL CODE      CHIEF COMPLAINT/REASON FOR VISIT:  Chief Complaint   Patient presents with     Review Of Multiple Medical Conditions       HISTORY:      HPI: María Elena is a 53 y.o. female who again has a review of chronic medical conditions.  She is on warfarin and making adjustments to them and she is been pretty stable.  She does have chronic pain along with osteoarthritis to the major joints as well as chronic pain into her major joints including knees and shoulders also does have generalized physical debility does not ambulate she has worked with therapy.  She also does see rheumatology secondary rheumatoid arthritis and also has chronic pain.  For her chronic pain she did take a last Tylenol on December 10 she does take about 1 oxycodone as needed per day not requiring any ibuprofen as needed.  She has been normotensive and afebrile.  Regular diet level 4 with thin liquids.  She does visit with her rheumatologist.  She does pretty much isolated herself in her room.  Does not go all for any social activities.  Also a week ago we did go over her polst and she wants to stay full code.    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  RD reports that she is not willing to commit to the program outlined.  See scanned document.  12/15/2015 - Marcio Quinonez MD        Obesity - although she was not weighed today, she is clearly obese on inspection. 4/10/2015     LOPEZ (obstructive sleep  apnea) - abnormal overnight pulse oximetry 5/22/2015     Osteoarthritis      Peripheral vascular disease (H)      Pleural effusion      Pressure ulcer of foot      Rheumatoid arthritis (H) 12/30/2014    seronegative     Seropositive rheumatoid arthritis (H) 1/19/2016     Vitamin D deficiency 8/26/2015             Family History   Problem Relation Age of Onset     Multiple myeloma Father      Deep vein thrombosis Father      Cataracts Father      Snoring Father      Other Brother         Blood withdrawn from time to time.  Sounds like hemochromatosis.     Sleep apnea Brother      Cancer Mother         Uterine CA     Arthritis Mother      Cataracts Mother      Breast cancer Mother 54     No Medical Problems Sister      Rheum arthritis Maternal Grandmother      Breast cancer Maternal Grandmother 50     Heart disease Maternal Grandmother      Rheum arthritis Paternal Aunt      Breast cancer Maternal Aunt 54     Breast cancer Cousin      Clotting disorder Neg Hx      Social History     Socioeconomic History     Marital status: Single     Spouse name: Not on file     Number of children: Not on file     Years of education: Not on file     Highest education level: Not on file   Social Needs     Financial resource strain: Not on file     Food insecurity - worry: Not on file     Food insecurity - inability: Not on file     Transportation needs - medical: Not on file     Transportation needs - non-medical: Not on file   Occupational History     Not on file   Tobacco Use     Smoking status: Former Smoker     Packs/day: 1.00     Years: 30.00     Pack years: 30.00     Types: Cigarettes     Smokeless tobacco: Never Used   Substance and Sexual Activity     Alcohol use: Yes     Comment: 3-4 times a year she will have 1 or 2.     Drug use: No     Sexual activity: No     Partners: Female   Other Topics Concern     Not on file   Social History Narrative    Long term  At [facility] to rehab to level allowing surgery on deteriorated  joints  LTC12/2015         Review of Systems  Denies any URI symptoms postnasal drip fever chills fatigue flulike symptoms nausea vomiting diarrhea dysuria rashes stiff neck swollen glands. Does have bilateral DVT sleep apnea obesity edema vitamin D deficiency PAD rheumatoid arthritis multiple sites chronic pain.              Current Outpatient Medications   Medication Sig     adalimumab (HUMIRA) 40 mg/0.8 mL injection Inject 0.8 mL (40 mg total) under the skin every 14 (fourteen) days.     artificial tears,hypromellose, (GENTEAL) 0.3 % Drop Administer 1 drop to both eyes 4 (four) times a day as needed.     ascorbic acid, vitamin C, (ASCORBIC ACID WITH KAEL HIPS) 500 MG tablet Take 500 mg by mouth 2 (two) times a day.     cholecalciferol, vitamin D3, (VITAMIN D3) 2,000 unit Tab Take 6,000 Units by mouth daily.     HUMIRA PEN 40 mg/0.8 mL PnKt INJECT 0.8 ML (40 MG TOTAL) UNDER THE SKIN EVERY 14 DAYS     hydroCHLOROthiazide (HYDRODIURIL) 25 MG tablet Take 25 mg by mouth daily.     hydroxychloroquine (PLAQUENIL) 200 mg tablet Take 200 mg by mouth 2 (two) times a day.     ibuprofen (ADVIL,MOTRIN) 200 MG tablet Take 400 mg by mouth 3 (three) times a day as needed for pain.      methotrexate 2.5 MG tablet Take 4 tablets (10 mg total) by mouth once a week.     methotrexate 2.5 MG tablet Take 10 mg by mouth once a week. q Friday     multivitamin therapeutic (THERAGRAN) tablet Take 1 tablet by mouth daily.     nystatin (MYCOSTATIN) powder Apply 1 application topically 2 (two) times a day. Wash well between applications. Apply under breasts.     oxyCODONE (OXY-IR) 5 mg capsule Take 1 capsule (5 mg total) by mouth daily as needed.     senna-docusate (SENNOSIDES-DOCUSATE SODIUM) 8.6-50 mg tablet Take 1 tablet by mouth daily.     sulfaSALAzine (AZULFIDINE ENTAB) 500 MG EC tablet Take 1,500 mg by mouth 2 (two) times a day.     sulfaSALAzine (AZULFIDINE) 500 mg tablet Take 3 tablets (1,500 mg total) by mouth 2 (two) times a day.      WARFARIN SODIUM (WARFARIN ORAL) Take by mouth daily 11/15/18 INR 2.44 Cont 8mg M-W-F, 9mg AOD> Next INR 11/29 11/8/18 INR 3.37 Decrease to 8mg M-W-F, 9mg AOD. Next INR 11/15.  10/11/18 INR 2.29  Cont 8mg Mon and Thurs and 9mg AOD.  Next INR 11/8/18.    9/14/18 INR 2.39 Cont 8mg M & TH, 9mg AOD. Next INR 10/11..       .There were no vitals filed for this visit.  Blood pressure 112/70 pulse 72 respirations 20 weight 330 pounds  Physical Exam      L obese .  .   Cardiovascular: Normal rate, regular rhythm and normal heart sounds.   Chronic edema .   Pulmonary/Chest: Breath sounds normal.   inder   Abdominal.  Protuberant  Musculoskeletal:   Chronic pain. Chronic edema. Rheumatoid arthritis multiple sites including hands. Left knee scar. Left hip OA. Psychiatric: She has a normal mood and affect. Her behavior is normal.ABS:   Lab Results   Component Value Date    WBC 4.2 10/15/2018    HGB 13.8 10/15/2018    HCT 41.2 10/15/2018    MCV 94 10/15/2018     10/15/2018     No results found for this or any previous visit.      ASSESSMENT:      ICD-10-CM    1. Polyarthralgia M25.50    2. Primary osteoarthritis of right knee M17.11    3. Physical debility R53.81    4. Morbid obesity (H) E66.01        PLAN:    No further changes at this time we will adjust her warfarin as needed.  She also seen rheumatology as previously arranged.  She did not have any other questions.    Electronically signed by: Michael Duane Johnson, CNP

## 2021-06-23 NOTE — PROGRESS NOTES
[facility]    Facility:   [facility] [[id number]]   Code Status: FULL CODE    Recent medical history/chief concerns: Patient is seen by me for review of multiple medical issues.  At time of exam, patient has no chief concerns.  At the time of my exam, patient had no acute chief concern.  She did not make her last appointment with the rheumatologist due to transportation problems.  She feels her current medication management for treatment of her discomfort related to arthritis is adequate.  She has pain especially in her knees with ambulation.  This pain, along with her other areas of discomfort, continues to make assistance with her ADLs necessary, and she thinks she does still need the services provided in the long-term care facility, especially in the mornings and evenings.  She does not have any acute symptoms of illness at exam, though her most recent set of vital signs does show a slight temperature elevation and lower SPO2.  The set of vital signs before the most recent set showed a normal temperature and SPO2.  She denies having any problems with breathing at exam, and no recent fevers or chills.  No new problems with urination or bowel movements.  She showed me her hand digit small joint deformities related to her arthritis.  She talked to me about her prior problems with her left hip also.  We talked about her room color and how she redecorated when she moved into the facility.  I talked about her getting out for some activities but for now, she prefers to mostly stay in her room during the day.  She keeps busy on her laptop computer, and tablet.    Review of systems: See history of present illness, all others negative.     Current Outpatient Medications   Medication Sig Dispense Refill     artificial tears,hypromellose, (GENTEAL) 0.3 % Drop Administer 1 drop to both eyes 4 (four) times a day as needed.       ascorbic acid, vitamin C, (ASCORBIC ACID WITH KAEL  HIPS) 500 MG tablet Take 500 mg by mouth 2 (two) times a day.       cholecalciferol, vitamin D3, (VITAMIN D3) 2,000 unit Tab Take 6,000 Units by mouth daily.       folic acid (FOLVITE) 1 MG tablet Take 1 tablet (1 mg total) by mouth daily.  0     HUMIRA PEN 40 mg/0.8 mL PnKt INJECT 0.8 ML (40 MG TOTAL) UNDER THE SKIN EVERY 14 DAYS 1 kit 4     hydroCHLOROthiazide (HYDRODIURIL) 25 MG tablet Take 25 mg by mouth daily.       hydroxychloroquine (PLAQUENIL) 200 mg tablet Take 200 mg by mouth 2 (two) times a day.       ibuprofen (ADVIL,MOTRIN) 200 MG tablet Take 400 mg by mouth 3 (three) times a day as needed for pain.        methotrexate 2.5 MG tablet Take 4 tablets (10 mg total) by mouth once a week. 16 tablet 0     methotrexate 2.5 MG tablet Take 10 mg by mouth once a week. q Friday       multivitamin therapeutic (THERAGRAN) tablet Take 1 tablet by mouth daily.       nystatin (MYCOSTATIN) powder Apply 1 application topically 2 (two) times a day. Wash well between applications. Apply under breasts.       oxyCODONE (OXY-IR) 5 mg capsule Take 1-2 tablets every 4 hours orally as needed for pain relief.  Use 1 tablet for 1-5/10 pain, take 2 tablets for 6-10/10 pain.  0     senna-docusate (SENNOSIDES-DOCUSATE SODIUM) 8.6-50 mg tablet Take 1 tablet by mouth daily.       sulfaSALAzine (AZULFIDINE ENTAB) 500 MG EC tablet Take 1,500 mg by mouth 2 (two) times a day.       WARFARIN SODIUM (WARFARIN ORAL) Take by mouth daily. 1/11/19 INR 3.02  Take 9mg on Wed and Sun and 8mg AOD.  Next INR 2/7/19.    12/11/18 INR 2.91 Cont 9mg M-W-F, 8mg AOD.  11/29/18 INR 2.99 9mg M-W-F, 8mg AOD.  11/15/18 INR 2.44 Cont 8mg M-W-F, 9mg AOD> Next INR 11/29 11/8/18 INR 3.37 Decrease to 8mg M-W-F, 9mg AOD. Next INR 11/15.  10/11/18 INR 2.29  Cont 8mg Mon and Thurs and 9mg AOD.  Next INR 11/8/18.    9/14/18 INR 2.39 Cont 8mg M & TH, 9mg AOD. Next INR 10/11.             No current facility-administered medications for this visit.        Past Medical  History:   Diagnosis Date     Aseptic necrosis of femoral head (H)     LEFT     DJD (degenerative joint disease)      DVT, bilateral lower limbs (H) 10/2014     Edema - swelling of the legs after blood clots 5/22/2015     Essential hypertension with goal blood pressure less than 140/90      Failure to thrive      History of blood clots      Hypokalemia 10/15/2014     Lung mass 10/18/2014     Morbid obesity (H) 4/10/2015    Had formal nutrition consult at [facility].  RD reports that she is not willing to commit to the program outlined.  See scanned document.  12/15/2015 - Marcio Quinonez MD        Obesity - although she was not weighed today, she is clearly obese on inspection. 4/10/2015     LOPEZ (obstructive sleep apnea) - abnormal overnight pulse oximetry 5/22/2015     Osteoarthritis      Peripheral vascular disease (H)      Pleural effusion      Pressure ulcer of foot      Rheumatoid arthritis (H) 12/30/2014    seronegative     Seropositive rheumatoid arthritis (H) 1/19/2016     Vitamin D deficiency 8/26/2015      Past Surgical History:   Procedure Laterality Date     JOINT REPLACEMENT      knee     TOTAL HIP ARTHROPLASTY Left 10/18/2016    Procedure: LEFT HIP TOTAL ARTHROPLASTY;  Surgeon: London Goss MD;  Location: [facility];  Service:      TOTAL KNEE ARTHROPLASTY Left 2006      Social History     Socioeconomic History     Marital status: Single     Spouse name: Not on file     Number of children: Not on file     Years of education: Not on file     Highest education level: Not on file   Social Needs     Financial resource strain: Not on file     Food insecurity - worry: Not on file     Food insecurity - inability: Not on file     Transportation needs - medical: Not on file     Transportation needs - non-medical: Not on file   Occupational History     Not on file   Tobacco Use     Smoking status: Former Smoker     Packs/day: 1.00     Years: 30.00     Pack years: 30.00     Types: Cigarettes      Smokeless tobacco: Never Used   Substance and Sexual Activity     Alcohol use: Yes     Comment: 3-4 times a year she will have 1 or 2.     Drug use: No     Sexual activity: No     Partners: Female   Other Topics Concern     Not on file   Social History Narrative    Long term  At [facility] to rehab to [facility] allowing surgery on deteriorated joints  LTC12/2015       Social History     Tobacco Use   Smoking Status Former Smoker     Packs/day: 1.00     Years: 30.00     Pack years: 30.00     Types: Cigarettes   Smokeless Tobacco Never Used        Exam:   Vitals:    01/04/19 1450 01/20/19 1602   BP:  109/76   Pulse:  76   Resp:  20   Temp:  99.1  F (37.3  C)   SpO2:  92%   Weight: (!) 330 lb (149.7 kg)        EXAM:   General: Vital signs reviewed.  Patient is in no acute appearing distress.  Breathing appears nonlabored.  Patient is alert and oriented ×3.  ENT exam: No rhinorrhea or abnormal ear drainage.  No intraoral plaques, vesicles, or ulcers.  Eyes: No scleral discoloration, corneas are clear.  No mattering.  Neck: Supple  Heart: Heart rate is regular without murmur.  Lungs: Lungs are clear to auscultation with good airflow bilaterally.  Skin/extremities: Skin is warm and dry.  She had some lower extremity Velcro compression wraps on.  I partially removed the one on her left lower extremity, and removed her sock to examine an area of skin irritation on her left heel she has had recently.  This is healed up well, with no open skin areas, areas of discoloration, or areas of increased skin temperature.  Neuro: No acute focal abnormalities/deficits.  Psych: Patient had a very pleasant affect making good eye contact during exam.  Her speech was clear and fluent.  She stayed on conversation topics well.    Recent studies: Last INR on 01/11/2019 was 3.02, near ideal therapeutic range.    Approximately 25 minutes was spent on patient management, with greater than 50% of this time being spent on medication  reconciliation between electronic medical records, review of past medical history and current medical management, and discussion of management with patient and care staff.  Assessment/Plan   1. Morbid obesity (H)     2. Warfarin anticoagulation - long-term because of unresolved DVT that first appeared in October, 2014     3. Localized edema     4. Seropositive rheumatoid arthritis of multiple sites (H)     5. Vitamin D deficiency     6. Physical debility     7. Shingles outbreak  oxyCODONE (OXY-IR) 5 mg capsule       Patient Instructions   No change in current management needed for now.  Patient will try to see her rheumatologist again as soon as possible.  She seems to be doing well overall with her current living arrangement.     Luis Enrique Lindsey, DO

## 2021-06-23 NOTE — TELEPHONE ENCOUNTER
[facility] Nurse Triage Anticoagulation Note      Provider: DELPHINE Perry  Facility: [facility]    Facility Type: [facility]    Caller: Janisaac  Call Back Number:  [phone]    Reason for call: INR    Today s INR: 2.54  Previous INR: 1/11 3.02(9mg Wed and Sun and 8mg AOD), 12/11/18 2.91(9mg M-W-F and 8mg AOD)    Diagnosis/Goal: DVT  Heparin/Lovenox: No   Currently on ABX: No  Other interacting Medications: None  Missed or refused doses: No    Verbal Order/Direction given by Provider: Continue Warfarin 9mg Wed and Sun and 8mg all other days.  Next INR 3/7/19.      Provider giving order: DELPHINE Perry    Verbal order given to: Alysia Easton RN

## 2021-06-23 NOTE — TELEPHONE ENCOUNTER
[facility] Nurse Triage Anticoagulation Note      Provider: DELPHINE Perry  Facility: [facility]    Facility Type: [facility]    Caller: Corbin  Call Back Number:  [phone]    Reason for call: INR    Today s INR: 3.02  Previous INR: 12/27 2.21(9mg M-W-F and 8mg AOD), 12/20 2.27(9mg M-W-F and 8mg AOD)    Diagnosis/Goal: DVT  Heparin/Lovenox: No   Currently on ABX: No  Other interacting Medications: None  Missed or refused doses: No    Verbal Order/Direction given by Provider: Take Warfarin 9mg on Wed and Sun and 8mg all other days.  Next INR 2/7/19.      Provider giving order: DELPHINE Perry    Verbal order given to: Alysia Easton RN

## 2021-06-23 NOTE — TELEPHONE ENCOUNTER
[facility] Nurse Triage Telephone Note      Provider: DELPHINE Perry  Facility: [facility]    Facility Type: [facility]    Caller: Theresa      Allergies: Adhesive; Banana; Grape; Morphine (pf); Latex, natural rubber; and Other food allergy    Reason for call: Pt was to be drawn today for an INR and it was missed. Nursing was wondering if we can continue her normal dose and check an INR in the morning     Verbal Order/Direction given by Provider: OK for INR in am and give normal scheduled dose tonight    Provider giving order: DELPHINE Perry    Verbal order given to: Theresa Blue RN

## 2021-06-23 NOTE — PATIENT INSTRUCTIONS - HE
No change in current management needed for now.  Patient will try to see her rheumatologist again as soon as possible.  She seems to be doing well overall with her current living arrangement.

## 2021-06-24 ENCOUNTER — COMMUNICATION - HEALTHEAST (OUTPATIENT)
Dept: GERIATRICS | Facility: CLINIC | Age: 56
End: 2021-06-24

## 2021-06-24 NOTE — TELEPHONE ENCOUNTER
[facility] Nurse Triage Anticoagulation Note      Provider: DELPHINE Perry  Facility: [facility]    Facility Type: [facility]    Caller: Liudmila  Call Back Number:  [phone]    Reason for call: INR    Today s INR:   Missed draw  Previous INR: 2/7/19 INR 2.54 9mg Wed & Sun, 8mg AOD.    Diagnosis/Goal: DVT  Heparin/Lovenox: No   Currently on ABX: No  Other interacting Medications: None  Missed or refused doses: No    Verbal Order/Direction given by Provider:  Give 8mg tonight per normal schedule, check INR in am.    Provider giving order: DELPHINE Perry    Verbal order given to: Liudmila Worthy RN

## 2021-06-24 NOTE — PROGRESS NOTES
[facility]    Facility:   [facility] [[id number]]   Code Status: FULL CODE      CHIEF COMPLAINT/REASON FOR VISIT:  Chief Complaint   Patient presents with     Review Of Multiple Medical Conditions       HISTORY:      HPI: María Elena is a 53 y.o. female who was seen secondary to monthly review of chronic medical conditions.  She apparently missed her rheumatology visit due to transport issues in February she does have a visit coming up again in April.  For her chronic pain he does have ibuprofen as needed has not taken any.  Otherwise can have Tylenol as needed and her last dose was on March 1.  She otherwise is on methotrexate sulfasalazine along with Humira and also warfarin.  She otherwise has been normotensive and afebrile and also being treated.  She usually keeps to herself.  Does not participate in group activities.  She did not have any other questions.    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.  RD reports that she is not willing to commit to the program outlined.  See scanned document.  12/15/2015 - Marcio Quinonez MD        Obesity - although she was not weighed today, she is clearly obese on inspection. 4/10/2015     LOPEZ (obstructive sleep apnea) - abnormal overnight pulse oximetry 5/22/2015     Osteoarthritis      Peripheral vascular disease (H)      Pleural effusion      Pressure ulcer of foot      Rheumatoid arthritis (H) 12/30/2014    seronegative     Seropositive rheumatoid arthritis (H) 1/19/2016     Vitamin D deficiency  8/26/2015             Family History   Problem Relation Age of Onset     Multiple myeloma Father      Deep vein thrombosis Father      Cataracts Father      Snoring Father      Other Brother         Blood withdrawn from time to time.  Sounds like hemochromatosis.     Sleep apnea Brother      Cancer Mother         Uterine CA     Arthritis Mother      Cataracts Mother      Breast cancer Mother 54     No Medical Problems Sister      Rheum arthritis Maternal Grandmother      Breast cancer Maternal Grandmother 50     Heart disease Maternal Grandmother      Rheum arthritis Paternal Aunt      Breast cancer Maternal Aunt 54     Breast cancer Cousin      Clotting disorder Neg Hx      Social History     Socioeconomic History     Marital status: Single     Spouse name: Not on file     Number of children: Not on file     Years of education: Not on file     Highest education level: Not on file   Occupational History     Not on file   Social Needs     Financial resource strain: Not on file     Food insecurity:     Worry: Not on file     Inability: Not on file     Transportation needs:     Medical: Not on file     Non-medical: Not on file   Tobacco Use     Smoking status: Former Smoker     Packs/day: 1.00     Years: 30.00     Pack years: 30.00     Types: Cigarettes     Smokeless tobacco: Never Used   Substance and Sexual Activity     Alcohol use: Yes     Comment: 3-4 times a year she will have 1 or 2.     Drug use: No     Sexual activity: No     Partners: Female   Lifestyle     Physical activity:     Days per week: Not on file     Minutes per session: Not on file     Stress: Not on file   Relationships     Social connections:     Talks on phone: Not on file     Gets together: Not on file     Attends Anabaptism service: Not on file     Active member of club or organization: Not on file     Attends meetings of clubs or organizations: Not on file     Relationship status: Not on file     Intimate partner violence:     Fear of current  or ex partner: Not on file     Emotionally abused: Not on file     Physically abused: Not on file     Forced sexual activity: Not on file   Other Topics Concern     Not on file   Social History Narrative    Long term  At [facility] to rehab to level allowing surgery on deteriorated joints  LTC12/2015         Review of Systems  Denies any URI symptoms postnasal drip fever chills fatigue flulike symptoms nausea vomiting diarrhea dysuria rashes stiff neck swollen glands. Does have bilateral DVT sleep apnea obesity edema vitamin D deficiency PAD rheumatoid arthritis multiple sites chronic pain.  .There were no vitals filed for this visit.  Vital signs on March 4 blood pressure 136/76 pulse 70 respirations 18 temperature 97.6  Physical Exam  Cardiovascular: Normal rate, regular rhythm and normal heart sounds.   Chronic edema .   Pulmonary/Chest: Breath sounds normal.   inder   Abdominal.  Protuberant  Musculoskeletal:   Chronic pain. Chronic edema. Rheumatoid arthritis multiple sites including hands. Left knee scar. Left hip OA. Psychiatric: She has a normal mood and affect. Her behavior is normal.    LABS:   .  Lab Results   Component Value Date    WBC 4.2 10/15/2018    HGB 13.8 10/15/2018    HCT 41.2 10/15/2018    MCV 94 10/15/2018     10/15/2018     Lab Results   Component Value Date    ALT 16 10/15/2018    AST 16 05/15/2015    ALKPHOS 82 04/07/2015    BILITOT 0.3 04/07/2015     .  Results for orders placed or performed in visit on 09/13/18   Basic Metabolic Panel   Result Value Ref Range    Sodium 144 136 - 145 mmol/L    Potassium 4.3 3.5 - 5.0 mmol/L    Chloride 101 98 - 107 mmol/L    CO2 34 (H) 22 - 31 mmol/L    Anion Gap, Calculation 9 5 - 18 mmol/L    Glucose 96 70 - 125 mg/dL    Calcium 9.7 8.5 - 10.5 mg/dL    BUN 16 8 - 22 mg/dL    Creatinine 0.75 0.60 - 1.10 mg/dL    GFR MDRD Af Amer >60 >60 mL/min/1.73m2    GFR MDRD Non Af Amer >60 >60 mL/min/1.73m2         ASSESSMENT:      ICD-10-CM    1.  Polyarthralgia M25.50    2. Pain management R52    3. Primary osteoarthritis of left hip M16.12        PLAN:    No further changes at this time.  She does see rheumatology they do her normal labs.  Apparently did miss in February so she will try to get there in April.  She did not have any other issues.      Electronically signed by: Michael Duane Johnson, CNP

## 2021-06-24 NOTE — TELEPHONE ENCOUNTER
[facility] Nurse Triage Anticoagulation Note      Provider: DELPHINE Perry  Facility: [facility]    Facility Type: [facility]    Caller: Silas  Call Back Number:  [phone]    Reason for call: INR    Today s INR: yesterday's INR 2.22  Previous INR: 2/7 2.54(9mg Wed and Sun and 8mg AOD), 1/11 3.02(9mg Wed and Sun and 8mg AOD), 12/11/18 INR 2.91(9mg M-W-F and 8mg AOD).      Diagnosis/Goal: DVT  Heparin/Lovenox: No   Currently on ABX: No  Other interacting Medications: None  Missed or refused doses: No    Verbal Order/Direction given by Provider: Continue Warfarin 9mg Wed and Sun and 8mg all other days.  Next INR 4/4/19.      Provider giving order: DELPHINE Perry    Verbal order given to: Rozina Easton RN

## 2021-06-25 NOTE — TELEPHONE ENCOUNTER
[facility] Nurse Triage Telephone Note      Provider: DELPHINE Perry  Facility: [facility]    Facility Type: [facility]    Caller: Codie  Call Back Number:  [phone]    Allergies: Adhesive; Banana; Grape; Morphine (pf); Pseudoephedrine cold/allergy [chlorpheniramine-pseudoephed]; Latex, natural rubber; and Other food allergy    Reason for call: Nurse calling to report that patient has swelling/redness/warmth/pain to her right foot that goes up to midshin.  Nurse states that patient's bed was soaked with sweat and also c/o chills.  No fever noted, however patient did take Ibuprofen around 0300.  Of note, patient had a chronic RLE DVT.  Currently on Xarelto 10mg daily, because she was non-compliant with INR's while on Warfarin.  Patient does wear Juxtasocks.      Addendum:  Ultrasound of the RLE is negative for DVT.       Verbal Order/Direction given by Provider: STAT Doppler of the right LE.  Start Doxycycline 100mg two times a day x 10 days for cellulitis.  Continue Juxtasocks as ordered.      Provider giving order: DELPHINE Perry    Verbal order given to: Codie Easton RN

## 2021-06-25 NOTE — PROGRESS NOTES
[facility]    Facility:   [facility] [[id number]]   Code Status: FULL CODE      CHIEF COMPLAINT/REASON FOR VISIT:  Chief Complaint   Patient presents with     [facility] Care Coordination - Regulatory       HISTORY:      HPI: María Elena is a 55 y.o. female who is being seen secondary to regulatory visit of her chronic medical conditions and medication management.  She does see rheumatology and her next visit is scheduled for early June. She is not having any increased pain or flareup. Regarding her as needed medications she has not required any oxycodone and no Tylenol she did take an ibuprofen last on May 24 and prior to that May 20. Her appetite is good. She has been normotensive and afebrile and also on room air. Does need full assistance with all ADLs. She does have lymphedema and leg wraps. Seems to be in pretty good spirits today. 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  RD reports that she is not willing to commit to the program outlined.  See scanned document.  12/15/2015 - Marcio Quinonez MD        Obesity - although she was not weighed today, she is clearly obese on inspection. 4/10/2015     LOPEZ (obstructive sleep apnea) - abnormal overnight pulse oximetry 5/22/2015     Osteoarthritis      Peripheral vascular disease (H)      Pleural effusion      Pressure ulcer of  foot      Rheumatoid arthritis (H) 12/30/2014    seronegative     Sepsis (H)      Seropositive rheumatoid arthritis (H) 1/19/2016     Vitamin D deficiency 8/26/2015             Family History   Problem Relation Age of Onset     Multiple myeloma Father      Deep vein thrombosis Father      Cataracts Father      Snoring Father      Other Brother         Blood withdrawn from time to time.  Sounds like hemochromatosis.     Sleep apnea Brother      Cancer Mother         Uterine CA     Arthritis Mother      Cataracts Mother      Breast cancer Mother 54     No Medical Problems Sister      Rheum arthritis Maternal Grandmother      Breast cancer Maternal Grandmother 50     Heart disease Maternal Grandmother      Rheum arthritis Paternal Aunt      Breast cancer Maternal Aunt 54     Breast cancer Cousin      Clotting disorder Neg Hx      Social History     Socioeconomic History     Marital status: Single     Spouse name: Not on file     Number of children: Not on file     Years of education: Not on file     Highest education level: Not on file   Occupational History     Not on file   Social Needs     Financial resource strain: Not on file     Food insecurity     Worry: Not on file     Inability: Not on file     Transportation needs     Medical: Not on file     Non-medical: Not on file   Tobacco Use     Smoking status: Former Smoker     Packs/day: 1.00     Years: 30.00     Pack years: 30.00     Types: Cigarettes     Smokeless tobacco: Never Used   Substance and Sexual Activity     Alcohol use: Yes     Comment: 3-4 times a year she will have 1 or 2.     Drug use: No     Sexual activity: Never     Partners: Female   Lifestyle     Physical activity     Days per week: Not on file     Minutes per session: Not on file     Stress: Not on file   Relationships     Social connections     Talks on phone: Not on file     Gets together: Not on file     Attends [employer]an service: Not on file     Active member of club or organization: Not on  file     Attends meetings of clubs or organizations: Not on file     Relationship status: Not on file     Intimate partner violence     Fear of current or ex partner: Not on file     Emotionally abused: Not on file     Physically abused: Not on file     Forced sexual activity: Not on file   Other Topics Concern     Not on file   Social History Narrative    Long term  At [facility] to rehab to level allowing surgery on deteriorated joints  LTC12/2015         Review of Systems  No reports of any URI symptoms postnasal drip fever chills fatigue flulike symptoms nausea vomiting diarrhea dysuria rashes stiff neck swollen glands. Does have bilateral DVT sleep apnea obesity edema vitamin D deficiency PAD rheumatoid arthritis multiple sites chronic pain.    Current Outpatient Medications   Medication Sig Note     acetaminophen (TYLENOL) 325 MG tablet Take 650 mg by mouth every 6 (six) hours as needed for pain.             adalimumab (HUMIRA) 40 mg/0.8 mL injection Inject 0.8 mL (40 mg total) under the skin every 14 (fourteen) days.      ascorbic acid, vitamin C, (ASCORBIC ACID WITH KAEL HIPS) 500 MG tablet Take 500 mg by mouth 2 (two) times a day.      cholecalciferol, vitamin D3, (VITAMIN D3) 2,000 unit Tab Take 6,000 Units by mouth daily.      DULoxetine (CYMBALTA) 20 MG capsule Take 1 capsule (20 mg total) by mouth daily. 6/24/2020: Currently takeing     folic acid (FOLVITE) 1 MG tablet Take 1 tablet (1 mg total) by mouth daily. 6/24/2020: Currently taking     HUMIRA PEN 40 mg/0.8 mL PnKt INJECT 0.8 ML (40 MG) UNDER THE SKIN EVERY 14 DAYS      hydroCHLOROthiazide (HYDRODIURIL) 25 MG tablet Take 25 mg by mouth daily.      ibuprofen (ADVIL,MOTRIN) 200 MG tablet Take 400 mg by mouth every 8 (eight) hours as needed for pain.             lanolin alcohol-mo-w.pet-ceres (EUCERIN) Crea Apply topically 2 (two) times a day. To feet      methotrexate 10 MG tablet Take 1 tablet (10 mg total) by mouth once a week.       methotrexate 2.5 MG tablet Take 4 tablets (10 mg total) by mouth once a week.      multivitamin therapeutic (THERAGRAN) tablet Take 1 tablet by mouth daily.      nystatin (MYCOSTATIN) powder Apply to rash areas twice daily.      oxyCODONE (OXY-IR) 5 mg capsule Take 1-2 tablets every 4 hours orally as needed for pain relief.  Use 1 tablet for 1-5/10 pain, take 2 tablets for 6-10/10 pain.      rivaroxaban ANTICOAGULANT (XARELTO) 10 mg tablet Take 10 mg by mouth daily.        There were no vitals filed for this visit.  Blood pressure 138/67, pulse 72, respirations 18 and temperature 98.3  Physical Exam  Head is normocephalic.  Cardiovascular: Normal rate, regular rhythm and normal heart sounds.    Chronic edema .  Catie wraps.  Pulmonary/Chest: Breath sounds normal.   inder   Abdominal.  Protuberant  Musculoskeletal:   Chronic pain. Chronic edema. Rheumatoid arthritis multiple sites including hands. Left knee scar. Left hip OA. Psychiatric: She has a normal mood and affect. Her behavior is normal.           LABS:   Lab Results   Component Value Date    WBC 5.1 04/20/2021    HGB 13.9 04/20/2021    HCT 42.6 04/20/2021    MCV 94 04/20/2021     04/20/2021     Results for orders placed or performed in visit on 09/13/18   Basic Metabolic Panel   Result Value Ref Range    Sodium 144 136 - 145 mmol/L    Potassium 4.3 3.5 - 5.0 mmol/L    Chloride 101 98 - 107 mmol/L    CO2 34 (H) 22 - 31 mmol/L    Anion Gap, Calculation 9 5 - 18 mmol/L    Glucose 96 70 - 125 mg/dL    Calcium 9.7 8.5 - 10.5 mg/dL    BUN 16 8 - 22 mg/dL    Creatinine 0.75 0.60 - 1.10 mg/dL    GFR MDRD Af Amer >60 >60 mL/min/1.73m2    GFR MDRD Non Af Amer >60 >60 mL/min/1.73m2       Lab Results   Component Value Date    ALT 18 04/20/2021    AST 23 07/25/2019    ALKPHOS 69 07/25/2019    BILITOT 0.3 07/25/2019       Case Management:  I have reviewed the facility/SNF care plan/MDS which was done Today, including the falls risk, nutrition and pain screening. I  also reviewed the current immunizations, and preventive care.. Future cancer screening indicated is Mammograms and provider and pt will formulate a POC.   Patient's desire to return to the community is present, but is not able due to care needs .    Information reviewed:  Medications, vital signs, orders, and nursing notes.    ASSESSMENT:      ICD-10-CM    1. Seropositive rheumatoid arthritis of multiple sites (H)  M05.79    2. Physical debility  R53.81    3. Polyarthralgia  M25.50    4. Primary osteoarthritis involving multiple joints  M89.49        PLAN:    She will follow up with rheumatology as previously arranged. They also do her laboratory studies. She seems to be doing well at this time. Staff did not have any other concerns.      Electronically signed by: Michael Duane Johnson, CNP

## 2021-06-26 NOTE — TELEPHONE ENCOUNTER
[facility] Nurse Triage Telephone Note      Provider: DELPHINE Perry  Facility: [facility]    Facility Type: [facility]    Caller: Tracy  Call Back Number:  [phone]    Allergies: Adhesive; Banana; Grape; Morphine (pf); Pseudoephedrine cold/allergy [chlorpheniramine-pseudoephed]; Latex, natural rubber; and Other food allergy    Reason for call: Weeks ago pt treated with Doxy for cellulitis R ft/ankle, doppler neg for DVT. Then T: 110.5 6/15 & treated with Keflex, finished that yesterday.  Today her R foot & lower leg has redness again & looks like tiny blisters under the skin, but no actual blisters or weeping. Afeb. Pt on Xarelto. She is very large, stays in bed a lot, incontinent of urine.     Verbal Order/Direction given by Provider: Send to Vascular clinic for testing & eval of legs & circulation. Think it is Stasis dermatitis not cellulitis.  Bath 3 times per week with 2 Tbsp dreft in water.     Provider giving order: DELPHINE Perry    Verbal order given to: Tracy Worthy RN

## 2021-06-29 ENCOUNTER — COMMUNICATION - HEALTHEAST (OUTPATIENT)
Dept: VASCULAR SURGERY | Facility: CLINIC | Age: 56
End: 2021-06-29

## 2021-07-01 ENCOUNTER — COMMUNICATION - HEALTHEAST (OUTPATIENT)
Dept: RHEUMATOLOGY | Facility: CLINIC | Age: 56
End: 2021-07-01

## 2021-07-04 NOTE — TELEPHONE ENCOUNTER
Telephone Encounter by Desi Bee at 7/2/2021  9:48 AM     Author: Desi Bee Service: -- Author Type: Financial Resource Guide    Filed: 7/2/2021  9:56 AM Encounter Date: 7/1/2021 Status: Signed    : Desi Bee (Financial Resource Guide)       Prior Authorization Approval  Medication: Humira 40mg/0.8ml  Qty: 2 pens for 28 days  Effective Dates: 7/1/21 - 7/1/22  Reference Number: NA  Insurance Company: [employer] (express scripts)  Pharmacy: [facility]  Expected Copay: not covered a this location  Copay Card Available: not elg  Foundation Assistance Needed/Available: na  Patient Assistance Program Needed: na  Patient Notified? Yes  Pharmacy Notified? Yes

## 2021-07-04 NOTE — LETTER
Letter by Johnson, Michael Duane, CNP at      Author: Johnson, Michael Duane, CNP Service: -- Author Type: --    Filed:  Encounter Date: 5/26/2021 Status: (Other)         Patient: María Elena Saab   MR Number: [id number]   YOB: 1965   Date of Visit: 5/26/2021     [facility]    Facility:   [facility] [[id number]]   Code Status: FULL CODE      CHIEF COMPLAINT/REASON FOR VISIT:  Chief Complaint   Patient presents with   ? FVP Care Coordination - Regulatory       HISTORY:      HPI: María Elena is a 55 y.o. female who is being seen secondary to regulatory visit of her chronic medical conditions and medication management.  She does see rheumatology and her next visit is scheduled for early June. She is not having any increased pain or flareup. Regarding her as needed medications she has not required any oxycodone and no Tylenol she did take an ibuprofen last on May 24 and prior to that May 20. Her appetite is good. She has been normotensive and afebrile and also on room air. Does need full assistance with all ADLs. She does have lymphedema and leg wraps. Seems to be in pretty good spirits today. She was telling me about some of her TV shows that she likes to watch was Hilda    Past Medical History:   Diagnosis Date   ? 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 Bacteremia due to group B Streptococcus    ? Cellulitis of right lower extremity    ? DJD (degenerative joint disease)    ? DVT, bilateral lower limbs (H) 10/2014   ? Edema - swelling of the legs after blood clots 5/22/2015   ? Essential hypertension with goal blood pressure less than 140/90    ? Failure to thrive    ? History of blood clots    ? Hypokalemia 10/15/2014   ? Lung mass 10/18/2014   ? Morbid obesity (H) 4/10/2015    Had formal nutrition consult at [facility].  RD reports that she is not willing to commit to the program outlined.  See scanned document.  12/15/2015 - Marcio Quinonez MD       ? Obesity - although she was not weighed today, she is clearly obese on inspection. 4/10/2015   ? LOPEZ (obstructive sleep apnea) - abnormal overnight pulse oximetry 5/22/2015   ? Osteoarthritis    ? Peripheral vascular disease (H)    ? Pleural effusion    ? Pressure ulcer of foot    ? Rheumatoid arthritis (H) 12/30/2014    seronegative   ? Sepsis (H)    ? Seropositive rheumatoid arthritis (H) 1/19/2016   ? Vitamin D deficiency 8/26/2015             Family History   Problem Relation Age of Onset   ? Multiple myeloma Father    ? Deep vein thrombosis Father    ? Cataracts Father    ? Snoring Father    ? Other Brother         Blood withdrawn from time to time.  Sounds like hemochromatosis.   ? Sleep apnea Brother    ? Cancer Mother         Uterine CA   ? Arthritis Mother    ? Cataracts Mother    ? Breast cancer Mother 54   ? No Medical Problems Sister    ? Rheum arthritis Maternal Grandmother    ? Breast cancer Maternal Grandmother 50   ? Heart disease Maternal Grandmother    ? Rheum arthritis Paternal Aunt    ? Breast cancer Maternal Aunt 54   ? Breast cancer Cousin    ? Clotting disorder Neg Hx      Social History     Socioeconomic History   ? Marital status: Single     Spouse name: Not on file   ? Number of children: Not on file   ? Years of education: Not on file   ? Highest education level: Not on file   Occupational History   ? Not on file   Social Needs   ? Financial resource strain: Not on file   ? Food insecurity     Worry: Not on file     Inability: Not on file   ? Transportation needs     Medical: Not on file     Non-medical: Not on file   Tobacco Use   ? Smoking status: Former Smoker     Packs/day: 1.00     Years: 30.00     Pack years: 30.00     Types: Cigarettes   ? Smokeless tobacco: Never Used   Substance and Sexual Activity   ? Alcohol use: Yes     Comment: 3-4 times a year she will have 1 or 2.   ? Drug use: No   ? Sexual activity: Never     Partners: Female   Lifestyle   ? Physical activity     Days  per week: Not on file     Minutes per session: Not on file   ? Stress: Not on file   Relationships   ? Social connections     Talks on phone: Not on file     Gets together: Not on file     Attends Religion service: Not on file     Active member of club or organization: Not on file     Attends meetings of clubs or organizations: Not on file     Relationship status: Not on file   ? Intimate partner violence     Fear of current or ex partner: Not on file     Emotionally abused: Not on file     Physically abused: Not on file     Forced sexual activity: Not on file   Other Topics Concern   ? Not on file   Social History Narrative    [facility] term  At [facility] to rehab to [facility] allowing surgery on deteriorated joints  LTC12/2015         Review of Systems  No reports of any URI symptoms postnasal drip fever chills fatigue flulike symptoms nausea vomiting diarrhea dysuria rashes stiff neck swollen glands. Does have bilateral DVT sleep apnea obesity edema vitamin D deficiency PAD rheumatoid arthritis multiple sites chronic pain.    Current Outpatient Medications   Medication Sig Note   ? acetaminophen (TYLENOL) 325 MG tablet Take 650 mg by mouth every 6 (six) hours as needed for pain.           ? adalimumab (HUMIRA) 40 mg/0.8 mL injection Inject 0.8 mL (40 mg total) under the skin every 14 (fourteen) days.    ? ascorbic acid, vitamin C, (ASCORBIC ACID WITH KAEL HIPS) 500 MG tablet Take 500 mg by mouth 2 (two) times a day.    ? cholecalciferol, vitamin D3, (VITAMIN D3) 2,000 unit Tab Take 6,000 Units by mouth daily.    ? DULoxetine (CYMBALTA) 20 MG capsule Take 1 capsule (20 mg total) by mouth daily. 6/24/2020: Currently takeing   ? folic acid (FOLVITE) 1 MG tablet Take 1 tablet (1 mg total) by mouth daily. 6/24/2020: Currently taking   ? HUMIRA PEN 40 mg/0.8 mL PnKt INJECT 0.8 ML (40 MG) UNDER THE SKIN EVERY 14 DAYS    ? hydroCHLOROthiazide (HYDRODIURIL) 25 MG tablet Take 25 mg by mouth daily.    ? ibuprofen  (ADVIL,MOTRIN) 200 MG tablet Take 400 mg by mouth every 8 (eight) hours as needed for pain.           ? lanolin alcohol-mo-w.pet-ceres (EUCERIN) Crea Apply topically 2 (two) times a day. To feet    ? methotrexate 10 MG tablet Take 1 tablet (10 mg total) by mouth once a week.    ? methotrexate 2.5 MG tablet Take 4 tablets (10 mg total) by mouth once a week.    ? multivitamin therapeutic (THERAGRAN) tablet Take 1 tablet by mouth daily.    ? nystatin (MYCOSTATIN) powder Apply to rash areas twice daily.    ? oxyCODONE (OXY-IR) 5 mg capsule Take 1-2 tablets every 4 hours orally as needed for pain relief.  Use 1 tablet for 1-5/10 pain, take 2 tablets for 6-10/10 pain.    ? rivaroxaban ANTICOAGULANT (XARELTO) 10 mg tablet Take 10 mg by mouth daily.        There were no vitals filed for this visit.  Blood pressure 138/67, pulse 72, respirations 18 and temperature 98.3  Physical Exam  Head is normocephalic.  Cardiovascular: Normal rate, regular rhythm and normal heart sounds.    Chronic edema .  Catie wraps.  Pulmonary/Chest: Breath sounds normal.   inder   Abdominal.  Protuberant  Musculoskeletal:   Chronic pain. Chronic edema. Rheumatoid arthritis multiple sites including hands. Left knee scar. Left hip OA. Psychiatric: She has a normal mood and affect. Her behavior is normal.           LABS:   Lab Results   Component Value Date    WBC 5.1 04/20/2021    HGB 13.9 04/20/2021    HCT 42.6 04/20/2021    MCV 94 04/20/2021     04/20/2021     Results for orders placed or performed in visit on 09/13/18   Basic Metabolic Panel   Result Value Ref Range    Sodium 144 136 - 145 mmol/L    Potassium 4.3 3.5 - 5.0 mmol/L    Chloride 101 98 - 107 mmol/L    CO2 34 (H) 22 - 31 mmol/L    Anion Gap, Calculation 9 5 - 18 mmol/L    Glucose 96 70 - 125 mg/dL    Calcium 9.7 8.5 - 10.5 mg/dL    BUN 16 8 - 22 mg/dL    Creatinine 0.75 0.60 - 1.10 mg/dL    GFR MDRD Af Amer >60 >60 mL/min/1.73m2    GFR MDRD Non Af Amer >60 >60 mL/min/1.73m2        Lab Results   Component Value Date    ALT 18 04/20/2021    AST 23 07/25/2019    ALKPHOS 69 07/25/2019    BILITOT 0.3 07/25/2019       Case Management:  I have reviewed the facility/SNF care plan/MDS which was done Today, including the falls risk, nutrition and pain screening. I also reviewed the current immunizations, and preventive care.. Future cancer screening indicated is Mammograms and provider and pt will formulate a POC.   Patient's desire to return to the community is present, but is not able due to care needs .    Information reviewed:  Medications, vital signs, orders, and nursing notes.    ASSESSMENT:      ICD-10-CM    1. Seropositive rheumatoid arthritis of multiple sites (H)  M05.79    2. Physical debility  R53.81    3. Polyarthralgia  M25.50    4. Primary osteoarthritis involving multiple joints  M89.49        PLAN:    She will follow up with rheumatology as previously arranged. They also do her laboratory studies. She seems to be doing well at this time. Staff did not have any other concerns.      Electronically signed by: Michael Duane Johnson, CNP

## 2021-07-04 NOTE — TELEPHONE ENCOUNTER
Telephone Encounter by Desi Bee at 7/1/2021  1:46 PM     Author: Desi Bee Service: -- Author Type: Financial Resource Guide    Filed: 7/1/2021  1:48 PM Encounter Date: 7/1/2021 Status: Signed    : Desi Bee (Financial Resource Guide)       PA Initiation  Medication: Humira 40mg/0.8ml  QTY: 2 pens for 28 days  Insurance Company: [employer] (express scripts)  Pharmacy Filing Rx: [facility]  Filling Pharmacy Phone: na  Filling Pharmacy Fax: na  Start Date: 7/1/21  Additional Information: proactive renewal

## 2021-07-04 NOTE — TELEPHONE ENCOUNTER
Huseyinr called [facility] back to schedule an apt and ultrasound.  Writer was asked to call back later because the RN was away form the desk.  Call back number [phone]

## 2021-07-04 NOTE — TELEPHONE ENCOUNTER
----- Message from Edmund Duque V RN sent at 6/29/2021 12:52 PM CDT -----  Regarding: FW: Appt  Normally it's best if NPs see patients >60yrs old. Ok to see NP for now with venous insuff. But also arrange an appt with SR/FJ so it can be turfed to them if NP feels that is needed.     Thanks.     ----- Message -----  From: Marjorie Salas  Sent: 6/29/2021   8:23 AM CDT  To: [facility] Support Pool  Subject: Appt                                             FV MRN [id number]  [facility] calling to schedule an appt for pt for chronic cellulitis. Pts legs frequently get red, swollen, and irritated, just finishing abx. OK to schedule w/ NP and studies needed?

## 2021-07-22 ENCOUNTER — TELEPHONE (OUTPATIENT)
Dept: RHEUMATOLOGY | Facility: CLINIC | Age: 56
End: 2021-07-22

## 2021-07-22 NOTE — TELEPHONE ENCOUNTER
[facility] is calling to see if a PA that was faxed on 7/19 by Cover my Meds has been received. Key code for cover my meds [id number]

## 2021-07-23 ENCOUNTER — TELEPHONE (OUTPATIENT)
Dept: RHEUMATOLOGY | Facility: CLINIC | Age: 56
End: 2021-07-23

## 2021-07-23 NOTE — TELEPHONE ENCOUNTER
Prior Authorization Approval    Authorization Effective Date: 6/23/2021  Authorization Expiration Date: 7/22/2024  Medication: Humira 40mg/0.8ml Pen  Approved Dose/Quantity: 2 pens for 28 days  Reference #: [id number]   Insurance Company: [employer] - Phone [phone] Fax [phone]  Expected CoPay: not covered at this location     CoPay Card Available: No    Foundation Assistance Needed: No  Which Pharmacy is filling the prescription (Not needed for infusion/clinic administered): [address]  Pharmacy Notified: Yes  Patient Notified: Yes

## 2021-07-23 NOTE — TELEPHONE ENCOUNTER
PA Initiation    Medication: Humira 40mg/0.8ml Pen  Insurance Company: [employer] - Phone [phone] Fax [phone]  Pharmacy Filling the Rx: [facility] - [address] [place] [address]  Filling Pharmacy Phone:    Filling Pharmacy Fax:    Start Date: 7/23/2021

## 2021-07-28 ENCOUNTER — TELEPHONE (OUTPATIENT)
Dept: VASCULAR SURGERY | Facility: CLINIC | Age: 56
End: 2021-07-28

## 2021-07-28 DIAGNOSIS — M79.89 LEG SWELLING: Primary | ICD-10-CM

## 2021-07-28 NOTE — TELEPHONE ENCOUNTER
Nina Nicole    6/29/21 1:19 PM  Note     Writer called [facility] back to schedule an apt and ultrasound.  Writer was asked to call back later because the RN was away form the desk.  Call back number [phone]         Jose Miguelobdulia Nina DUMONT    6/29/21 1:15 PM  Note     ----- Message from Edmund CHAMORRO RN sent at 6/29/2021 12:52 PM CDT -----  Regarding: FW: Appt  Normally it's best if NPs see patients >60yrs old. Ok to see NP for now with venous insuff. But also arrange an appt with SR/FJ so it can be turfed to them if NP feels that is needed.      Thanks.      ----- Message -----  From: Marjorie Salas  Sent: 6/29/2021   8:23 AM CDT  To: [facility] Support Pool  Subject: Appt                                              FV MRN [id number]  [facility]ait[facility] calling to schedule an appt for pt for chronic cellulitis. Pts legs frequently get red, swollen, and irritated, just finishing abx. OK to schedule w/ NP and studies needed?

## 2021-08-11 ENCOUNTER — ANCILLARY PROCEDURE (OUTPATIENT)
Dept: VASCULAR ULTRASOUND | Facility: CLINIC | Age: 56
End: 2021-08-11
Attending: NURSE PRACTITIONER
Payer: COMMERCIAL

## 2021-08-11 ENCOUNTER — OFFICE VISIT (OUTPATIENT)
Dept: VASCULAR SURGERY | Facility: CLINIC | Age: 56
End: 2021-08-11
Attending: NURSE PRACTITIONER
Payer: COMMERCIAL

## 2021-08-11 VITALS
BODY MASS INDEX: 47.09 KG/M2 | RESPIRATION RATE: 16 BRPM | HEIGHT: 66 IN | HEART RATE: 72 BPM | WEIGHT: 293 LBS | TEMPERATURE: 97.7 F

## 2021-08-11 DIAGNOSIS — M79.89 LEG SWELLING: ICD-10-CM

## 2021-08-11 DIAGNOSIS — R60.9 DEPENDENT EDEMA: ICD-10-CM

## 2021-08-11 DIAGNOSIS — D84.9 IMMUNOSUPPRESSION (H): ICD-10-CM

## 2021-08-11 DIAGNOSIS — I89.0 LYMPHEDEMA OF BOTH LOWER EXTREMITIES: Primary | ICD-10-CM

## 2021-08-11 DIAGNOSIS — B36.9 FUNGAL DERMATITIS: ICD-10-CM

## 2021-08-11 PROCEDURE — G0463 HOSPITAL OUTPT CLINIC VISIT: HCPCS

## 2021-08-11 PROCEDURE — 93970 EXTREMITY STUDY: CPT | Mod: 26 | Performed by: SURGERY

## 2021-08-11 PROCEDURE — 93970 EXTREMITY STUDY: CPT

## 2021-08-11 PROCEDURE — 99203 OFFICE O/P NEW LOW 30 MIN: CPT | Performed by: NURSE PRACTITIONER

## 2021-08-11 RX ORDER — FOLIC ACID 1 MG/1
1 TABLET ORAL
COMMUNITY
Start: 2020-03-23 | End: 2023-07-25

## 2021-08-11 ASSESSMENT — MIFFLIN-ST. JEOR: SCORE: 2161.66

## 2021-08-11 ASSESSMENT — PAIN SCALES - GENERAL: PAINLEVEL: NO PAIN (0)

## 2021-08-11 NOTE — PROGRESS NOTES
[facility] -[place]  CONSULT NOTE    Date of Service:  8/11/2021    Requesting Provider: Dr. Miah Zavala    Chief Complaint: BLE swelling    History of Present Illness: María Elena Saab is being seen at [facility] today regarding the above listed concern. The patient arrives to the clinic today from skilled nursing facility by herself. The patient's medical history is significant for obesity, seropositive rheumatoid arthritis with polyarthralgia on immunosuppression, history of BLE DVT on chronic anticoagulation, LOPEZ not using CPAP, hypertension, history of total hip arthroplasty and left total knee arthroplasty. Patient denies history of clotting disorder, skin cancer, or other autoimmune disease.      Subjective:  Today, the patient denies any fevers, chills, generalized ill feeling or other acute medical concerns. Patient is unsure of reason of visit. She reports staff are concerned about recurrent cellulitis and chronic toe yeast infections. She is seen by a podiatrist who provides routine toenail care. They have been trying topical lamisol but reports her skin gets dried out and peels. She does were velcro compression.    I personally reviewed outside imaging, lab work, and progress noted through Care Everywhere and outside records.      Review of Systems:   Constitutional:  denies  for fever, chills or night sweats  EENTM: denies vision or hearing changes  GI:  denies for nausea/vomiting; denies for constipation; denies diarrhea  :  denies dysuria, denies incontinence  MS: patient is not ambulatory, she pivots and transfers between wheelchair and bed. She is not able to lower the head of her bed. Her legs are elevated in bed but likely with the knees above the ankle.   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  RD reports that she is not willing to commit to the program outlined.  See scanned document.  12/15/2015 - Marcio Quinonez MD        Obesity 4/10/2015     LOPEZ (obstructive sleep apnea) 5/22/2015     Osteoarthritis      Peripheral vascular disease (H)      Pleural effusion      Pressure ulcer of foot      Rheumatoid arthritis (H) 12/30/2014    seronegative     Sepsis (H)      Seropositive rheumatoid arthritis (H) 1/19/2016     Vitamin D deficiency 8/26/2015        Surgical History:   Past Surgical History:   Procedure Laterality Date     JOINT REPLACEMENT      knee     PERIPHERALLY INSERTED CENTRAL CATHETER INSERTION Right 07/14/2019    4fr/44cm/Rt Cephalic.lowSVC.MGlav     TOTAL HIP ARTHROPLASTY Left 10/18/2016    Procedure: LEFT HIP TOTAL ARTHROPLASTY;  Surgeon: London Goss MD;  Location: [facility];  Service:      TOTAL KNEE ARTHROPLASTY Left 2006        Medications:      Current Outpatient Medications:      acetaminophen (TYLENOL) 325 MG tablet, [ACETAMINOPHEN (TYLENOL) 325 MG TABLET] Take 650 mg by mouth every 6 (six) hours as needed for pain.       , Disp: , Rfl:      adalimumab (HUMIRA) 40 mg/0.8 mL injection, [ADALIMUMAB (HUMIRA) 40 MG/0.8 ML INJECTION] Inject 0.8 mL (40 mg total) under the skin every 14 (fourteen) days., Disp: 1.6 mL, Rfl: 0     ascorbic acid, vitamin C, (ASCORBIC ACID WITH KAEL  HIPS) 500 MG tablet, [ASCORBIC ACID, VITAMIN C, (ASCORBIC ACID WITH KAEL HIPS) 500 MG TABLET] Take 500 mg by mouth 2 (two) times a day., Disp: , Rfl:      cholecalciferol, vitamin D3, (VITAMIN D3) 2,000 unit Tab, [CHOLECALCIFEROL, VITAMIN D3, (VITAMIN D3) 2,000 UNIT TAB] Take 6,000 Units by mouth daily., Disp: , Rfl:      DULoxetine (CYMBALTA) 20 MG capsule, [DULOXETINE (CYMBALTA) 20 MG CAPSULE] Take 1 capsule (20 mg total) by mouth daily., Disp: 90 capsule, Rfl: 2     folic acid (FOLVITE) 1 MG tablet, [FOLIC ACID (FOLVITE) 1 MG TABLET] Take 1 tablet (1 mg total) by mouth daily., Disp: 90 tablet, Rfl: 0     HUMIRA PEN 40 mg/0.8 mL PnKt, [HUMIRA PEN 40 MG/0.8 ML PNKT] INJECT 0.8 ML (40 MG) UNDER THE SKIN EVERY 14 DAYS, Disp: 1 kit, Rfl: 3     hydroCHLOROthiazide (HYDRODIURIL) 25 MG tablet, [HYDROCHLOROTHIAZIDE (HYDRODIURIL) 25 MG TABLET] Take 25 mg by mouth daily., Disp: , Rfl:      ibuprofen (ADVIL,MOTRIN) 200 MG tablet, [IBUPROFEN (ADVIL,MOTRIN) 200 MG TABLET] Take 400 mg by mouth every 8 (eight) hours as needed for pain.       , Disp: , Rfl:      lanolin alcohol-mo-w.pet-ceres (EUCERIN) Crea, [LANOLIN ALCOHOL-MO-W.PET-CERES (EUCERIN) CREA] Apply topically 2 (two) times a day. To feet, Disp: , Rfl:      methotrexate 10 MG tablet, [METHOTREXATE 10 MG TABLET] Take 1 tablet (10 mg total) by mouth once a week., Disp: 16 tablet, Rfl: 0     methotrexate 2.5 MG tablet, [METHOTREXATE 2.5 MG TABLET] Take 4 tablets (10 mg total) by mouth once a week., Disp: 48 tablet, Rfl: 0     multivitamin therapeutic (THERAGRAN) tablet, [MULTIVITAMIN THERAPEUTIC (THERAGRAN) TABLET] Take 1 tablet by mouth daily., Disp: , Rfl:      nystatin (MYCOSTATIN) powder, [NYSTATIN (MYCOSTATIN) POWDER] Apply to rash areas twice daily., Disp: 15 g, Rfl: 0     oxyCODONE (OXY-IR) 5 mg capsule, [OXYCODONE (OXY-IR) 5 MG CAPSULE] Take 1-2 tablets every 4 hours orally as needed for pain relief.  Use 1 tablet for 1-5/10 pain, take 2 tablets for 6-10/10 pain.,  Disp: 60 tablet, Rfl: 0     rivaroxaban ANTICOAGULANT (XARELTO) 10 mg tablet, [RIVAROXABAN ANTICOAGULANT (XARELTO) 10 MG TABLET] Take 10 mg by mouth daily., Disp: , Rfl:     Allergies:   Allergies   Allergen Reactions     Adhesive Tape Other (See Comments)     As on band-aids;  Causes skin tearing/wounds.     Adhesive [Mecrylate] Other (See Comments)     As on band-aids;  Causes skin tearing/wounds.     Banana Unknown     She avoids due to Latex allergy.  If she eats them 3 days in a row, gets stomach cramps and dark stool.  TBrinkMD - 4/10/15     Grape [Grape (Artificial) Flavor] Unknown     She avoids due to Latex allergy.  If she eats lots of them, and she likes them, stomach gets a little queasy.  TBrinkMD - 4/10/15     Morphine (Pf) [Morphine] Other (See Comments)     Pt has not reacted to this med herself, but mother has anaphylactic reaction and other family members get nausea/vomiting.     Pseudoephedrine Cold/Allergy [Cpm-Pse Cr] Other (See Comments)     Keeps pt awake up to 24 hours      Latex Rash     Hands get red from rubber gloves, but okay if she washes them right away.  Hands get red from rubber gloves, but okay if she washes them right away.     Other Food Allergy Rash     Pine trees, leather .        Family history:   Family History   Problem Relation Age of Onset     Multiple myeloma Father      Deep Vein Thrombosis Father      Cataracts Father      Snoring Father      Other - See Comments Brother         Blood withdrawn from time to time.  Sounds like hemochromatosis.     Sleep Apnea Brother      Cancer Mother         Uterine CA     Arthritis Mother      Cataracts Mother      Breast Cancer Mother 54.00     No Known Problems Sister      Rheumatoid Arthritis Maternal Grandmother      Breast Cancer Maternal Grandmother 50.00     Heart Disease Maternal Grandmother      Rheumatoid Arthritis Paternal Aunt      Breast Cancer Maternal Aunt 54.00     Breast Cancer Cousin      Clotting Disorder No family  "hx of         Social History:   Social History     Socioeconomic History     Marital status: Single     Spouse name: Not on file     Number of children: Not on file     Years of education: Not on file     Highest education level: Not on file   Occupational History     Not on file   Tobacco Use     Smoking status: Former Smoker     Packs/day: 1.00     Years: 30.00     Pack years: 30.00     Types: Cigarettes, Cigarettes     Smokeless tobacco: Never Used   Substance and Sexual Activity     Alcohol use: Yes     Comment: Alcoholic Drinks/day: 3-4 times a year she will have 1 or 2.     Drug use: No     Sexual activity: Never     Partners: Female   Other Topics Concern     Not on file   Social History Narrative    Long term  At [facility] to rehab to level allowing surgery on deteriorated joints  LTC12/2015     Social Determinants of Health     Financial Resource Strain:      Difficulty of Paying Living Expenses:    Food Insecurity:      Worried About Running Out of Food in the Last Year:      Ran Out of Food in the Last Year:    Transportation Needs:      Lack of Transportation (Medical):      Lack of Transportation (Non-Medical):    Physical Activity:      Days of Exercise per Week:      Minutes of Exercise per Session:    Stress:      Feeling of Stress :    Social Connections:      Frequency of Communication with Friends and Family:      Frequency of Social Gatherings with Friends and Family:      Attends Synagogue Services:      Active Member of Clubs or Organizations:      Attends Club or Organization Meetings:      Marital Status:    Intimate Partner Violence:      Fear of Current or Ex-Partner:      Emotionally Abused:      Physically Abused:      Sexually Abused:         Physical Exam:  Vitals: Pulse 72, temperature 97.7  F (36.5  C), temperature source Oral, resp. rate 16, height 5' 6\" (1.676 m), weight (!) 345 lb (156.5 kg).  Weight is 345 lbs 0 oz          Body mass index is 55.68 kg/m .    General: This is " a 56 year old female who appears their reported age, not in acute distress  Head: normocephalic, Atraumatic; non-icteric sclera; no exudate; mild hearing loss   Respiratory: Clear throughout all lung fields; unlabored breathing; no cough   Cardiac: Regular, Rate and Rhythm, no murmurs appreciated   Skin: Uniformly warm and dry  Psych: Alert and oriented x4; calm and cooperative throughout exam  Abdomen: Normal bowel sounds. Soft, symmetric, no guarding or rigidity, nontender with palpation.    Peripheral Vascular: Bilateral dorsalis pedis, posterior tibial pulses to 2/4 to manual palpation; uniformly warm without any hair growth noted. Good capillary refill. No unusual venous distention. No distinct hemosiderosis or hemosiderin staining; there is non pitting edema with early woody fibrosis of the tissue.     Circumferential volume measures:       Ulceration(s)/Wound(s):   Wound/Ulcer location: LEFT GREAT TOE SPACE   Size: not measured  Edges: Attached  Undermining: none  Base: pink  Tissues: dermis  Thickness: partial  Exudate Type: serous  Exudate Amount: moderate  Adali-Wound/Ulcer: macerated  Odor: none      Laboratory/Diagnostic studies:   No components found for: SEDRATE  No results found for: CREATININE  No results found for: HGBA1C  Lab Results   Component Value Date    BUN 15 07/25/2019     Lab Results   Component Value Date    ALBUMIN 3.0 (L) 04/20/2021     No components found for: [id number]    BILATERAL Venous Insufficiency Ultrasound   (Date: 08/11/21)  BILATERAL Lower Extremity     Right  Leg Deep Veins    CFV SFJ PFV PROX FV   PROX FV MID FV DIST POP V. PERON.   V. PTV'S   Compressibility  (FC,PC,NC) FC FC FC FC FC FC FC FC FC   Reflux -   - - - - +   -         Right Leg Saphenous Veins  Location SFJ PROX THIGH KNEE MID CALF SPJ PROX CALF MID CALF   RT GSV (mm) 6.9 6.8 6.7 3.3         Reflux - - - -         RT SSV (mm)         1.9 1.8 2.0   Reflux         - - -         Left Leg Deep Veins    CFV SFJ  "PFV PROX SFV PROX SFV MID SFV DIST POP V. PERON. V. PTV'S   Compressibility  (FC,PC,NC) FC FC FC FC FC FC FC FC FC   Reflux -   - - - - +   -         Lt Leg Saphenous Veins   Location SFJ PROX THIGH KNEE MID CALF SPJ PROX CALF MID CALF   LT GSV (mm) 8.0 8.2 4.7 5.4         Reflux - - - -         LT SSV (mm)         2.3 3.0 2.2   Reflux         - - -       Impression:    Right Deep Vein Findings: Patent deep venous system without reflux or DVT  Left Deep Vein Findings: Patent deep venous system without reflux or DVT     Superficial Vein Findings:   Right Greater Saphenous vein: Patent Greater Saphenous Vein without evidence of reflux.  Right Small Saphenous Vein: Patent Small Saphenous Vein without evidence of reflux.  Left Greater Saphenous Vein: Patent Greater Saphenous Vein without evidence of reflux.  Left Small Saphenous Vein: Patent Small Saphenous Vein without evidence of reflux.      Diagnoses:   1. Lymphedema of both lower extremities    2. Immunosuppression (H)    3. Fungal dermatitis    4. Dependent edema         Photo:  8/11/2021        Assessment/Plan:  1. Procedure: NA.    2. Treatment: wound treatment will include irrigation and dressings to promote autolytic debridement which will include: recommend topical miconazole powder in the toe web spaces with 2x2\" gauze to provide wicking/drying of the intertriginous moisture that is encouraging chronic fungal skin infection.    3. Edema: Continue velcro compression; add lymphedema therapy.    4. Offloading: NA    5. Nutrition: NA    6. Diagnostics: Venous    7. Medications: Zeasorb    8. Referrals: Lymphedema    9. Education: See above. Education provided regarding the above plan of care, expected changes in the wound/ulcer base and signs/symptoms of concern, as well as ordered labs and diagnostics. Patient verbalizes understanding and all questions answered to her satisfaction.       Impression:  María Elena Saab is a 56 year old patient who presents for " Bilateral Lower Extremities that is adequately controlled with velcro compression. She reports recurrent lower leg cellulitis. This is likely due to fungal infections from intertriginous moisture. She is noted to have maceration and skin breakdown in the webspace of the left great/2nd toe. This has been treated with topical ointment, likely worsening the moisture. I recommend topical miconazole powder with drying wick place between the toes. I also recommend lymphedema therapy to optimize dependent edema secondary to lymphedema. The patient is at higher risk for infection due to immunosuppression so these maintenance practices will help to reduce the risk of recurrent infections. Venous competency studies do not show reflux that would be amenable to treatment; venous hypertension is due to obesity and and dependent positioning.       Follow Up:  Patient to return as needed. They were instructed to call the clinic sooner with any further questions or concerns.       Rosangela Barth, MSN, CNP, CWOCN-AP  [facility]  [phone]

## 2021-08-11 NOTE — PATIENT INSTRUCTIONS
"Wound Care Instructions  Bilateral toe webspaces:  Cleanse with wound wash or bottled water. Use non-sterile 4x4 gauze to gently remove any loosening tissue or debris and pat dry with non-sterile gauze.     Apply thin layer of Zeasorb powder into the toe web spaces. Insert 2x2\" inch gauze in between the toes to help wick away moisture.     Change daily.     Activity:  -Sleep in bed at night.  -Twice daily (1 hour between normal meal times) lay flat on couch or bed to reduce edema. Re-tighten compression before getting up.   -Perform ankle circles and pumps while lying flat to bring fluid up out of legs.  -Walk as much as tolerated, this is good for edema.      Referral for OT/PT lymphedema therapy Eval&Treat for long term management plan of Bilateral Lower Extremity lymphedema with recurrent infections.       SEEK MEDICAL CARE IF:    You have an increase in swelling, pain, or redness around the wound.    You have an increase in the amount of pus coming from the wound.    There is a bad smell coming from the wound.    The wound appears to be worsening/enlarging    You have a fever greater than 101.5 F      It is ok to continue current wound care treatment/products for the next 2-3 days until new wound care supplies are ordered and arrive. If longer than this please contact our office at [phone].    Rosangela Barth, MSN, CNP, CWOCN-AP  [facility]  [phone]  "

## 2021-08-12 ENCOUNTER — TELEPHONE (OUTPATIENT)
Dept: VASCULAR SURGERY | Facility: CLINIC | Age: 56
End: 2021-08-12

## 2021-08-12 NOTE — TELEPHONE ENCOUNTER
Placed call to María Elena to go over ultrasound results. No answer or voicemail at home number and the other home number listed is out of service. Cell number has no voicemail box set up. Will re attempt call.

## 2021-08-16 ENCOUNTER — TELEPHONE (OUTPATIENT)
Dept: VASCULAR SURGERY | Facility: CLINIC | Age: 56
End: 2021-08-16

## 2021-08-16 NOTE — TELEPHONE ENCOUNTER
Called to relay ultrasound results to patient. Main phone number was for her facility at [facility].    Nurse states that patient is very challenging as she has behavior issues and is non-compliant with her instructions including activity instructions.       Asked the nurse to find out if the therapists at [facility] are lymphedema certified.  Nurse is asking that Rosangela write new lymphedema therapy orders that are very specific in what therapies should be done, as they are currently only putting her juxtalites on.     Fax #[phone]

## 2021-08-16 NOTE — TELEPHONE ENCOUNTER
----- Message from Corrie Whitney RN sent at 8/12/2021 10:25 AM CDT -----  Attempted call-KE 8/12 no voicemail

## 2021-08-16 NOTE — TELEPHONE ENCOUNTER
Carri with [facility] calling back. They do have 2 certified lymphedema therapists at their facility. She again stated they will need more specific/extensive order as to what should be done as far as lymphedema therapy. Asked that they receive a call when orders have been faxed.   Fax: [phone]  Ph: [phone]

## 2021-09-15 ENCOUNTER — OFFICE VISIT (OUTPATIENT)
Dept: RHEUMATOLOGY | Facility: CLINIC | Age: 56
End: 2021-09-15
Payer: COMMERCIAL

## 2021-09-15 ENCOUNTER — LAB (OUTPATIENT)
Dept: LAB | Facility: CLINIC | Age: 56
End: 2021-09-15

## 2021-09-15 VITALS — HEART RATE: 78 BPM | SYSTOLIC BLOOD PRESSURE: 130 MMHG | DIASTOLIC BLOOD PRESSURE: 84 MMHG

## 2021-09-15 DIAGNOSIS — M15.0 PRIMARY OSTEOARTHRITIS INVOLVING MULTIPLE JOINTS: ICD-10-CM

## 2021-09-15 DIAGNOSIS — Z79.899 HIGH RISK MEDICATION USE: ICD-10-CM

## 2021-09-15 DIAGNOSIS — M05.79 SEROPOSITIVE RHEUMATOID ARTHRITIS OF MULTIPLE SITES (H): Primary | ICD-10-CM

## 2021-09-15 DIAGNOSIS — M05.79 SEROPOSITIVE RHEUMATOID ARTHRITIS OF MULTIPLE SITES (H): ICD-10-CM

## 2021-09-15 DIAGNOSIS — R76.8 CYCLIC CITRULLINATED PEPTIDE (CCP) ANTIBODY POSITIVE: ICD-10-CM

## 2021-09-15 DIAGNOSIS — M25.50 POLYARTHRALGIA: ICD-10-CM

## 2021-09-15 LAB
ALBUMIN SERPL-MCNC: 3.4 G/DL (ref 3.5–5)
ALT SERPL W P-5'-P-CCNC: 23 U/L (ref 0–45)
CREAT SERPL-MCNC: 0.67 MG/DL (ref 0.6–1.1)
ERYTHROCYTE [DISTWIDTH] IN BLOOD BY AUTOMATED COUNT: 14.6 % (ref 10–15)
GFR SERPL CREATININE-BSD FRML MDRD: >90 ML/MIN/1.73M2
HCT VFR BLD AUTO: 43.7 % (ref 35–47)
HGB BLD-MCNC: 14 G/DL (ref 11.7–15.7)
MCH RBC QN AUTO: 29.4 PG (ref 26.5–33)
MCHC RBC AUTO-ENTMCNC: 32 G/DL (ref 31.5–36.5)
MCV RBC AUTO: 92 FL (ref 78–100)
PLATELET # BLD AUTO: 222 10E3/UL (ref 150–450)
RBC # BLD AUTO: 4.76 10E6/UL (ref 3.8–5.2)
WBC # BLD AUTO: 5 10E3/UL (ref 4–11)

## 2021-09-15 PROCEDURE — 20611 DRAIN/INJ JOINT/BURSA W/US: CPT | Mod: RT | Performed by: INTERNAL MEDICINE

## 2021-09-15 PROCEDURE — 99214 OFFICE O/P EST MOD 30 MIN: CPT | Mod: 25 | Performed by: INTERNAL MEDICINE

## 2021-09-15 PROCEDURE — 85027 COMPLETE CBC AUTOMATED: CPT

## 2021-09-15 PROCEDURE — 82565 ASSAY OF CREATININE: CPT

## 2021-09-15 PROCEDURE — 36415 COLL VENOUS BLD VENIPUNCTURE: CPT

## 2021-09-15 PROCEDURE — 84460 ALANINE AMINO (ALT) (SGPT): CPT

## 2021-09-15 PROCEDURE — 82040 ASSAY OF SERUM ALBUMIN: CPT

## 2021-09-15 RX ORDER — TRIAMCINOLONE ACETONIDE 40 MG/ML
40 INJECTION, SUSPENSION INTRA-ARTICULAR; INTRAMUSCULAR ONCE
Status: COMPLETED | OUTPATIENT
Start: 2021-09-15 | End: 2021-09-15

## 2021-09-15 RX ADMIN — TRIAMCINOLONE ACETONIDE 40 MG: 40 INJECTION, SUSPENSION INTRA-ARTICULAR; INTRAMUSCULAR at 16:53

## 2021-09-15 NOTE — PROGRESS NOTES
Rheumatology follow-up visit note     María Elena is a 56 year old female presents today for follow-up.    María Elena was seen today for recheck.    Diagnoses and all orders for this visit:    Seropositive rheumatoid arthritis of multiple sites (H)    Primary osteoarthritis involving multiple joints  -     MT ARTHROCENTESIS ASPIR&/INJ MAJOR JT/BURSA W/US  -     triamcinolone (KENALOG-40) injection 40 mg    Cyclic citrullinated peptide (CCP) antibody positive    High risk medication use    Polyarthralgia  -     MT ARTHROCENTESIS ASPIR&/INJ MAJOR JT/BURSA W/US  -     triamcinolone (KENALOG-40) injection 40 mg        This patient with seropositive rheumatoid arthritis, osteoarthritis, is here for follow-up.  She has well-controlled RA with combination of Humira and methotrexate.  She has increasing pain in her right knee.  Left side is status post arthroplasty.  She would like to proceed local injection pros and cons were outlined, she has longstanding lymphedema on that side no ulceration or infection.  Under ultrasound guidance 40 mg of Kenalog injected into the right knee anterolateral approach.  She is to follow-up here in 6 months labs every 3.  We will check labs today.    Follow up in 3 months.    HPI    María Elena Saab is a 56 year old female is here for follow-up of  rheumatoid arthritis, longstanding, deforming, seropositive, anti-CCP antibody positive, osteoarthritis, lymphedema lower extremities, left knee arthroplasty, on Humira, methotrexate with folic acid.  Not on prednisone.  In the past she was on sulfasalazine as well.  She has noted worsening pain.  This is in her right knee, moderately severe to severe.  She is status post left TKA.  Because of her weight related comorbidities she has been deemed not a candidate for arthroplasty of the right knee detail and the weight is at the level.  She has noted this pain to be moderately severe worse with activity.  She has crepitus.  She is not sure if it is  swollen.  We discussed options.  This will get together in person.  Meanwhile continue, methotrexate, consider going back on sulfasalazine if she has evidence of active synovitis on her hands.  Another option would be to change a biologic.  ROS enquiry held for fever, ocular symptoms, rash, headache,  GI issues.  Recent labs are reviewed within normal range.  Today we also discussed the issues related to the current pandemic, the pros and cons of the current treatment plan, the CDC guidelines such as social distancing washing the hands covering the cough.      DETAILED EXAMINATION  09/15/21  :    Vitals:    09/15/21 1613   BP: 130/84   Pulse: 78     Alert oriented. Head including the face is examined for malar rash, heliotropes, scarring, lupus pernio. Eyes examined for redness such as in episcleritis/scleritis, periorbital lesions.   Neck/ Face examined for parotid gland swelling, range of motion of neck.  Left upper and lower and right upper and lower extremities examined for tenderness, swelling, warmth of the appendicular joints, range of motion, edema, rash.  Some of the important findings included: she does not have evidence of synovitis in the palpable joints of the upper extremities.  No significant deformities of the digits.  No Heberden nodes.  Range of motion of the shoulders  show full abduction.  Arthroplasty left knee, joint line tenderness right side.  She does not have dactylitis of the digits.     Patient Active Problem List    Diagnosis Date Noted     Primary osteoarthritis involving multiple joints 04/26/2019     Priority: Medium     History of total left knee replacement (TKR) 04/26/2019     Priority: Medium     High risk medication use 02/13/2018     Priority: Medium     Physical debility 11/28/2017     Priority: Medium     Polyarthralgia 07/21/2017     Priority: Medium     H/O total hip arthroplasty 11/17/2016     Priority: Medium     Anemia 10/20/2016     Priority: Medium     Essential  hypertension with goal blood pressure less than 140/90      Priority: Medium     Degenerative joint disease (DJD) of hip 10/18/2016     Priority: Medium     History of DVT of lower extremity      Priority: Medium     Radiculopathy of leg 07/20/2016     Priority: Medium     Sleep apnea 07/20/2016     Priority: Medium     Pain management 04/26/2016     Priority: Medium     Seropositive rheumatoid arthritis of multiple sites (H) 03/21/2016     Priority: Medium     PAD (peripheral artery disease) (H) 03/21/2016     Priority: Medium     Right shoulder tendinitis 01/19/2016     Priority: Medium     Cyclic citrullinated peptide (CCP) antibody positive 09/16/2015     Priority: Medium     Vitamin D deficiency 08/26/2015     Priority: Medium     LOPEZ (obstructive sleep apnea) - abnormal overnight pulse oximetry - SLEEP visit in November, 2015, she states. 05/22/2015     Priority: Medium     Nocturnal hypoxemia - probable sleep apnea - had overnight pulse oximetry, April, 2015. 05/22/2015     Priority: Medium     Edema - swelling of the legs after blood clots 05/22/2015     Priority: Medium     Morbid obesity (H) 04/10/2015     Priority: Medium     Had formal nutrition consult at [facility].  RD reports that she is not   willing to commit to the program outlined.  See scanned document.    12/15/2015 - Marcio Quinonez MD         Warfarin anticoagulation - long-term because of unresolved DVT that first appeared in October, 2014 04/10/2015     Priority: Medium     Follow-up ultrasound in April, 2015 did not show complete resolution of   the venous findings.  4/10/2015 - Marcio Quinonez MD          DVT of lower extremity, bilateral - residual clot disease on repeat US in NEW location - after six months of warfarin. 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  RD reports that she is not willing to commit to the program outlined.  See scanned document.  12/15/2015 - Marcio Quinonez MD        Obesity 4/10/2015     LOPEZ (obstructive sleep apnea) 5/22/2015     Osteoarthritis      Peripheral vascular disease (H)      Pleural effusion      Pressure ulcer of foot      Rheumatoid arthritis (H) 12/30/2014    seronegative     Sepsis (H)      Seropositive rheumatoid arthritis (H) 1/19/2016     Vitamin D deficiency 8/26/2015     Allergies   Allergen Reactions     Adhesive Tape Other (See Comments)     As on band-aids;  Causes skin tearing/wounds.     Adhesive [Mecrylate] Other (See Comments)     As on band-aids;  Causes skin tearing/wounds.     Banana Unknown     She avoids due to Latex allergy.  If she eats them 3 days in a row, gets stomach cramps and dark stool.  TBrinkMD - 4/10/15     Grape [Grape (Artificial) Flavor] Unknown     She avoids due to Latex allergy.  If she eats lots of them, and she likes them, stomach gets a little queasy.  TBrinkMD - 4/10/15     Morphine (Pf) [Morphine] Other (See Comments)     Pt has not reacted to this med herself, but mother has anaphylactic reaction and other family members get nausea/vomiting.     Other  Environmental Allergy Swelling     leather     Pine      Pseudoephedrine Cold/Allergy [Cpm-Pse Cr] Other (See Comments)     Keeps pt awake up to 24 hours      Latex Rash     Hands get red from rubber gloves, but okay if she washes them right away.  Hands get red from rubber gloves, but okay if she washes them right away.     Other Food Allergy Rash     Pine trees, leather .      Current Outpatient Medications   Medication Sig Dispense Refill     acetaminophen (TYLENOL) 325 MG tablet [ACETAMINOPHEN (TYLENOL) 325 MG TABLET] Take 650 mg by mouth every 6 (six) hours as needed for pain.              adalimumab (HUMIRA) 40 mg/0.8 mL injection [ADALIMUMAB (HUMIRA) 40 MG/0.8 ML INJECTION] Inject 0.8 mL (40 mg total) under the skin every 14 (fourteen) days. 1.6 mL 0     ascorbic acid, vitamin C, (ASCORBIC ACID WITH KAEL HIPS) 500 MG tablet [ASCORBIC ACID, VITAMIN C, (ASCORBIC ACID WITH KAEL HIPS) 500 MG TABLET] Take 500 mg by mouth 2 (two) times a day.       cholecalciferol, vitamin D3, (VITAMIN D3) 2,000 unit Tab [CHOLECALCIFEROL, VITAMIN D3, (VITAMIN D3) 2,000 UNIT TAB] Take 6,000 Units by mouth daily.       DULoxetine (CYMBALTA) 20 MG capsule [DULOXETINE (CYMBALTA) 20 MG CAPSULE] Take 1 capsule (20 mg total) by mouth daily. 90 capsule 2     folic acid (FOLVITE) 1 MG tablet Take 1 mg by mouth       folic acid (FOLVITE) 1 MG tablet [FOLIC ACID (FOLVITE) 1 MG TABLET] Take 1 tablet (1 mg total) by mouth daily. 90 tablet 0     HUMIRA PEN 40 mg/0.8 mL PnKt [HUMIRA PEN 40 MG/0.8 ML PNKT] INJECT 0.8 ML (40 MG) UNDER THE SKIN EVERY 14 DAYS 1 kit 3     hydroCHLOROthiazide (HYDRODIURIL) 25 MG tablet [HYDROCHLOROTHIAZIDE (HYDRODIURIL) 25 MG TABLET] Take 25 mg by mouth daily.       ibuprofen (ADVIL,MOTRIN) 200 MG tablet [IBUPROFEN (ADVIL,MOTRIN) 200 MG TABLET] Take 400 mg by mouth every 8 (eight) hours as needed for pain.              lanolin alcohol-mo-w.pet-ceres (EUCERIN) Crea [LANOLIN ALCOHOL-MO-W.PET-CERES (EUCERIN) CREA] Apply  topically 2 (two) times a day. To feet       methotrexate 10 MG tablet [METHOTREXATE 10 MG TABLET] Take 1 tablet (10 mg total) by mouth once a week. 16 tablet 0     methotrexate 2.5 MG tablet Take 10 mg by mouth every 7 days       methotrexate 2.5 MG tablet [METHOTREXATE 2.5 MG TABLET] Take 4 tablets (10 mg total) by mouth once a week. 48 tablet 0     multivitamin therapeutic (THERAGRAN) tablet [MULTIVITAMIN THERAPEUTIC (THERAGRAN) TABLET] Take 1 tablet by mouth daily.       nystatin (MYCOSTATIN) powder [NYSTATIN (MYCOSTATIN) POWDER] Apply to rash areas twice daily. 15 g 0     oxyCODONE (OXY-IR) 5 mg capsule [OXYCODONE (OXY-IR) 5 MG CAPSULE] Take 1-2 tablets every 4 hours orally as needed for pain relief.  Use 1 tablet for 1-5/10 pain, take 2 tablets for 6-10/10 pain. 60 tablet 0     rivaroxaban ANTICOAGULANT (XARELTO) 10 mg tablet [RIVAROXABAN ANTICOAGULANT (XARELTO) 10 MG TABLET] Take 10 mg by mouth daily.       family history includes Arthritis in her mother; Breast Cancer in her cousin; Breast Cancer (age of onset: 50.00) in her maternal grandmother; Breast Cancer (age of onset: 54.00) in her maternal aunt and mother; Cancer in her mother; Cataracts in her father and mother; Deep Vein Thrombosis in her father; Heart Disease in her maternal grandmother; Multiple myeloma in her father; No Known Problems in her sister; Other - See Comments in her brother; Rheumatoid Arthritis in her maternal grandmother and paternal aunt; Sleep Apnea in her brother; Snoring in her father.     Social Connections:      Frequency of Communication with Friends and Family:      Frequency of Social Gatherings with Friends and Family:      Attends Sikh Services:      Active Member of Clubs or Organizations:      Attends Club or Organization Meetings:      Marital Status:           WBC   Date Value Ref Range Status   04/20/2021 5.1 4.0 - 11.0 thou/uL Final     RBC Count   Date Value Ref Range Status   04/20/2021 4.54 3.80 - 5.40  mill/uL Final     Hemoglobin   Date Value Ref Range Status   04/20/2021 13.9 12.0 - 16.0 g/dL Final     Hematocrit   Date Value Ref Range Status   04/20/2021 42.6 35.0 - 47.0 % Final     MCV   Date Value Ref Range Status   04/20/2021 94 80 - 100 fL Final     MCH   Date Value Ref Range Status   04/20/2021 30.6 27.0 - 34.0 pg Final     Platelet Count   Date Value Ref Range Status   04/20/2021 194 140 - 440 thou/uL Final     % Lymphocytes   Date Value Ref Range Status   12/21/2020 35 20 - 40 % Final     AST   Date Value Ref Range Status   07/25/2019 23 0 - 40 U/L Final     ALT   Date Value Ref Range Status   04/20/2021 18 0 - 45 U/L Final     Albumin   Date Value Ref Range Status   04/20/2021 3.0 (L) 3.5 - 5.0 g/dL Final     Alkaline Phosphatase   Date Value Ref Range Status   07/25/2019 69 45 - 120 U/L Final     Creatinine   Date Value Ref Range Status   04/20/2021 0.71 0.60 - 1.10 mg/dL Final     GFR Estimate   Date Value Ref Range Status   04/20/2021 >60 >60 mL/min/1.73m2 Final     GFR Estimate If Black   Date Value Ref Range Status   04/20/2021 >60 >60 mL/min/1.73m2 Final     CRP   Date Value Ref Range Status   12/21/2020 1.3 (H) 0.0 - 0.8 mg/dL Final

## 2021-09-21 ENCOUNTER — LAB REQUISITION (OUTPATIENT)
Dept: LAB | Facility: HOSPITAL | Age: 56
End: 2021-09-21
Payer: COMMERCIAL

## 2021-09-21 ENCOUNTER — NURSING HOME VISIT (OUTPATIENT)
Dept: GERIATRICS | Facility: CLINIC | Age: 56
End: 2021-09-21
Payer: COMMERCIAL

## 2021-09-21 VITALS
TEMPERATURE: 98.5 F | HEART RATE: 79 BPM | HEIGHT: 66 IN | WEIGHT: 293 LBS | OXYGEN SATURATION: 97 % | DIASTOLIC BLOOD PRESSURE: 78 MMHG | SYSTOLIC BLOOD PRESSURE: 144 MMHG | BODY MASS INDEX: 47.09 KG/M2 | RESPIRATION RATE: 20 BRPM

## 2021-09-21 DIAGNOSIS — Z12.11 SCREENING FOR MALIGNANT NEOPLASM OF COLON: ICD-10-CM

## 2021-09-21 DIAGNOSIS — Z12.4 SCREENING FOR CERVICAL CANCER: ICD-10-CM

## 2021-09-21 DIAGNOSIS — I73.9 PAD (PERIPHERAL ARTERY DISEASE) (H): Primary | ICD-10-CM

## 2021-09-21 DIAGNOSIS — Z12.31 BREAST CANCER SCREENING BY MAMMOGRAM: ICD-10-CM

## 2021-09-21 DIAGNOSIS — L03.90 CELLULITIS, UNSPECIFIED: ICD-10-CM

## 2021-09-21 DIAGNOSIS — R23.8 REDNESS AND SWELLING OF LOWER LEG: ICD-10-CM

## 2021-09-21 DIAGNOSIS — M79.89 REDNESS AND SWELLING OF LOWER LEG: ICD-10-CM

## 2021-09-21 PROCEDURE — 99310 SBSQ NF CARE HIGH MDM 45: CPT | Performed by: NURSE PRACTITIONER

## 2021-09-21 ASSESSMENT — MIFFLIN-ST. JEOR: SCORE: 2155.76

## 2021-09-21 NOTE — LETTER
9/21/2021        RE: María Elena Saab  [facility]  [address]  [place] [address]        [facility]  Chief Complaint   Patient presents with     [facility]     [place] Medical Record Number:  [id number]  Place of Service where encounter took place:  [facility] () [[id number]]    HPI:    María Elena Saab  is 56 year old (1965), who is being seen today for a federally mandated E/M visit. Today's concerns are:     PAD (peripheral artery disease) (H)  Redness and swelling of lower leg  Screening for malignant neoplasm of colon  Screening for cervical cancer  Breast cancer screening by mammogram     Patient seen today for a regulatory visit in [facility] . She is met today in her room - staff are concerned with increased redness and swelling on her LLE - she does have chronic redness/edema 2/2 PVD but this appears notably worse than prior assessments per nursing - patient does note increased pain in LLE. Patient notes that she is WAY overdue for all her regular cancer screenings - this worries her as her family has a history of both colon and breast cancers; she notes she hasn't had any cancer screening offered to her since her admission to the facility back in 2016 - this is confirmed per review of her Epic chart. She would like to have these completed now if able. Therapies are not working with resident - she is on a walking program with staff. Appetite remains at baseline. She is sleeping well for the most part if she isn't bother by staff. Denies CP, palpitations, fatigue, nausea, vomiting, increased SOB/ESPITIA, fever, chills, and/or b/b concerns today.    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  RD reports that she is not willing to commit to the program outlined.  See scanned document.  12/15/2015 - Marcio Quinonez MD        Obesity 4/10/2015     LOPEZ (obstructive sleep apnea) 5/22/2015     Osteoarthritis      Peripheral vascular disease (H)      Pleural effusion      Pressure ulcer of foot      Rheumatoid arthritis (H) 12/30/2014    seronegative     Sepsis (H)      Seropositive rheumatoid arthritis (H) 1/19/2016     Vitamin D deficiency 8/26/2015     PAST SURGICAL HISTORY:   has a past surgical history that includes Total Knee Arthroplasty (Left, 2006); joint replacement; Total Hip Arthroplasty (Left, 10/18/2016); and Peripherally Inserted Central Catheter Insertion (Right, 07/14/2019).  FAMILY HISTORY: family history includes Arthritis in her mother; Breast Cancer in her cousin; Breast Cancer (age of onset: 50.00) in her maternal grandmother; Breast Cancer (age of onset: 54.00) in her maternal aunt and mother; Cancer in her mother; Cataracts in her father and mother; Deep Vein Thrombosis in her father; Heart Disease in her maternal grandmother; Multiple myeloma in her father; No Known Problems in her sister; Other - See Comments in her brother; Rheumatoid Arthritis in her maternal grandmother and paternal aunt; Sleep Apnea in her brother; Snoring in her father.  SOCIAL HISTORY:  reports that she has quit smoking. Her smoking use included cigarettes and cigarettes. She has a 30.00 pack-year smoking history. She has never used  smokeless tobacco. She reports current alcohol use. She reports that she does not use drugs.    MEDICATIONS:  Current Outpatient Medications   Medication Sig Dispense Refill     acetaminophen (TYLENOL) 325 MG tablet [ACETAMINOPHEN (TYLENOL) 325 MG TABLET] Take 650 mg by mouth every 6 (six) hours as needed for pain.              adalimumab (HUMIRA) 40 mg/0.8 mL injection [ADALIMUMAB (HUMIRA) 40 MG/0.8 ML INJECTION] Inject 0.8 mL (40 mg total) under the skin every 14 (fourteen) days. 1.6 mL 0     ascorbic acid, vitamin C, (ASCORBIC ACID WITH KAEL HIPS) 500 MG tablet [ASCORBIC ACID, VITAMIN C, (ASCORBIC ACID WITH KAEL HIPS) 500 MG TABLET] Take 500 mg by mouth 2 (two) times a day.       cholecalciferol, vitamin D3, (VITAMIN D3) 2,000 unit Tab [CHOLECALCIFEROL, VITAMIN D3, (VITAMIN D3) 2,000 UNIT TAB] Take 6,000 Units by mouth daily.       DULoxetine (CYMBALTA) 20 MG capsule [DULOXETINE (CYMBALTA) 20 MG CAPSULE] Take 1 capsule (20 mg total) by mouth daily. 90 capsule 2     folic acid (FOLVITE) 1 MG tablet Take 1 mg by mouth       hydroCHLOROthiazide (HYDRODIURIL) 25 MG tablet [HYDROCHLOROTHIAZIDE (HYDRODIURIL) 25 MG TABLET] Take 25 mg by mouth daily.       ibuprofen (ADVIL,MOTRIN) 200 MG tablet [IBUPROFEN (ADVIL,MOTRIN) 200 MG TABLET] Take 400 mg by mouth every 8 (eight) hours as needed for pain.              lanolin alcohol-mo-w.pet-ceres (EUCERIN) Crea [LANOLIN ALCOHOL-MO-W.PET-CERES (EUCERIN) CREA] Apply topically 2 (two) times a day. To feet       methotrexate 10 MG tablet [METHOTREXATE 10 MG TABLET] Take 1 tablet (10 mg total) by mouth once a week. 16 tablet 0     multivitamin therapeutic (THERAGRAN) tablet [MULTIVITAMIN THERAPEUTIC (THERAGRAN) TABLET] Take 1 tablet by mouth daily.       nystatin (MYCOSTATIN) powder [NYSTATIN (MYCOSTATIN) POWDER] Apply to rash areas twice daily. 15 g 0     oxyCODONE (OXY-IR) 5 mg capsule [OXYCODONE (OXY-IR) 5 MG CAPSULE] Take 1-2 tablets every 4 hours orally as needed for pain  "relief.  Use 1 tablet for 1-5/10 pain, take 2 tablets for 6-10/10 pain. 60 tablet 0     rivaroxaban ANTICOAGULANT (XARELTO) 10 mg tablet [RIVAROXABAN ANTICOAGULANT (XARELTO) 10 MG TABLET] Take 10 mg by mouth daily.       adalimumab (HUMIRA PEN) 40 MG/0.8ML pen kit Inject 0.8 mLs (40 mg) Subcutaneous every 14 days 1.6 mL 5     adalimumab (HUMIRA) 20 MG/0.4ML prefilled syringe kit Inject 0.8 mLs (40 mg) Subcutaneous every 14 days 1.6 mL 11     Case Management:  I have reviewed the care plan and MDS and do agree with the plan. Patient's desire to return to the community is present, but is not able due to care needs . Information reviewed:  Medications, vital signs, orders, and nursing notes.    ROS:  10 point ROS of systems including Constitutional, Eyes, Respiratory, Cardiovascular, Gastroenterology, Genitourinary, Integumentary, Musculoskeletal, Psychiatric were all negative except for pertinent positives noted in my HPI.    Vitals:  BP (!) 144/78   Pulse 79   Temp 98.5  F (36.9  C)   Resp 20   Ht 1.676 m (5' 6\")   Wt (!) 155.9 kg (343 lb 11.2 oz)   SpO2 97%   BMI 55.47 kg/m    Body mass index is 55.47 kg/m .  Exam:  GENERAL APPEARANCE:  Alert, in no distress, oriented, morbidly obese, cooperative, middle-aged woman upright in w/c in room  EYES:  EOM, conjunctivae, lids, pupils and irises normal  RESP:  respiratory effort and palpation of chest normal, lungs clear to auscultation , no respiratory distress, diminished breath sounds throughout 2/2 body habitus  CV:  Palpation and auscultation of heart done , regular rate and rhythm, no murmur, rub, or gallop, +2 pedal pulses, peripheral edema 2-3+ in L>R  ABDOMEN:  normal bowel sounds, soft, nontender, no hepatosplenomegaly or other masses  M/S:   Gait and station abnormal -JEREMÍAS 2/2 patient being in w/c  Digits and nails abnormal - arthritic changes present  SKIN:  Inspection of skin and subcutaneous tissue abnormal, Palpation of skin and subcutaneous tissue " abnormal, erythema - left, lateral leg, calf  NEURO:   Cranial nerves 2-12 are normal tested and grossly at patient's baseline  PSYCH:  oriented X 3, normal insight, judgement and memory, affect and mood normal    Lab/Diagnostic data:   Recent labs in [facility] reviewed by me today.     ASSESSMENT/PLAN    ICD-10-CM    1. PAD (peripheral artery disease) (H)  I73.9 Acute on chronic - unstable. Increased swelling   2. Redness and swelling of lower leg  M79.89 Redness and pain as noted above - this was noted    R23.8 The AM prior to resident getting OOB - I inform her that this concerns me, as dependent redness is expected - but not increased redness while lying down. Will obtain CBC and CMP (for baseline monitoring) along with STAT US to R/o DVT of LLE. Staff to outline redness on LLE tonight and update writer tomorrow should redness increase beyond outline.   3. Screening for malignant neoplasm of colon needed  Z12.11 Acute - unstable. Patient in need of Colon Cancer screening - Staff to obtain stool sample for annual FIT testing   4. Screening for cervical cancer needed  Z12.4 Acute - unstable. Resident notes that she has NEVER had a pap smear in her entire life - none noted upon review of chart. Will have staff setup patient for Well woman exam - will need to inform Gyn clinic that she requires ADA accessible equipment to obtain screening   5. Breast cancer screening by mammogram needed  Z12.31 Acute - unstable. Resident has not had mammogram since admission to [facility] - concerns with FMHx of breast cancer - Staff to schedule Mammogram and update clinic/IR that she will require special accommodations for this as well           Electronically signed by:  Dr. Macarena Messina, APRN, DNP, AGNP-BC, PMHNP-[facility]  Office: [address] [place] [address]  [facility] Cell: [phone]  Fax: [phone]  Triage Phone: [phone]  Voicemail: [phone]    Email: [email or web address]                    Sincerely,        KRISTIN Fair CNP

## 2021-09-21 NOTE — PROGRESS NOTES
[facility] GERIATRIC SERVICES  Chief Complaint   Patient presents with     [facility]ulatory     [place] Medical Record Number:  [id number]  Place of Service where encounter took place:  [facility] () [[id number]]    HPI:    María Elena Saab  is 56 year old (1965), who is being seen today for a federally mandated E/M visit. Today's concerns are:     PAD (peripheral artery disease) (H)  Redness and swelling of lower leg  Screening for malignant neoplasm of colon  Screening for cervical cancer  Breast cancer screening by mammogram     Patient seen today for a regulatory visit in LT . She is met today in her room - staff are concerned with increased redness and swelling on her LLE - she does have chronic redness/edema 2/2 PVD but this appears notably worse than prior assessments per nursing - patient does note increased pain in LLE. Patient notes that she is WAY overdue for all her regular cancer screenings - this worries her as her family has a history of both colon and breast cancers; she notes she hasn't had any cancer screening offered to her since her admission to the facility back in 2016 - this is confirmed per review of her Epic chart. She would like to have these completed now if able. Therapies are not working with resident - she is on a walking program with staff. Appetite remains at baseline. She is sleeping well for the most part if she isn't bother by staff. Denies CP, palpitations, fatigue, nausea, vomiting, increased SOB/ESPITIA, fever, chills, and/or b/b concerns today.    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  RD reports that she is not willing to commit to the program outlined.  See scanned document.  12/15/2015 - Marcio Quinonez MD        Obesity 4/10/2015     LOPEZ (obstructive sleep apnea) 5/22/2015     Osteoarthritis      Peripheral vascular disease (H)      Pleural effusion      Pressure ulcer of foot      Rheumatoid arthritis (H) 12/30/2014    seronegative     Sepsis (H)      Seropositive rheumatoid arthritis (H) 1/19/2016     Vitamin D deficiency 8/26/2015     PAST SURGICAL HISTORY:   has a past surgical history that includes Total Knee Arthroplasty (Left, 2006); joint replacement; Total Hip Arthroplasty (Left, 10/18/2016); and Peripherally Inserted Central Catheter Insertion (Right, 07/14/2019).  FAMILY HISTORY: family history includes Arthritis in her mother; Breast Cancer in her cousin; Breast Cancer (age of onset: 50.00) in her maternal grandmother; Breast Cancer (age of onset: 54.00) in her maternal aunt and mother; Cancer in her mother; Cataracts in her father and mother; Deep Vein Thrombosis in her father; Heart Disease in her maternal grandmother; Multiple myeloma in her father; No Known Problems in her sister; Other - See Comments in her brother; Rheumatoid Arthritis in her maternal grandmother and paternal aunt; Sleep Apnea in her brother; Snoring in her father.  SOCIAL HISTORY:  reports that she has quit smoking. Her smoking use included cigarettes and cigarettes. She has a 30.00 pack-year smoking history. She has never used smokeless tobacco. She reports current alcohol use. She reports that she does not use drugs.    MEDICATIONS:  Current  Outpatient Medications   Medication Sig Dispense Refill     acetaminophen (TYLENOL) 325 MG tablet [ACETAMINOPHEN (TYLENOL) 325 MG TABLET] Take 650 mg by mouth every 6 (six) hours as needed for pain.              adalimumab (HUMIRA) 40 mg/0.8 mL injection [ADALIMUMAB (HUMIRA) 40 MG/0.8 ML INJECTION] Inject 0.8 mL (40 mg total) under the skin every 14 (fourteen) days. 1.6 mL 0     ascorbic acid, vitamin C, (ASCORBIC ACID WITH KAEL HIPS) 500 MG tablet [ASCORBIC ACID, VITAMIN C, (ASCORBIC ACID WITH KAEL HIPS) 500 MG TABLET] Take 500 mg by mouth 2 (two) times a day.       cholecalciferol, vitamin D3, (VITAMIN D3) 2,000 unit Tab [CHOLECALCIFEROL, VITAMIN D3, (VITAMIN D3) 2,000 UNIT TAB] Take 6,000 Units by mouth daily.       DULoxetine (CYMBALTA) 20 MG capsule [DULOXETINE (CYMBALTA) 20 MG CAPSULE] Take 1 capsule (20 mg total) by mouth daily. 90 capsule 2     folic acid (FOLVITE) 1 MG tablet Take 1 mg by mouth       hydroCHLOROthiazide (HYDRODIURIL) 25 MG tablet [HYDROCHLOROTHIAZIDE (HYDRODIURIL) 25 MG TABLET] Take 25 mg by mouth daily.       ibuprofen (ADVIL,MOTRIN) 200 MG tablet [IBUPROFEN (ADVIL,MOTRIN) 200 MG TABLET] Take 400 mg by mouth every 8 (eight) hours as needed for pain.              lanolin alcohol-mo-w.pet-ceres (EUCERIN) Crea [LANOLIN ALCOHOL-MO-W.PET-CERES (EUCERIN) CREA] Apply topically 2 (two) times a day. To feet       methotrexate 10 MG tablet [METHOTREXATE 10 MG TABLET] Take 1 tablet (10 mg total) by mouth once a week. 16 tablet 0     multivitamin therapeutic (THERAGRAN) tablet [MULTIVITAMIN THERAPEUTIC (THERAGRAN) TABLET] Take 1 tablet by mouth daily.       nystatin (MYCOSTATIN) powder [NYSTATIN (MYCOSTATIN) POWDER] Apply to rash areas twice daily. 15 g 0     oxyCODONE (OXY-IR) 5 mg capsule [OXYCODONE (OXY-IR) 5 MG CAPSULE] Take 1-2 tablets every 4 hours orally as needed for pain relief.  Use 1 tablet for 1-5/10 pain, take 2 tablets for 6-10/10 pain. 60 tablet 0     rivaroxaban ANTICOAGULANT (XARELTO)  "10 mg tablet [RIVAROXABAN ANTICOAGULANT (XARELTO) 10 MG TABLET] Take 10 mg by mouth daily.       adalimumab (HUMIRA PEN) 40 MG/0.8ML pen kit Inject 0.8 mLs (40 mg) Subcutaneous every 14 days 1.6 mL 5     adalimumab (HUMIRA) 20 MG/0.4ML prefilled syringe kit Inject 0.8 mLs (40 mg) Subcutaneous every 14 days 1.6 mL 11     Case Management:  I have reviewed the care plan and MDS and do agree with the plan. Patient's desire to return to the community is present, but is not able due to care needs . Information reviewed:  Medications, vital signs, orders, and nursing notes.    ROS:  10 point ROS of systems including Constitutional, Eyes, Respiratory, Cardiovascular, Gastroenterology, Genitourinary, Integumentary, Musculoskeletal, Psychiatric were all negative except for pertinent positives noted in my HPI.    Vitals:  BP (!) 144/78   Pulse 79   Temp 98.5  F (36.9  C)   Resp 20   Ht 1.676 m (5' 6\")   Wt (!) 155.9 kg (343 lb 11.2 oz)   SpO2 97%   BMI 55.47 kg/m    Body mass index is 55.47 kg/m .  Exam:  GENERAL APPEARANCE:  Alert, in no distress, oriented, morbidly obese, cooperative, middle-aged woman upright in w/c in room  EYES:  EOM, conjunctivae, lids, pupils and irises normal  RESP:  respiratory effort and palpation of chest normal, lungs clear to auscultation , no respiratory distress, diminished breath sounds throughout 2/2 body habitus  CV:  Palpation and auscultation of heart done , regular rate and rhythm, no murmur, rub, or gallop, +2 pedal pulses, peripheral edema 2-3+ in L>R  ABDOMEN:  normal bowel sounds, soft, nontender, no hepatosplenomegaly or other masses  M/S:   Gait and station abnormal -JEREMÍAS 2/2 patient being in w/c  Digits and nails abnormal - arthritic changes present  SKIN:  Inspection of skin and subcutaneous tissue abnormal, Palpation of skin and subcutaneous tissue abnormal, erythema - left, lateral leg, calf  NEURO:   Cranial nerves 2-12 are normal tested and grossly at patient's " baseline  PSYCH:  oriented X 3, normal insight, judgement and memory, affect and mood normal    Lab/Diagnostic data:   Recent labs in EPIC reviewed by me today.     ASSESSMENT/PLAN    ICD-10-CM    1. PAD (peripheral artery disease) (H)  I73.9 Acute on chronic - unstable. Increased swelling   2. Redness and swelling of lower leg  M79.89 Redness and pain as noted above - this was noted    R23.8 The AM prior to resident getting OOB - I inform her that this concerns me, as dependent redness is expected - but not increased redness while lying down. Will obtain CBC and CMP (for baseline monitoring) along with STAT US to R/o DVT of LLE. Staff to outline redness on LLE tonight and update writer tomorrow should redness increase beyond outline.   3. Screening for malignant neoplasm of colon needed  Z12.11 Acute - unstable. Patient in need of Colon Cancer screening - Staff to obtain stool sample for annual FIT testing   4. Screening for cervical cancer needed  Z12.4 Acute - unstable. Resident notes that she has NEVER had a pap smear in her entire life - none noted upon review of chart. Will have staff setup patient for Well woman exam - will need to inform Gyn clinic that she requires ADA accessible equipment to obtain screening   5. Breast cancer screening by mammogram needed  Z12.31 Acute - unstable. Resident has not had mammogram since admission to LTC - concerns with FMHx of breast cancer - Staff to schedule Mammogram and update clinic/IR that she will require special accommodations for this as well           Electronically signed by:  Dr. Macarena Messina, APRN, DNP, AGNP-BC, PMHNP-BC  [facility]  Office: [address] [place] [address] [address] [place] [address]  [facility] Cell: [phone]  Fax: [phone]  Triage Phone: [phone]  Voicemail: [phone]    Email: [email or web address]

## 2021-09-22 ENCOUNTER — LAB REQUISITION (OUTPATIENT)
Dept: LAB | Facility: CLINIC | Age: 56
End: 2021-09-22
Payer: COMMERCIAL

## 2021-09-22 DIAGNOSIS — I89.0 LYMPHEDEMA, NOT ELSEWHERE CLASSIFIED: ICD-10-CM

## 2021-09-22 DIAGNOSIS — M06.09 RHEUMATOID ARTHRITIS WITHOUT RHEUMATOID FACTOR, MULTIPLE SITES (H): ICD-10-CM

## 2021-09-22 DIAGNOSIS — M05.79 RHEUMATOID ARTHRITIS WITH RHEUMATOID FACTOR OF MULTIPLE SITES WITHOUT ORGAN OR SYSTEMS INVOLVEMENT (H): ICD-10-CM

## 2021-09-24 ENCOUNTER — TELEPHONE (OUTPATIENT)
Dept: RHEUMATOLOGY | Facility: CLINIC | Age: 56
End: 2021-09-24

## 2021-09-24 DIAGNOSIS — M15.0 PRIMARY OSTEOARTHRITIS INVOLVING MULTIPLE JOINTS: Primary | ICD-10-CM

## 2021-09-24 NOTE — TELEPHONE ENCOUNTER
[facility] Call Center    Phone Message    May a detailed message be left on voicemail: yes     Reason for Call: Medication Refill Request    Has the patient contacted the pharmacy for the refill? Yes   Name of medication being requested: Humira   Provider who prescribed the medication: Dr Humphrey  Pharmacy: [facility]   Date medication is needed: 9/27/21- Pt is completely out of refills.       Action Taken: Message routed to:  Other: Rheumatology Support Pool, W    Travel Screening: Not Applicable

## 2021-09-28 ENCOUNTER — NURSING HOME VISIT (OUTPATIENT)
Dept: GERIATRICS | Facility: CLINIC | Age: 56
End: 2021-09-28
Payer: COMMERCIAL

## 2021-09-28 VITALS
SYSTOLIC BLOOD PRESSURE: 135 MMHG | WEIGHT: 293 LBS | RESPIRATION RATE: 20 BRPM | TEMPERATURE: 98 F | BODY MASS INDEX: 47.09 KG/M2 | HEART RATE: 75 BPM | OXYGEN SATURATION: 96 % | DIASTOLIC BLOOD PRESSURE: 83 MMHG | HEIGHT: 66 IN

## 2021-09-28 DIAGNOSIS — Z53.9 ERRONEOUS ENCOUNTER--DISREGARD: Primary | ICD-10-CM

## 2021-09-28 ASSESSMENT — MIFFLIN-ST. JEOR: SCORE: 2155.76

## 2021-09-28 NOTE — LETTER
9/28/2021        RE: María Elena LAMB [place]  [address] [address]  [place] [address]        No notes on file      Sincerely,        KRISTIN Fair CNP

## 2021-10-05 ENCOUNTER — OFFICE VISIT (OUTPATIENT)
Dept: OBGYN | Facility: CLINIC | Age: 56
End: 2021-10-05
Attending: ADVANCED PRACTICE MIDWIFE
Payer: COMMERCIAL

## 2021-10-05 ENCOUNTER — ANCILLARY PROCEDURE (OUTPATIENT)
Dept: MAMMOGRAPHY | Facility: CLINIC | Age: 56
End: 2021-10-05
Attending: ADVANCED PRACTICE MIDWIFE
Payer: COMMERCIAL

## 2021-10-05 DIAGNOSIS — Z01.419 ENCOUNTER FOR GYNECOLOGICAL EXAMINATION WITHOUT ABNORMAL FINDING: Primary | ICD-10-CM

## 2021-10-05 DIAGNOSIS — Z12.31 VISIT FOR SCREENING MAMMOGRAM: ICD-10-CM

## 2021-10-05 DIAGNOSIS — Z12.4 SCREENING FOR MALIGNANT NEOPLASM OF CERVIX: ICD-10-CM

## 2021-10-05 PROCEDURE — G0145 SCR C/V CYTO,THINLAYER,RESCR: HCPCS | Performed by: ADVANCED PRACTICE MIDWIFE

## 2021-10-05 PROCEDURE — 77067 SCR MAMMO BI INCL CAD: CPT | Mod: 26 | Performed by: RADIOLOGY

## 2021-10-05 PROCEDURE — G0463 HOSPITAL OUTPT CLINIC VISIT: HCPCS | Mod: 25

## 2021-10-05 PROCEDURE — 87624 HPV HI-RISK TYP POOLED RSLT: CPT | Performed by: ADVANCED PRACTICE MIDWIFE

## 2021-10-05 PROCEDURE — G0101 CA SCREEN;PELVIC/BREAST EXAM: HCPCS | Performed by: ADVANCED PRACTICE MIDWIFE

## 2021-10-05 PROCEDURE — 77067 SCR MAMMO BI INCL CAD: CPT

## 2021-10-05 NOTE — PROGRESS NOTES
Progress Note    SUBJECTIVE:  María Elena Saab is an 56 year old  No obstetric history on file., who requests a breast and pelvic exam.  Patient is followed by provider in long term care facility.    Concerns today include: Wondering about colon cancer screening.    Menstrual History: Post-menopausal for greater than 10 years  No flowsheet data found.    Last PAP: unknown, patient reports that her last pap smear was over 10 years ago.      Mammogram current: patient completed screening today.    HISTORY:  Prescription Medications as of 10/5/2021       Rx Number Disp Refills Start End Last Dispensed Date Next Fill Date Owning Pharmacy    acetaminophen (TYLENOL) 325 MG tablet    4/26/2019        Sig: [ACETAMINOPHEN (TYLENOL) 325 MG TABLET] Take 650 mg by mouth every 6 (six) hours as needed for pain.           Class: Historical    Route: Oral    adalimumab (HUMIRA PEN) 40 MG/0.8ML pen kit  1.6 mL 5 9/27/2021    [address]    Sig: Inject 0.8 mLs (40 mg) Subcutaneous every 14 days    Class: E-Prescribe    Route: Subcutaneous    adalimumab (HUMIRA) 20 MG/0.4ML prefilled syringe kit  1.6 mL 11 9/24/2021    [address]    Sig: Inject 0.8 mLs (40 mg) Subcutaneous every 14 days    Class: Local Print    Notes to Pharmacy: SHRED- WRITTEN INCORRECTLY    Route: Subcutaneous    adalimumab (HUMIRA) 40 mg/0.8 mL injection  1.6 mL 0 4/19/2021        Sig: [ADALIMUMAB (HUMIRA) 40 MG/0.8 ML INJECTION] Inject 0.8 mL (40 mg total) under the skin every 14 (fourteen) days.    Notes to Pharmacy: PLEASE DISPENSE PENS Appeal Completed?->No Restricted Payer?->No    Route: Subcutaneous    ascorbic acid, vitamin C, (ASCORBIC ACID WITH KAEL HIPS) 500 MG tablet    7/20/2016        Sig: [ASCORBIC ACID, VITAMIN C, (ASCORBIC ACID WITH KAEL HIPS) 500 MG TABLET] Take 500 mg by mouth 2 (two) times a day.    Class: Historical    Route: Oral    cholecalciferol, vitamin D3,  (VITAMIN D3) 2,000 unit Tab    10/18/2016        Sig: [CHOLECALCIFEROL, VITAMIN D3, (VITAMIN D3) 2,000 UNIT TAB] Take 6,000 Units by mouth daily.    Class: Historical    Route: Oral    folic acid (FOLVITE) 1 MG tablet    3/23/2020        Sig: Take 1 mg by mouth    Class: Historical    Route: Oral    hydroCHLOROthiazide (HYDRODIURIL) 25 MG tablet    9/25/2017        Sig: [HYDROCHLOROTHIAZIDE (HYDRODIURIL) 25 MG TABLET] Take 25 mg by mouth daily.    Class: Historical    Route: Oral    ibuprofen (ADVIL,MOTRIN) 200 MG tablet    10/13/2016        Sig: [IBUPROFEN (ADVIL,MOTRIN) 200 MG TABLET] Take 400 mg by mouth every 8 (eight) hours as needed for pain.           Class: Historical    Route: Oral    lanolin alcohol-mo-w.pet-ceres (EUCERIN) Crea    2/19/2021        Sig: [LANOLIN ALCOHOL-MO-W.PET-CERES (EUCERIN) CREA] Apply topically 2 (two) times a day. To feet    Class: Historical    Route: Topical    multivitamin therapeutic (THERAGRAN) tablet    10/13/2016        Sig: [MULTIVITAMIN THERAPEUTIC (THERAGRAN) TABLET] Take 1 tablet by mouth daily.    Class: Historical    Route: Oral    nystatin (MYCOSTATIN) powder  15 g 0 9/24/2019        Sig: [NYSTATIN (MYCOSTATIN) POWDER] Apply to rash areas twice daily.    Class: Historical    oxyCODONE (OXY-IR) 5 mg capsule  60 tablet 0 9/21/2019        Sig: [OXYCODONE (OXY-IR) 5 MG CAPSULE] Take 1-2 tablets every 4 hours orally as needed for pain relief.  Use 1 tablet for 1-5/10 pain, take 2 tablets for 6-10/10 pain.    rivaroxaban ANTICOAGULANT (XARELTO) 10 mg tablet    4/20/2020        Sig: [RIVAROXABAN ANTICOAGULANT (XARELTO) 10 MG TABLET] Take 10 mg by mouth daily.    Class: Historical    Route: Oral    DULoxetine (CYMBALTA) 20 MG capsule  90 capsule 2 12/12/2019 9/21/2021       Sig: [DULOXETINE (CYMBALTA) 20 MG CAPSULE] Take 1 capsule (20 mg total) by mouth daily.    Route: Oral    methotrexate 10 MG tablet  16 tablet 0 10/1/2020 9/21/2021       Sig: [METHOTREXATE 10 MG TABLET]  Take 1 tablet (10 mg total) by mouth once a week.    Class: No Print Out    Route: Oral        Allergies   Allergen Reactions     Adhesive Tape Other (See Comments)     As on band-aids;  Causes skin tearing/wounds.     Adhesive [Mecrylate] Other (See Comments)     As on band-aids;  Causes skin tearing/wounds.     Banana Unknown     She avoids due to Latex allergy.  If she eats them 3 days in a row, gets stomach cramps and dark stool.  TBrinkMD - 4/10/15     Grape [Grape (Artificial) Flavor] Unknown     She avoids due to Latex allergy.  If she eats lots of them, and she likes them, stomach gets a little queasy.  TBrinkMD - 4/10/15     Morphine (Pf) [Morphine] Other (See Comments)     Pt has not reacted to this med herself, but mother has anaphylactic reaction and other family members get nausea/vomiting.     Other Environmental Allergy Swelling     leather     Pine      Pseudoephedrine Cold/Allergy [Cpm-Pse Cr] Other (See Comments)     Keeps pt awake up to 24 hours      Latex Rash     Hands get red from rubber gloves, but okay if she washes them right away.  Hands get red from rubber gloves, but okay if she washes them right away.     Other Food Allergy Rash     Pine trees, leather .      Immunization History   Administered Date(s) Administered     COVID-19,PF,Pfizer 04/22/2021, 05/20/2021     Flu, Unspecified 03/28/2016, 10/10/2017, 10/11/2018, 10/10/2019     Influenza Vaccine IM > 6 months Valent IIV4 (Alfuria,Fluzone) 10/16/2014     Pneumo Conj 13-V (2010&after) 04/10/2015     TD (ADULT, 7+) 04/19/2004     Td (Adult), Adsorbed 12/05/1990     Tdap (Adacel,Boostrix) 04/10/2015       OB History   No obstetric history on file.     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  RD reports that she is not willing to commit to the program outlined.  See scanned document.  12/15/2015 - Marcio Quinonez MD        Obesity 4/10/2015     LOPEZ (obstructive sleep apnea) 5/22/2015     Osteoarthritis      Peripheral vascular disease (H)      Pleural effusion      Pressure ulcer of foot      Rheumatoid arthritis (H) 12/30/2014    seronegative     Sepsis (H)      Seropositive rheumatoid arthritis (H) 1/19/2016     Vitamin D deficiency 8/26/2015     Past Surgical History:   Procedure Laterality Date     JOINT REPLACEMENT      knee     PERIPHERALLY INSERTED CENTRAL CATHETER INSERTION Right 07/14/2019    4fr/44cm/Rt Cephalic.lowSVC.MGlav     TOTAL HIP ARTHROPLASTY Left 10/18/2016    Procedure: LEFT HIP TOTAL ARTHROPLASTY;  Surgeon: London Goss MD;  Location: [facility] OR;  Service:      TOTAL KNEE ARTHROPLASTY Left 2006     Family History   Problem Relation Age of Onset     Multiple myeloma Father      Deep Vein Thrombosis Father      Cataracts Father      Snoring Father      Other - See Comments Brother         Blood withdrawn from time to time.  Sounds like hemochromatosis.     Sleep Apnea Brother      Cancer Mother         Uterine CA     Arthritis Mother      Cataracts Mother      Breast Cancer Mother 54.00     No Known Problems Sister      Rheumatoid Arthritis Maternal Grandmother      Breast Cancer Maternal Grandmother 50.00     Heart Disease Maternal Grandmother      Rheumatoid Arthritis Paternal Aunt      Breast Cancer Maternal Aunt 54.00     Breast Cancer Cousin      Clotting Disorder No family hx of      Social History     Socioeconomic History     Marital status: Single     Spouse name: None     Number of children: None     Years of education: None      Highest education level: None   Occupational History     None   Tobacco Use     Smoking status: Former Smoker     Packs/day: 1.00     Years: 30.00     Pack years: 30.00     Types: Cigarettes, Cigarettes     Smokeless tobacco: Never Used   Substance and Sexual Activity     Alcohol use: Yes     Comment: Alcoholic Drinks/day: 3-4 times a year she will have 1 or 2.     Drug use: No     Sexual activity: Never     Partners: Female   Other Topics Concern     None   Social History Narrative    Long term  At [facility] to rehab to level allowing surgery on deteriorated joints  LTC12/2015     Social Determinants of Health     Financial Resource Strain:      Difficulty of Paying Living Expenses:    Food Insecurity:      Worried About Running Out of Food in the Last Year:      Ran Out of Food in the Last Year:    Transportation Needs:      Lack of Transportation (Medical):      Lack of Transportation (Non-Medical):    Physical Activity:      Days of Exercise per Week:      Minutes of Exercise per Session:    Stress:      Feeling of Stress :    Social Connections:      Frequency of Communication with Friends and Family:      Frequency of Social Gatherings with Friends and Family:      Attends Zoroastrianism Services:      Active Member of Clubs or Organizations:      Attends Club or Organization Meetings:      Marital Status:    Intimate Partner Violence:      Fear of Current or Ex-Partner:      Emotionally Abused:      Physically Abused:      Sexually Abused:        ROS    EXAM:  There were no vitals taken for this visit. There is no height or weight on file to calculate BMI.  General appearance: Pleasant female in no acute distress.     BREAST EXAM:  Deferred, normal mammogram today    PELVIC EXAM:  EG/BUS: Normal genital architecture without lesions, erythema or abnormal secretions Bartholin's, Urethra, [place]'s normal   Urethral meatus: normal   Urethra: no masses, tenderness, or scarring   Bladder: no masses or tenderness    Vagina: atrophic, thin, dry with physiologic discharge  secretions  Cervix: no lesions  Rectum:anus normal       ASSESSMENT:  Encounter Diagnoses   Name Primary?     Screening for malignant neoplasm of cervix      Encounter for gynecological examination without abnormal finding       56 year old Female Pelvic Exam and pap smear    PLAN:   Orders Placed This Encounter   Procedures     Pelvic and Breast Exam Procedure []     Pap Smear Exam []     Follow up with primary care provider for colon cancer screening options.  Return in one year/PRN for preventive care or problems/concerns.     Verbalized understanding and agreement with visit plan.    KRISTIN Villatoro CNM

## 2021-10-05 NOTE — LETTER
10/5/2021       RE: María Elena Saab  [facility]  [address]  [place] [address]     Dear Colleague,    Thank you for referring your patient, María Elena Saab, to the [facility] [place] at [facility]. Please see a copy of my visit note below.    Progress Note    SUBJECTIVE:  María Elena Saab is an 56 year old  No obstetric history on file., who requests a breast and pelvic exam.  Patient is followed by provider in long term care facility.    Concerns today include: Wondering about colon cancer screening.    Menstrual History: Post-menopausal for greater than 10 years  No flowsheet data found.    Last PAP: unknown, patient reports that her last pap smear was over 10 years ago.      Mammogram current: patient completed screening today.    HISTORY:  Prescription Medications as of 10/5/2021       Rx Number Disp Refills Start End Last Dispensed Date Next Fill Date Owning Pharmacy    acetaminophen (TYLENOL) 325 MG tablet    4/26/2019        Sig: [ACETAMINOPHEN (TYLENOL) 325 MG TABLET] Take 650 mg by mouth every 6 (six) hours as needed for pain.           Class: Historical    Route: Oral    adalimumab (HUMIRA PEN) 40 MG/0.8ML pen kit  1.6 mL 5 9/27/2021    [address]    Sig: Inject 0.8 mLs (40 mg) Subcutaneous every 14 days    Class: E-Prescribe    Route: Subcutaneous    adalimumab (HUMIRA) 20 MG/0.4ML prefilled syringe kit  1.6 mL 11 9/24/2021    [address]    Sig: Inject 0.8 mLs (40 mg) Subcutaneous every 14 days    Class: Local Print    Notes to Pharmacy: SHRED- WRITTEN INCORRECTLY    Route: Subcutaneous    adalimumab (HUMIRA) 40 mg/0.8 mL injection  1.6 mL 0 4/19/2021        Sig: [ADALIMUMAB (HUMIRA) 40 MG/0.8 ML INJECTION] Inject 0.8 mL (40 mg total) under the skin every 14 (fourteen) days.    Notes to Pharmacy: PLEASE DISPENSE PENS Appeal  Completed?->No Restricted Payer?->No    Route: Subcutaneous    ascorbic acid, vitamin C, (ASCORBIC ACID WITH KAEL HIPS) 500 MG tablet    7/20/2016        Sig: [ASCORBIC ACID, VITAMIN C, (ASCORBIC ACID WITH KAEL HIPS) 500 MG TABLET] Take 500 mg by mouth 2 (two) times a day.    Class: Historical    Route: Oral    cholecalciferol, vitamin D3, (VITAMIN D3) 2,000 unit Tab    10/18/2016        Sig: [CHOLECALCIFEROL, VITAMIN D3, (VITAMIN D3) 2,000 UNIT TAB] Take 6,000 Units by mouth daily.    Class: Historical    Route: Oral    folic acid (FOLVITE) 1 MG tablet    3/23/2020        Sig: Take 1 mg by mouth    Class: Historical    Route: Oral    hydroCHLOROthiazide (HYDRODIURIL) 25 MG tablet    9/25/2017        Sig: [HYDROCHLOROTHIAZIDE (HYDRODIURIL) 25 MG TABLET] Take 25 mg by mouth daily.    Class: Historical    Route: Oral    ibuprofen (ADVIL,MOTRIN) 200 MG tablet    10/13/2016        Sig: [IBUPROFEN (ADVIL,MOTRIN) 200 MG TABLET] Take 400 mg by mouth every 8 (eight) hours as needed for pain.           Class: Historical    Route: Oral    lanolin alcohol-mo-w.pet-ceres (EUCERIN) Crea    2/19/2021        Sig: [LANOLIN ALCOHOL-MO-W.PET-CERES (EUCERIN) CREA] Apply topically 2 (two) times a day. To feet    Class: Historical    Route: Topical    multivitamin therapeutic (THERAGRAN) tablet    10/13/2016        Sig: [MULTIVITAMIN THERAPEUTIC (THERAGRAN) TABLET] Take 1 tablet by mouth daily.    Class: Historical    Route: Oral    nystatin (MYCOSTATIN) powder  15 g 0 9/24/2019        Sig: [NYSTATIN (MYCOSTATIN) POWDER] Apply to rash areas twice daily.    Class: Historical    oxyCODONE (OXY-IR) 5 mg capsule  60 tablet 0 9/21/2019        Sig: [OXYCODONE (OXY-IR) 5 MG CAPSULE] Take 1-2 tablets every 4 hours orally as needed for pain relief.  Use 1 tablet for 1-5/10 pain, take 2 tablets for 6-10/10 pain.    rivaroxaban ANTICOAGULANT (XARELTO) 10 mg tablet    4/20/2020        Sig: [RIVAROXABAN ANTICOAGULANT (XARELTO) 10 MG TABLET] Take 10  mg by mouth daily.    Class: Historical    Route: Oral    DULoxetine (CYMBALTA) 20 MG capsule  90 capsule 2 12/12/2019 9/21/2021       Sig: [DULOXETINE (CYMBALTA) 20 MG CAPSULE] Take 1 capsule (20 mg total) by mouth daily.    Route: Oral    methotrexate 10 MG tablet  16 tablet 0 10/1/2020 9/21/2021       Sig: [METHOTREXATE 10 MG TABLET] Take 1 tablet (10 mg total) by mouth once a week.    Class: No Print Out    Route: Oral        Allergies   Allergen Reactions     Adhesive Tape Other (See Comments)     As on band-aids;  Causes skin tearing/wounds.     Adhesive [Mecrylate] Other (See Comments)     As on band-aids;  Causes skin tearing/wounds.     Banana Unknown     She avoids due to Latex allergy.  If she eats them 3 days in a row, gets stomach cramps and dark stool.  TBrinkMD - 4/10/15     Grape [Grape (Artificial) Flavor] Unknown     She avoids due to Latex allergy.  If she eats lots of them, and she likes them, stomach gets a little queasy.  TBrinkMD - 4/10/15     Morphine (Pf) [Morphine] Other (See Comments)     Pt has not reacted to this med herself, but mother has anaphylactic reaction and other family members get nausea/vomiting.     Other Environmental Allergy Swelling     leather     Pine      Pseudoephedrine Cold/Allergy [Cpm-Pse Cr] Other (See Comments)     Keeps pt awake up to 24 hours      Latex Rash     Hands get red from rubber gloves, but okay if she washes them right away.  Hands get red from rubber gloves, but okay if she washes them right away.     Other Food Allergy Rash     Pine trees, leather .      Immunization History   Administered Date(s) Administered     COVID-19,PF,Pfizer 04/22/2021, 05/20/2021     Flu, Unspecified 03/28/2016, 10/10/2017, 10/11/2018, 10/10/2019     Influenza Vaccine IM > 6 months Valent IIV4 (Alfuria,Fluzone) 10/16/2014     Pneumo Conj 13-V (2010&after) 04/10/2015     TD (ADULT, 7+) 04/19/2004     Td (Adult), Adsorbed 12/05/1990     Tdap (Adacel,Boostrix) 04/10/2015        OB History   No obstetric history on file.     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  RD reports that she is not willing to commit to the program outlined.  See scanned document.  12/15/2015 - Marcio Quinonez MD        Obesity 4/10/2015     LOPEZ (obstructive sleep apnea) 5/22/2015     Osteoarthritis      Peripheral vascular disease (H)      Pleural effusion      Pressure ulcer of foot      Rheumatoid arthritis (H) 12/30/2014    seronegative     Sepsis (H)      Seropositive rheumatoid arthritis (H) 1/19/2016     Vitamin D deficiency 8/26/2015     Past Surgical History:   Procedure Laterality Date     JOINT REPLACEMENT      knee     PERIPHERALLY INSERTED CENTRAL CATHETER INSERTION Right 07/14/2019    4fr/44cm/Rt Cephalic.lowSVC.MGlav     TOTAL HIP ARTHROPLASTY Left 10/18/2016    Procedure: LEFT HIP TOTAL ARTHROPLASTY;  Surgeon: London Goss MD;  Location: [facility];  Service:      TOTAL KNEE ARTHROPLASTY Left 2006     Family History   Problem Relation Age of Onset     Multiple myeloma Father      Deep Vein Thrombosis Father      Cataracts Father      Snoring Father      Other - See Comments Brother         Blood withdrawn from time to time.  Sounds like hemochromatosis.     Sleep Apnea Brother      Cancer Mother         Uterine CA     Arthritis Mother      Cataracts Mother      Breast Cancer Mother 54.00     No Known Problems Sister      Rheumatoid Arthritis Maternal Grandmother      Breast Cancer Maternal Grandmother  50.00     Heart Disease Maternal Grandmother      Rheumatoid Arthritis Paternal Aunt      Breast Cancer Maternal Aunt 54.00     Breast Cancer Cousin      Clotting Disorder No family hx of      Social History     Socioeconomic History     Marital status: Single     Spouse name: None     Number of children: None     Years of education: None     Highest education level: None   Occupational History     None   Tobacco Use     Smoking status: Former Smoker     Packs/day: 1.00     Years: 30.00     Pack years: 30.00     Types: Cigarettes, Cigarettes     Smokeless tobacco: Never Used   Substance and Sexual Activity     Alcohol use: Yes     Comment: Alcoholic Drinks/day: 3-4 times a year she will have 1 or 2.     Drug use: No     Sexual activity: Never     Partners: Female   Other Topics Concern     None   Social History Narrative    Long term  At [facility] to rehab to level allowing surgery on deteriorated joints  LTC12/2015     Social Determinants of Health     Financial Resource Strain:      Difficulty of Paying Living Expenses:    Food Insecurity:      Worried About Running Out of Food in the Last Year:      Ran Out of Food in the Last Year:    Transportation Needs:      Lack of Transportation (Medical):      Lack of Transportation (Non-Medical):    Physical Activity:      Days of Exercise per Week:      Minutes of Exercise per Session:    Stress:      Feeling of Stress :    Social Connections:      Frequency of Communication with Friends and Family:      Frequency of Social Gatherings with Friends and Family:      Attends L[employer] Services:      Active Member of Clubs or Organizations:      Attends Club or Organization Meetings:      Marital Status:    Intimate Partner Violence:      Fear of Current or Ex-Partner:      Emotionally Abused:      Physically Abused:      Sexually Abused:        ROS    EXAM:  There were no vitals taken for this visit. There is no height or weight on file to calculate BMI.  General  appearance: Pleasant female in no acute distress.     BREAST EXAM:  Deferred, normal mammogram today    PELVIC EXAM:  EG/BUS: Normal genital architecture without lesions, erythema or abnormal secretions Bartholin's, Urethra, [place]'s normal   Urethral meatus: normal   Urethra: no masses, tenderness, or scarring   Bladder: no masses or tenderness   Vagina: atrophic, thin, dry with physiologic discharge  secretions  Cervix: no lesions  Rectum:anus normal       ASSESSMENT:  Encounter Diagnoses   Name Primary?     Screening for malignant neoplasm of cervix      Encounter for gynecological examination without abnormal finding       56 year old Female Pelvic Exam and pap smear    PLAN:   Orders Placed This Encounter   Procedures     Pelvic and Breast Exam Procedure []     Pap Smear Exam []     Follow up with primary care provider for colon cancer screening options.  Return in one year/PRN for preventive care or problems/concerns.     Verbalized understanding and agreement with visit plan.    Catarina Gaston, APRN CNM

## 2021-10-05 NOTE — LETTER
October 12, 2021      María Elena Saab  [facility] [place]  [address]  [address]  [place] [address]        Dear ,    We are happy to inform you that your recent Pap smear and Human Papillomavirus (HPV) test results are normal and negative.    It is recommended that you have your next Pap smear and Human Papillomavirus (HPV) test in 5 years. You will also need to return to the clinic every year for an annual wellness visit.    If you have additional questions regarding this result, please contact our office and we will be happy to assist you.      Sincerely,    Your [facility] Care Team

## 2021-10-07 LAB
BKR LAB AP GYN ADEQUACY: NORMAL
BKR LAB AP GYN INTERPRETATION: NORMAL
BKR LAB AP HPV REFLEX: NORMAL
BKR LAB AP PREVIOUS ABNORMAL: NORMAL
PATH REPORT.COMMENTS IMP SPEC: NORMAL
PATH REPORT.RELEVANT HX SPEC: NORMAL

## 2021-10-08 ENCOUNTER — LAB REQUISITION (OUTPATIENT)
Dept: LAB | Facility: CLINIC | Age: 56
End: 2021-10-08
Payer: COMMERCIAL

## 2021-10-11 LAB
HUMAN PAPILLOMA VIRUS 16 DNA: NEGATIVE
HUMAN PAPILLOMA VIRUS 18 DNA: NEGATIVE
HUMAN PAPILLOMA VIRUS FINAL DIAGNOSIS: NORMAL
HUMAN PAPILLOMA VIRUS OTHER HR: NEGATIVE

## 2021-10-14 ENCOUNTER — LAB REQUISITION (OUTPATIENT)
Dept: LAB | Facility: CLINIC | Age: 56
End: 2021-10-14
Payer: COMMERCIAL

## 2021-10-14 PROCEDURE — 82274 ASSAY TEST FOR BLOOD FECAL: CPT | Mod: ORL | Performed by: NURSE PRACTITIONER

## 2021-10-15 LAB — HEMOCCULT STL QL IA: POSITIVE

## 2021-10-16 ENCOUNTER — TELEPHONE (OUTPATIENT)
Dept: GERIATRICS | Facility: CLINIC | Age: 56
End: 2021-10-16

## 2021-10-16 NOTE — TELEPHONE ENCOUNTER
[facility] called to report the FIT test results which were positive.    Was going to order a HgB test but one was done on 9/15/21 and it was 14.0    Will just sent this on to primary NP.  Facility sent her results already as well.    Electronically signed by Madison Cedeño RN, CNP

## 2021-10-19 ENCOUNTER — NURSING HOME VISIT (OUTPATIENT)
Dept: GERIATRICS | Facility: CLINIC | Age: 56
End: 2021-10-19
Payer: COMMERCIAL

## 2021-10-19 VITALS
SYSTOLIC BLOOD PRESSURE: 144 MMHG | BODY MASS INDEX: 47.09 KG/M2 | DIASTOLIC BLOOD PRESSURE: 77 MMHG | OXYGEN SATURATION: 97 % | RESPIRATION RATE: 16 BRPM | TEMPERATURE: 98.5 F | WEIGHT: 293 LBS | HEIGHT: 66 IN | HEART RATE: 69 BPM

## 2021-10-19 DIAGNOSIS — Z12.11 SCREEN FOR COLON CANCER: ICD-10-CM

## 2021-10-19 DIAGNOSIS — Z12.31 ENCOUNTER FOR SCREENING MAMMOGRAM FOR BREAST CANCER: ICD-10-CM

## 2021-10-19 DIAGNOSIS — R19.5 POSITIVE FIT (FECAL IMMUNOCHEMICAL TEST): Primary | ICD-10-CM

## 2021-10-19 DIAGNOSIS — Z12.4 PAP SMEAR FOR CERVICAL CANCER SCREENING: ICD-10-CM

## 2021-10-19 DIAGNOSIS — Z23 NEED FOR SHINGLES VACCINE: ICD-10-CM

## 2021-10-19 PROCEDURE — 99309 SBSQ NF CARE MODERATE MDM 30: CPT | Performed by: NURSE PRACTITIONER

## 2021-10-19 ASSESSMENT — MIFFLIN-ST. JEOR: SCORE: 2106.32

## 2021-10-19 NOTE — LETTER
10/19/2021        RE: María Elena Saab  [facility]  [address]  [place] [address]        [facility] SERVICES    Chief Complaint   Patient presents with     RECHECK     HPI:  María Elena Saab is a 56 year old  (1965), who is being seen today for an episodic care visit at: [facility] () [[id number]]. Today's concern is:      Positive FIT (fecal immunochemical test)  Screen for colon cancer  Encounter for screening mammogram for breast cancer  Pap smear for cervical cancer screening  Need for shingles vaccine    Patient seen today for an acute visit in [facility]. We meet today to discuss recent preventative screening tet results. Unfortunately, her recent FIT test was positive - thus, she has been referred for a colonoscopy - she is in agreement with this plan. Her recent Pap and Mammogram were WNL. We will have her continue to complete mammograms annually do to her extensive FMHx of breast cancer; her next Pap won't be due for another 5 years unless concerns arise . Patient notes she is doing okay overall. Appetite remains at baseline. She is sleeping well. Denies CP, palpitations, fatigue, nausea, vomiting, increased SOB/ESPITIA, fever, chills, and/or b/b concerns today.    Allergies, and PMH/PSH reviewed in EPIC today.    Current Outpatient Medications   Medication Sig Dispense Refill     acetaminophen (TYLENOL) 325 MG tablet [ACETAMINOPHEN (TYLENOL) 325 MG TABLET] Take 650 mg by mouth every 6 (six) hours as needed for pain.              adalimumab (HUMIRA PEN) 40 MG/0.8ML pen kit Inject 0.8 mLs (40 mg) Subcutaneous every 14 days 1.6 mL 5     adalimumab (HUMIRA) 20 MG/0.4ML prefilled syringe kit Inject 0.8 mLs (40 mg) Subcutaneous every 14 days 1.6 mL 11     adalimumab (HUMIRA) 40 mg/0.8 mL injection [ADALIMUMAB (HUMIRA) 40 MG/0.8 ML INJECTION] Inject 0.8 mL (40 mg total) under the skin every 14 (fourteen) days. 1.6 mL 0     ascorbic acid, vitamin C, (ASCORBIC ACID  WITH KAEL HIPS) 500 MG tablet [ASCORBIC ACID, VITAMIN C, (ASCORBIC ACID WITH KAEL HIPS) 500 MG TABLET] Take 500 mg by mouth 2 (two) times a day.       cholecalciferol, vitamin D3, (VITAMIN D3) 2,000 unit Tab [CHOLECALCIFEROL, VITAMIN D3, (VITAMIN D3) 2,000 UNIT TAB] Take 6,000 Units by mouth daily.       DULoxetine (CYMBALTA) 20 MG capsule [DULOXETINE (CYMBALTA) 20 MG CAPSULE] Take 1 capsule (20 mg total) by mouth daily. 90 capsule 2     folic acid (FOLVITE) 1 MG tablet Take 1 mg by mouth       hydroCHLOROthiazide (HYDRODIURIL) 25 MG tablet [HYDROCHLOROTHIAZIDE (HYDRODIURIL) 25 MG TABLET] Take 25 mg by mouth daily.       ibuprofen (ADVIL,MOTRIN) 200 MG tablet [IBUPROFEN (ADVIL,MOTRIN) 200 MG TABLET] Take 400 mg by mouth every 8 (eight) hours as needed for pain.              lanolin alcohol-mo-w.pet-ceres (EUCERIN) Crea [LANOLIN ALCOHOL-MO-W.PET-CERES (EUCERIN) CREA] Apply topically 2 (two) times a day. To feet       methotrexate 10 MG tablet [METHOTREXATE 10 MG TABLET] Take 1 tablet (10 mg total) by mouth once a week. 16 tablet 0     multivitamin therapeutic (THERAGRAN) tablet [MULTIVITAMIN THERAPEUTIC (THERAGRAN) TABLET] Take 1 tablet by mouth daily.       nystatin (MYCOSTATIN) powder [NYSTATIN (MYCOSTATIN) POWDER] Apply to rash areas twice daily. 15 g 0     oxyCODONE (OXY-IR) 5 mg capsule [OXYCODONE (OXY-IR) 5 MG CAPSULE] Take 1-2 tablets every 4 hours orally as needed for pain relief.  Use 1 tablet for 1-5/10 pain, take 2 tablets for 6-10/10 pain. 60 tablet 0     rivaroxaban ANTICOAGULANT (XARELTO) 10 mg tablet [RIVAROXABAN ANTICOAGULANT (XARELTO) 10 MG TABLET] Take 10 mg by mouth daily.         REVIEW OF SYSTEMS:  10 point ROS of systems including Constitutional, Eyes, Respiratory, Cardiovascular, Gastroenterology, Genitourinary, Integumentary, Musculoskeletal, Psychiatric were all negative except for pertinent positives noted in my HPI.    Objective:   BP (!) 144/77   Pulse 69   Temp 98.5  F (36.9  C)   Resp  "16   Ht 1.676 m (5' 6\")   Wt (!) 151 kg (332 lb 12.8 oz)   SpO2 97%   BMI 53.72 kg/m    GENERAL APPEARANCE:  Alert, in no distress, oriented, morbidly obese, cooperative, middle-aged woman upright in w/c in room  EYES:  EOM, conjunctivae, lids, pupils and irises normal  RESP:  respiratory effort normal, no respiratory distress  CV:  Peripheral edema 2-3+ in L>R  M/S:   Gait and station abnormal -JEREMÍAS 2/2 patient being in w/c; Digits and nails abnormal - arthritic changes present  SKIN:  Inspection of skin and subcutaneous tissue abnormal, Palpation of skin and subcutaneous tissue baseline  PSYCH:  oriented X 3, normal insight, judgement and memory, affect and mood normal    Recent labs in [facility] reviewed by me today.  and Pertinent Imaging: Mammogram - reviewed in [facility]    Assessment/Plan:    ICD-10-CM    1. Positive FIT (fecal immunochemical test)  R19.5 Acute- unstable. Resident's recent FIT test for   2. Screen for colon cancer  Z12.11 Annual colon cancer screening positive - she will be setup for colonoscopy for further evaluation.   3. Encounter for screening mammogram for breast cancer  Z12.31 Acute - stable. Mammogram completed - normal examination. Plan next mammogram annually 2/2 FMHx heavy for breast cancer.   4. Pap smear for cervical cancer screening  Z12.4 Acute - stable. Recent Pap WNL. Next planned for 5y time unless symptomatic.         Electronically signed by:   KRISTIN Bills, DNP, AGNP-BC, PMHNP-[facility]  Office: [address] [place] [address]  [facility] Cell: [phone]  Fax: [phone]  Triage Phone: [phone]  Voicemail: [phone]    Email: [email or web address]                     Sincerely,        KRISTIN Fair CNP      "

## 2021-10-27 NOTE — PROGRESS NOTES
Encounter opened in error. Please disregard.     Dr. Macarena Messina, APRN, DNP, AGNP-BC, PMHNP-BC  [facility]  Office: [address] [place] [address]  [facility] Cell: [phone]  Fax: [phone]  Triage Phone: [phone]  Voicemail: [phone]    Email: [email or web address]

## 2021-11-06 NOTE — PROGRESS NOTES
[facility] GERIATRIC SERVICES    Chief Complaint   Patient presents with     RECHECK     HPI:  María Elena Saab is a 56 year old  (1965), who is being seen today for an episodic care visit at: [facility] () [[id number]]. Today's concern is:      Positive FIT (fecal immunochemical test)  Screen for colon cancer  Encounter for screening mammogram for breast cancer  Pap smear for cervical cancer screening  Need for shingles vaccine    Patient seen today for an acute visit in [facility]. We meet today to discuss recent preventative screening tet results. Unfortunately, her recent FIT test was positive - thus, she has been referred for a colonoscopy - she is in agreement with this plan. Her recent Pap and Mammogram were WNL. We will have her continue to complete mammograms annually do to her extensive FMHx of breast cancer; her next Pap won't be due for another 5 years unless concerns arise . Patient notes she is doing okay overall. Appetite remains at baseline. She is sleeping well. Denies CP, palpitations, fatigue, nausea, vomiting, increased SOB/ESPITIA, fever, chills, and/or b/b concerns today.    Allergies, and PMH/PSH reviewed in EPIC today.    Current Outpatient Medications   Medication Sig Dispense Refill     acetaminophen (TYLENOL) 325 MG tablet [ACETAMINOPHEN (TYLENOL) 325 MG TABLET] Take 650 mg by mouth every 6 (six) hours as needed for pain.              adalimumab (HUMIRA PEN) 40 MG/0.8ML pen kit Inject 0.8 mLs (40 mg) Subcutaneous every 14 days 1.6 mL 5     adalimumab (HUMIRA) 20 MG/0.4ML prefilled syringe kit Inject 0.8 mLs (40 mg) Subcutaneous every 14 days 1.6 mL 11     adalimumab (HUMIRA) 40 mg/0.8 mL injection [ADALIMUMAB (HUMIRA) 40 MG/0.8 ML INJECTION] Inject 0.8 mL (40 mg total) under the skin every 14 (fourteen) days. 1.6 mL 0     ascorbic acid, vitamin C, (ASCORBIC ACID WITH KAEL HIPS) 500 MG tablet [ASCORBIC ACID, VITAMIN C, (ASCORBIC ACID WITH KAEL HIPS) 500 MG TABLET] Take 500 mg by  "mouth 2 (two) times a day.       cholecalciferol, vitamin D3, (VITAMIN D3) 2,000 unit Tab [CHOLECALCIFEROL, VITAMIN D3, (VITAMIN D3) 2,000 UNIT TAB] Take 6,000 Units by mouth daily.       DULoxetine (CYMBALTA) 20 MG capsule [DULOXETINE (CYMBALTA) 20 MG CAPSULE] Take 1 capsule (20 mg total) by mouth daily. 90 capsule 2     folic acid (FOLVITE) 1 MG tablet Take 1 mg by mouth       hydroCHLOROthiazide (HYDRODIURIL) 25 MG tablet [HYDROCHLOROTHIAZIDE (HYDRODIURIL) 25 MG TABLET] Take 25 mg by mouth daily.       ibuprofen (ADVIL,MOTRIN) 200 MG tablet [IBUPROFEN (ADVIL,MOTRIN) 200 MG TABLET] Take 400 mg by mouth every 8 (eight) hours as needed for pain.              lanolin alcohol-mo-w.pet-ceres (EUCERIN) Crea [LANOLIN ALCOHOL-MO-W.PET-CERES (EUCERIN) CREA] Apply topically 2 (two) times a day. To feet       methotrexate 10 MG tablet [METHOTREXATE 10 MG TABLET] Take 1 tablet (10 mg total) by mouth once a week. 16 tablet 0     multivitamin therapeutic (THERAGRAN) tablet [MULTIVITAMIN THERAPEUTIC (THERAGRAN) TABLET] Take 1 tablet by mouth daily.       nystatin (MYCOSTATIN) powder [NYSTATIN (MYCOSTATIN) POWDER] Apply to rash areas twice daily. 15 g 0     oxyCODONE (OXY-IR) 5 mg capsule [OXYCODONE (OXY-IR) 5 MG CAPSULE] Take 1-2 tablets every 4 hours orally as needed for pain relief.  Use 1 tablet for 1-5/10 pain, take 2 tablets for 6-10/10 pain. 60 tablet 0     rivaroxaban ANTICOAGULANT (XARELTO) 10 mg tablet [RIVAROXABAN ANTICOAGULANT (XARELTO) 10 MG TABLET] Take 10 mg by mouth daily.         REVIEW OF SYSTEMS:  10 point ROS of systems including Constitutional, Eyes, Respiratory, Cardiovascular, Gastroenterology, Genitourinary, Integumentary, Musculoskeletal, Psychiatric were all negative except for pertinent positives noted in my HPI.    Objective:   BP (!) 144/77   Pulse 69   Temp 98.5  F (36.9  C)   Resp 16   Ht 1.676 m (5' 6\")   Wt (!) 151 kg (332 lb 12.8 oz)   SpO2 97%   BMI 53.72 kg/m    GENERAL APPEARANCE:  " Alert, in no distress, oriented, morbidly obese, cooperative, middle-aged woman upright in w/c in room  EYES:  EOM, conjunctivae, lids, pupils and irises normal  RESP:  respiratory effort normal, no respiratory distress  CV:  Peripheral edema 2-3+ in L>R  M/S:   Gait and station abnormal -JEREMÍAS 2/2 patient being in w/c; Digits and nails abnormal - arthritic changes present  SKIN:  Inspection of skin and subcutaneous tissue abnormal, Palpation of skin and subcutaneous tissue baseline  PSYCH:  oriented X 3, normal insight, judgement and memory, affect and mood normal    Recent labs in [facility] reviewed by me today.  and Pertinent Imaging: Mammogram - reviewed in [facility]    Assessment/Plan:    ICD-10-CM    1. Positive FIT (fecal immunochemical test)  R19.5 Acute- unstable. Resident's recent FIT test for   2. Screen for colon cancer  Z12.11 Annual colon cancer screening positive - she will be setup for colonoscopy for further evaluation.   3. Encounter for screening mammogram for breast cancer  Z12.31 Acute - stable. Mammogram completed - normal examination. Plan next mammogram annually 2/2 FMHx heavy for breast cancer.   4. Pap smear for cervical cancer screening  Z12.4 Acute - stable. Recent Pap WNL. Next planned for 5y time unless symptomatic.         Electronically signed by:   Dr. Macarena Messina, APRN, DNP, AGNP-BC, PMHNP-[facility]  Office: [address] [place] [address]  [facility] Cell: [phone]  Fax: [phone]  Triage Phone: [phone]  Voicemail: [phone]    Email: [email or web address]

## 2021-11-10 ENCOUNTER — TELEPHONE (OUTPATIENT)
Dept: GERIATRICS | Facility: CLINIC | Age: 56
End: 2021-11-10
Payer: COMMERCIAL

## 2021-11-10 NOTE — TELEPHONE ENCOUNTER
[facility] Nurse Triage Telephone Note    Provider: KRISTIN Moon DNP  Facility: [facility] Facility Type:  [facility]    Caller: Joy  Call Back Number: [phone]    Allergies:    Allergies   Allergen Reactions     Adhesive Tape Other (See Comments)     As on band-aids;  Causes skin tearing/wounds.     Adhesive [Mecrylate] Other (See Comments)     As on band-aids;  Causes skin tearing/wounds.     Banana Unknown     She avoids due to Latex allergy.  If she eats them 3 days in a row, gets stomach cramps and dark stool.  TBrinkMD - 4/10/15     Grape [Grape (Artificial) Flavor] Unknown     She avoids due to Latex allergy.  If she eats lots of them, and she likes them, stomach gets a little queasy.  TBrinkMD - 4/10/15     Morphine (Pf) [Morphine] Other (See Comments)     Pt has not reacted to this med herself, but mother has anaphylactic reaction and other family members get nausea/vomiting.     Other Environmental Allergy Swelling     leather     Pine      Pseudoephedrine Cold/Allergy [Cpm-Pse Cr] Other (See Comments)     Keeps pt awake up to 24 hours      Latex Rash     Hands get red from rubber gloves, but okay if she washes them right away.  Hands get red from rubber gloves, but okay if she washes them right away.     Other Food Allergy Rash     Pine trees, leather .         Reason for call: Director of therapy is requesting an order for PT eval to see if current walking program is sufficient/needed for patient.      Verbal Order/Direction given by Provider: Okay for PT eval.     Provider giving Order:  KRISTIN Moon DNP    Verbal Order given to: Joy Betancourt RN

## 2021-11-30 ENCOUNTER — NURSING HOME VISIT (OUTPATIENT)
Dept: GERIATRICS | Facility: CLINIC | Age: 56
End: 2021-11-30
Payer: COMMERCIAL

## 2021-11-30 VITALS
OXYGEN SATURATION: 93 % | HEIGHT: 66 IN | DIASTOLIC BLOOD PRESSURE: 80 MMHG | TEMPERATURE: 98.5 F | RESPIRATION RATE: 17 BRPM | HEART RATE: 74 BPM | BODY MASS INDEX: 47.09 KG/M2 | WEIGHT: 293 LBS | SYSTOLIC BLOOD PRESSURE: 147 MMHG

## 2021-11-30 DIAGNOSIS — E55.9 VITAMIN D DEFICIENCY: ICD-10-CM

## 2021-11-30 DIAGNOSIS — Z12.2 ENCOUNTER FOR SCREENING FOR LUNG CANCER: ICD-10-CM

## 2021-11-30 DIAGNOSIS — Z11.4 ENCOUNTER FOR SCREENING FOR HIV: ICD-10-CM

## 2021-11-30 DIAGNOSIS — G47.33 OBSTRUCTIVE SLEEP APNEA SYNDROME: ICD-10-CM

## 2021-11-30 DIAGNOSIS — Z86.718 HISTORY OF DVT OF LOWER EXTREMITY: ICD-10-CM

## 2021-11-30 DIAGNOSIS — M16.10 PRIMARY OSTEOARTHRITIS OF HIP, UNSPECIFIED LATERALITY: ICD-10-CM

## 2021-11-30 DIAGNOSIS — E66.01 MORBID OBESITY (H): ICD-10-CM

## 2021-11-30 DIAGNOSIS — I10 ESSENTIAL HYPERTENSION WITH GOAL BLOOD PRESSURE LESS THAN 140/90: ICD-10-CM

## 2021-11-30 DIAGNOSIS — R19.5 POSITIVE FIT (FECAL IMMUNOCHEMICAL TEST): Primary | ICD-10-CM

## 2021-11-30 DIAGNOSIS — M15.0 PRIMARY OSTEOARTHRITIS INVOLVING MULTIPLE JOINTS: ICD-10-CM

## 2021-11-30 DIAGNOSIS — R53.81 PHYSICAL DEBILITY: ICD-10-CM

## 2021-11-30 DIAGNOSIS — M54.10 RADICULOPATHY OF LEG: ICD-10-CM

## 2021-11-30 DIAGNOSIS — M05.79 SEROPOSITIVE RHEUMATOID ARTHRITIS OF MULTIPLE SITES (H): ICD-10-CM

## 2021-11-30 DIAGNOSIS — M25.50 POLYARTHRALGIA: ICD-10-CM

## 2021-11-30 DIAGNOSIS — Z96.649 HISTORY OF TOTAL HIP REPLACEMENT, UNSPECIFIED LATERALITY: ICD-10-CM

## 2021-11-30 DIAGNOSIS — Z13.220 LIPID SCREENING: ICD-10-CM

## 2021-11-30 PROCEDURE — 99318 PR ANNUAL NURSING FAC ASSESSMNT, STABLE: CPT | Performed by: NURSE PRACTITIONER

## 2021-11-30 ASSESSMENT — MIFFLIN-ST. JEOR: SCORE: 2129.91

## 2021-11-30 NOTE — LETTER
11/30/2021        RE: María Elena Saab  [address]  [address]  [place] [address]        [facility] GERIATRIC [facility]  Chief Complaint   Patient presents with     Annual Comprehensive Nursing Home     [facility] Record Number:  [id number]  Place of Service where encounter took place:  [facility] () [[id number]]         HPI:    María Elena Saab  is a 56 year old  (1965), who is being seen today for an annual comprehensive visit. HPI information obtained from: facility chart records, facility staff, patient report, [facility] chart review and Care Everywhere [facility] chart review.        Positive FIT (fecal immunochemical test)  Vitamin D deficiency  Morbid obesity (H)  Essential hypertension with goal blood pressure less than 140/90  Obstructive sleep apnea syndrome  Polyarthralgia  Radiculopathy of leg  Primary osteoarthritis of hip, unspecified laterality  Primary osteoarthritis involving multiple joints  Seropositive rheumatoid arthritis of multiple sites (H)  History of total hip replacement, unspecified laterality  History of DVT of lower extremity  Physical debility    Patient seen today for annual; visit in LTC. Patient notes that things are going the same as before - she has no acute concerns or complaints to note today. She states that they haven't provided her a date yet for her follow-up colonoscopy; she hasn't suffered GI upset, black stools or hematochezia since positive FIT test. Therapies continue to work with resident on her ambulation and safety. Appetite remains at baseline. She is sleeping well. Denies CP, palpitations, fatigue, nausea, vomiting, increased SOB/ESPITIA, fever, chills, and/or b/b concerns All relevant health concerns patient has experienced over the past year and/or chronic illnesses will be assessed during today's visit. Per staff report, the resident's functional status continues to improve - Cognitively the patient she has  remained at baseline. Last available labs as noted below. Patient with above noted changes in status - will attain yearly labs and address concerns as noted below      ALLERGIES: Adhesive tape, Adhesive [mecrylate], Banana, Grape [grape (artificial) flavor], Morphine (pf) [morphine], Other environmental allergy, Pine, Pseudoephedrine cold/allergy [cpm-pse cr], Latex, and Other food allergy  PAST MEDICAL HISTORY:   Past Medical History:   Diagnosis Date     Aseptic necrosis of femoral head (H)     LEFT     Bacteremia due to group B Streptococcus      Cellulitis of right lower extremity      DJD (degenerative joint disease)      DVT, bilateral lower limbs (H) 10/2014     Edema 5/22/2015     Essential hypertension with goal blood pressure less than 140/90      Failure to thrive      History of blood clots      Hypokalemia 10/15/2014     Lung mass 10/18/2014     Morbid obesity (H) 4/10/2015    Had formal nutrition consult at [facility].  RD reports that she is not willing to commit to the program outlined.  See scanned document.  12/15/2015 - Marcio Quinonez MD        Obesity 4/10/2015     LOPEZ (obstructive sleep apnea) 5/22/2015     Osteoarthritis      Peripheral vascular disease (H)      Pleural effusion      Pressure ulcer of foot      Rheumatoid arthritis (H) 12/30/2014    seronegative     Sepsis (H)      Seropositive rheumatoid arthritis (H) 1/19/2016     Vitamin D deficiency 8/26/2015      PAST SURGICAL HISTORY:  has a past surgical history that includes Total Knee Arthroplasty (Left, 2006); joint replacement; Total Hip Arthroplasty (Left, 10/18/2016); and Peripherally Inserted Central Catheter Insertion (Right, 07/14/2019).  IMMUNIZATIONS:  Immunization History   Administered Date(s) Administered     COVID-19,PF,Pfizer (12+ Yrs) 04/22/2021, 05/20/2021     Flu, Unspecified 03/28/2016, 10/10/2017, 10/11/2018, 10/10/2019     Influenza (High Dose) 3 valent vaccine 10/19/2021     Influenza Vaccine IM > 6 months Valent  IIV4 (Alfuria,Fluzone) 10/16/2014     Pneumo Conj 13-V (2010&after) 04/10/2015     TD (ADULT, 7+) 04/19/2004     TDAP Vaccine (Boostrix) 04/10/2015     Td (Adult), Adsorbed 12/05/1990     Tdap (Adacel,Boostrix) 04/10/2015     Above immunizations pulled from [facility]. [facility] and facility records also reconciled. Outstanding information sent to  to update [facility].  Future immunizations are not needed at this point as all recommended immunizations are up to date.       Current Outpatient Medications:      acetaminophen (TYLENOL) 325 MG tablet, [ACETAMINOPHEN (TYLENOL) 325 MG TABLET] Take 650 mg by mouth every 6 (six) hours as needed for pain.       , Disp: , Rfl:      adalimumab (HUMIRA) 40 mg/0.8 mL injection, [ADALIMUMAB (HUMIRA) 40 MG/0.8 ML INJECTION] Inject 0.8 mL (40 mg total) under the skin every 14 (fourteen) days., Disp: 1.6 mL, Rfl: 0     ascorbic acid, vitamin C, (ASCORBIC ACID WITH KAEL HIPS) 500 MG tablet, [ASCORBIC ACID, VITAMIN C, (ASCORBIC ACID WITH KAEL HIPS) 500 MG TABLET] Take 500 mg by mouth 2 (two) times a day., Disp: , Rfl:      cholecalciferol, vitamin D3, (VITAMIN D3) 2,000 unit Tab, [CHOLECALCIFEROL, VITAMIN D3, (VITAMIN D3) 2,000 UNIT TAB] Take 6,000 Units by mouth daily., Disp: , Rfl:      DULoxetine (CYMBALTA) 20 MG capsule, [DULOXETINE (CYMBALTA) 20 MG CAPSULE] Take 1 capsule (20 mg total) by mouth daily., Disp: 90 capsule, Rfl: 2     folic acid (FOLVITE) 1 MG tablet, Take 1 mg by mouth, Disp: , Rfl:      hydroCHLOROthiazide (HYDRODIURIL) 25 MG tablet, [HYDROCHLOROTHIAZIDE (HYDRODIURIL) 25 MG TABLET] Take 25 mg by mouth daily., Disp: , Rfl:      ibuprofen (ADVIL,MOTRIN) 200 MG tablet, [IBUPROFEN (ADVIL,MOTRIN) 200 MG TABLET] Take 400 mg by mouth every 8 (eight) hours as needed for pain.       , Disp: , Rfl:      lanolin alcohol-mo-w.pet-ceres (EUCERIN) Crea, [LANOLIN ALCOHOL-MO-W.PET-CERES (EUCERIN) CREA] Apply topically 2 (two) times a day. To feet, Disp: , Rfl:       methotrexate 10 MG tablet, [METHOTREXATE 10 MG TABLET] Take 1 tablet (10 mg total) by mouth once a week., Disp: 16 tablet, Rfl: 0     multivitamin therapeutic (THERAGRAN) tablet, [MULTIVITAMIN THERAPEUTIC (THERAGRAN) TABLET] Take 1 tablet by mouth daily., Disp: , Rfl:      nystatin (MYCOSTATIN) powder, [NYSTATIN (MYCOSTATIN) POWDER] Apply to rash areas twice daily., Disp: 15 g, Rfl: 0     oxyCODONE (OXY-IR) 5 mg capsule, [OXYCODONE (OXY-IR) 5 MG CAPSULE] Take 1-2 tablets every 4 hours orally as needed for pain relief.  Use 1 tablet for 1-5/10 pain, take 2 tablets for 6-10/10 pain., Disp: 60 tablet, Rfl: 0     rivaroxaban ANTICOAGULANT (XARELTO) 10 mg tablet, [RIVAROXABAN ANTICOAGULANT (XARELTO) 10 MG TABLET] Take 10 mg by mouth daily., Disp: , Rfl:      Case Management:  I have reviewed the facility/SNF care plan/MDS, including the falls risk, nutrition and pain screening. I also reviewed the current immunizations, and preventive care.. Future cancer screening indicated is follow-up colonoscopy after a positive FIT test and provider and pt will formulate a POC Patient's desire to return to the community is present, but is not able due to care needs . Current Level of Care is appropriate.    Advance Directive Discussion:    I reviewed the current advanced directives as reflected in EPIC, the POLST and the facility chart, and verified the congruency of orders. I spoke with the resident and discussed the plan of Care.  I did review the advance directives with the resident. Patient's goal is pain control and comfort.    Team Discussion:  I communicated with the appropriate disciplines involved with the Plan of Care:   Nursing    Information reviewed:  Medications, vital signs, orders, and nursing notes.    ROS:  10 point ROS of systems including Constitutional, Eyes, Respiratory, Cardiovascular, Gastroenterology, Genitourinary, Integumentary, Musculoskeletal, Psychiatric were all negative except for pertinent  "positives noted in my HPI.    Vitals:  BP (!) 147/80   Pulse 74   Temp 98.5  F (36.9  C)   Resp 17   Ht 1.676 m (5' 6\")   Wt (!) 153.3 kg (338 lb)   SpO2 93%   BMI 54.55 kg/m   Body mass index is 54.55 kg/m .  Exam:  GENERAL APPEARANCE:  Alert, in no distress, oriented, morbidly obese, cooperative, middle-aged woman sitting on the edge of her bed  EYES:  EOM, conjunctivae, lids, pupils and irises normal  RESP: Respiratory effort and palpation of chest normal, lungs clear to auscultation, no respiratory distress, diminished breath sounds throughout 2/2 body habitus  CV:  Palpation and auscultation of heart done, regular rate and rhythm, no murmur, rub, or gallop, +2 pedal pulses, peripheral edema 2-3+ in L>R  ABDOMEN:  normal bowel sounds, soft, nontender, no hepatosplenomegaly or other masses  M/S:   Gait and station abnormal -JEREMÍAS 2/2 patient being in bed; Digits and nails abnormal - arthritic changes present  SKIN:  Inspection of skin and subcutaneous tissue abnormal, Palpation of skin and subcutaneous tissue baseline  NEURO:   Cranial nerves 2-12 are normal tested and grossly at patient's baseline  PSYCH:  oriented X 3, normal insight, judgement and memory, affect and mood normal    Lab/Diagnostic data:   Recent labs in [facility] reviewed by me today.     ASSESSMENT/PLAN    ICD-10-CM    1. Positive FIT (fecal immunochemical test)  R19.5 Acute - unstable. No date at this time for colonoscopy - will follow-up with staff regarding this and have them ensure this gets scheduled.    2. Vitamin D deficiency  E55.9 Chronic - unstable remains on daily replacement - no level available in resident's chart for the past 7 years. Will recheck Vit D level at this time and continue supplementation as ordered until resulted, then adjust appropriately.   3. Morbid obesity (H)  E66.01 Chronic - unstable. 2/2 lifestyle choices and comorbid conditions - ongoing education.  Wt Readings from Last 4 Encounters:   11/30/21 (!) 153.3 kg " (338 lb)   10/19/21 (!) 151 kg (332 lb 12.8 oz)   09/28/21 (!) 155.9 kg (343 lb 11.2 oz)      4. Essential hypertension with goal blood pressure less than 140/90  I10 Chronic - stable. Managed on regimen of hydrochlorothiazide.     Continue medications as ordered with VS per facility protocol.   5. Obstructive sleep apnea syndrome  G47.33 Chronic - unstable. Resident does not use CPAP as recommended. Ongoing education.   6. Polyarthralgia  M25.50 Chronic - unstable, moderately controlled.   7. Radiculopathy of leg  M54.10 Continues on chronic regimen of Tylenol,   8. Primary osteoarthritis of hip, unspecified laterality  M16.10 Humira, Cymbalta, methotrexate, and Oxycodone with moderate relief. Will continue    9. Primary osteoarthritis involving multiple joints  M89.49 Medications as ordered at this time with ongoing monitoring and medication adjust-   10. Seropositive rheumatoid arthritis of multiple sites (H)  M05.79 ments as needed.   11. History of total hip replacement, unspecified laterality  Z96.649    12. History of DVT of lower extremity  Z86.718 Chronic - stable. No acute associated concerns. Continues on regimen of Xarelto - continue medications as ordered.    13. Physical debility  R53.81 Chronic - unstable. 2/2 chronic comorbid conditions. Continues to work with therapies - adv per their recommendations.    14. Encounter for screening for HIV  Z11.4 Acute - resident overdue for lifetime HIV screen - HIV screen next lab day.   15. Encounter for screening for lung cancer  Z12.2 Acute - resident has quit smoking within the last 15yrs, qualifies for annual lung cancer screening - 2 view CXR for lung cancer screening.   16. Lipid screening  Z13.220 Acute - resident due for lipid monitoring - FLP next lab day.       Electronically signed by:  Dr. Macarena Messina, APRN, DNP, AGNP-BC, PMHNP-BC  [facility]  Office: [address] [place] [address]  [facility] Cell: [phone]  Fax:  [phone]  Triage Phone: [phone]  Voicemail: [phone]    Email: [email or web address]                     Sincerely,        KRISTIN Fair CNP

## 2021-11-30 NOTE — PROGRESS NOTES
[facility] GERIATRIC SERVICES  Chief Complaint   Patient presents with     Annual Comprehensive Nursing Home     [facility] Record Number:  [id number]  Place of Service where encounter took place:  [facility] () [[id number]]         HPI:    María Elena Saab  is a 56 year old  (1965), who is being seen today for an annual comprehensive visit. HPI information obtained from: facility chart records, facility staff, patient report, [facility] chart review and Care Everywhere [facility] chart review.        Positive FIT (fecal immunochemical test)  Vitamin D deficiency  Morbid obesity (H)  Essential hypertension with goal blood pressure less than 140/90  Obstructive sleep apnea syndrome  Polyarthralgia  Radiculopathy of leg  Primary osteoarthritis of hip, unspecified laterality  Primary osteoarthritis involving multiple joints  Seropositive rheumatoid arthritis of multiple sites (H)  History of total hip replacement, unspecified laterality  History of DVT of lower extremity  Physical debility    Patient seen today for annual; visit in LTC. Patient notes that things are going the same as before - she has no acute concerns or complaints to note today. She states that they haven't provided her a date yet for her follow-up colonoscopy; she hasn't suffered GI upset, black stools or hematochezia since positive FIT test. Therapies continue to work with resident on her ambulation and safety. Appetite remains at baseline. She is sleeping well. Denies CP, palpitations, fatigue, nausea, vomiting, increased SOB/ESPITIA, fever, chills, and/or b/b concerns All relevant health concerns patient has experienced over the past year and/or chronic illnesses will be assessed during today's visit. Per staff report, the resident's functional status continues to improve - Cognitively the patient she has remained at baseline. Last available labs as noted below. Patient with above noted changes in status - will attain yearly labs  and address concerns as noted below      ALLERGIES: Adhesive tape, Adhesive [mecrylate], Banana, Grape [grape (artificial) flavor], Morphine (pf) [morphine], Other environmental allergy, Pine, Pseudoephedrine cold/allergy [cpm-pse cr], Latex, and Other food allergy  PAST MEDICAL HISTORY:   Past Medical History:   Diagnosis Date     Aseptic necrosis of femoral head (H)     LEFT     Bacteremia due to group B Streptococcus      Cellulitis of right lower extremity      DJD (degenerative joint disease)      DVT, bilateral lower limbs (H) 10/2014     Edema 5/22/2015     Essential hypertension with goal blood pressure less than 140/90      Failure to thrive      History of blood clots      Hypokalemia 10/15/2014     Lung mass 10/18/2014     Morbid obesity (H) 4/10/2015    Had formal nutrition consult at [facility].  RD reports that she is not willing to commit to the program outlined.  See scanned document.  12/15/2015 - Marcio Quinonez MD        Obesity 4/10/2015     LOPEZ (obstructive sleep apnea) 5/22/2015     Osteoarthritis      Peripheral vascular disease (H)      Pleural effusion      Pressure ulcer of foot      Rheumatoid arthritis (H) 12/30/2014    seronegative     Sepsis (H)      Seropositive rheumatoid arthritis (H) 1/19/2016     Vitamin D deficiency 8/26/2015      PAST SURGICAL HISTORY:  has a past surgical history that includes Total Knee Arthroplasty (Left, 2006); joint replacement; Total Hip Arthroplasty (Left, 10/18/2016); and Peripherally Inserted Central Catheter Insertion (Right, 07/14/2019).  IMMUNIZATIONS:  Immunization History   Administered Date(s) Administered     COVID-19,PF,Pfizer (12+ Yrs) 04/22/2021, 05/20/2021     Flu, Unspecified 03/28/2016, 10/10/2017, 10/11/2018, 10/10/2019     Influenza (High Dose) 3 valent vaccine 10/19/2021     Influenza Vaccine IM > 6 months Valent IIV4 (Alfuria,Fluzone) 10/16/2014     Pneumo Conj 13-V (2010&after) 04/10/2015     TD (ADULT, 7+) 04/19/2004     TDAP Vaccine  (Boostrix) 04/10/2015     Td (Adult), Adsorbed 12/05/1990     Tdap (Adacel,Boostrix) 04/10/2015     Above immunizations pulled from [facility]. [facility] and facility records also reconciled. Outstanding information sent to  to update [facility].  Future immunizations are not needed at this point as all recommended immunizations are up to date.       Current Outpatient Medications:      acetaminophen (TYLENOL) 325 MG tablet, [ACETAMINOPHEN (TYLENOL) 325 MG TABLET] Take 650 mg by mouth every 6 (six) hours as needed for pain.       , Disp: , Rfl:      adalimumab (HUMIRA) 40 mg/0.8 mL injection, [ADALIMUMAB (HUMIRA) 40 MG/0.8 ML INJECTION] Inject 0.8 mL (40 mg total) under the skin every 14 (fourteen) days., Disp: 1.6 mL, Rfl: 0     ascorbic acid, vitamin C, (ASCORBIC ACID WITH KAEL HIPS) 500 MG tablet, [ASCORBIC ACID, VITAMIN C, (ASCORBIC ACID WITH KAEL HIPS) 500 MG TABLET] Take 500 mg by mouth 2 (two) times a day., Disp: , Rfl:      cholecalciferol, vitamin D3, (VITAMIN D3) 2,000 unit Tab, [CHOLECALCIFEROL, VITAMIN D3, (VITAMIN D3) 2,000 UNIT TAB] Take 6,000 Units by mouth daily., Disp: , Rfl:      DULoxetine (CYMBALTA) 20 MG capsule, [DULOXETINE (CYMBALTA) 20 MG CAPSULE] Take 1 capsule (20 mg total) by mouth daily., Disp: 90 capsule, Rfl: 2     folic acid (FOLVITE) 1 MG tablet, Take 1 mg by mouth, Disp: , Rfl:      hydroCHLOROthiazide (HYDRODIURIL) 25 MG tablet, [HYDROCHLOROTHIAZIDE (HYDRODIURIL) 25 MG TABLET] Take 25 mg by mouth daily., Disp: , Rfl:      ibuprofen (ADVIL,MOTRIN) 200 MG tablet, [IBUPROFEN (ADVIL,MOTRIN) 200 MG TABLET] Take 400 mg by mouth every 8 (eight) hours as needed for pain.       , Disp: , Rfl:      lanolin alcohol-mo-w.pet-ceres (EUCERIN) Crea, [LANOLIN ALCOHOL-MO-W.PET-CERES (EUCERIN) CREA] Apply topically 2 (two) times a day. To feet, Disp: , Rfl:      methotrexate 10 MG tablet, [METHOTREXATE 10 MG TABLET] Take 1 tablet (10 mg total) by mouth once a week., Disp: 16 tablet,  "Rfl: 0     multivitamin therapeutic (THERAGRAN) tablet, [MULTIVITAMIN THERAPEUTIC (THERAGRAN) TABLET] Take 1 tablet by mouth daily., Disp: , Rfl:      nystatin (MYCOSTATIN) powder, [NYSTATIN (MYCOSTATIN) POWDER] Apply to rash areas twice daily., Disp: 15 g, Rfl: 0     oxyCODONE (OXY-IR) 5 mg capsule, [OXYCODONE (OXY-IR) 5 MG CAPSULE] Take 1-2 tablets every 4 hours orally as needed for pain relief.  Use 1 tablet for 1-5/10 pain, take 2 tablets for 6-10/10 pain., Disp: 60 tablet, Rfl: 0     rivaroxaban ANTICOAGULANT (XARELTO) 10 mg tablet, [RIVAROXABAN ANTICOAGULANT (XARELTO) 10 MG TABLET] Take 10 mg by mouth daily., Disp: , Rfl:      Case Management:  I have reviewed the facility/SNF care plan/MDS, including the falls risk, nutrition and pain screening. I also reviewed the current immunizations, and preventive care.. Future cancer screening indicated is follow-up colonoscopy after a positive FIT test and provider and pt will formulate a POC Patient's desire to return to the community is present, but is not able due to care needs . Current Level of Care is appropriate.    Advance Directive Discussion:    I reviewed the current advanced directives as reflected in EPIC, the POLST and the facility chart, and verified the congruency of orders. I spoke with the resident and discussed the plan of Care.  I did review the advance directives with the resident. Patient's goal is pain control and comfort.    Team Discussion:  I communicated with the appropriate disciplines involved with the Plan of Care:   Nursing    Information reviewed:  Medications, vital signs, orders, and nursing notes.    ROS:  10 point ROS of systems including Constitutional, Eyes, Respiratory, Cardiovascular, Gastroenterology, Genitourinary, Integumentary, Musculoskeletal, Psychiatric were all negative except for pertinent positives noted in my HPI.    Vitals:  BP (!) 147/80   Pulse 74   Temp 98.5  F (36.9  C)   Resp 17   Ht 1.676 m (5' 6\")   Wt (!) " 153.3 kg (338 lb)   SpO2 93%   BMI 54.55 kg/m   Body mass index is 54.55 kg/m .  Exam:  GENERAL APPEARANCE:  Alert, in no distress, oriented, morbidly obese, cooperative, middle-aged woman sitting on the edge of her bed  EYES:  EOM, conjunctivae, lids, pupils and irises normal  RESP: Respiratory effort and palpation of chest normal, lungs clear to auscultation, no respiratory distress, diminished breath sounds throughout 2/2 body habitus  CV:  Palpation and auscultation of heart done, regular rate and rhythm, no murmur, rub, or gallop, +2 pedal pulses, peripheral edema 2-3+ in L>R  ABDOMEN:  normal bowel sounds, soft, nontender, no hepatosplenomegaly or other masses  M/S:   Gait and station abnormal -JEREMÍAS 2/2 patient being in bed; Digits and nails abnormal - arthritic changes present  SKIN:  Inspection of skin and subcutaneous tissue abnormal, Palpation of skin and subcutaneous tissue baseline  NEURO:   Cranial nerves 2-12 are normal tested and grossly at patient's baseline  PSYCH:  oriented X 3, normal insight, judgement and memory, affect and mood normal    Lab/Diagnostic data:   Recent labs in [facility] reviewed by me today.     ASSESSMENT/PLAN    ICD-10-CM    1. Positive FIT (fecal immunochemical test)  R19.5 Acute - unstable. No date at this time for colonoscopy - will follow-up with staff regarding this and have them ensure this gets scheduled.    2. Vitamin D deficiency  E55.9 Chronic - unstable remains on daily replacement - no level available in resident's chart for the past 7 years. Will recheck Vit D level at this time and continue supplementation as ordered until resulted, then adjust appropriately.   3. Morbid obesity (H)  E66.01 Chronic - unstable. 2/2 lifestyle choices and comorbid conditions - ongoing education.  Wt Readings from Last 4 Encounters:   11/30/21 (!) 153.3 kg (338 lb)   10/19/21 (!) 151 kg (332 lb 12.8 oz)   09/28/21 (!) 155.9 kg (343 lb 11.2 oz)      4. Essential hypertension with goal  blood pressure less than 140/90  I10 Chronic - stable. Managed on regimen of hydrochlorothiazide.     Continue medications as ordered with VS per facility protocol.   5. Obstructive sleep apnea syndrome  G47.33 Chronic - unstable. Resident does not use CPAP as recommended. Ongoing education.   6. Polyarthralgia  M25.50 Chronic - unstable, moderately controlled.   7. Radiculopathy of leg  M54.10 Continues on chronic regimen of Tylenol,   8. Primary osteoarthritis of hip, unspecified laterality  M16.10 Humira, Cymbalta, methotrexate, and Oxycodone with moderate relief. Will continue    9. Primary osteoarthritis involving multiple joints  M89.49 Medications as ordered at this time with ongoing monitoring and medication adjust-   10. Seropositive rheumatoid arthritis of multiple sites (H)  M05.79 ments as needed.   11. History of total hip replacement, unspecified laterality  Z96.649    12. History of DVT of lower extremity  Z86.718 Chronic - stable. No acute associated concerns. Continues on regimen of Xarelto - continue medications as ordered.    13. Physical debility  R53.81 Chronic - unstable. 2/2 chronic comorbid conditions. Continues to work with therapies - adv per their recommendations.    14. Encounter for screening for HIV  Z11.4 Acute - resident overdue for lifetime HIV screen - HIV screen next lab day.   15. Encounter for screening for lung cancer  Z12.2 Acute - resident has quit smoking within the last 15yrs, qualifies for annual lung cancer screening - 2 view CXR for lung cancer screening.   16. Lipid screening  Z13.220 Acute - resident due for lipid monitoring - FLP next lab day.       Electronically signed by:  Dr. Macarena Messina, APRN, DNP, AGNP-BC, PMHNP-[facility]  Office: [address] [place] [address]  [facility] Cell: [phone]  Fax: [phone]  Triage Phone: [phone]  Voicemail: [phone]    Email: [email or web address]

## 2021-12-01 ENCOUNTER — LAB REQUISITION (OUTPATIENT)
Dept: LAB | Facility: CLINIC | Age: 56
End: 2021-12-01
Payer: COMMERCIAL

## 2021-12-01 DIAGNOSIS — I89.0 LYMPHEDEMA, NOT ELSEWHERE CLASSIFIED: ICD-10-CM

## 2021-12-01 DIAGNOSIS — M05.79 RHEUMATOID ARTHRITIS WITH RHEUMATOID FACTOR OF MULTIPLE SITES WITHOUT ORGAN OR SYSTEMS INVOLVEMENT (H): ICD-10-CM

## 2021-12-02 ENCOUNTER — TELEPHONE (OUTPATIENT)
Dept: GERIATRICS | Facility: CLINIC | Age: 56
End: 2021-12-02

## 2021-12-02 LAB
CHOLEST SERPL-MCNC: 221 MG/DL
FASTING STATUS PATIENT QL REPORTED: ABNORMAL
HBA1C MFR BLD: 5.3 %
HDLC SERPL-MCNC: 65 MG/DL
HIV 1+2 AB+HIV1 P24 AG SERPL QL IA: NEGATIVE
LDLC SERPL CALC-MCNC: 136 MG/DL
SODIUM SERPL-SCNC: 141 MMOL/L (ref 136–145)
TRIGL SERPL-MCNC: 101 MG/DL

## 2021-12-02 PROCEDURE — P9604 ONE-WAY ALLOW PRORATED TRIP: HCPCS | Mod: ORL | Performed by: FAMILY MEDICINE

## 2021-12-02 PROCEDURE — 83036 HEMOGLOBIN GLYCOSYLATED A1C: CPT | Mod: ORL | Performed by: FAMILY MEDICINE

## 2021-12-02 PROCEDURE — 84295 ASSAY OF SERUM SODIUM: CPT | Mod: ORL | Performed by: FAMILY MEDICINE

## 2021-12-02 PROCEDURE — 80061 LIPID PANEL: CPT | Mod: ORL | Performed by: FAMILY MEDICINE

## 2021-12-02 PROCEDURE — 82306 VITAMIN D 25 HYDROXY: CPT | Mod: ORL | Performed by: FAMILY MEDICINE

## 2021-12-02 PROCEDURE — 36415 COLL VENOUS BLD VENIPUNCTURE: CPT | Mod: ORL | Performed by: FAMILY MEDICINE

## 2021-12-02 PROCEDURE — 87389 HIV-1 AG W/HIV-1&-2 AB AG IA: CPT | Mod: ORL | Performed by: FAMILY MEDICINE

## 2021-12-03 LAB — DEPRECATED CALCIDIOL+CALCIFEROL SERPL-MC: 70 UG/L (ref 30–80)

## 2021-12-03 NOTE — TELEPHONE ENCOUNTER
[facility] Nurse Triage Telephone Note    Provider: KRISTIN Moon DNP  Facility: [facility]        Facility Type:  [facility]    Caller: Milagro    Allergies:    Allergies   Allergen Reactions     Adhesive Tape Other (See Comments)     As on band-aids;  Causes skin tearing/wounds.     Adhesive [Mecrylate] Other (See Comments)     As on band-aids;  Causes skin tearing/wounds.     Banana Unknown     She avoids due to Latex allergy.  If she eats them 3 days in a row, gets stomach cramps and dark stool.  TBrinkMD - 4/10/15     Grape [Grape (Artificial) Flavor] Unknown     She avoids due to Latex allergy.  If she eats lots of them, and she likes them, stomach gets a little queasy.  TBrinkMD - 4/10/15     Morphine (Pf) [Morphine] Other (See Comments)     Pt has not reacted to this med herself, but mother has anaphylactic reaction and other family members get nausea/vomiting.     Other Environmental Allergy Swelling     leather     Pine      Pseudoephedrine Cold/Allergy [Cpm-Pse Cr] Other (See Comments)     Keeps pt awake up to 24 hours      Latex Rash     Hands get red from rubber gloves, but okay if she washes them right away.  Hands get red from rubber gloves, but okay if she washes them right away.     Other Food Allergy Rash     Pine trees, leather .         Reason for call: CXR results: done for routine cancer screening. No infiltrates, Hrt nml, No TB, no active disease.    Verbal Order/Direction given by Provider: NNO    Provider giving Order:  KRISTIN Moon DNP    Verbal Order given to: Milagro Worthy RN

## 2021-12-10 ENCOUNTER — TELEPHONE (OUTPATIENT)
Dept: GERIATRICS | Facility: CLINIC | Age: 56
End: 2021-12-10
Payer: COMMERCIAL

## 2021-12-10 NOTE — TELEPHONE ENCOUNTER
[facility] Nurse Triage Telephone Note    Provider: KRISTIN Moon DNP  Facility: [facility] Facility Type:  C    Caller: Marycruz (RN from [facility])  Call Back Number: [phone]    Allergies:    Allergies   Allergen Reactions     Adhesive Tape Other (See Comments)     As on band-aids;  Causes skin tearing/wounds.     Adhesive [Mecrylate] Other (See Comments)     As on band-aids;  Causes skin tearing/wounds.     Banana Unknown     She avoids due to Latex allergy.  If she eats them 3 days in a row, gets stomach cramps and dark stool.  TBrinkMD - 4/10/15     Grape [Grape (Artificial) Flavor] Unknown     She avoids due to Latex allergy.  If she eats lots of them, and she likes them, stomach gets a little queasy.  TBrinkMD - 4/10/15     Morphine (Pf) [Morphine] Other (See Comments)     Pt has not reacted to this med herself, but mother has anaphylactic reaction and other family members get nausea/vomiting.     Other Environmental Allergy Swelling     leather     Pine      Pseudoephedrine Cold/Allergy [Cpm-Pse Cr] Other (See Comments)     Keeps pt awake up to 24 hours      Latex Rash     Hands get red from rubber gloves, but okay if she washes them right away.  Hands get red from rubber gloves, but okay if she washes them right away.     Other Food Allergy Rash     Pine trees, leather .         Reason for call: [facility] called and patient has a colonoscopy scheduled for 1/6.  Patient will need to have her Xarelto on hold x 3 days prior to surgery.  Order will need to come from NP.    Verbal Order/Direction given by Provider: Okay to hold Xarelto x 3 days prior to colonoscopy.    Provider giving Order:  KRISTIN Moon DNP    Verbal Order given to: Carri (nurse at [place])    María Elena Betancourt RN

## 2021-12-16 DIAGNOSIS — M16.10 PRIMARY OSTEOARTHRITIS OF HIP, UNSPECIFIED LATERALITY: Primary | ICD-10-CM

## 2021-12-17 RX ORDER — OXYCODONE HYDROCHLORIDE 5 MG/1
TABLET ORAL
Qty: 60 TABLET | Refills: 0 | Status: SHIPPED | OUTPATIENT
Start: 2022-01-14 | End: 2022-05-17

## 2021-12-21 ENCOUNTER — TELEPHONE (OUTPATIENT)
Dept: RHEUMATOLOGY | Facility: CLINIC | Age: 56
End: 2021-12-21
Payer: COMMERCIAL

## 2021-12-21 DIAGNOSIS — M15.0 PRIMARY OSTEOARTHRITIS INVOLVING MULTIPLE JOINTS: Primary | ICD-10-CM

## 2021-12-21 NOTE — TELEPHONE ENCOUNTER
[facility] Call Center    Phone Message    May a detailed message be left on voicemail: yes     Reason for Call: Order(s): Other:   Reason for requested: Pt is at a senior living where they have phlebotomists who can draw labs; this will make it easier for the Pt.    Please send lab orders to [phone]- Fax Jamia Peña and please put a good fax number where they can send the results back to Dr. Humphrey.    Date needed: ASAP  Provider name: Dr. Humphrey

## 2021-12-21 NOTE — TELEPHONE ENCOUNTER
Phone call received from staff at [facility], they just wanted Dr. Humphrey's team to be aware that they did receive the lab orders by fax. No further action needed.

## 2021-12-29 ENCOUNTER — VIRTUAL VISIT (OUTPATIENT)
Dept: GERIATRICS | Facility: CLINIC | Age: 56
End: 2021-12-29
Payer: COMMERCIAL

## 2021-12-29 VITALS
BODY MASS INDEX: 47.09 KG/M2 | OXYGEN SATURATION: 96 % | HEART RATE: 82 BPM | WEIGHT: 293 LBS | RESPIRATION RATE: 20 BRPM | HEIGHT: 66 IN | DIASTOLIC BLOOD PRESSURE: 67 MMHG | TEMPERATURE: 98.7 F | SYSTOLIC BLOOD PRESSURE: 148 MMHG

## 2021-12-29 DIAGNOSIS — E66.01 MORBID OBESITY (H): ICD-10-CM

## 2021-12-29 DIAGNOSIS — M15.0 PRIMARY OSTEOARTHRITIS INVOLVING MULTIPLE JOINTS: ICD-10-CM

## 2021-12-29 DIAGNOSIS — M05.79 SEROPOSITIVE RHEUMATOID ARTHRITIS OF MULTIPLE SITES (H): ICD-10-CM

## 2021-12-29 DIAGNOSIS — I73.9 PAD (PERIPHERAL ARTERY DISEASE) (H): ICD-10-CM

## 2021-12-29 DIAGNOSIS — I82.5Z3 CHRONIC DEEP VEIN THROMBOSIS (DVT) OF DISTAL VEIN OF BOTH LOWER EXTREMITIES (H): ICD-10-CM

## 2021-12-29 DIAGNOSIS — D64.9 ANEMIA, UNSPECIFIED TYPE: ICD-10-CM

## 2021-12-29 DIAGNOSIS — Z86.718 HISTORY OF DVT OF LOWER EXTREMITY: ICD-10-CM

## 2021-12-29 DIAGNOSIS — R19.5 POSITIVE FIT (FECAL IMMUNOCHEMICAL TEST): Primary | ICD-10-CM

## 2021-12-29 DIAGNOSIS — I10 ESSENTIAL HYPERTENSION WITH GOAL BLOOD PRESSURE LESS THAN 140/90: ICD-10-CM

## 2021-12-29 PROCEDURE — 99310 SBSQ NF CARE HIGH MDM 45: CPT | Mod: GT | Performed by: NURSE PRACTITIONER

## 2021-12-29 ASSESSMENT — MIFFLIN-ST. JEOR: SCORE: 2153.04

## 2021-12-29 NOTE — PROGRESS NOTES
Orders from today's visit:    CBC - Anemia    BMP - HTN    EKG - HTN/Preop    CXR - Asthma/Preop    Dr. Macarena Messina, APRN, DNP, AGNP-BC, PMHNP-BC  [facility]  Office: [address] [place] [address]  [facility] Cell: [phone]  Fax: [phone]  Triage Phone: [phone]  Voicemail: [phone]    Email: [email or web address]

## 2021-12-29 NOTE — LETTER
"    12/29/2021        RE: María Elena Saab  [facility] [address]  [place] [address]        Orders from today's visit:    CBC - Anemia    BMP - HTN    EKG - HTN/Preop    CXR - Asthma/Preop    Dr. Macarena Messina, APRN, DNP, AGNP-BC, PMHNP-BC  [facility]  Office: [address] [place] [address]  [facility] Cell: [phone]  Fax: [phone]  Triage Phone: [phone]  Voicemail: [phone]    Email: [email or web address]             María Elena Saab is being evaluated via a billable video visit.   The patient has been notified of following:  \"This video visit will be conducted via a call between you and your provider. We have found that certain health care needs can be provided without the need for an in-person physical exam.  This service lets us provide the care you need with a video conversation. If during the course of the call the provider feels a video visit is not appropriate, you will not be charged for this service.\"   The provider has received verbal consent for a Video Visit from the patient or first contact? Yes  Patient or facility staff would like the video invitation sent by: N/A   Video Start Time: 1345    [facility] Medical Record Number:  [id number]  Place of Location at the time of visit: [facility]  Chief Complaint   Patient presents with     Video Visit     Pre-Op Exam       [facility]  [address] [place] [address]  Phone: [phone]  Fax: [phone]  Primary Provider: Jeannie Messina  Pre-op Performing Provider: JEANNIE MESSINA     PREOPERATIVE EVALUATION:  Today's date: 12/29/2021     María Elena Saab is a 56 year old female who presents for a preoperative evaluation.     Surgical Information:  Surgery/Procedure: Colonoscopy  Surgery Location: [facility]  Surgeon: Dr Chintan Odonnell  Surgery Date: 01/03/22  Time of Surgery: 0830  Where patient plans to recover: " At a nursing home  Fax number for surgical facility: [phone]    Type of Anesthesia Anticipated: to be determined    Assessment & Plan     The proposed surgical procedure is considered LOW risk.      ICD-10-CM    1. Positive FIT (fecal immunochemical test)  R19.5    2. Morbid obesity (H)  E66.01    3. PAD (peripheral artery disease) (H)  I73.9    4. Essential hypertension with goal blood pressure less than 140/90  I10    5. Chronic deep vein thrombosis (DVT) of distal vein of both lower extremities (H)  I82.5Z3    6. History of DVT of lower extremity  Z86.718    7. Primary osteoarthritis involving multiple joints  M89.49    8. Seropositive rheumatoid arthritis of multiple sites (H)  M05.79    9. Anemia, unspecified type  D64.9        Implanted Device:  None    Risks and Recommendations:  The patient has the following additional risks and recommendations for perioperative complications:   - Morbid obesity (BMI >40)  Cardiovascular:   - Post-op monitoring of VS  Pulmonary:    - Incentive spirometry post-op  Obstructive Sleep Apnea:   - Noncompliant with use of CPAP at baseline  Anemia/Bleeding/Clotting:    - History of DVT or PE, consider DVT prevention postoperatively   - Anemia and does not require treatment prior to surgery. Monitor hemoglobin postoperatively    Medication Instructions:  Patient is to take all scheduled medications on the day of surgery EXCEPT for modifications listed below:   - apixaban (Eliquis), edoxaban (Savaysa), rivaroxaban (Xarelto): Neuraxial or regional block anticipated AND CrCL (>=) 50mL/min. HOLD 3 days before surgery.    - Diuretics: HOLD on the day of surgery.   - DMARDs Continue without modification    - SSRIs, SNRIs, TCAs, Antipsychotics: Continue without modification.     RECOMMENDATION:  APPROVAL GIVEN to proceed with proposed procedure, without further diagnostic evaluation.    Review of the result(s) of each unique test -    EKG:  Results unable to be located - no acute  concern at this time    CXR:      Subjective     HPI related to upcoming procedure:      Positive FIT (fecal immunochemical test)  Morbid obesity (H)  PAD (peripheral artery disease) (H)  Essential hypertension with goal blood pressure less than 140/90  Chronic deep vein thrombosis (DVT) of distal vein of both lower extremities (H)  History of DVT of lower extremity  Primary osteoarthritis involving multiple joints  Seropositive rheumatoid arthritis of multiple sites (H)  Anemia, unspecified type    Patient seen today for a Preop visit in LTC for upcoming Colonoscopy. Patient underwent routine FIT testing for colon cancer screening and was noted a positive result on 10/14/21. She has had no acute GI related concerns over the past year per resident report. Patient notes that she is feeling well overall. Denies acute concerns today outside of undergoing upcoming procedure. Appetite remains at baseline - will complete NPO and prep as ordered in facility. She is sleeping well. Denies CP, palpitations, fatigue, nausea, vomiting, increased SOB/ESPITIA, fever, chills, and/or b/b concerns today.     1. No - Have you ever had a heart attack or stroke?  2. No - Have you ever had surgery on your heart or blood vessels, such as a stent, coronary (heart) bypass, or surgery on an artery in the head, neck, heart, or legs?  3. No - Do you have chest pain when you are physically active?  4. No - Do you have a history of heart failure?  5. No - Do you currently have a cold, bronchitis, or symptoms of other respiratory (head and chest) infections?  6. No - Do you have a cough, shortness of breath, or wheezing?  7. Yes - Do you or anyone in your family have a history of blood clots? Yes - patient and father; treated on Xarelto - held x3d pre-op  8. No - Do you or anyone in your family have a serious bleeding problem, such as long-lasting bleeding after surgeries or cuts?  9. Yes - Have you ever had anemia or been told to take iron pills?  Yes - on daily folic acid  10. No - Have you had any abnormal blood loss such as black, tarry or bloody stools, or abnormal vaginal bleeding?  11. No - Have you ever had a blood transfusion?  12. Yes - Are you willing to have a blood transfusion if it is medically needed before, during, or after your surgery?  13. No - Have you or anyone in your family ever had problems with anesthesia (sedation for surgery)?  14. No - Do you have sleep apnea, excessive snoring, or daytime drowsiness?   15. No - Do you have any artifical heart valves or other implanted medical devices, such as a pacemaker, defibrillator, or continuous glucose monitor?  16. No - Do you have any artifical joints?  17. No - Are you allergic to latex?  18. No - Is there any chance that you may be pregnant?    Health Care Directive:  Patient does have a Health Care Directive or Living Will: Full Code    Preoperative Review of :   reviewed - controlled substances reflected in medication list.      Status of Chronic Conditions:  ANEMIA - Patient has a recent history of moderate-severe anemia, which has not been symptomatic. Work up to date has revealed Hgb as note. Treatment has been daily supplementation.     DEPRESSION - Patient has a long history of Depression of moderate severity requiring medication for control with recent symptoms being stable..Current symptoms of depression include none.     HYPERTENSION - Patient has longstanding history of HTN , currently denies any symptoms referable to elevated blood pressure. Specifically denies chest pain, palpitations, dyspnea, orthopnea, PND or peripheral edema. Blood pressure readings have been in normal range. Current medication regimen is as listed below. Patient denies any side effects of medication.     SLEEP PROBLEM - Patient has a longstanding history of lack of concentration.. Patient has tried OTC medications with limited success.     ROS:   ROS: 10 point ROS neg other than the symptoms noted  above in the HPI.    Patient Active Problem List    Diagnosis Date Noted     Primary osteoarthritis involving multiple joints 04/26/2019     Priority: Medium     History of total left knee replacement (TKR) 04/26/2019     Priority: Medium     High risk medication use 02/13/2018     Priority: Medium     Physical debility 11/28/2017     Priority: Medium     Polyarthralgia 07/21/2017     Priority: Medium     H/O total hip arthroplasty 11/17/2016     Priority: Medium     Anemia 10/20/2016     Priority: Medium     Essential hypertension with goal blood pressure less than 140/90      Priority: Medium     Degenerative joint disease (DJD) of hip 10/18/2016     Priority: Medium     History of DVT of lower extremity      Priority: Medium     Radiculopathy of leg 07/20/2016     Priority: Medium     Sleep apnea 07/20/2016     Priority: Medium     Pain management 04/26/2016     Priority: Medium     Seropositive rheumatoid arthritis of multiple sites (H) 03/21/2016     Priority: Medium     PAD (peripheral artery disease) (H) 03/21/2016     Priority: Medium     Right shoulder tendinitis 01/19/2016     Priority: Medium     Cyclic citrullinated peptide (CCP) antibody positive 09/16/2015     Priority: Medium     Vitamin D deficiency 08/26/2015     Priority: Medium     Edema - swelling of the legs after blood clots 05/22/2015     Priority: Medium     Morbid obesity (H) 04/10/2015     Priority: Medium     Had formal nutrition consult at [facility].  RD reports that she is not   willing to commit to the program outlined.  See scanned document.    12/15/2015 - Marcio Quinonez MD         DVT of lower extremity, bilateral - residual clot disease on repeat US in NEW location - after six months of warfarin. 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  RD reports that she is not willing to commit to the program outlined.  See scanned document.  12/15/2015 - Marcio Quinonez MD        Nocturnal hypoxemia - probable sleep apnea - had overnight pulse oximetry, April, 2015. 5/22/2015     Obesity 4/10/2015     LOPEZ (obstructive sleep apnea) 5/22/2015     Osteoarthritis      Peripheral vascular disease (H)      Pleural effusion      Pressure ulcer of foot      Rheumatoid arthritis (H) 12/30/2014    seronegative     Sepsis (H)      Seropositive rheumatoid arthritis (H) 1/19/2016     Vitamin D deficiency 8/26/2015     Past Surgical History:   Procedure Laterality Date     JOINT REPLACEMENT      knee     PERIPHERALLY INSERTED CENTRAL CATHETER INSERTION Right 07/14/2019    4fr/44cm/Rt Cephalic.lowSVC.MGlav     TOTAL HIP ARTHROPLASTY Left 10/18/2016    Procedure: LEFT HIP TOTAL ARTHROPLASTY;  Surgeon: London Goss MD;  Location: [facility];  Service:      TOTAL KNEE ARTHROPLASTY Left 2006     Current Outpatient Medications   Medication Sig Dispense Refill     [START ON 1/14/2022] oxyCODONE (ROXICODONE) 5 MG tablet TAKE 1-2 TABLETS BY MOUTH EVERY 4 HOURS AS NEEDED 60 tablet 0     acetaminophen (TYLENOL) 325 MG tablet [ACETAMINOPHEN (TYLENOL) 325 MG TABLET] Take 650 mg by mouth every 6 (six) hours as needed for pain.              adalimumab (HUMIRA) 40 mg/0.8 mL injection [ADALIMUMAB (HUMIRA) 40 MG/0.8 ML INJECTION] Inject 0.8 mL (40 mg total) under the skin every 14 (fourteen) days. 1.6 mL 0     ascorbic acid, vitamin C, (ASCORBIC ACID WITH KAEL HIPS) 500 MG tablet [ASCORBIC ACID, VITAMIN C, (ASCORBIC ACID WITH KAEL HIPS) 500 MG TABLET] Take 500  mg by mouth 2 (two) times a day.       cholecalciferol, vitamin D3, (VITAMIN D3) 2,000 unit Tab [CHOLECALCIFEROL, VITAMIN D3, (VITAMIN D3) 2,000 UNIT TAB] Take 6,000 Units by mouth daily.       DULoxetine (CYMBALTA) 20 MG capsule [DULOXETINE (CYMBALTA) 20 MG CAPSULE] Take 1 capsule (20 mg total) by mouth daily. 90 capsule 2     folic acid (FOLVITE) 1 MG tablet Take 1 mg by mouth       hydroCHLOROthiazide (HYDRODIURIL) 25 MG tablet [HYDROCHLOROTHIAZIDE (HYDRODIURIL) 25 MG TABLET] Take 25 mg by mouth daily.       ibuprofen (ADVIL,MOTRIN) 200 MG tablet [IBUPROFEN (ADVIL,MOTRIN) 200 MG TABLET] Take 400 mg by mouth every 8 (eight) hours as needed for pain.              lanolin alcohol-mo-w.pet-ceres (EUCERIN) Crea [LANOLIN ALCOHOL-MO-W.PET-CERES (EUCERIN) CREA] Apply topically 2 (two) times a day. To feet       methotrexate 10 MG tablet [METHOTREXATE 10 MG TABLET] Take 1 tablet (10 mg total) by mouth once a week. 16 tablet 0     multivitamin therapeutic (THERAGRAN) tablet [MULTIVITAMIN THERAPEUTIC (THERAGRAN) TABLET] Take 1 tablet by mouth daily.       nystatin (MYCOSTATIN) powder [NYSTATIN (MYCOSTATIN) POWDER] Apply to rash areas twice daily. 15 g 0     oxyCODONE (OXY-IR) 5 mg capsule [OXYCODONE (OXY-IR) 5 MG CAPSULE] Take 1-2 tablets every 4 hours orally as needed for pain relief.  Use 1 tablet for 1-5/10 pain, take 2 tablets for 6-10/10 pain. 60 tablet 0     rivaroxaban ANTICOAGULANT (XARELTO) 10 mg tablet [RIVAROXABAN ANTICOAGULANT (XARELTO) 10 MG TABLET] Take 10 mg by mouth daily.         Allergies   Allergen Reactions     Adhesive Tape Other (See Comments)     As on band-aids;  Causes skin tearing/wounds.     Adhesive [Mecrylate] Other (See Comments)     As on band-aids;  Causes skin tearing/wounds.     Banana Unknown     She avoids due to Latex allergy.  If she eats them 3 days in a row, gets stomach cramps and dark stool.  TBrinkMD - 4/10/15     Grape [Grape (Artificial) Flavor] Unknown     She avoids due to  "Latex allergy.  If she eats lots of them, and she likes them, stomach gets a little queasy.  TBrinkMD - 4/10/15     Morphine (Pf) [Morphine] Other (See Comments)     Pt has not reacted to this med herself, but mother has anaphylactic reaction and other family members get nausea/vomiting.     Other Environmental Allergy Swelling     leather     Pine      Pseudoephedrine Cold/Allergy [Cpm-Pse Cr] Other (See Comments)     Keeps pt awake up to 24 hours      Latex Rash     Hands get red from rubber gloves, but okay if she washes them right away.  Hands get red from rubber gloves, but okay if she washes them right away.     Other Food Allergy Rash     Pine trees, leather .         Social History     Tobacco Use     Smoking status: Former Smoker     Packs/day: 1.00     Years: 30.00     Pack years: 30.00     Types: Cigarettes, Cigarettes     Smokeless tobacco: Never Used   Substance Use Topics     Alcohol use: Yes     Comment: Alcoholic Drinks/day: 3-4 times a year she will have 1 or 2.     Family History   Problem Relation Age of Onset     Multiple myeloma Father      Deep Vein Thrombosis Father      Cataracts Father      Snoring Father      Other - See Comments Brother         Blood withdrawn from time to time.  Sounds like hemochromatosis.     Sleep Apnea Brother      Cancer Mother         Uterine CA     Arthritis Mother      Cataracts Mother      Breast Cancer Mother 54.00     No Known Problems Sister      Rheumatoid Arthritis Maternal Grandmother      Breast Cancer Maternal Grandmother 50.00     Heart Disease Maternal Grandmother      Rheumatoid Arthritis Paternal Aunt      Breast Cancer Maternal Aunt 54.00     Breast Cancer Cousin      Clotting Disorder No family hx of      History   Drug Use No         Objective     BP (!) 148/67   Pulse 82   Temp 98.7  F (37.1  C)   Resp 20   Ht 1.676 m (5' 6\")   Wt (!) 155.6 kg (343 lb 1.6 oz)   SpO2 96%   BMI 55.38 kg/m      EXAM:  Limited 2/2 Video Visit  GENERAL " APPEARANCE:  Alert, in no distress, oriented, morbidly obese, cooperative, middle-aged woman sitting on the edge of her bed  EYES:  EOM, conjunctivae, lids, pupils and irises normal, wears glasses  RESP: Respiratory effort normal, no respiratory distress, cough - none  CV:  Peripheral edema 2+ in L>R  ABDOMEN: Normal bowel sounds per nursing, soft, nontender, no hepatosplenomegaly or other masses  M/S:   Gait and station abnormal - JEREMÍAS 2/2 patient being in bed; Digits and nails abnormal - arthritic changes present  SKIN:  Inspection of visible skin and subcutaneous tissue abnormal, Palpation of skin and subcutaneous tissue baseline per nursing  PSYCH:  oriented X 3, normal insight, judgement and memory, affect and mood normal  .last2    Recent Labs   Lab Test 12/02/21  1155 09/15/21  1650 04/20/21  0751 07/01/20  1233 04/03/20  1630 03/12/20  1455 03/03/20  1230   HGB  --  14.0 13.9   < >  --    < >  --    PLT  --  222 194   < >  --    < >  --    INR  --   --   --   --  1.36*  --  2.55*     --   --   --   --   --   --    CR  --  0.67 0.71   < >  --    < >  --    A1C 5.3  --   --   --   --   --   --     < > = values in this interval not displayed.        Diagnostics:  Labs pending at this time.  Results will be reviewed when available.   No EKG this visit, completed in the last 90 days.    Revised Cardiac Risk Index (RCRI):  The patient has the following serious cardiovascular risks for perioperative complications:   - No serious cardiac risks = 0 points     RCRI Interpretation: 0 points: Class I (very low risk - 0.4% complication rate)           Signed Electronically by:   Dr. Macarena Messina, APRN, DNP, AGNP-BC, PMHNP-BC  [facility]  Office Hours: Tues-Fri 2638-3354  Office: [address] [place] [address]  [facility] Cell: [phone]  Fax: [phone]  Triage Phone: [phone]  Answering Service: [phone]    Email: [email or web address]     Copy of this evaluation  report is provided to requesting physician.    Video-Visit Details  Type of service:  Video Visit  Video End Time (time video stopped): 1402  Distant Location (provider location):  [facility]           Sincerely,        KRISTIN Fair CNP

## 2021-12-30 ENCOUNTER — LAB REQUISITION (OUTPATIENT)
Dept: LAB | Facility: CLINIC | Age: 56
End: 2021-12-30
Payer: COMMERCIAL

## 2021-12-30 DIAGNOSIS — I10 ESSENTIAL (PRIMARY) HYPERTENSION: ICD-10-CM

## 2022-01-03 ENCOUNTER — LAB REQUISITION (OUTPATIENT)
Dept: LAB | Facility: CLINIC | Age: 57
End: 2022-01-03
Payer: COMMERCIAL

## 2022-01-03 DIAGNOSIS — M06.09 RHEUMATOID ARTHRITIS WITHOUT RHEUMATOID FACTOR, MULTIPLE SITES (H): ICD-10-CM

## 2022-01-03 DIAGNOSIS — M05.79 RHEUMATOID ARTHRITIS WITH RHEUMATOID FACTOR OF MULTIPLE SITES WITHOUT ORGAN OR SYSTEMS INVOLVEMENT (H): ICD-10-CM

## 2022-01-03 DIAGNOSIS — I89.0 LYMPHEDEMA, NOT ELSEWHERE CLASSIFIED: ICD-10-CM

## 2022-01-03 NOTE — PROGRESS NOTES
"María Elena Saab is being evaluated via a billable video visit.   The patient has been notified of following:  \"This video visit will be conducted via a call between you and your provider. We have found that certain health care needs can be provided without the need for an in-person physical exam.  This service lets us provide the care you need with a video conversation. If during the course of the call the provider feels a video visit is not appropriate, you will not be charged for this service.\"   The provider has received verbal consent for a Video Visit from the patient or first contact? Yes  Patient or facility staff would like the video invitation sent by: N/A   Video Start Time: 1345    [facility] Medical Record Number:  [id number]  Place of Location at the time of visit: [facility]  Chief Complaint   Patient presents with     Video Visit     Pre-Op Exam       [facility]  [address] [place] [address]  Phone: [phone]  Fax: [phone]  Primary Provider: Jeannie Messina  Pre-op Performing Provider: JEANNIE MESSINA     PREOPERATIVE EVALUATION:  Today's date: 12/29/2021     María Elena Saab is a 56 year old female who presents for a preoperative evaluation.     Surgical Information:  Surgery/Procedure: Colonoscopy  Surgery Location: [facility]  Surgeon: Dr Chintan Odonnell  Surgery Date: 01/03/22  Time of Surgery: 0830  Where patient plans to recover: At a nursing home  Fax number for surgical facility: [phone]    Type of Anesthesia Anticipated: to be determined    Assessment & Plan     The proposed surgical procedure is considered LOW risk.      ICD-10-CM    1. Positive FIT (fecal immunochemical test)  R19.5    2. Morbid obesity (H)  E66.01    3. PAD (peripheral artery disease) (H)  I73.9    4. Essential hypertension with goal blood pressure less than 140/90  I10    5. Chronic deep vein thrombosis (DVT) of distal vein of both lower " extremities (H)  I82.5Z3    6. History of DVT of lower extremity  Z86.718    7. Primary osteoarthritis involving multiple joints  M89.49    8. Seropositive rheumatoid arthritis of multiple sites (H)  M05.79    9. Anemia, unspecified type  D64.9        Implanted Device:  None    Risks and Recommendations:  The patient has the following additional risks and recommendations for perioperative complications:   - Morbid obesity (BMI >40)  Cardiovascular:   - Post-op monitoring of VS  Pulmonary:    - Incentive spirometry post-op  Obstructive Sleep Apnea:   - Noncompliant with use of CPAP at baseline  Anemia/Bleeding/Clotting:    - History of DVT or PE, consider DVT prevention postoperatively   - Anemia and does not require treatment prior to surgery. Monitor hemoglobin postoperatively    Medication Instructions:  Patient is to take all scheduled medications on the day of surgery EXCEPT for modifications listed below:   - apixaban (Eliquis), edoxaban (Savaysa), rivaroxaban (Xarelto): Neuraxial or regional block anticipated AND CrCL (>=) 50mL/min. HOLD 3 days before surgery.    - Diuretics: HOLD on the day of surgery.   - DMARDs Continue without modification    - SSRIs, SNRIs, TCAs, Antipsychotics: Continue without modification.     RECOMMENDATION:  APPROVAL GIVEN to proceed with proposed procedure, without further diagnostic evaluation.    Review of the result(s) of each unique test -    EKG:  Results unable to be located - no acute concern at this time    CXR:      Subjective     HPI related to upcoming procedure:      Positive FIT (fecal immunochemical test)  Morbid obesity (H)  PAD (peripheral artery disease) (H)  Essential hypertension with goal blood pressure less than 140/90  Chronic deep vein thrombosis (DVT) of distal vein of both lower extremities (H)  History of DVT of lower extremity  Primary osteoarthritis involving multiple joints  Seropositive rheumatoid arthritis of multiple sites (H)  Anemia, unspecified  type    Patient seen today for a Preop visit in LTC for upcoming Colonoscopy. Patient underwent routine FIT testing for colon cancer screening and was noted a positive result on 10/14/21. She has had no acute GI related concerns over the past year per resident report. Patient notes that she is feeling well overall. Denies acute concerns today outside of undergoing upcoming procedure. Appetite remains at baseline - will complete NPO and prep as ordered in facility. She is sleeping well. Denies CP, palpitations, fatigue, nausea, vomiting, increased SOB/ESPITIA, fever, chills, and/or b/b concerns today.     1. No - Have you ever had a heart attack or stroke?  2. No - Have you ever had surgery on your heart or blood vessels, such as a stent, coronary (heart) bypass, or surgery on an artery in the head, neck, heart, or legs?  3. No - Do you have chest pain when you are physically active?  4. No - Do you have a history of heart failure?  5. No - Do you currently have a cold, bronchitis, or symptoms of other respiratory (head and chest) infections?  6. No - Do you have a cough, shortness of breath, or wheezing?  7. Yes - Do you or anyone in your family have a history of blood clots? Yes - patient and father; treated on Xarelto - held x3d pre-op  8. No - Do you or anyone in your family have a serious bleeding problem, such as long-lasting bleeding after surgeries or cuts?  9. Yes - Have you ever had anemia or been told to take iron pills? Yes - on daily folic acid  10. No - Have you had any abnormal blood loss such as black, tarry or bloody stools, or abnormal vaginal bleeding?  11. No - Have you ever had a blood transfusion?  12. Yes - Are you willing to have a blood transfusion if it is medically needed before, during, or after your surgery?  13. No - Have you or anyone in your family ever had problems with anesthesia (sedation for surgery)?  14. No - Do you have sleep apnea, excessive snoring, or daytime drowsiness?   15. No  - Do you have any artifical heart valves or other implanted medical devices, such as a pacemaker, defibrillator, or continuous glucose monitor?  16. No - Do you have any artifical joints?  17. No - Are you allergic to latex?  18. No - Is there any chance that you may be pregnant?    Health Care Directive:  Patient does have a Health Care Directive or Living Will: Full Code    Preoperative Review of :   reviewed - controlled substances reflected in medication list.      Status of Chronic Conditions:  ANEMIA - Patient has a recent history of moderate-severe anemia, which has not been symptomatic. Work up to date has revealed Hgb as note. Treatment has been daily supplementation.     DEPRESSION - Patient has a long history of Depression of moderate severity requiring medication for control with recent symptoms being stable..Current symptoms of depression include none.     HYPERTENSION - Patient has longstanding history of HTN , currently denies any symptoms referable to elevated blood pressure. Specifically denies chest pain, palpitations, dyspnea, orthopnea, PND or peripheral edema. Blood pressure readings have been in normal range. Current medication regimen is as listed below. Patient denies any side effects of medication.     SLEEP PROBLEM - Patient has a longstanding history of lack of concentration.. Patient has tried OTC medications with limited success.     ROS:   ROS: 10 point ROS neg other than the symptoms noted above in the HPI.    Patient Active Problem List    Diagnosis Date Noted     Primary osteoarthritis involving multiple joints 04/26/2019     Priority: Medium     History of total left knee replacement (TKR) 04/26/2019     Priority: Medium     High risk medication use 02/13/2018     Priority: Medium     Physical debility 11/28/2017     Priority: Medium     Polyarthralgia 07/21/2017     Priority: Medium     H/O total hip arthroplasty 11/17/2016     Priority: Medium     Anemia 10/20/2016      Priority: Medium     Essential hypertension with goal blood pressure less than 140/90      Priority: Medium     Degenerative joint disease (DJD) of hip 10/18/2016     Priority: Medium     History of DVT of lower extremity      Priority: Medium     Radiculopathy of leg 07/20/2016     Priority: Medium     Sleep apnea 07/20/2016     Priority: Medium     Pain management 04/26/2016     Priority: Medium     Seropositive rheumatoid arthritis of multiple sites (H) 03/21/2016     Priority: Medium     PAD (peripheral artery disease) (H) 03/21/2016     Priority: Medium     Right shoulder tendinitis 01/19/2016     Priority: Medium     Cyclic citrullinated peptide (CCP) antibody positive 09/16/2015     Priority: Medium     Vitamin D deficiency 08/26/2015     Priority: Medium     Edema - swelling of the legs after blood clots 05/22/2015     Priority: Medium     Morbid obesity (H) 04/10/2015     Priority: Medium     Had formal nutrition consult at [facility].  RD reports that she is not   willing to commit to the program outlined.  See scanned document.    12/15/2015 - Marcio Quinonez MD         DVT of lower extremity, bilateral - residual clot disease on repeat US in NEW location - after six months of warfarin. 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  RD reports that she is not willing to commit to the program outlined.  See scanned document.  12/15/2015 - Marcio Quinonez MD        Nocturnal hypoxemia - probable  sleep apnea - had overnight pulse oximetry, April, 2015. 5/22/2015     Obesity 4/10/2015     LOPEZ (obstructive sleep apnea) 5/22/2015     Osteoarthritis      Peripheral vascular disease (H)      Pleural effusion      Pressure ulcer of foot      Rheumatoid arthritis (H) 12/30/2014    seronegative     Sepsis (H)      Seropositive rheumatoid arthritis (H) 1/19/2016     Vitamin D deficiency 8/26/2015     Past Surgical History:   Procedure Laterality Date     JOINT REPLACEMENT      knee     PERIPHERALLY INSERTED CENTRAL CATHETER INSERTION Right 07/14/2019    4fr/44cm/Rt Cephalic.lowSVC.MGlav     TOTAL HIP ARTHROPLASTY Left 10/18/2016    Procedure: LEFT HIP TOTAL ARTHROPLASTY;  Surgeon: London Goss MD;  Location: [facility];  Service:      TOTAL KNEE ARTHROPLASTY Left 2006     Current Outpatient Medications   Medication Sig Dispense Refill     [START ON 1/14/2022] oxyCODONE (ROXICODONE) 5 MG tablet TAKE 1-2 TABLETS BY MOUTH EVERY 4 HOURS AS NEEDED 60 tablet 0     acetaminophen (TYLENOL) 325 MG tablet [ACETAMINOPHEN (TYLENOL) 325 MG TABLET] Take 650 mg by mouth every 6 (six) hours as needed for pain.              adalimumab (HUMIRA) 40 mg/0.8 mL injection [ADALIMUMAB (HUMIRA) 40 MG/0.8 ML INJECTION] Inject 0.8 mL (40 mg total) under the skin every 14 (fourteen) days. 1.6 mL 0     ascorbic acid, vitamin C, (ASCORBIC ACID WITH KAEL HIPS) 500 MG tablet [ASCORBIC ACID, VITAMIN C, (ASCORBIC ACID WITH KAEL HIPS) 500 MG TABLET] Take 500 mg by mouth 2 (two) times a day.       cholecalciferol, vitamin D3, (VITAMIN D3) 2,000 unit Tab [CHOLECALCIFEROL, VITAMIN D3, (VITAMIN D3) 2,000 UNIT TAB] Take 6,000 Units by mouth daily.       DULoxetine (CYMBALTA) 20 MG capsule [DULOXETINE (CYMBALTA) 20 MG CAPSULE] Take 1 capsule (20 mg total) by mouth daily. 90 capsule 2     folic acid (FOLVITE) 1 MG tablet Take 1 mg by mouth       hydroCHLOROthiazide (HYDRODIURIL) 25 MG tablet [HYDROCHLOROTHIAZIDE (HYDRODIURIL) 25 MG TABLET] Take 25  mg by mouth daily.       ibuprofen (ADVIL,MOTRIN) 200 MG tablet [IBUPROFEN (ADVIL,MOTRIN) 200 MG TABLET] Take 400 mg by mouth every 8 (eight) hours as needed for pain.              lanolin alcohol-mo-w.pet-ceres (EUCERIN) Crea [LANOLIN ALCOHOL-MO-W.PET-CERES (EUCERIN) CREA] Apply topically 2 (two) times a day. To feet       methotrexate 10 MG tablet [METHOTREXATE 10 MG TABLET] Take 1 tablet (10 mg total) by mouth once a week. 16 tablet 0     multivitamin therapeutic (THERAGRAN) tablet [MULTIVITAMIN THERAPEUTIC (THERAGRAN) TABLET] Take 1 tablet by mouth daily.       nystatin (MYCOSTATIN) powder [NYSTATIN (MYCOSTATIN) POWDER] Apply to rash areas twice daily. 15 g 0     oxyCODONE (OXY-IR) 5 mg capsule [OXYCODONE (OXY-IR) 5 MG CAPSULE] Take 1-2 tablets every 4 hours orally as needed for pain relief.  Use 1 tablet for 1-5/10 pain, take 2 tablets for 6-10/10 pain. 60 tablet 0     rivaroxaban ANTICOAGULANT (XARELTO) 10 mg tablet [RIVAROXABAN ANTICOAGULANT (XARELTO) 10 MG TABLET] Take 10 mg by mouth daily.         Allergies   Allergen Reactions     Adhesive Tape Other (See Comments)     As on band-aids;  Causes skin tearing/wounds.     Adhesive [Mecrylate] Other (See Comments)     As on band-aids;  Causes skin tearing/wounds.     Banana Unknown     She avoids due to Latex allergy.  If she eats them 3 days in a row, gets stomach cramps and dark stool.  TBrinkMD - 4/10/15     Grape [Grape (Artificial) Flavor] Unknown     She avoids due to Latex allergy.  If she eats lots of them, and she likes them, stomach gets a little queasy.  TBrinkMD - 4/10/15     Morphine (Pf) [Morphine] Other (See Comments)     Pt has not reacted to this med herself, but mother has anaphylactic reaction and other family members get nausea/vomiting.     Other Environmental Allergy Swelling     leather     Pine      Pseudoephedrine Cold/Allergy [Cpm-Pse Cr] Other (See Comments)     Keeps pt awake up to 24 hours      Latex Rash     Hands get red from  "rubber gloves, but okay if she washes them right away.  Hands get red from rubber gloves, but okay if she washes them right away.     Other Food Allergy Rash     Pine trees, leather .         Social History     Tobacco Use     Smoking status: Former Smoker     Packs/day: 1.00     Years: 30.00     Pack years: 30.00     Types: Cigarettes, Cigarettes     Smokeless tobacco: Never Used   Substance Use Topics     Alcohol use: Yes     Comment: Alcoholic Drinks/day: 3-4 times a year she will have 1 or 2.     Family History   Problem Relation Age of Onset     Multiple myeloma Father      Deep Vein Thrombosis Father      Cataracts Father      Snoring Father      Other - See Comments Brother         Blood withdrawn from time to time.  Sounds like hemochromatosis.     Sleep Apnea Brother      Cancer Mother         Uterine CA     Arthritis Mother      Cataracts Mother      Breast Cancer Mother 54.00     No Known Problems Sister      Rheumatoid Arthritis Maternal Grandmother      Breast Cancer Maternal Grandmother 50.00     Heart Disease Maternal Grandmother      Rheumatoid Arthritis Paternal Aunt      Breast Cancer Maternal Aunt 54.00     Breast Cancer Cousin      Clotting Disorder No family hx of      History   Drug Use No         Objective     BP (!) 148/67   Pulse 82   Temp 98.7  F (37.1  C)   Resp 20   Ht 1.676 m (5' 6\")   Wt (!) 155.6 kg (343 lb 1.6 oz)   SpO2 96%   BMI 55.38 kg/m      EXAM:  Limited 2/2 Video Visit  GENERAL APPEARANCE:  Alert, in no distress, oriented, morbidly obese, cooperative, middle-aged woman sitting on the edge of her bed  EYES:  EOM, conjunctivae, lids, pupils and irises normal, wears glasses  RESP: Respiratory effort normal, no respiratory distress, cough - none  CV:  Peripheral edema 2+ in L>R  ABDOMEN: Normal bowel sounds per nursing, soft, nontender, no hepatosplenomegaly or other masses  M/S:   Gait and station abnormal - JEREMÍAS 2/2 patient being in bed; Digits and nails abnormal - " arthritic changes present  SKIN:  Inspection of visible skin and subcutaneous tissue abnormal, Palpation of skin and subcutaneous tissue baseline per nursing  PSYCH:  oriented X 3, normal insight, judgement and memory, affect and mood normal  .last2    Recent Labs   Lab Test 12/02/21  1155 09/15/21  1650 04/20/21  0751 07/01/20  1233 04/03/20  1630 03/12/20  1455 03/03/20  1230   HGB  --  14.0 13.9   < >  --    < >  --    PLT  --  222 194   < >  --    < >  --    INR  --   --   --   --  1.36*  --  2.55*     --   --   --   --   --   --    CR  --  0.67 0.71   < >  --    < >  --    A1C 5.3  --   --   --   --   --   --     < > = values in this interval not displayed.        Diagnostics:  Labs pending at this time.  Results will be reviewed when available.   No EKG this visit, completed in the last 90 days.    Revised Cardiac Risk Index (RCRI):  The patient has the following serious cardiovascular risks for perioperative complications:   - No serious cardiac risks = 0 points     RCRI Interpretation: 0 points: Class I (very low risk - 0.4% complication rate)           Signed Electronically by:   Dr. Macarena Messina, APRN, DNP, AGNP-BC, PMHNP-BC  [facility]  Office Hours: Tues-Fri 9318-4361  Office: [address] [place] [address]  [facility] Cell: [phone]  Fax: [phone]  Triage Phone: [phone]  Answering Service: [phone]    Email: [email or web address]     Copy of this evaluation report is provided to requesting physician.    Video-Visit Details  Type of service:  Video Visit  Video End Time (time video stopped): 1402  Distant Location (provider location):  [facility]

## 2022-01-04 LAB
ALBUMIN SERPL-MCNC: 3.2 G/DL (ref 3.5–5)
ALP SERPL-CCNC: 70 U/L (ref 45–120)
ALP SERPL-CCNC: 72 U/L (ref 45–120)
ALT SERPL W P-5'-P-CCNC: 23 U/L (ref 0–45)
ANION GAP SERPL CALCULATED.3IONS-SCNC: 13 MMOL/L (ref 5–18)
AST SERPL W P-5'-P-CCNC: 20 U/L (ref 0–40)
AST SERPL W P-5'-P-CCNC: 21 U/L (ref 0–40)
BASOPHILS # BLD AUTO: 0 10E3/UL (ref 0–0.2)
BASOPHILS NFR BLD AUTO: 1 %
BILIRUB DIRECT SERPL-MCNC: 0.1 MG/DL
BILIRUB SERPL-MCNC: 0.3 MG/DL (ref 0–1)
BILIRUB SERPL-MCNC: 0.4 MG/DL (ref 0–1)
BUN SERPL-MCNC: 15 MG/DL (ref 8–22)
CALCIUM SERPL-MCNC: 9.9 MG/DL (ref 8.5–10.5)
CHLORIDE BLD-SCNC: 99 MMOL/L (ref 98–107)
CO2 SERPL-SCNC: 27 MMOL/L (ref 22–31)
CREAT SERPL-MCNC: 0.66 MG/DL (ref 0.6–1.1)
CREAT SERPL-MCNC: 0.66 MG/DL (ref 0.6–1.1)
DEPRECATED CALCIDIOL+CALCIFEROL SERPL-MC: 50 UG/L (ref 30–80)
EOSINOPHIL # BLD AUTO: 0.3 10E3/UL (ref 0–0.7)
EOSINOPHIL NFR BLD AUTO: 5 %
ERYTHROCYTE [DISTWIDTH] IN BLOOD BY AUTOMATED COUNT: 15 % (ref 10–15)
GFR SERPL CREATININE-BSD FRML MDRD: >90 ML/MIN/1.73M2
GFR SERPL CREATININE-BSD FRML MDRD: >90 ML/MIN/1.73M2
GLUCOSE BLD-MCNC: 82 MG/DL (ref 70–125)
HCT VFR BLD AUTO: 42.2 % (ref 35–47)
HGB BLD-MCNC: 13.4 G/DL (ref 11.7–15.7)
IMM GRANULOCYTES # BLD: 0 10E3/UL
IMM GRANULOCYTES NFR BLD: 1 %
LYMPHOCYTES # BLD AUTO: 2.1 10E3/UL (ref 0.8–5.3)
LYMPHOCYTES NFR BLD AUTO: 35 %
MCH RBC QN AUTO: 29.9 PG (ref 26.5–33)
MCHC RBC AUTO-ENTMCNC: 31.8 G/DL (ref 31.5–36.5)
MCV RBC AUTO: 94 FL (ref 78–100)
MONOCYTES # BLD AUTO: 0.7 10E3/UL (ref 0–1.3)
MONOCYTES NFR BLD AUTO: 11 %
NEUTROPHILS # BLD AUTO: 2.8 10E3/UL (ref 1.6–8.3)
NEUTROPHILS NFR BLD AUTO: 47 %
NRBC # BLD AUTO: 0 10E3/UL
NRBC BLD AUTO-RTO: 0 /100
PLATELET # BLD AUTO: 217 10E3/UL (ref 150–450)
POTASSIUM BLD-SCNC: 3.6 MMOL/L (ref 3.5–5)
PROT SERPL-MCNC: 7.5 G/DL (ref 6–8)
PROT SERPL-MCNC: 7.6 G/DL (ref 6–8)
RBC # BLD AUTO: 4.48 10E6/UL (ref 3.8–5.2)
SODIUM SERPL-SCNC: 139 MMOL/L (ref 136–145)
WBC # BLD AUTO: 5.9 10E3/UL (ref 4–11)

## 2022-01-04 PROCEDURE — 85025 COMPLETE CBC W/AUTO DIFF WBC: CPT | Mod: ORL | Performed by: FAMILY MEDICINE

## 2022-01-04 PROCEDURE — 82248 BILIRUBIN DIRECT: CPT | Mod: ORL | Performed by: FAMILY MEDICINE

## 2022-01-04 PROCEDURE — 82565 ASSAY OF CREATININE: CPT | Mod: ORL | Performed by: FAMILY MEDICINE

## 2022-01-04 PROCEDURE — 82040 ASSAY OF SERUM ALBUMIN: CPT | Mod: ORL | Performed by: FAMILY MEDICINE

## 2022-01-04 PROCEDURE — 84460 ALANINE AMINO (ALT) (SGPT): CPT | Mod: ORL,91 | Performed by: FAMILY MEDICINE

## 2022-01-04 PROCEDURE — 36415 COLL VENOUS BLD VENIPUNCTURE: CPT | Mod: ORL | Performed by: FAMILY MEDICINE

## 2022-01-04 PROCEDURE — P9604 ONE-WAY ALLOW PRORATED TRIP: HCPCS | Mod: ORL | Performed by: FAMILY MEDICINE

## 2022-01-04 PROCEDURE — 82306 VITAMIN D 25 HYDROXY: CPT | Mod: ORL | Performed by: FAMILY MEDICINE

## 2022-01-11 ENCOUNTER — NURSING HOME VISIT (OUTPATIENT)
Dept: GERIATRICS | Facility: CLINIC | Age: 57
End: 2022-01-11
Payer: COMMERCIAL

## 2022-01-11 VITALS
DIASTOLIC BLOOD PRESSURE: 78 MMHG | TEMPERATURE: 98.5 F | HEIGHT: 66 IN | SYSTOLIC BLOOD PRESSURE: 154 MMHG | HEART RATE: 86 BPM | BODY MASS INDEX: 47.09 KG/M2 | RESPIRATION RATE: 18 BRPM | WEIGHT: 293 LBS | OXYGEN SATURATION: 96 %

## 2022-01-11 DIAGNOSIS — E66.01 MORBID OBESITY (H): ICD-10-CM

## 2022-01-11 DIAGNOSIS — R19.5 POSITIVE FIT (FECAL IMMUNOCHEMICAL TEST): Primary | ICD-10-CM

## 2022-01-11 DIAGNOSIS — E55.9 VITAMIN D DEFICIENCY: ICD-10-CM

## 2022-01-11 PROCEDURE — 99309 SBSQ NF CARE MODERATE MDM 30: CPT | Performed by: NURSE PRACTITIONER

## 2022-01-11 ASSESSMENT — MIFFLIN-ST. JEOR: SCORE: 2194.32

## 2022-01-11 NOTE — LETTER
1/11/2022        RE: María Elena Saab  [facility] [address]  [address]  [place] [address]               M [facility] GERIATRIC SERVICES  Chief Complaint   Patient presents with     [facility]     [place] Medical Record Number:  [id number]  Place of Service where encounter took place:  [facility] () [[id number]]    HPI:    María Elena Saab  is 56 year old (1965), who is being seen today for a federally mandated E/M visit. Today's concerns are:     Positive FIT (fecal immunochemical test)  Morbid obesity (H)  Vitamin D deficiency     Patient seen today for a regulatory visit in LTC. Patient notes she is doing well - states she tolerated the colonoscopy without concern. Therapies and staff continue to work with resident on physical activity. Appetite remains at baseline. She is sleeping well. Denies CP, palpitations, fatigue, nausea, vomiting, increased SOB/ESPITIA, fever, chills, and/or b/b concerns today.    ALLERGIES:Adhesive tape, Adhesive [mecrylate], Banana, Grape [grape (artificial) flavor], Morphine (pf) [morphine], Other environmental allergy, Pine, Pseudoephedrine cold/allergy [cpm-pse cr], Latex, and Other food allergy  PAST MEDICAL HISTORY:   Past Medical History:   Diagnosis Date     Aseptic necrosis of femoral head (H)     LEFT     Bacteremia due to group B Streptococcus      Cellulitis of right lower extremity      DJD (degenerative joint disease)      DVT, bilateral lower limbs (H) 10/2014     Edema 5/22/2015     Essential hypertension with goal blood pressure less than 140/90      Failure to thrive      History of blood clots      Hypokalemia 10/15/2014     Lung mass 10/18/2014     Morbid obesity (H) 4/10/2015    Had formal nutrition consult at [facility].  RD reports that she is not willing to commit to the program outlined.  See scanned document.  12/15/2015 - Marcio Quinonez MD        Nocturnal hypoxemia - probable sleep apnea - had overnight pulse  oximetry, April, 2015. 5/22/2015     Obesity 4/10/2015     LOPEZ (obstructive sleep apnea) 5/22/2015     Osteoarthritis      Peripheral vascular disease (H)      Pleural effusion      Pressure ulcer of foot      Rheumatoid arthritis (H) 12/30/2014    seronegative     Sepsis (H)      Seropositive rheumatoid arthritis (H) 1/19/2016     Vitamin D deficiency 8/26/2015     PAST SURGICAL HISTORY:   has a past surgical history that includes Total Knee Arthroplasty (Left, 2006); joint replacement; Total Hip Arthroplasty (Left, 10/18/2016); and Peripherally Inserted Central Catheter Insertion (Right, 07/14/2019).  FAMILY HISTORY: family history includes Arthritis in her mother; Breast Cancer in her cousin; Breast Cancer (age of onset: 50.00) in her maternal grandmother; Breast Cancer (age of onset: 54.00) in her maternal aunt and mother; Cancer in her mother; Cataracts in her father and mother; Deep Vein Thrombosis in her father; Heart Disease in her maternal grandmother; Multiple myeloma in her father; No Known Problems in her sister; Other - See Comments in her brother; Rheumatoid Arthritis in her maternal grandmother and paternal aunt; Sleep Apnea in her brother; Snoring in her father.  SOCIAL HISTORY:  reports that she has quit smoking. Her smoking use included cigarettes and cigarettes. She has a 30.00 pack-year smoking history. She has never used smokeless tobacco. She reports current alcohol use. She reports that she does not use drugs.    MEDICATIONS:     Review of your medicines          Accurate as of January 11, 2022 11:59 PM. If you have any questions, ask your nurse or doctor.            CONTINUE these medicines which may have CHANGED, or have new prescriptions. If we are uncertain of the size of tablets/capsules you have at home, strength may be listed as something that might have changed.      Dose / Directions   cholecalciferol 50 MCG (2000 UT) tablet  This may have changed: how much to take  Changed by:  KRISTIN Fair CNP      Dose: 4,000 Units  Take 2 tablets (100 mcg) by mouth daily Patient taking 4000 mg  Refills: 0        CONTINUE these medicines which have NOT CHANGED      Dose / Directions   acetaminophen 325 MG tablet  Commonly known as: TYLENOL      Dose: 650 mg  [ACETAMINOPHEN (TYLENOL) 325 MG TABLET] Take 650 mg by mouth every 6 (six) hours as needed for pain.  Refills: 0     adalimumab 40 MG/0.8ML injection  Commonly known as: Humira  Used for: Seropositive rheumatoid arthritis of multiple sites (H)      Dose: 40 mg  [ADALIMUMAB (HUMIRA) 40 MG/0.8 ML INJECTION] Inject 0.8 mL (40 mg total) under the skin every 14 (fourteen) days.  Quantity: 1.6 mL  Refills: 0     DULoxetine 20 MG capsule  Commonly known as: CYMBALTA  Used for: Primary osteoarthritis involving multiple joints      Dose: 20 mg  [DULOXETINE (CYMBALTA) 20 MG CAPSULE] Take 1 capsule (20 mg total) by mouth daily.  Quantity: 90 capsule  Refills: 2     eucerin cream      [LANOLIN ALCOHOL-MO-W.PET-CERES (EUCERIN) CREA] Apply topically 2 (two) times a day. To feet  Refills: 0     folic acid 1 MG tablet  Commonly known as: FOLVITE      Dose: 1 mg  Take 1 mg by mouth  Refills: 0     hydrochlorothiazide 25 MG tablet  Commonly known as: HYDRODIURIL      Dose: 25 mg  [HYDROCHLOROTHIAZIDE (HYDRODIURIL) 25 MG TABLET] Take 25 mg by mouth daily.  Refills: 0     ibuprofen 200 MG tablet  Commonly known as: ADVIL/MOTRIN      Dose: 400 mg  [IBUPROFEN (ADVIL,MOTRIN) 200 MG TABLET] Take 400 mg by mouth every 8 (eight) hours as needed for pain.  Refills: 0     methotrexate sodium 10 MG Tabs  Used for: Seropositive rheumatoid arthritis of multiple sites (H)      Dose: 10 mg  [METHOTREXATE 10 MG TABLET] Take 1 tablet (10 mg total) by mouth once a week.  Quantity: 16 tablet  Refills: 0     Mycostatin 850265 UNIT/GM Powd      [NYSTATIN (MYCOSTATIN) POWDER] Apply to rash areas twice daily.  Quantity: 15 g  Refills: 0     * oxyCODONE 5 MG capsule  Commonly  "known as: OXY-IR  Used for: Shingles outbreak      [OXYCODONE (OXY-IR) 5 MG CAPSULE] Take 1-2 tablets every 4 hours orally as needed for pain relief.  Use 1 tablet for 1-5/10 pain, take 2 tablets for 6-10/10 pain.  Quantity: 60 tablet  Refills: 0     * oxyCODONE 5 MG tablet  Commonly known as: ROXICODONE  Used for: Primary osteoarthritis of hip, unspecified laterality      TAKE 1-2 TABLETS BY MOUTH EVERY 4 HOURS AS NEEDED  Quantity: 60 tablet  Refills: 0     rivaroxaban ANTICOAGULANT 10 MG Tabs tablet  Commonly known as: XARELTO      Dose: 10 mg  [RIVAROXABAN ANTICOAGULANT (XARELTO) 10 MG TABLET] Take 10 mg by mouth daily.  Refills: 0     THERAPEUTIC MULTIVITAMIN PO      Dose: 1 tablet  [MULTIVITAMIN THERAPEUTIC (THERAGRAN) TABLET] Take 1 tablet by mouth daily.  Refills: 0     vitamin C 500 MG tablet  Commonly known as: ASCORBIC ACID      Dose: 500 mg  [ASCORBIC ACID, VITAMIN C, (ASCORBIC ACID WITH KAEL HIPS) 500 MG TABLET] Take 500 mg by mouth 2 (two) times a day.  Refills: 0         * This list has 2 medication(s) that are the same as other medications prescribed for you. Read the directions carefully, and ask your doctor or other care provider to review them with you.               Case Management:  I have reviewed the care plan and MDS and do agree with the plan. Patient's desire to return to the community is not present. Information reviewed:  Medications, vital signs, orders, and nursing notes.    ROS:  10 point ROS of systems including Constitutional, Eyes, Respiratory, Cardiovascular, Gastroenterology, Genitourinary, Integumentary, Musculoskeletal, Psychiatric were all negative except for pertinent positives noted in my HPI.    Vitals:  BP (!) 154/78   Pulse 86   Temp 98.5  F (36.9  C)   Resp 18   Ht 1.676 m (5' 6\")   Wt (!) 159.8 kg (352 lb 3.2 oz)   SpO2 96%   BMI 56.85 kg/m    Body mass index is 56.85 kg/m .  Exam:  GENERAL APPEARANCE:  Alert, in no distress, oriented, morbidly obese, cooperative, " middle-aged woman sitting on the edge of her bed  EYES:  EOM, conjunctivae, lids, pupils and irises normal  RESP: Respiratory effort and palpation of chest normal, lungs clear to auscultation, no respiratory distress, diminished breath sounds throughout 2/2 body habitus  CV:  Palpation and auscultation of heart done, regular rate and rhythm, no murmur, rub, or gallop, +2 pedal pulses, peripheral edema 2-3+ in L>R  ABDOMEN: Normal bowel sounds, soft, nontender, no hepatosplenomegaly or other masses  M/S:   Gait and station abnormal -JEREMÍAS 2/2 patient being in bed; Digits and nails abnormal - arthritic changes present  SKIN:  Inspection of skin and subcutaneous tissue abnormal, Palpation of skin and subcutaneous tissue baseline  NEURO:   Cranial nerves 2-12 are normal tested and grossly at patient's baseline  PSYCH: Oriented X 3, normal insight, judgement and memory, affect and mood normal    Lab/Diagnostic data:   Recent labs in [facility] reviewed by me today.     ASSESSMENT/PLAN    ICD-10-CM    1. Positive FIT (fecal immunochemical test)  R19.5 Acute - unstable. Underwent colonoscopy on 1/6/22 noting there presence of three polyps within the transverse and ascending colon which were resected; also showed presence of Diverticulosis of the sigmoid colon and the presence of internal hemorrhoids - all samples sent to pathology negative for presence of dysplasia or malignancy. Ongoing colonoscopy per guideline monitoring.   2. Morbid obesity (H)  E66.01 Chronic - unstable. 2/2 lifestyle choices and comorbid conditions - weights as noted below. Ongoing education with dietary intervention PRN.  Wt Readings from Last 4 Encounters:   01/18/22 (!) 155.6 kg (343 lb)   01/11/22 (!) 159.8 kg (352 lb 3.2 oz)   12/29/21 (!) 155.6 kg (343 lb 1.6 oz)   11/30/21 (!) 153.3 kg (338 lb)      3. Vitamin D deficiency  E55.9 Chronic - unstable, improving. Last Check down to 50mg/dL - continues on regimen of Vit D 4000u daily  - plan for recheck  of Vit D level 03/2022.       Electronically signed by:  KRISTIN Bills, OMAYRA, AGNP-BC, [facility]  Office Hours: Tues-Fri 1704-1054  Office: [address] [facility] [place] [place] [address] [place] [address]  [facility] Cell: [phone]  Fax: [phone]  Triage Phone: [phone]  Answering Service: [phone]    Email: [email or web address]                   Orders:    Recheck Vit D level on 3/8/22 - Vit D deficiency    KRISTIN Bills, OMAYRA, [facility], [facility]  Office: [address] [facility] [place] [place] [address] [place] [address]  [facility] Cell: [phone]  Fax: [phone]  Triage Phone: [phone]  Voicemail: [phone]    Email: [email or web address]                 Sincerely,        KRISTIN Fair CNP

## 2022-01-12 ENCOUNTER — LAB REQUISITION (OUTPATIENT)
Dept: LAB | Facility: CLINIC | Age: 57
End: 2022-01-12
Payer: COMMERCIAL

## 2022-01-12 DIAGNOSIS — M05.79 RHEUMATOID ARTHRITIS WITH RHEUMATOID FACTOR OF MULTIPLE SITES WITHOUT ORGAN OR SYSTEMS INVOLVEMENT (H): ICD-10-CM

## 2022-01-13 LAB
ALT SERPL W P-5'-P-CCNC: 21 U/L (ref 0–45)
BASOPHILS # BLD AUTO: 0.1 10E3/UL (ref 0–0.2)
BASOPHILS NFR BLD AUTO: 1 %
CREAT SERPL-MCNC: 0.65 MG/DL (ref 0.6–1.1)
EOSINOPHIL # BLD AUTO: 0.2 10E3/UL (ref 0–0.7)
EOSINOPHIL NFR BLD AUTO: 5 %
ERYTHROCYTE [DISTWIDTH] IN BLOOD BY AUTOMATED COUNT: 15.8 % (ref 10–15)
GFR SERPL CREATININE-BSD FRML MDRD: >90 ML/MIN/1.73M2
HCT VFR BLD AUTO: 48 % (ref 35–47)
HGB BLD-MCNC: 15.2 G/DL (ref 11.7–15.7)
IMM GRANULOCYTES # BLD: 0 10E3/UL
IMM GRANULOCYTES NFR BLD: 0 %
LYMPHOCYTES # BLD AUTO: 1.5 10E3/UL (ref 0.8–5.3)
LYMPHOCYTES NFR BLD AUTO: 30 %
MCH RBC QN AUTO: 30.5 PG (ref 26.5–33)
MCHC RBC AUTO-ENTMCNC: 31.7 G/DL (ref 31.5–36.5)
MCV RBC AUTO: 96 FL (ref 78–100)
MONOCYTES # BLD AUTO: 0.4 10E3/UL (ref 0–1.3)
MONOCYTES NFR BLD AUTO: 9 %
NEUTROPHILS # BLD AUTO: 2.7 10E3/UL (ref 1.6–8.3)
NEUTROPHILS NFR BLD AUTO: 55 %
NRBC # BLD AUTO: 0 10E3/UL
NRBC BLD AUTO-RTO: 0 /100
PLATELET # BLD AUTO: 210 10E3/UL (ref 150–450)
RBC # BLD AUTO: 4.99 10E6/UL (ref 3.8–5.2)
WBC # BLD AUTO: 4.9 10E3/UL (ref 4–11)

## 2022-01-13 PROCEDURE — 82040 ASSAY OF SERUM ALBUMIN: CPT | Mod: ORL | Performed by: NURSE PRACTITIONER

## 2022-01-13 PROCEDURE — 85025 COMPLETE CBC W/AUTO DIFF WBC: CPT | Mod: ORL | Performed by: NURSE PRACTITIONER

## 2022-01-13 PROCEDURE — P9603 ONE-WAY ALLOW PRORATED MILES: HCPCS | Mod: ORL | Performed by: NURSE PRACTITIONER

## 2022-01-13 PROCEDURE — 36415 COLL VENOUS BLD VENIPUNCTURE: CPT | Mod: ORL | Performed by: NURSE PRACTITIONER

## 2022-01-13 PROCEDURE — 82565 ASSAY OF CREATININE: CPT | Mod: ORL | Performed by: NURSE PRACTITIONER

## 2022-01-13 PROCEDURE — 84460 ALANINE AMINO (ALT) (SGPT): CPT | Mod: ORL | Performed by: NURSE PRACTITIONER

## 2022-01-14 LAB — ALBUMIN SERPL-MCNC: 3.5 G/DL (ref 3.5–5)

## 2022-01-18 ENCOUNTER — OFFICE VISIT (OUTPATIENT)
Dept: RHEUMATOLOGY | Facility: CLINIC | Age: 57
End: 2022-01-18
Payer: COMMERCIAL

## 2022-01-18 VITALS
WEIGHT: 293 LBS | BODY MASS INDEX: 55.36 KG/M2 | DIASTOLIC BLOOD PRESSURE: 74 MMHG | HEART RATE: 80 BPM | SYSTOLIC BLOOD PRESSURE: 130 MMHG

## 2022-01-18 DIAGNOSIS — R76.8 CYCLIC CITRULLINATED PEPTIDE (CCP) ANTIBODY POSITIVE: ICD-10-CM

## 2022-01-18 DIAGNOSIS — M05.79 SEROPOSITIVE RHEUMATOID ARTHRITIS OF MULTIPLE SITES (H): Primary | ICD-10-CM

## 2022-01-18 DIAGNOSIS — M15.0 PRIMARY OSTEOARTHRITIS INVOLVING MULTIPLE JOINTS: ICD-10-CM

## 2022-01-18 DIAGNOSIS — Z79.899 HIGH RISK MEDICATION USE: ICD-10-CM

## 2022-01-18 PROCEDURE — 20610 DRAIN/INJ JOINT/BURSA W/O US: CPT | Performed by: INTERNAL MEDICINE

## 2022-01-18 PROCEDURE — 99214 OFFICE O/P EST MOD 30 MIN: CPT | Mod: 25 | Performed by: INTERNAL MEDICINE

## 2022-01-18 RX ORDER — TRIAMCINOLONE ACETONIDE 40 MG/ML
40 INJECTION, SUSPENSION INTRA-ARTICULAR; INTRAMUSCULAR ONCE
Status: COMPLETED | OUTPATIENT
Start: 2022-01-18 | End: 2022-01-18

## 2022-01-18 RX ADMIN — TRIAMCINOLONE ACETONIDE 40 MG: 40 INJECTION, SUSPENSION INTRA-ARTICULAR; INTRAMUSCULAR at 15:02

## 2022-01-18 NOTE — PROGRESS NOTES
Rheumatology follow-up visit note     María Elena is a 56 year old female presents today for follow-up.    María Elena was seen today for recheck.    Diagnoses and all orders for this visit:    Seropositive rheumatoid arthritis of multiple sites (H)  -     adalimumab (HUMIRA) 40 MG/0.8ML prefilled syringe kit; Inject 0.8 mLs (40 mg) Subcutaneous every 14 days    Primary osteoarthritis involving multiple joints  -     triamcinolone (KENALOG-40) injection 40 mg  -     MO ARTHROCENTESIS ASPIR&/INJ MAJOR JT/BURSA W/US    Cyclic citrullinated peptide (CCP) antibody positive  -     adalimumab (HUMIRA) 40 MG/0.8ML prefilled syringe kit; Inject 0.8 mLs (40 mg) Subcutaneous every 14 days    High risk medication use            After pros and cons were discussed including risk of infection, bleeding, skin changes including thinning, pigmentary alteration and scarring to name a few, right knee injected as noted in the orders section. This was done with nontouch technique.  The patient tolerated the procedure well and had a brisk Marcaine effect.  The postinjection care was discussed.      Follow up in 3 months.    HPI    María Elena Saab is a 56 year old female is here for follow-up.  She has rheumatoid arthritis longstanding, polyarticular, seropositive.  She has osteoarthritis.  Status post left knee arthroplasty.  She has chronic lymphedema.  While her RA appears to be under good control she has noted worsening pain in her right knee.  In the past she has had good response to corticosteroid injections.  Because of her weight related comorbidities she has been deemed not a candidate for arthroplasty of the right knee detail and the weight is at the level.  She has noted this pain to be moderately severe worse with activity.  She has crepitus.  She is not sure if it is swollen.  We discussed options.  This will get together in person.  Meanwhile continue, methotrexate, consider going back on sulfasalazine if she has evidence of  active synovitis on her hands.  Another option would be to change a biologic.  ROS enquiry held for fever, ocular symptoms, rash, headache,  GI issues.  Recent labs are reviewed within normal range.  Today we also discussed the issues related to the current pandemic, the pros and cons of the current treatment plan, the CDC guidelines such as social distancing washing the hands covering the cough.      DETAILED EXAMINATION  01/18/22  :    Vitals:    01/18/22 1259   BP: 130/74   Pulse: 80   Weight: (!) 155.6 kg (343 lb)     Alert oriented. Head including the face is examined for malar rash, heliotropes, scarring, lupus pernio. Eyes examined for redness such as in episcleritis/scleritis, periorbital lesions.   Neck/ Face examined for parotid gland swelling, range of motion of neck.  Left upper and lower and right upper and lower extremities examined for tenderness, swelling, warmth of the appendicular joints, range of motion, edema, rash.  Some of the important findings included: she does not have evidence of synovitis in the palpable joints of the upper extremities.  No significant deformities of the digits.  + Heber[place] nodes.    Left TKA.  Joint line tenderness of the right knee with warmth..  she does not have dactylitis of the digits.     Patient Active Problem List    Diagnosis Date Noted     Primary osteoarthritis involving multiple joints 04/26/2019     Priority: Medium     History of total left knee replacement (TKR) 04/26/2019     Priority: Medium     High risk medication use 02/13/2018     Priority: Medium     Physical debility 11/28/2017     Priority: Medium     Polyarthralgia 07/21/2017     Priority: Medium     H/O total hip arthroplasty 11/17/2016     Priority: Medium     Anemia 10/20/2016     Priority: Medium     Essential hypertension with goal blood pressure less than 140/90      Priority: Medium     Degenerative joint disease (DJD) of hip 10/18/2016     Priority: Medium     History of DVT of lower  extremity      Priority: Medium     Radiculopathy of leg 07/20/2016     Priority: Medium     Sleep apnea 07/20/2016     Priority: Medium     Pain management 04/26/2016     Priority: Medium     Seropositive rheumatoid arthritis of multiple sites (H) 03/21/2016     Priority: Medium     PAD (peripheral artery disease) (H) 03/21/2016     Priority: Medium     Right shoulder tendinitis 01/19/2016     Priority: Medium     Cyclic citrullinated peptide (CCP) antibody positive 09/16/2015     Priority: Medium     Vitamin D deficiency 08/26/2015     Priority: Medium     Edema - swelling of the legs after blood clots 05/22/2015     Priority: Medium     Morbid obesity (H) 04/10/2015     Priority: Medium     Had formal nutrition consult at [facility].  RD reports that she is not   willing to commit to the program outlined.  See scanned document.    12/15/2015 - Marcio Quinonez MD         DVT of lower extremity, bilateral - residual clot disease on repeat US in NEW location - after six months of warfarin. 10/15/2014     Priority: Medium     Past Surgical History:   Procedure Laterality Date     JOINT REPLACEMENT      knee     PERIPHERALLY INSERTED CENTRAL CATHETER INSERTION Right 07/14/2019    4fr/44cm/Rt Cephalic.lowSVC.MGlav     TOTAL HIP ARTHROPLASTY Left 10/18/2016    Procedure: LEFT HIP TOTAL ARTHROPLASTY;  Surgeon: London Goss MD;  Location: [facility];  Service:      TOTAL KNEE ARTHROPLASTY Left 2006      Past Medical History:   Diagnosis Date     Aseptic necrosis of femoral head (H)     LEFT     Bacteremia due to group B Streptococcus      Cellulitis of right lower extremity      DJD (degenerative joint disease)      DVT, bilateral lower limbs (H) 10/2014     Edema 5/22/2015     Essential hypertension with goal blood pressure less than 140/90      Failure to thrive      History of blood clots      Hypokalemia 10/15/2014     Lung mass 10/18/2014     Morbid obesity (H) 4/10/2015    Had formal nutrition consult at  [facility].  RD reports that she is not willing to commit to the program outlined.  See scanned document.  12/15/2015 - Marcio Quinonez MD        Nocturnal hypoxemia - probable sleep apnea - had overnight pulse oximetry, April, 2015. 5/22/2015     Obesity 4/10/2015     LOPEZ (obstructive sleep apnea) 5/22/2015     Osteoarthritis      Peripheral vascular disease (H)      Pleural effusion      Pressure ulcer of foot      Rheumatoid arthritis (H) 12/30/2014    seronegative     Sepsis (H)      Seropositive rheumatoid arthritis (H) 1/19/2016     Vitamin D deficiency 8/26/2015     Allergies   Allergen Reactions     Adhesive Tape Other (See Comments)     As on band-aids;  Causes skin tearing/wounds.     Adhesive [Mecrylate] Other (See Comments)     As on band-aids;  Causes skin tearing/wounds.     Banana Unknown     She avoids due to Latex allergy.  If she eats them 3 days in a row, gets stomach cramps and dark stool.  TBrinkMD - 4/10/15     Grape [Grape (Artificial) Flavor] Unknown     She avoids due to Latex allergy.  If she eats lots of them, and she likes them, stomach gets a little queasy.  TBrinkMD - 4/10/15     Morphine (Pf) [Morphine] Other (See Comments)     Pt has not reacted to this med herself, but mother has anaphylactic reaction and other family members get nausea/vomiting.     Other Environmental Allergy Swelling     leather     Pine      Pseudoephedrine Cold/Allergy [Cpm-Pse Cr] Other (See Comments)     Keeps pt awake up to 24 hours      Latex Rash     Hands get red from rubber gloves, but okay if she washes them right away.  Hands get red from rubber gloves, but okay if she washes them right away.     Other Food Allergy Rash     Pine trees, leather .      Current Outpatient Medications   Medication Sig Dispense Refill     oxyCODONE (ROXICODONE) 5 MG tablet TAKE 1-2 TABLETS BY MOUTH EVERY 4 HOURS AS NEEDED 60 tablet 0     acetaminophen (TYLENOL) 325 MG tablet [ACETAMINOPHEN (TYLENOL) 325 MG TABLET] Take 650 mg  by mouth every 6 (six) hours as needed for pain.              adalimumab (HUMIRA) 40 mg/0.8 mL injection [ADALIMUMAB (HUMIRA) 40 MG/0.8 ML INJECTION] Inject 0.8 mL (40 mg total) under the skin every 14 (fourteen) days. 1.6 mL 0     adalimumab (HUMIRA) 40 MG/0.8ML prefilled syringe kit Inject 0.8 mLs (40 mg) Subcutaneous every 14 days 2 each 0     ascorbic acid, vitamin C, (ASCORBIC ACID WITH KAEL HIPS) 500 MG tablet [ASCORBIC ACID, VITAMIN C, (ASCORBIC ACID WITH KAEL HIPS) 500 MG TABLET] Take 500 mg by mouth 2 (two) times a day.       cholecalciferol, vitamin D3, (VITAMIN D3) 2,000 unit Tab [CHOLECALCIFEROL, VITAMIN D3, (VITAMIN D3) 2,000 UNIT TAB] Take 6,000 Units by mouth daily.       DULoxetine (CYMBALTA) 20 MG capsule [DULOXETINE (CYMBALTA) 20 MG CAPSULE] Take 1 capsule (20 mg total) by mouth daily. 90 capsule 2     folic acid (FOLVITE) 1 MG tablet Take 1 mg by mouth       hydroCHLOROthiazide (HYDRODIURIL) 25 MG tablet [HYDROCHLOROTHIAZIDE (HYDRODIURIL) 25 MG TABLET] Take 25 mg by mouth daily.       ibuprofen (ADVIL,MOTRIN) 200 MG tablet [IBUPROFEN (ADVIL,MOTRIN) 200 MG TABLET] Take 400 mg by mouth every 8 (eight) hours as needed for pain.              lanolin alcohol-mo-w.pet-ceres (EUCERIN) Crea [LANOLIN ALCOHOL-MO-W.PET-CERES (EUCERIN) CREA] Apply topically 2 (two) times a day. To feet       methotrexate 10 MG tablet [METHOTREXATE 10 MG TABLET] Take 1 tablet (10 mg total) by mouth once a week. 16 tablet 0     multivitamin therapeutic (THERAGRAN) tablet [MULTIVITAMIN THERAPEUTIC (THERAGRAN) TABLET] Take 1 tablet by mouth daily.       nystatin (MYCOSTATIN) powder [NYSTATIN (MYCOSTATIN) POWDER] Apply to rash areas twice daily. 15 g 0     oxyCODONE (OXY-IR) 5 mg capsule [OXYCODONE (OXY-IR) 5 MG CAPSULE] Take 1-2 tablets every 4 hours orally as needed for pain relief.  Use 1 tablet for 1-5/10 pain, take 2 tablets for 6-10/10 pain. 60 tablet 0     rivaroxaban ANTICOAGULANT (XARELTO) 10 mg tablet [RIVAROXABAN  ANTICOAGULANT (XARELTO) 10 MG TABLET] Take 10 mg by mouth daily.       family history includes Arthritis in her mother; Breast Cancer in her cousin; Breast Cancer (age of onset: 50.00) in her maternal grandmother; Breast Cancer (age of onset: 54.00) in her maternal aunt and mother; Cancer in her mother; Cataracts in her father and mother; Deep Vein Thrombosis in her father; Heart Disease in her maternal grandmother; Multiple myeloma in her father; No Known Problems in her sister; Other - See Comments in her brother; Rheumatoid Arthritis in her maternal grandmother and paternal aunt; Sleep Apnea in her brother; Snoring in her father.  Social Connections: Not on file          WBC Count   Date Value Ref Range Status   01/13/2022 4.9 4.0 - 11.0 10e3/uL Final     RBC Count   Date Value Ref Range Status   01/13/2022 4.99 3.80 - 5.20 10e6/uL Final     Hemoglobin   Date Value Ref Range Status   01/13/2022 15.2 11.7 - 15.7 g/dL Final     Hematocrit   Date Value Ref Range Status   01/13/2022 48.0 (H) 35.0 - 47.0 % Final     MCV   Date Value Ref Range Status   01/13/2022 96 78 - 100 fL Final     MCH   Date Value Ref Range Status   01/13/2022 30.5 26.5 - 33.0 pg Final     Platelet Count   Date Value Ref Range Status   01/13/2022 210 150 - 450 10e3/uL Final     % Lymphocytes   Date Value Ref Range Status   01/13/2022 30 % Final     AST   Date Value Ref Range Status   01/04/2022 20 0 - 40 U/L Final   01/04/2022 21 0 - 40 U/L Final     ALT   Date Value Ref Range Status   01/13/2022 21 0 - 45 U/L Final     Albumin   Date Value Ref Range Status   01/13/2022 3.5 3.5 - 5.0 g/dL Final     Alkaline Phosphatase   Date Value Ref Range Status   01/04/2022 72 45 - 120 U/L Final   01/04/2022 70 45 - 120 U/L Final     Creatinine   Date Value Ref Range Status   01/13/2022 0.65 0.60 - 1.10 mg/dL Final     GFR Estimate   Date Value Ref Range Status   01/13/2022 >90 >60 mL/min/1.73m2 Final     Comment:     Effective December 21, 2021 eGFRcr in  adults is calculated using the 2021 CKD-EPI creatinine equation which includes age and gender (Eduardo et al., NEJ, DOI: 10.1056/DTTWcn3403882)   04/20/2021 >60 >60 mL/min/1.73m2 Final     GFR Estimate If Black   Date Value Ref Range Status   04/20/2021 >60 >60 mL/min/1.73m2 Final     CRP   Date Value Ref Range Status   12/21/2020 1.3 (H) 0.0 - 0.8 mg/dL Final

## 2022-02-02 NOTE — PROGRESS NOTES
[facility] GERIATRIC SERVICES  Chief Complaint   Patient presents with     skilled nursing Regulatory     [place] Medical Record Number:  [id number]  Place of Service where encounter took place:  [facility] () [[id number]]    HPI:    María Elena Saab  is 56 year old (1965), who is being seen today for a federally mandated E/M visit. Today's concerns are:     Positive FIT (fecal immunochemical test)  Morbid obesity (H)  Vitamin D deficiency     Patient seen today for a regulatory visit in LTC. Patient notes she is doing well - states she tolerated the colonoscopy without concern. Therapies and staff continue to work with resident on physical activity. Appetite remains at baseline. She is sleeping well. Denies CP, palpitations, fatigue, nausea, vomiting, increased SOB/ESPITIA, fever, chills, and/or b/b concerns today.    ALLERGIES:Adhesive tape, Adhesive [mecrylate], Banana, Grape [grape (artificial) flavor], Morphine (pf) [morphine], Other environmental allergy, Pine, Pseudoephedrine cold/allergy [cpm-pse cr], Latex, and Other food allergy  PAST MEDICAL HISTORY:   Past Medical History:   Diagnosis Date     Aseptic necrosis of femoral head (H)     LEFT     Bacteremia due to group B Streptococcus      Cellulitis of right lower extremity      DJD (degenerative joint disease)      DVT, bilateral lower limbs (H) 10/2014     Edema 5/22/2015     Essential hypertension with goal blood pressure less than 140/90      Failure to thrive      History of blood clots      Hypokalemia 10/15/2014     Lung mass 10/18/2014     Morbid obesity (H) 4/10/2015    Had formal nutrition consult at [facility].  RD reports that she is not willing to commit to the program outlined.  See scanned document.  12/15/2015 - Marcio Quinonez MD        Nocturnal hypoxemia - probable sleep apnea - had overnight pulse oximetry, April, 2015. 5/22/2015     Obesity 4/10/2015     LOPEZ (obstructive sleep apnea) 5/22/2015     Osteoarthritis       Peripheral vascular disease (H)      Pleural effusion      Pressure ulcer of foot      Rheumatoid arthritis (H) 12/30/2014    seronegative     Sepsis (H)      Seropositive rheumatoid arthritis (H) 1/19/2016     Vitamin D deficiency 8/26/2015     PAST SURGICAL HISTORY:   has a past surgical history that includes Total Knee Arthroplasty (Left, 2006); joint replacement; Total Hip Arthroplasty (Left, 10/18/2016); and Peripherally Inserted Central Catheter Insertion (Right, 07/14/2019).  FAMILY HISTORY: family history includes Arthritis in her mother; Breast Cancer in her cousin; Breast Cancer (age of onset: 50.00) in her maternal grandmother; Breast Cancer (age of onset: 54.00) in her maternal aunt and mother; Cancer in her mother; Cataracts in her father and mother; Deep Vein Thrombosis in her father; Heart Disease in her maternal grandmother; Multiple myeloma in her father; No Known Problems in her sister; Other - See Comments in her brother; Rheumatoid Arthritis in her maternal grandmother and paternal aunt; Sleep Apnea in her brother; Snoring in her father.  SOCIAL HISTORY:  reports that she has quit smoking. Her smoking use included cigarettes and cigarettes. She has a 30.00 pack-year smoking history. She has never used smokeless tobacco. She reports current alcohol use. She reports that she does not use drugs.    MEDICATIONS:     Review of your medicines          Accurate as of January 11, 2022 11:59 PM. If you have any questions, ask your nurse or doctor.            CONTINUE these medicines which may have CHANGED, or have new prescriptions. If we are uncertain of the size of tablets/capsules you have at home, strength may be listed as something that might have changed.      Dose / Directions   cholecalciferol 50 MCG (2000 UT) tablet  This may have changed: how much to take  Changed by: KRISTIN Fair CNP      Dose: 4,000 Units  Take 2 tablets (100 mcg) by mouth daily Patient taking 4000 mg  Refills: 0         CONTINUE these medicines which have NOT CHANGED      Dose / Directions   acetaminophen 325 MG tablet  Commonly known as: TYLENOL      Dose: 650 mg  [ACETAMINOPHEN (TYLENOL) 325 MG TABLET] Take 650 mg by mouth every 6 (six) hours as needed for pain.  Refills: 0     adalimumab 40 MG/0.8ML injection  Commonly known as: Humira  Used for: Seropositive rheumatoid arthritis of multiple sites (H)      Dose: 40 mg  [ADALIMUMAB (HUMIRA) 40 MG/0.8 ML INJECTION] Inject 0.8 mL (40 mg total) under the skin every 14 (fourteen) days.  Quantity: 1.6 mL  Refills: 0     DULoxetine 20 MG capsule  Commonly known as: CYMBALTA  Used for: Primary osteoarthritis involving multiple joints      Dose: 20 mg  [DULOXETINE (CYMBALTA) 20 MG CAPSULE] Take 1 capsule (20 mg total) by mouth daily.  Quantity: 90 capsule  Refills: 2     eucerin cream      [LANOLIN ALCOHOL-MO-W.PET-CERES (EUCERIN) CREA] Apply topically 2 (two) times a day. To feet  Refills: 0     folic acid 1 MG tablet  Commonly known as: FOLVITE      Dose: 1 mg  Take 1 mg by mouth  Refills: 0     hydrochlorothiazide 25 MG tablet  Commonly known as: HYDRODIURIL      Dose: 25 mg  [HYDROCHLOROTHIAZIDE (HYDRODIURIL) 25 MG TABLET] Take 25 mg by mouth daily.  Refills: 0     ibuprofen 200 MG tablet  Commonly known as: ADVIL/MOTRIN      Dose: 400 mg  [IBUPROFEN (ADVIL,MOTRIN) 200 MG TABLET] Take 400 mg by mouth every 8 (eight) hours as needed for pain.  Refills: 0     methotrexate sodium 10 MG Tabs  Used for: Seropositive rheumatoid arthritis of multiple sites (H)      Dose: 10 mg  [METHOTREXATE 10 MG TABLET] Take 1 tablet (10 mg total) by mouth once a week.  Quantity: 16 tablet  Refills: 0     Mycostatin 219645 UNIT/GM Powd      [NYSTATIN (MYCOSTATIN) POWDER] Apply to rash areas twice daily.  Quantity: 15 g  Refills: 0     * oxyCODONE 5 MG capsule  Commonly known as: OXY-IR  Used for: Shingles outbreak      [OXYCODONE (OXY-IR) 5 MG CAPSULE] Take 1-2 tablets every 4 hours orally as needed  "for pain relief.  Use 1 tablet for 1-5/10 pain, take 2 tablets for 6-10/10 pain.  Quantity: 60 tablet  Refills: 0     * oxyCODONE 5 MG tablet  Commonly known as: ROXICODONE  Used for: Primary osteoarthritis of hip, unspecified laterality      TAKE 1-2 TABLETS BY MOUTH EVERY 4 HOURS AS NEEDED  Quantity: 60 tablet  Refills: 0     rivaroxaban ANTICOAGULANT 10 MG Tabs tablet  Commonly known as: XARELTO      Dose: 10 mg  [RIVAROXABAN ANTICOAGULANT (XARELTO) 10 MG TABLET] Take 10 mg by mouth daily.  Refills: 0     THERAPEUTIC MULTIVITAMIN PO      Dose: 1 tablet  [MULTIVITAMIN THERAPEUTIC (THERAGRAN) TABLET] Take 1 tablet by mouth daily.  Refills: 0     vitamin C 500 MG tablet  Commonly known as: ASCORBIC ACID      Dose: 500 mg  [ASCORBIC ACID, VITAMIN C, (ASCORBIC ACID WITH KAEL HIPS) 500 MG TABLET] Take 500 mg by mouth 2 (two) times a day.  Refills: 0         * This list has 2 medication(s) that are the same as other medications prescribed for you. Read the directions carefully, and ask your doctor or other care provider to review them with you.               Case Management:  I have reviewed the care plan and MDS and do agree with the plan. Patient's desire to return to the community is not present. Information reviewed:  Medications, vital signs, orders, and nursing notes.    ROS:  10 point ROS of systems including Constitutional, Eyes, Respiratory, Cardiovascular, Gastroenterology, Genitourinary, Integumentary, Musculoskeletal, Psychiatric were all negative except for pertinent positives noted in my HPI.    Vitals:  BP (!) 154/78   Pulse 86   Temp 98.5  F (36.9  C)   Resp 18   Ht 1.676 m (5' 6\")   Wt (!) 159.8 kg (352 lb 3.2 oz)   SpO2 96%   BMI 56.85 kg/m    Body mass index is 56.85 kg/m .  Exam:  GENERAL APPEARANCE:  Alert, in no distress, oriented, morbidly obese, cooperative, middle-aged woman sitting on the edge of her bed  EYES:  EOM, conjunctivae, lids, pupils and irises normal  RESP: Respiratory effort " and palpation of chest normal, lungs clear to auscultation, no respiratory distress, diminished breath sounds throughout 2/2 body habitus  CV:  Palpation and auscultation of heart done, regular rate and rhythm, no murmur, rub, or gallop, +2 pedal pulses, peripheral edema 2-3+ in L>R  ABDOMEN: Normal bowel sounds, soft, nontender, no hepatosplenomegaly or other masses  M/S:   Gait and station abnormal -JEREMÍAS 2/2 patient being in bed; Digits and nails abnormal - arthritic changes present  SKIN:  Inspection of skin and subcutaneous tissue abnormal, Palpation of skin and subcutaneous tissue baseline  NEURO:   Cranial nerves 2-12 are normal tested and grossly at patient's baseline  PSYCH: Oriented X 3, normal insight, judgement and memory, affect and mood normal    Lab/Diagnostic data:   Recent labs in [facility] reviewed by me today.     ASSESSMENT/PLAN    ICD-10-CM    1. Positive FIT (fecal immunochemical test)  R19.5 Acute - unstable. Underwent colonoscopy on 1/6/22 noting there presence of three polyps within the transverse and ascending colon which were resected; also showed presence of Diverticulosis of the sigmoid colon and the presence of internal hemorrhoids - all samples sent to pathology negative for presence of dysplasia or malignancy. Ongoing colonoscopy per guideline monitoring.   2. Morbid obesity (H)  E66.01 Chronic - unstable. 2/2 lifestyle choices and comorbid conditions - weights as noted below. Ongoing education with dietary intervention PRN.  Wt Readings from Last 4 Encounters:   01/18/22 (!) 155.6 kg (343 lb)   01/11/22 (!) 159.8 kg (352 lb 3.2 oz)   12/29/21 (!) 155.6 kg (343 lb 1.6 oz)   11/30/21 (!) 153.3 kg (338 lb)      3. Vitamin D deficiency  E55.9 Chronic - unstable, improving. Last Check down to 50mg/dL - continues on regimen of Vit D 4000u daily  - plan for recheck of Vit D level 03/2022.       Electronically signed by:  Dr. Macarena Messina, APRN, DNP, AGNP-BC, PMHNP-BC  [facility]  Hours: Tues-Fri 1580-7166  Office: [address] [place] [address]  [facility] Cell: [phone]  Fax: [phone]  Triage Phone: [phone]  Answering Service: [phone]    Email: [email or web address]

## 2022-02-02 NOTE — PROGRESS NOTES
Orders:    Recheck Vit D level on 3/8/22 - Vit D deficiency    Dr. Macarena Messina, APRN, DNP, AGNP-BC, PMHNP-BC  [facility]  Office: [address] [place] [address]  [facility] Cell: [phone]  Fax: [phone]  Triage Phone: [phone]  Voicemail: [phone]    Email: [email or web address]

## 2022-02-08 DIAGNOSIS — M16.10 PRIMARY OSTEOARTHRITIS OF HIP, UNSPECIFIED LATERALITY: ICD-10-CM

## 2022-02-08 RX ORDER — OXYCODONE HYDROCHLORIDE 5 MG/1
TABLET ORAL
Qty: 30 TABLET | Refills: 0 | OUTPATIENT
Start: 2022-02-08

## 2022-02-09 NOTE — TELEPHONE ENCOUNTER
Pharmacy requesting updated script for medication - writer to provide new script to pharmacy when requested refill from facility based upon resident need.    Dr. Macarena Messina, APRN, DNP, AGNP-BC, PMHNP-[facility]  Office Hours: Tues-Fri [id number]  Office: [address] [place] [address]  [facility] Cell: [phone]  Fax: [phone]  Triage Phone: [phone]  Answering Service: [phone]    Email: [email or web address]

## 2022-02-25 ENCOUNTER — DOCUMENTATION ONLY (OUTPATIENT)
Dept: RHEUMATOLOGY | Facility: CLINIC | Age: 57
End: 2022-02-25
Payer: COMMERCIAL

## 2022-02-25 NOTE — PROGRESS NOTES
Pt will be having her lab tests done through her nursing home a few days prior to her 4/29/22 appt with Dr. Humphrey. Results will be faxed to [phone].

## 2022-03-17 ENCOUNTER — NURSING HOME VISIT (OUTPATIENT)
Dept: GERIATRICS | Facility: CLINIC | Age: 57
End: 2022-03-17
Payer: COMMERCIAL

## 2022-03-17 VITALS
HEART RATE: 82 BPM | SYSTOLIC BLOOD PRESSURE: 152 MMHG | RESPIRATION RATE: 18 BRPM | HEIGHT: 66 IN | WEIGHT: 293 LBS | TEMPERATURE: 98.5 F | DIASTOLIC BLOOD PRESSURE: 84 MMHG | BODY MASS INDEX: 47.09 KG/M2 | OXYGEN SATURATION: 95 %

## 2022-03-17 DIAGNOSIS — M54.10 RADICULOPATHY OF LEG: ICD-10-CM

## 2022-03-17 DIAGNOSIS — E66.01 MORBID OBESITY (H): Primary | ICD-10-CM

## 2022-03-17 DIAGNOSIS — R53.81 PHYSICAL DEBILITY: ICD-10-CM

## 2022-03-17 DIAGNOSIS — I73.9 PAD (PERIPHERAL ARTERY DISEASE) (H): ICD-10-CM

## 2022-03-17 DIAGNOSIS — R76.8 CYCLIC CITRULLINATED PEPTIDE (CCP) ANTIBODY POSITIVE: ICD-10-CM

## 2022-03-17 DIAGNOSIS — S81.802D OPEN WOUND OF LEFT LOWER EXTREMITY, SUBSEQUENT ENCOUNTER: ICD-10-CM

## 2022-03-17 PROCEDURE — 99309 SBSQ NF CARE MODERATE MDM 30: CPT | Performed by: NURSE PRACTITIONER

## 2022-03-17 NOTE — PROGRESS NOTES
[facility] GERIATRICS  Chief Complaint   Patient presents with     [facility] Regulatory     [place] Medical Record Number:  [id number]  Place of Service where encounter took place:  [facility] () [[id number]]    HPI:    María Elena Saab  is 56 year old (1965), who is being seen today for a federally mandated E/M visit. Today's concerns are:  She is seen sitting in her room in her wheelchair, she is pleasant and conversational.  Her main concern is a small wound in the crease of her ankle secondary to lymphedema.  There is no redness, drainage or tenderness.  Encouraged her to continue monitoring and keep clean and dry.  She is followed by the wound care MD.  Has had antibiotics in the past for lower extremity wounds however those seem to be healing well.  Has lymphedema wraps on as well.  Tells me she skipped her Humira last week due to concern for open wound however after reviewing it I can encouraged her to take her Humira this week so as not to induce an RA flare as there is no signs or symptoms of infection of her wounds.    (E66.01) Morbid obesity (H)  (primary encounter diagnosis)  Comment: Weight increased about 10 pounds over the last 2 months.  Obesity impactful to nursing cares, patient mobility.  Dietitian to follow per nursing home regulations.    (M54.10) Radiculopathy of leg  Comment: No complaints today.  Plan: Oxycodone as needed.    (I73.9) PAD (peripheral artery disease) (H)  Comment: No claudication complaints.    (R53.81) Physical debility  Comment: Assisted by nursing for ADLs, wheelchair-bound.    (S81.802D) Open wound of left lower extremity, subsequent encounter  Comment: Related to edema, PAD.  Plan:   -Cleanse with normal saline, pat dry, cover with nonstick dressing daily.  Update primary for any signs or symptoms of infection.    (R79.89) Cyclic citrullinated peptide (CCP) antibody positive  Comment: on humara which she skipped last week due to open wound.    Plan:   -Encouraged okay to take Humira this week, wound has no signs or symptoms of infection, keep a close eye on wound and wound MD to monitor.      ALLERGIES:Adhesive tape, Adhesive [mecrylate], Banana, Grape [grape (artificial) flavor], Morphine (pf) [morphine], Other environmental allergy, Pine, Pseudoephedrine cold/allergy [cpm-pse cr], Latex, and Other food allergy  PAST MEDICAL HISTORY:   Past Medical History:   Diagnosis Date     Aseptic necrosis of femoral head (H)     LEFT     Bacteremia due to group B Streptococcus      Cellulitis of right lower extremity      DJD (degenerative joint disease)      DVT, bilateral lower limbs (H) 10/2014     Edema 5/22/2015     Essential hypertension with goal blood pressure less than 140/90      Failure to thrive      History of blood clots      Hypokalemia 10/15/2014     Lung mass 10/18/2014     Morbid obesity (H) 4/10/2015    Had formal nutrition consult at [facility].  RD reports that she is not willing to commit to the program outlined.  See scanned document.  12/15/2015 - Marcio Quinonez MD        Nocturnal hypoxemia - probable sleep apnea - had overnight pulse oximetry, April, 2015. 5/22/2015     Obesity 4/10/2015     LOPEZ (obstructive sleep apnea) 5/22/2015     Osteoarthritis      Peripheral vascular disease (H)      Pleural effusion      Pressure ulcer of foot      Rheumatoid arthritis (H) 12/30/2014    seronegative     Sepsis (H)      Seropositive rheumatoid arthritis (H) 1/19/2016     Vitamin D deficiency 8/26/2015     PAST SURGICAL HISTORY:   has a past surgical history that includes Total Knee Arthroplasty (Left, 2006); joint replacement; Total Hip Arthroplasty (Left, 10/18/2016); and Peripherally Inserted Central Catheter Insertion (Right, 07/14/2019).  FAMILY HISTORY: family history includes Arthritis in her mother; Breast Cancer in her cousin; Breast Cancer (age of onset: 50.00) in her maternal grandmother; Breast Cancer (age of onset: 54.00) in her  maternal aunt and mother; Cancer in her mother; Cataracts in her father and mother; Deep Vein Thrombosis in her father; Heart Disease in her maternal grandmother; Multiple myeloma in her father; No Known Problems in her sister; Other - See Comments in her brother; Rheumatoid Arthritis in her maternal grandmother and paternal aunt; Sleep Apnea in her brother; Snoring in her father.  SOCIAL HISTORY:  reports that she has quit smoking. Her smoking use included cigarettes and cigarettes. She has a 30.00 pack-year smoking history. She has never used smokeless tobacco. She reports current alcohol use. She reports that she does not use drugs.    MEDICATIONS:     Review of your medicines          Accurate as of March 17, 2022 11:59 PM. If you have any questions, ask your nurse or doctor.            CONTINUE these medicines which have NOT CHANGED      Dose / Directions   acetaminophen 325 MG tablet  Commonly known as: TYLENOL      Dose: 650 mg  [ACETAMINOPHEN (TYLENOL) 325 MG TABLET] Take 650 mg by mouth every 6 (six) hours as needed for pain.  Refills: 0     * adalimumab 40 MG/0.8ML injection  Commonly known as: Humira  Used for: Seropositive rheumatoid arthritis of multiple sites (H)      Dose: 40 mg  [ADALIMUMAB (HUMIRA) 40 MG/0.8 ML INJECTION] Inject 0.8 mL (40 mg total) under the skin every 14 (fourteen) days.  Quantity: 1.6 mL  Refills: 0     * adalimumab 40 MG/0.8ML prefilled syringe kit  Commonly known as: HUMIRA  Used for: Seropositive rheumatoid arthritis of multiple sites (H), Cyclic citrullinated peptide (CCP) antibody positive      Dose: 40 mg  Inject 0.8 mLs (40 mg) Subcutaneous every 14 days  Quantity: 2 each  Refills: 3     cholecalciferol 50 MCG (2000 UT) tablet      Dose: 4,000 Units  Take 2 tablets (100 mcg) by mouth daily Patient taking 4000 mg  Refills: 0     DULoxetine 20 MG capsule  Commonly known as: CYMBALTA  Used for: Primary osteoarthritis involving multiple joints      Dose: 20 mg  [DULOXETINE  (CYMBALTA) 20 MG CAPSULE] Take 1 capsule (20 mg total) by mouth daily.  Quantity: 90 capsule  Refills: 2     eucerin cream      [LANOLIN ALCOHOL-MO-W.PET-CERES (EUCERIN) CREA] Apply topically 2 (two) times a day. To feet  Refills: 0     folic acid 1 MG tablet  Commonly known as: FOLVITE      Dose: 1 mg  Take 1 mg by mouth  Refills: 0     hydrochlorothiazide 25 MG tablet  Commonly known as: HYDRODIURIL      Dose: 25 mg  [HYDROCHLOROTHIAZIDE (HYDRODIURIL) 25 MG TABLET] Take 25 mg by mouth daily.  Refills: 0     ibuprofen 200 MG tablet  Commonly known as: ADVIL/MOTRIN      Dose: 400 mg  [IBUPROFEN (ADVIL,MOTRIN) 200 MG TABLET] Take 400 mg by mouth every 8 (eight) hours as needed for pain.  Refills: 0     methotrexate sodium 10 MG Tabs  Used for: Seropositive rheumatoid arthritis of multiple sites (H)      Dose: 10 mg  [METHOTREXATE 10 MG TABLET] Take 1 tablet (10 mg total) by mouth once a week.  Quantity: 16 tablet  Refills: 0     Mycostatin 337995 UNIT/GM Powd      [NYSTATIN (MYCOSTATIN) POWDER] Apply to rash areas twice daily.  Quantity: 15 g  Refills: 0     * oxyCODONE 5 MG capsule  Commonly known as: OXY-IR  Used for: Shingles outbreak      [OXYCODONE (OXY-IR) 5 MG CAPSULE] Take 1-2 tablets every 4 hours orally as needed for pain relief.  Use 1 tablet for 1-5/10 pain, take 2 tablets for 6-10/10 pain.  Quantity: 60 tablet  Refills: 0     * oxyCODONE 5 MG tablet  Commonly known as: ROXICODONE  Used for: Primary osteoarthritis of hip, unspecified laterality      TAKE 1-2 TABLETS BY MOUTH EVERY 4 HOURS AS NEEDED  Quantity: 60 tablet  Refills: 0     rivaroxaban ANTICOAGULANT 10 MG Tabs tablet  Commonly known as: XARELTO      Dose: 10 mg  [RIVAROXABAN ANTICOAGULANT (XARELTO) 10 MG TABLET] Take 10 mg by mouth daily.  Refills: 0     THERAPEUTIC MULTIVITAMIN PO      Dose: 1 tablet  [MULTIVITAMIN THERAPEUTIC (THERAGRAN) TABLET] Take 1 tablet by mouth daily.  Refills: 0     vitamin C 500 MG tablet  Commonly known as: ASCORBIC  "ACID      Dose: 500 mg  [ASCORBIC ACID, VITAMIN C, (ASCORBIC ACID WITH KAEL HIPS) 500 MG TABLET] Take 500 mg by mouth 2 (two) times a day.  Refills: 0         * This list has 4 medication(s) that are the same as other medications prescribed for you. Read the directions carefully, and ask your doctor or other care provider to review them with you.             Case Management:  I have reviewed the care plan and MDS and do agree with the plan. Patient's desire to return to the community is present, but is not able due to care needs . Information reviewed:  Medications, vital signs, orders, and nursing notes.    ROS:  4 point ROS including Respiratory, CV, GI and , other than that noted in the HPI,  is negative    Vitals:  BP (!) 152/84   Pulse 82   Temp 98.5  F (36.9  C)   Resp 18   Ht 1.676 m (5' 6\")   Wt (!) 161.5 kg (356 lb 1.6 oz)   SpO2 95%   BMI 57.48 kg/m    Body mass index is 57.48 kg/m .  Exam:  Physical Exam   General appearance: Obese, alert, appears stated age and cooperative.   Head: Normocephalic, without obvious abnormality, atraumatic, Eyes: sclera anicteric.  Lungs: respirations unlabored.  Extremities: extremities normal, stasis dermatitis present, bilateral lymphedema with wraps on, small open area decrease in medial left ankle.  Skin: As above.  Neurologic:oriented. No focal deficits.   Psych: interacts well with caregivers, exhibits logical thought processes and connections, pleasant    Lab/Diagnostic data:     Most Recent 3 CBC's:Recent Labs   Lab Test 01/13/22  1234 01/04/22  0439 09/15/21  1650   WBC 4.9 5.9 5.0   HGB 15.2 13.4 14.0   MCV 96 94 92    217 222     Most Recent 3 BMP's:Recent Labs   Lab Test 01/13/22  1234 01/04/22  0439 12/02/21  1155 09/15/21  1650 09/10/19  1409 07/25/19  1215 07/18/19  1345 10/15/18  1434 09/13/18  1345   NA  --  139 141  --   --   --   --   --  144   POTASSIUM  --  3.6  --   --   --   --   --   --  4.3   CHLORIDE  --  99  --   --   --   --   --  "  --  101   CO2  --  27  --   --   --   --   --   --  34*   BUN  --  15  --   --   --  15 11  --  16   CR 0.65 0.66  0.66  --  0.67   < > 0.63 0.70   < > 0.75   ANIONGAP  --  13  --   --   --   --   --   --  9   WILVER  --  9.9  --   --   --   --   --   --  9.7   GLC  --  82  --   --   --   --   --   --  96    < > = values in this interval not displayed.     Most Recent 2 LFT's:Recent Labs   Lab Test 01/13/22  1234 01/04/22  0439 09/10/19  1409 07/25/19  1215   AST  --  21  20  --  23   ALT 21 23  23  23   < > 22   ALKPHOS  --  70  72  --  69   BILITOTAL  --  0.3  0.4  --  0.3    < > = values in this interval not displayed.       ASSESSMENT/PLAN  (E66.01) Morbid obesity (H)  (primary encounter diagnosis)  Comment: Weight increased about 10 pounds over the last 2 months.  Obesity impactful to nursing cares, patient mobility.  Dietitian to follow per nursing home regulations.    (M54.10) Radiculopathy of leg  Comment: No complaints today.  Plan: Oxycodone as needed.    (I73.9) PAD (peripheral artery disease) (H)  Comment: No claudication complaints.    (R53.81) Physical debility  Comment: Assisted by nursing for ADLs, wheelchair-bound.    (S87.226H) Open wound of left lower extremity, subsequent encounter  Comment: Related to edema, PAD.  Plan:   -Cleanse with normal saline, pat dry, cover with nonstick dressing daily.  Update primary for any signs or symptoms of infection.    (R79.89) Cyclic citrullinated peptide (CCP) antibody positive  Comment: on humara which she skipped last week due to open wound.   Plan:   -Encouraged okay to take Humira this week, wound has no signs or symptoms of infection, keep a close eye on wound and wound MD to monitor.        Electronically signed by:  Aysha Moffett, CNP

## 2022-03-17 NOTE — LETTER
3/17/2022        RE: María Elena Saab  [facility]  [address]  [place] [address]        [facility] GERIATRICS  Chief Complaint   Patient presents with     [facility] Regulatory     [facility] Record Number:  [id number]  Place of Service where encounter took place:  [facility] () [[id number]]    HPI:    María Elena Saab  is 56 year old (1965), who is being seen today for a federally mandated E/M visit. Today's concerns are:  She is seen sitting in her room in her wheelchair, she is pleasant and conversational.  Her main concern is a small wound in the crease of her ankle secondary to lymphedema.  There is no redness, drainage or tenderness.  Encouraged her to continue monitoring and keep clean and dry.  She is followed by the wound care MD.  Has had antibiotics in the past for lower extremity wounds however those seem to be healing well.  Has lymphedema wraps on as well.  Tells me she skipped her Humira last week due to concern for open wound however after reviewing it I can encouraged her to take her Humira this week so as not to induce an RA flare as there is no signs or symptoms of infection of her wounds.    (E66.01) Morbid obesity (H)  (primary encounter diagnosis)  Comment: Weight increased about 10 pounds over the last 2 months.  Obesity impactful to nursing cares, patient mobility.  Dietitian to follow per nursing home regulations.    (M54.10) Radiculopathy of leg  Comment: No complaints today.  Plan: Oxycodone as needed.    (I73.9) PAD (peripheral artery disease) (H)  Comment: No claudication complaints.    (R53.81) Physical debility  Comment: Assisted by nursing for ADLs, wheelchair-bound.    (S81.802D) Open wound of left lower extremity, subsequent encounter  Comment: Related to edema, PAD.  Plan:   -Cleanse with normal saline, pat dry, cover with nonstick dressing daily.  Update primary for any signs or symptoms of infection.    (R79.89)  Cyclic citrullinated peptide (CCP) antibody positive  Comment: on humara which she skipped last week due to open wound.   Plan:   -Encouraged okay to take Humira this week, wound has no signs or symptoms of infection, keep a close eye on wound and wound MD to monitor.      ALLERGIES:Adhesive tape, Adhesive [mecrylate], Banana, Grape [grape (artificial) flavor], Morphine (pf) [morphine], Other environmental allergy, Pine, Pseudoephedrine cold/allergy [cpm-pse cr], Latex, and Other food allergy  PAST MEDICAL HISTORY:   Past Medical History:   Diagnosis Date     Aseptic necrosis of femoral head (H)     LEFT     Bacteremia due to group B Streptococcus      Cellulitis of right lower extremity      DJD (degenerative joint disease)      DVT, bilateral lower limbs (H) 10/2014     Edema 5/22/2015     Essential hypertension with goal blood pressure less than 140/90      Failure to thrive      History of blood clots      Hypokalemia 10/15/2014     Lung mass 10/18/2014     Morbid obesity (H) 4/10/2015    Had formal nutrition consult at [facility].  RD reports that she is not willing to commit to the program outlined.  See scanned document.  12/15/2015 - Marcio Quinonez MD        Nocturnal hypoxemia - probable sleep apnea - had overnight pulse oximetry, April, 2015. 5/22/2015     Obesity 4/10/2015     LOPEZ (obstructive sleep apnea) 5/22/2015     Osteoarthritis      Peripheral vascular disease (H)      Pleural effusion      Pressure ulcer of foot      Rheumatoid arthritis (H) 12/30/2014    seronegative     Sepsis (H)      Seropositive rheumatoid arthritis (H) 1/19/2016     Vitamin D deficiency 8/26/2015     PAST SURGICAL HISTORY:   has a past surgical history that includes Total Knee Arthroplasty (Left, 2006); joint replacement; Total Hip Arthroplasty (Left, 10/18/2016); and Peripherally Inserted Central Catheter Insertion (Right, 07/14/2019).  FAMILY HISTORY: family history includes Arthritis in her mother; Breast Cancer in her  cousin; Breast Cancer (age of onset: 50.00) in her maternal grandmother; Breast Cancer (age of onset: 54.00) in her maternal aunt and mother; Cancer in her mother; Cataracts in her father and mother; Deep Vein Thrombosis in her father; Heart Disease in her maternal grandmother; Multiple myeloma in her father; No Known Problems in her sister; Other - See Comments in her brother; Rheumatoid Arthritis in her maternal grandmother and paternal aunt; Sleep Apnea in her brother; Snoring in her father.  SOCIAL HISTORY:  reports that she has quit smoking. Her smoking use included cigarettes and cigarettes. She has a 30.00 pack-year smoking history. She has never used smokeless tobacco. She reports current alcohol use. She reports that she does not use drugs.    MEDICATIONS:     Review of your medicines          Accurate as of March 17, 2022 11:59 PM. If you have any questions, ask your nurse or doctor.            CONTINUE these medicines which have NOT CHANGED      Dose / Directions   acetaminophen 325 MG tablet  Commonly known as: TYLENOL      Dose: 650 mg  [ACETAMINOPHEN (TYLENOL) 325 MG TABLET] Take 650 mg by mouth every 6 (six) hours as needed for pain.  Refills: 0     * adalimumab 40 MG/0.8ML injection  Commonly known as: Humira  Used for: Seropositive rheumatoid arthritis of multiple sites (H)      Dose: 40 mg  [ADALIMUMAB (HUMIRA) 40 MG/0.8 ML INJECTION] Inject 0.8 mL (40 mg total) under the skin every 14 (fourteen) days.  Quantity: 1.6 mL  Refills: 0     * adalimumab 40 MG/0.8ML prefilled syringe kit  Commonly known as: HUMIRA  Used for: Seropositive rheumatoid arthritis of multiple sites (H), Cyclic citrullinated peptide (CCP) antibody positive      Dose: 40 mg  Inject 0.8 mLs (40 mg) Subcutaneous every 14 days  Quantity: 2 each  Refills: 3     cholecalciferol 50 MCG (2000 UT) tablet      Dose: 4,000 Units  Take 2 tablets (100 mcg) by mouth daily Patient taking 4000 mg  Refills: 0     DULoxetine 20 MG  capsule  Commonly known as: CYMBALTA  Used for: Primary osteoarthritis involving multiple joints      Dose: 20 mg  [DULOXETINE (CYMBALTA) 20 MG CAPSULE] Take 1 capsule (20 mg total) by mouth daily.  Quantity: 90 capsule  Refills: 2     eucerin cream      [LANOLIN ALCOHOL-MO-W.PET-CERES (EUCERIN) CREA] Apply topically 2 (two) times a day. To feet  Refills: 0     folic acid 1 MG tablet  Commonly known as: FOLVITE      Dose: 1 mg  Take 1 mg by mouth  Refills: 0     hydrochlorothiazide 25 MG tablet  Commonly known as: HYDRODIURIL      Dose: 25 mg  [HYDROCHLOROTHIAZIDE (HYDRODIURIL) 25 MG TABLET] Take 25 mg by mouth daily.  Refills: 0     ibuprofen 200 MG tablet  Commonly known as: ADVIL/MOTRIN      Dose: 400 mg  [IBUPROFEN (ADVIL,MOTRIN) 200 MG TABLET] Take 400 mg by mouth every 8 (eight) hours as needed for pain.  Refills: 0     methotrexate sodium 10 MG Tabs  Used for: Seropositive rheumatoid arthritis of multiple sites (H)      Dose: 10 mg  [METHOTREXATE 10 MG TABLET] Take 1 tablet (10 mg total) by mouth once a week.  Quantity: 16 tablet  Refills: 0     Mycostatin 711082 UNIT/GM Powd      [NYSTATIN (MYCOSTATIN) POWDER] Apply to rash areas twice daily.  Quantity: 15 g  Refills: 0     * oxyCODONE 5 MG capsule  Commonly known as: OXY-IR  Used for: Shingles outbreak      [OXYCODONE (OXY-IR) 5 MG CAPSULE] Take 1-2 tablets every 4 hours orally as needed for pain relief.  Use 1 tablet for 1-5/10 pain, take 2 tablets for 6-10/10 pain.  Quantity: 60 tablet  Refills: 0     * oxyCODONE 5 MG tablet  Commonly known as: ROXICODONE  Used for: Primary osteoarthritis of hip, unspecified laterality      TAKE 1-2 TABLETS BY MOUTH EVERY 4 HOURS AS NEEDED  Quantity: 60 tablet  Refills: 0     rivaroxaban ANTICOAGULANT 10 MG Tabs tablet  Commonly known as: XARELTO      Dose: 10 mg  [RIVAROXABAN ANTICOAGULANT (XARELTO) 10 MG TABLET] Take 10 mg by mouth daily.  Refills: 0     THERAPEUTIC MULTIVITAMIN PO      Dose: 1 tablet  [MULTIVITAMIN  "THERAPEUTIC (THERAGRAN) TABLET] Take 1 tablet by mouth daily.  Refills: 0     vitamin C 500 MG tablet  Commonly known as: ASCORBIC ACID      Dose: 500 mg  [ASCORBIC ACID, VITAMIN C, (ASCORBIC ACID WITH KAEL HIPS) 500 MG TABLET] Take 500 mg by mouth 2 (two) times a day.  Refills: 0         * This list has 4 medication(s) that are the same as other medications prescribed for you. Read the directions carefully, and ask your doctor or other care provider to review them with you.             Case Management:  I have reviewed the care plan and MDS and do agree with the plan. Patient's desire to return to the community is present, but is not able due to care needs . Information reviewed:  Medications, vital signs, orders, and nursing notes.    ROS:  4 point ROS including Respiratory, CV, GI and , other than that noted in the HPI,  is negative    Vitals:  BP (!) 152/84   Pulse 82   Temp 98.5  F (36.9  C)   Resp 18   Ht 1.676 m (5' 6\")   Wt (!) 161.5 kg (356 lb 1.6 oz)   SpO2 95%   BMI 57.48 kg/m    Body mass index is 57.48 kg/m .  Exam:  Physical Exam   General appearance: Obese, alert, appears stated age and cooperative.   Head: Normocephalic, without obvious abnormality, atraumatic, Eyes: sclera anicteric.  Lungs: respirations unlabored.  Extremities: extremities normal, stasis dermatitis present, bilateral lymphedema with wraps on, small open area decrease in medial left ankle.  Skin: As above.  Neurologic:oriented. No focal deficits.   Psych: interacts well with caregivers, exhibits logical thought processes and connections, pleasant    Lab/Diagnostic data:     Most Recent 3 CBC's:Recent Labs   Lab Test 01/13/22  1234 01/04/22  0439 09/15/21  1650   WBC 4.9 5.9 5.0   HGB 15.2 13.4 14.0   MCV 96 94 92    217 222     Most Recent 3 BMP's:Recent Labs   Lab Test 01/13/22  1234 01/04/22  0439 12/02/21  1155 09/15/21  1650 09/10/19  1409 07/25/19  1215 07/18/19  1345 10/15/18  1434 09/13/18  1345   NA  --  139 " 141  --   --   --   --   --  144   POTASSIUM  --  3.6  --   --   --   --   --   --  4.3   CHLORIDE  --  99  --   --   --   --   --   --  101   CO2  --  27  --   --   --   --   --   --  34*   BUN  --  15  --   --   --  15 11  --  16   CR 0.65 0.66  0.66  --  0.67   < > 0.63 0.70   < > 0.75   ANIONGAP  --  13  --   --   --   --   --   --  9   WILVER  --  9.9  --   --   --   --   --   --  9.7   GLC  --  82  --   --   --   --   --   --  96    < > = values in this interval not displayed.     Most Recent 2 LFT's:Recent Labs   Lab Test 01/13/22  1234 01/04/22  0439 09/10/19  1409 07/25/19  1215   AST  --  21  20  --  23   ALT 21 23  23  23   < > 22   ALKPHOS  --  70  72  --  69   BILITOTAL  --  0.3  0.4  --  0.3    < > = values in this interval not displayed.       ASSESSMENT/PLAN  (E66.01) Morbid obesity (H)  (primary encounter diagnosis)  Comment: Weight increased about 10 pounds over the last 2 months.  Obesity impactful to nursing cares, patient mobility.  Dietitian to follow per nursing home regulations.    (M54.10) Radiculopathy of leg  Comment: No complaints today.  Plan: Oxycodone as needed.    (I73.9) PAD (peripheral artery disease) (H)  Comment: No claudication complaints.    (R53.81) Physical debility  Comment: Assisted by nursing for ADLs, wheelchair-bound.    (H77.015E) Open wound of left lower extremity, subsequent encounter  Comment: Related to edema, PAD.  Plan:   -Cleanse with normal saline, pat dry, cover with nonstick dressing daily.  Update primary for any signs or symptoms of infection.    (R79.89) Cyclic citrullinated peptide (CCP) antibody positive  Comment: on humara which she skipped last week due to open wound.   Plan:   -Encouraged okay to take Humira this week, wound has no signs or symptoms of infection, keep a close eye on wound and wound MD to monitor.        Electronically signed by:  Aysha Moffett CNP              Sincerely,        Aysha Moffett CNP

## 2022-03-21 PROBLEM — S81.802A OPEN WOUND OF LEFT LOWER EXTREMITY: Status: ACTIVE | Noted: 2022-03-21

## 2022-04-10 ENCOUNTER — LAB REQUISITION (OUTPATIENT)
Dept: LAB | Facility: CLINIC | Age: 57
End: 2022-04-10
Payer: COMMERCIAL

## 2022-04-10 DIAGNOSIS — M06.09 RHEUMATOID ARTHRITIS WITHOUT RHEUMATOID FACTOR, MULTIPLE SITES (H): ICD-10-CM

## 2022-04-12 LAB
ALBUMIN SERPL-MCNC: 3.1 G/DL (ref 3.5–5)
ALT SERPL W P-5'-P-CCNC: 19 U/L (ref 0–45)
CREAT SERPL-MCNC: 0.65 MG/DL (ref 0.6–1.1)
ERYTHROCYTE [DISTWIDTH] IN BLOOD BY AUTOMATED COUNT: 15 % (ref 10–15)
GFR SERPL CREATININE-BSD FRML MDRD: >90 ML/MIN/1.73M2
HCT VFR BLD AUTO: 43.9 % (ref 35–47)
HGB BLD-MCNC: 14.3 G/DL (ref 11.7–15.7)
MCH RBC QN AUTO: 30.3 PG (ref 26.5–33)
MCHC RBC AUTO-ENTMCNC: 32.6 G/DL (ref 31.5–36.5)
MCV RBC AUTO: 93 FL (ref 78–100)
PLATELET # BLD AUTO: 229 10E3/UL (ref 150–450)
RBC # BLD AUTO: 4.72 10E6/UL (ref 3.8–5.2)
WBC # BLD AUTO: 5.2 10E3/UL (ref 4–11)

## 2022-04-12 PROCEDURE — 82565 ASSAY OF CREATININE: CPT | Mod: ORL | Performed by: INTERNAL MEDICINE

## 2022-04-12 PROCEDURE — P9604 ONE-WAY ALLOW PRORATED TRIP: HCPCS | Mod: ORL | Performed by: INTERNAL MEDICINE

## 2022-04-12 PROCEDURE — 36415 COLL VENOUS BLD VENIPUNCTURE: CPT | Mod: ORL | Performed by: INTERNAL MEDICINE

## 2022-04-12 PROCEDURE — 82040 ASSAY OF SERUM ALBUMIN: CPT | Mod: ORL | Performed by: INTERNAL MEDICINE

## 2022-04-12 PROCEDURE — 84460 ALANINE AMINO (ALT) (SGPT): CPT | Mod: ORL | Performed by: INTERNAL MEDICINE

## 2022-04-12 PROCEDURE — 85027 COMPLETE CBC AUTOMATED: CPT | Mod: ORL | Performed by: INTERNAL MEDICINE

## 2022-04-19 ENCOUNTER — MEDICAL CORRESPONDENCE (OUTPATIENT)
Dept: HEALTH INFORMATION MANAGEMENT | Facility: CLINIC | Age: 57
End: 2022-04-19

## 2022-04-19 ENCOUNTER — OFFICE VISIT (OUTPATIENT)
Dept: RHEUMATOLOGY | Facility: CLINIC | Age: 57
End: 2022-04-19
Payer: COMMERCIAL

## 2022-04-19 VITALS — SYSTOLIC BLOOD PRESSURE: 124 MMHG | DIASTOLIC BLOOD PRESSURE: 80 MMHG | HEART RATE: 84 BPM

## 2022-04-19 DIAGNOSIS — M05.79 SEROPOSITIVE RHEUMATOID ARTHRITIS OF MULTIPLE SITES (H): Primary | ICD-10-CM

## 2022-04-19 DIAGNOSIS — R76.8 CYCLIC CITRULLINATED PEPTIDE (CCP) ANTIBODY POSITIVE: ICD-10-CM

## 2022-04-19 DIAGNOSIS — M15.0 PRIMARY OSTEOARTHRITIS INVOLVING MULTIPLE JOINTS: ICD-10-CM

## 2022-04-19 DIAGNOSIS — Z79.899 HIGH RISK MEDICATION USE: ICD-10-CM

## 2022-04-19 PROCEDURE — 99214 OFFICE O/P EST MOD 30 MIN: CPT | Performed by: INTERNAL MEDICINE

## 2022-04-19 NOTE — PROGRESS NOTES
Rheumatology follow-up visit note     María Elena is a 56 year old female presents today for follow-up.    María Elena was seen today for recheck.    Diagnoses and all orders for this visit:    Seropositive rheumatoid arthritis of multiple sites (H)    Cyclic citrullinated peptide (CCP) antibody positive    Primary osteoarthritis involving multiple joints    High risk medication use        She is going to continue the current dose of methotrexate, Humira.  Residual discomfort in the left wrist.  This is to be monitored carefully she may have be candidate going back to sulfasalazine as she had been in the past.  Will meet her son at this time in 2 months.  Paperwork for the nursing home completed.    Follow up in 2 months.    HPI    María Elena Saab is a 56 year old female is here for follow-up of  rheumatoid arthritis longstanding, polyarticular, seropositive.  She has osteoarthritis.  Status post left knee arthroplasty.  She has chronic lymphedema.  While her RA appears to be under good control she has noted discomfort in her left wrist.  The right knee continues to do well after the previous visits corticosteroid injection.  Her recent labs are within acceptable range..  In the past she has had good response to corticosteroid injections.  Because of her weight related comorbidities she has been deemed not a candidate for arthroplasty of the right knee detail and the weight is at the level.   Meanwhile continue, methotrexate, consider going back on sulfasalazine if she has evidence of active synovitis on her hands.  Another option would be to change a biologic      DETAILED EXAMINATION  04/19/22  :    Vitals:    04/19/22 1219   BP: 124/80   Pulse: 84     Alert oriented. Head including the face is examined for malar rash, heliotropes, scarring, lupus pernio. Eyes examined for redness such as in episcleritis/scleritis, periorbital lesions.   Neck/ Face examined for parotid gland swelling, range of motion of neck.  Left  upper and lower and right upper and lower extremities examined for tenderness, swelling, warmth of the appendicular joints, range of motion, edema, rash.  Some of the important findings included: she does not have evidence of synovitis in the palpable joints of the upper extremities.  Under deviation of the digits specially on the right side.  She has mild tenderness of the left wrist.  + [place]berden nodes.  Range of motion of the shoulders  show full abduction.  No JLT effusion or warmth of the knees.  she does not have dactylitis of the digits.     Patient Active Problem List    Diagnosis Date Noted     Open wound of left lower extremity 03/21/2022     Priority: Medium     Primary osteoarthritis involving multiple joints 04/26/2019     Priority: Medium     History of total left knee replacement (TKR) 04/26/2019     Priority: Medium     High risk medication use 02/13/2018     Priority: Medium     Physical debility 11/28/2017     Priority: Medium     Polyarthralgia 07/21/2017     Priority: Medium     H/O total hip arthroplasty 11/17/2016     Priority: Medium     Anemia 10/20/2016     Priority: Medium     Essential hypertension with goal blood pressure less than 140/90      Priority: Medium     Degenerative joint disease (DJD) of hip 10/18/2016     Priority: Medium     History of DVT of lower extremity      Priority: Medium     Radiculopathy of leg 07/20/2016     Priority: Medium     Sleep apnea 07/20/2016     Priority: Medium     Pain management 04/26/2016     Priority: Medium     Seropositive rheumatoid arthritis of multiple sites (H) 03/21/2016     Priority: Medium     PAD (peripheral artery disease) (H) 03/21/2016     Priority: Medium     Right shoulder tendinitis 01/19/2016     Priority: Medium     Cyclic citrullinated peptide (CCP) antibody positive 09/16/2015     Priority: Medium     Vitamin D deficiency 08/26/2015     Priority: Medium     Edema - swelling of the legs after blood clots 05/22/2015     Priority:  Medium     Morbid obesity (H) 04/10/2015     Priority: Medium     Had formal nutrition consult at [facility].  RD reports that she is not   willing to commit to the program outlined.  See scanned document.    12/15/2015 - Marcio Quinonez MD         DVT of lower extremity, bilateral - residual clot disease on repeat US in NEW location - after six months of warfarin. 10/15/2014     Priority: Medium     Past Surgical History:   Procedure Laterality Date     JOINT REPLACEMENT      knee     PERIPHERALLY INSERTED CENTRAL CATHETER INSERTION Right 07/14/2019    4fr/44cm/Rt Cephalic.lowSVC.MGlav     TOTAL HIP ARTHROPLASTY Left 10/18/2016    Procedure: LEFT HIP TOTAL ARTHROPLASTY;  Surgeon: London Goss MD;  Location: [facility];  Service:      TOTAL KNEE ARTHROPLASTY Left 2006      Past Medical History:   Diagnosis Date     Aseptic necrosis of femoral head (H)     LEFT     Bacteremia due to group B Streptococcus      Cellulitis of right lower extremity      DJD (degenerative joint disease)      DVT, bilateral lower limbs (H) 10/2014     Edema 5/22/2015     Essential hypertension with goal blood pressure less than 140/90      Failure to thrive      History of blood clots      Hypokalemia 10/15/2014     Lung mass 10/18/2014     Morbid obesity (H) 4/10/2015    Had formal nutrition consult at [facility].  RD reports that she is not willing to commit to the program outlined.  See scanned document.  12/15/2015 - Marcio Quinonez MD        Nocturnal hypoxemia - probable sleep apnea - had overnight pulse oximetry, April, 2015. 5/22/2015     Obesity 4/10/2015     LOPEZ (obstructive sleep apnea) 5/22/2015     Osteoarthritis      Peripheral vascular disease (H)      Pleural effusion      Pressure ulcer of foot      Rheumatoid arthritis (H) 12/30/2014    seronegative     Sepsis (H)      Seropositive rheumatoid arthritis (H) 1/19/2016     Vitamin D deficiency 8/26/2015     Allergies   Allergen Reactions     Adhesive Tape Other (See  Comments)     As on band-aids;  Causes skin tearing/wounds.     Adhesive [Mecrylate] Other (See Comments)     As on band-aids;  Causes skin tearing/wounds.     Banana Unknown     She avoids due to Latex allergy.  If she eats them 3 days in a row, gets stomach cramps and dark stool.  TBrinkMD - 4/10/15     Grape [Grape (Artificial) Flavor] Unknown     She avoids due to Latex allergy.  If she eats lots of them, and she likes them, stomach gets a little queasy.  TBrinkMD - 4/10/15     Morphine (Pf) [Morphine] Other (See Comments)     Pt has not reacted to this med herself, but mother has anaphylactic reaction and other family members get nausea/vomiting.     Other Environmental Allergy Swelling     leather     Pine      Pseudoephedrine Cold/Allergy [Cpm-Pse Cr] Other (See Comments)     Keeps pt awake up to 24 hours      Latex Rash     Hands get red from rubber gloves, but okay if she washes them right away.  Hands get red from rubber gloves, but okay if she washes them right away.     Other Food Allergy Rash     Pine trees, leather .      Current Outpatient Medications   Medication Sig Dispense Refill     acetaminophen (TYLENOL) 325 MG tablet [ACETAMINOPHEN (TYLENOL) 325 MG TABLET] Take 650 mg by mouth every 6 (six) hours as needed for pain.              adalimumab (HUMIRA) 40 mg/0.8 mL injection [ADALIMUMAB (HUMIRA) 40 MG/0.8 ML INJECTION] Inject 0.8 mL (40 mg total) under the skin every 14 (fourteen) days. 1.6 mL 0     ascorbic acid, vitamin C, (ASCORBIC ACID WITH KAEL HIPS) 500 MG tablet [ASCORBIC ACID, VITAMIN C, (ASCORBIC ACID WITH KAEL HIPS) 500 MG TABLET] Take 500 mg by mouth 2 (two) times a day.       cholecalciferol 50 MCG (2000 UT) tablet Take 2 tablets (100 mcg) by mouth daily Patient taking 4000 mg       DULoxetine (CYMBALTA) 20 MG capsule [DULOXETINE (CYMBALTA) 20 MG CAPSULE] Take 1 capsule (20 mg total) by mouth daily. 90 capsule 2     folic acid (FOLVITE) 1 MG tablet Take 1 mg by mouth        hydroCHLOROthiazide (HYDRODIURIL) 25 MG tablet [HYDROCHLOROTHIAZIDE (HYDRODIURIL) 25 MG TABLET] Take 25 mg by mouth daily.       ibuprofen (ADVIL,MOTRIN) 200 MG tablet [IBUPROFEN (ADVIL,MOTRIN) 200 MG TABLET] Take 400 mg by mouth every 8 (eight) hours as needed for pain.              methotrexate 10 MG tablet [METHOTREXATE 10 MG TABLET] Take 1 tablet (10 mg total) by mouth once a week. 16 tablet 0     nystatin (MYCOSTATIN) powder [NYSTATIN (MYCOSTATIN) POWDER] Apply to rash areas twice daily. 15 g 0     rivaroxaban ANTICOAGULANT (XARELTO) 10 mg tablet [RIVAROXABAN ANTICOAGULANT (XARELTO) 10 MG TABLET] Take 10 mg by mouth daily.       adalimumab (HUMIRA) 40 MG/0.8ML prefilled syringe kit Inject 0.8 mLs (40 mg) Subcutaneous every 14 days 2 each 3     lanolin alcohol-mo-w.pet-ceres (EUCERIN) Crea [LANOLIN ALCOHOL-MO-W.PET-CERES (EUCERIN) CREA] Apply topically 2 (two) times a day. To feet (Patient not taking: Reported on 4/19/2022)       multivitamin therapeutic (THERAGRAN) tablet [MULTIVITAMIN THERAPEUTIC (THERAGRAN) TABLET] Take 1 tablet by mouth daily. (Patient not taking: Reported on 4/19/2022)       oxyCODONE (OXY-IR) 5 mg capsule [OXYCODONE (OXY-IR) 5 MG CAPSULE] Take 1-2 tablets every 4 hours orally as needed for pain relief.  Use 1 tablet for 1-5/10 pain, take 2 tablets for 6-10/10 pain. (Patient not taking: Reported on 4/19/2022) 60 tablet 0     oxyCODONE (ROXICODONE) 5 MG tablet TAKE 1-2 TABLETS BY MOUTH EVERY 4 HOURS AS NEEDED (Patient not taking: Reported on 4/19/2022) 60 tablet 0     family history includes Arthritis in her mother; Breast Cancer in her cousin; Breast Cancer (age of onset: 50.00) in her maternal grandmother; Breast Cancer (age of onset: 54.00) in her maternal aunt and mother; Cancer in her mother; Cataracts in her father and mother; Deep Vein Thrombosis in her father; Heart Disease in her maternal grandmother; Multiple myeloma in her father; No Known Problems in her sister; Other - See  Comments in her brother; Rheumatoid Arthritis in her maternal grandmother and paternal aunt; Sleep Apnea in her brother; Snoring in her father.  Social Connections: Not on file          WBC Count   Date Value Ref Range Status   04/12/2022 5.2 4.0 - 11.0 10e3/uL Final     RBC Count   Date Value Ref Range Status   04/12/2022 4.72 3.80 - 5.20 10e6/uL Final     Hemoglobin   Date Value Ref Range Status   04/12/2022 14.3 11.7 - 15.7 g/dL Final     Hematocrit   Date Value Ref Range Status   04/12/2022 43.9 35.0 - 47.0 % Final     MCV   Date Value Ref Range Status   04/12/2022 93 78 - 100 fL Final     MCH   Date Value Ref Range Status   04/12/2022 30.3 26.5 - 33.0 pg Final     Platelet Count   Date Value Ref Range Status   04/12/2022 229 150 - 450 10e3/uL Final     % Lymphocytes   Date Value Ref Range Status   01/13/2022 30 % Final     AST   Date Value Ref Range Status   01/04/2022 20 0 - 40 U/L Final   01/04/2022 21 0 - 40 U/L Final     ALT   Date Value Ref Range Status   04/12/2022 19 0 - 45 U/L Final     Albumin   Date Value Ref Range Status   04/12/2022 3.1 (L) 3.5 - 5.0 g/dL Final     Alkaline Phosphatase   Date Value Ref Range Status   01/04/2022 72 45 - 120 U/L Final   01/04/2022 70 45 - 120 U/L Final     Creatinine   Date Value Ref Range Status   04/12/2022 0.65 0.60 - 1.10 mg/dL Final     GFR Estimate   Date Value Ref Range Status   04/12/2022 >90 >60 mL/min/1.73m2 Final     Comment:     Effective December 21, 2021 eGFRcr in adults is calculated using the 2021 CKD-EPI creatinine equation which includes age and gender (Eduardo et al., NEJM, DOI: 10.1056/HDZLic6397441)   04/20/2021 >60 >60 mL/min/1.73m2 Final     GFR Estimate If Black   Date Value Ref Range Status   04/20/2021 >60 >60 mL/min/1.73m2 Final     CRP   Date Value Ref Range Status   12/21/2020 1.3 (H) 0.0 - 0.8 mg/dL Final

## 2022-05-17 ENCOUNTER — NURSING HOME VISIT (OUTPATIENT)
Dept: GERIATRICS | Facility: CLINIC | Age: 57
End: 2022-05-17
Payer: COMMERCIAL

## 2022-05-17 VITALS
DIASTOLIC BLOOD PRESSURE: 71 MMHG | RESPIRATION RATE: 20 BRPM | OXYGEN SATURATION: 96 % | HEART RATE: 70 BPM | TEMPERATURE: 98.3 F | BODY MASS INDEX: 47.09 KG/M2 | HEIGHT: 66 IN | WEIGHT: 293 LBS | SYSTOLIC BLOOD PRESSURE: 126 MMHG

## 2022-05-17 DIAGNOSIS — M05.79 SEROPOSITIVE RHEUMATOID ARTHRITIS OF MULTIPLE SITES (H): Primary | ICD-10-CM

## 2022-05-17 PROCEDURE — 99207 PR NO CHARGE LOS: CPT | Performed by: INTERNAL MEDICINE

## 2022-05-17 NOTE — LETTER
5/17/2022        RE: María Elena Saab  [facility] [address]  [address]  [place] [address]        M [facility]  Nursing Home Regulatory Visit  May 17, 2022    Chief Complaint   Patient presents with     half-way Regulatory       María Elena Saab is a 56 year old  (1965) that I tried to see today for a federally mandated E/M visit at [facility] where she has resided since April 2016.     Unable to locate patient. Reviewed VS -- SBPs 120s-150s. HR 70s-80s. Weight stable over the past year. Medications reconciled per facility EMR/Epic. Talked with nursing and no concerns today.    MEDICATIONS:  Current Outpatient Medications   Medication Sig Dispense Refill     acetaminophen (TYLENOL) 325 MG tablet [ACETAMINOPHEN (TYLENOL) 325 MG TABLET] Take 650 mg by mouth every 6 (six) hours as needed for pain.              adalimumab (HUMIRA) 40 mg/0.8 mL injection [ADALIMUMAB (HUMIRA) 40 MG/0.8 ML INJECTION] Inject 0.8 mL (40 mg total) under the skin every 14 (fourteen) days. 1.6 mL 0     ascorbic acid, vitamin C, (ASCORBIC ACID WITH KAEL HIPS) 500 MG tablet [ASCORBIC ACID, VITAMIN C, (ASCORBIC ACID WITH KAEL HIPS) 500 MG TABLET] Take 500 mg by mouth 2 (two) times a day.       cholecalciferol 50 MCG (2000 UT) tablet Take 2 tablets (100 mcg) by mouth daily Patient taking 4000 mg       DULoxetine (CYMBALTA) 20 MG capsule [DULOXETINE (CYMBALTA) 20 MG CAPSULE] Take 1 capsule (20 mg total) by mouth daily. 90 capsule 2     folic acid (FOLVITE) 1 MG tablet Take 1 mg by mouth       hydroCHLOROthiazide (HYDRODIURIL) 25 MG tablet [HYDROCHLOROTHIAZIDE (HYDRODIURIL) 25 MG TABLET] Take 25 mg by mouth daily.       ibuprofen (ADVIL,MOTRIN) 200 MG tablet [IBUPROFEN (ADVIL,MOTRIN) 200 MG TABLET] Take 400 mg by mouth every 8 (eight) hours as needed for pain.              lanolin alcohol-mo-w.pet-ceres (EUCERIN) Crea Apply topically 2 times daily       methotrexate 10 MG tablet [METHOTREXATE 10 MG  TABLET] Take 1 tablet (10 mg total) by mouth once a week. 16 tablet 0     multivitamin therapeutic (THERAGRAN) tablet Take 1 tablet by mouth daily       nystatin (MYCOSTATIN) powder [NYSTATIN (MYCOSTATIN) POWDER] Apply to rash areas twice daily. 15 g 0     rivaroxaban ANTICOAGULANT (XARELTO) 10 mg tablet [RIVAROXABAN ANTICOAGULANT (XARELTO) 10 MG TABLET] Take 10 mg by mouth daily.         Medications reviewed:  Medications reconciled to facility chart and changes were made to reflect current medications as identified as above med list. Below are the changes that were made:   Medications stopped since last EPIC medication reconciliation:   Medications Discontinued During This Encounter   Medication Reason     adalimumab (HUMIRA) 40 MG/0.8ML prefilled syringe kit Medication Reconciliation Clean Up     oxyCODONE (ROXICODONE) 5 MG tablet      oxyCODONE (OXY-IR) 5 mg capsule      Medications started since last [facility] medication reconciliation:  No orders of the defined types were placed in this encounter.        Electronically signed by:  Urvashi River MD          Sincerely,        Urvashi River MD

## 2022-05-17 NOTE — PROGRESS NOTES
[facility] GERIATRICS  Nursing Home Regulatory Visit  May 17, 2022    Chief Complaint   Patient presents with     [facility] Regulatory       María Elena Saab is a 56 year old  (1965) that I tried to see today for a federally mandated E/M visit at [facility] where she has resided since April 2016.     Unable to locate patient. Reviewed VS -- SBPs 120s-150s. HR 70s-80s. Weight stable over the past year. Medications reconciled per facility EMR/Epic. Talked with nursing and no concerns today.    MEDICATIONS:  Current Outpatient Medications   Medication Sig Dispense Refill     acetaminophen (TYLENOL) 325 MG tablet [ACETAMINOPHEN (TYLENOL) 325 MG TABLET] Take 650 mg by mouth every 6 (six) hours as needed for pain.              adalimumab (HUMIRA) 40 mg/0.8 mL injection [ADALIMUMAB (HUMIRA) 40 MG/0.8 ML INJECTION] Inject 0.8 mL (40 mg total) under the skin every 14 (fourteen) days. 1.6 mL 0     ascorbic acid, vitamin C, (ASCORBIC ACID WITH KAEL HIPS) 500 MG tablet [ASCORBIC ACID, VITAMIN C, (ASCORBIC ACID WITH KAEL HIPS) 500 MG TABLET] Take 500 mg by mouth 2 (two) times a day.       cholecalciferol 50 MCG (2000 UT) tablet Take 2 tablets (100 mcg) by mouth daily Patient taking 4000 mg       DULoxetine (CYMBALTA) 20 MG capsule [DULOXETINE (CYMBALTA) 20 MG CAPSULE] Take 1 capsule (20 mg total) by mouth daily. 90 capsule 2     folic acid (FOLVITE) 1 MG tablet Take 1 mg by mouth       hydroCHLOROthiazide (HYDRODIURIL) 25 MG tablet [HYDROCHLOROTHIAZIDE (HYDRODIURIL) 25 MG TABLET] Take 25 mg by mouth daily.       ibuprofen (ADVIL,MOTRIN) 200 MG tablet [IBUPROFEN (ADVIL,MOTRIN) 200 MG TABLET] Take 400 mg by mouth every 8 (eight) hours as needed for pain.              lanolin alcohol-mo-w.pet-ceres (EUCERIN) Crea Apply topically 2 times daily       methotrexate 10 MG tablet [METHOTREXATE 10 MG TABLET] Take 1 tablet (10 mg total) by mouth once a week. 16 tablet 0     multivitamin therapeutic (THERAGRAN) tablet  Take 1 tablet by mouth daily       nystatin (MYCOSTATIN) powder [NYSTATIN (MYCOSTATIN) POWDER] Apply to rash areas twice daily. 15 g 0     rivaroxaban ANTICOAGULANT (XARELTO) 10 mg tablet [RIVAROXABAN ANTICOAGULANT (XARELTO) 10 MG TABLET] Take 10 mg by mouth daily.         Medications reviewed:  Medications reconciled to facility chart and changes were made to reflect current medications as identified as above med list. Below are the changes that were made:   Medications stopped since last EPIC medication reconciliation:   Medications Discontinued During This Encounter   Medication Reason     adalimumab (HUMIRA) 40 MG/0.8ML prefilled syringe kit Medication Reconciliation Clean Up     oxyCODONE (ROXICODONE) 5 MG tablet      oxyCODONE (OXY-IR) 5 mg capsule      Medications started since last [facility] medication reconciliation:  No orders of the defined types were placed in this encounter.        Electronically signed by:  Urvashi River MD

## 2022-05-20 DIAGNOSIS — M05.79 SEROPOSITIVE RHEUMATOID ARTHRITIS OF MULTIPLE SITES (H): Primary | ICD-10-CM

## 2022-05-20 DIAGNOSIS — R76.8 CYCLIC CITRULLINATED PEPTIDE (CCP) ANTIBODY POSITIVE: ICD-10-CM

## 2022-06-23 ENCOUNTER — TELEPHONE (OUTPATIENT)
Dept: GERIATRICS | Facility: CLINIC | Age: 57
End: 2022-06-23

## 2022-06-23 NOTE — TELEPHONE ENCOUNTER
[facility]  ENCOUNTER    María Elena Saab is a 57 year old  (1965)    Facility nurse who has worked with pt long-term called back to report the following: RLE has a purple tinge to it, there is weeping in several places and welts covering her foot. She said the pain is so severe, she is not able to put weight on it and she yells out when it is touched  Pt sent to ED. PCP notified.    Electronically signed by:   Jo Ann Watson RN

## 2022-06-23 NOTE — TELEPHONE ENCOUNTER
[facility] Geriatrics Triage Nurse Telephone Encounter    Provider: KRISTIN Moon DNP  Facility: [facility] Facility Type:  [facility]    Caller: Sharon  Call Back Number: [phone]    Allergies:    Allergies   Allergen Reactions     Adhesive Tape Other (See Comments)     As on band-aids;  Causes skin tearing/wounds.     Adhesive [Mecrylate] Other (See Comments)     As on band-aids;  Causes skin tearing/wounds.     Banana Unknown     She avoids due to Latex allergy.  If she eats them 3 days in a row, gets stomach cramps and dark stool.  TBrinkMD - 4/10/15     Grape [Grape (Artificial) Flavor] Unknown     She avoids due to Latex allergy.  If she eats lots of them, and she likes them, stomach gets a little queasy.  TBrinkMD - 4/10/15     Morphine (Pf) [Morphine] Other (See Comments)     Pt has not reacted to this med herself, but mother has anaphylactic reaction and other family members get nausea/vomiting.     Other Environmental Allergy Swelling     leather     Pine      Pseudoephedrine Cold/Allergy [Cpm-Pse Cr] Other (See Comments)     Keeps pt awake up to 24 hours      Latex Rash     Hands get red from rubber gloves, but okay if she washes them right away.  Hands get red from rubber gloves, but okay if she washes them right away.     Other Food Allergy Rash     Pine trees, leather .         Reason for call: Pt has 3+ pitting edema around RLE ankle. Redness and swelling go up her leg and pt reports it is very painful. No weeping at this time. Pt does have a small wound on her outer foot that is being treated by facility. Nurse reports no s/s of infection on wound. Vitals: 165/82, T 98.5, O2 97%, P 81.    Verbal Order/Direction given by Provider:  1) Outline redness on leg and monitor  2) Doxycycline 100 mg PO BID x 10 days  3) Increase Hydrochlorothiazide to 50 mg PO daily.    Provider giving Order:  KRISTIN Moon DNP    Verbal Order given to: Sharon Watson RN

## 2022-07-19 ENCOUNTER — NURSING HOME VISIT (OUTPATIENT)
Dept: GERIATRICS | Facility: CLINIC | Age: 57
End: 2022-07-19
Payer: COMMERCIAL

## 2022-07-19 VITALS
HEART RATE: 4 BPM | HEIGHT: 65 IN | RESPIRATION RATE: 19 BRPM | WEIGHT: 293 LBS | OXYGEN SATURATION: 95 % | TEMPERATURE: 97.8 F | BODY MASS INDEX: 48.82 KG/M2 | SYSTOLIC BLOOD PRESSURE: 137 MMHG | DIASTOLIC BLOOD PRESSURE: 84 MMHG

## 2022-07-19 DIAGNOSIS — Z86.718 HISTORY OF DVT OF LOWER EXTREMITY: ICD-10-CM

## 2022-07-19 DIAGNOSIS — E78.00 HIGH BLOOD CHOLESTEROL: ICD-10-CM

## 2022-07-19 DIAGNOSIS — M05.79 SEROPOSITIVE RHEUMATOID ARTHRITIS OF MULTIPLE SITES (H): Primary | ICD-10-CM

## 2022-07-19 DIAGNOSIS — Z12.11 SCREENING FOR MALIGNANT NEOPLASM OF COLON: ICD-10-CM

## 2022-07-19 DIAGNOSIS — Z12.31 BREAST CANCER SCREENING BY MAMMOGRAM: ICD-10-CM

## 2022-07-19 DIAGNOSIS — R23.8 REDNESS AND SWELLING OF LOWER LEG: ICD-10-CM

## 2022-07-19 DIAGNOSIS — M79.89 REDNESS AND SWELLING OF LOWER LEG: ICD-10-CM

## 2022-07-19 DIAGNOSIS — I73.9 PAD (PERIPHERAL ARTERY DISEASE) (H): ICD-10-CM

## 2022-07-19 DIAGNOSIS — E66.01 MORBID OBESITY (H): ICD-10-CM

## 2022-07-19 DIAGNOSIS — E55.9 VITAMIN D DEFICIENCY: ICD-10-CM

## 2022-07-19 DIAGNOSIS — Z12.4 SCREENING FOR CERVICAL CANCER: ICD-10-CM

## 2022-07-19 DIAGNOSIS — G47.33 OBSTRUCTIVE SLEEP APNEA SYNDROME: ICD-10-CM

## 2022-07-19 DIAGNOSIS — I10 ESSENTIAL HYPERTENSION WITH GOAL BLOOD PRESSURE LESS THAN 140/90: ICD-10-CM

## 2022-07-19 DIAGNOSIS — Z23 NEED FOR SHINGLES VACCINE: ICD-10-CM

## 2022-07-19 PROCEDURE — 99309 SBSQ NF CARE MODERATE MDM 30: CPT | Performed by: NURSE PRACTITIONER

## 2022-07-19 NOTE — LETTER
7/19/2022        RE: María Elena Saab  [facility] [address]  [address]  [place] [address]        M [facility] GERIATRICS  Chief Complaint   Patient presents with     Annual Comprehensive Nursing Home     [place] [facility] Record Number:  [id number]  Place of Service where encounter took place:  [facility] () [[id number]]    HPI:    María Elena Saab  is 57 year old (1965), who is being seen today for a federally mandated E/M visit. Today's concerns are:     Seropositive rheumatoid arthritis of multiple sites (H)  Morbid obesity (H)  PAD (peripheral artery disease) (H)  Vitamin D deficiency  Essential hypertension with goal blood pressure less than 140/90  History of DVT of lower extremity  Obstructive sleep apnea syndrome  Redness and swelling of lower leg  Breast cancer screening by mammogram  Screening for malignant neoplasm of colon  Screening for cervical cancer  Need for shingles vaccine  High blood cholesterol     Patient is being seen today for her annual examination - all relevant health concerns patient has experienced over the past year and/or chronic illnesses will be assessed during today's visit. Per staff report, she has maintained functional status - Cognitively the patient remains at baseline. Last available labs as noted below. Patient with above noted changes in status - will attain yearly labs and address concerns as noted below. Patient reports that she is feeling better since her hospital admission. She was recently admitted to [facility] 6/23-6/30 for cellulitis with abscess on right leg and ankle/foot and treated with IV antibiotics. She was transitioned to oral bactrim which she completed 7/8 to complete a total of 2 weeks of therapy. She is getting daily dressing changes by nursing and states that her wound is no longer draining. The pain is decreased and she is able to walk on it, she walked down the bashir with therapy and tolerated this well. Has an  "appointment with wound MD in 2 days. She states that in the hospital she often desaturated to <87% while sleeping and was advised to follow up with sleep doctor. She reports she has been diagnosed with sleep apnea in the past but is unable to wear a cpap mask due to sinus problems as it will \"blow her sinus\" causing air leak into her left eye. She currently wears nasal cannula at night. She is not sleeping well. States it is hard to get out of bed in the morning, often due to going to bed late (sometimes up reading till 6am). Denies CP, palpitations, nausea, vomiting, increased SOB/ESPITIA, fever, chills, and/or b/b concerns today.      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  RD reports that she is not willing to commit to the program outlined.  See scanned document.  12/15/2015 - Marcio Quinonez MD        Nocturnal hypoxemia - probable sleep apnea - had overnight pulse oximetry, April, 2015. 5/22/2015     Obesity 4/10/2015     LOPEZ (obstructive sleep apnea) 5/22/2015     Osteoarthritis      Peripheral vascular disease (H)      Pleural effusion      Pressure ulcer of foot      Rheumatoid arthritis (H) 12/30/2014    seronegative     Sepsis (H)      Seropositive rheumatoid arthritis (H) 1/19/2016 "     Vitamin D deficiency 8/26/2015     PAST SURGICAL HISTORY:   has a past surgical history that includes Total Knee Arthroplasty (Left, 2006); joint replacement; Total Hip Arthroplasty (Left, 10/18/2016); and Peripherally Inserted Central Catheter Insertion (Right, 07/14/2019).  FAMILY HISTORY: family history includes Arthritis in her mother; Breast Cancer in her cousin; Breast Cancer (age of onset: 50.00) in her maternal grandmother; Breast Cancer (age of onset: 54.00) in her maternal aunt and mother; Cancer in her mother; Cataracts in her father and mother; Deep Vein Thrombosis in her father; Heart Disease in her maternal grandmother; Multiple myeloma in her father; No Known Problems in her sister; Other - See Comments in her brother; Rheumatoid Arthritis in her maternal grandmother and paternal aunt; Sleep Apnea in her brother; Snoring in her father.  SOCIAL HISTORY:  reports that she has quit smoking. Her smoking use included cigarettes and cigarettes. She has a 30.00 pack-year smoking history. She has never used smokeless tobacco. She reports current alcohol use. She reports that she does not use drugs.    MEDICATIONS:     Review of your medicines          Accurate as of July 19, 2022 10:50 AM. If you have any questions, ask your nurse or doctor.            CONTINUE these medicines which have NOT CHANGED      Dose / Directions   acetaminophen 325 MG tablet  Commonly known as: TYLENOL      Dose: 650 mg  [ACETAMINOPHEN (TYLENOL) 325 MG TABLET] Take 650 mg by mouth every 6 (six) hours as needed for pain.  Refills: 0     * adalimumab 40 MG/0.8ML injection  Commonly known as: Humira  Used for: Seropositive rheumatoid arthritis of multiple sites (H)      Dose: 40 mg  [ADALIMUMAB (HUMIRA) 40 MG/0.8 ML INJECTION] Inject 0.8 mL (40 mg total) under the skin every 14 (fourteen) days.  Quantity: 1.6 mL  Refills: 0     * Humira Pen 40 MG/0.8ML pen kit  Used for: Seropositive rheumatoid arthritis of multiple sites (H),  Cyclic citrullinated peptide (CCP) antibody positive  Generic drug: adalimumab      INJECT 40 MG (0.8 ML) UNDER THE SKIN EVERY 14 DAYS.  Quantity: 2 each  Refills: 3     cholecalciferol 50 MCG (2000 UT) tablet      Dose: 4,000 Units  Take 2 tablets (100 mcg) by mouth daily Patient taking 4000 mg  Refills: 0     DULoxetine 20 MG capsule  Commonly known as: CYMBALTA  Used for: Primary osteoarthritis involving multiple joints      Dose: 20 mg  [DULOXETINE (CYMBALTA) 20 MG CAPSULE] Take 1 capsule (20 mg total) by mouth daily.  Quantity: 90 capsule  Refills: 2     eucerin cream      Apply topically 2 times daily  Refills: 0     folic acid 1 MG tablet  Commonly known as: FOLVITE      Dose: 1 mg  Take 1 mg by mouth  Refills: 0     hydrochlorothiazide 25 MG tablet  Commonly known as: HYDRODIURIL      Dose: 25 mg  [HYDROCHLOROTHIAZIDE (HYDRODIURIL) 25 MG TABLET] Take 25 mg by mouth daily.  Refills: 0     ibuprofen 200 MG tablet  Commonly known as: ADVIL/MOTRIN      Dose: 400 mg  [IBUPROFEN (ADVIL,MOTRIN) 200 MG TABLET] Take 400 mg by mouth every 8 (eight) hours as needed for pain.  Refills: 0     methotrexate sodium 10 MG Tabs  Used for: Seropositive rheumatoid arthritis of multiple sites (H)      Dose: 10 mg  [METHOTREXATE 10 MG TABLET] Take 1 tablet (10 mg total) by mouth once a week.  Quantity: 16 tablet  Refills: 0     Mycostatin 237982 UNIT/GM Powd      [NYSTATIN (MYCOSTATIN) POWDER] Apply to rash areas twice daily.  Quantity: 15 g  Refills: 0     rivaroxaban ANTICOAGULANT 10 MG Tabs tablet  Commonly known as: XARELTO      Dose: 10 mg  [RIVAROXABAN ANTICOAGULANT (XARELTO) 10 MG TABLET] Take 10 mg by mouth daily.  Refills: 0     THERAPEUTIC MULTIVITAMIN PO      Dose: 1 tablet  Take 1 tablet by mouth daily  Refills: 0     vitamin C 500 MG tablet  Commonly known as: ASCORBIC ACID      Dose: 500 mg  [ASCORBIC ACID, VITAMIN C, (ASCORBIC ACID WITH KAEL HIPS) 500 MG TABLET] Take 500 mg by mouth 2 (two) times a day.  Refills:  "0         * This list has 2 medication(s) that are the same as other medications prescribed for you. Read the directions carefully, and ask your doctor or other care provider to review them with you.               Case Management:  I have reviewed the care plan and MDS and do agree with the plan. Patient's desire to return to the community is present, but is not able due to care needs . Information reviewed:  Medications, vital signs, orders, and nursing notes.    ROS:  10 point ROS of systems including Constitutional, Eyes, Respiratory, Cardiovascular, Gastroenterology, Genitourinary, Integumentary, Musculoskeletal, Psychiatric were all negative except for pertinent positives noted in my HPI.    Vitals:  /84   Pulse (!) 4   Temp 97.8  F (36.6  C)   Resp 19   Ht 1.651 m (5' 5\")   Wt (!) 157 kg (346 lb 3.2 oz)   SpO2 95%   BMI 57.61 kg/m    Body mass index is 57.61 kg/m .  Exam:  GENERAL APPEARANCE:  Alert, in no distress, oriented, morbidly obese, cooperative, middle-aged woman sitting on the edge of her bed  EYES:  EOM, conjunctivae, lids, pupils and irises normal  RESP: Respiratory effort and palpation of chest normal, lungs clear to auscultation, no respiratory distress, diminished breath sounds throughout 2/2 body habitus  CV:  Palpation and auscultation of heart done , regular rate and rhythm, no murmur, rub, or gallop, +2 pedal pulses, peripheral edema 3+ in BLE with ACE wraps in place  ABDOMEN: Normal bowel sounds, soft, nontender, no hepatosplenomegaly or other masses  M/S:   Gait and station abnormal -JEREMÍAS 2/2 patient sitting on bed; Digits and nails abnormal - mild arthritic changes present  SKIN:  Inspection of visible skin and subcutaneous tissue abnormal, Palpation of skin and subcutaneous tissue abnormal, wound - LLE, JEREMÍAS 2/2 compression stockings in place - no drainage noted on dressing  NEURO:   Cranial nerves 2-12 are normal tested and grossly at patient's baseline  PSYCH: Oriented X 3, " normal insight, judgement and memory, affect and mood - labile    Lab/Diagnostic data:   Recent labs in [facility] reviewed by me today.    CBC RESULTS: Recent Labs   Lab Test 04/12/22 1218 01/13/22  1234   WBC 5.2 4.9   RBC 4.72 4.99   HGB 14.3 15.2   HCT 43.9 48.0*   MCV 93 96   MCH 30.3 30.5   MCHC 32.6 31.7   RDW 15.0 15.8*    210       Last Basic Metabolic Panel:  Recent Labs   Lab Test 04/12/22 1218 01/13/22  1234 01/04/22 0439 12/02/21  1155 09/10/19  1409 07/25/19  1215 10/15/18  1434 09/13/18  1345   NA  --   --  139 141  --   --   --  144   POTASSIUM  --   --  3.6  --   --   --   --  4.3   CHLORIDE  --   --  99  --   --   --   --  101   WILVER  --   --  9.9  --   --   --   --  9.7   CO2  --   --  27  --   --   --   --  34*   BUN  --   --  15  --   --  15   < > 16   CR 0.65 0.65 0.66  0.66  --    < > 0.63   < > 0.75   GLC  --   --  82  --   --   --   --  96    < > = values in this interval not displayed.       Liver Function Studies -   Recent Labs   Lab Test 04/12/22 1218 01/13/22  1234 01/04/22  0439 09/10/19  1409 07/25/19  1215   PROTTOTAL  --   --  7.6  7.5  --   --    ALBUMIN 3.1* 3.5 3.2*  3.2*  3.2*   < >  --    BILITOTAL  --   --  0.3  0.4  --  0.3   ALKPHOS  --   --  70  72  --  69   AST  --   --  21  20  --  23   ALT 19 21 23  23  23   < > 22    < > = values in this interval not displayed.     Lab Results   Component Value Date    A1C 5.3 12/02/2021     ASSESSMENT/PLAN    ICD-10-CM    1. Seropositive rheumatoid arthritis of multiple sites (H)  M05.79 Chronic - stable. Managed by Rheum - no acute associated concerns today. Continue methotrexate, Humira and Cymbalta as ordered with ongoing monitoringCh and management per Rheum.   2. Morbid obesity (H)  E66.01 Chronic - unstable. 2/2 lifestyle choices and comorbid conditions - weights as noted below. Ongoing dietary education with interventions PRN.   3. PAD (peripheral artery disease) (H)  I73.9 Chronic - stable. No acute associated  concerns. Denies presence of claudication pain. No active tx regimen. Noted - monitor.   4. Vitamin D deficiency  E55.9 Chronic - stable. Last Vit D levels as noted below - continue current regimen of 4000IU daily with ongoing monitoring.   Latest Reference Range & Units 12/02/21 11:55 01/04/22 04:39   Vitamin D Deficiency screening 30 - 80 ug/L 70 50      5. Essential hypertension with goal blood pressure less than 140/90  I10 Chronic - stable. Recent BPs as noted below. Continue regimen of hydrochlorothiazide as ordered with monitoring of VS per facility protocol and BMP ongoing.     6. History of DVT of lower extremity  Z86.718 Chronic - stable, recurrent. No acute associated concerns at this time. Continue regimen of Xarelto as ordered with ongoing monitoring.   7. Obstructive sleep apnea syndrome  G47.33 Chronic - unstable. As per HPI - does not have CPAP - continues with frequent desaturations when sleeping during hospital aadmission; recommend follow-up with sleep medicine provider as noted.    8. Redness and swelling of lower leg  M79.89 Acute on chronic - unstable, improving. noted    R23.8 Prior to recent hospital admission as per HPI. Continues to improve. Completed abx as ordered - continue ongoing monitoring, management and wound care per wound MD.   9. Breast cancer screening by mammogram needed  Z12.31 Acute - resident in need of mammogram - due for annual screen 10/2022   10. Screening for malignant neoplasm of colon needed  Z12.11 Acute - positive FIT test in 2021 with colonoscopy 2022 negative for malignancy with presence of 2 adenomas; recommend follow-up colonoscopy in 7-10 years or annual FIT testing.    11. Screening for cervical cancer needed  Z12.4 Chronic - UTD on current screening - next due 10/2026.   12. Need for shingles vaccine  Z23 Acute - patient in need of Shingles vaccinations - staff to administer Shingrix per RUTH.   13. High blood cholesterol  E78.00 Chronic - variable. ASCVD risk  score of 2.1% noting low risk - statin not indicated - ongoing annual monitoring per guidelines.         This patient was seen along with a nurse practitioner student, OUMAR Ness, RN, FNP Student.  The histories and review of systems are obtained by OUMAR Ness, RN, FNP Student and confirmed by myself all objective, assessments and plans were completed by myself.    Electronically signed by:  KRISTIN Bills, DNP, AGNP-BC, PMHNP-BC  [facility]  Office Hours: Tues-Fri [id number]  Office: [address] [place] [address]  Fax - [phone]  Triage Phone- [phone]  Business Office- [phone]      Email: [email or web address]                       Sincerely,        KRISTIN Fair CNP

## 2022-07-19 NOTE — PROGRESS NOTES
[facility] GERIATRICS  Chief Complaint   Patient presents with     Annual [facility]     [facility] Medical Record Number:  [id number]  Place of Service where encounter took place:  [facility] () [[id number]]    HPI:    María Elena Saab  is 57 year old (1965), who is being seen today for a federally mandated E/M visit. Today's concerns are:     Seropositive rheumatoid arthritis of multiple sites (H)  Morbid obesity (H)  PAD (peripheral artery disease) (H)  Vitamin D deficiency  Essential hypertension with goal blood pressure less than 140/90  History of DVT of lower extremity  Obstructive sleep apnea syndrome  Redness and swelling of lower leg  Breast cancer screening by mammogram  Screening for malignant neoplasm of colon  Screening for cervical cancer  Need for shingles vaccine  High blood cholesterol     Patient is being seen today for her annual examination - all relevant health concerns patient has experienced over the past year and/or chronic illnesses will be assessed during today's visit. Per staff report, she has maintained functional status - Cognitively the patient remains at baseline. Last available labs as noted below. Patient with above noted changes in status - will attain yearly labs and address concerns as noted below. Patient reports that she is feeling better since her hospital admission. She was recently admitted to [facility] 6/23-6/30 for cellulitis with abscess on right leg and ankle/foot and treated with IV antibiotics. She was transitioned to oral bactrim which she completed 7/8 to complete a total of 2 weeks of therapy. She is getting daily dressing changes by nursing and states that her wound is no longer draining. The pain is decreased and she is able to walk on it, she walked down the bashir with therapy and tolerated this well. Has an appointment with wound MD in 2 days. She states that in the hospital she often desaturated to <87% while sleeping and  "was advised to follow up with sleep doctor. She reports she has been diagnosed with sleep apnea in the past but is unable to wear a cpap mask due to sinus problems as it will \"blow her sinus\" causing air leak into her left eye. She currently wears nasal cannula at night. She is not sleeping well. States it is hard to get out of bed in the morning, often due to going to bed late (sometimes up reading till 6am). Denies CP, palpitations, nausea, vomiting, increased SOB/ESPITIA, fever, chills, and/or b/b concerns today.      ALLERGIES:Adhesive tape, Adhesive [mecrylate], Banana, Grape [grape (artificial) flavor], Morphine (pf) [morphine], Other environmental allergy, Pine, Pseudoephedrine cold/allergy [cpm-pse cr], Latex, and Other food allergy  PAST MEDICAL HISTORY:   Past Medical History:   Diagnosis Date     Aseptic necrosis of femoral head (H)     LEFT     Bacteremia due to group B Streptococcus      Cellulitis of right lower extremity      DJD (degenerative joint disease)      DVT, bilateral lower limbs (H) 10/2014     Edema 5/22/2015     Essential hypertension with goal blood pressure less than 140/90      Failure to thrive      History of blood clots      Hypokalemia 10/15/2014     Lung mass 10/18/2014     Morbid obesity (H) 4/10/2015    [facility].  RD reports that she is not willing to commit to the program outlined.  See scanned document.  12/15/2015 - Marcio Quinonez MD        Nocturnal hypoxemia - probable sleep apnea - had overnight pulse oximetry, April, 2015. 5/22/2015     Obesity 4/10/2015     LOPEZ (obstructive sleep apnea) 5/22/2015     Osteoarthritis      Peripheral vascular disease (H)      Pleural effusion      Pressure ulcer of foot      Rheumatoid arthritis (H) 12/30/2014    seronegative     Sepsis (H)      Seropositive rheumatoid arthritis (H) 1/19/2016     Vitamin D deficiency 8/26/2015     PAST SURGICAL HISTORY:   has a past surgical history that includes Total Knee " Arthroplasty (Left, 2006); joint replacement; Total Hip Arthroplasty (Left, 10/18/2016); and Peripherally Inserted Central Catheter Insertion (Right, 07/14/2019).  FAMILY HISTORY: family history includes Arthritis in her mother; Breast Cancer in her cousin; Breast Cancer (age of onset: 50.00) in her maternal grandmother; Breast Cancer (age of onset: 54.00) in her maternal aunt and mother; Cancer in her mother; Cataracts in her father and mother; Deep Vein Thrombosis in her father; Heart Disease in her maternal grandmother; Multiple myeloma in her father; No Known Problems in her sister; Other - See Comments in her brother; Rheumatoid Arthritis in her maternal grandmother and paternal aunt; Sleep Apnea in her brother; Snoring in her father.  SOCIAL HISTORY:  reports that she has quit smoking. Her smoking use included cigarettes and cigarettes. She has a 30.00 pack-year smoking history. She has never used smokeless tobacco. She reports current alcohol use. She reports that she does not use drugs.    MEDICATIONS:     Review of your medicines          Accurate as of July 19, 2022 10:50 AM. If you have any questions, ask your nurse or doctor.            CONTINUE these medicines which have NOT CHANGED      Dose / Directions   acetaminophen 325 MG tablet  Commonly known as: TYLENOL      Dose: 650 mg  [ACETAMINOPHEN (TYLENOL) 325 MG TABLET] Take 650 mg by mouth every 6 (six) hours as needed for pain.  Refills: 0     * adalimumab 40 MG/0.8ML injection  Commonly known as: Humira  Used for: Seropositive rheumatoid arthritis of multiple sites (H)      Dose: 40 mg  [ADALIMUMAB (HUMIRA) 40 MG/0.8 ML INJECTION] Inject 0.8 mL (40 mg total) under the skin every 14 (fourteen) days.  Quantity: 1.6 mL  Refills: 0     * Humira Pen 40 MG/0.8ML pen kit  Used for: Seropositive rheumatoid arthritis of multiple sites (H), Cyclic citrullinated peptide (CCP) antibody positive  Generic drug: adalimumab      INJECT 40 MG (0.8 ML) UNDER THE SKIN  EVERY 14 DAYS.  Quantity: 2 each  Refills: 3     cholecalciferol 50 MCG (2000 UT) tablet      Dose: 4,000 Units  Take 2 tablets (100 mcg) by mouth daily Patient taking 4000 mg  Refills: 0     DULoxetine 20 MG capsule  Commonly known as: CYMBALTA  Used for: Primary osteoarthritis involving multiple joints      Dose: 20 mg  [DULOXETINE (CYMBALTA) 20 MG CAPSULE] Take 1 capsule (20 mg total) by mouth daily.  Quantity: 90 capsule  Refills: 2     eucerin cream      Apply topically 2 times daily  Refills: 0     folic acid 1 MG tablet  Commonly known as: FOLVITE      Dose: 1 mg  Take 1 mg by mouth  Refills: 0     hydrochlorothiazide 25 MG tablet  Commonly known as: HYDRODIURIL      Dose: 25 mg  [HYDROCHLOROTHIAZIDE (HYDRODIURIL) 25 MG TABLET] Take 25 mg by mouth daily.  Refills: 0     ibuprofen 200 MG tablet  Commonly known as: ADVIL/MOTRIN      Dose: 400 mg  [IBUPROFEN (ADVIL,MOTRIN) 200 MG TABLET] Take 400 mg by mouth every 8 (eight) hours as needed for pain.  Refills: 0     methotrexate sodium 10 MG Tabs  Used for: Seropositive rheumatoid arthritis of multiple sites (H)      Dose: 10 mg  [METHOTREXATE 10 MG TABLET] Take 1 tablet (10 mg total) by mouth once a week.  Quantity: 16 tablet  Refills: 0     Mycostatin 017513 UNIT/GM Powd      [NYSTATIN (MYCOSTATIN) POWDER] Apply to rash areas twice daily.  Quantity: 15 g  Refills: 0     rivaroxaban ANTICOAGULANT 10 MG Tabs tablet  Commonly known as: XARELTO      Dose: 10 mg  [RIVAROXABAN ANTICOAGULANT (XARELTO) 10 MG TABLET] Take 10 mg by mouth daily.  Refills: 0     THERAPEUTIC MULTIVITAMIN PO      Dose: 1 tablet  Take 1 tablet by mouth daily  Refills: 0     vitamin C 500 MG tablet  Commonly known as: ASCORBIC ACID      Dose: 500 mg  [ASCORBIC ACID, VITAMIN C, (ASCORBIC ACID WITH KAEL HIPS) 500 MG TABLET] Take 500 mg by mouth 2 (two) times a day.  Refills: 0         * This list has 2 medication(s) that are the same as other medications prescribed for you. Read the directions  "carefully, and ask your doctor or other care provider to review them with you.               Case Management:  I have reviewed the care plan and MDS and do agree with the plan. Patient's desire to return to the community is present, but is not able due to care needs . Information reviewed:  Medications, vital signs, orders, and nursing notes.    ROS:  10 point ROS of systems including Constitutional, Eyes, Respiratory, Cardiovascular, Gastroenterology, Genitourinary, Integumentary, Musculoskeletal, Psychiatric were all negative except for pertinent positives noted in my HPI.    Vitals:  /84   Pulse (!) 4   Temp 97.8  F (36.6  C)   Resp 19   Ht 1.651 m (5' 5\")   Wt (!) 157 kg (346 lb 3.2 oz)   SpO2 95%   BMI 57.61 kg/m    Body mass index is 57.61 kg/m .  Exam:  GENERAL APPEARANCE:  Alert, in no distress, oriented, morbidly obese, cooperative, middle-aged woman sitting on the edge of her bed  EYES:  EOM, conjunctivae, lids, pupils and irises normal  RESP: Respiratory effort and palpation of chest normal, lungs clear to auscultation, no respiratory distress, diminished breath sounds throughout 2/2 body habitus  CV:  Palpation and auscultation of heart done , regular rate and rhythm, no murmur, rub, or gallop, +2 pedal pulses, peripheral edema 3+ in BLE with ACE wraps in place  ABDOMEN: Normal bowel sounds, soft, nontender, no hepatosplenomegaly or other masses  M/S:   Gait and station abnormal -JEREMÍAS 2/2 patient sitting on bed; Digits and nails abnormal - mild arthritic changes present  SKIN:  Inspection of visible skin and subcutaneous tissue abnormal, Palpation of skin and subcutaneous tissue abnormal, wound - LLE, JEREMÍAS 2/2 compression stockings in place - no drainage noted on dressing  NEURO:   Cranial nerves 2-12 are normal tested and grossly at patient's baseline  PSYCH: Oriented X 3, normal insight, judgement and memory, affect and mood - labile    Lab/Diagnostic data:   Recent labs in [facility] reviewed by " me today.    CBC RESULTS: Recent Labs   Lab Test 04/12/22  1218 01/13/22  1234   WBC 5.2 4.9   RBC 4.72 4.99   HGB 14.3 15.2   HCT 43.9 48.0*   MCV 93 96   MCH 30.3 30.5   MCHC 32.6 31.7   RDW 15.0 15.8*    210       Last Basic Metabolic Panel:  Recent Labs   Lab Test 04/12/22  1218 01/13/22  1234 01/04/22  0439 12/02/21  1155 09/10/19  1409 07/25/19  1215 10/15/18  1434 09/13/18  1345   NA  --   --  139 141  --   --   --  144   POTASSIUM  --   --  3.6  --   --   --   --  4.3   CHLORIDE  --   --  99  --   --   --   --  101   WILVER  --   --  9.9  --   --   --   --  9.7   CO2  --   --  27  --   --   --   --  34*   BUN  --   --  15  --   --  15   < > 16   CR 0.65 0.65 0.66  0.66  --    < > 0.63   < > 0.75   GLC  --   --  82  --   --   --   --  96    < > = values in this interval not displayed.       Liver Function Studies -   Recent Labs   Lab Test 04/12/22  1218 01/13/22  1234 01/04/22  0439 09/10/19  1409 07/25/19  1215   PROTTOTAL  --   --  7.6  7.5  --   --    ALBUMIN 3.1* 3.5 3.2*  3.2*  3.2*   < >  --    BILITOTAL  --   --  0.3  0.4  --  0.3   ALKPHOS  --   --  70  72  --  69   AST  --   --  21  20  --  23   ALT 19 21 23  23  23   < > 22    < > = values in this interval not displayed.     Lab Results   Component Value Date    A1C 5.3 12/02/2021     ASSESSMENT/PLAN    ICD-10-CM    1. Seropositive rheumatoid arthritis of multiple sites (H)  M05.79 Chronic - stable. Managed by Rheum - no acute associated concerns today. Continue methotrexate, Humira and Cymbalta as ordered with ongoing monitoringCh and management per Rheum.   2. Morbid obesity (H)  E66.01 Chronic - unstable. 2/2 lifestyle choices and comorbid conditions - weights as noted below. Ongoing dietary education with interventions PRN.   3. PAD (peripheral artery disease) (H)  I73.9 Chronic - stable. No acute associated concerns. Denies presence of claudication pain. No active tx regimen. Noted - monitor.   4. Vitamin D deficiency  E55.9  Chronic - stable. Last Vit D levels as noted below - continue current regimen of 4000IU daily with ongoing monitoring.   Latest Reference Range & Units 12/02/21 11:55 01/04/22 04:39   Vitamin D Deficiency screening 30 - 80 ug/L 70 50      5. Essential hypertension with goal blood pressure less than 140/90  I10 Chronic - stable. Recent BPs as noted below. Continue regimen of hydrochlorothiazide as ordered with monitoring of VS per facility protocol and BMP ongoing.     6. History of DVT of lower extremity  Z86.718 Chronic - stable, recurrent. No acute associated concerns at this time. Continue regimen of Xarelto as ordered with ongoing monitoring.   7. Obstructive sleep apnea syndrome  G47.33 Chronic - unstable. As per HPI - does not have CPAP - continues with frequent desaturations when sleeping during hospital aadmission; recommend follow-up with sleep medicine provider as noted.    8. Redness and swelling of lower leg  M79.89 Acute on chronic - unstable, improving. noted    R23.8 Prior to recent hospital admission as per HPI. Continues to improve. Completed abx as ordered - continue ongoing monitoring, management and wound care per wound MD.   9. Breast cancer screening by mammogram needed  Z12.31 Acute - resident in need of mammogram - due for annual screen 10/2022   10. Screening for malignant neoplasm of colon needed  Z12.11 Acute - positive FIT test in 2021 with colonoscopy 2022 negative for malignancy with presence of 2 adenomas; recommend follow-up colonoscopy in 7-10 years or annual FIT testing.    11. Screening for cervical cancer needed  Z12.4 Chronic - UTD on current screening - next due 10/2026.   12. Need for shingles vaccine  Z23 Acute - patient in need of Shingles vaccinations - staff to administer Shingrix per RUTH.   13. High blood cholesterol  E78.00 Chronic - variable. ASCVD risk score of 2.1% noting low risk - statin not indicated - ongoing annual monitoring per guidelines.         This patient  was seen along with a nurse practitioner student, OUMAR Ness, RN, FNP Student.  The histories and review of systems are obtained by OUMAR Ness, RN, FNP Student and confirmed by myself all objective, assessments and plans were completed by myself.    Electronically signed by:  Dr. Macarena Messina, APRN, DNP, AGNP-BC, PMHNP-BC  [facility]  Office Hours: Tues-Fri 5032-6431  Office: [address] [place] [address]  Fax - [phone]  Triage Phone- [phone]  Business Office- [phone]      Email: [email or web address]

## 2022-07-19 NOTE — NURSING NOTE
"[facility] GERIATRICS    Chief Complaint   Patient presents with     [facility]ual [facility]     [place] Medical Record Number:  [id number]  Place of Service where encounter took place:  [facility] () [[id number]]    HPI:  María Elena Saab is a 57 year old  (1965), who is being seen today for an episodic care visit at: [facility] () [[id number]]. Today's concern is:      Seropositive rheumatoid arthritis of multiple sites (H)  Morbid obesity (H)  PAD (peripheral artery disease) (H)  Vitamin D deficiency  Essential hypertension with goal blood pressure less than 140/90  History of DVT of lower extremity  Obstructive sleep apnea syndrome  Redness and swelling of lower leg  Breast cancer screening by mammogram  Screening for malignant neoplasm of colon  Screening for cervical cancer  Need for shingles vaccine  High blood cholesterol    Patient seen today for annual visit in LT. Patient reports that she is feeling better since her hospital admission. She was recently admitted to [facility] 6/23-6/30 for cellulitis with abscess on right leg and ankle/foot and treated with IV antibiotics. She was transitioned to oral bactrim which she completed 7/8 to complete a total of 2 weeks of therapy. She is getting daily dressing changes by nursing and states that her wound is no longer draining. The pain is decreased and she is able to walk on it, she walked down the bashir with therapy and tolerated this well. Has an appointment with wound MD in 2 days.     She states that in the hospital she often desaturated to <87% while sleeping and was advised to follow up with sleep doctor. She reports she has been diagnosed with sleep apnea in the past but is unable to wear a cpap mask due to sinus problems as it will \"blow her sinus\" causing air leak into her left eye. She currently wears nasal cannula at night. She is not sleeping well. States it is hard to get out of bed in the morning, " often due to going to bed late (sometimes up reading till 6am). Denies CP, palpitations, nausea, vomiting, increased SOB/ESPITIA, fever, chills, and/or b/b concerns today.    ALLERGIES:Adhesive tape, Adhesive [mecrylate], Banana, Grape [grape (artificial) flavor], Morphine (pf) [morphine], Other environmental allergy, Pine, Pseudoephedrine cold/allergy [cpm-pse cr], Latex, and Other food allergy  PAST MEDICAL HISTORY:   Past Medical History:   Diagnosis Date     Aseptic necrosis of femoral head (H)     LEFT     Bacteremia due to group B Streptococcus      Cellulitis of right lower extremity      DJD (degenerative joint disease)      DVT, bilateral lower limbs (H) 10/2014     Edema 5/22/2015     Essential hypertension with goal blood pressure less than 140/90      Failure to thrive      History of blood clots      Hypokalemia 10/15/2014     Lung mass 10/18/2014     Morbid obesity (H) 4/10/2015    Had formal nutrition consult at [facility].  RD reports that she is not willing to commit to the program outlined.  See scanned document.  12/15/2015 - Marcio Quinonez MD        Nocturnal hypoxemia - probable sleep apnea - had overnight pulse oximetry, April, 2015. 5/22/2015     Obesity 4/10/2015     LOPEZ (obstructive sleep apnea) 5/22/2015     Osteoarthritis      Peripheral vascular disease (H)      Pleural effusion      Pressure ulcer of foot      Rheumatoid arthritis (H) 12/30/2014    seronegative     Sepsis (H)      Seropositive rheumatoid arthritis (H) 1/19/2016     Vitamin D deficiency 8/26/2015     PAST SURGICAL HISTORY:   has a past surgical history that includes Total Knee Arthroplasty (Left, 2006); joint replacement; Total Hip Arthroplasty (Left, 10/18/2016); and Peripherally Inserted Central Catheter Insertion (Right, 07/14/2019).  FAMILY HISTORY: family history includes Arthritis in her mother; Breast Cancer in her cousin; Breast Cancer (age of onset: 50.00) in her maternal grandmother; Breast Cancer (age of onset:  54.00) in her maternal aunt and mother; Cancer in her mother; Cataracts in her father and mother; Deep Vein Thrombosis in her father; Heart Disease in her maternal grandmother; Multiple myeloma in her father; No Known Problems in her sister; Other - See Comments in her brother; Rheumatoid Arthritis in her maternal grandmother and paternal aunt; Sleep Apnea in her brother; Snoring in her father.  SOCIAL HISTORY:  reports that she has quit smoking. Her smoking use included cigarettes and cigarettes. She has a 30.00 pack-year smoking history. She has never used smokeless tobacco. She reports current alcohol use. She reports that she does not use drugs.    MEDICATIONS:  Current Outpatient Medications   Medication Sig Dispense Refill     acetaminophen (TYLENOL) 325 MG tablet [ACETAMINOPHEN (TYLENOL) 325 MG TABLET] Take 650 mg by mouth every 6 (six) hours as needed for pain.              adalimumab (HUMIRA) 40 mg/0.8 mL injection [ADALIMUMAB (HUMIRA) 40 MG/0.8 ML INJECTION] Inject 0.8 mL (40 mg total) under the skin every 14 (fourteen) days. 1.6 mL 0     ascorbic acid, vitamin C, (ASCORBIC ACID WITH KAEL HIPS) 500 MG tablet [ASCORBIC ACID, VITAMIN C, (ASCORBIC ACID WITH KAEL HIPS) 500 MG TABLET] Take 500 mg by mouth 2 (two) times a day.       cholecalciferol 50 MCG (2000 UT) tablet Take 2 tablets (100 mcg) by mouth daily Patient taking 4000 mg       DULoxetine (CYMBALTA) 20 MG capsule [DULOXETINE (CYMBALTA) 20 MG CAPSULE] Take 1 capsule (20 mg total) by mouth daily. 90 capsule 2     folic acid (FOLVITE) 1 MG tablet Take 1 mg by mouth       HUMIRA PEN 40 MG/0.8ML pen kit INJECT 40 MG (0.8 ML) UNDER THE SKIN EVERY 14 DAYS. 2 each 3     hydroCHLOROthiazide (HYDRODIURIL) 25 MG tablet [HYDROCHLOROTHIAZIDE (HYDRODIURIL) 25 MG TABLET] Take 25 mg by mouth daily.       ibuprofen (ADVIL,MOTRIN) 200 MG tablet [IBUPROFEN (ADVIL,MOTRIN) 200 MG TABLET] Take 400 mg by mouth every 8 (eight) hours as needed for pain.              lanolin  "alcohol-mo-w.pet-ceres (EUCERIN) Crea Apply topically 2 times daily       methotrexate 10 MG tablet [METHOTREXATE 10 MG TABLET] Take 1 tablet (10 mg total) by mouth once a week. 16 tablet 0     multivitamin therapeutic (THERAGRAN) tablet Take 1 tablet by mouth daily       nystatin (MYCOSTATIN) powder [NYSTATIN (MYCOSTATIN) POWDER] Apply to rash areas twice daily. 15 g 0     rivaroxaban ANTICOAGULANT (XARELTO) 10 mg tablet [RIVAROXABAN ANTICOAGULANT (XARELTO) 10 MG TABLET] Take 10 mg by mouth daily.         REVIEW OF SYSTEMS:  10 point ROS of systems including Constitutional, Eyes, Respiratory, Cardiovascular, Gastroenterology, Genitourinary, Integumentary, Musculoskeletal, Psychiatric were all negative except for pertinent positives noted in my HPI.    Objective:   /84   Pulse (!) 4   Temp 97.8  F (36.6  C)   Resp 19   Ht 1.651 m (5' 5\")   Wt (!) 157 kg (346 lb 3.2 oz)   SpO2 95%   BMI 57.61 kg/m    GENERAL APPEARANCE:  Alert, in no distress  RESP:  respiratory effort and palpation of chest normal, lungs clear to auscultation , no respiratory distress  CV:  Palpation and auscultation of heart done , regular rate and rhythm, no murmur, rub, or gallop. 3+ BLE edema R>L   M/S:   Gait and station abnormal JEREMÍAS secondary to patient sitting in bed  Digits and nails abnormal - mild arthritic changes present  SKIN:  RLE with erythema extending to mid calf. ACE wraps present bilaterally limiting assessment. No drainage noted on dressing.  PSYCH:  oriented X 3, affect and mood - labile    Recent labs in [facility] reviewed by me today.   CBC RESULTS:   Recent Labs   Lab Test 04/12/22  1218 01/13/22  1234   WBC 5.2 4.9   RBC 4.72 4.99   HGB 14.3 15.2   HCT 43.9 48.0*   MCV 93 96   MCH 30.3 30.5   MCHC 32.6 31.7   RDW 15.0 15.8*    210       Last Basic Metabolic Panel:  Recent Labs   Lab Test 04/12/22  1218 01/13/22  1234 01/04/22  0439 12/02/21  1155 09/10/19  1409 07/25/19  1215 10/15/18  1434 09/13/18  1345   NA "  --   --  139 141  --   --   --  144   POTASSIUM  --   --  3.6  --   --   --   --  4.3   CHLORIDE  --   --  99  --   --   --   --  101   WILVER  --   --  9.9  --   --   --   --  9.7   CO2  --   --  27  --   --   --   --  34*   BUN  --   --  15  --   --  15   < > 16   CR 0.65 0.65 0.66  0.66  --    < > 0.63   < > 0.75   GLC  --   --  82  --   --   --   --  96    < > = values in this interval not displayed.       Liver Function Studies -   Recent Labs   Lab Test 04/12/22  1218 01/13/22  1234 01/04/22  0439 09/10/19  1409 07/25/19  1215   PROTTOTAL  --   --  7.6  7.5  --   --    ALBUMIN 3.1* 3.5 3.2*  3.2*  3.2*   < >  --    BILITOTAL  --   --  0.3  0.4  --  0.3   ALKPHOS  --   --  70  72  --  69   AST  --   --  21  20  --  23   ALT 19 21 23  23  23   < > 22    < > = values in this interval not displayed.       No results found for: TSH]    Lab Results   Component Value Date    A1C 5.3 12/02/2021       Assessment/Plan:  1. Seropositive rheumatoid arthritis of multiple sites (H)  Stable. On methotrexate, humira, and cymbalta. Managed by rheumatology. Continue ongoing monitoring and management per Rheum    2. Morbid obesity (H)  Chronic, unstable secondary to lifestyle choices and comorbid conditions. Ongoing education with dietary intervention PRN    3. PAD (peripheral artery disease) (H)  No claudication complaints    4. Vitamin D deficiency  Hx vit D deficiency, on 4000IU daily. Last check 1/4/22 50. Will recheck level to assess efficacy    5. Essential hypertension with goal blood pressure less than 140/90  Stable. On hydrochlorothiazide 25mg. -130's. Continue with current regimen and ongoing VS monitoring.    6. History of DVT of lower extremity  Chronic. On xarelto 20mg daily. No s/s DVT. Continue with current regimen    7. Obstructive sleep apnea syndrome  Hx of LOPEZ, does not have CPAP as this causes sinus problems per patient. Patient with frequent desaturations while sleeping during recent hospital  admission. Recommend follow up with sleep medicine provider to provide recommendations for management.    8. Redness and swelling of lower leg  Recent hospital admission for cellulitis per HPI, improving. Patient finished course of antibiotics with improvement. Followed by wound MD. Continue with daily dressing changes and management per wound MD.    9. Breast cancer screening by mammogram needed  Last mammo 10/5/21. Due for annual screening 10/5/22    10. Screening for malignant neoplasm of colon needed  Had positive FIT 10/14/21 with colonoscopy 1/6/22. Negative for malignancy but 2 adenomas. Per guidelines repeat colonoscopy in 7-10 years    11. Screening for cervical cancer needed  UTD on screening. Next due 10/5/2026    12. Need for shingles vaccine  Ordered     13. High blood cholesterol  Hx elevated cholesterol and LDL. ASCVD risk 2.1% - low risk. Statin not indicated. Continue ongoing monitoring per guidelines      Electronically signed by: OUMAR Ness, RN, FNP Student

## 2022-09-13 ENCOUNTER — TELEPHONE (OUTPATIENT)
Dept: RHEUMATOLOGY | Facility: CLINIC | Age: 57
End: 2022-09-13

## 2022-09-13 NOTE — TELEPHONE ENCOUNTER
Health Call Center    Phone Message    May a detailed message be left on voicemail: yes     Reason for Call: Other: Mariana RN is calling from [facility], where the pt lives. She is wondering if the pt can see Dr Humphrey soon. Pt was scheduled for next available in December and added to the wait list. But the RN wanted a message see if she can get in sooner since her June appointment was cancelled. Please call Mariana back at [phone]. [facility] nurses station number is [phone]     Action Taken: Message routed to:  Other: Rheumatology Support Pool     Travel Screening: Not Applicable

## 2022-09-20 ENCOUNTER — TELEPHONE (OUTPATIENT)
Dept: GERIATRICS | Facility: CLINIC | Age: 57
End: 2022-09-20

## 2022-09-20 NOTE — TELEPHONE ENCOUNTER
[facility] Geriatrics Triage Nurse Telephone Encounter    Provider: Arlen Carlson DNP, APRN  Facility: [facility] Facility Type:  LT    Caller: Chintan     Allergies:    Allergies   Allergen Reactions     Adhesive Tape Other (See Comments)     As on band-aids;  Causes skin tearing/wounds.     Adhesive [Mecrylate] Other (See Comments)     As on band-aids;  Causes skin tearing/wounds.     Banana Unknown     She avoids due to Latex allergy.  If she eats them 3 days in a row, gets stomach cramps and dark stool.  TBrinkMD - 4/10/15     Grape [Grape (Artificial) Flavor] Unknown     She avoids due to Latex allergy.  If she eats lots of them, and she likes them, stomach gets a little queasy.  TBrinkMD - 4/10/15     Morphine (Pf) [Morphine] Other (See Comments)     Pt has not reacted to this med herself, but mother has anaphylactic reaction and other family members get nausea/vomiting.     Other Environmental Allergy Swelling     leather     Pine      Pseudoephedrine Cold/Allergy [Cpm-Pse Cr] Other (See Comments)     Keeps pt awake up to 24 hours      Latex Rash     Hands get red from rubber gloves, but okay if she washes them right away.  Hands get red from rubber gloves, but okay if she washes them right away.     Other Food Allergy Rash     Pine trees, leather .         Reason for call:     Pt has a boil like area that had erupted yesterday white drainage. They are calling to report this today, the area is on the posterior left thigh. The area is tender to touch, no further drainage, no redness, the area is about 2.5 inches round and firm to touch.     Pt is wondering if her Humira 40mg that she gets weekly should be held and the methorexate 10mg weekly should be held          Verbal Order/Direction given by Provider:     Clindamycin 300mg TID x 10 days DX: Boil/Abscess   Monitor for worsening concerns and update if any worsening drainage, redness or fevers  Hold 1 dose of methotrexate and 1 dose of the Humira  while on the Abx.     Provider giving Order:  Arlen Carlson DNP, APRN    Verbal Order given to: Chintan Blue RN

## 2022-09-22 ENCOUNTER — OFFICE VISIT (OUTPATIENT)
Dept: RHEUMATOLOGY | Facility: CLINIC | Age: 57
End: 2022-09-22

## 2022-09-22 ENCOUNTER — LAB (OUTPATIENT)
Dept: LAB | Facility: CLINIC | Age: 57
End: 2022-09-22
Payer: COMMERCIAL

## 2022-09-22 VITALS — HEART RATE: 80 BPM | DIASTOLIC BLOOD PRESSURE: 88 MMHG | SYSTOLIC BLOOD PRESSURE: 138 MMHG

## 2022-09-22 DIAGNOSIS — Z79.899 HIGH RISK MEDICATION USE: ICD-10-CM

## 2022-09-22 DIAGNOSIS — M15.0 PRIMARY OSTEOARTHRITIS INVOLVING MULTIPLE JOINTS: ICD-10-CM

## 2022-09-22 DIAGNOSIS — M05.79 SEROPOSITIVE RHEUMATOID ARTHRITIS OF MULTIPLE SITES (H): ICD-10-CM

## 2022-09-22 DIAGNOSIS — R76.8 CYCLIC CITRULLINATED PEPTIDE (CCP) ANTIBODY POSITIVE: ICD-10-CM

## 2022-09-22 DIAGNOSIS — M05.79 SEROPOSITIVE RHEUMATOID ARTHRITIS OF MULTIPLE SITES (H): Primary | ICD-10-CM

## 2022-09-22 LAB
ALBUMIN SERPL BCG-MCNC: 3.7 G/DL (ref 3.5–5.2)
ALT SERPL W P-5'-P-CCNC: 12 U/L (ref 10–35)
CREAT SERPL-MCNC: 0.51 MG/DL (ref 0.51–0.95)
ERYTHROCYTE [DISTWIDTH] IN BLOOD BY AUTOMATED COUNT: 14.5 % (ref 10–15)
GFR SERPL CREATININE-BSD FRML MDRD: >90 ML/MIN/1.73M2
HCT VFR BLD AUTO: 41.5 % (ref 35–47)
HGB BLD-MCNC: 13.4 G/DL (ref 11.7–15.7)
MCH RBC QN AUTO: 29.6 PG (ref 26.5–33)
MCHC RBC AUTO-ENTMCNC: 32.3 G/DL (ref 31.5–36.5)
MCV RBC AUTO: 92 FL (ref 78–100)
PLATELET # BLD AUTO: 257 10E3/UL (ref 150–450)
RBC # BLD AUTO: 4.53 10E6/UL (ref 3.8–5.2)
WBC # BLD AUTO: 5.4 10E3/UL (ref 4–11)

## 2022-09-22 PROCEDURE — 85027 COMPLETE CBC AUTOMATED: CPT

## 2022-09-22 PROCEDURE — 82040 ASSAY OF SERUM ALBUMIN: CPT

## 2022-09-22 PROCEDURE — 82565 ASSAY OF CREATININE: CPT

## 2022-09-22 PROCEDURE — 84460 ALANINE AMINO (ALT) (SGPT): CPT

## 2022-09-22 PROCEDURE — 36415 COLL VENOUS BLD VENIPUNCTURE: CPT

## 2022-09-22 PROCEDURE — 99214 OFFICE O/P EST MOD 30 MIN: CPT | Performed by: INTERNAL MEDICINE

## 2022-09-22 RX ORDER — OXYCODONE HYDROCHLORIDE 5 MG/1
5 CAPSULE ORAL EVERY 4 HOURS PRN
COMMUNITY
End: 2022-11-09

## 2022-09-22 RX ORDER — CLINDAMYCIN HCL 300 MG
300 CAPSULE ORAL 3 TIMES DAILY
COMMUNITY
Start: 2022-09-20 | End: 2022-11-16

## 2022-09-22 NOTE — PROGRESS NOTES
"      Rheumatology follow-up visit note     María Elena is a 57 year old female presents today for follow-up.    María Elena was seen today for recheck.    Diagnoses and all orders for this visit:    Seropositive rheumatoid arthritis of multiple sites (H)  -     ALT; Standing  -     Albumin level; Standing  -     Creatinine; Standing  -     CBC with platelets; Standing    Cyclic citrullinated peptide (CCP) antibody positive    Primary osteoarthritis involving multiple joints    High risk medication use  -     ALT; Standing  -     Albumin level; Standing  -     Creatinine; Standing  -     CBC with platelets; Standing        While she had a recent staph infection in the context of lymphedema of her right lower extremity, the rheumatoid arthritis appears to be under good control, however with deformity such as ulnar deviation of the digit, on methotrexate and Humira.  I have advised once again that she check labs every 3 months.  We will check those today.  We will meet here in 6 months and labs every 3.    Follow up in 6 months.    HPI    María Elena Saab is a 57 year old female is here for follow-up of   rheumatoid arthritis longstanding, polyarticular, seropositive.  She has osteoarthritis.  Status post left knee arthroplasty.  She has chronic lymphedema.  While her RA appears to be under good with the current regimen of Humira and methotrexate the latter at 10 mg/week with folic acid.  She had held her Humira because of her \"boil\" in her left thigh area for which she is on antibiotics.  Prior to that she had staph infection of the right lower extremity extending from mid shin to the foot.  As we know she has chronic lymphedema.  She is due for labs.    DETAILED EXAMINATION  09/22/22  :    Vitals:    09/22/22 1522   BP: 138/88   Pulse: 80     Alert oriented. Head including the face is examined for malar rash, heliotropes, scarring, lupus pernio. Eyes examined for redness such as in episcleritis/scleritis, periorbital lesions. "   Neck/ Face examined for parotid gland swelling, range of motion of neck.  Left upper and lower and right upper and lower extremities examined for tenderness, swelling, warmth of the appendicular joints, range of motion, edema, rash.  Some of the important findings included: she does not have evidence of synovitis in the palpable joints of the upper extremities, ulnar deviation at the MCPs.  No significant deformities of the digits.  Minimal Heberden nodes.  Range of motion of the shoulders  show reduced abduction to about 130 degrees.  No JLT effusion or warmth of the knees.  she does not have dactylitis of the digits.  Bilateral lower extremity lymphedema.     Patient Active Problem List    Diagnosis Date Noted     Open wound of left lower extremity 03/21/2022     Priority: Medium     Primary osteoarthritis involving multiple joints 04/26/2019     Priority: Medium     History of total left knee replacement (TKR) 04/26/2019     Priority: Medium     High risk medication use 02/13/2018     Priority: Medium     Physical debility 11/28/2017     Priority: Medium     Polyarthralgia 07/21/2017     Priority: Medium     H/O total hip arthroplasty 11/17/2016     Priority: Medium     Anemia 10/20/2016     Priority: Medium     Essential hypertension with goal blood pressure less than 140/90      Priority: Medium     Degenerative joint disease (DJD) of hip 10/18/2016     Priority: Medium     History of DVT of lower extremity      Priority: Medium     Radiculopathy of leg 07/20/2016     Priority: Medium     Sleep apnea 07/20/2016     Priority: Medium     Pain management 04/26/2016     Priority: Medium     Seropositive rheumatoid arthritis of multiple sites (H) 03/21/2016     Priority: Medium     PAD (peripheral artery disease) (H) 03/21/2016     Priority: Medium     Right shoulder tendinitis 01/19/2016     Priority: Medium     Cyclic citrullinated peptide (CCP) antibody positive 09/16/2015     Priority: Medium     Vitamin D  deficiency 08/26/2015     Priority: Medium     Edema - swelling of the legs after blood clots 05/22/2015     Priority: Medium     Morbid obesity (H) 04/10/2015     Priority: Medium     Had formal nutrition consult at [facility].  RD reports that she is not   willing to commit to the program outlined.  See scanned document.    12/15/2015 - Marcio Quinonez MD         DVT of lower extremity, bilateral - residual clot disease on repeat US in NEW location - after six months of warfarin. 10/15/2014     Priority: Medium     Past Surgical History:   Procedure Laterality Date     JOINT REPLACEMENT      knee     PERIPHERALLY INSERTED CENTRAL CATHETER INSERTION Right 07/14/2019    4fr/44cm/Rt Cephalic.lowSVC.MGlav     TOTAL HIP ARTHROPLASTY Left 10/18/2016    Procedure: LEFT HIP TOTAL ARTHROPLASTY;  Surgeon: London Goss MD;  Location: [facility];  Service:      TOTAL KNEE ARTHROPLASTY Left 2006      Past Medical History:   Diagnosis Date     Aseptic necrosis of femoral head (H)     LEFT     Bacteremia due to group B Streptococcus      Cellulitis of right lower extremity      DJD (degenerative joint disease)      DVT, bilateral lower limbs (H) 10/2014     Edema 5/22/2015     Essential hypertension with goal blood pressure less than 140/90      Failure to thrive      History of blood clots      Hypokalemia 10/15/2014     Lung mass 10/18/2014     Morbid obesity (H) 4/10/2015    Had formal nutrition consult at [facility].  RD reports that she is not willing to commit to the program outlined.  See scanned document.  12/15/2015 - Marcio Quinonez MD        Nocturnal hypoxemia - probable sleep apnea - had overnight pulse oximetry, April, 2015. 5/22/2015     Obesity 4/10/2015     LOPEZ (obstructive sleep apnea) 5/22/2015     Osteoarthritis      Peripheral vascular disease (H)      Pleural effusion      Pressure ulcer of foot      Rheumatoid arthritis (H) 12/30/2014    seronegative     Sepsis (H)      Seropositive rheumatoid  arthritis (H) 1/19/2016     Vitamin D deficiency 8/26/2015     Allergies   Allergen Reactions     Adhesive Tape Other (See Comments)     As on band-aids;  Causes skin tearing/wounds.     Adhesive [Mecrylate] Other (See Comments)     As on band-aids;  Causes skin tearing/wounds.     Banana Unknown     She avoids due to Latex allergy.  If she eats them 3 days in a row, gets stomach cramps and dark stool.  TBrinkMD - 4/10/15     Grape [Grape (Artificial) Flavor] Unknown     She avoids due to Latex allergy.  If she eats lots of them, and she likes them, stomach gets a little queasy.  TBrinkMD - 4/10/15     Morphine (Pf) [Morphine] Other (See Comments)     Pt has not reacted to this med herself, but mother has anaphylactic reaction and other family members get nausea/vomiting.     Other Environmental Allergy Swelling     leather     Pine      Pseudoephedrine Cold/Allergy [Cpm-Pse Cr] Other (See Comments)     Keeps pt awake up to 24 hours      Latex Rash     Hands get red from rubber gloves, but okay if she washes them right away.  Hands get red from rubber gloves, but okay if she washes them right away.     Other Food Allergy Rash     Pine trees, leather .      Current Outpatient Medications   Medication Sig Dispense Refill     acetaminophen (TYLENOL) 325 MG tablet [ACETAMINOPHEN (TYLENOL) 325 MG TABLET] Take 650 mg by mouth every 6 (six) hours as needed for pain.              ascorbic acid, vitamin C, (ASCORBIC ACID WITH KAEL HIPS) 500 MG tablet [ASCORBIC ACID, VITAMIN C, (ASCORBIC ACID WITH KAEL HIPS) 500 MG TABLET] Take 500 mg by mouth 2 (two) times a day.       cholecalciferol 50 MCG (2000 UT) tablet Take 2 tablets (100 mcg) by mouth daily Patient taking 4000 mg       clindamycin (CLEOCIN) 300 MG capsule        DULoxetine (CYMBALTA) 20 MG capsule [DULOXETINE (CYMBALTA) 20 MG CAPSULE] Take 1 capsule (20 mg total) by mouth daily. 90 capsule 2     folic acid (FOLVITE) 1 MG tablet Take 1 mg by mouth       HUMIRA PEN 40  MG/0.8ML pen kit INJECT 40 MG (0.8 ML) UNDER THE SKIN EVERY 14 DAYS. 2 each 3     hydroCHLOROthiazide (HYDRODIURIL) 25 MG tablet [HYDROCHLOROTHIAZIDE (HYDRODIURIL) 25 MG TABLET] Take 25 mg by mouth daily.       ibuprofen (ADVIL,MOTRIN) 200 MG tablet [IBUPROFEN (ADVIL,MOTRIN) 200 MG TABLET] Take 400 mg by mouth every 8 (eight) hours as needed for pain.              lanolin alcohol-mo-w.pet-ceres (EUCERIN) Crea Apply topically 2 times daily       methotrexate 10 MG tablet [METHOTREXATE 10 MG TABLET] Take 1 tablet (10 mg total) by mouth once a week. 16 tablet 0     multivitamin therapeutic (THERAGRAN) tablet Take 1 tablet by mouth daily       nystatin (MYCOSTATIN) powder [NYSTATIN (MYCOSTATIN) POWDER] Apply to rash areas twice daily. 15 g 0     oxyCODONE (OXY-IR) 5 MG capsule Take 5 mg by mouth every 4 hours as needed for severe pain (7-10)       rivaroxaban ANTICOAGULANT (XARELTO) 10 mg tablet [RIVAROXABAN ANTICOAGULANT (XARELTO) 10 MG TABLET] Take 10 mg by mouth daily.       adalimumab (HUMIRA) 40 mg/0.8 mL injection [ADALIMUMAB (HUMIRA) 40 MG/0.8 ML INJECTION] Inject 0.8 mL (40 mg total) under the skin every 14 (fourteen) days. 1.6 mL 0     family history includes Arthritis in her mother; Breast Cancer in her cousin; Breast Cancer (age of onset: 50.00) in her maternal grandmother; Breast Cancer (age of onset: 54.00) in her maternal aunt and mother; Cancer in her mother; Cataracts in her father and mother; Deep Vein Thrombosis in her father; Heart Disease in her maternal grandmother; Multiple myeloma in her father; No Known Problems in her sister; Other - See Comments in her brother; Rheumatoid Arthritis in her maternal grandmother and paternal aunt; Sleep Apnea in her brother; Snoring in her father.  Social Connections: Not on file          WBC Count   Date Value Ref Range Status   04/12/2022 5.2 4.0 - 11.0 10e3/uL Final     RBC Count   Date Value Ref Range Status   04/12/2022 4.72 3.80 - 5.20 10e6/uL Final      Hemoglobin   Date Value Ref Range Status   04/12/2022 14.3 11.7 - 15.7 g/dL Final     Hematocrit   Date Value Ref Range Status   04/12/2022 43.9 35.0 - 47.0 % Final     MCV   Date Value Ref Range Status   04/12/2022 93 78 - 100 fL Final     MCH   Date Value Ref Range Status   04/12/2022 30.3 26.5 - 33.0 pg Final     Platelet Count   Date Value Ref Range Status   04/12/2022 229 150 - 450 10e3/uL Final     % Lymphocytes   Date Value Ref Range Status   01/13/2022 30 % Final     AST   Date Value Ref Range Status   01/04/2022 20 0 - 40 U/L Final   01/04/2022 21 0 - 40 U/L Final     ALT   Date Value Ref Range Status   04/12/2022 19 0 - 45 U/L Final     Albumin   Date Value Ref Range Status   04/12/2022 3.1 (L) 3.5 - 5.0 g/dL Final     Alkaline Phosphatase   Date Value Ref Range Status   01/04/2022 72 45 - 120 U/L Final   01/04/2022 70 45 - 120 U/L Final     Creatinine   Date Value Ref Range Status   04/12/2022 0.65 0.60 - 1.10 mg/dL Final     GFR Estimate   Date Value Ref Range Status   04/12/2022 >90 >60 mL/min/1.73m2 Final     Comment:     Effective December 21, 2021 eGFRcr in adults is calculated using the 2021 CKD-EPI creatinine equation which includes age and gender (Eduardo quick al., NEJ, DOI: 10.1056/HPFCqt8057196)   04/20/2021 >60 >60 mL/min/1.73m2 Final     GFR Estimate If Black   Date Value Ref Range Status   04/20/2021 >60 >60 mL/min/1.73m2 Final     CRP   Date Value Ref Range Status   12/21/2020 1.3 (H) 0.0 - 0.8 mg/dL Final

## 2022-09-27 ENCOUNTER — NURSING HOME VISIT (OUTPATIENT)
Dept: GERIATRICS | Facility: CLINIC | Age: 57
End: 2022-09-27
Payer: COMMERCIAL

## 2022-09-27 VITALS
HEIGHT: 65 IN | DIASTOLIC BLOOD PRESSURE: 83 MMHG | TEMPERATURE: 98.3 F | WEIGHT: 293 LBS | HEART RATE: 77 BPM | RESPIRATION RATE: 20 BRPM | OXYGEN SATURATION: 94 % | SYSTOLIC BLOOD PRESSURE: 138 MMHG | BODY MASS INDEX: 48.82 KG/M2

## 2022-09-27 DIAGNOSIS — I89.0 LYMPHEDEMA OF BOTH LOWER EXTREMITIES: ICD-10-CM

## 2022-09-27 DIAGNOSIS — I10 PRIMARY HYPERTENSION: ICD-10-CM

## 2022-09-27 DIAGNOSIS — Z86.718 PERSONAL HISTORY OF DVT (DEEP VEIN THROMBOSIS): ICD-10-CM

## 2022-09-27 DIAGNOSIS — M05.79 SEROPOSITIVE RHEUMATOID ARTHRITIS OF MULTIPLE SITES (H): Primary | ICD-10-CM

## 2022-09-27 PROCEDURE — 99309 SBSQ NF CARE MODERATE MDM 30: CPT | Performed by: INTERNAL MEDICINE

## 2022-09-27 NOTE — LETTER
"    9/27/2022        RE: María Elena Saab  [facility] [address]  [place] [address]        [facility] GERIATRICS  REGULATORY VISIT  September 27, 2022      [facility] Medical Record Number:  [id number]  Place of Service where encounter took place:  [facility] () [[id number]]    Chief Complaint   Patient presents with     group home Regulatory       HPI:    María Elena Saab is a 57 year old  (1965), who is being seen today for a federally mandated E/M visit at [facility] where she has resided since March 2016. HPI information obtained from: facility chart records, facility staff, patient report and [facility] chart review.    Today, Ms. Saab is seen in her room laying in bed.  Her main concerns today are around skin care of her legs.  She reports not having had a bath in 3 weeks.  She said between the agency staff and people being worried they will \"drop \"her from the bathtub (is just not been happening.  Does mostly stand lift treatments to her legs.this happened recently) she is very worried about developing another staph infection.  I was not able to locate her nurse nor the floor nurse manager today.      ALLERGIES:    Allergies   Allergen Reactions     Adhesive Tape Other (See Comments)     As on band-aids;  Causes skin tearing/wounds.     Adhesive [Mecrylate] Other (See Comments)     As on band-aids;  Causes skin tearing/wounds.     Banana Unknown     She avoids due to Latex allergy.  If she eats them 3 days in a row, gets stomach cramps and dark stool.  TBrinkMD - 4/10/15     Grape [Grape (Artificial) Flavor] Unknown     She avoids due to Latex allergy.  If she eats lots of them, and she likes them, stomach gets a little queasy.  TBrinkMD - 4/10/15     Morphine (Pf) [Morphine] Other (See Comments)     Pt has not reacted to this med herself, but mother has anaphylactic reaction and other family members get nausea/vomiting.     Other " Environmental Allergy Swelling     leather     Pine      Pseudoephedrine Cold/Allergy [Cpm-Pse Cr] Other (See Comments)     Keeps pt awake up to 24 hours      Latex Rash     Hands get red from rubber gloves, but okay if she washes them right away.  Hands get red from rubber gloves, but okay if she washes them right away.     Other Food Allergy Rash     Pine trees, leather .         Past Medical, Surgical, Family and Social History: Reviewed and updated in EPIC.    MEDICATIONS:  Current Outpatient Medications   Medication Sig Dispense Refill     acetaminophen (TYLENOL) 325 MG tablet [ACETAMINOPHEN (TYLENOL) 325 MG TABLET] Take 650 mg by mouth every 6 (six) hours as needed for pain.              adalimumab (HUMIRA) 40 mg/0.8 mL injection [ADALIMUMAB (HUMIRA) 40 MG/0.8 ML INJECTION] Inject 0.8 mL (40 mg total) under the skin every 14 (fourteen) days. 1.6 mL 0     ascorbic acid, vitamin C, (ASCORBIC ACID WITH KAEL HIPS) 500 MG tablet [ASCORBIC ACID, VITAMIN C, (ASCORBIC ACID WITH KAEL HIPS) 500 MG TABLET] Take 500 mg by mouth 2 (two) times a day.       cholecalciferol 50 MCG (2000 UT) tablet Take 2 tablets (100 mcg) by mouth daily Patient taking 4000 mg       clindamycin (CLEOCIN) 300 MG capsule Take 300 mg by mouth 3 times daily Last day 9/29/22       DULoxetine (CYMBALTA) 20 MG capsule [DULOXETINE (CYMBALTA) 20 MG CAPSULE] Take 1 capsule (20 mg total) by mouth daily. 90 capsule 2     folic acid (FOLVITE) 1 MG tablet Take 1 mg by mouth       hydroCHLOROthiazide (HYDRODIURIL) 25 MG tablet [HYDROCHLOROTHIAZIDE (HYDRODIURIL) 25 MG TABLET] Take 25 mg by mouth daily.       ibuprofen (ADVIL,MOTRIN) 200 MG tablet [IBUPROFEN (ADVIL,MOTRIN) 200 MG TABLET] Take 400 mg by mouth every 8 (eight) hours as needed for pain.              lanolin alcohol-mo-w.pet-ceres (EUCERIN) Crea Apply topically 2 times daily       methotrexate 10 MG tablet [METHOTREXATE 10 MG TABLET] Take 1 tablet (10 mg total) by mouth once a week. 16 tablet 0      "multivitamin therapeutic (THERAGRAN) tablet Take 1 tablet by mouth daily       nystatin (MYCOSTATIN) powder [NYSTATIN (MYCOSTATIN) POWDER] Apply to rash areas twice daily. 15 g 0     oxyCODONE (OXY-IR) 5 MG capsule Take 5 mg by mouth every 4 hours as needed for severe pain (7-10)       rivaroxaban ANTICOAGULANT (XARELTO) 20 MG TABS tablet Take 20 mg by mouth daily       HUMIRA PEN 40 MG/0.8ML pen kit INJECT 40 MG (0.8 ML) UNDER THE SKIN EVERY 14 DAYS. 2 each 3     Medications reviewed:  Medications reconciled to facility chart and changes were made to reflect current medications as identified as above med list. Below are the changes that were made:   Medications stopped since last EPIC medication reconciliation:   There are no discontinued medications.  Medications started since last [facility] medication reconciliation:  No orders of the defined types were placed in this encounter.    REVIEW OF SYSTEMS:  4 point ROS neg other than the symptoms noted above in the HPI.    PHYSICAL EXAM:  /83   Pulse 77   Temp 98.3  F (36.8  C)   Resp 20   Ht 1.651 m (5' 5\")   Wt (!) 155.5 kg (342 lb 12.8 oz)   SpO2 94%   BMI 57.04 kg/m    Gen: laying in bed, alert, cooperative and in no acute distress  Resp: Breathing unlabored, no tachypnea  Ext: Bilateral lower extremity lymphedema  Neuro: CX II-XII grossly in tact; ROM in all four extremities grossly in tact  Psych: alert and oriented x3; normal affect  Skin: bilateral lower extremities are zoya; skin is not warm or blanchable; she has patches of heaped up dry scaly skin on both ankles, and on her toes; she has some areas of pink, \"newer\" skin where the scales have been deroofed; no open or draining areas or mau signs of infection    LABS/IMAGING: Reviewed as per Epic and/or [facility]    ASSESSMENT / PLAN:    Rheumatoid Arthritis  Most recent rheum visit on 9/22 and RA noted to be under good control.   -- Humira every 14 days  -- methotrexate 10 mg weekly and folic " acid 1 mg daily   -- APAP 650 mg q6h PRN, ibuprofen 400 mg q8h PRN, oxycodone 5 mg q4h PRN   -- labs and follow up per rheumatology     Bilateral LE Lymphedema  Recent infection. Apart from needing dead skin removed, her legs look OK today. No signs of infection  -- needs ongoing good hygiene of her skin and close monitoring for infectio    HTN  SBPs 110s-140s , most 120s-130s  -- hydrochlorothiazide 25 mg daily   -- follow BPs and adjust medications as needed    History of LE DVT  -- rivaroxaban 20 mg daily     Electronically signed by  Urvashi River MD                Sincerely,        Urvashi River MD

## 2022-09-27 NOTE — PROGRESS NOTES
"[facility] GERIATRICS  REGULATORY VISIT  September 27, 2022      [facility] Medical Record Number:  [id number]  Place of Service where encounter took place:  [facility] () [[id number]]    Chief Complaint   Patient presents with     [facility]       HPI:    María Elena Saab is a 57 year old  (1965), who is being seen today for a federally mandated E/M visit at [facility] where she has resided since March 2016. HPI information obtained from: facility chart records, facility staff, patient report and [facility] chart review.    Today, Ms. Saab is seen in her room laying in bed.  Her main concerns today are around skin care of her legs.  She reports not having had a bath in 3 weeks.  She said between the agency staff and people being worried they will \"drop \"her from the bathtub (is just not been happening.  Does mostly stand lift treatments to her legs.this happened recently) she is very worried about developing another staph infection.  I was not able to locate her nurse nor the floor nurse manager today.      ALLERGIES:    Allergies   Allergen Reactions     Adhesive Tape Other (See Comments)     As on band-aids;  Causes skin tearing/wounds.     Adhesive [Mecrylate] Other (See Comments)     As on band-aids;  Causes skin tearing/wounds.     Banana Unknown     She avoids due to Latex allergy.  If she eats them 3 days in a row, gets stomach cramps and dark stool.  TBrinkMD - 4/10/15     Grape [Grape (Artificial) Flavor] Unknown     She avoids due to Latex allergy.  If she eats lots of them, and she likes them, stomach gets a little queasy.  TBrinkMD - 4/10/15     Morphine (Pf) [Morphine] Other (See Comments)     Pt has not reacted to this med herself, but mother has anaphylactic reaction and other family members get nausea/vomiting.     Other Environmental Allergy Swelling     leather     Pine      Pseudoephedrine Cold/Allergy [Cpm-Pse Cr] Other (See Comments) "     Keeps pt awake up to 24 hours      Latex Rash     Hands get red from rubber gloves, but okay if she washes them right away.  Hands get red from rubber gloves, but okay if she washes them right away.     Other Food Allergy Rash     Pine trees, leather .         Past Medical, Surgical, Family and Social History: Reviewed and updated in EPIC.    MEDICATIONS:  Current Outpatient Medications   Medication Sig Dispense Refill     acetaminophen (TYLENOL) 325 MG tablet [ACETAMINOPHEN (TYLENOL) 325 MG TABLET] Take 650 mg by mouth every 6 (six) hours as needed for pain.              adalimumab (HUMIRA) 40 mg/0.8 mL injection [ADALIMUMAB (HUMIRA) 40 MG/0.8 ML INJECTION] Inject 0.8 mL (40 mg total) under the skin every 14 (fourteen) days. 1.6 mL 0     ascorbic acid, vitamin C, (ASCORBIC ACID WITH KAEL HIPS) 500 MG tablet [ASCORBIC ACID, VITAMIN C, (ASCORBIC ACID WITH KAEL HIPS) 500 MG TABLET] Take 500 mg by mouth 2 (two) times a day.       cholecalciferol 50 MCG (2000 UT) tablet Take 2 tablets (100 mcg) by mouth daily Patient taking 4000 mg       clindamycin (CLEOCIN) 300 MG capsule Take 300 mg by mouth 3 times daily Last day 9/29/22       DULoxetine (CYMBALTA) 20 MG capsule [DULOXETINE (CYMBALTA) 20 MG CAPSULE] Take 1 capsule (20 mg total) by mouth daily. 90 capsule 2     folic acid (FOLVITE) 1 MG tablet Take 1 mg by mouth       hydroCHLOROthiazide (HYDRODIURIL) 25 MG tablet [HYDROCHLOROTHIAZIDE (HYDRODIURIL) 25 MG TABLET] Take 25 mg by mouth daily.       ibuprofen (ADVIL,MOTRIN) 200 MG tablet [IBUPROFEN (ADVIL,MOTRIN) 200 MG TABLET] Take 400 mg by mouth every 8 (eight) hours as needed for pain.              lanolin alcohol-mo-w.pet-ceres (EUCERIN) Crea Apply topically 2 times daily       methotrexate 10 MG tablet [METHOTREXATE 10 MG TABLET] Take 1 tablet (10 mg total) by mouth once a week. 16 tablet 0     multivitamin therapeutic (THERAGRAN) tablet Take 1 tablet by mouth daily       nystatin (MYCOSTATIN) powder [NYSTATIN  "(MYCOSTATIN) POWDER] Apply to rash areas twice daily. 15 g 0     oxyCODONE (OXY-IR) 5 MG capsule Take 5 mg by mouth every 4 hours as needed for severe pain (7-10)       rivaroxaban ANTICOAGULANT (XARELTO) 20 MG TABS tablet Take 20 mg by mouth daily       HUMIRA PEN 40 MG/0.8ML pen kit INJECT 40 MG (0.8 ML) UNDER THE SKIN EVERY 14 DAYS. 2 each 3     Medications reviewed:  Medications reconciled to facility chart and changes were made to reflect current medications as identified as above med list. Below are the changes that were made:   Medications stopped since last EPIC medication reconciliation:   There are no discontinued medications.  Medications started since last [facility] medication reconciliation:  No orders of the defined types were placed in this encounter.    REVIEW OF SYSTEMS:  4 point ROS neg other than the symptoms noted above in the HPI.    PHYSICAL EXAM:  /83   Pulse 77   Temp 98.3  F (36.8  C)   Resp 20   Ht 1.651 m (5' 5\")   Wt (!) 155.5 kg (342 lb 12.8 oz)   SpO2 94%   BMI 57.04 kg/m    Gen: laying in bed, alert, cooperative and in no acute distress  Resp: Breathing unlabored, no tachypnea  Ext: Bilateral lower extremity lymphedema  Neuro: CX II-XII grossly in tact; ROM in all four extremities grossly in tact  Psych: alert and oriented x3; normal affect  Skin: bilateral lower extremities are zoya; skin is not warm or blanchable; she has patches of heaped up dry scaly skin on both ankles, and on her toes; she has some areas of pink, \"newer\" skin where the scales have been deroofed; no open or draining areas or mau signs of infection    LABS/IMAGING: Reviewed as per Epic and/or [facility]    ASSESSMENT / PLAN:    Rheumatoid Arthritis  Most recent rheum visit on 9/22 and RA noted to be under good control.   -- Humira every 14 days  -- methotrexate 10 mg weekly and folic acid 1 mg daily   -- APAP 650 mg q6h PRN, ibuprofen 400 mg q8h PRN, oxycodone 5 mg q4h PRN   -- labs and follow up per " rheumatology     Bilateral LE Lymphedema  Recent infection. Apart from needing dead skin removed, her legs look OK today. No signs of infection  -- needs ongoing good hygiene of her skin and close monitoring for infectio    HTN  SBPs 110s-140s , most 120s-130s  -- hydrochlorothiazide 25 mg daily   -- follow BPs and adjust medications as needed    History of LE DVT  -- rivaroxaban 20 mg daily     Electronically signed by  Urvashi River MD

## 2022-11-09 ENCOUNTER — LAB REQUISITION (OUTPATIENT)
Dept: LAB | Facility: CLINIC | Age: 57
End: 2022-11-09
Payer: COMMERCIAL

## 2022-11-09 ENCOUNTER — NURSING HOME VISIT (OUTPATIENT)
Dept: GERIATRICS | Facility: CLINIC | Age: 57
End: 2022-11-09
Payer: COMMERCIAL

## 2022-11-09 VITALS
HEIGHT: 65 IN | RESPIRATION RATE: 20 BRPM | DIASTOLIC BLOOD PRESSURE: 81 MMHG | OXYGEN SATURATION: 97 % | HEART RATE: 90 BPM | SYSTOLIC BLOOD PRESSURE: 135 MMHG | BODY MASS INDEX: 48.82 KG/M2 | WEIGHT: 293 LBS | TEMPERATURE: 98.5 F

## 2022-11-09 DIAGNOSIS — E66.01 MORBID OBESITY (H): ICD-10-CM

## 2022-11-09 DIAGNOSIS — E55.9 VITAMIN D DEFICIENCY: ICD-10-CM

## 2022-11-09 DIAGNOSIS — E78.00 HIGH BLOOD CHOLESTEROL: ICD-10-CM

## 2022-11-09 DIAGNOSIS — L03.116 CELLULITIS OF LEFT LOWER LIMB: ICD-10-CM

## 2022-11-09 DIAGNOSIS — L03.116 CELLULITIS OF LEFT LOWER EXTREMITY: ICD-10-CM

## 2022-11-09 DIAGNOSIS — I10 ESSENTIAL HYPERTENSION WITH GOAL BLOOD PRESSURE LESS THAN 140/90: ICD-10-CM

## 2022-11-09 DIAGNOSIS — I89.0 LYMPHEDEMA OF BOTH LOWER EXTREMITIES: ICD-10-CM

## 2022-11-09 DIAGNOSIS — I73.9 PAD (PERIPHERAL ARTERY DISEASE) (H): ICD-10-CM

## 2022-11-09 DIAGNOSIS — G47.33 OBSTRUCTIVE SLEEP APNEA SYNDROME: ICD-10-CM

## 2022-11-09 DIAGNOSIS — Z86.718 HISTORY OF DVT OF LOWER EXTREMITY: ICD-10-CM

## 2022-11-09 DIAGNOSIS — M05.79 SEROPOSITIVE RHEUMATOID ARTHRITIS OF MULTIPLE SITES (H): Primary | ICD-10-CM

## 2022-11-09 DIAGNOSIS — M15.0 PRIMARY OSTEOARTHRITIS INVOLVING MULTIPLE JOINTS: ICD-10-CM

## 2022-11-09 DIAGNOSIS — R76.8 CYCLIC CITRULLINATED PEPTIDE (CCP) ANTIBODY POSITIVE: ICD-10-CM

## 2022-11-09 DIAGNOSIS — D64.9 ANEMIA, UNSPECIFIED TYPE: ICD-10-CM

## 2022-11-09 PROBLEM — A49.02 MRSA (METHICILLIN RESISTANT STAPHYLOCOCCUS AUREUS): Status: ACTIVE | Noted: 2022-06-28

## 2022-11-09 PROBLEM — R78.81 BACTEREMIA DUE TO GROUP B STREPTOCOCCUS: Status: ACTIVE | Noted: 2019-07-13

## 2022-11-09 PROBLEM — F33.9 MAJOR DEPRESSIVE DISORDER, RECURRENT (H): Status: ACTIVE | Noted: 2022-06-24

## 2022-11-09 PROBLEM — B95.1 BACTEREMIA DUE TO GROUP B STREPTOCOCCUS: Status: ACTIVE | Noted: 2019-07-13

## 2022-11-09 PROBLEM — L03.90 CELLULITIS: Status: ACTIVE | Noted: 2022-06-23

## 2022-11-09 PROBLEM — Z87.898 HX OF BACTEREMIA: Status: ACTIVE | Noted: 2022-06-24

## 2022-11-09 PROBLEM — Z79.01 CURRENT LONG-TERM USE OF ANTICOAGULANT MEDICATION WITH HISTORY OF DEEP VENOUS THROMBOSIS (DVT): Status: ACTIVE | Noted: 2022-06-24

## 2022-11-09 PROCEDURE — 99310 SBSQ NF CARE HIGH MDM 45: CPT | Performed by: NURSE PRACTITIONER

## 2022-11-09 RX ORDER — OXYCODONE HYDROCHLORIDE 5 MG/1
5 CAPSULE ORAL EVERY 6 HOURS PRN
Qty: 60 CAPSULE | Refills: 0 | Status: SHIPPED | OUTPATIENT
Start: 2022-11-09 | End: 2022-11-16

## 2022-11-09 RX ORDER — ACETAMINOPHEN 500 MG
1000 TABLET ORAL EVERY 6 HOURS PRN
Qty: 240 TABLET | Refills: 11 | Status: SHIPPED | OUTPATIENT
Start: 2022-11-09 | End: 2022-11-16

## 2022-11-09 RX ORDER — CLINDAMYCIN HCL 300 MG
300 CAPSULE ORAL 3 TIMES DAILY
Qty: 42 CAPSULE | Refills: 0 | Status: SHIPPED | OUTPATIENT
Start: 2022-11-09 | End: 2022-11-16

## 2022-11-09 RX ORDER — IBUPROFEN 200 MG
400 TABLET ORAL EVERY 8 HOURS PRN
Qty: 240 TABLET | Refills: 11 | Status: SHIPPED | OUTPATIENT
Start: 2022-11-09 | End: 2023-03-14

## 2022-11-09 NOTE — PROGRESS NOTES
[facility] GERIATRICS  Chief Complaint   Patient presents with     [facility] Medical Record Number:  [id number]  Place of Service where encounter took place:  [facility] () [[id number]]    HPI:    María Elena Saab  is 57 year old (1965), who is being seen today for a federally mandated E/M visit. Today's concerns are:     Seropositive rheumatoid arthritis of multiple sites (H)  Cyclic citrullinated peptide (CCP) antibody positive  Primary osteoarthritis involving multiple joints  Vitamin D deficiency  Morbid obesity (H)  Lymphedema of both lower extremities  PAD (peripheral artery disease) (H)  Essential hypertension with goal blood pressure less than 140/90  History of DVT of lower extremity  High blood cholesterol  Obstructive sleep apnea syndrome  Anemia, unspecified type  Cellulitis of left lower extremity     Met with patient who denies any chest pain, palpitations, shortness of breath, ESPITIA, lightheadedness, dizziness, or cough. Denies any abdominal discomfort. Denies N&V. Denies B&B concerns. Denies dysuria or frequency. Denies loose or constipation. Appetite good. Sleeping well. She reports increased redness and warmth to bilateral lower extremities particularly to LLE. Pain noted to left heel only with ambulation. I recommended hospitalization due to appearance today, however she would like to try oral agents with lab assessments now and if no improvement in the next 24-48 hours then will agree to go to hospital for further needs. No recent falls. No behaviors. Ibuprofen was discontinued however patient wants this back and she finds this helpful for pain control vs tylenol.     BP Readings from Last 3 Encounters:   11/09/22 135/81   09/27/22 138/83   09/22/22 138/88     Wt Readings from Last 5 Encounters:   11/09/22 (!) 155.5 kg (342 lb 12.8 oz)   09/27/22 (!) 155.5 kg (342 lb 12.8 oz)   07/19/22 (!) 157 kg (346 lb 3.2 oz)   05/17/22 (!) 157 kg (346 lb  3.2 oz)   03/17/22 (!) 161.5 kg (356 lb 1.6 oz)     ALLERGIES:Adhesive tape, Adhesive [mecrylate], Banana, Food, Grape [grape (artificial) flavor], Morphine (pf) [morphine], Other environmental allergy, Pine, Pseudoephedrine, Pseudoephedrine cold/allergy [cpm-pse cr], Latex, and Other food allergy  PAST MEDICAL HISTORY:   Past Medical History:   Diagnosis Date     Aseptic necrosis of femoral head (H)     LEFT     Bacteremia due to group B Streptococcus      Cellulitis of right lower extremity      DJD (degenerative joint disease)      DVT, bilateral lower limbs (H) 10/2014     Edema 5/22/2015     Essential hypertension with goal blood pressure less than 140/90      Failure to thrive      History of blood clots      Hypokalemia 10/15/2014     Lung mass 10/18/2014     Morbid obesity (H) 4/10/2015    Had formal nutrition consult at [facility].  RD reports that she is not willing to commit to the program outlined.  See scanned document.  12/15/2015 - Marcio Quinonez MD        Nocturnal hypoxemia - probable sleep apnea - had overnight pulse oximetry, April, 2015. 5/22/2015     Obesity 4/10/2015     LOPEZ (obstructive sleep apnea) 5/22/2015     Osteoarthritis      Peripheral vascular disease (H)      Pleural effusion      Pressure ulcer of foot      Rheumatoid arthritis (H) 12/30/2014    seronegative     Sepsis (H)      Seropositive rheumatoid arthritis (H) 1/19/2016     Vitamin D deficiency 8/26/2015     PAST SURGICAL HISTORY:   has a past surgical history that includes Total Knee Arthroplasty (Left, 2006); joint replacement; Total Hip Arthroplasty (Left, 10/18/2016); and Peripherally Inserted Central Catheter Insertion (Right, 07/14/2019).  FAMILY HISTORY: family history includes Arthritis in her mother; Breast Cancer in her cousin; Breast Cancer (age of onset: 50.00) in her maternal grandmother; Breast Cancer (age of onset: 54.00) in her maternal aunt and mother; Cancer in her mother; Cataracts in her father and mother;  Deep Vein Thrombosis in her father; Heart Disease in her maternal grandmother; Multiple myeloma in her father; No Known Problems in her sister; Other - See Comments in her brother; Rheumatoid Arthritis in her maternal grandmother and paternal aunt; Sleep Apnea in her brother; Snoring in her father.  SOCIAL HISTORY:  reports that she has quit smoking. Her smoking use included cigarettes and cigarettes. She has a 30.00 pack-year smoking history. She has never used smokeless tobacco. She reports current alcohol use. She reports that she does not use drugs.    MEDICATIONS:  MED REC REQUIRED  Post Medication Reconciliation Status:  Patient was not discharged from an inpatient facility or TCU           Review of your medicines          Accurate as of November 9, 2022 12:32 PM. If you have any questions, ask your nurse or doctor.            CONTINUE these medicines which may have CHANGED, or have new prescriptions. If we are uncertain of the size of tablets/capsules you have at home, strength may be listed as something that might have changed.      Dose / Directions   * acetaminophen 325 MG tablet  Commonly known as: TYLENOL  This may have changed: Another medication with the same name was added. Make sure you understand how and when to take each.  Changed by: KRISTIN Rodriges CNP      Dose: 650 mg  [ACETAMINOPHEN (TYLENOL) 325 MG TABLET] Take 650 mg by mouth every 6 (six) hours as needed for pain.  Refills: 0     * acetaminophen 500 MG tablet  Commonly known as: TYLENOL  This may have changed: You were already taking a medication with the same name, and this prescription was added. Make sure you understand how and when to take each.  Used for: Primary osteoarthritis involving multiple joints  Changed by: KRISTIN Rodriges CNP      Dose: 1,000 mg  Take 2 tablets (1,000 mg) by mouth every 6 hours as needed for mild pain  Quantity: 240 tablet  Refills: 11     cholecalciferol 50 MCG (2000 UT) tablet  This may have  changed:     how much to take    additional instructions  Used for: Vitamin D deficiency  Changed by: KRISTIN Rodriges CNP      Dose: 1 tablet  Take 1 tablet (50 mcg) by mouth daily  Quantity: 30 tablet  Refills: 11     * clindamycin 300 MG capsule  Commonly known as: CLEOCIN  This may have changed: Another medication with the same name was added. Make sure you understand how and when to take each.  Changed by: KRISTIN Rodriges CNP      Dose: 300 mg  Take 300 mg by mouth 3 times daily Last day 9/29/22  Refills: 0     * clindamycin 300 MG capsule  Commonly known as: CLEOCIN  This may have changed: You were already taking a medication with the same name, and this prescription was added. Make sure you understand how and when to take each.  Used for: Cellulitis of left lower extremity  Changed by: KRISTIN Rodriges CNP      Dose: 300 mg  Take 1 capsule (300 mg) by mouth 3 times daily for 14 days  Quantity: 42 capsule  Refills: 0     ibuprofen 200 MG tablet  Commonly known as: ADVIL/MOTRIN  This may have changed: reasons to take this  Used for: Primary osteoarthritis involving multiple joints  Changed by: KRISTIN Rodriges CNP      Dose: 400 mg  Take 2 tablets (400 mg) by mouth every 8 hours as needed for mild pain  Quantity: 240 tablet  Refills: 11     oxyCODONE 5 MG capsule  Commonly known as: OXY-IR  This may have changed: when to take this  Used for: Primary osteoarthritis involving multiple joints  Changed by: KRISTIN Rodriges CNP      Dose: 5 mg  Take 1 capsule (5 mg) by mouth every 6 hours as needed for severe pain  Quantity: 60 capsule  Refills: 0         * This list has 4 medication(s) that are the same as other medications prescribed for you. Read the directions carefully, and ask your doctor or other care provider to review them with you.            CONTINUE these medicines which have NOT CHANGED      Dose / Directions   * adalimumab 40 MG/0.8ML injection  Commonly known as: Humira  Used  for: Seropositive rheumatoid arthritis of multiple sites (H)      Dose: 40 mg  [ADALIMUMAB (HUMIRA) 40 MG/0.8 ML INJECTION] Inject 0.8 mL (40 mg total) under the skin every 14 (fourteen) days.  Quantity: 1.6 mL  Refills: 0     * Humira Pen 40 MG/0.8ML pen kit  Used for: Seropositive rheumatoid arthritis of multiple sites (H), Cyclic citrullinated peptide (CCP) antibody positive  Generic drug: adalimumab      INJECT 40 MG (0.8 ML) UNDER THE SKIN EVERY 14 DAYS.  Quantity: 2 each  Refills: 3     DULoxetine 20 MG capsule  Commonly known as: CYMBALTA  Used for: Primary osteoarthritis involving multiple joints      Dose: 20 mg  [DULOXETINE (CYMBALTA) 20 MG CAPSULE] Take 1 capsule (20 mg total) by mouth daily.  Quantity: 90 capsule  Refills: 2     eucerin cream      Apply topically 2 times daily  Refills: 0     folic acid 1 MG tablet  Commonly known as: FOLVITE      Dose: 1 mg  Take 1 mg by mouth  Refills: 0     hydrochlorothiazide 25 MG tablet  Commonly known as: HYDRODIURIL      Dose: 25 mg  [HYDROCHLOROTHIAZIDE (HYDRODIURIL) 25 MG TABLET] Take 25 mg by mouth daily.  Refills: 0     methotrexate sodium 10 MG Tabs  Used for: Seropositive rheumatoid arthritis of multiple sites (H)      Dose: 10 mg  [METHOTREXATE 10 MG TABLET] Take 1 tablet (10 mg total) by mouth once a week.  Quantity: 16 tablet  Refills: 0     Mycostatin [id number] UNIT/GM Powd      [NYSTATIN (MYCOSTATIN) POWDER] Apply to rash areas twice daily.  Quantity: 15 g  Refills: 0     rivaroxaban ANTICOAGULANT 20 MG Tabs tablet  Commonly known as: XARELTO      Dose: 20 mg  Take 20 mg by mouth daily  Refills: 0     THERAPEUTIC MULTIVITAMIN PO      Dose: 1 tablet  Take 1 tablet by mouth daily  Refills: 0     vitamin C 500 MG tablet  Commonly known as: ASCORBIC ACID      Dose: 500 mg  [ASCORBIC ACID, VITAMIN C, (ASCORBIC ACID WITH KAEL HIPS) 500 MG TABLET] Take 500 mg by mouth 2 (two) times a day.  Refills: 0         * This list has 2 medication(s) that are the same as  "other medications prescribed for you. Read the directions carefully, and ask your doctor or other care provider to review them with you.               Where to get your medicines      These medications were sent to [facility] [place] - [place], [place] - [address] [address]  [address] [address], [place] [address]    Phone: [phone]     acetaminophen 500 MG tablet    cholecalciferol 50 MCG (2000 UT) tablet    clindamycin 300 MG capsule    ibuprofen 200 MG tablet    oxyCODONE 5 MG capsule        Case Management:  I have reviewed the care plan and MDS and do agree with the plan. Patient's desire to return to the community is not present. Information reviewed:  Medications, vital signs, orders, and nursing notes.    ROS:  10 point ROS of systems including Constitutional, Eyes, Respiratory, Cardiovascular, Gastroenterology, Genitourinary, Integumentary, Musculoskeletal, Psychiatric were all negative except for pertinent positives noted in my HPI.    Vitals:  /81   Pulse 90   Temp 98.5  F (36.9  C)   Resp 20   Ht 1.651 m (5' 5\")   Wt (!) 155.5 kg (342 lb 12.8 oz)   SpO2 97%   BMI 57.04 kg/m    Body mass index is 57.04 kg/m .  Exam:  GENERAL APPEARANCE:  Alert, in no distress, oriented, morbidly obese, cooperative  ENT:  Mouth and posterior oropharynx normal, moist mucous membranes, [place]  EYES:  EOM, conjunctivae, lids, pupils and irises normal  NECK:  No adenopathy,masses or thyromegaly  RESP:  respiratory effort and palpation of chest normal, lungs clear to auscultation , no respiratory distress  CV:  Palpation and auscultation of heart done , regular rate and rhythm, no murmur, rub, or gallop, peripheral edema 1+ in BLE  ABDOMEN:  normal bowel sounds, soft, nontender, no hepatosplenomegaly or other masses, no guarding or rebound  M/S:   Ambulates with walker  SKIN:  increased redness to BLE especially to LLE. Redness increased from toes to up to medial thigh. See photos  NEURO:   Cranial nerves " 2-12 are normal tested and grossly at patient's baseline, no purposeful movement in upper and lower extremities  PSYCH:  oriented X 3, normal insight, judgement and memory, affect and mood normal                      Lab/Diagnostic data:     Most Recent 3 CBC's:  Recent Labs   Lab Test 09/22/22  1559 04/12/22  1218 01/13/22  1234   WBC 5.4 5.2 4.9   HGB 13.4 14.3 15.2   MCV 92 93 96    229 210     Most Recent 3 BMP's:  Recent Labs   Lab Test 09/22/22  1559 04/12/22  1218 01/13/22  1234 01/04/22  0439 12/02/21  1155 09/10/19  1409 07/25/19  1215 07/18/19  1345 10/15/18  1434 09/13/18  1345   NA  --   --   --  139 141  --   --   --   --  144   POTASSIUM  --   --   --  3.6  --   --   --   --   --  4.3   CHLORIDE  --   --   --  99  --   --   --   --   --  101   CO2  --   --   --  27  --   --   --   --   --  34*   BUN  --   --   --  15  --   --  15 11  --  16   CR 0.51 0.65 0.65 0.66  0.66  --    < > 0.63 0.70   < > 0.75   ANIONGAP  --   --   --  13  --   --   --   --   --  9   WILVER  --   --   --  9.9  --   --   --   --   --  9.7   GLC  --   --   --  82  --   --   --   --   --  96    < > = values in this interval not displayed.     Most Recent 2 LFT's:  Recent Labs   Lab Test 09/22/22  1559 04/12/22  1218 01/13/22  1234 01/04/22  0439 09/10/19  1409 07/25/19  1215   AST  --   --   --  21  20  --  23   ALT 12 19   < > 23  23  23   < > 22   ALKPHOS  --   --   --  70  72  --  69   BILITOTAL  --   --   --  0.3  0.4  --  0.3    < > = values in this interval not displayed.     Most Recent Cholesterol Panel:  Recent Labs   Lab Test 12/02/21  1155   CHOL 221*   *   HDL 65   TRIG 101     Most Recent Hemoglobin A1c:  Recent Labs   Lab Test 12/02/21  1155   A1C 5.3     Most Recent Anemia Panel:  Recent Labs   Lab Test 09/22/22  1559   WBC 5.4   HGB 13.4   HCT 41.5   MCV 92        Vitamin D Deficiency Screening Results:  Lab Results   Component Value Date    VITDT 50 01/04/2022    VITDT 70 12/02/2021        ASSESSMENT/PLAN  (M05.79) Seropositive rheumatoid arthritis of multiple sites (H)  (primary encounter diagnosis)  (R76.8) Cyclic citrullinated peptide (CCP) antibody positive  (M15.9) Primary osteoarthritis involving multiple joints  (E55.9) Vitamin D deficiency  Comment: Chronic. Managed by rheumatology. Used PRN oxycodone once in the last 2 weeks.   Plan:   -Continue methotrexate 10mg weekly as directed and folic acid 1mg daily  -Tylenol PRN  -Continue PRN ibuprofen  -Change oxycodone to 5mg every 8 hours for now. GDR in near future if non use.   -Continue humira 40mg injection every 2 weeks as directed  -Continue Duloxetine 20mg daily as directed  -Reduce vitamin D down to 2000U daily  -Follow up with rheumatology as directed. Scheduled for 3/22/23  -BMP, CBC, D dimer, CRP and ESR due tomorrow 11/10/22    (I89.0) Lymphedema of both lower extremities  (I73.9) PAD (peripheral artery disease) (H)  (L03.116) Cellulitis of LLE  Comment: Chronic. No claudication complaints. Recent infection. Needs ongoing good hygiene for her skin and close monitoring for infection. Increased warmth and redness to LLE in particular. Starting from toes and foot with redness radiating up to mid medial thigh. I highly recommended to go to hospital for aggressive IV ANTIBIOTIC needs, however patient wants to hold off and try oral agents here with labs tomorrow for further assessment  Plan:   -Eucerin daily to bilateral lower extremities  -Monitor for worsening s/sx of concerns  -Staff to provide appropriate skin hygiene needs as directed  -US to LLE. Already know history of DVT to LLE  -Start clindamycin 300mg TID x 14 days  -BMP, CBC, D dimer, CRP and ESR due tomorrow 11/10/22  -If no improvement in appearance in the next 24-48 hours, recommend to send to hospital for further evaluation needs and aggressive IV management goals.     (I10) Essential hypertension with goal blood pressure less than 140/90  (Z86.718) History of DVT of  lower extremity  (E78.00) High blood cholesterol  Comment: Chronic. Based on JNC-8 goals,  patients age of 57 year old, no presence of diabetes or CKD, and goals of care goal BP is <150/90 mm Hg. Patient is stable with current plan of care and routine assessment. History of DVT to LLE. SBP readings remaining 130s-150s.   Plan:   -Monitor BP and HR  -Continue hydrochlorothiazide 25mg daily.   -Continue xarelto 20mg daily  -BMP, CBC, D dimer, CRP and ESR due tomorrow 11/10/22    (E66.01) Morbid obesity (H)  (G47.33) Obstructive sleep apnea syndrome  Comment: Chronic. Hx of LOPEZ, does not have CPAP as this causes sinus problems per patient. Patient with frequent desaturations while sleeping during history of hospital admission. Recommend follow up with sleep medicine provider to provide recommendations for management which she last saw at [facility] location last in 2016  Plan:   -Monitor sleeping patterns  -Monitor respiratory status  -Oxygen used at night  -Follow up with sleep medicine as directed at [facility] location within 3 months  -BMP, CBC, D dimer, CRP and ESR due tomorrow 11/10/22    (D64.9) Anemia, unspecified type  Comment: Acute on chronic. Baseline hgb around 13  Plan:   -Monitor bleeding risks  -Monitor for worsening s/sx of concerns  -BMP, CBC, D dimer, CRP and ESR due tomorrow 11/10/22      Electronically signed by:  Arlen Carlson DNP, APRN

## 2022-11-09 NOTE — LETTER
11/9/2022        RE: María Elena Saab  [facility] [address]  [address]  [place] [address]        [facility]  No chief complaint on file.    [facility] Record Number:  [id number]  Place of Service where encounter took place:  No question data found.    HPI:    María Elena Saab  is 57 year old (1965), who is being seen today for a federally mandated E/M visit. Today's concerns are:     Seropositive rheumatoid arthritis of multiple sites (H)  Cyclic citrullinated peptide (CCP) antibody positive  Primary osteoarthritis involving multiple joints  Vitamin D deficiency  Morbid obesity (H)  Lymphedema of both lower extremities  PAD (peripheral artery disease) (H)  Essential hypertension with goal blood pressure less than 140/90  History of DVT of lower extremity  High blood cholesterol  Obstructive sleep apnea syndrome  Anemia, unspecified type***    BP Readings from Last 3 Encounters:   09/27/22 138/83   09/22/22 138/88   07/19/22 137/84     Wt Readings from Last 5 Encounters:   09/27/22 (!) 155.5 kg (342 lb 12.8 oz)   07/19/22 (!) 157 kg (346 lb 3.2 oz)   05/17/22 (!) 157 kg (346 lb 3.2 oz)   03/17/22 (!) 161.5 kg (356 lb 1.6 oz)   01/18/22 (!) 155.6 kg (343 lb)     ALLERGIES:Adhesive tape, Adhesive [mecrylate], Banana, Grape [grape (artificial) flavor], Morphine (pf) [morphine], Other environmental allergy, Pine, Pseudoephedrine cold/allergy [cpm-pse cr], Latex, and Other food allergy  PAST MEDICAL HISTORY:   Past Medical History:   Diagnosis Date     Aseptic necrosis of femoral head (H)     LEFT     Bacteremia due to group B Streptococcus      Cellulitis of right lower extremity      DJD (degenerative joint disease)      DVT, bilateral lower limbs (H) 10/2014     Edema 5/22/2015     Essential hypertension with goal blood pressure less than 140/90      Failure to thrive      History of blood clots      Hypokalemia 10/15/2014     Lung mass 10/18/2014     Morbid obesity (H)  4/10/2015    Had formal nutrition consult at [facility].  RD reports that she is not willing to commit to the program outlined.  See scanned document.  12/15/2015 - Marcio Quinonez MD        Nocturnal hypoxemia - probable sleep apnea - had overnight pulse oximetry, April, 2015. 5/22/2015     Obesity 4/10/2015     LOPEZ (obstructive sleep apnea) 5/22/2015     Osteoarthritis      Peripheral vascular disease (H)      Pleural effusion      Pressure ulcer of foot      Rheumatoid arthritis (H) 12/30/2014    seronegative     Sepsis (H)      Seropositive rheumatoid arthritis (H) 1/19/2016     Vitamin D deficiency 8/26/2015     PAST SURGICAL HISTORY:   has a past surgical history that includes Total Knee Arthroplasty (Left, 2006); joint replacement; Total Hip Arthroplasty (Left, 10/18/2016); and Peripherally Inserted Central Catheter Insertion (Right, 07/14/2019).  FAMILY HISTORY: family history includes Arthritis in her mother; Breast Cancer in her cousin; Breast Cancer (age of onset: 50.00) in her maternal grandmother; Breast Cancer (age of onset: 54.00) in her maternal aunt and mother; Cancer in her mother; Cataracts in her father and mother; Deep Vein Thrombosis in her father; Heart Disease in her maternal grandmother; Multiple myeloma in her father; No Known Problems in her sister; Other - See Comments in her brother; Rheumatoid Arthritis in her maternal grandmother and paternal aunt; Sleep Apnea in her brother; Snoring in her father.  SOCIAL HISTORY:  reports that she has quit smoking. Her smoking use included cigarettes and cigarettes. She has a 30.00 pack-year smoking history. She has never used smokeless tobacco. She reports current alcohol use. She reports that she does not use drugs.    MEDICATIONS:  MED REC REQUIRED  Post Medication Reconciliation Status:  Patient was not discharged from an inpatient facility or TCU           Review of your medicines          Accurate as of November 8, 2022  7:43 PM. If you have any  questions, ask your nurse or doctor.            CONTINUE these medicines which have NOT CHANGED      Dose / Directions   acetaminophen 325 MG tablet  Commonly known as: TYLENOL      Dose: 650 mg  [ACETAMINOPHEN (TYLENOL) 325 MG TABLET] Take 650 mg by mouth every 6 (six) hours as needed for pain.  Refills: 0     * adalimumab 40 MG/0.8ML injection  Commonly known as: Humira  Used for: Seropositive rheumatoid arthritis of multiple sites (H)      Dose: 40 mg  [ADALIMUMAB (HUMIRA) 40 MG/0.8 ML INJECTION] Inject 0.8 mL (40 mg total) under the skin every 14 (fourteen) days.  Quantity: 1.6 mL  Refills: 0     * Humira Pen 40 MG/0.8ML pen kit  Used for: Seropositive rheumatoid arthritis of multiple sites (H), Cyclic citrullinated peptide (CCP) antibody positive  Generic drug: adalimumab      INJECT 40 MG (0.8 ML) UNDER THE SKIN EVERY 14 DAYS.  Quantity: 2 each  Refills: 3     cholecalciferol 50 MCG (2000 UT) tablet      Dose: 4,000 Units  Take 2 tablets (100 mcg) by mouth daily Patient taking 4000 mg  Refills: 0     clindamycin 300 MG capsule  Commonly known as: CLEOCIN      Dose: 300 mg  Take 300 mg by mouth 3 times daily Last day 9/29/22  Refills: 0     DULoxetine 20 MG capsule  Commonly known as: CYMBALTA  Used for: Primary osteoarthritis involving multiple joints      Dose: 20 mg  [DULOXETINE (CYMBALTA) 20 MG CAPSULE] Take 1 capsule (20 mg total) by mouth daily.  Quantity: 90 capsule  Refills: 2     eucerin cream      Apply topically 2 times daily  Refills: 0     folic acid 1 MG tablet  Commonly known as: FOLVITE      Dose: 1 mg  Take 1 mg by mouth  Refills: 0     hydrochlorothiazide 25 MG tablet  Commonly known as: HYDRODIURIL      Dose: 25 mg  [HYDROCHLOROTHIAZIDE (HYDRODIURIL) 25 MG TABLET] Take 25 mg by mouth daily.  Refills: 0     ibuprofen 200 MG tablet  Commonly known as: ADVIL/MOTRIN      Dose: 400 mg  [IBUPROFEN (ADVIL,MOTRIN) 200 MG TABLET] Take 400 mg by mouth every 8 (eight) hours as needed for pain.  Refills:  "0     methotrexate sodium 10 MG Tabs  Used for: Seropositive rheumatoid arthritis of multiple sites (H)      Dose: 10 mg  [METHOTREXATE 10 MG TABLET] Take 1 tablet (10 mg total) by mouth once a week.  Quantity: 16 tablet  Refills: 0     Mycostatin 756584 UNIT/GM Powd      [NYSTATIN (MYCOSTATIN) POWDER] Apply to rash areas twice daily.  Quantity: 15 g  Refills: 0     oxyCODONE 5 MG capsule  Commonly known as: OXY-IR      Dose: 5 mg  Take 5 mg by mouth every 4 hours as needed for severe pain (7-10)  Refills: 0     rivaroxaban ANTICOAGULANT 20 MG Tabs tablet  Commonly known as: XARELTO      Dose: 20 mg  Take 20 mg by mouth daily  Refills: 0     THERAPEUTIC MULTIVITAMIN PO      Dose: 1 tablet  Take 1 tablet by mouth daily  Refills: 0     vitamin C 500 MG tablet  Commonly known as: ASCORBIC ACID      Dose: 500 mg  [ASCORBIC ACID, VITAMIN C, (ASCORBIC ACID WITH KAEL HIPS) 500 MG TABLET] Take 500 mg by mouth 2 (two) times a day.  Refills: 0         * This list has 2 medication(s) that are the same as other medications prescribed for you. Read the directions carefully, and ask your doctor or other care provider to review them with you.               Case Management:  I have reviewed the care plan and MDS and do agree with the plan. Patient's desire to return to the community is not present. Information reviewed:  Medications, vital signs, orders, and nursing notes.    ROS:  10 point ROS of systems including Constitutional, Eyes, Respiratory, Cardiovascular, Gastroenterology, Genitourinary, Integumentary, Musculoskeletal, Psychiatric were all negative except for pertinent positives noted in my HPI.    Vitals:  There were no vitals taken for this visit.  There is no height or weight on file to calculate BMI.  Exam:  GENERAL APPEARANCE:  {[facility] GENERAL APPEARANCE:490322}  ENT:  {[facility] ENT PE:226457::\"Mouth and posterior oropharynx normal, moist mucous membranes\"}  EYES:  {[facility] EYES PE :009650::\"EOM, " "conjunctivae, lids, pupils and irises normal\"}  NECK:  {[facility] N[facility] PE:298344::\"No adenopathy,masses or thyromegaly\"}  RESP:  {[facility] RESP PE:170899}  CV:  {[facility] CV PE:784468::\"Palpation and auscultation of heart done \",\"regular rate and rhythm, no murmur, rub, or gallop\"}  ABDOMEN:  {[facility] GI PE:695368::\"normal bowel sounds, soft, nontender, no hepatosplenomegaly or other masses\"}  M/S:   {[facility] M/S PE:097426}  SKIN:  {[facility] SKIN PE:102912}  NEURO:   {[facility] NEURO PE:069444}  PSYCH:  {[facility] PSYCH PE:152460}    Lab/Diagnostic data:     Most Recent 3 CBC's:  Recent Labs   Lab Test 09/22/22  1559 04/12/22  1218 01/13/22  1234   WBC 5.4 5.2 4.9   HGB 13.4 14.3 15.2   MCV 92 93 96    229 210     Most Recent 3 BMP's:  Recent Labs   Lab Test 09/22/22  1559 04/12/22  1218 01/13/22  1234 01/04/22  0439 12/02/21  1155 09/10/19  1409 07/25/19  1215 07/18/19  1345 10/15/18  1434 09/13/18  1345   NA  --   --   --  139 141  --   --   --   --  144   POTASSIUM  --   --   --  3.6  --   --   --   --   --  4.3   CHLORIDE  --   --   --  99  --   --   --   --   --  101   CO2  --   --   --  27  --   --   --   --   --  34*   BUN  --   --   --  15  --   --  15 11  --  16   CR 0.51 0.65 0.65 0.66  0.66  --    < > 0.63 0.70   < > 0.75   ANIONGAP  --   --   --  13  --   --   --   --   --  9   WILVER  --   --   --  9.9  --   --   --   --   --  9.7   GLC  --   --   --  82  --   --   --   --   --  96    < > = values in this interval not displayed.     Most Recent 2 LFT's:  Recent Labs   Lab Test 09/22/22  1559 04/12/22  1218 01/13/22  1234 01/04/22  0439 09/10/19  1409 07/25/19  1215   AST  --   --   --  21  20  --  23   ALT 12 19   < > 23  23  23   < > 22   ALKPHOS  --   --   --  70  72  --  69   BILITOTAL  --   --   --  0.3  0.4  --  0.3    < > = values in this interval not displayed.     Most Recent Cholesterol Panel:  Recent Labs   Lab Test 12/02/21  1155   CHOL 221*   * "   HDL 65   TRIG 101     Most Recent Hemoglobin A1c:  Recent Labs   Lab Test 12/02/21  1155   A1C 5.3     Most Recent Anemia Panel:  Recent Labs   Lab Test 09/22/22  1559   WBC 5.4   HGB 13.4   HCT 41.5   MCV 92        Vitamin D Deficiency Screening Results:  Lab Results   Component Value Date    VITDT 50 01/04/2022    VITDT 70 12/02/2021       ASSESSMENT/PLAN  (M05.79) Seropositive rheumatoid arthritis of multiple sites (H)  (primary encounter diagnosis)  (R76.8) Cyclic citrullinated peptide (CCP) antibody positive  (M15.9) Primary osteoarthritis involving multiple joints  (E55.9) Vitamin D deficiency  Comment: Chronic. Managed by rheumatology. Used PRN oxycodone once in the last 2 weeks.   Plan:   -Continue methotrexate 10mg weekly as directed and folic acid 1mg daily  -Tylenol PRN  -Discontinue PRN ibuprofen  -Change oxycodone to 5mg every 8 hours for now. GDR in near future if non use.   -Continue humira 40mg injection every 2 weeks as directed  -Continue Duloxetine 20mg daily as directed  -Reduce vitamin D down to 2000U daily  -Follow up with rheumatology as directed. Scheduled for 3/22/23  -Trend labs of BMP and CBC and vitamin D due 1/3/23    (I89.0) Lymphedema of both lower extremities  (I73.9) PAD (peripheral artery disease) (H)  Comment: Chronic. No claudication complaints. Recent infection. Needs ongoing good hygiene for her skin and close monitoring for infection.   Plan:   -Eucerin daily to bilateral lower extremities  -Monitor for worsening s/sx of concerns  -Staff to provide appropriate skin hygiene needs as directed  -Trend labs of BMP and CBC and vitamin D due 1/3/23    (I10) Essential hypertension with goal blood pressure less than 140/90  (Z86.718) History of DVT of lower extremity  (E78.00) High blood cholesterol  Comment: Chronic. Based on JNC-8 goals,  patients age of 57 year old, no presence of diabetes or CKD, and goals of care goal BP is <150/90 mm Hg. Patient is stable with current  plan of care and routine assessment. History of DVT to LLE. SBP readings remaining 130s-150s.   Plan:   -Monitor BP and HR  -Continue hydrochlorothiazide 25mg daily.   -Continue xarelto 20mg daily  -Trend labs of BMP and CBC and vitamin D due 1/3/23    (E66.01) Morbid obesity (H)  (G47.33) Obstructive sleep apnea syndrome  Comment: Chronic. Hx of LOPEZ, does not have CPAP as this causes sinus problems per patient. Patient with frequent desaturations while sleeping during history of hospital admission. Recommend follow up with sleep medicine provider to provide recommendations for management which she last saw at [facility] location last in 2016  Plan:   -Monitor sleeping patterns  -Monitor respiratory status  -Oxygen PRN?  -Follow up with sleep medicine as directed at [facility] within 3 months  -Trend labs of BMP and CBC and vitamin D due 1/3/23    (D64.9) Anemia, unspecified type  Comment: Acute on chronic. Baseline hgb around 13  Plan:   -Monitor bleeding risks  -Monitor for worsening s/sx of concerns  -Discontinue ibuprofen due to risks and non use  -Trend labs of BMP and CBC and vitamin D due 1/3/23      Electronically signed by:  Arlen Carlson DNP, KRISTIN RIOS [facility] GERIATRICS  Chief Complaint   Patient presents with     RECHECK     [facility] Record Number:  [id number]  Place of Service where encounter took place:  [facility] [[id number]]    HPI:    María Elena Saab  is 57 year old (1965), who is being seen today for a federally mandated E/M visit. Today's concerns are:     Seropositive rheumatoid arthritis of multiple sites (H)  Cyclic citrullinated peptide (CCP) antibody positive  Primary osteoarthritis involving multiple joints  Vitamin D deficiency  Morbid obesity (H)  Lymphedema of both lower extremities  PAD (peripheral artery disease) (H)  Essential hypertension with goal blood pressure less than 140/90  History of DVT of lower  extremity  High blood cholesterol  Obstructive sleep apnea syndrome  Anemia, unspecified type***    BP Readings from Last 3 Encounters:   11/09/22 135/81   09/27/22 138/83   09/22/22 138/88     Wt Readings from Last 5 Encounters:   11/09/22 (!) 155.5 kg (342 lb 12.8 oz)   09/27/22 (!) 155.5 kg (342 lb 12.8 oz)   07/19/22 (!) 157 kg (346 lb 3.2 oz)   05/17/22 (!) 157 kg (346 lb 3.2 oz)   03/17/22 (!) 161.5 kg (356 lb 1.6 oz)     ALLERGIES:Adhesive tape, Adhesive [mecrylate], Banana, Food, Grape [grape (artificial) flavor], Morphine (pf) [morphine], Other environmental allergy, Pine, Pseudoephedrine, Pseudoephedrine cold/allergy [cpm-pse cr], Latex, and Other food allergy  PAST MEDICAL HISTORY:   Past Medical History:   Diagnosis Date     Aseptic necrosis of femoral head (H)     LEFT     Bacteremia due to group B Streptococcus      Cellulitis of right lower extremity      DJD (degenerative joint disease)      DVT, bilateral lower limbs (H) 10/2014     Edema 5/22/2015     Essential hypertension with goal blood pressure less than 140/90      Failure to thrive      History of blood clots      Hypokalemia 10/15/2014     Lung mass 10/18/2014     Morbid obesity (H) 4/10/2015    Had formal nutrition consult at [facility].  RD reports that she is not willing to commit to the program outlined.  See scanned document.  12/15/2015 - Marcio Quinonez MD        Nocturnal hypoxemia - probable sleep apnea - had overnight pulse oximetry, April, 2015. 5/22/2015     Obesity 4/10/2015     LOPEZ (obstructive sleep apnea) 5/22/2015     Osteoarthritis      Peripheral vascular disease (H)      Pleural effusion      Pressure ulcer of foot      Rheumatoid arthritis (H) 12/30/2014    seronegative     Sepsis (H)      Seropositive rheumatoid arthritis (H) 1/19/2016     Vitamin D deficiency 8/26/2015     PAST SURGICAL HISTORY:   has a past surgical history that includes Total Knee Arthroplasty (Left, 2006); joint replacement; Total Hip Arthroplasty  (Left, 10/18/2016); and Peripherally Inserted Central Catheter Insertion (Right, 07/14/2019).  FAMILY HISTORY: family history includes Arthritis in her mother; Breast Cancer in her cousin; Breast Cancer (age of onset: 50.00) in her maternal grandmother; Breast Cancer (age of onset: 54.00) in her maternal aunt and mother; Cancer in her mother; Cataracts in her father and mother; Deep Vein Thrombosis in her father; Heart Disease in her maternal grandmother; Multiple myeloma in her father; No Known Problems in her sister; Other - See Comments in her brother; Rheumatoid Arthritis in her maternal grandmother and paternal aunt; Sleep Apnea in her brother; Snoring in her father.  SOCIAL HISTORY:  reports that she has quit smoking. Her smoking use included cigarettes and cigarettes. She has a 30.00 pack-year smoking history. She has never used smokeless tobacco. She reports current alcohol use. She reports that she does not use drugs.    MEDICATIONS:  MED REC REQUIRED  Post Medication Reconciliation Status:  Patient was not discharged from an inpatient facility or TCU           Review of your medicines          Accurate as of November 9, 2022  7:19 AM. If you have any questions, ask your nurse or doctor.            CONTINUE these medicines which have NOT CHANGED      Dose / Directions   acetaminophen 325 MG tablet  Commonly known as: TYLENOL      Dose: 650 mg  [ACETAMINOPHEN (TYLENOL) 325 MG TABLET] Take 650 mg by mouth every 6 (six) hours as needed for pain.  Refills: 0     * adalimumab 40 MG/0.8ML injection  Commonly known as: Humira  Used for: Seropositive rheumatoid arthritis of multiple sites (H)      Dose: 40 mg  [ADALIMUMAB (HUMIRA) 40 MG/0.8 ML INJECTION] Inject 0.8 mL (40 mg total) under the skin every 14 (fourteen) days.  Quantity: 1.6 mL  Refills: 0     * Humira Pen 40 MG/0.8ML pen kit  Used for: Seropositive rheumatoid arthritis of multiple sites (H), Cyclic citrullinated peptide (CCP) antibody positive  Generic  drug: adalimumab      INJECT 40 MG (0.8 ML) UNDER THE SKIN EVERY 14 DAYS.  Quantity: 2 each  Refills: 3     cholecalciferol 50 MCG (2000 UT) tablet      Dose: 4,000 Units  Take 2 tablets (100 mcg) by mouth daily Patient taking 4000 mg  Refills: 0     clindamycin 300 MG capsule  Commonly known as: CLEOCIN      Dose: 300 mg  Take 300 mg by mouth 3 times daily Last day 9/29/22  Refills: 0     DULoxetine 20 MG capsule  Commonly known as: CYMBALTA  Used for: Primary osteoarthritis involving multiple joints      Dose: 20 mg  [DULOXETINE (CYMBALTA) 20 MG CAPSULE] Take 1 capsule (20 mg total) by mouth daily.  Quantity: 90 capsule  Refills: 2     eucerin cream      Apply topically 2 times daily  Refills: 0     folic acid 1 MG tablet  Commonly known as: FOLVITE      Dose: 1 mg  Take 1 mg by mouth  Refills: 0     hydrochlorothiazide 25 MG tablet  Commonly known as: HYDRODIURIL      Dose: 25 mg  [HYDROCHLOROTHIAZIDE (HYDRODIURIL) 25 MG TABLET] Take 25 mg by mouth daily.  Refills: 0     ibuprofen 200 MG tablet  Commonly known as: ADVIL/MOTRIN      Dose: 400 mg  [IBUPROFEN (ADVIL,MOTRIN) 200 MG TABLET] Take 400 mg by mouth every 8 (eight) hours as needed for pain.  Refills: 0     methotrexate sodium 10 MG Tabs  Used for: Seropositive rheumatoid arthritis of multiple sites (H)      Dose: 10 mg  [METHOTREXATE 10 MG TABLET] Take 1 tablet (10 mg total) by mouth once a week.  Quantity: 16 tablet  Refills: 0     Mycostatin [id number] UNIT/GM Powd      [NYSTATIN (MYCOSTATIN) POWDER] Apply to rash areas twice daily.  Quantity: 15 g  Refills: 0     oxyCODONE 5 MG capsule  Commonly known as: OXY-IR      Dose: 5 mg  Take 5 mg by mouth every 4 hours as needed for severe pain (7-10)  Refills: 0     rivaroxaban ANTICOAGULANT 20 MG Tabs tablet  Commonly known as: XARELTO      Dose: 20 mg  Take 20 mg by mouth daily  Refills: 0     THERAPEUTIC MULTIVITAMIN PO      Dose: 1 tablet  Take 1 tablet by mouth daily  Refills: 0     vitamin C 500 MG  "tablet  Commonly known as: ASCORBIC ACID      Dose: 500 mg  [ASCORBIC ACID, VITAMIN C, (ASCORBIC ACID WITH KAEL HIPS) 500 MG TABLET] Take 500 mg by mouth 2 (two) times a day.  Refills: 0         * This list has 2 medication(s) that are the same as other medications prescribed for you. Read the directions carefully, and ask your doctor or other care provider to review them with you.               Case Management:  I have reviewed the care plan and MDS and do agree with the plan. Patient's desire to return to the community is not present. Information reviewed:  Medications, vital signs, orders, and nursing notes.    ROS:  10 point ROS of systems including Constitutional, Eyes, Respiratory, Cardiovascular, Gastroenterology, Genitourinary, Integumentary, Musculoskeletal, Psychiatric were all negative except for pertinent positives noted in my HPI.    Vitals:  /81   Pulse 90   Temp 98.5  F (36.9  C)   Resp 20   Ht 1.651 m (5' 5\")   Wt (!) 155.5 kg (342 lb 12.8 oz)   SpO2 97%   BMI 57.04 kg/m    Body mass index is 57.04 kg/m .  Exam:  GENERAL APPEARANCE:  {[facility] GENERAL APPEARANCE:525890}  ENT:  {[facility] ENT PE:667528}  EYES:  {[facility] EYES PE :681724}  NECK:  {[facility] NECK PE:073960}  RESP:  {[facility] RESP PE:790422}  CV:  {[facility] CV PE:819462}  ABDOMEN:  {[facility] GI PE:684748}  M/S:   {[facility] M/S PE:518471}  SKIN:  {[facility] SKIN PE:913564}  NEURO:   {[facility] NEURO PE:503491}  PSYCH:  {[facility] PSYCH PE:412569}    Lab/Diagnostic data:     Most Recent 3 CBC's:  Recent Labs   Lab Test 09/22/22  1559 04/12/22  1218 01/13/22  1234   WBC 5.4 5.2 4.9   HGB 13.4 14.3 15.2   MCV 92 93 96    229 210     Most Recent 3 BMP's:  Recent Labs   Lab Test 09/22/22  1559 04/12/22  1218 01/13/22  1234 01/04/22  0439 12/02/21  1155 09/10/19  1409 07/25/19  1215 07/18/19  1345 10/15/18  1434 09/13/18  1345   TERESA  --   --   --  139 141  --   --   --   --  144 "   POTASSIUM  --   --   --  3.6  --   --   --   --   --  4.3   CHLORIDE  --   --   --  99  --   --   --   --   --  101   CO2  --   --   --  27  --   --   --   --   --  34*   BUN  --   --   --  15  --   --  15 11  --  16   CR 0.51 0.65 0.65 0.66  0.66  --    < > 0.63 0.70   < > 0.75   ANIONGAP  --   --   --  13  --   --   --   --   --  9   WILVER  --   --   --  9.9  --   --   --   --   --  9.7   GLC  --   --   --  82  --   --   --   --   --  96    < > = values in this interval not displayed.     Most Recent 2 LFT's:  Recent Labs   Lab Test 09/22/22  1559 04/12/22  1218 01/13/22  1234 01/04/22  0439 09/10/19  1409 07/25/19  1215   AST  --   --   --  21  20  --  23   ALT 12 19   < > 23  23  23   < > 22   ALKPHOS  --   --   --  70  72  --  69   BILITOTAL  --   --   --  0.3  0.4  --  0.3    < > = values in this interval not displayed.     Most Recent Cholesterol Panel:  Recent Labs   Lab Test 12/02/21  1155   CHOL 221*   *   HDL 65   TRIG 101     Most Recent Hemoglobin A1c:  Recent Labs   Lab Test 12/02/21  1155   A1C 5.3     Most Recent Anemia Panel:  Recent Labs   Lab Test 09/22/22  1559   WBC 5.4   HGB 13.4   HCT 41.5   MCV 92        Vitamin D Deficiency Screening Results:  Lab Results   Component Value Date    VITDT 50 01/04/2022    VITDT 70 12/02/2021       ASSESSMENT/PLAN  (M05.79) Seropositive rheumatoid arthritis of multiple sites (H)  (primary encounter diagnosis)  (R76.8) Cyclic citrullinated peptide (CCP) antibody positive  (M15.9) Primary osteoarthritis involving multiple joints  (E55.9) Vitamin D deficiency  Comment: Chronic. Managed by rheumatology. Used PRN oxycodone once in the last 2 weeks.   Plan:   -Continue methotrexate 10mg weekly as directed and folic acid 1mg daily  -Tylenol PRN  -Discontinue PRN ibuprofen  -Change oxycodone to 5mg every 8 hours for now. GDR in near future if non use.   -Continue humira 40mg injection every 2 weeks as directed  -Continue Duloxetine 20mg daily as  directed  -Reduce vitamin D down to 2000U daily  -Follow up with rheumatology as directed. Scheduled for 3/22/23  -Trend labs of BMP and CBC and vitamin D due 1/3/23    (I89.0) Lymphedema of both lower extremities  (I73.9) PAD (peripheral artery disease) (H)  Comment: Chronic. No claudication complaints. Recent infection. Needs ongoing good hygiene for her skin and close monitoring for infection.   Plan:   -Eucerin daily to bilateral lower extremities  -Monitor for worsening s/sx of concerns  -Staff to provide appropriate skin hygiene needs as directed  -Trend labs of BMP and CBC and vitamin D due 1/3/23    (I10) Essential hypertension with goal blood pressure less than 140/90  (Z86.718) History of DVT of lower extremity  (E78.00) High blood cholesterol  Comment: Chronic. Based on JNC-8 goals,  patients age of 57 year old, no presence of diabetes or CKD, and goals of care goal BP is <150/90 mm Hg. Patient is stable with current plan of care and routine assessment. History of DVT to LLE. SBP readings remaining 130s-150s.   Plan:   -Monitor BP and HR  -Continue hydrochlorothiazide 25mg daily.   -Continue xarelto 20mg daily  -Trend labs of BMP and CBC and vitamin D due 1/3/23    (E66.01) Morbid obesity (H)  (G47.33) Obstructive sleep apnea syndrome  Comment: Chronic. Hx of LOPEZ, does not have CPAP as this causes sinus problems per patient. Patient with frequent desaturations while sleeping during history of hospital admission. Recommend follow up with sleep medicine provider to provide recommendations for management which she last saw at [facility] location last in 2016  Plan:   -Monitor sleeping patterns  -Monitor respiratory status  -Oxygen PRN?  -Follow up with sleep medicine as directed at [facility] location within 3 months  -Trend labs of BMP and CBC and vitamin D due 1/3/23    (D64.9) Anemia, unspecified type  Comment: Acute on chronic. Baseline hgb around 13  Plan:   -Monitor bleeding risks  -Monitor for  worsening s/sx of concerns  -Discontinue ibuprofen due to risks and non use  -Trend labs of BMP and CBC and vitamin D due 1/3/23      Electronically signed by:  Arlen Carlson DNP, APRN              Sincerely,        Arlen Carlson, KRISTIN CNP

## 2022-11-09 NOTE — LETTER
11/9/2022        RE: María Elena Saab  [address]  [address]  [place] [address]        M [facility] GERIATRICS  Chief Complaint   Patient presents with     correction Regulatory     [facility] Medical Record Number:  [id number]  Place of Service where encounter took place:  [facility] () [[id number]]    HPI:    María Elena Saab  is 57 year old (1965), who is being seen today for a federally mandated E/M visit. Today's concerns are:     Seropositive rheumatoid arthritis of multiple sites (H)  Cyclic citrullinated peptide (CCP) antibody positive  Primary osteoarthritis involving multiple joints  Vitamin D deficiency  Morbid obesity (H)  Lymphedema of both lower extremities  PAD (peripheral artery disease) (H)  Essential hypertension with goal blood pressure less than 140/90  History of DVT of lower extremity  High blood cholesterol  Obstructive sleep apnea syndrome  Anemia, unspecified type  Cellulitis of left lower extremity     Met with patient who denies any chest pain, palpitations, shortness of breath, ESPITIA, lightheadedness, dizziness, or cough. Denies any abdominal discomfort. Denies N&V. Denies B&B concerns. Denies dysuria or frequency. Denies loose or constipation. Appetite good. Sleeping well. She reports increased redness and warmth to bilateral lower extremities particularly to LLE. Pain noted to left heel only with ambulation. I recommended hospitalization due to appearance today, however she would like to try oral agents with lab assessments now and if no improvement in the next 24-48 hours then will agree to go to hospital for further needs. No recent falls. No behaviors. Ibuprofen was discontinued however patient wants this back and she finds this helpful for pain control vs tylenol.     BP Readings from Last 3 Encounters:   11/09/22 135/81   09/27/22 138/83   09/22/22 138/88     Wt Readings from Last 5 Encounters:   11/09/22 (!) 155.5 kg (342 lb  12.8 oz)   09/27/22 (!) 155.5 kg (342 lb 12.8 oz)   07/19/22 (!) 157 kg (346 lb 3.2 oz)   05/17/22 (!) 157 kg (346 lb 3.2 oz)   03/17/22 (!) 161.5 kg (356 lb 1.6 oz)     ALLERGIES:Adhesive tape, Adhesive [mecrylate], Banana, Food, Grape [grape (artificial) flavor], Morphine (pf) [morphine], Other environmental allergy, Pine, Pseudoephedrine, Pseudoephedrine cold/allergy [cpm-pse cr], Latex, and Other food allergy  PAST MEDICAL HISTORY:   Past Medical History:   Diagnosis Date     Aseptic necrosis of femoral head (H)     LEFT     Bacteremia due to group B Streptococcus      Cellulitis of right lower extremity      DJD (degenerative joint disease)      DVT, bilateral lower limbs (H) 10/2014     Edema 5/22/2015     Essential hypertension with goal blood pressure less than 140/90      Failure to thrive      History of blood clots      Hypokalemia 10/15/2014     Lung mass 10/18/2014     Morbid obesity (H) 4/10/2015    Had formal nutrition consult at [facility].  RD reports that she is not willing to commit to the program outlined.  See scanned document.  12/15/2015 - Marcio Quinonez MD        Nocturnal hypoxemia - probable sleep apnea - had overnight pulse oximetry, April, 2015. 5/22/2015     Obesity 4/10/2015     LOPEZ (obstructive sleep apnea) 5/22/2015     Osteoarthritis      Peripheral vascular disease (H)      Pleural effusion      Pressure ulcer of foot      Rheumatoid arthritis (H) 12/30/2014    seronegative     Sepsis (H)      Seropositive rheumatoid arthritis (H) 1/19/2016     Vitamin D deficiency 8/26/2015     PAST SURGICAL HISTORY:   has a past surgical history that includes Total Knee Arthroplasty (Left, 2006); joint replacement; Total Hip Arthroplasty (Left, 10/18/2016); and Peripherally Inserted Central Catheter Insertion (Right, 07/14/2019).  FAMILY HISTORY: family history includes Arthritis in her mother; Breast Cancer in her cousin; Breast Cancer (age of onset: 50.00) in her maternal grandmother; Breast  Cancer (age of onset: 54.00) in her maternal aunt and mother; Cancer in her mother; Cataracts in her father and mother; Deep Vein Thrombosis in her father; Heart Disease in her maternal grandmother; Multiple myeloma in her father; No Known Problems in her sister; Other - See Comments in her brother; Rheumatoid Arthritis in her maternal grandmother and paternal aunt; Sleep Apnea in her brother; Snoring in her father.  SOCIAL HISTORY:  reports that she has quit smoking. Her smoking use included cigarettes and cigarettes. She has a 30.00 pack-year smoking history. She has never used smokeless tobacco. She reports current alcohol use. She reports that she does not use drugs.    MEDICATIONS:  MED REC REQUIRED  Post Medication Reconciliation Status:  Patient was not discharged from an inpatient facility or TCU           Review of your medicines          Accurate as of November 9, 2022 12:32 PM. If you have any questions, ask your nurse or doctor.            CONTINUE these medicines which may have CHANGED, or have new prescriptions. If we are uncertain of the size of tablets/capsules you have at home, strength may be listed as something that might have changed.      Dose / Directions   * acetaminophen 325 MG tablet  Commonly known as: TYLENOL  This may have changed: Another medication with the same name was added. Make sure you understand how and when to take each.  Changed by: KRISTIN Rodriges CNP      Dose: 650 mg  [ACETAMINOPHEN (TYLENOL) 325 MG TABLET] Take 650 mg by mouth every 6 (six) hours as needed for pain.  Refills: 0     * acetaminophen 500 MG tablet  Commonly known as: TYLENOL  This may have changed: You were already taking a medication with the same name, and this prescription was added. Make sure you understand how and when to take each.  Used for: Primary osteoarthritis involving multiple joints  Changed by: KRISTIN Rodriges CNP      Dose: 1,000 mg  Take 2 tablets (1,000 mg) by mouth every 6 hours  as needed for mild pain  Quantity: 240 tablet  Refills: 11     cholecalciferol 50 MCG (2000 UT) tablet  This may have changed:     how much to take    additional instructions  Used for: Vitamin D deficiency  Changed by: KRISTIN Rodriges CNP      Dose: 1 tablet  Take 1 tablet (50 mcg) by mouth daily  Quantity: 30 tablet  Refills: 11     * clindamycin 300 MG capsule  Commonly known as: CLEOCIN  This may have changed: Another medication with the same name was added. Make sure you understand how and when to take each.  Changed by: KRISTIN Rodriges CNP      Dose: 300 mg  Take 300 mg by mouth 3 times daily Last day 9/29/22  Refills: 0     * clindamycin 300 MG capsule  Commonly known as: CLEOCIN  This may have changed: You were already taking a medication with the same name, and this prescription was added. Make sure you understand how and when to take each.  Used for: Cellulitis of left lower extremity  Changed by: KRISTIN Rodriges CNP      Dose: 300 mg  Take 1 capsule (300 mg) by mouth 3 times daily for 14 days  Quantity: 42 capsule  Refills: 0     ibuprofen 200 MG tablet  Commonly known as: ADVIL/MOTRIN  This may have changed: reasons to take this  Used for: Primary osteoarthritis involving multiple joints  Changed by: KRISTIN Rodriges CNP      Dose: 400 mg  Take 2 tablets (400 mg) by mouth every 8 hours as needed for mild pain  Quantity: 240 tablet  Refills: 11     oxyCODONE 5 MG capsule  Commonly known as: OXY-IR  This may have changed: when to take this  Used for: Primary osteoarthritis involving multiple joints  Changed by: KRISTIN Rodriges CNP      Dose: 5 mg  Take 1 capsule (5 mg) by mouth every 6 hours as needed for severe pain  Quantity: 60 capsule  Refills: 0         * This list has 4 medication(s) that are the same as other medications prescribed for you. Read the directions carefully, and ask your doctor or other care provider to review them with you.            CONTINUE these  medicines which have NOT CHANGED      Dose / Directions   * adalimumab 40 MG/0.8ML injection  Commonly known as: Humira  Used for: Seropositive rheumatoid arthritis of multiple sites (H)      Dose: 40 mg  [ADALIMUMAB (HUMIRA) 40 MG/0.8 ML INJECTION] Inject 0.8 mL (40 mg total) under the skin every 14 (fourteen) days.  Quantity: 1.6 mL  Refills: 0     * Humira Pen 40 MG/0.8ML pen kit  Used for: Seropositive rheumatoid arthritis of multiple sites (H), Cyclic citrullinated peptide (CCP) antibody positive  Generic drug: adalimumab      INJECT 40 MG (0.8 ML) UNDER THE SKIN EVERY 14 DAYS.  Quantity: 2 each  Refills: 3     DULoxetine 20 MG capsule  Commonly known as: CYMBALTA  Used for: Primary osteoarthritis involving multiple joints      Dose: 20 mg  [DULOXETINE (CYMBALTA) 20 MG CAPSULE] Take 1 capsule (20 mg total) by mouth daily.  Quantity: 90 capsule  Refills: 2     eucerin cream      Apply topically 2 times daily  Refills: 0     folic acid 1 MG tablet  Commonly known as: FOLVITE      Dose: 1 mg  Take 1 mg by mouth  Refills: 0     hydrochlorothiazide 25 MG tablet  Commonly known as: HYDRODIURIL      Dose: 25 mg  [HYDROCHLOROTHIAZIDE (HYDRODIURIL) 25 MG TABLET] Take 25 mg by mouth daily.  Refills: 0     methotrexate sodium 10 MG Tabs  Used for: Seropositive rheumatoid arthritis of multiple sites (H)      Dose: 10 mg  [METHOTREXATE 10 MG TABLET] Take 1 tablet (10 mg total) by mouth once a week.  Quantity: 16 tablet  Refills: 0     Mycostatin 715125 UNIT/GM Powd      [NYSTATIN (MYCOSTATIN) POWDER] Apply to rash areas twice daily.  Quantity: 15 g  Refills: 0     rivaroxaban ANTICOAGULANT 20 MG Tabs tablet  Commonly known as: XARELTO      Dose: 20 mg  Take 20 mg by mouth daily  Refills: 0     THERAPEUTIC MULTIVITAMIN PO      Dose: 1 tablet  Take 1 tablet by mouth daily  Refills: 0     vitamin C 500 MG tablet  Commonly known as: ASCORBIC ACID      Dose: 500 mg  [ASCORBIC ACID, VITAMIN C, (ASCORBIC ACID WITH KAEL HIPS) 500  "MG TABLET] Take 500 mg by mouth 2 (two) times a day.  Refills: 0         * This list has 2 medication(s) that are the same as other medications prescribed for you. Read the directions carefully, and ask your doctor or other care provider to review them with you.               Where to get your medicines      These medications were sent to [facility] [place], [place] - [address] [address]  [address] [address] [place] [address]    Phone: [phone]     acetaminophen 500 MG tablet    cholecalciferol 50 MCG (2000 UT) tablet    clindamycin 300 MG capsule    ibuprofen 200 MG tablet    oxyCODONE 5 MG capsule        Case Management:  I have reviewed the care plan and MDS and do agree with the plan. Patient's desire to return to the community is not present. Information reviewed:  Medications, vital signs, orders, and nursing notes.    ROS:  10 point ROS of systems including Constitutional, Eyes, Respiratory, Cardiovascular, Gastroenterology, Genitourinary, Integumentary, Musculoskeletal, Psychiatric were all negative except for pertinent positives noted in my HPI.    Vitals:  /81   Pulse 90   Temp 98.5  F (36.9  C)   Resp 20   Ht 1.651 m (5' 5\")   Wt (!) 155.5 kg (342 lb 12.8 oz)   SpO2 97%   BMI 57.04 kg/m    Body mass index is 57.04 kg/m .  Exam:  GENERAL APPEARANCE:  Alert, in no distress, oriented, morbidly obese, cooperative  ENT:  Mouth and posterior oropharynx normal, moist mucous membranes, [place]  EYES:  EOM, conjunctivae, lids, pupils and irises normal  NECK:  No adenopathy,masses or thyromegaly  RESP:  respiratory effort and palpation of chest normal, lungs clear to auscultation , no respiratory distress  CV:  Palpation and auscultation of heart done , regular rate and rhythm, no murmur, rub, or gallop, peripheral edema 1+ in BLE  ABDOMEN:  normal bowel sounds, soft, nontender, no hepatosplenomegaly or other masses, no guarding or rebound  M/S:   Ambulates with walker  SKIN:  " increased redness to BLE especially to LLE. Redness increased from toes to up to medial thigh. See photos  NEURO:   Cranial nerves 2-12 are normal tested and grossly at patient's baseline, no purposeful movement in upper and lower extremities  PSYCH:  oriented X 3, normal insight, judgement and memory, affect and mood normal                      Lab/Diagnostic data:     Most Recent 3 CBC's:  Recent Labs   Lab Test 09/22/22  1559 04/12/22 1218 01/13/22  1234   WBC 5.4 5.2 4.9   HGB 13.4 14.3 15.2   MCV 92 93 96    229 210     Most Recent 3 BMP's:  Recent Labs   Lab Test 09/22/22  1559 04/12/22  1218 01/13/22  1234 01/04/22  0439 12/02/21  1155 09/10/19  1409 07/25/19  1215 07/18/19  1345 10/15/18  1434 09/13/18  1345   NA  --   --   --  139 141  --   --   --   --  144   POTASSIUM  --   --   --  3.6  --   --   --   --   --  4.3   CHLORIDE  --   --   --  99  --   --   --   --   --  101   CO2  --   --   --  27  --   --   --   --   --  34*   BUN  --   --   --  15  --   --  15 11  --  16   CR 0.51 0.65 0.65 0.66  0.66  --    < > 0.63 0.70   < > 0.75   ANIONGAP  --   --   --  13  --   --   --   --   --  9   WILVER  --   --   --  9.9  --   --   --   --   --  9.7   GLC  --   --   --  82  --   --   --   --   --  96    < > = values in this interval not displayed.     Most Recent 2 LFT's:  Recent Labs   Lab Test 09/22/22  1559 04/12/22  1218 01/13/22  1234 01/04/22  0439 09/10/19  1409 07/25/19  1215   AST  --   --   --  21  20  --  23   ALT 12 19   < > 23  23  23   < > 22   ALKPHOS  --   --   --  70  72  --  69   BILITOTAL  --   --   --  0.3  0.4  --  0.3    < > = values in this interval not displayed.     Most Recent Cholesterol Panel:  Recent Labs   Lab Test 12/02/21  1155   CHOL 221*   *   HDL 65   TRIG 101     Most Recent Hemoglobin A1c:  Recent Labs   Lab Test 12/02/21  1155   A1C 5.3     Most Recent Anemia Panel:  Recent Labs   Lab Test 09/22/22  1559   WBC 5.4   HGB 13.4   HCT 41.5   MCV 92         Vitamin D Deficiency Screening Results:  Lab Results   Component Value Date    VITDT 50 01/04/2022    VITDT 70 12/02/2021       ASSESSMENT/PLAN  (M05.79) Seropositive rheumatoid arthritis of multiple sites (H)  (primary encounter diagnosis)  (R76.8) Cyclic citrullinated peptide (CCP) antibody positive  (M15.9) Primary osteoarthritis involving multiple joints  (E55.9) Vitamin D deficiency  Comment: Chronic. Managed by rheumatology. Used PRN oxycodone once in the last 2 weeks.   Plan:   -Continue methotrexate 10mg weekly as directed and folic acid 1mg daily  -Tylenol PRN  -Continue PRN ibuprofen  -Change oxycodone to 5mg every 8 hours for now. GDR in near future if non use.   -Continue humira 40mg injection every 2 weeks as directed  -Continue Duloxetine 20mg daily as directed  -Reduce vitamin D down to 2000U daily  -Follow up with rheumatology as directed. Scheduled for 3/22/23  -BMP, CBC, D dimer, CRP and ESR due tomorrow 11/10/22    (I89.0) Lymphedema of both lower extremities  (I73.9) PAD (peripheral artery disease) (H)  (L03.116) Cellulitis of LLE  Comment: Chronic. No claudication complaints. Recent infection. Needs ongoing good hygiene for her skin and close monitoring for infection. Increased warmth and redness to LLE in particular. Starting from toes and foot with redness radiating up to mid medial thigh. I highly recommended to go to hospital for aggressive IV ANTIBIOTIC needs, however patient wants to hold off and try oral agents here with labs tomorrow for further assessment  Plan:   -Eucerin daily to bilateral lower extremities  -Monitor for worsening s/sx of concerns  -Staff to provide appropriate skin hygiene needs as directed  -US to LLE. Already know history of DVT to LLE  -Start clindamycin 300mg TID x 14 days  -BMP, CBC, D dimer, CRP and ESR due tomorrow 11/10/22  -If no improvement in appearance in the next 24-48 hours, recommend to send to hospital for further evaluation needs and aggressive  IV management goals.     (I10) Essential hypertension with goal blood pressure less than 140/90  (Z86.718) History of DVT of lower extremity  (E78.00) High blood cholesterol  Comment: Chronic. Based on JNC-8 goals,  patients age of 57 year old, no presence of diabetes or CKD, and goals of care goal BP is <150/90 mm Hg. Patient is stable with current plan of care and routine assessment. History of DVT to LLE. SBP readings remaining 130s-150s.   Plan:   -Monitor BP and HR  -Continue hydrochlorothiazide 25mg daily.   -Continue xarelto 20mg daily  -BMP, CBC, D dimer, CRP and ESR due tomorrow 11/10/22    (E66.01) Morbid obesity (H)  (G47.33) Obstructive sleep apnea syndrome  Comment: Chronic. Hx of LOPEZ, does not have CPAP as this causes sinus problems per patient. Patient with frequent desaturations while sleeping during history of hospital admission. Recommend follow up with sleep medicine provider to provide recommendations for management which she last saw at [facility] location last in 2016  Plan:   -Monitor sleeping patterns  -Monitor respiratory status  -Oxygen used at night  -Follow up with sleep medicine as directed at [facility] location within 3 months  -BMP, CBC, D dimer, CRP and ESR due tomorrow 11/10/22    (D64.9) Anemia, unspecified type  Comment: Acute on chronic. Baseline hgb around 13  Plan:   -Monitor bleeding risks  -Monitor for worsening s/sx of concerns  -BMP, CBC, D dimer, CRP and ESR due tomorrow 11/10/22      Electronically signed by:  Arlen Carlson DNP, APRN              Sincerely,        KRSITIN Rodriges CNP

## 2022-11-15 NOTE — PROGRESS NOTES
[facility] GERIATRICS    PRIMARY CARE PROVIDER AND CLINIC:  Arlen Carlson, KRISTIN CNP, [address] / [place] [address]  Chief Complaint   Patient presents with     Hospital F/U      [place] Medical Record Number:  [id number]  Place of Service where encounter took place:  [facility] () [[id number]]    María Elena Saab  is a 57 year old  (1965), admitted to the above facility from  [facility] . Hospital stay 11/9/22 through 11/13/22..   HPI:      Sepsis POA d/t LLE cellulitis in patient with hx of MRSA cellulitis with abscess: Had MRSA cellulitis with abscess in RLE in June 2022 for which she was admitted. Now with worsening erythema and swelling involving the RLE. Clinically improved with IV vancomycin. u/s LE ruled out DVT.     The primary encounter diagnosis was Seropositive rheumatoid arthritis of multiple sites (H). Diagnoses of Cyclic citrullinated peptide (CCP) antibody positive, Primary osteoarthritis involving multiple joints, Vitamin D deficiency, Morbid obesity (H), Lymphedema of both lower extremities, PAD (peripheral artery disease) (H), Essential hypertension with goal blood pressure less than 140/90, History of DVT of lower extremity, High blood cholesterol, Obstructive sleep apnea syndrome, Anemia, unspecified type, Cellulitis of left lower extremity, Primary hypertension, Personal history of DVT (deep vein thrombosis), Redness and swelling of lower leg, Sepsis without acute organ dysfunction, due to unspecified organism (H), and Slow transit constipation were also pertinent to this visit.    Met with patient who denies any chest pain, palpitations, shortness of breath, ESPITIA, lightheadedness, dizziness, or cough. Denies any abdominal discomfort. Denies N&V. Denies dysuria or frequency. She does report constipation concerns today. Not currently on any BM agents. Suspect due to polypharmacy/narcotics.  Appetite good. Sleeping well. She uses oxygen at night.  Unable to tolerate CPAP per chronic history. Neuropathy pain to bilateral lower extremities she reports has improved since starting gabapentin agent. I did update her that we can adjust accordingly depending on her symptoms. Staff report bactrim antibiotic did not start from being hospital until today. Will need to extend her days so she can get the full 10 day course accordingly.     BP Readings from Last 3 Encounters:   11/16/22 118/72   11/09/22 135/81   09/27/22 138/83     Wt Readings from Last 5 Encounters:   11/16/22 (!) 155.5 kg (342 lb 12.8 oz)   11/09/22 (!) 155.5 kg (342 lb 12.8 oz)   09/27/22 (!) 155.5 kg (342 lb 12.8 oz)   07/19/22 (!) 157 kg (346 lb 3.2 oz)   05/17/22 (!) 157 kg (346 lb 3.2 oz)     CODE STATUS/ADVANCE DIRECTIVES DISCUSSION:  Full Code  CPR/Full code   ALLERGIES:   Allergies   Allergen Reactions     Adhesive Tape Other (See Comments)     As on band-aids;  Causes skin tearing/wounds.     Adhesive [Mecrylate] Other (See Comments)     As on band-aids;  Causes skin tearing/wounds.     Banana Unknown     She avoids due to Latex allergy.  If she eats them 3 days in a row, gets stomach cramps and dark stool.  TBrinkMD - 4/10/15     Food Unknown     Bananas, grapes, pine nuts     Grape [Grape (Artificial) Flavor] Unknown     She avoids due to Latex allergy.  If she eats lots of them, and she likes them, stomach gets a little queasy.  TBrinkMD - 4/10/15     Morphine (Pf) [Morphine] Other (See Comments)     Pt has not reacted to this med herself, but mother has anaphylactic reaction and other family members get nausea/vomiting.     Other Environmental Allergy Swelling     leather     Pine      Pseudoephedrine Other (See Comments)     Insomnia     Pseudoephedrine Cold/Allergy [Cpm-Pse Cr] Other (See Comments)     Keeps pt awake up to 24 hours      Latex Rash     Hands get red from rubber gloves, but okay if she washes them right away.  Hands get red from rubber gloves, but okay if she washes them  right away.     Other Food Allergy Rash     Pine trees, leather .       PAST MEDICAL HISTORY:   Past Medical History:   Diagnosis Date     Aseptic necrosis of femoral head (H)     LEFT     Bacteremia due to group B Streptococcus      Cellulitis of right lower extremity      DJD (degenerative joint disease)      DVT, bilateral lower limbs (H) 10/2014     Edema 5/22/2015     Essential hypertension with goal blood pressure less than 140/90      Failure to thrive      History of blood clots      Hypokalemia 10/15/2014     Lung mass 10/18/2014     Morbid obesity (H) 4/10/2015    Had formal nutrition consult at [facility].  RD reports that she is not willing to commit to the program outlined.  See scanned document.  12/15/2015 - Marcio Quinonez MD        Nocturnal hypoxemia - probable sleep apnea - had overnight pulse oximetry, April, 2015. 5/22/2015     Obesity 4/10/2015     LOPEZ (obstructive sleep apnea) 5/22/2015     Osteoarthritis      Peripheral vascular disease (H)      Pleural effusion      Pressure ulcer of foot      Rheumatoid arthritis (H) 12/30/2014    seronegative     Sepsis (H)      Seropositive rheumatoid arthritis (H) 1/19/2016     Vitamin D deficiency 8/26/2015      PAST SURGICAL HISTORY:   has a past surgical history that includes Total Knee Arthroplasty (Left, 2006); joint replacement; Total Hip Arthroplasty (Left, 10/18/2016); and Peripherally Inserted Central Catheter Insertion (Right, 07/14/2019).  FAMILY HISTORY: family history includes Arthritis in her mother; Breast Cancer in her cousin; Breast Cancer (age of onset: 50.00) in her maternal grandmother; Breast Cancer (age of onset: 54.00) in her maternal aunt and mother; Cancer in her mother; Cataracts in her father and mother; Deep Vein Thrombosis in her father; Heart Disease in her maternal grandmother; Multiple myeloma in her father; No Known Problems in her sister; Other - See Comments in her brother; Rheumatoid Arthritis in her maternal  grandmother and paternal aunt; Sleep Apnea in her brother; Snoring in her father.  SOCIAL HISTORY:   reports that she has quit smoking. Her smoking use included cigarettes and cigarettes. She has a 30.00 pack-year smoking history. She has never used smokeless tobacco. She reports current alcohol use. She reports that she does not use drugs.  Patient's living condition: lives in SNF    Post Discharge Medication Reconciliation Status:   MED REC REQUIRED  Post Medication Reconciliation Status:  Discharge medications reconciled and changed, see notes/orders         Current Outpatient Medications   Medication Sig     acetaminophen (TYLENOL) 500 MG tablet Take 2 tablets (1,000 mg) by mouth every 6 hours as needed for mild pain     adalimumab (HUMIRA) 40 mg/0.8 mL injection [ADALIMUMAB (HUMIRA) 40 MG/0.8 ML INJECTION] Inject 0.8 mL (40 mg total) under the skin every 14 (fourteen) days.     ascorbic acid, vitamin C, (ASCORBIC ACID WITH KAEL HIPS) 500 MG tablet [ASCORBIC ACID, VITAMIN C, (ASCORBIC ACID WITH KAEL HIPS) 500 MG TABLET] Take 500 mg by mouth 2 (two) times a day.     cholecalciferol 50 MCG (2000 UT) tablet Take 1 tablet (50 mcg) by mouth daily     cyclobenzaprine (FLEXERIL) 5 MG tablet Take 2 tablets (10 mg) by mouth 3 times daily as needed for muscle spasms     DULoxetine (CYMBALTA) 20 MG capsule [DULOXETINE (CYMBALTA) 20 MG CAPSULE] Take 1 capsule (20 mg total) by mouth daily.     folic acid (FOLVITE) 1 MG tablet Take 1 mg by mouth     gabapentin (NEURONTIN) 100 MG capsule Take 100 mg by mouth 3 times daily     HUMIRA PEN 40 MG/0.8ML pen kit INJECT 40 MG (0.8 ML) UNDER THE SKIN EVERY 14 DAYS.     hydroCHLOROthiazide (HYDRODIURIL) 25 MG tablet [HYDROCHLOROTHIAZIDE (HYDRODIURIL) 25 MG TABLET] Take 25 mg by mouth daily.     lanolin alcohol-mo-w.pet-ceres (EUCERIN) Crea Apply topically 2 times daily     methotrexate 10 MG tablet [METHOTREXATE 10 MG TABLET] Take 1 tablet (10 mg total) by mouth once a week.      "miconazole (MICATIN) 2 % external cream Apply topically 2 times daily Apply to under toes     multivitamin therapeutic (THERAGRAN) tablet Take 1 tablet by mouth daily     NYAMYC 695404 UNIT/GM external powder 2 times daily Apply to rash areas under brest and under stomach     oxyCODONE (OXY-IR) 5 MG capsule Take 1 capsule (5 mg) by mouth every 6 hours as needed for severe pain (7-10)     rivaroxaban ANTICOAGULANT (XARELTO) 20 MG TABS tablet Take 20 mg by mouth daily     senna-docusate (SENOKOT-S/PERICOLACE) 8.6-50 MG tablet Take 1 tablet by mouth 2 times daily     sulfamethoxazole-trimethoprim (BACTRIM DS) 800-160 MG tablet Take 2 tablets by mouth 2 times daily for 6 days X 10 doses for infections under the skin.     ibuprofen (ADVIL/MOTRIN) 200 MG tablet Take 2 tablets (400 mg) by mouth every 8 hours as needed for mild pain     No current facility-administered medications for this visit.       ROS:  10 point ROS of systems including Constitutional, Eyes, Respiratory, Cardiovascular, Gastroenterology, Genitourinary, Integumentary, Musculoskeletal, Psychiatric were all negative except for pertinent positives noted in my HPI.    Vitals:  /72   Pulse 78   Temp 98.2  F (36.8  C)   Resp 18   Ht 1.651 m (5' 5\")   Wt (!) 155.5 kg (342 lb 12.8 oz)   SpO2 95%   BMI 57.04 kg/m    Exam:  GENERAL APPEARANCE:  Alert, in no distress, oriented, morbidly obese, cooperative  ENT:  Mouth and posterior oropharynx normal, moist mucous membranes, [place]  EYES:  EOM, conjunctivae, lids, pupils and irises normal  RESP:  respiratory effort and palpation of chest normal, lungs clear to auscultation , no respiratory distress  CV:  Palpation and auscultation of heart done , regular rate and rhythm, no murmur, rub, or gallop, peripheral edema 2+ in BLE  ABDOMEN:  normal bowel sounds, soft, nontender, no hepatosplenomegaly or other masses, no guarding or rebound  M/S:   Ambulates with walker. May need east stand during times of " weakness  SKIN:  Inspection of skin and subcutaneous tissue baseline, Palpation of skin and subcutaneous tissue baseline, see photo  NEURO:   Cranial nerves 2-12 are normal tested and grossly at patient's baseline, no purposeful movement in upper and lower extremities  PSYCH:  oriented X 3, normal insight, judgement and memory, affect and mood normal                          Lab/Diagnostic data:    Most Recent 3 CBC's:  Recent Labs   Lab Test 09/22/22  1559 04/12/22  1218 01/13/22  1234   WBC 5.4 5.2 4.9   HGB 13.4 14.3 15.2   MCV 92 93 96    229 210     Most Recent 3 BMP's:  Recent Labs   Lab Test 09/22/22  1559 04/12/22  1218 01/13/22  1234 01/04/22  0439 12/02/21  1155 09/10/19  1409 07/25/19  1215 07/18/19  1345 10/15/18  1434 09/13/18  1345   NA  --   --   --  139 141  --   --   --   --  144   POTASSIUM  --   --   --  3.6  --   --   --   --   --  4.3   CHLORIDE  --   --   --  99  --   --   --   --   --  101   CO2  --   --   --  27  --   --   --   --   --  34*   BUN  --   --   --  15  --   --  15 11  --  16   CR 0.51 0.65 0.65 0.66  0.66  --    < > 0.63 0.70   < > 0.75   ANIONGAP  --   --   --  13  --   --   --   --   --  9   WILVER  --   --   --  9.9  --   --   --   --   --  9.7   GLC  --   --   --  82  --   --   --   --   --  96    < > = values in this interval not displayed.     Most Recent 2 LFT's:  Recent Labs   Lab Test 09/22/22  1559 04/12/22  1218 01/13/22  1234 01/04/22  0439 09/10/19  1409 07/25/19  1215   AST  --   --   --  21  20  --  23   ALT 12 19   < > 23  23  23   < > 22   ALKPHOS  --   --   --  70  72  --  69   BILITOTAL  --   --   --  0.3  0.4  --  0.3    < > = values in this interval not displayed.     Most Recent Cholesterol Panel:  Recent Labs   Lab Test 12/02/21  1155   CHOL 221*   *   HDL 65   TRIG 101     7-Day Micro Results     Collected Updated Procedure Result Status      11/10/2022 0216 11/11/2022 2234 COVID-19 VIRUS (CORONAVIRUS) BY PCR (EXTERNAL RESULT) Final result  "Component Value   No component results                  Most Recent Hemoglobin A1c:  Recent Labs   Lab Test 12/02/21  1155   A1C 5.3     Most Recent ESR & CRP:  Recent Labs   Lab Test 12/21/20  0942   CRP 1.3*     Most Recent Anemia Panel:  Recent Labs   Lab Test 09/22/22  1559   WBC 5.4   HGB 13.4   HCT 41.5   MCV 92          ASSESSMENT/PLAN  (M05.79) Seropositive rheumatoid arthritis of multiple sites (H)  (primary encounter diagnosis)  (R76.8) Cyclic citrullinated peptide (CCP) antibody positive  (M15.9) Primary osteoarthritis involving multiple joints  (E55.9) Vitamin D deficiency  Comment: Chronic. Managed by rheumatology. c/o posterior neck pain as well as \"all over pain\" that is most likely neuropathic based on description. Started on gabapentin, which can be uptitrated as outpt. Flexeril prn.   Plan:   -Continue methotrexate 10mg weekly as directed and folic acid 1mg daily. ON HOLD WHILE ON ABX  -Change Tylenol to 1000mg every 6 hours PRN  -change flexeril to 10mg TID PRN  -Continue gabapentin 100mg TID for now. Adjust accordingly to assist with pain control  -Continue PRN ibuprofen 400mg every 8 hours PRN  -Continue oxycodone 5mg every 6 hours for now. GDR in near future if non use.   -Continue humira 40mg injection every 2 weeks as directed. ON HOLD WHILE ON ABX  -Continue Duloxetine 20mg daily as directed  -Reduce vitamin D down to 2000U daily  -Follow up with rheumatology as directed. Scheduled for 3/22/23  -BMP, CBC and CRP due 11/22/22     (I89.0) Lymphedema of both lower extremities  (I73.9) PAD (peripheral artery disease) (H)  (L03.116) Cellulitis of LLE  (A41.9) Sepsis  Comment: Chronic. No claudication complaints. Recent infection. Needs ongoing good hygiene for her skin and close monitoring for infection. Sepsis POA d/t LLE cellulitis in patient with hx of MRSA cellulitis with abscess: Had MRSA cellulitis with abscess in RLE in June 2022 for which she was admitted. Now with worsening " erythema and swelling involving the RLE. Clinically improved with IV vancomycin. u/s LE ruled out DVT. CRP last done on 11/13=28.9 with WBC 13.3  Plan:   -Continue bactrim as directed to complete full 10 day course. Extend a few days due to missed doses during hospital discharge transition  -Eucerin daily to bilateral lower extremities  -Start lymphedema therapy eval and treat  -Nursing to apply eucerin cream to bilateral lower extremities. Wrap with ace wraps daily until lymphedema follows.   -Monitor for worsening s/sx of concerns  -Staff to provide appropriate skin hygiene needs as directed  -BMP, CBC and CRP due 11/22/22    (K59.01) Constipation  Comment: Acute on chronic. Suspect due to polypharmacy and narcotic use  Plan:  -Monitor BM patterns  -Start senna S 1 tab BID. HOLD for loose stools     (I10) Essential hypertension with goal blood pressure less than 140/90  (Z86.718) History of DVT of lower extremity  (E78.00) High blood cholesterol  Comment: Chronic. Based on JNC-8 goals,  patients age of 57 year old, no presence of diabetes or CKD, and goals of care goal BP is <150/90 mm Hg. Patient is stable with current plan of care and routine assessment. History of DVT to LLE. SBP readings remaining 130s-150s.   Plan:   -Monitor BP and HR  -Continue hydrochlorothiazide 25mg daily.   -Continue xarelto 20mg daily  -BMP, CBC and CRP due 11/22/22     (E66.01) Morbid obesity (H)  (G47.33) Obstructive sleep apnea syndrome  Comment: Chronic. Hx of LOPEZ, does not have CPAP as this causes sinus problems per patient. Patient with frequent desaturations while sleeping during history of hospital admission. Recommend follow up with sleep medicine provider to provide recommendations for management which she last saw at [facility] location last in 2016  Plan:   -Monitor sleeping patterns  -Monitor respiratory status  -Oxygen used at night  -Follow up with sleep medicine as directed at [facility] location within 3  months  -BMP, CBC and CRP due 11/22/22     (D64.9) Anemia, unspecified type  Comment: Acute on chronic. Baseline hgb around 13  Plan:   -Monitor bleeding risks  -Monitor for worsening s/sx of concerns  -BMP, CBC and CRP due 11/22/22      Total time spent with patient visit at the [facility] nursing [facility] was 60 min including patient visit and review of past records. Greater than 50% of total time spent with counseling and coordinating care with nursing staff due to the complexities of their diagnoses, review of HPI, development of POC, assisting HUC on appointment schedules, IDT rounding with discussion with rehab goals and patient progression to assist with coordination of care, and patient education and review of POC with patient which includes 20 min discussion with patient on: current medications/orders changes, recent blood work results, current pain control plan and controlled substances ordered. Counseling on: Diagnostic results, impressions, and/or recommended diagnostic studies, Prognosis, Risks and benefits of management (treatment) options, Instructions for management (treatment) and/or follow up, Importance of compliance with management (treatment) options, and Risk factor reduction regarding above health concerns    Electronically signed by:  Arlen Carlson DNP, APRN

## 2022-11-16 ENCOUNTER — DOCUMENTATION ONLY (OUTPATIENT)
Dept: GERIATRICS | Facility: CLINIC | Age: 57
End: 2022-11-16

## 2022-11-16 ENCOUNTER — NURSING HOME VISIT (OUTPATIENT)
Dept: GERIATRICS | Facility: CLINIC | Age: 57
End: 2022-11-16
Payer: COMMERCIAL

## 2022-11-16 VITALS
HEART RATE: 78 BPM | DIASTOLIC BLOOD PRESSURE: 72 MMHG | TEMPERATURE: 98.2 F | BODY MASS INDEX: 48.82 KG/M2 | SYSTOLIC BLOOD PRESSURE: 118 MMHG | RESPIRATION RATE: 18 BRPM | HEIGHT: 65 IN | OXYGEN SATURATION: 95 % | WEIGHT: 293 LBS

## 2022-11-16 DIAGNOSIS — R76.8 CYCLIC CITRULLINATED PEPTIDE (CCP) ANTIBODY POSITIVE: ICD-10-CM

## 2022-11-16 DIAGNOSIS — E78.00 HIGH BLOOD CHOLESTEROL: ICD-10-CM

## 2022-11-16 DIAGNOSIS — A41.9 SEPSIS WITHOUT ACUTE ORGAN DYSFUNCTION, DUE TO UNSPECIFIED ORGANISM (H): ICD-10-CM

## 2022-11-16 DIAGNOSIS — M79.89 REDNESS AND SWELLING OF LOWER LEG: ICD-10-CM

## 2022-11-16 DIAGNOSIS — R23.8 REDNESS AND SWELLING OF LOWER LEG: ICD-10-CM

## 2022-11-16 DIAGNOSIS — F33.41 RECURRENT MAJOR DEPRESSIVE DISORDER, IN PARTIAL REMISSION (H): Primary | ICD-10-CM

## 2022-11-16 DIAGNOSIS — M15.0 PRIMARY OSTEOARTHRITIS INVOLVING MULTIPLE JOINTS: ICD-10-CM

## 2022-11-16 DIAGNOSIS — L03.116 CELLULITIS OF LEFT LOWER EXTREMITY: ICD-10-CM

## 2022-11-16 DIAGNOSIS — I10 ESSENTIAL HYPERTENSION WITH GOAL BLOOD PRESSURE LESS THAN 140/90: ICD-10-CM

## 2022-11-16 DIAGNOSIS — Z86.718 HISTORY OF DVT OF LOWER EXTREMITY: ICD-10-CM

## 2022-11-16 DIAGNOSIS — I89.0 LYMPHEDEMA OF BOTH LOWER EXTREMITIES: ICD-10-CM

## 2022-11-16 DIAGNOSIS — E55.9 VITAMIN D DEFICIENCY: ICD-10-CM

## 2022-11-16 DIAGNOSIS — E66.01 MORBID OBESITY (H): ICD-10-CM

## 2022-11-16 DIAGNOSIS — I73.9 PAD (PERIPHERAL ARTERY DISEASE) (H): ICD-10-CM

## 2022-11-16 DIAGNOSIS — Z86.718 PERSONAL HISTORY OF DVT (DEEP VEIN THROMBOSIS): ICD-10-CM

## 2022-11-16 DIAGNOSIS — M25.50 POLYARTHRALGIA: ICD-10-CM

## 2022-11-16 DIAGNOSIS — I10 PRIMARY HYPERTENSION: ICD-10-CM

## 2022-11-16 DIAGNOSIS — M05.79 SEROPOSITIVE RHEUMATOID ARTHRITIS OF MULTIPLE SITES (H): Primary | ICD-10-CM

## 2022-11-16 DIAGNOSIS — G47.33 OBSTRUCTIVE SLEEP APNEA SYNDROME: ICD-10-CM

## 2022-11-16 DIAGNOSIS — D64.9 ANEMIA, UNSPECIFIED TYPE: ICD-10-CM

## 2022-11-16 DIAGNOSIS — K59.01 SLOW TRANSIT CONSTIPATION: ICD-10-CM

## 2022-11-16 PROCEDURE — 99310 SBSQ NF CARE HIGH MDM 45: CPT | Performed by: NURSE PRACTITIONER

## 2022-11-16 RX ORDER — GINSENG 100 MG
CAPSULE ORAL DAILY
COMMUNITY
Start: 2022-11-15 | End: 2022-11-16

## 2022-11-16 RX ORDER — CYCLOBENZAPRINE HCL 5 MG
10 TABLET ORAL 3 TIMES DAILY PRN
Qty: 60 TABLET | Refills: 4 | Status: SHIPPED | OUTPATIENT
Start: 2022-11-16 | End: 2022-12-21

## 2022-11-16 RX ORDER — IBUPROFEN 400 MG/1
400 TABLET, FILM COATED ORAL 3 TIMES DAILY PRN
COMMUNITY
Start: 2022-11-15 | End: 2022-11-16

## 2022-11-16 RX ORDER — SULFAMETHOXAZOLE/TRIMETHOPRIM 800-160 MG
2 TABLET ORAL 2 TIMES DAILY
COMMUNITY
Start: 2022-11-14 | End: 2022-11-16

## 2022-11-16 RX ORDER — OXYCODONE HYDROCHLORIDE 5 MG/1
5 CAPSULE ORAL EVERY 6 HOURS PRN
Qty: 60 CAPSULE | Refills: 0 | Status: SHIPPED | OUTPATIENT
Start: 2022-11-16 | End: 2022-12-21

## 2022-11-16 RX ORDER — ACETAMINOPHEN 500 MG
1000 TABLET ORAL EVERY 6 HOURS PRN
Qty: 240 TABLET | Refills: 11 | Status: SHIPPED | OUTPATIENT
Start: 2022-11-16 | End: 2022-12-21

## 2022-11-16 RX ORDER — GABAPENTIN 100 MG/1
100 CAPSULE ORAL 3 TIMES DAILY
COMMUNITY
Start: 2022-11-14 | End: 2022-11-22

## 2022-11-16 RX ORDER — MICONAZOLE NITRATE 20 MG/G
CREAM TOPICAL 2 TIMES DAILY
COMMUNITY
End: 2023-05-02

## 2022-11-16 RX ORDER — AMOXICILLIN 250 MG
1 CAPSULE ORAL 2 TIMES DAILY
Qty: 60 TABLET | Refills: 11 | Status: SHIPPED | OUTPATIENT
Start: 2022-11-16 | End: 2022-12-21

## 2022-11-16 RX ORDER — NYSTATIN 100000 [USP'U]/G
POWDER TOPICAL 2 TIMES DAILY
COMMUNITY
Start: 2022-11-15 | End: 2023-05-23

## 2022-11-16 RX ORDER — SULFAMETHOXAZOLE/TRIMETHOPRIM 800-160 MG
2 TABLET ORAL 2 TIMES DAILY
Qty: 24 TABLET | Refills: 0 | Status: SHIPPED | OUTPATIENT
Start: 2022-11-16 | End: 2022-11-21

## 2022-11-16 RX ORDER — CYCLOBENZAPRINE HCL 5 MG
5-10 TABLET ORAL 3 TIMES DAILY PRN
COMMUNITY
Start: 2022-11-14 | End: 2022-11-16

## 2022-11-16 NOTE — PROGRESS NOTES
Psychiatric Chart Review    Chart review completed today on María Elena Saab residing at [facility]. Psychiatric Nurse Practitioner reviewed patient's current medication list and facility progress notes. No recent calls or noted concerns voiced to provider regarding patient's psychiatric diagnoses or medications. Noted recent inpatient hospitalization with no behavioral changes noted since re-admission.    Will continue current regimen of:    Duloxetine ER 20mg PO at bedtime - Depression/Chronic Pain    With planned follow-up on 12/14 to allow medical stabilization, or beforehand PRN.     Dr. Macarena Messina, APRN, DNP, AGNP-BC, PMHNP-BC  [facility]  Office Hours: Tues-Fri [phone]  Office: [address] [facility] [address] [place] [address]  Fax - [phone]  Triage Phone- [phone]  Business Office- [phone]      Email: [email or web address]

## 2022-11-16 NOTE — LETTER
11/16/2022        RE: María Elena Saab  [address]  [address]  [place] [address]        [facility]    PRIMARY CARE PROVIDER AND CLINIC:  Arlen Carlson, KRISTIN CNP, [address] / [place] [address]  Chief Complaint   Patient presents with     Hospital F/U      [facility] Medical Record Number:  [id number]  Place of Service where encounter took place:  [facility] () [[id number]]    María Elena Saab  is a 57 year old  (1965), admitted to the above facility from  [facility] . Hospital stay 11/9/22 through 11/13/22..   HPI:      Sepsis POA d/t LLE cellulitis in patient with hx of MRSA cellulitis with abscess: Had MRSA cellulitis with abscess in RLE in June 2022 for which she was admitted. Now with worsening erythema and swelling involving the RLE. Clinically improved with IV vancomycin. u/s LE ruled out DVT.     The primary encounter diagnosis was Seropositive rheumatoid arthritis of multiple sites (H). Diagnoses of Cyclic citrullinated peptide (CCP) antibody positive, Primary osteoarthritis involving multiple joints, Vitamin D deficiency, Morbid obesity (H), Lymphedema of both lower extremities, PAD (peripheral artery disease) (H), Essential hypertension with goal blood pressure less than 140/90, History of DVT of lower extremity, High blood cholesterol, Obstructive sleep apnea syndrome, Anemia, unspecified type, Cellulitis of left lower extremity, Primary hypertension, Personal history of DVT (deep vein thrombosis), Redness and swelling of lower leg, Sepsis without acute organ dysfunction, due to unspecified organism (H), and Slow transit constipation were also pertinent to this visit.    Met with patient who denies any chest pain, palpitations, shortness of breath, ESPITIA, lightheadedness, dizziness, or cough. Denies any abdominal discomfort. Denies N&V. Denies dysuria or frequency. She does report constipation concerns today. Not  currently on any BM agents. Suspect due to polypharmacy/narcotics.  Appetite good. Sleeping well. She uses oxygen at night. Unable to tolerate CPAP per chronic history. Neuropathy pain to bilateral lower extremities she reports has improved since starting gabapentin agent. I did update her that we can adjust accordingly depending on her symptoms. Staff report bactrim antibiotic did not start from being hospital until today. Will need to extend her days so she can get the full 10 day course accordingly.     BP Readings from Last 3 Encounters:   11/16/22 118/72   11/09/22 135/81   09/27/22 138/83     Wt Readings from Last 5 Encounters:   11/16/22 (!) 155.5 kg (342 lb 12.8 oz)   11/09/22 (!) 155.5 kg (342 lb 12.8 oz)   09/27/22 (!) 155.5 kg (342 lb 12.8 oz)   07/19/22 (!) 157 kg (346 lb 3.2 oz)   05/17/22 (!) 157 kg (346 lb 3.2 oz)     CODE STATUS/ADVANCE DIRECTIVES DISCUSSION:  Full Code  CPR/Full code   ALLERGIES:   Allergies   Allergen Reactions     Adhesive Tape Other (See Comments)     As on band-aids;  Causes skin tearing/wounds.     Adhesive [Mecrylate] Other (See Comments)     As on band-aids;  Causes skin tearing/wounds.     Banana Unknown     She avoids due to Latex allergy.  If she eats them 3 days in a row, gets stomach cramps and dark stool.  TBrinkMD - 4/10/15     Food Unknown     Bananas, grapes, pine nuts     Grape [Grape (Artificial) Flavor] Unknown     She avoids due to Latex allergy.  If she eats lots of them, and she likes them, stomach gets a little queasy.  TBrinkMD - 4/10/15     Morphine (Pf) [Morphine] Other (See Comments)     Pt has not reacted to this med herself, but mother has anaphylactic reaction and other family members get nausea/vomiting.     Other Environmental Allergy Swelling     leather     Pine      Pseudoephedrine Other (See Comments)     Insomnia     Pseudoephedrine Cold/Allergy [Cpm-Pse Cr] Other (See Comments)     Keeps pt awake up to 24 hours      Latex Rash     Hands get red  from rubber gloves, but okay if she washes them right away.  Hands get red from rubber gloves, but okay if she washes them right away.     Other Food Allergy Rash     Pine trees, leather .       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  RD reports that she is not willing to commit to the program outlined.  See scanned document.  12/15/2015 - Marcio Quinonez MD        Nocturnal hypoxemia - probable sleep apnea - had overnight pulse oximetry, April, 2015. 5/22/2015     Obesity 4/10/2015     LOPEZ (obstructive sleep apnea) 5/22/2015     Osteoarthritis      Peripheral vascular disease (H)      Pleural effusion      Pressure ulcer of foot      Rheumatoid arthritis (H) 12/30/2014    seronegative     Sepsis (H)      Seropositive rheumatoid arthritis (H) 1/19/2016     Vitamin D deficiency 8/26/2015      PAST SURGICAL HISTORY:   has a past surgical history that includes Total Knee Arthroplasty (Left, 2006); joint replacement; Total Hip Arthroplasty (Left, 10/18/2016); and Peripherally Inserted Central Catheter Insertion (Right, 07/14/2019).  FAMILY HISTORY: family history includes Arthritis in her mother; Breast Cancer in her cousin; Breast Cancer (age of onset: 50.00) in her maternal grandmother; Breast Cancer (age of onset: 54.00) in her maternal aunt and mother; Cancer in her mother; Cataracts in her father and mother; Deep Vein Thrombosis in her father; Heart Disease in her maternal grandmother; Multiple myeloma in her  father; No Known Problems in her sister; Other - See Comments in her brother; Rheumatoid Arthritis in her maternal grandmother and paternal aunt; Sleep Apnea in her brother; Snoring in her father.  SOCIAL HISTORY:   reports that she has quit smoking. Her smoking use included cigarettes and cigarettes. She has a 30.00 pack-year smoking history. She has never used smokeless tobacco. She reports current alcohol use. She reports that she does not use drugs.  Patient's living condition: lives in SNF    Post Discharge Medication Reconciliation Status:   MED REC REQUIRED  Post Medication Reconciliation Status:  Discharge medications reconciled and changed, see notes/orders         Current Outpatient Medications   Medication Sig     acetaminophen (TYLENOL) 500 MG tablet Take 2 tablets (1,000 mg) by mouth every 6 hours as needed for mild pain     adalimumab (HUMIRA) 40 mg/0.8 mL injection [ADALIMUMAB (HUMIRA) 40 MG/0.8 ML INJECTION] Inject 0.8 mL (40 mg total) under the skin every 14 (fourteen) days.     ascorbic acid, vitamin C, (ASCORBIC ACID WITH KAEL HIPS) 500 MG tablet [ASCORBIC ACID, VITAMIN C, (ASCORBIC ACID WITH KAEL HIPS) 500 MG TABLET] Take 500 mg by mouth 2 (two) times a day.     cholecalciferol 50 MCG (2000 UT) tablet Take 1 tablet (50 mcg) by mouth daily     cyclobenzaprine (FLEXERIL) 5 MG tablet Take 2 tablets (10 mg) by mouth 3 times daily as needed for muscle spasms     DULoxetine (CYMBALTA) 20 MG capsule [DULOXETINE (CYMBALTA) 20 MG CAPSULE] Take 1 capsule (20 mg total) by mouth daily.     folic acid (FOLVITE) 1 MG tablet Take 1 mg by mouth     gabapentin (NEURONTIN) 100 MG capsule Take 100 mg by mouth 3 times daily     HUMIRA PEN 40 MG/0.8ML pen kit INJECT 40 MG (0.8 ML) UNDER THE SKIN EVERY 14 DAYS.     hydroCHLOROthiazide (HYDRODIURIL) 25 MG tablet [HYDROCHLOROTHIAZIDE (HYDRODIURIL) 25 MG TABLET] Take 25 mg by mouth daily.     lanolin alcohol-mo-w.pet-ceres (EUCERIN) Crea Apply topically 2 times daily      "methotrexate 10 MG tablet [METHOTREXATE 10 MG TABLET] Take 1 tablet (10 mg total) by mouth once a week.     miconazole (MICATIN) 2 % external cream Apply topically 2 times daily Apply to under toes     multivitamin therapeutic (THERAGRAN) tablet Take 1 tablet by mouth daily     NYAMYC 871996 UNIT/GM external powder 2 times daily Apply to rash areas under brest and under stomach     oxyCODONE (OXY-IR) 5 MG capsule Take 1 capsule (5 mg) by mouth every 6 hours as needed for severe pain (7-10)     rivaroxaban ANTICOAGULANT (XARELTO) 20 MG TABS tablet Take 20 mg by mouth daily     senna-docusate (SENOKOT-S/PERICOLACE) 8.6-50 MG tablet Take 1 tablet by mouth 2 times daily     sulfamethoxazole-trimethoprim (BACTRIM DS) 800-160 MG tablet Take 2 tablets by mouth 2 times daily for 6 days X 10 doses for infections under the skin.     ibuprofen (ADVIL/MOTRIN) 200 MG tablet Take 2 tablets (400 mg) by mouth every 8 hours as needed for mild pain     No current facility-administered medications for this visit.       ROS:  10 point ROS of systems including Constitutional, Eyes, Respiratory, Cardiovascular, Gastroenterology, Genitourinary, Integumentary, Musculoskeletal, Psychiatric were all negative except for pertinent positives noted in my HPI.    Vitals:  /72   Pulse 78   Temp 98.2  F (36.8  C)   Resp 18   Ht 1.651 m (5' 5\")   Wt (!) 155.5 kg (342 lb 12.8 oz)   SpO2 95%   BMI 57.04 kg/m    Exam:  GENERAL APPEARANCE:  Alert, in no distress, oriented, morbidly obese, cooperative  ENT:  Mouth and posterior oropharynx normal, moist mucous membranes, [place]  EYES:  EOM, conjunctivae, lids, pupils and irises normal  RESP:  respiratory effort and palpation of chest normal, lungs clear to auscultation , no respiratory distress  CV:  Palpation and auscultation of heart done , regular rate and rhythm, no murmur, rub, or gallop, peripheral edema 2+ in BLE  ABDOMEN:  normal bowel sounds, soft, nontender, no hepatosplenomegaly or " other masses, no guarding or rebound  M/S:   Ambulates with walker. May need east stand during times of weakness  SKIN:  Inspection of skin and subcutaneous tissue baseline, Palpation of skin and subcutaneous tissue baseline, see photo  NEURO:   Cranial nerves 2-12 are normal tested and grossly at patient's baseline, no purposeful movement in upper and lower extremities  PSYCH:  oriented X 3, normal insight, judgement and memory, affect and mood normal                          Lab/Diagnostic data:    Most Recent 3 CBC's:  Recent Labs   Lab Test 09/22/22  1559 04/12/22 1218 01/13/22  1234   WBC 5.4 5.2 4.9   HGB 13.4 14.3 15.2   MCV 92 93 96    229 210     Most Recent 3 BMP's:  Recent Labs   Lab Test 09/22/22  1559 04/12/22 1218 01/13/22  1234 01/04/22  0439 12/02/21  1155 09/10/19  1409 07/25/19  1215 07/18/19  1345 10/15/18  1434 09/13/18  1345   NA  --   --   --  139 141  --   --   --   --  144   POTASSIUM  --   --   --  3.6  --   --   --   --   --  4.3   CHLORIDE  --   --   --  99  --   --   --   --   --  101   CO2  --   --   --  27  --   --   --   --   --  34*   BUN  --   --   --  15  --   --  15 11  --  16   CR 0.51 0.65 0.65 0.66  0.66  --    < > 0.63 0.70   < > 0.75   ANIONGAP  --   --   --  13  --   --   --   --   --  9   WILVER  --   --   --  9.9  --   --   --   --   --  9.7   GLC  --   --   --  82  --   --   --   --   --  96    < > = values in this interval not displayed.     Most Recent 2 LFT's:  Recent Labs   Lab Test 09/22/22  1559 04/12/22  1218 01/13/22  1234 01/04/22  0439 09/10/19  1409 07/25/19  1215   AST  --   --   --  21  20  --  23   ALT 12 19   < > 23  23  23   < > 22   ALKPHOS  --   --   --  70  72  --  69   BILITOTAL  --   --   --  0.3  0.4  --  0.3    < > = values in this interval not displayed.     Most Recent Cholesterol Panel:  Recent Labs   Lab Test 12/02/21  1155   CHOL 221*   *   HDL 65   TRIG 101     7-Day Micro Results     Collected Updated Procedure Result Status  "     11/10/2022 0216 11/11/2022 2234 COVID-19 VIRUS (CORONAVIRUS) BY PCR (EXTERNAL RESULT) Final result Component Value   No component results                  Most Recent Hemoglobin A1c:  Recent Labs   Lab Test 12/02/21  1155   A1C 5.3     Most Recent ESR & CRP:  Recent Labs   Lab Test 12/21/20  0942   CRP 1.3*     Most Recent Anemia Panel:  Recent Labs   Lab Test 09/22/22  1559   WBC 5.4   HGB 13.4   HCT 41.5   MCV 92          ASSESSMENT/PLAN  (M05.79) Seropositive rheumatoid arthritis of multiple sites (H)  (primary encounter diagnosis)  (R76.8) Cyclic citrullinated peptide (CCP) antibody positive  (M15.9) Primary osteoarthritis involving multiple joints  (E55.9) Vitamin D deficiency  Comment: Chronic. Managed by rheumatology. c/o posterior neck pain as well as \"all over pain\" that is most likely neuropathic based on description. Started on gabapentin, which can be uptitrated as outpt. Flexeril prn.   Plan:   -Continue methotrexate 10mg weekly as directed and folic acid 1mg daily. ON HOLD WHILE ON ABX  -Change Tylenol to 1000mg every 6 hours PRN  -change flexeril to 10mg TID PRN  -Continue gabapentin 100mg TID for now. Adjust accordingly to assist with pain control  -Continue PRN ibuprofen 400mg every 8 hours PRN  -Continue oxycodone 5mg every 6 hours for now. GDR in near future if non use.   -Continue humira 40mg injection every 2 weeks as directed. ON HOLD WHILE ON ABX  -Continue Duloxetine 20mg daily as directed  -Reduce vitamin D down to 2000U daily  -Follow up with rheumatology as directed. Scheduled for 3/22/23  -BMP, CBC and CRP due 11/22/22     (I89.0) Lymphedema of both lower extremities  (I73.9) PAD (peripheral artery disease) (H)  (L03.116) Cellulitis of LLE  (A41.9) Sepsis  Comment: Chronic. No claudication complaints. Recent infection. Needs ongoing good hygiene for her skin and close monitoring for infection. Sepsis POA d/t LLE cellulitis in patient with hx of MRSA cellulitis with abscess: " Had MRSA cellulitis with abscess in RLE in June 2022 for which she was admitted. Now with worsening erythema and swelling involving the RLE. Clinically improved with IV vancomycin. u/s LE ruled out DVT. CRP last done on 11/13=28.9 with WBC 13.3  Plan:   -Continue bactrim as directed to complete full 10 day course. Extend a few days due to missed doses during hospital discharge transition  -Eucerin daily to bilateral lower extremities  -Start lymphedema therapy eval and treat  -Nursing to apply eucerin cream to bilateral lower extremities. Wrap with ace wraps daily until lymphedema follows.   -Monitor for worsening s/sx of concerns  -Staff to provide appropriate skin hygiene needs as directed  -BMP, CBC and CRP due 11/22/22    (K59.01) Constipation  Comment: Acute on chronic. Suspect due to polypharmacy and narcotic use  Plan:  -Monitor BM patterns  -Start senna S 1 tab BID. HOLD for loose stools     (I10) Essential hypertension with goal blood pressure less than 140/90  (Z86.718) History of DVT of lower extremity  (E78.00) High blood cholesterol  Comment: Chronic. Based on JNC-8 goals,  patients age of 57 year old, no presence of diabetes or CKD, and goals of care goal BP is <150/90 mm Hg. Patient is stable with current plan of care and routine assessment. History of DVT to LLE. SBP readings remaining 130s-150s.   Plan:   -Monitor BP and HR  -Continue hydrochlorothiazide 25mg daily.   -Continue xarelto 20mg daily  -BMP, CBC and CRP due 11/22/22     (E66.01) Morbid obesity (H)  (G47.33) Obstructive sleep apnea syndrome  Comment: Chronic. Hx of LOPEZ, does not have CPAP as this causes sinus problems per patient. Patient with frequent desaturations while sleeping during history of hospital admission. Recommend follow up with sleep medicine provider to provide recommendations for management which she last saw at [facility] location last in 2016  Plan:   -Monitor sleeping patterns  -Monitor respiratory status  -Oxygen  used at night  -Follow up with sleep medicine as directed at [facility] within 3 months  -BMP, CBC and CRP due 11/22/22     (D64.9) Anemia, unspecified type  Comment: Acute on chronic. Baseline hgb around 13  Plan:   -Monitor bleeding risks  -Monitor for worsening s/sx of concerns  -BMP, CBC and CRP due 11/22/22      Total time spent with patient visit at the [facility] was 60 min including patient visit and review of past records. Greater than 50% of total time spent with counseling and coordinating care with nursing staff due to the complexities of their diagnoses, review of HPI, development of POC, assisting HUC on appointment schedules, IDT rounding with discussion with rehab goals and patient progression to assist with coordination of care, and patient education and review of POC with patient which includes 20 min discussion with patient on: current medications/orders changes, recent blood work results, current pain control plan and controlled substances ordered. Counseling on: Diagnostic results, impressions, and/or recommended diagnostic studies, Prognosis, Risks and benefits of management (treatment) options, Instructions for management (treatment) and/or follow up, Importance of compliance with management (treatment) options, and Risk factor reduction regarding above health concerns    Electronically signed by:  Arlen Carlson DNP, APRN          Sincerely,        KRISTIN Rodriges CNP

## 2022-11-20 ENCOUNTER — LAB REQUISITION (OUTPATIENT)
Dept: LAB | Facility: CLINIC | Age: 57
End: 2022-11-20
Payer: COMMERCIAL

## 2022-11-20 DIAGNOSIS — L03.116 CELLULITIS OF LEFT LOWER LIMB: ICD-10-CM

## 2022-11-21 ENCOUNTER — TELEPHONE (OUTPATIENT)
Dept: GERIATRICS | Facility: CLINIC | Age: 57
End: 2022-11-21

## 2022-11-21 DIAGNOSIS — L03.116 CELLULITIS OF LEFT LOWER EXTREMITY: Primary | ICD-10-CM

## 2022-11-21 RX ORDER — PREDNISONE 20 MG/1
40 TABLET ORAL DAILY
Qty: 10 TABLET | Refills: 0 | Status: SHIPPED | OUTPATIENT
Start: 2022-11-21 | End: 2022-11-26

## 2022-11-21 RX ORDER — CLINDAMYCIN HCL 300 MG
300 CAPSULE ORAL 3 TIMES DAILY
Qty: 30 CAPSULE | Refills: 0 | Status: SHIPPED | OUTPATIENT
Start: 2022-11-21 | End: 2022-12-01

## 2022-11-22 ENCOUNTER — NURSING HOME VISIT (OUTPATIENT)
Dept: GERIATRICS | Facility: CLINIC | Age: 57
End: 2022-11-22
Payer: COMMERCIAL

## 2022-11-22 VITALS
HEIGHT: 65 IN | BODY MASS INDEX: 48.82 KG/M2 | OXYGEN SATURATION: 95 % | DIASTOLIC BLOOD PRESSURE: 72 MMHG | SYSTOLIC BLOOD PRESSURE: 118 MMHG | WEIGHT: 293 LBS | RESPIRATION RATE: 18 BRPM | HEART RATE: 78 BPM | TEMPERATURE: 98.3 F

## 2022-11-22 DIAGNOSIS — Z86.718 HISTORY OF DVT OF LOWER EXTREMITY: ICD-10-CM

## 2022-11-22 DIAGNOSIS — I10 PRIMARY HYPERTENSION: ICD-10-CM

## 2022-11-22 DIAGNOSIS — R76.8 CYCLIC CITRULLINATED PEPTIDE (CCP) ANTIBODY POSITIVE: ICD-10-CM

## 2022-11-22 DIAGNOSIS — M79.89 REDNESS AND SWELLING OF LOWER LEG: ICD-10-CM

## 2022-11-22 DIAGNOSIS — I73.9 PAD (PERIPHERAL ARTERY DISEASE) (H): ICD-10-CM

## 2022-11-22 DIAGNOSIS — M05.79 SEROPOSITIVE RHEUMATOID ARTHRITIS OF MULTIPLE SITES (H): ICD-10-CM

## 2022-11-22 DIAGNOSIS — K59.01 SLOW TRANSIT CONSTIPATION: ICD-10-CM

## 2022-11-22 DIAGNOSIS — L03.116 CELLULITIS OF LEFT LOWER EXTREMITY: Primary | ICD-10-CM

## 2022-11-22 DIAGNOSIS — M25.50 POLYARTHRALGIA: ICD-10-CM

## 2022-11-22 DIAGNOSIS — I10 ESSENTIAL HYPERTENSION WITH GOAL BLOOD PRESSURE LESS THAN 140/90: ICD-10-CM

## 2022-11-22 DIAGNOSIS — E66.01 MORBID OBESITY (H): ICD-10-CM

## 2022-11-22 DIAGNOSIS — D64.9 ANEMIA, UNSPECIFIED TYPE: ICD-10-CM

## 2022-11-22 DIAGNOSIS — Z86.718 PERSONAL HISTORY OF DVT (DEEP VEIN THROMBOSIS): ICD-10-CM

## 2022-11-22 DIAGNOSIS — E78.00 HIGH BLOOD CHOLESTEROL: ICD-10-CM

## 2022-11-22 DIAGNOSIS — F33.41 RECURRENT MAJOR DEPRESSIVE DISORDER, IN PARTIAL REMISSION (H): ICD-10-CM

## 2022-11-22 DIAGNOSIS — G47.33 OBSTRUCTIVE SLEEP APNEA SYNDROME: ICD-10-CM

## 2022-11-22 DIAGNOSIS — E55.9 VITAMIN D DEFICIENCY: ICD-10-CM

## 2022-11-22 DIAGNOSIS — R23.8 REDNESS AND SWELLING OF LOWER LEG: ICD-10-CM

## 2022-11-22 DIAGNOSIS — M15.0 PRIMARY OSTEOARTHRITIS INVOLVING MULTIPLE JOINTS: ICD-10-CM

## 2022-11-22 DIAGNOSIS — I89.0 LYMPHEDEMA OF BOTH LOWER EXTREMITIES: ICD-10-CM

## 2022-11-22 LAB
ANION GAP SERPL CALCULATED.3IONS-SCNC: 11 MMOL/L (ref 7–15)
BUN SERPL-MCNC: 15.8 MG/DL (ref 8–23)
CALCIUM SERPL-MCNC: 9.1 MG/DL (ref 8.6–10)
CHLORIDE SERPL-SCNC: 100 MMOL/L (ref 98–107)
CREAT SERPL-MCNC: 0.74 MG/DL (ref 0.51–0.95)
CRP SERPL-MCNC: 170 MG/L
DEPRECATED HCO3 PLAS-SCNC: 26 MMOL/L (ref 22–29)
ERYTHROCYTE [DISTWIDTH] IN BLOOD BY AUTOMATED COUNT: 15.4 % (ref 10–15)
GFR SERPL CREATININE-BSD FRML MDRD: >90 ML/MIN/1.73M2
GLUCOSE SERPL-MCNC: 114 MG/DL (ref 70–99)
HCT VFR BLD AUTO: 34.5 % (ref 35–47)
HGB BLD-MCNC: 10.9 G/DL (ref 11.7–15.7)
MCH RBC QN AUTO: 28.8 PG (ref 26.5–33)
MCHC RBC AUTO-ENTMCNC: 31.6 G/DL (ref 31.5–36.5)
MCV RBC AUTO: 91 FL (ref 78–100)
PLATELET # BLD AUTO: 427 10E3/UL (ref 150–450)
POTASSIUM SERPL-SCNC: 4.4 MMOL/L (ref 3.4–5.3)
RBC # BLD AUTO: 3.79 10E6/UL (ref 3.8–5.2)
SODIUM SERPL-SCNC: 137 MMOL/L (ref 136–145)
WBC # BLD AUTO: 11 10E3/UL (ref 4–11)

## 2022-11-22 PROCEDURE — 85027 COMPLETE CBC AUTOMATED: CPT | Mod: ORL | Performed by: NURSE PRACTITIONER

## 2022-11-22 PROCEDURE — P9604 ONE-WAY ALLOW PRORATED TRIP: HCPCS | Mod: ORL | Performed by: NURSE PRACTITIONER

## 2022-11-22 PROCEDURE — 99309 SBSQ NF CARE MODERATE MDM 30: CPT | Performed by: NURSE PRACTITIONER

## 2022-11-22 PROCEDURE — 36415 COLL VENOUS BLD VENIPUNCTURE: CPT | Mod: ORL | Performed by: NURSE PRACTITIONER

## 2022-11-22 PROCEDURE — 80048 BASIC METABOLIC PNL TOTAL CA: CPT | Mod: ORL | Performed by: NURSE PRACTITIONER

## 2022-11-22 PROCEDURE — 86140 C-REACTIVE PROTEIN: CPT | Mod: ORL | Performed by: NURSE PRACTITIONER

## 2022-11-22 RX ORDER — GABAPENTIN 100 MG/1
200 CAPSULE ORAL 3 TIMES DAILY
Qty: 240 CAPSULE | Refills: 11 | Status: SHIPPED | OUTPATIENT
Start: 2022-11-22 | End: 2023-03-14

## 2022-11-22 NOTE — LETTER
11/22/2022        RE: María Elena Saab  [facility] [address]  [address]  [place] [address]        [facility]    Chief Complaint   Patient presents with     RECHECK     HPI:  María Elena Saab is a 57 year old  (1965), who is being seen today for an episodic care visit at: [facility] () [[id number]]. Today's concern is: The primary encounter diagnosis was Cellulitis of left lower extremity. Diagnoses of Recurrent major depressive disorder, in partial remission (H), Polyarthralgia, Seropositive rheumatoid arthritis of multiple sites (H), Cyclic citrullinated peptide (CCP) antibody positive, Primary osteoarthritis involving multiple joints, Vitamin D deficiency, Morbid obesity (H), Lymphedema of both lower extremities, PAD (peripheral artery disease) (H), Essential hypertension with goal blood pressure less than 140/90, History of DVT of lower extremity, High blood cholesterol, Obstructive sleep apnea syndrome, Anemia, unspecified type, Primary hypertension, Personal history of DVT (deep vein thrombosis), Redness and swelling of lower leg, and Slow transit constipation were also pertinent to this visit.    Met with patient who denies any chest pain, palpitations, shortness of breath, ESPITIA, lightheadedness, dizziness, or cough. Denies any abdominal discomfort. Denies N&V. Denies B&B concerns. Denies dysuria or frequency. Denies loose or constipation. Appetite good. Sleeping well. Continue to report pain to LLE wound area. DIfficulty with transferring. Now needing easy stand for needs. She is wanting to increase gabapentin dose which I think is reasonable at this time. Noted on 11/21-Nurse called today to report: Staff assessed patient's left toe. The first 3 toes are having drainage, moist and skin is open. It looks fungal to me but do not know if it started as a blister. Area approximately measures 7 x 4.5cm with foul smelling discharge.  Bactrim was changed to  "clindamycin x 10 days along with prednisone. Requested on site wound provider to follow. Nursing denies any acute additional concerns. She reports not having shower for over a week. I did update staff and will plan to give her one accordingly.     BP Readings from Last 3 Encounters:   11/22/22 118/72   11/16/22 118/72   11/09/22 135/81     Wt Readings from Last 5 Encounters:   11/22/22 (!) 155.5 kg (342 lb 12.8 oz)   11/16/22 (!) 155.5 kg (342 lb 12.8 oz)   11/09/22 (!) 155.5 kg (342 lb 12.8 oz)   09/27/22 (!) 155.5 kg (342 lb 12.8 oz)   07/19/22 (!) 157 kg (346 lb 3.2 oz)     Allergies, and PMH/PSH reviewed in EPIC today.  REVIEW OF SYSTEMS:  10 point ROS of systems including Constitutional, Eyes, Respiratory, Cardiovascular, Gastroenterology, Genitourinary, Integumentary, Musculoskeletal, Psychiatric were all negative except for pertinent positives noted in my HPI.    Objective:   /72   Pulse 78   Temp 98.3  F (36.8  C)   Resp 18   Ht 1.651 m (5' 5\")   Wt (!) 155.5 kg (342 lb 12.8 oz)   SpO2 95%   BMI 57.04 kg/m    GENERAL APPEARANCE:  Alert, in no distress, oriented, morbidly obese, cooperative  ENT:  Mouth and posterior oropharynx normal, moist mucous membranes, [place]  EYES:  EOM, conjunctivae, lids, pupils and irises normal  NECK:  No adenopathy,masses or thyromegaly  RESP:  respiratory effort and palpation of chest normal, lungs clear to auscultation , no respiratory distress  CV:  Palpation and auscultation of heart done , regular rate and rhythm, no murmur, rub, or gallop, peripheral edema 2-3+ in BLE  ABDOMEN:  normal bowel sounds, soft, nontender, no hepatosplenomegaly or other masses, no guarding or rebound  M/S:   Wheelchair bound. Easy stand for transfers  SKIN:  redness to LLE > RLE. minimal drainage present  NEURO:   Cranial nerves 2-12 are normal tested and grossly at patient's baseline, no purposeful movement in upper and lower extremities  PSYCH:  oriented X 3, normal insight, judgement " "and memory, affect and mood normal      Most Recent 3 CBC's:  Recent Labs   Lab Test 11/22/22  0654 09/22/22  1559 04/12/22  1218   WBC 11.0 5.4 5.2   HGB 10.9* 13.4 14.3   MCV 91 92 93    257 229     Most Recent 3 BMP's:  Recent Labs   Lab Test 11/22/22  0654 09/22/22  1559 04/12/22  1218 01/13/22  1234 01/04/22  0439 12/02/21  1155 09/10/19  1409 07/25/19  1215 10/15/18  1434 09/13/18  1345     --   --   --  139 141  --   --   --  144   POTASSIUM 4.4  --   --   --  3.6  --   --   --   --  4.3   CHLORIDE 100  --   --   --  99  --   --   --   --  101   CO2 26  --   --   --  27  --   --   --   --  34*   BUN 15.8  --   --   --  15  --   --  15   < > 16   CR 0.74 0.51 0.65   < > 0.66  0.66  --    < > 0.63   < > 0.75   ANIONGAP 11  --   --   --  13  --   --   --   --  9   WILVER 9.1  --   --   --  9.9  --   --   --   --  9.7   *  --   --   --  82  --   --   --   --  96    < > = values in this interval not displayed.     Most Recent 2 LFT's:  Recent Labs   Lab Test 09/22/22  1559 04/12/22  1218 01/13/22  1234 01/04/22  0439 09/10/19  1409 07/25/19  1215   AST  --   --   --  21  20  --  23   ALT 12 19   < > 23  23  23   < > 22   ALKPHOS  --   --   --  70  72  --  69   BILITOTAL  --   --   --  0.3  0.4  --  0.3    < > = values in this interval not displayed.     Most Recent Hemoglobin A1c:  Recent Labs   Lab Test 12/02/21  1155   A1C 5.3     Most Recent ESR & CRP:  Recent Labs   Lab Test 11/22/22  0654   .00*     Most Recent Anemia Panel:  Recent Labs   Lab Test 11/22/22  0654   WBC 11.0   HGB 10.9*   HCT 34.5*   MCV 91          ASSESSMENT/PLAN  (M05.79) Seropositive rheumatoid arthritis of multiple sites (H)  (primary encounter diagnosis)  (R76.8) Cyclic citrullinated peptide (CCP) antibody positive  (M15.9) Primary osteoarthritis involving multiple joints  (E55.9) Vitamin D deficiency  Comment: Chronic. Managed by rheumatology. c/o posterior neck pain as well as \"all over pain\" that " is most likely neuropathic based on description. Started on gabapentin, which can be uptitrated as outpt. Flexeril prn.   Plan:   -Continue methotrexate 10mg weekly as directed and folic acid 1mg daily. ON HOLD WHILE ON ABX  -COntinue Tylenol 1000mg every 6 hours PRN  -Continue flexeril to 10mg TID PRN  -Increase gabapentin to 200mg TID for now. Adjust accordingly to assist with pain control  -Continue PRN ibuprofen 400mg every 8 hours PRN  -Continue oxycodone 5mg every 6 hours for now. GDR in near future if non use.   -Continue humira 40mg injection every 2 weeks as directed. ON HOLD WHILE ON ABX  -Continue Duloxetine 20mg daily as directed  -Continue vitamin D down to 2000U daily  -Follow up with rheumatology as directed. Scheduled for 3/22/23  -BMP, CBC and CRP due 11/22/22--results pending  -BMP, CBC, CRP, procalcitonin due 11/29/22     (I89.0) Lymphedema of both lower extremities  (I73.9) PAD (peripheral artery disease) (H)  (L03.116) Cellulitis of LLE  (A41.9) Sepsis  Comment: Chronic. No claudication complaints. Recent infection. Needs ongoing good hygiene for her skin and close monitoring for infection. Sepsis POA d/t LLE cellulitis in patient with hx of MRSA cellulitis with abscess: Had MRSA cellulitis with abscess in RLE in June 2022 for which she was admitted. Now with worsening erythema and swelling involving the RLE. Clinically improved with IV vancomycin. u/s LE ruled out DVT. CRP last done on 11/13=28.9 with WBC 13.3  Plan:   -Continue new antibiotic of clindamycin 300mg TID x 10 days as directed  -Continue prednisone burst as directed  -Wound provider to follow on site ongoing for additional recommendations.   -Continue lymphedema therapy eval and treat  -Nursing to apply eucerin cream to bilateral lower extremities. Wrap with ace wraps daily until lymphedema follows.   -Monitor for worsening s/sx of concerns  -Staff to provide appropriate skin hygiene needs as directed  -BMP, CBC and CRP due  11/22/22--results pending  -BMP, CBC, CRP, procalcitonin due 11/29/22  -Wound care for Left toe (first 3 digits): Clean with normal saline, at dry, apply bacitracin ointment, cover with non-adherent dressing and cover with gauze and secure with Kerlix daily and PRN. Special Instructions: Left toe infected wound care until being seen by wound MD     (K59.01) Constipation  Comment: Acute on chronic. Suspect due to polypharmacy and narcotic use  Plan:  -Monitor BM patterns  -Continue senna S 1 tab BID. HOLD for loose stools  -BMP, CBC and CRP due 11/22/22--results pending  -BMP, CBC, CRP, procalcitonin due 11/29/22     (I10) Essential hypertension with goal blood pressure less than 140/90  (Z86.718) History of DVT of lower extremity  (E78.00) High blood cholesterol  Comment: Chronic. Based on JNC-8 goals,  patients age of 57 year old, no presence of diabetes or CKD, and goals of care goal BP is <150/90 mm Hg. Patient is stable with current plan of care and routine assessment. History of DVT to LLE. SBP readings remaining 130s-150s.   Plan:   -Monitor BP and HR  -Continue hydrochlorothiazide 25mg daily.   -Continue xarelto 20mg daily  -BMP, CBC and CRP due 11/22/22--results pending  -BMP, CBC, CRP, procalcitonin due 11/29/22     (E66.01) Morbid obesity (H)  (G47.33) Obstructive sleep apnea syndrome  Comment: Chronic. Hx of LOPEZ, does not have CPAP as this causes sinus problems per patient. Patient with frequent desaturations while sleeping during history of hospital admission. Recommend follow up with sleep medicine provider to provide recommendations for management which she last saw at [facility] location last in 2016  Plan:   -Monitor sleeping patterns  -Monitor respiratory status  -Oxygen used at night  -Follow up with sleep medicine as directed at [facility] location within 3 months  -BMP, CBC and CRP due 11/22/22--results pending  -BMP, CBC, CRP, procalcitonin due 11/29/22     (D64.9) Anemia, unspecified  type  Comment: Acute on chronic. Baseline hgb around 13  Plan:   -Monitor bleeding risks  -Monitor for worsening s/sx of concerns  -BMP, CBC and CRP due 11/22/22--results pending  -BMP, CBC, CRP, procalcitonin due 11/29/22     Electronically signed by:  Arlen Carlson DNP, APRN          Sincerely,        Arlen Carlson, KRISTIN CNP

## 2022-11-22 NOTE — TELEPHONE ENCOUNTER
[facility] TELEPHONE ENCOUNTER    Chief Complaint   Patient presents with     Wounds       María Elena Saab is a 57 year old  (1965),Nurse called today to report: Staff assessed patient's left toe. The first 3 toes are having drainage, moist and skin is open. It looks fungal to me but do not know if it started as a blister. Area approximately measures 7 x 4.5cm with foul smelling discharge.     ASSESSMENT/PLAN      Discontinue bactirm    Start clidnamycin 300mg TID x 10 days    Prednisone 40mg angelina x 5 days    HOLD humira and methotrexate while on antibiotic    Wound care: Cleanse with NS. Pat Dry; Apply bacitracin with nonadherent dressing with gauze and kerlix. Perform daily until rounding wound provider to see on site    Electronically signed by:   KRISTIN Rodriges CNP

## 2022-11-22 NOTE — PROGRESS NOTES
[facility]    Chief Complaint   Patient presents with     RECHECK     HPI:  María Elena Saab is a 57 year old  (1965), who is being seen today for an episodic care visit at: [facility] () [[id number]]. Today's concern is: The primary encounter diagnosis was Cellulitis of left lower extremity. Diagnoses of Recurrent major depressive disorder, in partial remission (H), Polyarthralgia, Seropositive rheumatoid arthritis of multiple sites (H), Cyclic citrullinated peptide (CCP) antibody positive, Primary osteoarthritis involving multiple joints, Vitamin D deficiency, Morbid obesity (H), Lymphedema of both lower extremities, PAD (peripheral artery disease) (H), Essential hypertension with goal blood pressure less than 140/90, History of DVT of lower extremity, High blood cholesterol, Obstructive sleep apnea syndrome, Anemia, unspecified type, Primary hypertension, Personal history of DVT (deep vein thrombosis), Redness and swelling of lower leg, and Slow transit constipation were also pertinent to this visit.    Met with patient who denies any chest pain, palpitations, shortness of breath, ESPITIA, lightheadedness, dizziness, or cough. Denies any abdominal discomfort. Denies N&V. Denies B&B concerns. Denies dysuria or frequency. Denies loose or constipation. Appetite good. Sleeping well. Continue to report pain to LLE wound area. DIfficulty with transferring. Now needing easy stand for needs. She is wanting to increase gabapentin dose which I think is reasonable at this time. Noted on 11/21-Nurse called today to report: Staff assessed patient's left toe. The first 3 toes are having drainage, moist and skin is open. It looks fungal to me but do not know if it started as a blister. Area approximately measures 7 x 4.5cm with foul smelling discharge.  Bactrim was changed to clindamycin x 10 days along with prednisone. Requested on site wound provider to follow. Nursing denies any acute  "additional concerns. She reports not having shower for over a week. I did update staff and will plan to give her one accordingly.     BP Readings from Last 3 Encounters:   11/22/22 118/72   11/16/22 118/72   11/09/22 135/81     Wt Readings from Last 5 Encounters:   11/22/22 (!) 155.5 kg (342 lb 12.8 oz)   11/16/22 (!) 155.5 kg (342 lb 12.8 oz)   11/09/22 (!) 155.5 kg (342 lb 12.8 oz)   09/27/22 (!) 155.5 kg (342 lb 12.8 oz)   07/19/22 (!) 157 kg (346 lb 3.2 oz)     Allergies, and PMH/PSH reviewed in EPIC today.  REVIEW OF SYSTEMS:  10 point ROS of systems including Constitutional, Eyes, Respiratory, Cardiovascular, Gastroenterology, Genitourinary, Integumentary, Musculoskeletal, Psychiatric were all negative except for pertinent positives noted in my HPI.    Objective:   /72   Pulse 78   Temp 98.3  F (36.8  C)   Resp 18   Ht 1.651 m (5' 5\")   Wt (!) 155.5 kg (342 lb 12.8 oz)   SpO2 95%   BMI 57.04 kg/m    GENERAL APPEARANCE:  Alert, in no distress, oriented, morbidly obese, cooperative  ENT:  Mouth and posterior oropharynx normal, moist mucous membranes, [place]  EYES:  EOM, conjunctivae, lids, pupils and irises normal  NECK:  No adenopathy,masses or thyromegaly  RESP:  respiratory effort and palpation of chest normal, lungs clear to auscultation , no respiratory distress  CV:  Palpation and auscultation of heart done , regular rate and rhythm, no murmur, rub, or gallop, peripheral edema 2-3+ in BLE  ABDOMEN:  normal bowel sounds, soft, nontender, no hepatosplenomegaly or other masses, no guarding or rebound  M/S:   Wheelchair bound. Easy stand for transfers  SKIN:  redness to LLE > RLE. minimal drainage present  NEURO:   Cranial nerves 2-12 are normal tested and grossly at patient's baseline, no purposeful movement in upper and lower extremities  PSYCH:  oriented X 3, normal insight, judgement and memory, affect and mood normal      Most Recent 3 CBC's:  Recent Labs   Lab Test 11/22/22  0654 09/22/22  1559 " "04/12/22  1218   WBC 11.0 5.4 5.2   HGB 10.9* 13.4 14.3   MCV 91 92 93    257 229     Most Recent 3 BMP's:  Recent Labs   Lab Test 11/22/22  0654 09/22/22  1559 04/12/22  1218 01/13/22  1234 01/04/22  0439 12/02/21  1155 09/10/19  1409 07/25/19  1215 10/15/18  1434 09/13/18  1345     --   --   --  139 141  --   --   --  144   POTASSIUM 4.4  --   --   --  3.6  --   --   --   --  4.3   CHLORIDE 100  --   --   --  99  --   --   --   --  101   CO2 26  --   --   --  27  --   --   --   --  34*   BUN 15.8  --   --   --  15  --   --  15   < > 16   CR 0.74 0.51 0.65   < > 0.66  0.66  --    < > 0.63   < > 0.75   ANIONGAP 11  --   --   --  13  --   --   --   --  9   WILVER 9.1  --   --   --  9.9  --   --   --   --  9.7   *  --   --   --  82  --   --   --   --  96    < > = values in this interval not displayed.     Most Recent 2 LFT's:  Recent Labs   Lab Test 09/22/22  1559 04/12/22  1218 01/13/22  1234 01/04/22  0439 09/10/19  1409 07/25/19  1215   AST  --   --   --  21  20  --  23   ALT 12 19   < > 23  23  23   < > 22   ALKPHOS  --   --   --  70  72  --  69   BILITOTAL  --   --   --  0.3  0.4  --  0.3    < > = values in this interval not displayed.     Most Recent Hemoglobin A1c:  Recent Labs   Lab Test 12/02/21  1155   A1C 5.3     Most Recent ESR & CRP:  Recent Labs   Lab Test 11/22/22  0654   .00*     Most Recent Anemia Panel:  Recent Labs   Lab Test 11/22/22  0654   WBC 11.0   HGB 10.9*   HCT 34.5*   MCV 91          ASSESSMENT/PLAN  (M05.79) Seropositive rheumatoid arthritis of multiple sites (H)  (primary encounter diagnosis)  (R76.8) Cyclic citrullinated peptide (CCP) antibody positive  (M15.9) Primary osteoarthritis involving multiple joints  (E55.9) Vitamin D deficiency  Comment: Chronic. Managed by rheumatology. c/o posterior neck pain as well as \"all over pain\" that is most likely neuropathic based on description. Started on gabapentin, which can be uptitrated as outpt. Flexeril " prn.   Plan:   -Continue methotrexate 10mg weekly as directed and folic acid 1mg daily. ON HOLD WHILE ON ABX  -COntinue Tylenol 1000mg every 6 hours PRN  -Continue flexeril to 10mg TID PRN  -Increase gabapentin to 200mg TID for now. Adjust accordingly to assist with pain control  -Continue PRN ibuprofen 400mg every 8 hours PRN  -Continue oxycodone 5mg every 6 hours for now. GDR in near future if non use.   -Continue humira 40mg injection every 2 weeks as directed. ON HOLD WHILE ON ABX  -Continue Duloxetine 20mg daily as directed  -Continue vitamin D down to 2000U daily  -Follow up with rheumatology as directed. Scheduled for 3/22/23  -BMP, CBC and CRP due 11/22/22--results pending  -BMP, CBC, CRP, procalcitonin due 11/29/22     (I89.0) Lymphedema of both lower extremities  (I73.9) PAD (peripheral artery disease) (H)  (L03.116) Cellulitis of LLE  (A41.9) Sepsis  Comment: Chronic. No claudication complaints. Recent infection. Needs ongoing good hygiene for her skin and close monitoring for infection. Sepsis POA d/t LLE cellulitis in patient with hx of MRSA cellulitis with abscess: Had MRSA cellulitis with abscess in RLE in June 2022 for which she was admitted. Now with worsening erythema and swelling involving the RLE. Clinically improved with IV vancomycin. u/s LE ruled out DVT. CRP last done on 11/13=28.9 with WBC 13.3  Plan:   -Continue new antibiotic of clindamycin 300mg TID x 10 days as directed  -Continue prednisone burst as directed  -Wound provider to follow on site ongoing for additional recommendations.   -Continue lymphedema therapy eval and treat  -Nursing to apply eucerin cream to bilateral lower extremities. Wrap with ace wraps daily until lymphedema follows.   -Monitor for worsening s/sx of concerns  -Staff to provide appropriate skin hygiene needs as directed  -BMP, CBC and CRP due 11/22/22--results pending  -BMP, CBC, CRP, procalcitonin due 11/29/22  -Wound care for Left toe (first 3 digits): Clean  with normal saline, at dry, apply bacitracin ointment, cover with non-adherent dressing and cover with gauze and secure with Kerlix daily and PRN. Special Instructions: Left toe infected wound care until being seen by wound MD     (K59.01) Constipation  Comment: Acute on chronic. Suspect due to polypharmacy and narcotic use  Plan:  -Monitor BM patterns  -Continue senna S 1 tab BID. HOLD for loose stools  -BMP, CBC and CRP due 11/22/22--results pending  -BMP, CBC, CRP, procalcitonin due 11/29/22     (I10) Essential hypertension with goal blood pressure less than 140/90  (Z86.718) History of DVT of lower extremity  (E78.00) High blood cholesterol  Comment: Chronic. Based on JNC-8 goals,  patients age of 57 year old, no presence of diabetes or CKD, and goals of care goal BP is <150/90 mm Hg. Patient is stable with current plan of care and routine assessment. History of DVT to LLE. SBP readings remaining 130s-150s.   Plan:   -Monitor BP and HR  -Continue hydrochlorothiazide 25mg daily.   -Continue xarelto 20mg daily  -BMP, CBC and CRP due 11/22/22--results pending  -BMP, CBC, CRP, procalcitonin due 11/29/22     (E66.01) Morbid obesity (H)  (G47.33) Obstructive sleep apnea syndrome  Comment: Chronic. Hx of LOPEZ, does not have CPAP as this causes sinus problems per patient. Patient with frequent desaturations while sleeping during history of hospital admission. Recommend follow up with sleep medicine provider to provide recommendations for management which she last saw at [facility] location last in 2016  Plan:   -Monitor sleeping patterns  -Monitor respiratory status  -Oxygen used at night  -Follow up with sleep medicine as directed at [facility] location within 3 months  -BMP, CBC and CRP due 11/22/22--results pending  -BMP, CBC, CRP, procalcitonin due 11/29/22     (D64.9) Anemia, unspecified type  Comment: Acute on chronic. Baseline hgb around 13  Plan:   -Monitor bleeding risks  -Monitor for worsening s/sx of  concerns  -BMP, CBC and CRP due 11/22/22--results pending  -BMP, CBC, CRP, procalcitonin due 11/29/22     Electronically signed by:  Arlen Carlson DNP, APRN

## 2022-11-26 ENCOUNTER — LAB REQUISITION (OUTPATIENT)
Dept: LAB | Facility: CLINIC | Age: 57
End: 2022-11-26
Payer: COMMERCIAL

## 2022-11-26 DIAGNOSIS — S81.802D UNSPECIFIED OPEN WOUND, LEFT LOWER LEG, SUBSEQUENT ENCOUNTER: ICD-10-CM

## 2022-11-26 DIAGNOSIS — L03.116 CELLULITIS OF LEFT LOWER LIMB: ICD-10-CM

## 2022-11-26 DIAGNOSIS — Z86.14 PERSONAL HISTORY OF METHICILLIN RESISTANT STAPHYLOCOCCUS AUREUS INFECTION: ICD-10-CM

## 2022-11-26 DIAGNOSIS — I89.0 LYMPHEDEMA, NOT ELSEWHERE CLASSIFIED: ICD-10-CM

## 2022-11-28 NOTE — PROGRESS NOTES
[facility] GERIATRICS    Chief Complaint   Patient presents with     RECHECK     HPI:  María Elena Saab is a 57 year old  (1965), who is being seen today for an episodic care visit at: [facility] () [[id number]]. Today's concern is: The primary encounter diagnosis was Cellulitis of left lower extremity. Diagnoses of Recurrent major depressive disorder, in partial remission (H), Polyarthralgia, Seropositive rheumatoid arthritis of multiple sites (H), Cyclic citrullinated peptide (CCP) antibody positive, Primary osteoarthritis involving multiple joints, Vitamin D deficiency, Morbid obesity (H), Lymphedema of both lower extremities, PAD (peripheral artery disease) (H), Essential hypertension with goal blood pressure less than 140/90, History of DVT of lower extremity, High blood cholesterol, Obstructive sleep apnea syndrome, and Anemia, unspecified type were also pertinent to this visit.    Met with patient who denies any chest pain, palpitations, shortness of breath, ESPITIA, lightheadedness, dizziness, or cough. Denies any abdominal discomfort. Denies N&V. Denies B&B concerns. Denies dysuria or frequency. Denies loose or constipation. Appetite good. Sleeping well. Reports pain to LLE improving with current gabapentin dose adjustment. She does complain of pain and itch at times to LLE. Nursing denies any acute concerns. History of being followed by vascular however no follow up for over 1 year.     BP Readings from Last 3 Encounters:   11/29/22 127/86   11/22/22 118/72   11/16/22 118/72     Wt Readings from Last 5 Encounters:   11/29/22 (!) 155.5 kg (342 lb 12.8 oz)   11/22/22 (!) 155.5 kg (342 lb 12.8 oz)   11/16/22 (!) 155.5 kg (342 lb 12.8 oz)   11/09/22 (!) 155.5 kg (342 lb 12.8 oz)   09/27/22 (!) 155.5 kg (342 lb 12.8 oz)     Allergies, and PMH/PSH reviewed in EPIC today.  REVIEW OF SYSTEMS:  10 point ROS of systems including Constitutional, Eyes, Respiratory, Cardiovascular,  "Gastroenterology, Genitourinary, Integumentary, Musculoskeletal, Psychiatric were all negative except for pertinent positives noted in my HPI.    Objective:   /86   Pulse 92   Temp 98.5  F (36.9  C)   Resp 18   Ht 1.651 m (5' 5\")   Wt (!) 155.5 kg (342 lb 12.8 oz)   SpO2 95%   BMI 57.04 kg/m    GENERAL APPEARANCE:  Alert, in no distress, morbidly obese, cooperative  ENT:  Mouth and posterior oropharynx normal, moist mucous membranes, [place]  EYES:  EOM, conjunctivae, lids, pupils and irises normal  NECK:  No adenopathy,masses or thyromegaly  RESP:  respiratory effort and palpation of chest normal, lungs clear to auscultation , no respiratory distress  CV:  Palpation and auscultation of heart done , regular rate and rhythm, no murmur, rub, or gallop, peripheral edema 1-2+ in BLE  ABDOMEN:  normal bowel sounds, soft, nontender, no hepatosplenomegaly or other masses, no guarding or rebound  M/S:   AMbulates short distance. easy stand when tired/fatigued. Wheechair for long distance needs  SKIN:  Inspection of skin and subcutaneous tissue baseline, see photos. Redness present. Dry milady with flaking present.   NEURO:   Cranial nerves 2-12 are normal tested and grossly at patient's baseline, no purposeful movement in upper and lower extremities  PSYCH:  oriented X 3, memory impaired , affect and mood normal                              Most Recent 3 CBC's:  Recent Labs   Lab Test 11/22/22  0654 09/22/22  1559 04/12/22  1218   WBC 11.0 5.4 5.2   HGB 10.9* 13.4 14.3   MCV 91 92 93    257 229     Most Recent 3 BMP's:  Recent Labs   Lab Test 11/22/22  0654 09/22/22  1559 04/12/22  1218 01/13/22  1234 01/04/22  0439 12/02/21  1155 09/10/19  1409 07/25/19  1215 10/15/18  1434 09/13/18  1345     --   --   --  139 141  --   --   --  144   POTASSIUM 4.4  --   --   --  3.6  --   --   --   --  4.3   CHLORIDE 100  --   --   --  99  --   --   --   --  101   CO2 26  --   --   --  27  --   --   --   --  34*   BUN " "15.8  --   --   --  15  --   --  15   < > 16   CR 0.74 0.51 0.65   < > 0.66  0.66  --    < > 0.63   < > 0.75   ANIONGAP 11  --   --   --  13  --   --   --   --  9   WILVER 9.1  --   --   --  9.9  --   --   --   --  9.7   *  --   --   --  82  --   --   --   --  96    < > = values in this interval not displayed.     Most Recent 3 INR's:  Recent Labs   Lab Test 04/03/20  1630 03/03/20  1230 02/04/20  1230   INR 1.36* 2.55* 2.12*     Most Recent ESR & CRP:  Recent Labs   Lab Test 11/22/22  0654   .00*     Most Recent Anemia Panel:  Recent Labs   Lab Test 11/22/22  0654   WBC 11.0   HGB 10.9*   HCT 34.5*   MCV 91          ASSESSMENT/PLAN  (M05.79) Seropositive rheumatoid arthritis of multiple sites (H)  (primary encounter diagnosis)  (R76.8) Cyclic citrullinated peptide (CCP) antibody positive  (M15.9) Primary osteoarthritis involving multiple joints  (E55.9) Vitamin D deficiency  Comment: Chronic. Managed by rheumatology. c/o posterior neck pain as well as \"all over pain\" that is most likely neuropathic based on description.   Plan:   -Continue methotrexate 10mg weekly as directed and folic acid 1mg daily. ON HOLD WHILE ON ABX  -COntinue Tylenol 1000mg every 6 hours PRN  -Continue flexeril to 10mg TID PRN  -Continue gabapentin to 200mg TID for now. Adjust accordingly to assist with pain control  -Continue PRN ibuprofen 400mg every 8 hours PRN  -Continue oxycodone 5mg every 6 hours for now. GDR in near future if non use.   -Continue humira 40mg injection every 2 weeks as directed. ON HOLD WHILE ON ABX  -Continue Duloxetine 20mg daily as directed  -Continue vitamin D down to 2000U daily  -Follow up with rheumatology as directed. Scheduled for 3/22/23  -BMP, CBC, CRP, procalcitonin due 11/29/22  -Trend labs at next routine visit     (I89.0) Lymphedema of both lower extremities  (I73.9) PAD (peripheral artery disease) (H)  (L03.116) Cellulitis of LLE  (A41.9) Sepsis  Comment: Chronic. No claudication " complaints. Recent infection. Sepsis POA d/t LLE cellulitis in patient with hx of MRSA cellulitis with abscess: Had MRSA cellulitis with abscess in RLE in June 2022 for which she was admitted. Now with worsening erythema and swelling involving the RLE. Clinically improved with IV vancomycin. u/s LE ruled out DVT. Noted on 11/21/22-Nurse called today to report: Staff assessed patient's left toe. The first 3 toes are having drainage, moist and skin is open. It looks fungal to me but do not know if it started as a blister. Area approximately measures 7 x 4.5cm with foul smelling discharge. ABx was switched to clindamycin  Plan:   -Continue new antibiotic of clindamycin 300mg TID x 10 days as directed until completion.   -Continue prednisone burst as directed  -Recommend to follow up with vascular clinic of [place] within 1 month. Pending appt needs. Previously saw back in Aug 2021.   -Wound provider to follow on site ongoing for additional recommendations.   -Continue lymphedema therapy eval and treat  -Monitor for worsening s/sx of concerns  -Staff to provide appropriate skin hygiene needs as directed  -BMP, CBC, CRP, procalcitonin due 11/29/22  -Trend labs at next routine visit  -Wound care for Left toe (first 3 digits): Clean with wound cleanser. Pat Dry. Apply eucerin cream. Cover with nonadaptic, and cover with gauze and secure with Kerlix daily and PRN. Special Instructions: Left toe infected wound care until being seen by wound MD  -Eucerin to bilateral lower extremities daily  -Ace wrap to bilateral lower extremities daily until lymphedema follows.      (K59.01) Constipation  Comment: Acute on chronic. Suspect due to polypharmacy and narcotic use  Plan:  -Monitor BM patterns  -Continue senna S 1 tab BID. HOLD for loose stools  -BMP, CBC, CRP, procalcitonin due 11/29/22  -Trend labs at next routine visit     (I10) Essential hypertension with goal blood pressure less than 140/90  (Z86.718) History of DVT of  lower extremity  (E78.00) High blood cholesterol  Comment: Chronic. Based on JNC-8 goals,  patients age of 57 year old, no presence of diabetes or CKD, and goals of care goal BP is <150/90 mm Hg. Patient is stable with current plan of care and routine assessment. History of DVT to LLE. SBP readings remaining 130s-150s.   Plan:   -Monitor BP and HR  -Continue hydrochlorothiazide 25mg daily.   -Continue xarelto 20mg daily  -BMP, CBC, CRP, procalcitonin due 11/29/22  -Trend labs at next routine visit     (E66.01) Morbid obesity (H)  (G47.33) Obstructive sleep apnea syndrome  Comment: Chronic. Hx of LOPEZ, does not have CPAP as this causes sinus problems per patient. Patient with frequent desaturations while sleeping during history of hospital admission. Recommend follow up with sleep medicine provider to provide recommendations for management which she last saw at [facility] location last in 2016  Plan:   -Monitor sleeping patterns  -Monitor respiratory status  -Oxygen used at night  -Follow up with sleep medicine as directed at [facility] location within 3 months  -BMP, CBC, CRP, procalcitonin due 11/29/22  -Trend labs at next routine visit     (D64.9) Anemia, unspecified type  Comment: Acute on chronic. Baseline hgb around 13  Plan:   -Monitor bleeding risks  -Monitor for worsening s/sx of concerns  -BMP, CBC, CRP, procalcitonin due 11/29/22  -Trend labs at next routine visit     Electronically signed by:  Arlen Carlson DNP, APRN

## 2022-11-29 ENCOUNTER — NURSING HOME VISIT (OUTPATIENT)
Dept: GERIATRICS | Facility: CLINIC | Age: 57
End: 2022-11-29
Payer: COMMERCIAL

## 2022-11-29 VITALS
WEIGHT: 293 LBS | OXYGEN SATURATION: 95 % | BODY MASS INDEX: 48.82 KG/M2 | HEIGHT: 65 IN | HEART RATE: 92 BPM | RESPIRATION RATE: 18 BRPM | TEMPERATURE: 98.5 F | DIASTOLIC BLOOD PRESSURE: 86 MMHG | SYSTOLIC BLOOD PRESSURE: 127 MMHG

## 2022-11-29 DIAGNOSIS — F33.41 RECURRENT MAJOR DEPRESSIVE DISORDER, IN PARTIAL REMISSION (H): ICD-10-CM

## 2022-11-29 DIAGNOSIS — E78.00 HIGH BLOOD CHOLESTEROL: ICD-10-CM

## 2022-11-29 DIAGNOSIS — M05.79 SEROPOSITIVE RHEUMATOID ARTHRITIS OF MULTIPLE SITES (H): ICD-10-CM

## 2022-11-29 DIAGNOSIS — D64.9 ANEMIA, UNSPECIFIED TYPE: ICD-10-CM

## 2022-11-29 DIAGNOSIS — I89.0 LYMPHEDEMA OF BOTH LOWER EXTREMITIES: ICD-10-CM

## 2022-11-29 DIAGNOSIS — I10 ESSENTIAL HYPERTENSION WITH GOAL BLOOD PRESSURE LESS THAN 140/90: ICD-10-CM

## 2022-11-29 DIAGNOSIS — R76.8 CYCLIC CITRULLINATED PEPTIDE (CCP) ANTIBODY POSITIVE: ICD-10-CM

## 2022-11-29 DIAGNOSIS — M15.0 PRIMARY OSTEOARTHRITIS INVOLVING MULTIPLE JOINTS: ICD-10-CM

## 2022-11-29 DIAGNOSIS — Z86.718 HISTORY OF DVT OF LOWER EXTREMITY: ICD-10-CM

## 2022-11-29 DIAGNOSIS — G47.33 OBSTRUCTIVE SLEEP APNEA SYNDROME: ICD-10-CM

## 2022-11-29 DIAGNOSIS — I73.9 PAD (PERIPHERAL ARTERY DISEASE) (H): ICD-10-CM

## 2022-11-29 DIAGNOSIS — E66.01 MORBID OBESITY (H): ICD-10-CM

## 2022-11-29 DIAGNOSIS — M25.50 POLYARTHRALGIA: ICD-10-CM

## 2022-11-29 DIAGNOSIS — E55.9 VITAMIN D DEFICIENCY: ICD-10-CM

## 2022-11-29 DIAGNOSIS — L03.116 CELLULITIS OF LEFT LOWER EXTREMITY: Primary | ICD-10-CM

## 2022-11-29 LAB
ANION GAP SERPL CALCULATED.3IONS-SCNC: 12 MMOL/L (ref 7–15)
BUN SERPL-MCNC: 13.7 MG/DL (ref 8–23)
CALCIUM SERPL-MCNC: 8.8 MG/DL (ref 8.6–10)
CHLORIDE SERPL-SCNC: 96 MMOL/L (ref 98–107)
CREAT SERPL-MCNC: 0.5 MG/DL (ref 0.51–0.95)
CRP SERPL-MCNC: 100 MG/L
DEPRECATED HCO3 PLAS-SCNC: 28 MMOL/L (ref 22–29)
ERYTHROCYTE [DISTWIDTH] IN BLOOD BY AUTOMATED COUNT: 15.6 % (ref 10–15)
GFR SERPL CREATININE-BSD FRML MDRD: >90 ML/MIN/1.73M2
GLUCOSE SERPL-MCNC: 97 MG/DL (ref 70–99)
HCT VFR BLD AUTO: 34.9 % (ref 35–47)
HGB BLD-MCNC: 10.8 G/DL (ref 11.7–15.7)
MCH RBC QN AUTO: 28.3 PG (ref 26.5–33)
MCHC RBC AUTO-ENTMCNC: 30.9 G/DL (ref 31.5–36.5)
MCV RBC AUTO: 91 FL (ref 78–100)
PLATELET # BLD AUTO: 303 10E3/UL (ref 150–450)
POTASSIUM SERPL-SCNC: 3.9 MMOL/L (ref 3.4–5.3)
PROCALCITONIN SERPL IA-MCNC: 0.05 NG/ML
RBC # BLD AUTO: 3.82 10E6/UL (ref 3.8–5.2)
SODIUM SERPL-SCNC: 136 MMOL/L (ref 136–145)
WBC # BLD AUTO: 8.9 10E3/UL (ref 4–11)

## 2022-11-29 PROCEDURE — 36415 COLL VENOUS BLD VENIPUNCTURE: CPT | Mod: ORL | Performed by: INTERNAL MEDICINE

## 2022-11-29 PROCEDURE — P9604 ONE-WAY ALLOW PRORATED TRIP: HCPCS | Mod: ORL | Performed by: INTERNAL MEDICINE

## 2022-11-29 PROCEDURE — 86140 C-REACTIVE PROTEIN: CPT | Mod: ORL | Performed by: INTERNAL MEDICINE

## 2022-11-29 PROCEDURE — 84145 PROCALCITONIN (PCT): CPT | Mod: ORL | Performed by: INTERNAL MEDICINE

## 2022-11-29 PROCEDURE — 85027 COMPLETE CBC AUTOMATED: CPT | Mod: ORL | Performed by: INTERNAL MEDICINE

## 2022-11-29 PROCEDURE — 80048 BASIC METABOLIC PNL TOTAL CA: CPT | Mod: ORL | Performed by: INTERNAL MEDICINE

## 2022-11-29 PROCEDURE — 99310 SBSQ NF CARE HIGH MDM 45: CPT | Performed by: NURSE PRACTITIONER

## 2022-11-29 NOTE — LETTER
11/29/2022        RE: María Elena Saab  [facility] [address]  [address]  [place] [address]        [facility]    Chief Complaint   Patient presents with     RECHECK     HPI:  María Elena Saab is a 57 year old  (1965), who is being seen today for an episodic care visit at: [facility] () [[id number]]. Today's concern is: The primary encounter diagnosis was Cellulitis of left lower extremity. Diagnoses of Recurrent major depressive disorder, in partial remission (H), Polyarthralgia, Seropositive rheumatoid arthritis of multiple sites (H), Cyclic citrullinated peptide (CCP) antibody positive, Primary osteoarthritis involving multiple joints, Vitamin D deficiency, Morbid obesity (H), Lymphedema of both lower extremities, PAD (peripheral artery disease) (H), Essential hypertension with goal blood pressure less than 140/90, History of DVT of lower extremity, High blood cholesterol, Obstructive sleep apnea syndrome, and Anemia, unspecified type were also pertinent to this visit.    Met with patient who denies any chest pain, palpitations, shortness of breath, ESPITIA, lightheadedness, dizziness, or cough. Denies any abdominal discomfort. Denies N&V. Denies B&B concerns. Denies dysuria or frequency. Denies loose or constipation. Appetite good. Sleeping well. Reports pain to LLE improving with current gabapentin dose adjustment. She does complain of pain and itch at times to LLE. Nursing denies any acute concerns. History of being followed by vascular however no follow up for over 1 year.     BP Readings from Last 3 Encounters:   11/29/22 127/86   11/22/22 118/72   11/16/22 118/72     Wt Readings from Last 5 Encounters:   11/29/22 (!) 155.5 kg (342 lb 12.8 oz)   11/22/22 (!) 155.5 kg (342 lb 12.8 oz)   11/16/22 (!) 155.5 kg (342 lb 12.8 oz)   11/09/22 (!) 155.5 kg (342 lb 12.8 oz)   09/27/22 (!) 155.5 kg (342 lb 12.8 oz)     Allergies, and PMH/PSH reviewed in EPIC  "today.  REVIEW OF SYSTEMS:  10 point ROS of systems including Constitutional, Eyes, Respiratory, Cardiovascular, Gastroenterology, Genitourinary, Integumentary, Musculoskeletal, Psychiatric were all negative except for pertinent positives noted in my HPI.    Objective:   /86   Pulse 92   Temp 98.5  F (36.9  C)   Resp 18   Ht 1.651 m (5' 5\")   Wt (!) 155.5 kg (342 lb 12.8 oz)   SpO2 95%   BMI 57.04 kg/m    GENERAL APPEARANCE:  Alert, in no distress, morbidly obese, cooperative  ENT:  Mouth and posterior oropharynx normal, moist mucous membranes, [place]  EYES:  EOM, conjunctivae, lids, pupils and irises normal  NECK:  No adenopathy,masses or thyromegaly  RESP:  respiratory effort and palpation of chest normal, lungs clear to auscultation , no respiratory distress  CV:  Palpation and auscultation of heart done , regular rate and rhythm, no murmur, rub, or gallop, peripheral edema 1-2+ in BLE  ABDOMEN:  normal bowel sounds, soft, nontender, no hepatosplenomegaly or other masses, no guarding or rebound  M/S:   AMbulates short distance. easy stand when tired/fatigued. Wheechair for long distance needs  SKIN:  Inspection of skin and subcutaneous tissue baseline, see photos. Redness present. Dry milady with flaking present.   NEURO:   Cranial nerves 2-12 are normal tested and grossly at patient's baseline, no purposeful movement in upper and lower extremities  PSYCH:  oriented X 3, memory impaired , affect and mood normal                              Most Recent 3 CBC's:  Recent Labs   Lab Test 11/22/22  0654 09/22/22  1559 04/12/22  1218   WBC 11.0 5.4 5.2   HGB 10.9* 13.4 14.3   MCV 91 92 93    257 229     Most Recent 3 BMP's:  Recent Labs   Lab Test 11/22/22  0654 09/22/22  1559 04/12/22  1218 01/13/22  1234 01/04/22  0439 12/02/21  1155 09/10/19  1409 07/25/19  1215 10/15/18  1434 09/13/18  1345     --   --   --  139 141  --   --   --  144   POTASSIUM 4.4  --   --   --  3.6  --   --   --   --  4.3 " "  CHLORIDE 100  --   --   --  99  --   --   --   --  101   CO2 26  --   --   --  27  --   --   --   --  34*   BUN 15.8  --   --   --  15  --   --  15   < > 16   CR 0.74 0.51 0.65   < > 0.66  0.66  --    < > 0.63   < > 0.75   ANIONGAP 11  --   --   --  13  --   --   --   --  9   IWLVER 9.1  --   --   --  9.9  --   --   --   --  9.7   *  --   --   --  82  --   --   --   --  96    < > = values in this interval not displayed.     Most Recent 3 INR's:  Recent Labs   Lab Test 04/03/20  1630 03/03/20  1230 02/04/20  1230   INR 1.36* 2.55* 2.12*     Most Recent ESR & CRP:  Recent Labs   Lab Test 11/22/22  0654   .00*     Most Recent Anemia Panel:  Recent Labs   Lab Test 11/22/22  0654   WBC 11.0   HGB 10.9*   HCT 34.5*   MCV 91          ASSESSMENT/PLAN  (M05.79) Seropositive rheumatoid arthritis of multiple sites (H)  (primary encounter diagnosis)  (R76.8) Cyclic citrullinated peptide (CCP) antibody positive  (M15.9) Primary osteoarthritis involving multiple joints  (E55.9) Vitamin D deficiency  Comment: Chronic. Managed by rheumatology. c/o posterior neck pain as well as \"all over pain\" that is most likely neuropathic based on description.   Plan:   -Continue methotrexate 10mg weekly as directed and folic acid 1mg daily. ON HOLD WHILE ON ABX  -COntinue Tylenol 1000mg every 6 hours PRN  -Continue flexeril to 10mg TID PRN  -Continue gabapentin to 200mg TID for now. Adjust accordingly to assist with pain control  -Continue PRN ibuprofen 400mg every 8 hours PRN  -Continue oxycodone 5mg every 6 hours for now. GDR in near future if non use.   -Continue humira 40mg injection every 2 weeks as directed. ON HOLD WHILE ON ABX  -Continue Duloxetine 20mg daily as directed  -Continue vitamin D down to 2000U daily  -Follow up with rheumatology as directed. Scheduled for 3/22/23  -BMP, CBC, CRP, procalcitonin due 11/29/22  -Trend labs at next routine visit     (I89.0) Lymphedema of both lower extremities  (I73.9) PAD " (peripheral artery disease) (H)  (L03.116) Cellulitis of LLE  (A41.9) Sepsis  Comment: Chronic. No claudication complaints. Recent infection. Sepsis POA d/t LLE cellulitis in patient with hx of MRSA cellulitis with abscess: Had MRSA cellulitis with abscess in RLE in June 2022 for which she was admitted. Now with worsening erythema and swelling involving the RLE. Clinically improved with IV vancomycin. u/s LE ruled out DVT. Noted on 11/21/22-Nurse called today to report: Staff assessed patient's left toe. The first 3 toes are having drainage, moist and skin is open. It looks fungal to me but do not know if it started as a blister. Area approximately measures 7 x 4.5cm with foul smelling discharge. ABx was switched to clindamycin  Plan:   -Continue new antibiotic of clindamycin 300mg TID x 10 days as directed until completion.   -Continue prednisone burst as directed  -Recommend to follow up with vascular clinic of [place] within 1 month. Pending appt needs. Previously saw back in Aug 2021.   -Wound provider to follow on site ongoing for additional recommendations.   -Continue lymphedema therapy eval and treat  -Monitor for worsening s/sx of concerns  -Staff to provide appropriate skin hygiene needs as directed  -BMP, CBC, CRP, procalcitonin due 11/29/22  -Trend labs at next routine visit  -Wound care for Left toe (first 3 digits): Clean with wound cleanser. Pat Dry. Apply eucerin cream. Cover with nonadaptic, and cover with gauze and secure with Kerlix daily and PRN. Special Instructions: Left toe infected wound care until being seen by wound MD  -Eucerin to bilateral lower extremities daily  -Ace wrap to bilateral lower extremities daily until lymphedema follows.      (K59.01) Constipation  Comment: Acute on chronic. Suspect due to polypharmacy and narcotic use  Plan:  -Monitor BM patterns  -Continue senna S 1 tab BID. HOLD for loose stools  -BMP, CBC, CRP, procalcitonin due 11/29/22  -Trend labs at next routine  visit     (I10) Essential hypertension with goal blood pressure less than 140/90  (Z86.718) History of DVT of lower extremity  (E78.00) High blood cholesterol  Comment: Chronic. Based on JNC-8 goals,  patients age of 57 year old, no presence of diabetes or CKD, and goals of care goal BP is <150/90 mm Hg. Patient is stable with current plan of care and routine assessment. History of DVT to LLE. SBP readings remaining 130s-150s.   Plan:   -Monitor BP and HR  -Continue hydrochlorothiazide 25mg daily.   -Continue xarelto 20mg daily  -BMP, CBC, CRP, procalcitonin due 11/29/22  -Trend labs at next routine visit     (E66.01) Morbid obesity (H)  (G47.33) Obstructive sleep apnea syndrome  Comment: Chronic. Hx of LOPEZ, does not have CPAP as this causes sinus problems per patient. Patient with frequent desaturations while sleeping during history of hospital admission. Recommend follow up with sleep medicine provider to provide recommendations for management which she last saw at [facility] location last in 2016  Plan:   -Monitor sleeping patterns  -Monitor respiratory status  -Oxygen used at night  -Follow up with sleep medicine as directed at [facility] location within 3 months  -BMP, CBC, CRP, procalcitonin due 11/29/22  -Trend labs at next routine visit     (D64.9) Anemia, unspecified type  Comment: Acute on chronic. Baseline hgb around 13  Plan:   -Monitor bleeding risks  -Monitor for worsening s/sx of concerns  -BMP, CBC, CRP, procalcitonin due 11/29/22  -Trend labs at next routine visit     Electronically signed by:  Arlen Carlson DNP, APRN               Sincerely,        KRISTIN Rodriges CNP

## 2022-11-30 ENCOUNTER — TELEPHONE (OUTPATIENT)
Dept: VASCULAR SURGERY | Facility: CLINIC | Age: 57
End: 2022-11-30

## 2022-11-30 NOTE — TELEPHONE ENCOUNTER
Carissa and see vascular medicine at [facility]. Venous insufficiency has been completed already. Has DVT/ PVD and Lymphedema to address.

## 2022-11-30 NOTE — TELEPHONE ENCOUNTER
Patient needs to be scheduled for in person consult with Dr. Aaron or Dr. Zhao at next available. No imaging prior.    Appointment note: Referral for LLE cellulitis, lymphedema, hx of DVT, PVD.     Mariel OSEGUERA, RN    [facility]  Office: [phone]  Fax: [phone]

## 2022-11-30 NOTE — TELEPHONE ENCOUNTER
Spoke to [facility] and informed them patient should be seen by vascular medicine. They are requesting a call back from [facility] to schedule: [phone] (ask for 2nd floor nursing station)

## 2022-11-30 NOTE — TELEPHONE ENCOUNTER
Mariana from [facility] [address] calling to schedule this patient with a vascular provider.  She states it is in her documentation to schedule at our clinic per the nursing home visit on 11/29/22.  Please review and advise on which provider is appropriate and if any imaging is needed before being seen.    Schedulers, call Mariana at the [facility] to schedule: [phone]

## 2022-12-07 ENCOUNTER — OFFICE VISIT (OUTPATIENT)
Dept: OTHER | Facility: CLINIC | Age: 57
End: 2022-12-07
Attending: INTERNAL MEDICINE
Payer: COMMERCIAL

## 2022-12-07 VITALS
DIASTOLIC BLOOD PRESSURE: 83 MMHG | WEIGHT: 293 LBS | SYSTOLIC BLOOD PRESSURE: 121 MMHG | OXYGEN SATURATION: 95 % | BODY MASS INDEX: 56.58 KG/M2 | HEART RATE: 83 BPM

## 2022-12-07 DIAGNOSIS — I89.0 LYMPHEDEMA: Primary | ICD-10-CM

## 2022-12-07 DIAGNOSIS — E66.01 MORBID OBESITY (H): ICD-10-CM

## 2022-12-07 DIAGNOSIS — L03.119 CELLULITIS OF LOWER EXTREMITY, UNSPECIFIED LATERALITY: ICD-10-CM

## 2022-12-07 DIAGNOSIS — Z86.718 HISTORY OF DEEP VENOUS THROMBOSIS: ICD-10-CM

## 2022-12-07 DIAGNOSIS — G47.33 OSA (OBSTRUCTIVE SLEEP APNEA): ICD-10-CM

## 2022-12-07 DIAGNOSIS — M05.79 SEROPOSITIVE RHEUMATOID ARTHRITIS OF MULTIPLE SITES (H): ICD-10-CM

## 2022-12-07 PROCEDURE — 99205 OFFICE O/P NEW HI 60 MIN: CPT | Performed by: INTERNAL MEDICINE

## 2022-12-07 PROCEDURE — G0463 HOSPITAL OUTPT CLINIC VISIT: HCPCS

## 2022-12-07 NOTE — PROGRESS NOTES
[facility] INITIAL VASCULAR MEDICINE CONSULT    (New patient visit)      PRIMARY HEALTH CARE PROVIDER:  Arlen Carlson APRN CNP      REFERRING HEALTH CARE PROVIDER;  Arlen Carlson APRN CNP      REASON FOR CONSULT: Evaluation and management of chronic bilateral lower extremity lymphedema in a morbidly obese female BMI greater than 56.5 lives in a facility with history of multiple comorbidities and recurrent cellulitis of legs.      HPI: María Elena Saab is a 57 year old very pleasant female morbidly obese BMI greater than 56.5, history of DVT, recurrent cellulitis of bilateral lower extremities, known history of bilateral lower extremity lymphedema, obstructive sleep apnea and uses nocturnal oxygen but not using any CPAP machine, rheumatoid arthritis on treatment, DJD of major joints here today for evaluation and management of bilateral lower extremity lymphedema.  She is trying to lose wraps in the facility but they are not helping.  Recently developed right lower extremity cellulitis treated then followed by left lower extremity cellulitis she also has a history of MRSA.  Completed course of Bactrim and clindamycin etc..  She denies any fever, chills  She is chronically on anticoagulation previously warfarin for many years then followed by currently Xarelto 20 mg daily tolerating without any problems      She is new to this clinic she brought facility papers, reviewed extensive records outside records and updated chart      PAST MEDICAL HISTORY  Past Medical History:   Diagnosis Date     Aseptic necrosis of femoral head (H)     LEFT     Bacteremia due to group B Streptococcus      Cellulitis of right lower extremity      DJD (degenerative joint disease)      DVT, bilateral lower limbs (H) 10/2014     Edema 5/22/2015     Essential hypertension with goal blood pressure less than 140/90      Failure to thrive      History of blood clots      Hypokalemia 10/15/2014     Lung mass  10/18/2014     Morbid obesity (H) 4/10/2015    Had formal nutrition consult at [facility].  RD reports that she is not willing to commit to the program outlined.  See scanned document.  12/15/2015 - Marcio Quinonez MD        Nocturnal hypoxemia - probable sleep apnea - had overnight pulse oximetry, April, 2015. 5/22/2015     Obesity 4/10/2015     LOPEZ (obstructive sleep apnea) 5/22/2015     Osteoarthritis      Peripheral vascular disease (H)      Pleural effusion      Pressure ulcer of foot      Rheumatoid arthritis (H) 12/30/2014    seronegative     Sepsis (H)      Seropositive rheumatoid arthritis (H) 1/19/2016     Vitamin D deficiency 8/26/2015       CURRENT MEDICATIONS  ascorbic acid, vitamin C, (ASCORBIC ACID WITH KAEL HIPS) 500 MG tablet, [ASCORBIC ACID, VITAMIN C, (ASCORBIC ACID WITH KAEL HIPS) 500 MG TABLET] Take 500 mg by mouth 2 (two) times a day.  cholecalciferol 50 MCG (2000 UT) tablet, Take 1 tablet (50 mcg) by mouth daily  cyclobenzaprine (FLEXERIL) 5 MG tablet, Take 2 tablets (10 mg) by mouth 3 times daily as needed for muscle spasms  DULoxetine (CYMBALTA) 20 MG capsule, [DULOXETINE (CYMBALTA) 20 MG CAPSULE] Take 1 capsule (20 mg total) by mouth daily.  folic acid (FOLVITE) 1 MG tablet, Take 1 mg by mouth  gabapentin (NEURONTIN) 100 MG capsule, Take 2 capsules (200 mg) by mouth 3 times daily  HUMIRA PEN 40 MG/0.8ML pen kit, INJECT 40 MG (0.8 ML) UNDER THE SKIN EVERY 14 DAYS.  hydroCHLOROthiazide (HYDRODIURIL) 25 MG tablet, [HYDROCHLOROTHIAZIDE (HYDRODIURIL) 25 MG TABLET] Take 25 mg by mouth daily.  ibuprofen (ADVIL/MOTRIN) 200 MG tablet, Take 2 tablets (400 mg) by mouth every 8 hours as needed for mild pain  lanolin alcohol-mo-w.pet-ceres (EUCERIN) Crea, Apply topically 2 times daily  miconazole (MICATIN) 2 % external cream, Apply topically 2 times daily Apply to under toes  multivitamin therapeutic (THERAGRAN) tablet, Take 1 tablet by mouth daily  NYAMYC 236471 UNIT/GM external powder, 2 times daily  Apply to rash areas under brest and under stomach  oxyCODONE (OXY-IR) 5 MG capsule, Take 1 capsule (5 mg) by mouth every 6 hours as needed for severe pain (7-10)  rivaroxaban ANTICOAGULANT (XARELTO) 20 MG TABS tablet, Take 20 mg by mouth daily  senna-docusate (SENOKOT-S/PERICOLACE) 8.6-50 MG tablet, Take 1 tablet by mouth 2 times daily  acetaminophen (TYLENOL) 500 MG tablet, Take 2 tablets (1,000 mg) by mouth every 6 hours as needed for mild pain (Patient not taking: Reported on 12/7/2022)  adalimumab (HUMIRA) 40 mg/0.8 mL injection, [ADALIMUMAB (HUMIRA) 40 MG/0.8 ML INJECTION] Inject 0.8 mL (40 mg total) under the skin every 14 (fourteen) days. (Patient not taking: Reported on 12/7/2022)  methotrexate 10 MG tablet, [METHOTREXATE 10 MG TABLET] Take 1 tablet (10 mg total) by mouth once a week. (Patient not taking: Reported on 12/7/2022)    No current facility-administered medications on file prior to visit.      PAST SURGICAL HISTORY:  Past Surgical History:   Procedure Laterality Date     JOINT REPLACEMENT      knee     PERIPHERALLY INSERTED CENTRAL CATHETER INSERTION Right 07/14/2019    4fr/44cm/Rt Cephalic.lowSVC.MGlav     TOTAL HIP ARTHROPLASTY Left 10/18/2016    Procedure: LEFT HIP TOTAL ARTHROPLASTY;  Surgeon: London Goss MD;  Location: [facility];  Service:      TOTAL KNEE ARTHROPLASTY Left 2006       ALLERGIES     Allergies   Allergen Reactions     Adhesive Tape Other (See Comments)     As on band-aids;  Causes skin tearing/wounds.     Adhesive [Mecrylate] Other (See Comments)     As on band-aids;  Causes skin tearing/wounds.     Banana Unknown     She avoids due to Latex allergy.  If she eats them 3 days in a row, gets stomach cramps and dark stool.  TBrinkMD - 4/10/15     Food Unknown     Bananas, grapes, pine nuts     Grape [Grape (Artificial) Flavor] Unknown     She avoids due to Latex allergy.  If she eats lots of them, and she likes them, stomach gets a little queasy.  TBrinkMD - 4/10/15      Morphine (Pf) [Morphine] Other (See Comments)     Pt has not reacted to this med herself, but mother has anaphylactic reaction and other family members get nausea/vomiting.     Other Environmental Allergy Swelling     leather     Pine      Pseudoephedrine Other (See Comments)     Insomnia     Pseudoephedrine Cold/Allergy [Cpm-Pse Cr] Other (See Comments)     Keeps pt awake up to 24 hours      Latex Rash     Hands get red from rubber gloves, but okay if she washes them right away.  Hands get red from rubber gloves, but okay if she washes them right away.     Other Food Allergy Rash     Pine trees, leather .        FAMILY HISTORY  Family History   Problem Relation Age of Onset     Multiple myeloma Father      Deep Vein Thrombosis Father      Cataracts Father      Snoring Father      Other - See Comments Brother         Blood withdrawn from time to time.  Sounds like hemochromatosis.     Sleep Apnea Brother      Cancer Mother         Uterine CA     Arthritis Mother      Cataracts Mother      Breast Cancer Mother 54.00     No Known Problems Sister      Rheumatoid Arthritis Maternal Grandmother      Breast Cancer Maternal Grandmother 50.00     Heart Disease Maternal Grandmother      Rheumatoid Arthritis Paternal Aunt      Breast Cancer Maternal Aunt 54.00     Breast Cancer Cousin      Clotting Disorder No family hx of        VASCULAR FAMILY HISTORY  1st order relative with atherosclerotic PAD: No  1st order relative with AAA: No  Family history of Familial Hyperlipidemia No  Family History of Hypercoagulable state:No    VASCULAR RISK FACTORS  1. Diabetes:No   2. Smoking: has never smoked.  3. HTN: controlled  4.Hyperlipidemia: No      SOCIAL HISTORY  Social History     Socioeconomic History     Marital status: Single     Spouse name: Not on file     Number of children: Not on file     Years of education: Not on file     Highest education level: Not on file   Occupational History     Not on file   Tobacco Use     Smoking  status: Former     Packs/day: 1.00     Years: 30.00     Pack years: 30.00     Types: Cigarettes     Smokeless tobacco: Never   Substance and Sexual Activity     Alcohol use: Not Currently     Comment: Alcoholic Drinks/day: 3-4 times a year she will have 1 or 2.     Drug use: No     Sexual activity: Never     Partners: Female   Other Topics Concern     Not on file   Social History Narrative    Long term  At [facility] to rehab to level allowing surgery on deteriorated joints  LTC12/2015     Social Determinants of Health     Financial Resource Strain: Not on file   Food Insecurity: Not on file   Transportation Needs: Not on file   Physical Activity: Not on file   Stress: Not on file   Social Connections: Not on file   Intimate Partner Violence: Not on file   Housing Stability: Not on file       ROS:   General: No change in weight, sleep or appetite.  Normal energy.  No fever or chills  Eyes: Negative for vision changes or eye problems  ENT: No problems with ears, nose or throat.  No difficulty swallowing.  Resp: No coughing, wheezing or shortness of breath  CV: No chest pains or palpitations  GI: No nausea, vomiting,  heartburn, abdominal pain, diarrhea, constipation or change in bowel habits  : No urinary frequency or dysuria, bladder or kidney problems  Musculoskeletal:  BLE swelling  Neurologic: No headaches, numbness, tingling, weakness, problems with balance or coordination  Psychiatric: No problems with anxiety, depression or mental health  Heme/immune/allergy: No history of bleeding or clotting problems or anemia.  No allergies or immune system problems  Endocrine: No history of thyroid disease, diabetes or other endocrine disorders  Skin: No rashes,worrisome lesions or skin problems  Vascular: History of DVT chronically on anticoagulation  Bilateral lower extremity recurrent cellulitis  History of lymphedema in both legs  EXAM:  /83 (BP Location: Left arm, Patient Position: Chair, Cuff Size: Adult  Regular)   Pulse 83   Wt (!) 340 lb (154.2 kg)   SpO2 95%   BMI 56.58 kg/m    In general, the patient is a pleasant female in no apparent distress.    HEENT: NC/AT.  PERRLA.  EOMI.  Sclerae white, not injected.  Nares clear.  Pharynx without erythema or exudate.  Dentition intact.    Neck: No adenopathy.  No thyromegaly. Carotids +2/2 bilaterally without bruits.  No jugular venous distension.   Heart: RRR. Normal S1, S2 splits physiologically. No murmur, rub, click, or gallop. The PMI is in the 5th ICS in the midclavicular line. There is no heave.    Lungs: CTA.  No ronchi, wheezes, rales.  No dullness to percussion.   Abdomen: Soft, nontender, nondistended. No organomegaly. No AAA.  No bruits.   Extremities: Vascular:  She has a bilateral lower extremity classical lymphedema with squaring of the toes, positive Stemmer sign, thickening of the skin with keratosis in the lower part of the dorsal hump of the foot bilaterally.  Difficult to palpate pulses but able to Doppler  No signs of cellulitis on today's exam        Labs:  LIPID RESULTS:  Lab Results   Component Value Date    CHOL 221 (H) 12/02/2021    HDL 65 12/02/2021     (H) 12/02/2021    TRIG 101 12/02/2021       LIVER ENZYME RESULTS:  Lab Results   Component Value Date    AST 20 01/04/2022    AST 21 01/04/2022    ALT 12 09/22/2022       CBC RESULTS:  Lab Results   Component Value Date    WBC 8.9 11/29/2022    RBC 3.82 11/29/2022    HGB 10.8 (L) 11/29/2022    HCT 34.9 (L) 11/29/2022    MCV 91 11/29/2022    MCH 28.3 11/29/2022    MCHC 30.9 (L) 11/29/2022    RDW 15.6 (H) 11/29/2022     11/29/2022       BMP RESULTS:  Lab Results   Component Value Date     11/29/2022    POTASSIUM 3.9 11/29/2022    POTASSIUM 3.6 01/04/2022    CHLORIDE 96 (L) 11/29/2022    CHLORIDE 99 01/04/2022    CO2 28 11/29/2022    CO2 27 01/04/2022    ANIONGAP 12 11/29/2022    ANIONGAP 13 01/04/2022    GLC 97 11/29/2022    GLC 82 01/04/2022    BUN 13.7 11/29/2022    BUN  15 01/04/2022    CR 0.50 (L) 11/29/2022    GFRESTIMATED >90 11/29/2022    GFRESTIMATED >60 04/20/2021    GFRESTBLACK >60 04/20/2021    WILVER 8.8 11/29/2022        A1C RESULTS:  Lab Results   Component Value Date    A1C 5.3 12/02/2021         Procedures:     BILATERAL Venous Insufficiency Ultrasound (Date: 08/11/21)    BILATERAL Lower Extremity          Indication:  SURVEILLANCE BILATERAL LEGS VARICOSE VEINS, PAIN AND SWELLING.      Previous: NONE     Patient History: Swelling and Morbid Obesity     Presenting Symptoms:  Swelling, Varicose Veins and Pain     Technique:   Supine and Upright Ultrasound of the Deep and Superficial Veins with Valsalva and Compression Augmentation Maneuvers. Duplex Imaging is performed utilizing gray-scale, Two-dimensional images, color-flow imaging, Doppler waveform analysis, and Spectral doppler imaging done with provacative maneuvers.      Incompetency Criteria:  Deep vein reflux reported when greater than 1000 msec flow reversal. Superficial vein reflux reported when equal to or greater than 600 msec flow reversal.  vein reflux reported as greater than 350 msec flow reversal.      Right  Leg Deep Veins    CFV SFJ PFV PROX FV   PROX FV MID FV DIST POP V. PERON.   V. PTV'S   Compressibility  (FC,PC,NC) FC FC FC FC FC FC FC FC FC   Reflux -   - - - - +   -         Right Leg Saphenous Veins  Location SFJ PROX THIGH KNEE MID CALF SPJ PROX CALF MID CALF   RT GSV (mm) 6.9 6.8 6.7 3.3         Reflux - - - -         RT SSV (mm)         1.9 1.8 2.0   Reflux         - - -         Left Leg Deep Veins    CFV SFJ PFV PROX SFV PROX SFV MID SFV DIST POP V. PERON. V. PTV'S   Compressibility  (FC,PC,NC) FC FC FC FC FC FC FC FC FC   Reflux -   - - - - +   -         Lt Leg Saphenous Veins   Location SFJ PROX THIGH KNEE MID CALF SPJ PROX CALF MID CALF   LT GSV (mm) 8.0 8.2 4.7 5.4         Reflux - - - -         LT SSV (mm)         2.3 3.0 2.2   Reflux         - - -               Impression:        Right Deep Vein Findings: Patent deep venous system without reflux or DVT     Left Deep Vein Findings: Patent deep venous system without reflux or DVT     Superficial Vein Findings:      Right Greater Saphenous vein: Patent Greater Saphenous Vein without evidence of reflux.     Right Small Saphenous Vein: Patent Small Saphenous Vein without evidence of reflux.     Left Greater Saphenous Vein: Patent Greater Saphenous Vein without evidence of reflux.     Left Small Saphenous Vein: Patent Small Saphenous Vein without evidence of reflux.           Reference:     Compressibility: FC= Fully compressible, PC= Partially compressible, NC= Non-compressible, NV= Not Visualized     Reflux: (+) Incompetent  (-) Competent, (NV) = Not Visualized     Interpretation criteria:          Duration of Retrograde flow (milliseconds)  Category Deep Veins Superficial Veins  veins   Competent < 1000ms < 600ms < 350ms   Incompetent > 1000ms > 600ms > 350ms            Assessment and Plan:       1. Lymphedema    2.  recurrent Cellulitis of  Bilateral lower extremity, treated recently     3. Morbid obesity (H)    4. Seropositive rheumatoid arthritis of multiple sites (H)    5. History of deep venous thrombosis, on chronic anticoagulation with Xarelto previously treated with warfarin    6. LOPEZ (obstructive sleep apnea) wears nocturnal oxygen       This is a very pleasant 57-year-old female morbidly obese BMI greater than 56 with classical features of bilateral lower extremity lymphedema and recurrent cellulitis and skin keratosis and also history of a DVT chronically on anticoagulation.  She lives in a facility currently not using any Velcro wraps and no formal lymphedema therapy in the past.  She has a history of obstructive sleep apnea unable to tolerate CPAP machine but she wears the nocturnal oxygen.  History of rheumatoid arthritis on treatment.  Multiple other comorbidities.    Had a lengthy discussion with the patient  importance of compression, wraps, weight loss, elevation of the legs skin care etc..  I will arrange a referral for her to see the lymphedema therapist for MLD, compression and possibly pump therapy in the future  Suggested patient to talk to the primary care provider and see at medical weight loss clinic  Skin care discussed with the patient    AVS with written instructions given and she brought facility papers which were handwritten copy sent to media and original given to the patient    Return to clinic in 6 months    Thank you for the consultation    This note was dictated by utilizing Dragon software    Copy of this note to primary care provider    - Lymphedema Therapy Referral; Future        More than 60 minutes spent on the date of the encounter doing chart review, history and exam, documentation, and further activities as noted above.  She is new to this clinic reviewed extensive records outside records and updated chart        Camilo Zhao MD, RAE, FSVM, FNLA, FACP  Vascular medicine

## 2022-12-07 NOTE — PATIENT INSTRUCTIONS
Please  e Lymhedema therapist for MLD, Compression, velcro wraps , skin care etc and in the future pump therapy      Start lymphatic formula ( OTC ) take one pill  a day ( Rx and info given)     Consider seeing at weight loss  clinic , ask primary to refer     Follow up in 6 months

## 2022-12-07 NOTE — PROGRESS NOTES
Patient is here to discuss consult.    /83 (BP Location: Left arm, Patient Position: Chair, Cuff Size: Adult Regular)   Pulse 83   Wt (!) 340 lb (154.2 kg)   SpO2 95%   BMI 56.58 kg/m      Questions patient would like addressed today are: N/A.    Refills are needed: N/A    Has homecare services and agency name:  Yes[facility]     Zackary Reese

## 2022-12-09 ENCOUNTER — TELEPHONE (OUTPATIENT)
Dept: RHEUMATOLOGY | Facility: CLINIC | Age: 57
End: 2022-12-09

## 2022-12-09 ENCOUNTER — TELEPHONE (OUTPATIENT)
Dept: OTHER | Facility: CLINIC | Age: 57
End: 2022-12-09

## 2022-12-09 NOTE — TELEPHONE ENCOUNTER
Health Call Center    Phone Message    May a detailed message be left on voicemail: yes     Reason for Call: Other: Lab appt   RN at Pt's nursing facility is wondering if Pt can just complete lab from her nursing home facility instead of coming into one of the [facility] clinic? Pt as a lab appt on 12/22/22    Farhad states they already have lab orders but just wanted to make sure Dr. Humphrey is okay for Pt to complete labs from the nursing home facility instead?  They will fax over the labs to Dr. Humphrey's office once it has been completed.    Please contact Farhad back to let her know.

## 2022-12-09 NOTE — TELEPHONE ENCOUNTER
Returned call to Jaswant ROSAS requesting she call me back.    Flaca Rob RN BSN  [facility]  [phone]

## 2022-12-09 NOTE — TELEPHONE ENCOUNTER
[facility]    Who is the name of the provider?:  Pavel    What is the location you see this provider at/preferred location?: [place]  Person calling: Manager Juan A  Phone number: [phone]  Nurse call back needed:  YES     Reason for call:   Patients caregiver has questons about compression wraps. Please call her.    Pharmacy location:  n/a  Outside Imaging: Not Applicable   Can we leave a detailed message on this number?  YES

## 2022-12-09 NOTE — TELEPHONE ENCOUNTER
Called and spoke to Farhad at [facility] on [address], pt will have labs drawn at their facility, verbal lab orders given for Dr Humphrey's lab orders.

## 2022-12-09 NOTE — TELEPHONE ENCOUNTER
Phone number listed in message is for [facility] program.     Will try to find number to pts LTC facility and call them back

## 2022-12-20 ENCOUNTER — LAB REQUISITION (OUTPATIENT)
Dept: LAB | Facility: CLINIC | Age: 57
End: 2022-12-20
Payer: COMMERCIAL

## 2022-12-20 DIAGNOSIS — M06.09 RHEUMATOID ARTHRITIS WITHOUT RHEUMATOID FACTOR, MULTIPLE SITES (H): ICD-10-CM

## 2022-12-21 ENCOUNTER — NURSING HOME VISIT (OUTPATIENT)
Dept: GERIATRICS | Facility: CLINIC | Age: 57
End: 2022-12-21
Payer: COMMERCIAL

## 2022-12-21 VITALS
DIASTOLIC BLOOD PRESSURE: 74 MMHG | RESPIRATION RATE: 18 BRPM | WEIGHT: 293 LBS | HEIGHT: 65 IN | HEART RATE: 88 BPM | BODY MASS INDEX: 48.82 KG/M2 | OXYGEN SATURATION: 96 % | SYSTOLIC BLOOD PRESSURE: 121 MMHG | TEMPERATURE: 98.3 F

## 2022-12-21 DIAGNOSIS — I10 PRIMARY HYPERTENSION: ICD-10-CM

## 2022-12-21 DIAGNOSIS — I89.0 LYMPHEDEMA OF BOTH LOWER EXTREMITIES: ICD-10-CM

## 2022-12-21 DIAGNOSIS — R76.8 CYCLIC CITRULLINATED PEPTIDE (CCP) ANTIBODY POSITIVE: ICD-10-CM

## 2022-12-21 DIAGNOSIS — E55.9 VITAMIN D DEFICIENCY: ICD-10-CM

## 2022-12-21 DIAGNOSIS — D64.9 ANEMIA, UNSPECIFIED TYPE: ICD-10-CM

## 2022-12-21 DIAGNOSIS — G47.33 OBSTRUCTIVE SLEEP APNEA SYNDROME: ICD-10-CM

## 2022-12-21 DIAGNOSIS — I73.9 PAD (PERIPHERAL ARTERY DISEASE) (H): ICD-10-CM

## 2022-12-21 DIAGNOSIS — M15.0 PRIMARY OSTEOARTHRITIS INVOLVING MULTIPLE JOINTS: ICD-10-CM

## 2022-12-21 DIAGNOSIS — F33.41 RECURRENT MAJOR DEPRESSIVE DISORDER, IN PARTIAL REMISSION (H): Primary | ICD-10-CM

## 2022-12-21 DIAGNOSIS — M05.79 SEROPOSITIVE RHEUMATOID ARTHRITIS OF MULTIPLE SITES (H): ICD-10-CM

## 2022-12-21 DIAGNOSIS — I10 ESSENTIAL HYPERTENSION WITH GOAL BLOOD PRESSURE LESS THAN 140/90: ICD-10-CM

## 2022-12-21 DIAGNOSIS — E66.01 MORBID OBESITY (H): ICD-10-CM

## 2022-12-21 DIAGNOSIS — Z86.718 PERSONAL HISTORY OF DVT (DEEP VEIN THROMBOSIS): ICD-10-CM

## 2022-12-21 DIAGNOSIS — K59.01 SLOW TRANSIT CONSTIPATION: ICD-10-CM

## 2022-12-21 DIAGNOSIS — E78.00 HIGH BLOOD CHOLESTEROL: ICD-10-CM

## 2022-12-21 DIAGNOSIS — Z86.718 HISTORY OF DVT OF LOWER EXTREMITY: ICD-10-CM

## 2022-12-21 PROCEDURE — 99310 SBSQ NF CARE HIGH MDM 45: CPT | Performed by: NURSE PRACTITIONER

## 2022-12-21 RX ORDER — AMOXICILLIN 250 MG
1 CAPSULE ORAL 2 TIMES DAILY PRN
Qty: 60 TABLET | Refills: 11
Start: 2022-12-21 | End: 2023-05-02 | Stop reason: ALTCHOICE

## 2022-12-21 RX ORDER — CYCLOBENZAPRINE HCL 5 MG
5 TABLET ORAL 3 TIMES DAILY PRN
Qty: 60 TABLET | Refills: 4 | Status: SHIPPED | OUTPATIENT
Start: 2022-12-21 | End: 2023-03-14

## 2022-12-21 RX ORDER — OXYCODONE HYDROCHLORIDE 5 MG/1
5 CAPSULE ORAL 2 TIMES DAILY PRN
Qty: 60 CAPSULE | Refills: 0 | Status: SHIPPED | OUTPATIENT
Start: 2022-12-21 | End: 2023-02-14

## 2022-12-21 RX ORDER — ACETAMINOPHEN 500 MG
1000 TABLET ORAL EVERY 6 HOURS PRN
Qty: 240 TABLET | Refills: 11
Start: 2022-12-21 | End: 2023-05-23

## 2022-12-21 NOTE — LETTER
12/21/2022        RE: María Elena Saab  [facility] [address]  [address]  [place] [address]        [facility]    Chief Complaint   Patient presents with     RECHECK     HPI:  María Elena Saab is a 57 year old  (1965), who is being seen today for an episodic care visit at: [facility] () [[id number]]. Today's concern is: The primary encounter diagnosis was Recurrent major depressive disorder, in partial remission (H). Diagnoses of Morbid obesity (H), Obstructive sleep apnea syndrome, Anemia, unspecified type, Primary hypertension, Personal history of DVT (deep vein thrombosis), High blood cholesterol, History of DVT of lower extremity, Essential hypertension with goal blood pressure less than 140/90, PAD (peripheral artery disease) (H), Slow transit constipation, Lymphedema of both lower extremities, Seropositive rheumatoid arthritis of multiple sites (H), Cyclic citrullinated peptide (CCP) antibody positive, Primary osteoarthritis involving multiple joints, and Vitamin D deficiency were also pertinent to this visit.    Met with patient who denies any chest pain, palpitations, shortness of breath, ESPITIA, lightheadedness, dizziness, or cough. Denies any abdominal discomfort. Denies N&V. Denies dysuria or frequency.She reports having diarrhea few times per day and would like her senna adjusted. Appetite good. Sleeping well. Chronic pain complaints with neuropathy present but reports pain stable with current regimen. Not interested in gabapentin dose adjustment today. Continuation of lymphedema present. Redness present to bilateral lower extremities with some small drainage noted to left anterior lower leg. Does not appear to be infected today. At times can be noncompliant with lymphedema needs. She is not open to pump compression at this time. Nursing denies any acute concerns.     BP Readings from Last 3 Encounters:   12/21/22 121/74   12/07/22 121/83   11/29/22  "127/86     Wt Readings from Last 5 Encounters:   12/21/22 (!) 155.5 kg (342 lb 12.8 oz)   12/07/22 (!) 154.2 kg (340 lb)   11/29/22 (!) 155.5 kg (342 lb 12.8 oz)   11/22/22 (!) 155.5 kg (342 lb 12.8 oz)   11/16/22 (!) 155.5 kg (342 lb 12.8 oz)     Allergies, and PMH/PSH reviewed in EPIC today.  REVIEW OF SYSTEMS:  10 point ROS of systems including Constitutional, Eyes, Respiratory, Cardiovascular, Gastroenterology, Genitourinary, Integumentary, Musculoskeletal, Psychiatric were all negative except for pertinent positives noted in my HPI.    Objective:   /74   Pulse 88   Temp 98.3  F (36.8  C)   Resp 18   Ht 1.651 m (5' 5\")   Wt (!) 155.5 kg (342 lb 12.8 oz)   SpO2 96%   BMI 57.04 kg/m    GENERAL APPEARANCE:  Alert, in no distress, morbidly obese, cooperative  ENT:  Mouth and posterior oropharynx normal, moist mucous membranes, [place]  EYES:  EOM, conjunctivae, lids, pupils and irises normal  NECK:  No adenopathy,masses or thyromegaly  RESP:  respiratory effort and palpation of chest normal, lungs clear to auscultation , no respiratory distress  CV:  Palpation and auscultation of heart done , regular rate and rhythm, no murmur, rub, or gallop, peripheral edema 2+ in BLE  ABDOMEN:  normal bowel sounds, soft, nontender, no hepatosplenomegaly or other masses, no guarding or rebound  M/S:   Ambulates short distance with walker. Easy stand when fatigued  SKIN:  Inspection of skin and subcutaneous tissue baseline, see photos. dry skin present. Mild dermatitis appearance. does not appear infected.   NEURO:   Cranial nerves 2-12 are normal tested and grossly at patient's baseline, no purposeful movement in upper and lower extremities  PSYCH:  oriented X 3, normal insight, judgement and memory, affect and mood normal                                        Most Recent 3 CBC's:  Recent Labs   Lab Test 11/29/22  0820 11/22/22  0654 09/22/22  1559   WBC 8.9 11.0 5.4   HGB 10.8* 10.9* 13.4   MCV 91 91 92    427 " "257     Most Recent 3 BMP's:  Recent Labs   Lab Test 11/29/22  0820 11/22/22  0654 09/22/22  1559 01/13/22  1234 01/04/22  0439    137  --   --  139   POTASSIUM 3.9 4.4  --   --  3.6   CHLORIDE 96* 100  --   --  99   CO2 28 26  --   --  27   BUN 13.7 15.8  --   --  15   CR 0.50* 0.74 0.51   < > 0.66  0.66   ANIONGAP 12 11  --   --  13   WILVER 8.8 9.1  --   --  9.9   GLC 97 114*  --   --  82    < > = values in this interval not displayed.     Most Recent 2 LFT's:  Recent Labs   Lab Test 09/22/22  1559 04/12/22  1218 01/13/22  1234 01/04/22  0439 09/10/19  1409 07/25/19  1215   AST  --   --   --  21  20  --  23   ALT 12 19   < > 23  23  23   < > 22   ALKPHOS  --   --   --  70  72  --  69   BILITOTAL  --   --   --  0.3  0.4  --  0.3    < > = values in this interval not displayed.     Most Recent ESR & CRP:  Recent Labs   Lab Test 11/29/22  0820   .00*     Most Recent Anemia Panel:  Recent Labs   Lab Test 11/29/22  0820   WBC 8.9   HGB 10.8*   HCT 34.9*   MCV 91          ASSESSMENT/PLAN  (M05.79) Seropositive rheumatoid arthritis of multiple sites (H)  (primary encounter diagnosis)  (R76.8) Cyclic citrullinated peptide (CCP) antibody positive  (M15.9) Primary osteoarthritis involving multiple joints  (E55.9) Vitamin D deficiency  Comment: Chronic. Managed by rheumatology. c/o posterior neck pain as well as \"all over pain\" that is most likely neuropathic based on description.   Plan:   -Continue methotrexate 10mg weekly as directed and folic acid 1mg daily  -Continue Tylenol 1000mg every 6 hours PRN  -Continue flexeril 5mg TID PRN  -Continue gabapentin to 200mg TID for now. Adjust accordingly to assist with pain control  -Continue PRN ibuprofen 400mg TID PRN  -Reduce oxycodone down to 5mg BID PRN. (limited use so far within the last 2 weeks).   -Continue humira 40mg injection every 2 weeks as directed.   -Continue Duloxetine 20mg daily as directed  -Continue vitamin D down to 2000U daily  -Follow " up with rheumatology as directed. Scheduled for 3/22/23  -Trend labs at next routine visit     (I89.0) Lymphedema of both lower extremities  (I73.9) PAD (peripheral artery disease) (H)  Comment: Chronic. No claudication complaints. Recent infection. Sepsis POA d/t LLE cellulitis in patient with hx of MRSA cellulitis with abscess: Had MRSA cellulitis with abscess in RLE in June 2022 for which she was admitted. Now with worsening erythema and swelling involving the RLE. Clinically improved with IV vancomycin. u/s LE ruled out DVT. Vascular following with recent visit on 12/7/22. Recommendations for lymphedema therapist for MLD, compression and possibly pump therapy in near future.   Plan:   -Follow up with vascular clinic as directed. Schedule for f/u appt within 6 months with Dr Zhao. Due June 2023  -Follow up with lymphedema therapy as directed.   -Continue lymphedema therapy eval and treat  -Monitor for worsening s/sx of concerns  -Staff to provide appropriate skin hygiene needs as directed  -Right leg wound: Cleanse 3x weekly with wound cleanser and pat dry. Apply xeroform, abd pad and then lymph wraps per OT.  -Skin prep to scab on left great toe and Left shin blister 3x weekly.  -Wash legs with soap and water. Apply Eucerin cream to legs and feet including in between toes prior to lymphedema wraps being placed.  -Ace wrap to bilateral lower extremities daily until lymphedema follows.   -Trend labs at next routine visit     (K59.01) Constipation  Comment: Acute on chronic. Suspect due to polypharmacy and narcotic use  Plan:  -Monitor BM patterns  -Change senna to 1 tab BID PRN  -Trend labs at next routine visit     (I10) Essential hypertension with goal blood pressure less than 140/90  (Z86.718) History of DVT of lower extremity  (E78.00) High blood cholesterol  Comment: Chronic. Based on JNC-8 goals,  patients age of 57 year old, no presence of diabetes or CKD, and goals of care goal BP is <150/90 mm  Hg. Patient is stable with current plan of care and routine assessment. History of DVT to LLE. SBP readings remaining 130s-150s.   Plan:   -Monitor BP and HR  -Continue hydrochlorothiazide 25mg daily.   -Continue xarelto 20mg daily  -Trend labs at next routine visit     (E66.01) Morbid obesity (H)  (G47.33) Obstructive sleep apnea syndrome  Comment: Chronic. Hx of LOPEZ, does not have CPAP as this causes sinus problems per patient. Patient with frequent desaturations while sleeping during history of hospital admission. Recommend follow up with sleep medicine provider to provide recommendations for management which she last saw at [facility] location last in 2016  Plan:   -Monitor sleeping patterns  -Monitor respiratory status  -Oxygen used at night  -Follow up with sleep medicine as directed at [facility] within 3 months. Encouragement needed.   -Trend labs at next routine visit     (D64.9) Anemia, unspecified type  Comment: Acute on chronic. Baseline hgb around 13  Plan:   -Monitor bleeding risks  -Monitor for worsening s/sx of concerns  -Trend labs at next routine visit     Electronically signed by:  Arlen Carlson DNP, APRN            Sincerely,        KRISTIN Rodriges CNP

## 2022-12-21 NOTE — PROGRESS NOTES
[facility] GERIATRICS    Chief Complaint   Patient presents with     RECHECK     HPI:  María Elena Saab is a 57 year old  (1965), who is being seen today for an episodic care visit at: [facility] () [[id number]]. Today's concern is: The primary encounter diagnosis was Recurrent major depressive disorder, in partial remission (H). Diagnoses of Morbid obesity (H), Obstructive sleep apnea syndrome, Anemia, unspecified type, Primary hypertension, Personal history of DVT (deep vein thrombosis), High blood cholesterol, History of DVT of lower extremity, Essential hypertension with goal blood pressure less than 140/90, PAD (peripheral artery disease) (H), Slow transit constipation, Lymphedema of both lower extremities, Seropositive rheumatoid arthritis of multiple sites (H), Cyclic citrullinated peptide (CCP) antibody positive, Primary osteoarthritis involving multiple joints, and Vitamin D deficiency were also pertinent to this visit.    Met with patient who denies any chest pain, palpitations, shortness of breath, ESPITIA, lightheadedness, dizziness, or cough. Denies any abdominal discomfort. Denies N&V. Denies dysuria or frequency.She reports having diarrhea few times per day and would like her senna adjusted. Appetite good. Sleeping well. Chronic pain complaints with neuropathy present but reports pain stable with current regimen. Not interested in gabapentin dose adjustment today. Continuation of lymphedema present. Redness present to bilateral lower extremities with some small drainage noted to left anterior lower leg. Does not appear to be infected today. At times can be noncompliant with lymphedema needs. She is not open to pump compression at this time. Nursing denies any acute concerns.     BP Readings from Last 3 Encounters:   12/21/22 121/74   12/07/22 121/83   11/29/22 127/86     Wt Readings from Last 5 Encounters:   12/21/22 (!) 155.5 kg (342 lb 12.8 oz)   12/07/22 (!) 154.2 kg (340  "lb)   11/29/22 (!) 155.5 kg (342 lb 12.8 oz)   11/22/22 (!) 155.5 kg (342 lb 12.8 oz)   11/16/22 (!) 155.5 kg (342 lb 12.8 oz)     Allergies, and PMH/PSH reviewed in EPIC today.  REVIEW OF SYSTEMS:  10 point ROS of systems including Constitutional, Eyes, Respiratory, Cardiovascular, Gastroenterology, Genitourinary, Integumentary, Musculoskeletal, Psychiatric were all negative except for pertinent positives noted in my HPI.    Objective:   /74   Pulse 88   Temp 98.3  F (36.8  C)   Resp 18   Ht 1.651 m (5' 5\")   Wt (!) 155.5 kg (342 lb 12.8 oz)   SpO2 96%   BMI 57.04 kg/m    GENERAL APPEARANCE:  Alert, in no distress, morbidly obese, cooperative  ENT:  Mouth and posterior oropharynx normal, moist mucous membranes, [place]  EYES:  EOM, conjunctivae, lids, pupils and irises normal  NECK:  No adenopathy,masses or thyromegaly  RESP:  respiratory effort and palpation of chest normal, lungs clear to auscultation , no respiratory distress  CV:  Palpation and auscultation of heart done , regular rate and rhythm, no murmur, rub, or gallop, peripheral edema 2+ in BLE  ABDOMEN:  normal bowel sounds, soft, nontender, no hepatosplenomegaly or other masses, no guarding or rebound  M/S:   Ambulates short distance with walker. Easy stand when fatigued  SKIN:  Inspection of skin and subcutaneous tissue baseline, see photos. dry skin present. Mild dermatitis appearance. does not appear infected.   NEURO:   Cranial nerves 2-12 are normal tested and grossly at patient's baseline, no purposeful movement in upper and lower extremities  PSYCH:  oriented X 3, normal insight, judgement and memory, affect and mood normal                                        Most Recent 3 CBC's:  Recent Labs   Lab Test 11/29/22  0820 11/22/22  0654 09/22/22  1559   WBC 8.9 11.0 5.4   HGB 10.8* 10.9* 13.4   MCV 91 91 92    427 257     Most Recent 3 BMP's:  Recent Labs   Lab Test 11/29/22  0820 11/22/22  0654 09/22/22  1559 01/13/22  1234 " "01/04/22  0439    137  --   --  139   POTASSIUM 3.9 4.4  --   --  3.6   CHLORIDE 96* 100  --   --  99   CO2 28 26  --   --  27   BUN 13.7 15.8  --   --  15   CR 0.50* 0.74 0.51   < > 0.66  0.66   ANIONGAP 12 11  --   --  13   WILVER 8.8 9.1  --   --  9.9   GLC 97 114*  --   --  82    < > = values in this interval not displayed.     Most Recent 2 LFT's:  Recent Labs   Lab Test 09/22/22  1559 04/12/22  1218 01/13/22  1234 01/04/22  0439 09/10/19  1409 07/25/19  1215   AST  --   --   --  21  20  --  23   ALT 12 19   < > 23  23  23   < > 22   ALKPHOS  --   --   --  70  72  --  69   BILITOTAL  --   --   --  0.3  0.4  --  0.3    < > = values in this interval not displayed.     Most Recent ESR & CRP:  Recent Labs   Lab Test 11/29/22  0820   .00*     Most Recent Anemia Panel:  Recent Labs   Lab Test 11/29/22  0820   WBC 8.9   HGB 10.8*   HCT 34.9*   MCV 91          ASSESSMENT/PLAN  (M05.79) Seropositive rheumatoid arthritis of multiple sites (H)  (primary encounter diagnosis)  (R76.8) Cyclic citrullinated peptide (CCP) antibody positive  (M15.9) Primary osteoarthritis involving multiple joints  (E55.9) Vitamin D deficiency  Comment: Chronic. Managed by rheumatology. c/o posterior neck pain as well as \"all over pain\" that is most likely neuropathic based on description.   Plan:   -Continue methotrexate 10mg weekly as directed and folic acid 1mg daily  -Continue Tylenol 1000mg every 6 hours PRN  -Continue flexeril 5mg TID PRN  -Continue gabapentin to 200mg TID for now. Adjust accordingly to assist with pain control  -Continue PRN ibuprofen 400mg TID PRN  -Reduce oxycodone down to 5mg BID PRN. (limited use so far within the last 2 weeks).   -Continue humira 40mg injection every 2 weeks as directed.   -Continue Duloxetine 20mg daily as directed  -Continue vitamin D down to 2000U daily  -Follow up with rheumatology as directed. Scheduled for 3/22/23  -Trend labs at next routine visit     (I89.0) Lymphedema " of both lower extremities  (I73.9) PAD (peripheral artery disease) (H)  Comment: Chronic. No claudication complaints. Recent infection. Sepsis POA d/t LLE cellulitis in patient with hx of MRSA cellulitis with abscess: Had MRSA cellulitis with abscess in RLE in June 2022 for which she was admitted. Now with worsening erythema and swelling involving the RLE. Clinically improved with IV vancomycin. u/s LE ruled out DVT. Vascular following with recent visit on 12/7/22. Recommendations for lymphedema therapist for MLD, compression and possibly pump therapy in near future.   Plan:   -Follow up with vascular clinic as directed. Schedule for f/u appt within 6 months with Dr Zhao. Due June 2023  -Follow up with lymphedema therapy as directed.   -Continue lymphedema therapy eval and treat  -Monitor for worsening s/sx of concerns  -Staff to provide appropriate skin hygiene needs as directed  -Right leg wound: Cleanse 3x weekly with wound cleanser and pat dry. Apply xeroform, abd pad and then lymph wraps per OT.  -Skin prep to scab on left great toe and Left shin blister 3x weekly.  -Wash legs with soap and water. Apply Eucerin cream to legs and feet including in between toes prior to lymphedema wraps being placed.  -Ace wrap to bilateral lower extremities daily until lymphedema follows.   -Trend labs at next routine visit     (K59.01) Constipation  Comment: Acute on chronic. Suspect due to polypharmacy and narcotic use  Plan:  -Monitor BM patterns  -Change senna to 1 tab BID PRN  -Trend labs at next routine visit     (I10) Essential hypertension with goal blood pressure less than 140/90  (Z86.718) History of DVT of lower extremity  (E78.00) High blood cholesterol  Comment: Chronic. Based on JNC-8 goals,  patients age of 57 year old, no presence of diabetes or CKD, and goals of care goal BP is <150/90 mm Hg. Patient is stable with current plan of care and routine assessment. History of DVT to LLE. SBP readings remaining  130s-150s.   Plan:   -Monitor BP and HR  -Continue hydrochlorothiazide 25mg daily.   -Continue xarelto 20mg daily  -Trend labs at next routine visit     (E66.01) Morbid obesity (H)  (G47.33) Obstructive sleep apnea syndrome  Comment: Chronic. Hx of LOPEZ, does not have CPAP as this causes sinus problems per patient. Patient with frequent desaturations while sleeping during history of hospital admission. Recommend follow up with sleep medicine provider to provide recommendations for management which she last saw at [facility] location last in 2016  Plan:   -Monitor sleeping patterns  -Monitor respiratory status  -Oxygen used at night  -Follow up with sleep medicine as directed at [facility] within 3 months. Encouragement needed.   -Trend labs at next routine visit     (D64.9) Anemia, unspecified type  Comment: Acute on chronic. Baseline hgb around 13  Plan:   -Monitor bleeding risks  -Monitor for worsening s/sx of concerns  -Trend labs at next routine visit     Electronically signed by:  Arlen Carlson DNP, APRN

## 2022-12-22 PROCEDURE — P9603 ONE-WAY ALLOW PRORATED MILES: HCPCS | Mod: ORL | Performed by: NURSE PRACTITIONER

## 2022-12-22 PROCEDURE — 85027 COMPLETE CBC AUTOMATED: CPT | Mod: ORL | Performed by: NURSE PRACTITIONER

## 2022-12-22 PROCEDURE — 84460 ALANINE AMINO (ALT) (SGPT): CPT | Mod: ORL | Performed by: NURSE PRACTITIONER

## 2022-12-22 PROCEDURE — 82565 ASSAY OF CREATININE: CPT | Mod: ORL | Performed by: NURSE PRACTITIONER

## 2022-12-22 PROCEDURE — 82040 ASSAY OF SERUM ALBUMIN: CPT | Mod: ORL | Performed by: NURSE PRACTITIONER

## 2022-12-22 PROCEDURE — 36415 COLL VENOUS BLD VENIPUNCTURE: CPT | Mod: ORL | Performed by: NURSE PRACTITIONER

## 2022-12-23 LAB
ALBUMIN SERPL BCG-MCNC: 3.2 G/DL (ref 3.5–5.2)
ALT SERPL W P-5'-P-CCNC: 21 U/L (ref 10–35)
CREAT SERPL-MCNC: 0.45 MG/DL (ref 0.51–0.95)
ERYTHROCYTE [DISTWIDTH] IN BLOOD BY AUTOMATED COUNT: 15.8 % (ref 10–15)
GFR SERPL CREATININE-BSD FRML MDRD: >90 ML/MIN/1.73M2
HCT VFR BLD AUTO: 42.3 % (ref 35–47)
HGB BLD-MCNC: 12.3 G/DL (ref 11.7–15.7)
MCH RBC QN AUTO: 27.9 PG (ref 26.5–33)
MCHC RBC AUTO-ENTMCNC: 29.1 G/DL (ref 31.5–36.5)
MCV RBC AUTO: 96 FL (ref 78–100)
PLATELET # BLD AUTO: 345 10E3/UL (ref 150–450)
RBC # BLD AUTO: 4.41 10E6/UL (ref 3.8–5.2)
WBC # BLD AUTO: 7.3 10E3/UL (ref 4–11)

## 2022-12-29 ENCOUNTER — TELEPHONE (OUTPATIENT)
Dept: OTHER | Facility: CLINIC | Age: 57
End: 2022-12-29

## 2022-12-29 NOTE — TELEPHONE ENCOUNTER
Please advise.    Mariel OSEGUERA, RN    [facility]  Vascular [facility]  Office: [phone]  Fax: [phone]

## 2022-12-29 NOTE — TELEPHONE ENCOUNTER
[facility]    Who is the name of the provider?:  Pavel    What is the location you see this provider at/preferred location?: [place]  Person calling: Juan A - Nurse 2nd Floor [facility]  Phone number:  [phone]  Nurse call back needed:  YES     Reason for call:   Cannot obtain the lymphatic formula from the number given on the prescription.   told caller they only dispense to hospitals.  Is there an alternative?       Pharmacy location:  NA  Outside Imaging: Not Applicable   Can we leave a detailed message on this number?  NO call [phone] to leave message

## 2023-01-03 NOTE — TELEPHONE ENCOUNTER
This is not covered by insurance, she has to buy  online or by phone it is around 35 dollars for 2 months ..   Alternate individual pills will be much more expensive   Ask her family to purchase and supply to facility ( doctor approved med etc )     LG

## 2023-01-03 NOTE — TELEPHONE ENCOUNTER
Vein Formula (over the counter)      From [employer]  ([employer] or #[phone])  -  currently about $34.95 for a 30 day supply.       [employer] Vein Formula - currently $31.46 per month

## 2023-01-04 NOTE — TELEPHONE ENCOUNTER
Left message for Cristaline explaining options for obtaining Vein Formula.    Flaca Rob RN BSN  [facility] Vascular [facility]  [phone]

## 2023-01-09 DIAGNOSIS — M05.79 SEROPOSITIVE RHEUMATOID ARTHRITIS OF MULTIPLE SITES (H): ICD-10-CM

## 2023-01-11 ENCOUNTER — NURSING HOME VISIT (OUTPATIENT)
Dept: GERIATRICS | Facility: CLINIC | Age: 58
End: 2023-01-11
Payer: COMMERCIAL

## 2023-01-11 VITALS
BODY MASS INDEX: 48.82 KG/M2 | SYSTOLIC BLOOD PRESSURE: 152 MMHG | HEIGHT: 65 IN | RESPIRATION RATE: 16 BRPM | WEIGHT: 293 LBS | TEMPERATURE: 98.3 F | OXYGEN SATURATION: 95 % | HEART RATE: 98 BPM | DIASTOLIC BLOOD PRESSURE: 79 MMHG

## 2023-01-11 DIAGNOSIS — Z86.718 PERSONAL HISTORY OF DVT (DEEP VEIN THROMBOSIS): ICD-10-CM

## 2023-01-11 DIAGNOSIS — M05.79 SEROPOSITIVE RHEUMATOID ARTHRITIS OF MULTIPLE SITES (H): Primary | ICD-10-CM

## 2023-01-11 DIAGNOSIS — I89.0 LYMPHEDEMA OF BOTH LOWER EXTREMITIES: ICD-10-CM

## 2023-01-11 DIAGNOSIS — I10 PRIMARY HYPERTENSION: ICD-10-CM

## 2023-01-11 PROCEDURE — 99309 SBSQ NF CARE MODERATE MDM 30: CPT | Performed by: INTERNAL MEDICINE

## 2023-01-11 NOTE — PROGRESS NOTES
[facility] GERIATRICS  REGULATORY VISIT  January 11, 2023    [facility] Medical Record Number:  [id number]  Place of Service where encounter took place:  [facility] () [[id number]]      Chief Complaint   Patient presents with     shelter Regulatory       HPI:    María Elena Saab is a 57 year old  (1965), who is being seen today for a federally mandated E/M visit at at [facility] where she has resided since March 2016. HPI information obtained from: facility chart records, facility staff, patient report and [facility] chart review.    Today, Ms. Saab is seen in her room laying in bed.  It was about 10:10 AM, and she does not like to be disturbed before 10 AM.  Her main concern today is around a dose change in vitamin D -when did it happen, who changed it and why was not she informed?  Told her that I will ask and that it could be due to a pharmacy recommendation and she suggested it could have even been due to her recent hospitalization.  Reviewed that she cannot to see vascular medicine, and her understanding is the vein pills have been ordered and should be delivered.      ALLERGIES:    Allergies   Allergen Reactions     Adhesive Tape Other (See Comments)     As on band-aids;  Causes skin tearing/wounds.     Adhesive [Mecrylate] Other (See Comments)     As on band-aids;  Causes skin tearing/wounds.     Banana Unknown     She avoids due to Latex allergy.  If she eats them 3 days in a row, gets stomach cramps and dark stool.  TBrinkMD - 4/10/15     Food Unknown     Bananas, grapes, pine nuts     Grape [Grape (Artificial) Flavor] Unknown     She avoids due to Latex allergy.  If she eats lots of them, and she likes them, stomach gets a little queasy.  TBrinkMD - 4/10/15     Morphine (Pf) [Morphine] Other (See Comments)     Pt has not reacted to this med herself, but mother has anaphylactic reaction and other family members get nausea/vomiting.     Other Environmental  Allergy Swelling     leather     Pine      Pseudoephedrine Other (See Comments)     Insomnia     Pseudoephedrine Cold/Allergy [Cpm-Pse Cr] Other (See Comments)     Keeps pt awake up to 24 hours      Latex Rash     Hands get red from rubber gloves, but okay if she washes them right away.  Hands get red from rubber gloves, but okay if she washes them right away.     Other Food Allergy Rash     Pine trees, leather .         Past Medical, Surgical, Family and Social History: Reviewed and updated in EPIC.    MEDICATIONS:  Current Outpatient Medications   Medication Sig Dispense Refill     acetaminophen (TYLENOL) 500 MG tablet Take 2 tablets (1,000 mg) by mouth every 6 hours as needed for mild pain 240 tablet 11     adalimumab (HUMIRA *CF*) 40 MG/0.4ML prefilled syringe kit Inject 0.4 mLs (40 mg) Subcutaneous every 14 days 0.8 mL 11     ascorbic acid, vitamin C, (ASCORBIC ACID WITH KAEL HIPS) 500 MG tablet [ASCORBIC ACID, VITAMIN C, (ASCORBIC ACID WITH KAEL HIPS) 500 MG TABLET] Take 500 mg by mouth 2 (two) times a day.       cholecalciferol 50 MCG (2000 UT) tablet Take 1 tablet (50 mcg) by mouth daily 30 tablet 11     cyclobenzaprine (FLEXERIL) 5 MG tablet Take 1 tablet (5 mg) by mouth 3 times daily as needed for muscle spasms 60 tablet 4     DULoxetine (CYMBALTA) 20 MG capsule [DULOXETINE (CYMBALTA) 20 MG CAPSULE] Take 1 capsule (20 mg total) by mouth daily. 90 capsule 2     folic acid (FOLVITE) 1 MG tablet Take 1 mg by mouth       gabapentin (NEURONTIN) 100 MG capsule Take 2 capsules (200 mg) by mouth 3 times daily 240 capsule 11     hydroCHLOROthiazide (HYDRODIURIL) 25 MG tablet [HYDROCHLOROTHIAZIDE (HYDRODIURIL) 25 MG TABLET] Take 25 mg by mouth daily.       ibuprofen (ADVIL/MOTRIN) 200 MG tablet Take 2 tablets (400 mg) by mouth every 8 hours as needed for mild pain 240 tablet 11     methotrexate sodium 10 MG TABS Take 10 mg by mouth once a week 16 tablet 0     miconazole (MICATIN) 2 % external cream Apply topically 2  "times daily Apply to under toes       multivitamin therapeutic (THERAGRAN) tablet Take 1 tablet by mouth daily       NYAMYC 002136 UNIT/GM external powder 2 times daily Apply to rash areas under brest and under stomach       oxyCODONE (OXY-IR) 5 MG capsule Take 1 capsule (5 mg) by mouth 2 times daily as needed for severe pain (7-10) 60 capsule 0     rivaroxaban ANTICOAGULANT (XARELTO) 20 MG TABS tablet Take 20 mg by mouth daily       senna-docusate (SENOKOT-S/PERICOLACE) 8.6-50 MG tablet Take 1 tablet by mouth 2 times daily as needed for constipation 60 tablet 11     Medications reviewed:  Medications reconciled to facility chart and changes were made to reflect current medications as identified as above med list. Below are the changes that were made:   Medications stopped since last EPIC medication reconciliation:   Medications Discontinued During This Encounter   Medication Reason     lanolin alcohol-mo-w.pet-ceres (EUCERIN) Crea Medication Reconciliation Clean Up     Medications started since last [facility] medication reconciliation:  No orders of the defined types were placed in this encounter.      REVIEW OF SYSTEMS:  4 point ROS neg other than the symptoms noted above in the HPI.    PHYSICAL EXAM:  BP (!) 152/79   Pulse 98   Temp 98.3  F (36.8  C)   Resp 16   Ht 1.651 m (5' 5\")   Wt (!) 150.5 kg (331 lb 11.2 oz)   SpO2 95%   BMI 55.20 kg/m    Gen: laying in bed, alert, cooperative and in no acute distress  Resp: breathing nonlabored, no tachypnea; nasal cannula in place  Neuro: CX II-XII grossly in tact; ROM in all four extremities grossly in tact  Psych: alert and oriented x3; normal affect    LABS/IMAGING: Reviewed as per Epic and/or [facility]    ASSESSMENT / PLAN:    Bilateral LE Lymphedema  Was out to see vascular medicine - appreciate their expertise  -- recommendation for compression, lymphedema therapies, good skin care      HTN  SBPs 120s-130s. Weight is down about 12 lbs over the past year. BMP " was OK in Nov.   -- hydrochlorothiazide 25 mg daily   -- follow BPs and adjust medications as needed    Rheumatoid Arthritis  RA seems to be under good control. She follows with rheumatology.   -- Humira subQ every 14 days  -- methotrexate 10 mg weekly and folic acid 1 mg daily   -- duloxetine 20 mg daily, gabapentin 200 mg TID   -- APAP 650 mg q6h PRN, cyclobenzaprine 5 mg TID PRN, ibuprofen 400 mg q8h PRN, oxycodone 5 mg BID PRN  -- labs and follow up per rheumatology (next visit in March)    History of Lower Exremity DVT  -- rivaroxaban 20 mg daily     Morbid Obesity   BMI 55. With HTN and lymphedema  -- encourage healthy dietary choices and activity as able    Electronically signed by  Urvashi River MD           Include Validation In Note: Yes Is Xerosis Present?: Yes - Normal Treatment Months Of Therapy Completed: 1 Display Individual Monthly Dosage In The Note (If Yes Will Display All Dosages Which Are Not N/A): no Myalgia Monitoring: I explained this is common when taking isotretinoin. If this worsens they will contact us. Cheilitis Normal Treatment: I recommended application of Vaseline or Aquaphor numerous times a day (as often as every hour) and before going to bed. Counseling Text: I reviewed the side effect in detail. Patient should get monthly blood tests, not donate blood, not drive at night if vision affected, and not share medication. Retinoid Dermatitis Normal Treatment: I recommended more frequent application of Cetaphil or CeraVe to the areas of dermatitis. Nosebleeds Normal Treatment: I explained this is common when taking isotretinoin. I recommended saline mist in each nostril multiple times a day. If this worsens they will contact us. Xerosis Normal Treatment: I recommended application of Cetaphil or CeraVe numerous times a day and before going to bed to all dry areas. Next Month's Dosage: Continue Current Dosage Cheilitis Aggressive Treatment: I recommended application of Vaseline or Aquaphor numerous times a day (as often as every hour) and before going to bed. I also prescribed a topical steroid for twice daily use. Myalgia Treatment: I explained this is common when taking isotretinoin. If this worsens they will contact us. They may try OTC ibuprofen. Female Pregnancy Counseling Text: Female patients should also be on two forms of birth control while taking this medication and for one month after their last dose. Retinoid Dermatitis Aggressive Treatment: I recommended more frequent application of Cetaphil or CeraVe to the areas of dermatitis. I also prescribed a topical steroid for twice daily use until the dermatitis resolves. Dosing Month 1 (Required For Cumulative Dosing): 40mg Daily Hypercholesterolemia Monitoring: I explained this is common when taking isotretinoin. We will monitor closely. Xerosis Aggressive Treatment: I recommended application of Cetaphil or CeraVe numerous times a day and before going to bed to all dry areas. I also prescribed a topical steroid for twice daily use. Use Therapeutic Ranged Or Therapeutic Target: please select Range or Target Detail Level: Zone Headache Monitoring: I recommended monitoring the headaches for now. There is no evidence of increased intracranial pressure. They were instructed to call if the headaches are worsening. Pounds Preamble Statement (Weight Entered In Details Tab): Reported Weight in pounds: Lower Range (In Mg/Kg): 120 Female Completion Statement: After discussing her treatment course we decided to discontinue isotretinoin therapy at this time. I explained that she would need to continue her birth control methods for at least one month after the last dosage. She should also get a pregnancy test one month after the last dose. She shouldn't donate blood for one month after the last dose. She should call with any new symptoms of depression. What Is The Patient's Gender: Male Kilograms Preamble Statement (Weight Entered In Details Tab): Reported Weight in kilograms: Male Completion Statement: After discussing his treatment course we decided to discontinue isotretinoin therapy at this time. He shouldn't donate blood for one month after the last dose. He should call with any new symptoms of depression. Xerosis Normal Treatment: I recommended application of Cetaphil or CeraVe numerous times a day going to bed to all dry areas. Upper Range (In Mg/Kg): 150 Any Depression?: No - Free Text Target Cumulative Dosage (In Mg/Kg): 135 Xerosis Aggressive Treatment: I recommended application of Cetaphil or CeraVe numerous times a day going to bed to all dry areas. I also prescribed a topical steroid for twice daily use. No Depression (Free Text): Patient under the aid of a mental health counselor in the remote past. Pt denies any depression and denies self harm/SI. Pt very pleased with accutane. Pt wishes to proceed and continue. Ipledge Number (Optional): [id number] Weight Units: pounds Patient Weight (Optional But Required For Cumulative Dose-Numbers And Decimals Only): 181

## 2023-01-11 NOTE — LETTER
1/11/2023        RE: María Elena Saab  [facility] [address]  [address]  [place] [address]        [facility] GERIATRICS  REGULATORY VISIT  January 11, 2023    [facility] Medical Record Number:  [id number]  Place of Service where encounter took place:  [facility] () [[id number]]      Chief Complaint   Patient presents with     MCFP Regulatory       HPI:    María Elena Saab is a 57 year old  (1965), who is being seen today for a federally mandated E/M visit at at [facility] where she has resided since March 2016. HPI information obtained from: facility chart records, facility staff, patient report and [facility] chart review.    Today, Ms. Saab is seen in her room laying in bed.  It was about 10:10 AM, and she does not like to be disturbed before 10 AM.  Her main concern today is around a dose change in vitamin D -when did it happen, who changed it and why was not she informed?  Told her that I will ask and that it could be due to a pharmacy recommendation and she suggested it could have even been due to her recent hospitalization.  Reviewed that she cannot to see vascular medicine, and her understanding is the vein pills have been ordered and should be delivered.      ALLERGIES:    Allergies   Allergen Reactions     Adhesive Tape Other (See Comments)     As on band-aids;  Causes skin tearing/wounds.     Adhesive [Mecrylate] Other (See Comments)     As on band-aids;  Causes skin tearing/wounds.     Banana Unknown     She avoids due to Latex allergy.  If she eats them 3 days in a row, gets stomach cramps and dark stool.  TBrinkMD - 4/10/15     Food Unknown     Bananas, grapes, pine nuts     Grape [Grape (Artificial) Flavor] Unknown     She avoids due to Latex allergy.  If she eats lots of them, and she likes them, stomach gets a little queasy.  TBrinkMD - 4/10/15     Morphine (Pf) [Morphine] Other (See Comments)     Pt has not reacted to this  med herself, but mother has anaphylactic reaction and other family members get nausea/vomiting.     Other Environmental Allergy Swelling     leather     Pine      Pseudoephedrine Other (See Comments)     Insomnia     Pseudoephedrine Cold/Allergy [Cpm-Pse Cr] Other (See Comments)     Keeps pt awake up to 24 hours      Latex Rash     Hands get red from rubber gloves, but okay if she washes them right away.  Hands get red from rubber gloves, but okay if she washes them right away.     Other Food Allergy Rash     Pine trees, leather .         Past Medical, Surgical, Family and Social History: Reviewed and updated in EPIC.    MEDICATIONS:  Current Outpatient Medications   Medication Sig Dispense Refill     acetaminophen (TYLENOL) 500 MG tablet Take 2 tablets (1,000 mg) by mouth every 6 hours as needed for mild pain 240 tablet 11     adalimumab (HUMIRA *CF*) 40 MG/0.4ML prefilled syringe kit Inject 0.4 mLs (40 mg) Subcutaneous every 14 days 0.8 mL 11     ascorbic acid, vitamin C, (ASCORBIC ACID WITH KAEL HIPS) 500 MG tablet [ASCORBIC ACID, VITAMIN C, (ASCORBIC ACID WITH KAEL HIPS) 500 MG TABLET] Take 500 mg by mouth 2 (two) times a day.       cholecalciferol 50 MCG (2000 UT) tablet Take 1 tablet (50 mcg) by mouth daily 30 tablet 11     cyclobenzaprine (FLEXERIL) 5 MG tablet Take 1 tablet (5 mg) by mouth 3 times daily as needed for muscle spasms 60 tablet 4     DULoxetine (CYMBALTA) 20 MG capsule [DULOXETINE (CYMBALTA) 20 MG CAPSULE] Take 1 capsule (20 mg total) by mouth daily. 90 capsule 2     folic acid (FOLVITE) 1 MG tablet Take 1 mg by mouth       gabapentin (NEURONTIN) 100 MG capsule Take 2 capsules (200 mg) by mouth 3 times daily 240 capsule 11     hydroCHLOROthiazide (HYDRODIURIL) 25 MG tablet [HYDROCHLOROTHIAZIDE (HYDRODIURIL) 25 MG TABLET] Take 25 mg by mouth daily.       ibuprofen (ADVIL/MOTRIN) 200 MG tablet Take 2 tablets (400 mg) by mouth every 8 hours as needed for mild pain 240 tablet 11     methotrexate  "sodium 10 MG TABS Take 10 mg by mouth once a week 16 tablet 0     miconazole (MICATIN) 2 % external cream Apply topically 2 times daily Apply to under toes       multivitamin therapeutic (THERAGRAN) tablet Take 1 tablet by mouth daily       NYAMYC 452309 UNIT/GM external powder 2 times daily Apply to rash areas under brest and under stomach       oxyCODONE (OXY-IR) 5 MG capsule Take 1 capsule (5 mg) by mouth 2 times daily as needed for severe pain (7-10) 60 capsule 0     rivaroxaban ANTICOAGULANT (XARELTO) 20 MG TABS tablet Take 20 mg by mouth daily       senna-docusate (SENOKOT-S/PERICOLACE) 8.6-50 MG tablet Take 1 tablet by mouth 2 times daily as needed for constipation 60 tablet 11     Medications reviewed:  Medications reconciled to facility chart and changes were made to reflect current medications as identified as above med list. Below are the changes that were made:   Medications stopped since last EPIC medication reconciliation:   Medications Discontinued During This Encounter   Medication Reason     lanolin alcohol-mo-w.pet-ceres (EUCERIN) Crea Medication Reconciliation Clean Up     Medications started since last [facility] medication reconciliation:  No orders of the defined types were placed in this encounter.      REVIEW OF SYSTEMS:  4 point ROS neg other than the symptoms noted above in the HPI.    PHYSICAL EXAM:  BP (!) 152/79   Pulse 98   Temp 98.3  F (36.8  C)   Resp 16   Ht 1.651 m (5' 5\")   Wt (!) 150.5 kg (331 lb 11.2 oz)   SpO2 95%   BMI 55.20 kg/m    Gen: laying in bed, alert, cooperative and in no acute distress  Resp: breathing nonlabored, no tachypnea; nasal cannula in place  Neuro: CX II-XII grossly in tact; ROM in all four extremities grossly in tact  Psych: alert and oriented x3; normal affect    LABS/IMAGING: Reviewed as per Epic and/or [facility]    ASSESSMENT / PLAN:    Bilateral LE Lymphedema  Was out to see vascular medicine - appreciate their expertise  -- recommendation for " compression, lymphedema therapies, good skin care      HTN  SBPs 120s-130s. Weight is down about 12 lbs over the past year. BMP was OK in Nov.   -- hydrochlorothiazide 25 mg daily   -- follow BPs and adjust medications as needed    Rheumatoid Arthritis  RA seems to be under good control. She follows with rheumatology.   -- Humira subQ every 14 days  -- methotrexate 10 mg weekly and folic acid 1 mg daily   -- duloxetine 20 mg daily, gabapentin 200 mg TID   -- APAP 650 mg q6h PRN, cyclobenzaprine 5 mg TID PRN, ibuprofen 400 mg q8h PRN, oxycodone 5 mg BID PRN  -- labs and follow up per rheumatology (next visit in March)    History of Lower Exremity DVT  -- rivaroxaban 20 mg daily     Morbid Obesity   BMI 55. With HTN and lymphedema  -- encourage healthy dietary choices and activity as able    Electronically signed by  Urvashi River MD                Sincerely,        Urvashi River MD

## 2023-02-14 DIAGNOSIS — M15.0 PRIMARY OSTEOARTHRITIS INVOLVING MULTIPLE JOINTS: ICD-10-CM

## 2023-02-14 RX ORDER — OXYCODONE HYDROCHLORIDE 5 MG/1
TABLET ORAL
Qty: 60 TABLET | Refills: 0 | Status: SHIPPED | OUTPATIENT
Start: 2023-02-14 | End: 2023-03-14

## 2023-03-10 DIAGNOSIS — L03.90 CELLULITIS, UNSPECIFIED CELLULITIS SITE: Primary | ICD-10-CM

## 2023-03-10 RX ORDER — DOXYCYCLINE 100 MG/1
100 CAPSULE ORAL 2 TIMES DAILY
Qty: 20 CAPSULE | Refills: 0 | Status: SHIPPED | OUTPATIENT
Start: 2023-03-10 | End: 2023-03-20

## 2023-03-10 NOTE — TELEPHONE ENCOUNTER
[facility] [facility] TELEPHONE ENCOUNTER    Chief Complaint   Patient presents with     Cellulitis       María Elena Saab is a 57 year old  (1965),Nurse called today to report: Increased drainage and redness to bilateral lower extremities wounds. Followed by in house wound provider as well    ASSESSMENT/PLAN      Start doxycycline BID x 10 days    Monitor for worsening s/sx of concerns    BMP and CBC due 3/14    Follow up with wound provider    Electronically signed by:   KIRSTIN Rodriges CNP

## 2023-03-11 ENCOUNTER — LAB REQUISITION (OUTPATIENT)
Dept: LAB | Facility: CLINIC | Age: 58
End: 2023-03-11
Payer: COMMERCIAL

## 2023-03-11 ENCOUNTER — TELEPHONE (OUTPATIENT)
Dept: GERIATRICS | Facility: CLINIC | Age: 58
End: 2023-03-11
Payer: COMMERCIAL

## 2023-03-11 DIAGNOSIS — L03.115 CELLULITIS OF RIGHT LOWER LIMB: ICD-10-CM

## 2023-03-11 NOTE — TELEPHONE ENCOUNTER
Nursing called to clarify the use of the ABX with medications.    Humera on hold while on ABX.  Patient said her Methotrexate should be on hold as well.    Orders:  Resume Methotrexate on 3/24/23  Reset Humera to start on 3/24/23 and every two weeks from that date.    KRISTIN Hamilton CNP

## 2023-03-13 VITALS
WEIGHT: 293 LBS | SYSTOLIC BLOOD PRESSURE: 141 MMHG | BODY MASS INDEX: 48.82 KG/M2 | HEIGHT: 65 IN | DIASTOLIC BLOOD PRESSURE: 79 MMHG | HEART RATE: 77 BPM | OXYGEN SATURATION: 98 % | TEMPERATURE: 98.1 F | RESPIRATION RATE: 17 BRPM

## 2023-03-13 NOTE — PROGRESS NOTES
[facility] GERIATRICS  Chief Complaint   Patient presents with     [facility] Medical Record Number:  [id number]  Place of Service where encounter took place:  [facility] () [[id number]]    HPI:    María Elena Saab  is 57 year old (1965), who is being seen today for a federally mandated E/M visit. Today's concerns are:     Seropositive rheumatoid arthritis of multiple sites (H)  Cyclic citrullinated peptide (CCP) antibody positive  Primary osteoarthritis involving multiple joints  Vitamin D deficiency  Lymphedema of both lower extremities  PAD (peripheral artery disease) (H)  Cellulitis, unspecified cellulitis site  Constipation, unspecified constipation type  Essential hypertension with goal blood pressure less than 140/90  History of DVT of lower extremity  High blood cholesterol  Morbid obesity (H)  Obstructive sleep apnea syndrome  Anemia, unspecified type  Post-nasal drip  Polyarthralgia     Met with patient who denies any chest pain, palpitations, ESPITIA, lightheadedness, dizziness . Some complaints of shortness of breath and mild cough irritation. She does report some mild ear fullness. I suspect some underlying post nasal drip concerns. She remains on oxygen at  while sleeping. Denies any abdominal discomfort. Denies N&V. Denies B&B concerns. Denies dysuria or frequency. Denies loose or constipation. Appetite good. Sleeping well. She reports mild pain to bilateral lower extremities Right >Left. See wound photos below. No signs of infection noted today. Remains on doxycycline for short duration for suspected cellulitis history.     BP Readings from Last 3 Encounters:   03/13/23 (!) 141/79   01/11/23 (!) 152/79   12/21/22 121/74     Wt Readings from Last 5 Encounters:   03/13/23 (!) 154.2 kg (340 lb)   01/11/23 (!) 150.5 kg (331 lb 11.2 oz)   12/21/22 (!) 155.5 kg (342 lb 12.8 oz)   12/07/22 (!) 154.2 kg (340 lb)   11/29/22 (!) 155.5 kg (342 lb 12.8 oz)      ALLERGIES:Adhesive tape, Adhesive [mecrylate], Banana, Food, Grape [grape (artificial) flavor], Morphine (pf) [morphine], Other environmental allergy, Pine, Pseudoephedrine, Pseudoephedrine cold/allergy [cpm-pse cr], Latex, and Other food allergy  PAST MEDICAL HISTORY:   Past Medical History:   Diagnosis Date     Aseptic necrosis of femoral head (H)     LEFT     Bacteremia due to group B Streptococcus      Cellulitis of right lower extremity      DJD (degenerative joint disease)      DVT, bilateral lower limbs (H) 10/2014     Edema 5/22/2015     Essential hypertension with goal blood pressure less than 140/90      Failure to thrive      History of blood clots      Hypokalemia 10/15/2014     Lung mass 10/18/2014     Morbid obesity (H) 4/10/2015    Had formal nutrition consult at [facility].  RD reports that she is not willing to commit to the program outlined.  See scanned document.  12/15/2015 - Marcio Quinonez MD        Nocturnal hypoxemia - probable sleep apnea - had overnight pulse oximetry, April, 2015. 5/22/2015     Obesity 4/10/2015     LOPEZ (obstructive sleep apnea) 5/22/2015     Osteoarthritis      Peripheral vascular disease (H)      Pleural effusion      Pressure ulcer of foot      Rheumatoid arthritis (H) 12/30/2014    seronegative     Sepsis (H)      Seropositive rheumatoid arthritis (H) 1/19/2016     Vitamin D deficiency 8/26/2015     PAST SURGICAL HISTORY:   has a past surgical history that includes Total Knee Arthroplasty (Left, 2006); joint replacement; Total Hip Arthroplasty (Left, 10/18/2016); and Peripherally Inserted Central Catheter Insertion (Right, 07/14/2019).  FAMILY HISTORY: family history includes Arthritis in her mother; Breast Cancer in her cousin; Breast Cancer (age of onset: 50.00) in her maternal grandmother; Breast Cancer (age of onset: 54.00) in her maternal aunt and mother; Cancer in her mother; Cataracts in her father and mother; Deep Vein Thrombosis in her father; Heart Disease  in her maternal grandmother; Multiple myeloma in her father; No Known Problems in her sister; Other - See Comments in her brother; Rheumatoid Arthritis in her maternal grandmother and paternal aunt; Sleep Apnea in her brother; Snoring in her father.  SOCIAL HISTORY:  reports that she has quit smoking. Her smoking use included cigarettes. She has a 30.00 pack-year smoking history. She has never used smokeless tobacco. She reports that she does not currently use alcohol. She reports that she does not use drugs.    MEDICATIONS:  MED REC REQUIRED  Post Medication Reconciliation Status:  Patient was not discharged from an inpatient facility or TCU           Review of your medicines          Accurate as of March 14, 2023 11:28 AM. If you have any questions, ask your nurse or doctor.            START taking      Dose / Directions   loratadine 10 MG tablet  Commonly known as: CLARITIN  Used for: Post-nasal drip  Started by: KRISTIN Rodriges CNP      Dose: 10 mg  Take 1 tablet (10 mg) by mouth daily  Quantity: 30 tablet  Refills: 11        CONTINUE these medicines which may have CHANGED, or have new prescriptions. If we are uncertain of the size of tablets/capsules you have at home, strength may be listed as something that might have changed.      Dose / Directions   gabapentin 300 MG capsule  Commonly known as: NEURONTIN  This may have changed:     medication strength    how much to take    when to take this  Used for: Seropositive rheumatoid arthritis of multiple sites (H), Primary osteoarthritis involving multiple joints, Polyarthralgia  Changed by: KRISTIN Rodriges CNP      Dose: 300 mg  Take 1 capsule (300 mg) by mouth 2 times daily  Quantity: 60 capsule  Refills: 11        CONTINUE these medicines which have NOT CHANGED      Dose / Directions   acetaminophen 500 MG tablet  Commonly known as: TYLENOL  Used for: Primary osteoarthritis involving multiple joints      Dose: 1,000 mg  Take 2 tablets (1,000 mg) by  mouth every 6 hours as needed for mild pain  Quantity: 240 tablet  Refills: 11     adalimumab 40 MG/0.4ML prefilled syringe kit  Commonly known as: HUMIRA *CF*  Used for: Seropositive rheumatoid arthritis of multiple sites (H)      Dose: 40 mg  Inject 0.4 mLs (40 mg) Subcutaneous every 14 days  Quantity: 0.8 mL  Refills: 11     cholecalciferol 50 MCG (2000 UT) tablet  Used for: Vitamin D deficiency      Dose: 1 tablet  Take 1 tablet (50 mcg) by mouth daily  Quantity: 30 tablet  Refills: 11     doxycycline hyclate 100 MG capsule  Commonly known as: VIBRAMYCIN  Used for: Cellulitis, unspecified cellulitis site      Dose: 100 mg  Take 1 capsule (100 mg) by mouth 2 times daily for 10 days  Quantity: 20 capsule  Refills: 0     DULoxetine 20 MG capsule  Commonly known as: CYMBALTA  Used for: Primary osteoarthritis involving multiple joints      Dose: 20 mg  [DULOXETINE (CYMBALTA) 20 MG CAPSULE] Take 1 capsule (20 mg total) by mouth daily.  Quantity: 90 capsule  Refills: 2     folic acid 1 MG tablet  Commonly known as: FOLVITE      Dose: 1 mg  Take 1 mg by mouth  Refills: 0     hydrochlorothiazide 25 MG tablet  Commonly known as: HYDRODIURIL      Dose: 25 mg  [HYDROCHLOROTHIAZIDE (HYDRODIURIL) 25 MG TABLET] Take 25 mg by mouth daily.  Refills: 0     methotrexate sodium 10 MG Tabs  Used for: Seropositive rheumatoid arthritis of multiple sites (H)      Dose: 10 mg  Take 10 mg by mouth once a week  Quantity: 16 tablet  Refills: 0     miconazole 2 % external cream  Commonly known as: MICATIN      Apply topically 2 times daily Apply to under toes  Refills: 0     Nyamyc 610822 UNIT/GM external powder  Generic drug: nystatin      2 times daily Apply to rash areas under brest and under stomach  Refills: 0     rivaroxaban ANTICOAGULANT 20 MG Tabs tablet  Commonly known as: XARELTO      Dose: 20 mg  Take 20 mg by mouth daily  Refills: 0     senna-docusate 8.6-50 MG tablet  Commonly known as: SENOKOT-S/PERICOLACE  Used for: Slow  "transit constipation      Dose: 1 tablet  Take 1 tablet by mouth 2 times daily as needed for constipation  Quantity: 60 tablet  Refills: 11     THERAPEUTIC MULTIVITAMIN PO      Dose: 1 tablet  Take 1 tablet by mouth daily  Refills: 0     vitamin C 500 MG tablet  Commonly known as: ASCORBIC ACID      Dose: 500 mg  [ASCORBIC ACID, VITAMIN C, (ASCORBIC ACID WITH KAEL HIPS) 500 MG TABLET] Take 500 mg by mouth 2 (two) times a day.  Refills: 0           Where to get your medicines      These medications were sent to [facility], [place] - [address]  [address], [facility] [address]    Phone: [phone]     gabapentin 300 MG capsule    loratadine 10 MG tablet        Case Management:  I have reviewed the care plan and MDS and do agree with the plan. Patient's desire to return to the community is present, but is not able due to care needs . Information reviewed:  Medications, vital signs, orders, and nursing notes.    ROS:  10 point ROS of systems including Constitutional, Eyes, Respiratory, Cardiovascular, Gastroenterology, Genitourinary, Integumentary, Musculoskeletal, Psychiatric were all negative except for pertinent positives noted in my HPI.    Vitals:  BP (!) 141/79   Pulse 77   Temp 98.1  F (36.7  C)   Resp 17   Ht 1.651 m (5' 5\")   Wt (!) 154.2 kg (340 lb)   SpO2 98%   BMI 56.58 kg/m    Body mass index is 56.58 kg/m .  Exam:  GENERAL APPEARANCE:  Alert, in no distress, oriented, morbidly obese, cooperative  ENT:  Mouth and posterior oropharynx normal, moist mucous membranes, [place]  EYES:  EOM, conjunctivae, lids, pupils and irises normal  NECK:  No adenopathy,masses or thyromegaly  RESP:  respiratory effort and palpation of chest normal, lungs clear to auscultation , no respiratory distress  CV:  Palpation and auscultation of heart done , regular rate and rhythm, no murmur, rub, or gallop, no edema  ABDOMEN:  normal bowel sounds, soft, nontender, " no hepatosplenomegaly or other masses, no guarding or rebound  M/S:   wheelchair for main mobilty needs. Staff to assist for all ADLs, transfers and cares  SKIN:  Inspection of skin and subcutaneous tissue baseline, wound healing well, no signs of infection see photos  NEURO:   Cranial nerves 2-12 are normal tested and grossly at patient's baseline, no purposeful movement in upper and lower extremities  PSYCH:  oriented X 3, normal insight, judgement and memory, affect and mood normal                              Lab/Diagnostic data:     Most Recent 3 CBC's:  Recent Labs   Lab Test 12/22/22 1710 11/29/22  0820 11/22/22  0654   WBC 7.3 8.9 11.0   HGB 12.3 10.8* 10.9*   MCV 96 91 91    303 427     Most Recent 3 BMP's:  Recent Labs   Lab Test 12/22/22 1710 11/29/22  0820 11/22/22  0654 01/13/22  1234 01/04/22  0439   NA  --  136 137  --  139   POTASSIUM  --  3.9 4.4  --  3.6   CHLORIDE  --  96* 100  --  99   CO2  --  28 26  --  27   BUN  --  13.7 15.8  --  15   CR 0.45* 0.50* 0.74   < > 0.66  0.66   ANIONGAP  --  12 11  --  13   WILVER  --  8.8 9.1  --  9.9   GLC  --  97 114*  --  82    < > = values in this interval not displayed.     Most Recent 2 LFT's:  Recent Labs   Lab Test 12/22/22 1710 09/22/22  1559 01/13/22  1234 01/04/22  0439 09/10/19  1409 07/25/19  1215   AST  --   --   --  21  20  --  23   ALT 21 12   < > 23  23  23   < > 22   ALKPHOS  --   --   --  70  72  --  69   BILITOTAL  --   --   --  0.3  0.4  --  0.3    < > = values in this interval not displayed.     Most Recent Anemia Panel:  Recent Labs   Lab Test 12/22/22 1710   WBC 7.3   HGB 12.3   HCT 42.3   MCV 96          ASSESSMENT/PLAN  (M05.79) Seropositive rheumatoid arthritis of multiple sites (H)  (primary encounter diagnosis)  (R76.8) Cyclic citrullinated peptide (CCP) antibody positive  (M15.9) Primary osteoarthritis involving multiple joints  (E55.9) Vitamin D deficiency  Comment: Chronic. Managed by rheumatology. c/o  "posterior neck pain as well as \"all over pain\" that is most likely neuropathic based on description.   Plan:   -Continue methotrexate 10mg weekly as directed and folic acid 1mg daily  -Continue Tylenol 1000mg every 6 hours PRN  -Discontinue PRN flexeril due to limited use. Used 2x in the past 2 weeks  -Increase gabapentin to 300mg BID  -Discontinue ibuprofen due to non use. Last used in feb 2023.   -Discontinue oxycodone PRN due to non use. Last used in Jan 2023  -Continue humira 40mg injection every 2 weeks as directed.   -Continue Duloxetine 20mg daily as directed  -Continue vitamin D 2000U daily  -Follow up with rheumatology as directed. Scheduled for 3/22/23  -BMP, CBC due today 3/14/23. Contacted lab today which these can be added of albumin, and ALT on from today's assessments. Cancel labs for 3/16 as they were collected today to avoid second lab stick  Per patient request.   -Trend labs at next routine visit or sooner if indicated     (I89.0) Lymphedema of both lower extremities  (I73.9) PAD (peripheral artery disease) (H)  (L03.90) Cellulitis  Comment: Chronic. No claudication complaints. Recent infection. Vascular following with recent visit on 12/7/22. Recommendations for lymphedema therapist for MLD, compression and possibly pump therapy in near future. Noted on 3/10/23-Nurse called today to report: Increased drainage and redness to bilateral lower extremities wounds. Followed by in house wound provider as well. 10 day course of doxycycline started. Improvement to skin assessment today. See photos above.   Plan:   -Follow up with vascular clinic as directed. Schedule for f/u appt within 6 months with Dr Zhao. Due June 2023  -Continue doxycycline as directed until completion. HOLD methotrexate and humira while on antibiotic.   -Continue lyphmhatic formula 1 capsule daily per vascular recommendations  -Monitor for worsening s/sx of concerns  -Staff to provide appropriate skin hygiene needs as " directed  -Continue wound care to bilateral lower extremities as directed:  *Cleanse with NS. Pat Dry. Apply eucerin to periwound. Calcium alginate to wound bed. Cover with kerlix. Tape in place. Perform daily.  *Left posterior thigh: Cleanse with NS. Pat Dry. Apply telfa non adherent and tape in place. Perform daily and PRN  -Elevate bilateral lower extremities as much as possible  -Velcro wraps to bilateral lower extremities daily. On in AM and off at HS  -In house wound provider to follow and monitor.   -BMP, CBC due today 3/14/23. Contacted lab today which these can be added of albumin, and ALT on from today's assessments. Cancel labs for 3/16 as they were collected today to avoid second lab stick  Per patient request.   -Trend labs at next routine visit or sooner if indicated     (K59.01) Constipation  Comment: Acute on chronic. Suspect due to polypharmacy and narcotic use  Plan:  -Monitor BM patterns  -senna 1 tab BID PRN  -BMP, CBC due today 3/14/23. Contacted lab today which these can be added of albumin, and ALT on from today's assessments. Cancel labs for 3/16 as they were collected today to avoid second lab stick  Per patient request.   -Trend labs at next routine visit or sooner if indicated     (I10) Essential hypertension with goal blood pressure less than 140/90  (Z86.718) History of DVT of lower extremity  (E78.00) High blood cholesterol  Comment: Chronic. Based on JNC-8 goals,  patients age of 57 year old, no presence of diabetes or CKD, and goals of care goal BP is <150/90 mm Hg. Patient is stable with current plan of care and routine assessment. History of DVT to LLE. SBP readings remaining 130s-150s.   Plan:   -Monitor BP and HR  -Continue hydrochlorothiazide 25mg daily.   -Continue xarelto 20mg daily  -BMP, CBC due today 3/14/23. Contacted lab today which these can be added of albumin, and ALT on from today's assessments. Cancel labs for 3/16 as they were collected today to avoid second lab  stick  Per patient request.   -Trend labs at next routine visit or sooner if indicated     (E66.01) Morbid obesity (H)  (G47.33) Obstructive sleep apnea syndrome  Comment: Chronic. Hx of LOPEZ, does not have CPAP as this causes sinus problems per patient. Patient with frequent desaturations while sleeping during history of hospital admission. Recommend follow up with sleep medicine provider to provide recommendations for management which she last saw at [facility] location last in 2016  Plan:   -Monitor sleeping patterns  -Monitor respiratory status  -Oxygen used at night  -Follow up with sleep medicine as directed. Scheduled for 6/28/23  -BMP, CBC due today 3/14/23. Contacted lab today which these can be added of albumin, and ALT on from today's assessments. Cancel labs for 3/16 as they were collected today to avoid second lab stick  Per patient request.   -Trend labs at next routine visit or sooner if indicated     (D64.9) Anemia, unspecified type  Comment: Acute on chronic. Baseline hgb around 12  Plan:   -Monitor bleeding risks  -Monitor for worsening s/sx of concerns  -BMP, CBC due today 3/14/23. Contacted lab today which these can be added of albumin, and ALT on from today's assessments. Cancel labs for 3/16 as they were collected today to avoid second lab stick  Per patient request.   -Trend labs at next routine visit or sooner if indicated    (R09.82) Post nasal drip  Comment: Acute on chronic. Reports irritative throat cough with ear fullness.   Plan:  -Start claritin 10mg daily  -Monitor for worsening s/sx of concerns  -BMP, CBC due today 3/14/23. Contacted lab today which these can be added of albumin, and ALT on from today's assessments. Cancel labs for 3/16 as they were collected today to avoid second lab stick  Per patient request.   -Trend labs at next routine visit or sooner if indicated     Electronically signed by:  Arlen Carlson DNP, APRN    I was present with the medical student/advanced  practice registered nurse, Keny Carter, who participated in the service and in the documentation of this note and/or observation along side of this provider. I have verified the history and personally performed the physical exam and medical decision making. I agree with the assessment and plan of care as documented in the note.

## 2023-03-14 ENCOUNTER — NURSING HOME VISIT (OUTPATIENT)
Dept: GERIATRICS | Facility: CLINIC | Age: 58
End: 2023-03-14
Payer: COMMERCIAL

## 2023-03-14 DIAGNOSIS — L03.90 CELLULITIS, UNSPECIFIED CELLULITIS SITE: ICD-10-CM

## 2023-03-14 DIAGNOSIS — I73.9 PAD (PERIPHERAL ARTERY DISEASE) (H): ICD-10-CM

## 2023-03-14 DIAGNOSIS — M05.79 SEROPOSITIVE RHEUMATOID ARTHRITIS OF MULTIPLE SITES (H): Primary | ICD-10-CM

## 2023-03-14 DIAGNOSIS — E78.00 HIGH BLOOD CHOLESTEROL: ICD-10-CM

## 2023-03-14 DIAGNOSIS — Z86.718 HISTORY OF DVT OF LOWER EXTREMITY: ICD-10-CM

## 2023-03-14 DIAGNOSIS — R09.82 POST-NASAL DRIP: ICD-10-CM

## 2023-03-14 DIAGNOSIS — E55.9 VITAMIN D DEFICIENCY: ICD-10-CM

## 2023-03-14 DIAGNOSIS — I89.0 LYMPHEDEMA OF BOTH LOWER EXTREMITIES: ICD-10-CM

## 2023-03-14 DIAGNOSIS — R76.8 CYCLIC CITRULLINATED PEPTIDE (CCP) ANTIBODY POSITIVE: ICD-10-CM

## 2023-03-14 DIAGNOSIS — E66.01 MORBID OBESITY (H): ICD-10-CM

## 2023-03-14 DIAGNOSIS — M15.0 PRIMARY OSTEOARTHRITIS INVOLVING MULTIPLE JOINTS: ICD-10-CM

## 2023-03-14 DIAGNOSIS — M25.50 POLYARTHRALGIA: ICD-10-CM

## 2023-03-14 DIAGNOSIS — D64.9 ANEMIA, UNSPECIFIED TYPE: ICD-10-CM

## 2023-03-14 DIAGNOSIS — G47.33 OBSTRUCTIVE SLEEP APNEA SYNDROME: ICD-10-CM

## 2023-03-14 DIAGNOSIS — K59.00 CONSTIPATION, UNSPECIFIED CONSTIPATION TYPE: ICD-10-CM

## 2023-03-14 DIAGNOSIS — I10 ESSENTIAL HYPERTENSION WITH GOAL BLOOD PRESSURE LESS THAN 140/90: ICD-10-CM

## 2023-03-14 LAB
ALBUMIN SERPL BCG-MCNC: 3.2 G/DL (ref 3.5–5.2)
ALT SERPL W P-5'-P-CCNC: 6 U/L (ref 10–35)
ANION GAP SERPL CALCULATED.3IONS-SCNC: 11 MMOL/L (ref 7–15)
BUN SERPL-MCNC: 17 MG/DL (ref 8–23)
CALCIUM SERPL-MCNC: 9.4 MG/DL (ref 8.6–10)
CHLORIDE SERPL-SCNC: 102 MMOL/L (ref 98–107)
CREAT SERPL-MCNC: 0.56 MG/DL (ref 0.51–0.95)
DEPRECATED HCO3 PLAS-SCNC: 27 MMOL/L (ref 22–29)
ERYTHROCYTE [DISTWIDTH] IN BLOOD BY AUTOMATED COUNT: 15.7 % (ref 10–15)
GFR SERPL CREATININE-BSD FRML MDRD: >90 ML/MIN/1.73M2
GLUCOSE SERPL-MCNC: 89 MG/DL (ref 70–99)
HCT VFR BLD AUTO: 40.4 % (ref 35–47)
HGB BLD-MCNC: 12.5 G/DL (ref 11.7–15.7)
MCH RBC QN AUTO: 28.7 PG (ref 26.5–33)
MCHC RBC AUTO-ENTMCNC: 30.9 G/DL (ref 31.5–36.5)
MCV RBC AUTO: 93 FL (ref 78–100)
PLATELET # BLD AUTO: 295 10E3/UL (ref 150–450)
POTASSIUM SERPL-SCNC: 4 MMOL/L (ref 3.4–5.3)
RBC # BLD AUTO: 4.35 10E6/UL (ref 3.8–5.2)
SODIUM SERPL-SCNC: 140 MMOL/L (ref 136–145)
WBC # BLD AUTO: 6.5 10E3/UL (ref 4–11)

## 2023-03-14 PROCEDURE — 80048 BASIC METABOLIC PNL TOTAL CA: CPT | Mod: ORL | Performed by: INTERNAL MEDICINE

## 2023-03-14 PROCEDURE — 99309 SBSQ NF CARE MODERATE MDM 30: CPT | Performed by: NURSE PRACTITIONER

## 2023-03-14 PROCEDURE — 85027 COMPLETE CBC AUTOMATED: CPT | Mod: ORL | Performed by: INTERNAL MEDICINE

## 2023-03-14 PROCEDURE — 84460 ALANINE AMINO (ALT) (SGPT): CPT | Mod: ORL | Performed by: INTERNAL MEDICINE

## 2023-03-14 PROCEDURE — P9604 ONE-WAY ALLOW PRORATED TRIP: HCPCS | Mod: ORL | Performed by: INTERNAL MEDICINE

## 2023-03-14 PROCEDURE — 36415 COLL VENOUS BLD VENIPUNCTURE: CPT | Mod: ORL | Performed by: INTERNAL MEDICINE

## 2023-03-14 PROCEDURE — 82040 ASSAY OF SERUM ALBUMIN: CPT | Mod: ORL | Performed by: INTERNAL MEDICINE

## 2023-03-14 RX ORDER — GABAPENTIN 300 MG/1
300 CAPSULE ORAL 2 TIMES DAILY
Qty: 60 CAPSULE | Refills: 11 | Status: SHIPPED | OUTPATIENT
Start: 2023-03-14 | End: 2023-03-21

## 2023-03-14 RX ORDER — LORATADINE 10 MG/1
10 TABLET ORAL DAILY
Qty: 30 TABLET | Refills: 11 | Status: SHIPPED | OUTPATIENT
Start: 2023-03-14

## 2023-03-14 NOTE — LETTER
3/14/2023        RE: María Elena Saab  [place] [address]  [place] [address]        [facility] GERIATRICS  Chief Complaint   Patient presents with     [facility]     [facility] Record Number:  [id number]  Place of Service where encounter took place:  [facility] () [[id number]]    HPI:    María Elena Saab  is 57 year old (1965), who is being seen today for a federally mandated E/M visit. Today's concerns are:     Seropositive rheumatoid arthritis of multiple sites (H)  Cyclic citrullinated peptide (CCP) antibody positive  Primary osteoarthritis involving multiple joints  Vitamin D deficiency  Lymphedema of both lower extremities  PAD (peripheral artery disease) (H)  Cellulitis, unspecified cellulitis site  Constipation, unspecified constipation type  Essential hypertension with goal blood pressure less than 140/90  History of DVT of lower extremity  High blood cholesterol  Morbid obesity (H)  Obstructive sleep apnea syndrome  Anemia, unspecified type  Post-nasal drip  Polyarthralgia     Met with patient who denies any chest pain, palpitations, ESPITIA, lightheadedness, dizziness . Some complaints of shortness of breath and mild cough irritation. She does report some mild ear fullness. I suspect some underlying post nasal drip concerns. She remains on oxygen at HS while sleeping. Denies any abdominal discomfort. Denies N&V. Denies B&B concerns. Denies dysuria or frequency. Denies loose or constipation. Appetite good. Sleeping well. She reports mild pain to bilateral lower extremities Right >Left. See wound photos below. No signs of infection noted today. Remains on doxycycline for short duration for suspected cellulitis history.     BP Readings from Last 3 Encounters:   03/13/23 (!) 141/79   01/11/23 (!) 152/79   12/21/22 121/74     Wt Readings from Last 5 Encounters:   03/13/23 (!) 154.2 kg (340 lb)   01/11/23 (!) 150.5 kg (331 lb 11.2 oz)    12/21/22 (!) 155.5 kg (342 lb 12.8 oz)   12/07/22 (!) 154.2 kg (340 lb)   11/29/22 (!) 155.5 kg (342 lb 12.8 oz)     ALLERGIES:Adhesive tape, Adhesive [mecrylate], Banana, Food, Grape [grape (artificial) flavor], Morphine (pf) [morphine], Other environmental allergy, Pine, Pseudoephedrine, Pseudoephedrine cold/allergy [cpm-pse cr], Latex, and Other food allergy  PAST MEDICAL HISTORY:   Past Medical History:   Diagnosis Date     Aseptic necrosis of femoral head (H)     LEFT     Bacteremia due to group B Streptococcus      Cellulitis of right lower extremity      DJD (degenerative joint disease)      DVT, bilateral lower limbs (H) 10/2014     Edema 5/22/2015     Essential hypertension with goal blood pressure less than 140/90      Failure to thrive      History of blood clots      Hypokalemia 10/15/2014     Lung mass 10/18/2014     Morbid obesity (H) 4/10/2015    Had formal nutrition consult at [facility].  RD reports that she is not willing to commit to the program outlined.  See scanned document.  12/15/2015 - Marcio Quinonez MD        Nocturnal hypoxemia - probable sleep apnea - had overnight pulse oximetry, April, 2015. 5/22/2015     Obesity 4/10/2015     LOPEZ (obstructive sleep apnea) 5/22/2015     Osteoarthritis      Peripheral vascular disease (H)      Pleural effusion      Pressure ulcer of foot      Rheumatoid arthritis (H) 12/30/2014    seronegative     Sepsis (H)      Seropositive rheumatoid arthritis (H) 1/19/2016     Vitamin D deficiency 8/26/2015     PAST SURGICAL HISTORY:   has a past surgical history that includes Total Knee Arthroplasty (Left, 2006); joint replacement; Total Hip Arthroplasty (Left, 10/18/2016); and Peripherally Inserted Central Catheter Insertion (Right, 07/14/2019).  FAMILY HISTORY: family history includes Arthritis in her mother; Breast Cancer in her cousin; Breast Cancer (age of onset: 50.00) in her maternal grandmother; Breast Cancer (age of onset: 54.00) in her maternal aunt and  mother; Cancer in her mother; Cataracts in her father and mother; Deep Vein Thrombosis in her father; Heart Disease in her maternal grandmother; Multiple myeloma in her father; No Known Problems in her sister; Other - See Comments in her brother; Rheumatoid Arthritis in her maternal grandmother and paternal aunt; Sleep Apnea in her brother; Snoring in her father.  SOCIAL HISTORY:  reports that she has quit smoking. Her smoking use included cigarettes. She has a 30.00 pack-year smoking history. She has never used smokeless tobacco. She reports that she does not currently use alcohol. She reports that she does not use drugs.    MEDICATIONS:  MED REC REQUIRED  Post Medication Reconciliation Status:  Patient was not discharged from an inpatient facility or TCU           Review of your medicines          Accurate as of March 14, 2023 11:28 AM. If you have any questions, ask your nurse or doctor.            START taking      Dose / Directions   loratadine 10 MG tablet  Commonly known as: CLARITIN  Used for: Post-nasal drip  Started by: KRISTIN Rodriges CNP      Dose: 10 mg  Take 1 tablet (10 mg) by mouth daily  Quantity: 30 tablet  Refills: 11        CONTINUE these medicines which may have CHANGED, or have new prescriptions. If we are uncertain of the size of tablets/capsules you have at home, strength may be listed as something that might have changed.      Dose / Directions   gabapentin 300 MG capsule  Commonly known as: NEURONTIN  This may have changed:     medication strength    how much to take    when to take this  Used for: Seropositive rheumatoid arthritis of multiple sites (H), Primary osteoarthritis involving multiple joints, Polyarthralgia  Changed by: KRISTIN Rodriges CNP      Dose: 300 mg  Take 1 capsule (300 mg) by mouth 2 times daily  Quantity: 60 capsule  Refills: 11        CONTINUE these medicines which have NOT CHANGED      Dose / Directions   acetaminophen 500 MG tablet  Commonly known as:  TYLENOL  Used for: Primary osteoarthritis involving multiple joints      Dose: 1,000 mg  Take 2 tablets (1,000 mg) by mouth every 6 hours as needed for mild pain  Quantity: 240 tablet  Refills: 11     adalimumab 40 MG/0.4ML prefilled syringe kit  Commonly known as: HUMIRA *CF*  Used for: Seropositive rheumatoid arthritis of multiple sites (H)      Dose: 40 mg  Inject 0.4 mLs (40 mg) Subcutaneous every 14 days  Quantity: 0.8 mL  Refills: 11     cholecalciferol 50 MCG (2000 UT) tablet  Used for: Vitamin D deficiency      Dose: 1 tablet  Take 1 tablet (50 mcg) by mouth daily  Quantity: 30 tablet  Refills: 11     doxycycline hyclate 100 MG capsule  Commonly known as: VIBRAMYCIN  Used for: Cellulitis, unspecified cellulitis site      Dose: 100 mg  Take 1 capsule (100 mg) by mouth 2 times daily for 10 days  Quantity: 20 capsule  Refills: 0     DULoxetine 20 MG capsule  Commonly known as: CYMBALTA  Used for: Primary osteoarthritis involving multiple joints      Dose: 20 mg  [DULOXETINE (CYMBALTA) 20 MG CAPSULE] Take 1 capsule (20 mg total) by mouth daily.  Quantity: 90 capsule  Refills: 2     folic acid 1 MG tablet  Commonly known as: FOLVITE      Dose: 1 mg  Take 1 mg by mouth  Refills: 0     hydrochlorothiazide 25 MG tablet  Commonly known as: HYDRODIURIL      Dose: 25 mg  [HYDROCHLOROTHIAZIDE (HYDRODIURIL) 25 MG TABLET] Take 25 mg by mouth daily.  Refills: 0     methotrexate sodium 10 MG Tabs  Used for: Seropositive rheumatoid arthritis of multiple sites (H)      Dose: 10 mg  Take 10 mg by mouth once a week  Quantity: 16 tablet  Refills: 0     miconazole 2 % external cream  Commonly known as: MICATIN      Apply topically 2 times daily Apply to under toes  Refills: 0     Nyamyc [id number] UNIT/GM external powder  Generic drug: nystatin      2 times daily Apply to rash areas under brest and under stomach  Refills: 0     rivaroxaban ANTICOAGULANT 20 MG Tabs tablet  Commonly known as: XARELTO      Dose: 20 mg  Take 20 mg by  "mouth daily  Refills: 0     senna-docusate 8.6-50 MG tablet  Commonly known as: SENOKOT-S/PERICOLACE  Used for: Slow transit constipation      Dose: 1 tablet  Take 1 tablet by mouth 2 times daily as needed for constipation  Quantity: 60 tablet  Refills: 11     THERAPEUTIC MULTIVITAMIN PO      Dose: 1 tablet  Take 1 tablet by mouth daily  Refills: 0     vitamin C 500 MG tablet  Commonly known as: ASCORBIC ACID      Dose: 500 mg  [ASCORBIC ACID, VITAMIN C, (ASCORBIC ACID WITH KAEL HIPS) 500 MG TABLET] Take 500 mg by mouth 2 (two) times a day.  Refills: 0           Where to get your medicines      These medications were sent to [facility], [place] - [address]  [address], [facility] [address]    Phone: [phone]     gabapentin 300 MG capsule    loratadine 10 MG tablet        Case Management:  I have reviewed the care plan and MDS and do agree with the plan. Patient's desire to return to the community is present, but is not able due to care needs . Information reviewed:  Medications, vital signs, orders, and nursing notes.    ROS:  10 point ROS of systems including Constitutional, Eyes, Respiratory, Cardiovascular, Gastroenterology, Genitourinary, Integumentary, Musculoskeletal, Psychiatric were all negative except for pertinent positives noted in my HPI.    Vitals:  BP (!) 141/79   Pulse 77   Temp 98.1  F (36.7  C)   Resp 17   Ht 1.651 m (5' 5\")   Wt (!) 154.2 kg (340 lb)   SpO2 98%   BMI 56.58 kg/m    Body mass index is 56.58 kg/m .  Exam:  GENERAL APPEARANCE:  Alert, in no distress, oriented, morbidly obese, cooperative  ENT:  Mouth and posterior oropharynx normal, moist mucous membranes, [place]  EYES:  EOM, conjunctivae, lids, pupils and irises normal  NECK:  No adenopathy,masses or thyromegaly  RESP:  respiratory effort and palpation of chest normal, lungs clear to auscultation , no respiratory distress  CV:  Palpation and auscultation of heart " done , regular rate and rhythm, no murmur, rub, or gallop, no edema  ABDOMEN:  normal bowel sounds, soft, nontender, no hepatosplenomegaly or other masses, no guarding or rebound  M/S:   wheelchair for main mobilty needs. Staff to assist for all ADLs, transfers and cares  SKIN:  Inspection of skin and subcutaneous tissue baseline, wound healing well, no signs of infection see photos  NEURO:   Cranial nerves 2-12 are normal tested and grossly at patient's baseline, no purposeful movement in upper and lower extremities  PSYCH:  oriented X 3, normal insight, judgement and memory, affect and mood normal                              Lab/Diagnostic data:     Most Recent 3 CBC's:  Recent Labs   Lab Test 12/22/22 1710 11/29/22  0820 11/22/22  0654   WBC 7.3 8.9 11.0   HGB 12.3 10.8* 10.9*   MCV 96 91 91    303 427     Most Recent 3 BMP's:  Recent Labs   Lab Test 12/22/22 1710 11/29/22  0820 11/22/22  0654 01/13/22  1234 01/04/22  0439   NA  --  136 137  --  139   POTASSIUM  --  3.9 4.4  --  3.6   CHLORIDE  --  96* 100  --  99   CO2  --  28 26  --  27   BUN  --  13.7 15.8  --  15   CR 0.45* 0.50* 0.74   < > 0.66  0.66   ANIONGAP  --  12 11  --  13   WILVER  --  8.8 9.1  --  9.9   GLC  --  97 114*  --  82    < > = values in this interval not displayed.     Most Recent 2 LFT's:  Recent Labs   Lab Test 12/22/22 1710 09/22/22  1559 01/13/22  1234 01/04/22  0439 09/10/19  1409 07/25/19  1215   AST  --   --   --  21  20  --  23   ALT 21 12   < > 23  23  23   < > 22   ALKPHOS  --   --   --  70  72  --  69   BILITOTAL  --   --   --  0.3  0.4  --  0.3    < > = values in this interval not displayed.     Most Recent Anemia Panel:  Recent Labs   Lab Test 12/22/22 1710   WBC 7.3   HGB 12.3   HCT 42.3   MCV 96          ASSESSMENT/PLAN  (M05.79) Seropositive rheumatoid arthritis of multiple sites (H)  (primary encounter diagnosis)  (R76.8) Cyclic citrullinated peptide (CCP) antibody positive  (M15.9) Primary  "osteoarthritis involving multiple joints  (E55.9) Vitamin D deficiency  Comment: Chronic. Managed by rheumatology. c/o posterior neck pain as well as \"all over pain\" that is most likely neuropathic based on description.   Plan:   -Continue methotrexate 10mg weekly as directed and folic acid 1mg daily  -Continue Tylenol 1000mg every 6 hours PRN  -Discontinue PRN flexeril due to limited use. Used 2x in the past 2 weeks  -Increase gabapentin to 300mg BID  -Discontinue ibuprofen due to non use. Last used in feb 2023.   -Discontinue oxycodone PRN due to non use. Last used in Jan 2023  -Continue humira 40mg injection every 2 weeks as directed.   -Continue Duloxetine 20mg daily as directed  -Continue vitamin D 2000U daily  -Follow up with rheumatology as directed. Scheduled for 3/22/23  -BMP, CBC due today 3/14/23. Contacted lab today which these can be added of albumin, and ALT on from today's assessments. Cancel labs for 3/16 as they were collected today to avoid second lab stick  Per patient request.   -Trend labs at next routine visit or sooner if indicated     (I89.0) Lymphedema of both lower extremities  (I73.9) PAD (peripheral artery disease) (H)  (L03.90) Cellulitis  Comment: Chronic. No claudication complaints. Recent infection. Vascular following with recent visit on 12/7/22. Recommendations for lymphedema therapist for MLD, compression and possibly pump therapy in near future. Noted on 3/10/23-Nurse called today to report: Increased drainage and redness to bilateral lower extremities wounds. Followed by in house wound provider as well. 10 day course of doxycycline started. Improvement to skin assessment today. See photos above.   Plan:   -Follow up with vascular clinic as directed. Schedule for f/u appt within 6 months with Dr Zhao. Due June 2023  -Continue doxycycline as directed until completion. HOLD methotrexate and humira while on antibiotic.   -Continue lyphmhatic formula 1 capsule daily per vascular " recommendations  -Monitor for worsening s/sx of concerns  -Staff to provide appropriate skin hygiene needs as directed  -Continue wound care to bilateral lower extremities as directed:  *Cleanse with NS. Pat Dry. Apply eucerin to periwound. Calcium alginate to wound bed. Cover with kerlix. Tape in place. Perform daily.  *Left posterior thigh: Cleanse with NS. Pat Dry. Apply telfa non adherent and tape in place. Perform daily and PRN  -Elevate bilateral lower extremities as much as possible  -Velcro wraps to bilateral lower extremities daily. On in AM and off at HS  -In house wound provider to follow and monitor.   -BMP, CBC due today 3/14/23. Contacted lab today which these can be added of albumin, and ALT on from today's assessments. Cancel labs for 3/16 as they were collected today to avoid second lab stick  Per patient request.   -Trend labs at next routine visit or sooner if indicated     (K59.01) Constipation  Comment: Acute on chronic. Suspect due to polypharmacy and narcotic use  Plan:  -Monitor BM patterns  -senna 1 tab BID PRN  -BMP, CBC due today 3/14/23. Contacted lab today which these can be added of albumin, and ALT on from today's assessments. Cancel labs for 3/16 as they were collected today to avoid second lab stick  Per patient request.   -Trend labs at next routine visit or sooner if indicated     (I10) Essential hypertension with goal blood pressure less than 140/90  (Z86.718) History of DVT of lower extremity  (E78.00) High blood cholesterol  Comment: Chronic. Based on JNC-8 goals,  patients age of 57 year old, no presence of diabetes or CKD, and goals of care goal BP is <150/90 mm Hg. Patient is stable with current plan of care and routine assessment. History of DVT to LLE. SBP readings remaining 130s-150s.   Plan:   -Monitor BP and HR  -Continue hydrochlorothiazide 25mg daily.   -Continue xarelto 20mg daily  -BMP, CBC due today 3/14/23. Contacted lab today which these can be added of albumin,  and ALT on from today's assessments. Cancel labs for 3/16 as they were collected today to avoid second lab stick  Per patient request.   -Trend labs at next routine visit or sooner if indicated     (E66.01) Morbid obesity (H)  (G47.33) Obstructive sleep apnea syndrome  Comment: Chronic. Hx of LOPEZ, does not have CPAP as this causes sinus problems per patient. Patient with frequent desaturations while sleeping during history of hospital admission. Recommend follow up with sleep medicine provider to provide recommendations for management which she last saw [facility] location last in 2016  Plan:   -Monitor sleeping patterns  -Monitor respiratory status  -Oxygen used at night  -Follow up with sleep medicine as directed. Scheduled for 6/28/23  -BMP, CBC due today 3/14/23. Contacted lab today which these can be added of albumin, and ALT on from today's assessments. Cancel labs for 3/16 as they were collected today to avoid second lab stick  Per patient request.   -Trend labs at next routine visit or sooner if indicated     (D64.9) Anemia, unspecified type  Comment: Acute on chronic. Baseline hgb around 12  Plan:   -Monitor bleeding risks  -Monitor for worsening s/sx of concerns  -BMP, CBC due today 3/14/23. Contacted lab today which these can be added of albumin, and ALT on from today's assessments. Cancel labs for 3/16 as they were collected today to avoid second lab stick  Per patient request.   -Trend labs at next routine visit or sooner if indicated    (R09.82) Post nasal drip  Comment: Acute on chronic. Reports irritative throat cough with ear fullness.   Plan:  -Start claritin 10mg daily  -Monitor for worsening s/sx of concerns  -BMP, CBC due today 3/14/23. Contacted lab today which these can be added of albumin, and ALT on from today's assessments. Cancel labs for 3/16 as they were collected today to avoid second lab stick  Per patient request.   -Trend labs at next routine visit or sooner if  indicated     Electronically signed by:  KRISTIN Rodriges DNP    I was present with the medical student/advanced practice registered nurse, Keny aCrter, who participated in the service and in the documentation of this note and/or observation along side of this provider. I have verified the history and personally performed the physical exam and medical decision making. I agree with the assessment and plan of care as documented in the note.             Sincerely,        KRISTIN Rodirges CNP

## 2023-03-21 ENCOUNTER — TELEPHONE (OUTPATIENT)
Dept: GERIATRICS | Facility: CLINIC | Age: 58
End: 2023-03-21
Payer: COMMERCIAL

## 2023-03-21 DIAGNOSIS — M05.79 SEROPOSITIVE RHEUMATOID ARTHRITIS OF MULTIPLE SITES (H): ICD-10-CM

## 2023-03-21 DIAGNOSIS — M25.50 POLYARTHRALGIA: ICD-10-CM

## 2023-03-21 DIAGNOSIS — M15.0 PRIMARY OSTEOARTHRITIS INVOLVING MULTIPLE JOINTS: ICD-10-CM

## 2023-03-21 DIAGNOSIS — M15.0 PRIMARY OSTEOARTHRITIS INVOLVING MULTIPLE JOINTS: Primary | ICD-10-CM

## 2023-03-21 RX ORDER — GABAPENTIN 300 MG/1
300 CAPSULE ORAL 3 TIMES DAILY
Qty: 90 CAPSULE | Refills: 11 | Status: SHIPPED | OUTPATIENT
Start: 2023-03-21

## 2023-03-21 RX ORDER — CYCLOBENZAPRINE HCL 10 MG
10 TABLET ORAL EVERY 8 HOURS PRN
Qty: 30 TABLET | Refills: 5 | Status: SHIPPED | OUTPATIENT
Start: 2023-03-21 | End: 2023-11-09

## 2023-03-21 RX ORDER — IBUPROFEN 400 MG/1
400 TABLET, FILM COATED ORAL EVERY 6 HOURS PRN
Qty: 30 TABLET | Refills: 11 | Status: SHIPPED | OUTPATIENT
Start: 2023-03-21 | End: 2023-07-25

## 2023-03-21 NOTE — TELEPHONE ENCOUNTER
[facility] TELEPHONE ENCOUNTER    Chief Complaint   Patient presents with     Pain       María Elena Saab is a 57 year old  (1965),Nurse called today to report: Patient complaints that most PRN analgesics were discontinued at last visit last week. Most PRN agents were discontinued due to non use >3+ months.     ASSESSMENT/PLAN    Change gabapentin to 300mg TID  Restart ibuprofen every 6 hours PRN  Restart flexeril every 8 hours PRN  Recommend and advise to not start oxycodone due to non use for >3 months.   May benefit from duloxetine dose adjustment in near future if pain persists or worsens.     Electronically signed by:   KRISTIN Rodriges CNP

## 2023-03-22 ENCOUNTER — MEDICAL CORRESPONDENCE (OUTPATIENT)
Dept: HEALTH INFORMATION MANAGEMENT | Facility: CLINIC | Age: 58
End: 2023-03-22

## 2023-03-22 ENCOUNTER — OFFICE VISIT (OUTPATIENT)
Dept: RHEUMATOLOGY | Facility: CLINIC | Age: 58
End: 2023-03-22
Payer: COMMERCIAL

## 2023-03-22 VITALS
SYSTOLIC BLOOD PRESSURE: 130 MMHG | BODY MASS INDEX: 48.82 KG/M2 | HEIGHT: 65 IN | HEART RATE: 80 BPM | WEIGHT: 293 LBS | DIASTOLIC BLOOD PRESSURE: 78 MMHG

## 2023-03-22 DIAGNOSIS — R76.8 CYCLIC CITRULLINATED PEPTIDE (CCP) ANTIBODY POSITIVE: ICD-10-CM

## 2023-03-22 DIAGNOSIS — Z79.899 HIGH RISK MEDICATION USE: ICD-10-CM

## 2023-03-22 DIAGNOSIS — M15.0 PRIMARY OSTEOARTHRITIS INVOLVING MULTIPLE JOINTS: ICD-10-CM

## 2023-03-22 DIAGNOSIS — M05.79 SEROPOSITIVE RHEUMATOID ARTHRITIS OF MULTIPLE SITES (H): Primary | ICD-10-CM

## 2023-03-22 DIAGNOSIS — M25.50 POLYARTHRALGIA: ICD-10-CM

## 2023-03-22 PROCEDURE — 99214 OFFICE O/P EST MOD 30 MIN: CPT | Performed by: INTERNAL MEDICINE

## 2023-03-22 NOTE — PROGRESS NOTES
"      Rheumatology follow-up visit note     María Elena is a 57 year old female presents today for follow-up.    María Elena was seen today for recheck.    Diagnoses and all orders for this visit:    Seropositive rheumatoid arthritis of multiple sites (H)    Cyclic citrullinated peptide (CCP) antibody positive    Primary osteoarthritis involving multiple joints    High risk medication use    Polyarthralgia        While her seropositive rheumatoid arthritis appears to be under good control, where she has deformity such as ulnar deviation in the digits, she is on methotrexate but had been holding Humira in view of recent diagnosis of cellulitis in her lower extremity where she has significant lymphedema.  Once she finishes the course of antibiotics and the cellulitis settles she will resume Humira.  We will continue the current regimen otherwise.  We will meet here in 6 months labs every 3.  Recent labs reviewed within acceptable range.    Follow up in 6 months.    HPI    María Elena Saab is a 57 year old female is here for follow-up of rheumatoid arthritis longstanding, polyarticular, seropositive.  She has osteoarthritis.  Status post left knee arthroplasty.  She has chronic lymphedema.  While her RA appears to be under good with the current regimen of Humira and methotrexate the latter at 10 mg/week with folic acid.  She ended up having to hold back on the Humira given the cellulitis that affected her lower extremities with superimposed lymphedema.  She has been on antibiotics.  This was a 10-day course.  Her recent labs that she brings with her reviewed within acceptable range.    DETAILED EXAMINATION  03/22/23  :    Vitals:    03/22/23 1345   BP: 130/78   BP Location: Right arm   Patient Position: Sitting   Cuff Size: Adult Large   Pulse: 80   Weight: (!) 150.7 kg (332 lb 3.2 oz)   Height: 1.651 m (5' 5\")     Alert oriented. Head including the face is examined for malar rash, heliotropes, scarring, lupus pernio. Eyes " examined for redness such as in episcleritis/scleritis, periorbital lesions.   Neck/ Face examined for parotid gland swelling, range of motion of neck.  Left upper and lower and right upper and lower extremities examined for tenderness, swelling, warmth of the appendicular joints, range of motion, edema, rash.  Some of the important findings included: she does not have evidence of synovitis in the palpable joints of the upper extremities.  No significant deformities of the digits.  She has ulnar deviation at her digits worse on the right side.  She has severe edema of the lower extremities where she has dressing wrapped around her lower extremity, impingement of both her shoulders with features of tendinopathy with a painful arc on abduction.  Patient Active Problem List    Diagnosis Date Noted     MRSA (methicillin resistant Staphylococcus aureus) 06/28/2022     Priority: Medium     Formatting of this note might be different from the original.  +tissue right foot 6/25/22  +Swab right ankle 6/24/22       Chronic acquired lymphedema 06/24/2022     Priority: Medium     Current long-term use of anticoagulant medication with history of deep venous thrombosis (DVT) 06/24/2022     Priority: Medium     Hx of bacteremia 06/24/2022     Priority: Medium     Formatting of this note might be different from the original.  2019 - GBS bacteremia and cellulitis       Major depressive disorder, recurrent (H) 06/24/2022     Priority: Medium     Cellulitis 06/23/2022     Priority: Medium     Formatting of this note might be different from the original.  Added automatically from request for surgery [id number]       Open wound of left lower extremity 03/21/2022     Priority: Medium     Bacteremia due to group B Streptococcus 07/13/2019     Priority: Medium     Primary osteoarthritis involving multiple joints 04/26/2019     Priority: Medium     History of total left knee replacement (TKR) 04/26/2019     Priority: Medium     High risk  medication use 02/13/2018     Priority: Medium     Physical debility 11/28/2017     Priority: Medium     Polyarthralgia 07/21/2017     Priority: Medium     H/O total hip arthroplasty 11/17/2016     Priority: Medium     Anemia 10/20/2016     Priority: Medium     Essential hypertension with goal blood pressure less than 140/90      Priority: Medium     Degenerative joint disease (DJD) of hip 10/18/2016     Priority: Medium     History of DVT of lower extremity      Priority: Medium     Radiculopathy of leg 07/20/2016     Priority: Medium     Sleep apnea 07/20/2016     Priority: Medium     Pain management 04/26/2016     Priority: Medium     Seropositive rheumatoid arthritis of multiple sites (H) 03/21/2016     Priority: Medium     PAD (peripheral artery disease) (H) 03/21/2016     Priority: Medium     Right shoulder tendinitis 01/19/2016     Priority: Medium     Cyclic citrullinated peptide (CCP) antibody positive 09/16/2015     Priority: Medium     Vitamin D deficiency 08/26/2015     Priority: Medium     Edema - swelling of the legs after blood clots 05/22/2015     Priority: Medium     Morbid obesity (H) 04/10/2015     Priority: Medium     Had formal nutrition consult at [facility].  RD reports that she is not   willing to commit to the program outlined.  See scanned document.    12/15/2015 - Marcio Quinonez MD         DVT of lower extremity, bilateral - residual clot disease on repeat US in NEW location - after six months of warfarin. 10/15/2014     Priority: Medium     Past Surgical History:   Procedure Laterality Date     JOINT REPLACEMENT      knee     PERIPHERALLY INSERTED CENTRAL CATHETER INSERTION Right 07/14/2019    4fr/44cm/Rt Cephalic.lowSVC.MGlav     TOTAL HIP ARTHROPLASTY Left 10/18/2016    Procedure: LEFT HIP TOTAL ARTHROPLASTY;  Surgeon: London Goss MD;  Location: [facility];  Service:      TOTAL KNEE ARTHROPLASTY Left 2006      Past Medical History:   Diagnosis Date     Aseptic necrosis of  femoral head (H)     LEFT     Bacteremia due to group B Streptococcus      Cellulitis of right lower extremity      DJD (degenerative joint disease)      DVT, bilateral lower limbs (H) 10/2014     Edema 5/22/2015     Essential hypertension with goal blood pressure less than 140/90      Failure to thrive      History of blood clots      Hypokalemia 10/15/2014     Lung mass 10/18/2014     Morbid obesity (H) 4/10/2015    Had formal nutrition consult at [facility].  RD reports that she is not willing to commit to the program outlined.  See scanned document.  12/15/2015 - Marcio Quinonez MD        Nocturnal hypoxemia - probable sleep apnea - had overnight pulse oximetry, April, 2015. 5/22/2015     Obesity 4/10/2015     LOPEZ (obstructive sleep apnea) 5/22/2015     Osteoarthritis      Peripheral vascular disease (H)      Pleural effusion      Pressure ulcer of foot      Rheumatoid arthritis (H) 12/30/2014    seronegative     Sepsis (H)      Seropositive rheumatoid arthritis (H) 1/19/2016     Vitamin D deficiency 8/26/2015     Allergies   Allergen Reactions     Adhesive Tape Other (See Comments)     As on band-aids;  Causes skin tearing/wounds.     Adhesive [Mecrylate] Other (See Comments)     As on band-aids;  Causes skin tearing/wounds.     Banana Unknown     She avoids due to Latex allergy.  If she eats them 3 days in a row, gets stomach cramps and dark stool.  TBrinkMD - 4/10/15     Food Unknown     Bananas, grapes, pine nuts     Grape [Grape (Artificial) Flavor] Unknown     She avoids due to Latex allergy.  If she eats lots of them, and she likes them, stomach gets a little queasy.  TBrinkMD - 4/10/15     Morphine (Pf) [Morphine] Other (See Comments)     Pt has not reacted to this med herself, but mother has anaphylactic reaction and other family members get nausea/vomiting.     Other Environmental Allergy Swelling     leather     Pine      Pseudoephedrine Other (See Comments)     Insomnia     Pseudoephedrine  Cold/Allergy [Cpm-Pse Cr] Other (See Comments)     Keeps pt awake up to 24 hours      Latex Rash     Hands get red from rubber gloves, but okay if she washes them right away.  Hands get red from rubber gloves, but okay if she washes them right away.     Other Food Allergy Rash     Pine trees, leather .      Current Outpatient Medications   Medication Sig Dispense Refill     acetaminophen (TYLENOL) 500 MG tablet Take 2 tablets (1,000 mg) by mouth every 6 hours as needed for mild pain 240 tablet 11     ascorbic acid, vitamin C, (ASCORBIC ACID WITH KAEL HIPS) 500 MG tablet [ASCORBIC ACID, VITAMIN C, (ASCORBIC ACID WITH KAEL HIPS) 500 MG TABLET] Take 500 mg by mouth 2 (two) times a day.       cholecalciferol 50 MCG (2000 UT) tablet Take 1 tablet (50 mcg) by mouth daily 30 tablet 11     cyclobenzaprine (FLEXERIL) 10 MG tablet Take 1 tablet (10 mg) by mouth every 8 hours as needed for muscle spasms 30 tablet 5     DULoxetine (CYMBALTA) 20 MG capsule [DULOXETINE (CYMBALTA) 20 MG CAPSULE] Take 1 capsule (20 mg total) by mouth daily. 90 capsule 2     folic acid (FOLVITE) 1 MG tablet Take 1 mg by mouth       gabapentin (NEURONTIN) 300 MG capsule Take 1 capsule (300 mg) by mouth 3 times daily 90 capsule 11     hydroCHLOROthiazide (HYDRODIURIL) 25 MG tablet [HYDROCHLOROTHIAZIDE (HYDRODIURIL) 25 MG TABLET] Take 25 mg by mouth daily.       ibuprofen (ADVIL/MOTRIN) 400 MG tablet Take 1 tablet (400 mg) by mouth every 6 hours as needed for moderate pain (4-6) 30 tablet 11     loratadine (CLARITIN) 10 MG tablet Take 1 tablet (10 mg) by mouth daily 30 tablet 11     miconazole (MICATIN) 2 % external cream Apply topically 2 times daily Apply to under toes       multivitamin therapeutic (THERAGRAN) tablet Take 1 tablet by mouth daily       NYAMYC 117219 UNIT/GM external powder 2 times daily Apply to rash areas under brest and under stomach       rivaroxaban ANTICOAGULANT (XARELTO) 20 MG TABS tablet Take 20 mg by mouth daily        senna-docusate (SENOKOT-S/PERICOLACE) 8.6-50 MG tablet Take 1 tablet by mouth 2 times daily as needed for constipation 60 tablet 11     adalimumab (HUMIRA *CF*) 40 MG/0.4ML prefilled syringe kit Inject 0.4 mLs (40 mg) Subcutaneous every 14 days (Patient not taking: Reported on 3/22/2023) 0.8 mL 11     methotrexate sodium 10 MG TABS Take 10 mg by mouth once a week (Patient not taking: Reported on 3/22/2023) 16 tablet 0     family history includes Arthritis in her mother; Breast Cancer in her cousin; Breast Cancer (age of onset: 50.00) in her maternal grandmother; Breast Cancer (age of onset: 54.00) in her maternal aunt and mother; Cancer in her mother; Cataracts in her father and mother; Deep Vein Thrombosis in her father; Heart Disease in her maternal grandmother; Multiple myeloma in her father; No Known Problems in her sister; Other - See Comments in her brother; Rheumatoid Arthritis in her maternal grandmother and paternal aunt; Sleep Apnea in her brother; Snoring in her father.  Social Connections: Not on file          WBC Count   Date Value Ref Range Status   03/14/2023 6.5 4.0 - 11.0 10e3/uL Final     RBC Count   Date Value Ref Range Status   03/14/2023 4.35 3.80 - 5.20 10e6/uL Final     Hemoglobin   Date Value Ref Range Status   03/14/2023 12.5 11.7 - 15.7 g/dL Final     Hematocrit   Date Value Ref Range Status   03/14/2023 40.4 35.0 - 47.0 % Final     MCV   Date Value Ref Range Status   03/14/2023 93 78 - 100 fL Final     MCH   Date Value Ref Range Status   03/14/2023 28.7 26.5 - 33.0 pg Final     Platelet Count   Date Value Ref Range Status   03/14/2023 295 150 - 450 10e3/uL Final     % Lymphocytes   Date Value Ref Range Status   01/13/2022 30 % Final     AST   Date Value Ref Range Status   01/04/2022 20 0 - 40 U/L Final   01/04/2022 21 0 - 40 U/L Final     ALT   Date Value Ref Range Status   03/14/2023 6 (L) 10 - 35 U/L Final     Albumin   Date Value Ref Range Status   03/14/2023 3.2 (L) 3.5 - 5.2 g/dL  Final   04/12/2022 3.1 (L) 3.5 - 5.0 g/dL Final     Alkaline Phosphatase   Date Value Ref Range Status   01/04/2022 72 45 - 120 U/L Final   01/04/2022 70 45 - 120 U/L Final     Creatinine   Date Value Ref Range Status   03/14/2023 0.56 0.51 - 0.95 mg/dL Final     GFR Estimate   Date Value Ref Range Status   03/14/2023 >90 >60 mL/min/1.73m2 Final     Comment:     eGFR calculated using 2021 CKD-EPI equation.   04/20/2021 >60 >60 mL/min/1.73m2 Final     GFR Estimate If Black   Date Value Ref Range Status   04/20/2021 >60 >60 mL/min/1.73m2 Final     CRP   Date Value Ref Range Status   12/21/2020 1.3 (H) 0.0 - 0.8 mg/dL Final

## 2023-04-18 ENCOUNTER — NURSING HOME VISIT (OUTPATIENT)
Dept: GERIATRICS | Facility: CLINIC | Age: 58
End: 2023-04-18
Payer: COMMERCIAL

## 2023-04-18 VITALS
WEIGHT: 293 LBS | OXYGEN SATURATION: 96 % | DIASTOLIC BLOOD PRESSURE: 60 MMHG | SYSTOLIC BLOOD PRESSURE: 121 MMHG | HEIGHT: 65 IN | BODY MASS INDEX: 48.82 KG/M2 | RESPIRATION RATE: 18 BRPM | HEART RATE: 80 BPM | TEMPERATURE: 98.2 F

## 2023-04-18 DIAGNOSIS — I89.0 LYMPHEDEMA OF BOTH LOWER EXTREMITIES: Primary | ICD-10-CM

## 2023-04-18 DIAGNOSIS — Z86.718 HISTORY OF DVT OF LOWER EXTREMITY: ICD-10-CM

## 2023-04-18 DIAGNOSIS — I73.9 PAD (PERIPHERAL ARTERY DISEASE) (H): ICD-10-CM

## 2023-04-18 DIAGNOSIS — L03.90 CELLULITIS, UNSPECIFIED CELLULITIS SITE: ICD-10-CM

## 2023-04-18 DIAGNOSIS — E66.01 MORBID OBESITY (H): ICD-10-CM

## 2023-04-18 PROCEDURE — 99310 SBSQ NF CARE HIGH MDM 45: CPT | Performed by: NURSE PRACTITIONER

## 2023-04-18 RX ORDER — SULFAMETHOXAZOLE/TRIMETHOPRIM 800-160 MG
1 TABLET ORAL 2 TIMES DAILY
Qty: 20 TABLET | Refills: 0 | Status: SHIPPED | OUTPATIENT
Start: 2023-04-18 | End: 2023-04-28

## 2023-04-18 NOTE — PROGRESS NOTES
[facility] GERIATRICS    Chief Complaint   Patient presents with     RECHECK     Skin concerns     HPI:  María Elena Saab is a 57 year old  (1965), who is being seen today for an episodic care visit at: [facility] () [[id number]]. Today's concern is: The primary encounter diagnosis was Lymphedema of both lower extremities. Diagnoses of PAD (peripheral artery disease) (H), Cellulitis, unspecified cellulitis site, History of DVT of lower extremity, and Morbid obesity (H) were also pertinent to this visit.    Nursing staff called provider today to report increased redness and drainage to bilateral lower extremities. Redness now going up to bilateral thighs. Met with patient today who was found lying in bed. She denies any chest pain, palpitations, shortness of breath, ESPITIA, lightheadedness, dizziness, or cough. Denies any abdominal discomfort. Denies N&V. Denies B&B concerns. Denies dysuria or frequency. Denies loose or constipation. Appetite good. Sleeping well. She reports chronic pain but good relief from PRN ibuprofen and flexeril use. Per therapy she was minimally confused this AM as she did not know who the therapist was. No fevers. She is oriented x 3 with me at this time. No confusion noted. I did discuss with patient options for oral antibiotic treatment vs hospital. She wants to try oral at this time, however I did tell her that if things worsens and do not improve by tomorrow then best to send her out to hospital for further evaluation needs. She is open to this recommendations at this time. Skin was marked with black marker to indicate her current status. Did update staff to monitor this and any signs of worsening then to send out.     BP Readings from Last 3 Encounters:   04/18/23 121/60   03/22/23 130/78   03/13/23 (!) 141/79     Wt Readings from Last 5 Encounters:   04/18/23 (!) 150.6 kg (332 lb)   03/22/23 (!) 150.7 kg (332 lb 3.2 oz)   03/13/23 (!) 154.2 kg (340 lb)  "  01/11/23 (!) 150.5 kg (331 lb 11.2 oz)   12/21/22 (!) 155.5 kg (342 lb 12.8 oz)     Allergies, and PMH/PSH reviewed in EPIC today.  REVIEW OF SYSTEMS:  4 point ROS including Respiratory, CV, GI and , other than that noted in the HPI,  is negative    Objective:   /60   Pulse 80   Temp 98.2  F (36.8  C)   Resp 18   Ht 1.651 m (5' 5\")   Wt (!) 150.6 kg (332 lb)   SpO2 96%   BMI 55.25 kg/m    GENERAL APPEARANCE:  Alert, in no distress, oriented, morbidly obese, cooperative  RESP:  respiratory effort and palpation of chest normal, lungs clear to auscultation , no respiratory distress  CV:  Palpation and auscultation of heart done , regular rate and rhythm, no murmur, rub, or gallop, peripheral edema 1+ in BLE  ABDOMEN:  normal bowel sounds, soft, nontender, no hepatosplenomegaly or other masses, no guarding or rebound  M/S:   Wheelchair bound. Staff to assist for all ADLS, transfers and cares  SKIN:  see photos. worsening signs of cellulitis with possible sepsis to BLE. increased serous drainage noted.   NEURO:   Cranial nerves 2-12 are normal tested and grossly at patient's baseline, no purposeful movement in upper and lower extremities  PSYCH:  oriented X 3, memory impaired , affect and mood normal                      Labs done in SNF are in [facility]. Please refer to them using [facility]/Care Everywhere.    Assessment/Plan:  (I89.0) Lymphedema of both lower extremities  (primary encounter diagnosis)  (I73.9) PAD (peripheral artery disease) (H)  (L03.90) Cellulitis, unspecified cellulitis site  (Z86.718) History of DVT of lower extremity  (E66.01) Morbid obesity (H)  Comment: Chronic. No claudication complaints. Recent infection in past which required hospitalization. Vascular following with recent visit on 12/7/22. Recommendations for lymphedema therapist for MLD, compression and possibly pump therapy in near future. Followed by in house wound provider as well who advised to start calcium alginate for " drainage concerns. See photos above.   Plan:   -Follow up with vascular clinic as directed. Schedule for f/u appt within 6 months with Dr Zhao. Due June 2023  -Start bactrim DS BID x 10 days course.   -HOLD methotrexate and humira injections while on antibiotic. May resume once off antibiotics.   -Continue lyphmhatic formula 1 capsule daily per vascular recommendations  -Monitor for worsening s/sx of concerns  -Staff to provide appropriate skin hygiene needs as directed  -Continue wound care to bilateral lower extremities as directed:  *Cleanse with NS. Pat Dry. Apply eucerin to periwound. Calcium alginate to wound bed cover with ABD pads. Cover with kerlix. Tape in place. Perform daily and PRN  -Elevate bilateral lower extremities as much as possible  -Velcro wraps to bilateral lower extremities daily. On in AM and off at HS  -In house wound provider to follow and monitor.   -BMP and CBC due 4/20/23  -Redness was marked today with black marker. If any worsening signs of redness despite treatment above, or any fevers or worsening infections, then would recommend to send out to hospital for further evaluation needs with suspected IV antibiotic management needs    Electronically signed by:   Arlen Carlson DNP, APRN

## 2023-04-18 NOTE — LETTER
4/18/2023        RE: María Elena Saab  [facility] [address]  [address]  [place] [address]        [facility]    Chief Complaint   Patient presents with     RECHECK     Skin concerns     HPI:  María Elena Saab is a 57 year old  (1965), who is being seen today for an episodic care visit at: [facility] () [[id number]]. Today's concern is: The primary encounter diagnosis was Lymphedema of both lower extremities. Diagnoses of PAD (peripheral artery disease) (H), Cellulitis, unspecified cellulitis site, History of DVT of lower extremity, and Morbid obesity (H) were also pertinent to this visit.    Nursing staff called provider today to report increased redness and drainage to bilateral lower extremities. Redness now going up to bilateral thighs. Met with patient today who was found lying in bed. She denies any chest pain, palpitations, shortness of breath, ESPITIA, lightheadedness, dizziness, or cough. Denies any abdominal discomfort. Denies N&V. Denies B&B concerns. Denies dysuria or frequency. Denies loose or constipation. Appetite good. Sleeping well. She reports chronic pain but good relief from PRN ibuprofen and flexeril use. Per therapy she was minimally confused this AM as she did not know who the therapist was. No fevers. She is oriented x 3 with me at this time. No confusion noted. I did discuss with patient options for oral antibiotic treatment vs hospital. She wants to try oral at this time, however I did tell her that if things worsens and do not improve by tomorrow then best to send her out to hospital for further evaluation needs. She is open to this recommendations at this time. Skin was marked with black marker to indicate her current status. Did update staff to monitor this and any signs of worsening then to send out.     BP Readings from Last 3 Encounters:   04/18/23 121/60   03/22/23 130/78   03/13/23 (!) 141/79     Wt Readings from Last 5  "Encounters:   04/18/23 (!) 150.6 kg (332 lb)   03/22/23 (!) 150.7 kg (332 lb 3.2 oz)   03/13/23 (!) 154.2 kg (340 lb)   01/11/23 (!) 150.5 kg (331 lb 11.2 oz)   12/21/22 (!) 155.5 kg (342 lb 12.8 oz)     Allergies, and PMH/PSH reviewed in EPIC today.  REVIEW OF SYSTEMS:  4 point ROS including Respiratory, CV, GI and , other than that noted in the HPI,  is negative    Objective:   /60   Pulse 80   Temp 98.2  F (36.8  C)   Resp 18   Ht 1.651 m (5' 5\")   Wt (!) 150.6 kg (332 lb)   SpO2 96%   BMI 55.25 kg/m    GENERAL APPEARANCE:  Alert, in no distress, oriented, morbidly obese, cooperative  RESP:  respiratory effort and palpation of chest normal, lungs clear to auscultation , no respiratory distress  CV:  Palpation and auscultation of heart done , regular rate and rhythm, no murmur, rub, or gallop, peripheral edema 1+ in BLE  ABDOMEN:  normal bowel sounds, soft, nontender, no hepatosplenomegaly or other masses, no guarding or rebound  M/S:   Wheelchair bound. Staff to assist for all ADLS, transfers and cares  SKIN:  see photos. worsening signs of cellulitis with possible sepsis to BLE. increased serous drainage noted.   NEURO:   Cranial nerves 2-12 are normal tested and grossly at patient's baseline, no purposeful movement in upper and lower extremities  PSYCH:  oriented X 3, memory impaired , affect and mood normal    Labs done in SNF are in [facility]. Please refer to them using [employer]/Care Everywhere.    Assessment/Plan:  (I89.0) Lymphedema of both lower extremities  (primary encounter diagnosis)  (I73.9) PAD (peripheral artery disease) (H)  (L03.90) Cellulitis, unspecified cellulitis site  (Z86.718) History of DVT of lower extremity  (E66.01) Morbid obesity (H)  Comment: Chronic. No claudication complaints. Recent infection in past which required hospitalization. Vascular following with recent visit on 12/7/22. Recommendations for lymphedema therapist for MLD, compression and possibly pump therapy " in near future. Followed by in house wound provider as well who advised to start calcium alginate for drainage concerns. See photos above.   Plan:   -Follow up with vascular clinic as directed. Schedule for f/u appt within 6 months with Dr Zhao. Due June 2023  -Start bactrim DS BID x 10 days course.   -HOLD methotrexate and humira injections while on antibiotic. May resume once off antibiotics.   -Continue lyphmhatic formula 1 capsule daily per vascular recommendations  -Monitor for worsening s/sx of concerns  -Staff to provide appropriate skin hygiene needs as directed  -Continue wound care to bilateral lower extremities as directed:  *Cleanse with NS. Pat Dry. Apply eucerin to periwound. Calcium alginate to wound bed cover with ABD pads. Cover with kerlix. Tape in place. Perform daily and PRN  -Elevate bilateral lower extremities as much as possible  -Velcro wraps to bilateral lower extremities daily. On in AM and off at HS  -In house wound provider to follow and monitor.   -BMP and CBC due 4/20/23  -Redness was marked today with black marker. If any worsening signs of redness despite treatment above, or any fevers or worsening infections, then would recommend to send out to hospital for further evaluation needs with suspected IV antibiotic management needs    Electronically signed by:   Arlen Carlson DNP, APRN          Sincerely,        KRISTIN Rodriges CNP

## 2023-04-18 NOTE — LETTER
4/18/2023        RE: María Elena Saab  [facility]  [address]  [place] [address]        [facility]    Chief Complaint   Patient presents with     RECHECK     Skin concerns     HPI:  María Elena Saab is a 57 year old  (1965), who is being seen today for an episodic care visit at: [facility] () [[id number]]. Today's concern is: The primary encounter diagnosis was Lymphedema of both lower extremities. Diagnoses of PAD (peripheral artery disease) (H), Cellulitis, unspecified cellulitis site, History of DVT of lower extremity, and Morbid obesity (H) were also pertinent to this visit.    Nursing staff called provider today to report increased redness and drainage to bilateral lower extremities. Redness now going up to bilateral thighs. Met with patient today who was found lying in bed. She denies any chest pain, palpitations, shortness of breath, ESPITIA, lightheadedness, dizziness, or cough. Denies any abdominal discomfort. Denies N&V. Denies B&B concerns. Denies dysuria or frequency. Denies loose or constipation. Appetite good. Sleeping well. She reports chronic pain but good relief from PRN ibuprofen and flexeril use. Per therapy she was minimally confused this AM as she did not know who the therapist was. No fevers. She is oriented x 3 with me at this time. No confusion noted. I did discuss with patient options for oral antibiotic treatment vs hospital. She wants to try oral at this time, however I did tell her that if things worsens and do not improve by tomorrow then best to send her out to hospital for further evaluation needs. She is open to this recommendations at this time. Skin was marked with black marker to indicate her current status. Did update staff to monitor this and any signs of worsening then to send out.     BP Readings from Last 3 Encounters:   04/18/23 121/60   03/22/23 130/78   03/13/23 (!) 141/79     Wt Readings from Last 5  "Encounters:   04/18/23 (!) 150.6 kg (332 lb)   03/22/23 (!) 150.7 kg (332 lb 3.2 oz)   03/13/23 (!) 154.2 kg (340 lb)   01/11/23 (!) 150.5 kg (331 lb 11.2 oz)   12/21/22 (!) 155.5 kg (342 lb 12.8 oz)     Allergies, and PMH/PSH reviewed in EPIC today.  REVIEW OF SYSTEMS:  4 point ROS including Respiratory, CV, GI and , other than that noted in the HPI,  is negative    Objective:   /60   Pulse 80   Temp 98.2  F (36.8  C)   Resp 18   Ht 1.651 m (5' 5\")   Wt (!) 150.6 kg (332 lb)   SpO2 96%   BMI 55.25 kg/m    GENERAL APPEARANCE:  Alert, in no distress, oriented, morbidly obese, cooperative  RESP:  respiratory effort and palpation of chest normal, lungs clear to auscultation , no respiratory distress  CV:  Palpation and auscultation of heart done , regular rate and rhythm, no murmur, rub, or gallop, peripheral edema 1+ in BLE  ABDOMEN:  normal bowel sounds, soft, nontender, no hepatosplenomegaly or other masses, no guarding or rebound  M/S:   Wheelchair bound. Staff to assist for all ADLS, transfers and cares  SKIN:  see photos. worsening signs of cellulitis with possible sepsis to BLE. increased serous drainage noted.   NEURO:   Cranial nerves 2-12 are normal tested and grossly at patient's baseline, no purposeful movement in upper and lower extremities  PSYCH:  oriented X 3, memory impaired , affect and mood normal                      Labs done in SNF are in [facility]. Please refer to them using [employer] Everywhere.    Assessment/Plan:  (I89.0) Lymphedema of both lower extremities  (primary encounter diagnosis)  (I73.9) PAD (peripheral artery disease) (H)  (L03.90) Cellulitis, unspecified cellulitis site  (Z86.718) History of DVT of lower extremity  (E66.01) Morbid obesity (H)  Comment: Chronic. No claudication complaints. Recent infection in past which required hospitalization. Vascular following with recent visit on 12/7/22. Recommendations for lymphedema therapist for MLD, compression and " possibly pump therapy in near future. Followed by in house wound provider as well who advised to start calcium alginate for drainage concerns. See photos above.   Plan:   -Follow up with vascular clinic as directed. Schedule for f/u appt within 6 months with Dr Zhao. Due June 2023  -Start bactrim DS BID x 10 days course.   -HOLD methotrexate and humira injections while on antibiotic. May resume once off antibiotics.   -Continue lyphmhatic formula 1 capsule daily per vascular recommendations  -Monitor for worsening s/sx of concerns  -Staff to provide appropriate skin hygiene needs as directed  -Continue wound care to bilateral lower extremities as directed:  *Cleanse with NS. Pat Dry. Apply eucerin to periwound. Calcium alginate to wound bed cover with ABD pads. Cover with kerlix. Tape in place. Perform daily and PRN  -Elevate bilateral lower extremities as much as possible  -Velcro wraps to bilateral lower extremities daily. On in AM and off at HS  -In house wound provider to follow and monitor.   -BMP and CBC due 4/20/23  -Redness was marked today with black marker. If any worsening signs of redness despite treatment above, or any fevers or worsening infections, then would recommend to send out to hospital for further evaluation needs with suspected IV antibiotic management needs    Electronically signed by:   Arlen Carlson DNP, APRN            Sincerely,        KRISTIN Rodriges CNP

## 2023-04-19 ENCOUNTER — LAB REQUISITION (OUTPATIENT)
Dept: LAB | Facility: CLINIC | Age: 58
End: 2023-04-19
Payer: COMMERCIAL

## 2023-04-19 DIAGNOSIS — I10 ESSENTIAL (PRIMARY) HYPERTENSION: ICD-10-CM

## 2023-05-01 NOTE — PROGRESS NOTES
[facility] GERIATRICS    PRIMARY CARE PROVIDER AND CLINIC:  KRISTIN Rodriges CNP, [address] / [place] [address]  Chief Complaint   Patient presents with     Hospital F/U      [place] Medical Record Number:  [id number]  Place of Service where encounter took place:  [facility] () [[id number]]    María Elena Saab  is a 57 year old  (1965), returned to the above facility from  [facility] . Hospital stay 4/19/23 - 4/27/23. .   HPI:    57 y.o. female with PMH RA, depression/anxiety, h/o dvt presenting to ER with 2 days of RLE redness/swelling. Patient states she was her normal self until RLE got red and warm (up to her thigh). She was started on bactrim yesterday but has not seen any improvement. Otherwise states does feel warm/febrile but has not taken her temperature. She has had cellulitis in the past and responded well to vancomycin Otherwise no chest pain, sob, diarrhea, dysuria, swelling in legs (other than the redness).     The primary encounter diagnosis was Post-nasal drip. Diagnoses of Anemia, unspecified type, Obstructive sleep apnea syndrome, Morbid obesity (H), History of DVT of lower extremity, High blood cholesterol, Essential hypertension with goal blood pressure less than 140/90, Constipation, unspecified constipation type, Lymphedema of both lower extremities, PAD (peripheral artery disease) (H), Cellulitis, unspecified cellulitis site, Primary osteoarthritis involving multiple joints, Polyarthralgia, Seropositive rheumatoid arthritis of multiple sites (H), Cyclic citrullinated peptide (CCP) antibody positive, Vitamin D deficiency, and Slow transit constipation were also pertinent to this visit.    Today-Met with patient who denies any chest pain, palpitations, shortness of breath, ESPITIA, lightheadedness, dizziness, or cough. Remains on oxygen at HS only while sleeping. Denies any abdominal discomfort. Denies N&V. Denies dysuria or frequency. Reports  some mild constipation concerns today. Appetite good. Sleeping well. Minimal pain complaints to bilateral lower extremities. Redness and drainage slowly improving with current measures below. She is open to continued recommendations as directed below. Nursing denies any acute concerns today.     BP Readings from Last 3 Encounters:   05/02/23 133/85   04/18/23 121/60   03/22/23 130/78     Wt Readings from Last 5 Encounters:   05/02/23 148.3 kg (327 lb)   04/18/23 (!) 150.6 kg (332 lb)   03/22/23 (!) 150.7 kg (332 lb 3.2 oz)   03/13/23 (!) 154.2 kg (340 lb)   01/11/23 (!) 150.5 kg (331 lb 11.2 oz)     CODE STATUS/ADVANCE DIRECTIVES DISCUSSION:  Full Code  CPR/Full code   ALLERGIES:   Allergies   Allergen Reactions     Adhesive Tape Other (See Comments)     As on band-aids;  Causes skin tearing/wounds.     Adhesive [Cyanoacrylate] Other (See Comments)     As on band-aids;  Causes skin tearing/wounds.     Banana Unknown     She avoids due to Latex allergy.  If she eats them 3 days in a row, gets stomach cramps and dark stool.  TBrinkMD - 4/10/15     Food Unknown     Bananas, grapes, pine nuts     Grape [Grape (Artificial) Flavor] Unknown     She avoids due to Latex allergy.  If she eats lots of them, and she likes them, stomach gets a little queasy.  TBrinkMD - 4/10/15     Morphine (Pf) [Morphine] Other (See Comments)     Pt has not reacted to this med herself, but mother has anaphylactic reaction and other family members get nausea/vomiting.     Other Environmental Allergy Swelling     leather     Pine      Pseudoephedrine Other (See Comments)     Insomnia     Pseudoephedrine Cold/Allergy [Chlorpheniramine-Pseudoeph] Other (See Comments)     Keeps pt awake up to 24 hours      Latex Rash     Hands get red from rubber gloves, but okay if she washes them right away.  Hands get red from rubber gloves, but okay if she washes them right away.     Other Food Allergy Rash     Pine trees, leather .       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  RD reports that she is not willing to commit to the program outlined.  See scanned document.  12/15/2015 - Marcio Quinonez MD        Nocturnal hypoxemia - probable sleep apnea - had overnight pulse oximetry, April, 2015. 5/22/2015     Obesity 4/10/2015     LOPEZ (obstructive sleep apnea) 5/22/2015     Osteoarthritis      Peripheral vascular disease (H)      Pleural effusion      Pressure ulcer of foot      Rheumatoid arthritis (H) 12/30/2014    seronegative     Sepsis (H)      Seropositive rheumatoid arthritis (H) 1/19/2016     Vitamin D deficiency 8/26/2015      PAST SURGICAL HISTORY:   has a past surgical history that includes Total Knee Arthroplasty (Left, 2006); joint replacement; Total Hip Arthroplasty (Left, 10/18/2016); and Peripherally Inserted Central Catheter Insertion (Right, 07/14/2019).  FAMILY HISTORY: family history includes Arthritis in her mother; Breast Cancer in her cousin; Breast Cancer (age of onset: 50.00) in her maternal grandmother; Breast Cancer (age of onset: 54.00) in her maternal aunt and mother; Cancer in her mother; Cataracts in her father and mother; Deep Vein Thrombosis in her father; Heart Disease in her maternal grandmother; Multiple myeloma in her father; No Known Problems in her sister; Other - See Comments in her brother; Rheumatoid Arthritis in her maternal grandmother and paternal aunt; Sleep Apnea in her brother; Snoring in her father.  SOCIAL HISTORY:    reports that she has quit smoking. Her smoking use included cigarettes. She has a 30.00 pack-year smoking history. She has never used smokeless tobacco. She reports that she does not currently use alcohol. She reports that she does not use drugs.  Patient's living condition: lives in a skilled nursing facility    Post Discharge Medication Reconciliation Status:   MED REC REQUIRED  Post Medication Reconciliation Status:  Discharge medications reconciled and changed, see notes/orders         Current Outpatient Medications   Medication Sig     acetaminophen (TYLENOL) 500 MG tablet Take 2 tablets (1,000 mg) by mouth every 6 hours as needed for mild pain     adalimumab (HUMIRA *CF*) 40 MG/0.4ML prefilled syringe kit Inject 0.4 mLs (40 mg) Subcutaneous every 14 days     amoxicillin (AMOXIL) 500 MG capsule Take 2 capsules (1,000 mg) by mouth every 8 hours for 10 days Infection of skin     ascorbic acid, vitamin C, (ASCORBIC ACID WITH KAEL HIPS) 500 MG tablet [ASCORBIC ACID, VITAMIN C, (ASCORBIC ACID WITH KAEL HIPS) 500 MG TABLET] Take 500 mg by mouth 2 (two) times a day.     cholecalciferol 50 MCG (2000 UT) tablet Take 1 tablet (50 mcg) by mouth daily     DULoxetine (CYMBALTA) 20 MG capsule [DULOXETINE (CYMBALTA) 20 MG CAPSULE] Take 1 capsule (20 mg total) by mouth daily.     folic acid (FOLVITE) 1 MG tablet Take 1 mg by mouth     gabapentin (NEURONTIN) 300 MG capsule Take 1 capsule (300 mg) by mouth 3 times daily     hydroCHLOROthiazide (HYDRODIURIL) 25 MG tablet [HYDROCHLOROTHIAZIDE (HYDRODIURIL) 25 MG TABLET] Take 25 mg by mouth daily.     ibuprofen (ADVIL/MOTRIN) 400 MG tablet Take 1 tablet (400 mg) by mouth every 6 hours as needed for moderate pain (4-6)     loratadine (CLARITIN) 10 MG tablet Take 1 tablet (10 mg) by mouth daily     methotrexate sodium 10 MG TABS Take 10 mg by mouth once a week     NEW MED Take 1 capsule by mouth daily Lymphatic formula     NYAMYC 359030 UNIT/GM external powder 2 times daily Apply to  "rash areas under brest and under stomach     rivaroxaban ANTICOAGULANT (XARELTO) 20 MG TABS tablet Take 20 mg by mouth daily     sennosides (SENOKOT) 8.6 MG tablet Take 1 tablet by mouth 2 times daily as needed for constipation     sulfamethoxazole-trimethoprim (BACTRIM DS) 800-160 MG tablet Take 2 tablets by mouth 2 times daily for 10 days X 7 days.     cyclobenzaprine (FLEXERIL) 10 MG tablet Take 1 tablet (10 mg) by mouth every 8 hours as needed for muscle spasms     No current facility-administered medications for this visit.       ROS:  10 point ROS of systems including Constitutional, Eyes, Respiratory, Cardiovascular, Gastroenterology, Genitourinary, Integumentary, Musculoskeletal, Psychiatric were all negative except for pertinent positives noted in my HPI.    Vitals:  /85   Pulse 60   Temp 98.5  F (36.9  C)   Resp 20   Ht 1.575 m (5' 2\")   Wt 148.3 kg (327 lb)   SpO2 97%   BMI 59.81 kg/m    Exam:  GENERAL APPEARANCE:  Alert, in no distress, oriented, morbidly obese, cooperative  ENT:  Mouth and posterior oropharynx normal, moist mucous membranes, [place]  EYES:  EOM, conjunctivae, lids, pupils and irises normal  NECK:  No adenopathy,masses or thyromegaly  RESP:  respiratory effort and palpation of chest normal, lungs clear to auscultation , no respiratory distress  CV:  Palpation and auscultation of heart done , regular rate and rhythm, no murmur, rub, or gallop, peripheral edema 1+ in BLE  ABDOMEN:  normal bowel sounds, soft, nontender, no hepatosplenomegaly or other masses, no guarding or rebound  M/S:   Ambulates short distance in room. Wheelchair for long distance needs.   SKIN:  dry skin present. Redness present but slowly improving today. Mild drainage. Mild pain complaints. see photos  NEURO:   Cranial nerves 2-12 are normal tested and grossly at patient's baseline, no purposeful movement in upper and lower extremities  PSYCH:  oriented X 3, normal insight, judgement and memory, affect and " "mood normal                                      Lab/Diagnostic data:    Most Recent 3 CBC's:  Recent Labs   Lab Test 03/14/23  0732 12/22/22  1710 11/29/22  0820   WBC 6.5 7.3 8.9   HGB 12.5 12.3 10.8*   MCV 93 96 91    345 303     Most Recent 3 BMP's:  Recent Labs   Lab Test 03/14/23  0732 12/22/22  1710 11/29/22  0820 11/22/22  0654     --  136 137   POTASSIUM 4.0  --  3.9 4.4   CHLORIDE 102  --  96* 100   CO2 27  --  28 26   BUN 17.0  --  13.7 15.8   CR 0.56 0.45* 0.50* 0.74   ANIONGAP 11  --  12 11   WILVER 9.4  --  8.8 9.1   GLC 89  --  97 114*     Most Recent 2 LFT's:  Recent Labs   Lab Test 03/14/23  0732 12/22/22  1710 01/13/22  1234 01/04/22  0439 09/10/19  1409 07/25/19  1215   AST  --   --   --  21  20  --  23   ALT 6* 21   < > 23  23  23   < > 22   ALKPHOS  --   --   --  70  72  --  69   BILITOTAL  --   --   --  0.3  0.4  --  0.3    < > = values in this interval not displayed.     Most Recent Cholesterol Panel:  Recent Labs   Lab Test 12/02/21  1155   CHOL 221*   *   HDL 65   TRIG 101     Most Recent ESR & CRP:  Recent Labs   Lab Test 11/29/22  0820 11/22/22  0654 12/21/20  0942   CRP  --   --  1.3*   CRPI 100.00*   < >  --     < > = values in this interval not displayed.     Most Recent Anemia Panel:  Recent Labs   Lab Test 03/14/23  0732   WBC 6.5   HGB 12.5   HCT 40.4   MCV 93          ASSESSMENT/PLAN  (M05.79) Seropositive rheumatoid arthritis of multiple sites (H)  (primary encounter diagnosis)  (R76.8) Cyclic citrullinated peptide (CCP) antibody positive  (M15.9) Primary osteoarthritis involving multiple joints  (E55.9) Vitamin D deficiency  Comment: Chronic. Managed by rheumatology. c/o posterior neck pain as well as \"all over pain\" that is most likely neuropathic based on description. No longer use PRN oxycodone- Last used in Jan 2023 therefore this medication was stopped  Plan:   -Continue methotrexate 10mg weekly as directed and folic acid 1mg " daily  -Continue Tylenol 1000mg every 6 hours PRN  -PRN flexeril  -Continue gabapentin 300mg TID  -Ibuprofen PRN  -Continue humira 40mg injection every 2 weeks as directed.   -Continue Duloxetine 20mg daily as directed. If worsening pain, would recommend dose increase.   -Continue vitamin D 2000U daily  -Follow up with rheumatology as directed. Scheduled for 9/25/23  -CMP, CBC and CRP due 5/4/23     (I89.0) Lymphedema of both lower extremities  (I73.9) PAD (peripheral artery disease) (H)  (L03.90) Cellulitis  Comment: Chronic. No claudication complaints. Recent infection with recent hospitalization on 4/19/23. Received IV vancomycin MRI negative for bony involvement or abscess per documentation. Vascular following with recent visit on 12/7/22. Recommendations for lymphedema therapist for MLD, compression and possibly pump therapy in near future. Noted on 3/10/23-Nurse called today to report: Increased drainage and redness to bilateral lower extremities wounds. Followed by in house wound provider as well. 10 day course of doxycycline started. Improvement to skin assessment today. See photos above.   Plan:   -Follow up with vascular clinic as directed. Schedule for f/u appt within 6 months with Dr Zhao. Due June 2023  -Continue dual antibiotic therapy per ID reports of: Amoxicillin 1000mg every 8 hours and bactrim DS BID as directed (recommend a minium of 2 week course or until cellulitis resolved, currently on until 5/4/23. Will continue for another week until 5/12/23  -HOLD methotrexate and humira while on antibiotic. Resume Humira 5/19 and methotrexate 5/15  -Continue lyphmhatic formula 1 capsule daily per vascular recommendations  -Continue on site wound care provider for ongoing bilateral lower extremities wound care needs  -Monitor for worsening s/sx of concerns  -Staff to provide appropriate skin hygiene needs as directed  -Elevate bilateral lower extremities as much as possible  -Velcro wraps to bilateral  lower extremities daily. On in AM and off at HS  -CMP, CBC and CRP due 5/4/23     (K59.01) Constipation  Comment: Acute on chronic. Not at goal. Reports mild constipation today  Plan:  -Monitor BM patterns  -senna 1 tab BID PRN  -CMP, CBC and CRP due 5/4/23     (I10) Essential hypertension with goal blood pressure less than 140/90  (Z86.718) History of DVT of lower extremity  (E78.00) High blood cholesterol  Comment: Chronic. Based on JNC-8 goals,  patients age of 57 year old, no presence of diabetes or CKD, and goals of care goal BP is <150/90 mm Hg. Patient is stable with current plan of care and routine assessment. History of DVT to LLE. SBP readings remaining 130s-150s.   Plan:   -Monitor BP and HR  -Continue hydrochlorothiazide 25mg daily.   -Continue xarelto 20mg daily  -CMP, CBC and CRP due 5/4/23     (E66.01) Morbid obesity (H)  (G47.33) Obstructive sleep apnea syndrome  Comment: Chronic. Hx of LOPEZ, does not have CPAP as this causes sinus problems per patient. Patient with frequent desaturations while sleeping during history of hospital admission. Recommend follow up with sleep medicine provider to provide recommendations for management which she last saw at [facility] location last in 2016  Plan:   -Monitor sleeping patterns  -Monitor respiratory status  -Oxygen used at night- 2L  -Follow up with sleep medicine as directed. Scheduled for 6/28/23  -CMP, CBC and CRP due 5/4/23     (D64.9) Anemia, unspecified type  Comment: Acute on chronic. Baseline hgb around 12  Plan:   -Monitor bleeding risks  -Monitor for worsening s/sx of concerns  -CMP, CBC and CRP due 5/4/23     (R09.82) Post nasal drip  Comment: Acute on chronic. Reports irritative throat cough with ear fullness.   Plan:  -Start claritin 10mg daily  -Monitor for worsening s/sx of concerns  -CMP, CBC and CRP due 5/4/23     Electronically signed by:  Arlen Carlson DNP, APRN

## 2023-05-02 ENCOUNTER — NURSING HOME VISIT (OUTPATIENT)
Dept: GERIATRICS | Facility: CLINIC | Age: 58
End: 2023-05-02
Payer: COMMERCIAL

## 2023-05-02 VITALS
BODY MASS INDEX: 53.92 KG/M2 | WEIGHT: 293 LBS | HEIGHT: 62 IN | TEMPERATURE: 98.5 F | OXYGEN SATURATION: 97 % | SYSTOLIC BLOOD PRESSURE: 133 MMHG | RESPIRATION RATE: 20 BRPM | HEART RATE: 60 BPM | DIASTOLIC BLOOD PRESSURE: 85 MMHG

## 2023-05-02 DIAGNOSIS — M25.50 POLYARTHRALGIA: ICD-10-CM

## 2023-05-02 DIAGNOSIS — I89.0 LYMPHEDEMA OF BOTH LOWER EXTREMITIES: ICD-10-CM

## 2023-05-02 DIAGNOSIS — K59.01 SLOW TRANSIT CONSTIPATION: ICD-10-CM

## 2023-05-02 DIAGNOSIS — G47.33 OBSTRUCTIVE SLEEP APNEA SYNDROME: ICD-10-CM

## 2023-05-02 DIAGNOSIS — D64.9 ANEMIA, UNSPECIFIED TYPE: ICD-10-CM

## 2023-05-02 DIAGNOSIS — R09.82 POST-NASAL DRIP: Primary | ICD-10-CM

## 2023-05-02 DIAGNOSIS — M05.79 SEROPOSITIVE RHEUMATOID ARTHRITIS OF MULTIPLE SITES (H): ICD-10-CM

## 2023-05-02 DIAGNOSIS — Z86.718 HISTORY OF DVT OF LOWER EXTREMITY: ICD-10-CM

## 2023-05-02 DIAGNOSIS — M15.0 PRIMARY OSTEOARTHRITIS INVOLVING MULTIPLE JOINTS: ICD-10-CM

## 2023-05-02 DIAGNOSIS — I10 ESSENTIAL HYPERTENSION WITH GOAL BLOOD PRESSURE LESS THAN 140/90: ICD-10-CM

## 2023-05-02 DIAGNOSIS — E78.00 HIGH BLOOD CHOLESTEROL: ICD-10-CM

## 2023-05-02 DIAGNOSIS — K59.00 CONSTIPATION, UNSPECIFIED CONSTIPATION TYPE: ICD-10-CM

## 2023-05-02 DIAGNOSIS — R76.8 CYCLIC CITRULLINATED PEPTIDE (CCP) ANTIBODY POSITIVE: ICD-10-CM

## 2023-05-02 DIAGNOSIS — E55.9 VITAMIN D DEFICIENCY: ICD-10-CM

## 2023-05-02 DIAGNOSIS — L03.90 CELLULITIS, UNSPECIFIED CELLULITIS SITE: ICD-10-CM

## 2023-05-02 DIAGNOSIS — E66.01 MORBID OBESITY (H): ICD-10-CM

## 2023-05-02 DIAGNOSIS — I73.9 PAD (PERIPHERAL ARTERY DISEASE) (H): ICD-10-CM

## 2023-05-02 PROBLEM — I27.82 CHRONIC PULMONARY EMBOLISM (H): Status: ACTIVE | Noted: 2023-04-20

## 2023-05-02 PROBLEM — Z86.14 HISTORY OF MRSA INFECTION: Status: ACTIVE | Noted: 2023-04-26

## 2023-05-02 PROBLEM — L03.115 CELLULITIS OF RIGHT LOWER LIMB: Status: ACTIVE | Noted: 2023-04-25

## 2023-05-02 PROBLEM — R79.82 ELEVATED C-REACTIVE PROTEIN (CRP): Status: ACTIVE | Noted: 2023-04-26

## 2023-05-02 PROCEDURE — 99309 SBSQ NF CARE MODERATE MDM 30: CPT | Performed by: NURSE PRACTITIONER

## 2023-05-02 RX ORDER — SULFAMETHOXAZOLE/TRIMETHOPRIM 800-160 MG
2 TABLET ORAL 2 TIMES DAILY
Qty: 40 TABLET | Refills: 0 | Status: SHIPPED | OUTPATIENT
Start: 2023-05-02 | End: 2023-05-09

## 2023-05-02 RX ORDER — CYCLOBENZAPRINE HCL 5 MG
10 TABLET ORAL EVERY 8 HOURS PRN
COMMUNITY
Start: 2023-04-03 | End: 2023-05-02 | Stop reason: ALTCHOICE

## 2023-05-02 RX ORDER — SULFAMETHOXAZOLE/TRIMETHOPRIM 800-160 MG
2 TABLET ORAL 2 TIMES DAILY
COMMUNITY
Start: 2023-04-27 | End: 2023-05-02

## 2023-05-02 RX ORDER — SENNOSIDES 8.6 MG
1 TABLET ORAL 2 TIMES DAILY PRN
Start: 2023-05-02 | End: 2023-06-28

## 2023-05-02 RX ORDER — AMOXICILLIN 500 MG/1
1000 CAPSULE ORAL EVERY 8 HOURS
COMMUNITY
Start: 2023-04-27 | End: 2023-05-02

## 2023-05-02 RX ORDER — AMOXICILLIN 500 MG/1
1000 CAPSULE ORAL EVERY 8 HOURS
Qty: 60 CAPSULE | Refills: 0 | Status: SHIPPED | OUTPATIENT
Start: 2023-05-02 | End: 2023-05-09

## 2023-05-02 NOTE — LETTER
5/2/2023        RE: María Elena Saab  [place]  [address]  [place] [address]        [facility] GERIATRICS    PRIMARY CARE PROVIDER AND CLINIC:  KRISTIN Rodriges CNP, [address] / [place] [address]  Chief Complaint   Patient presents with     Hospital F/U      [facility] Medical Record Number:  [id number]  [facility] [place] [facility] [[id number]    María Elena Saab  is a 57 year old  (1965), returned to the above facility from  [facility] . Hospital stay 4/19/23 - 4/27/23. .   HPI:    57 y.o. female with PMH RA, depression/anxiety, h/o dvt presenting to ER with 2 days of RLE redness/swelling. Patient states she was her normal self until RLE got red and warm (up to her thigh). She was started on bactrim yesterday but has not seen any improvement. Otherwise states does feel warm/febrile but has not taken her temperature. She has had cellulitis in the past and responded well to vancomycin Otherwise no chest pain, sob, diarrhea, dysuria, swelling in legs (other than the redness).     The primary encounter diagnosis was Post-nasal drip. Diagnoses of Anemia, unspecified type, Obstructive sleep apnea syndrome, Morbid obesity (H), History of DVT of lower extremity, High blood cholesterol, Essential hypertension with goal blood pressure less than 140/90, Constipation, unspecified constipation type, Lymphedema of both lower extremities, PAD (peripheral artery disease) (H), Cellulitis, unspecified cellulitis site, Primary osteoarthritis involving multiple joints, Polyarthralgia, Seropositive rheumatoid arthritis of multiple sites (H), Cyclic citrullinated peptide (CCP) antibody positive, Vitamin D deficiency, and Slow transit constipation were also pertinent to this visit.    Today-Met with patient who denies any chest pain, palpitations, shortness of breath, ESPITIA, lightheadedness, dizziness, or cough. Remains on  oxygen at HS only while sleeping. Denies any abdominal discomfort. Denies N&V. Denies dysuria or frequency. Reports some mild constipation concerns today. Appetite good. Sleeping well. Minimal pain complaints to bilateral lower extremities. Redness and drainage slowly improving with current measures below. She is open to continued recommendations as directed below. Nursing denies any acute concerns today.     BP Readings from Last 3 Encounters:   05/02/23 133/85   04/18/23 121/60   03/22/23 130/78     Wt Readings from Last 5 Encounters:   05/02/23 148.3 kg (327 lb)   04/18/23 (!) 150.6 kg (332 lb)   03/22/23 (!) 150.7 kg (332 lb 3.2 oz)   03/13/23 (!) 154.2 kg (340 lb)   01/11/23 (!) 150.5 kg (331 lb 11.2 oz)     CODE STATUS/ADVANCE DIRECTIVES DISCUSSION:  Full Code  CPR/Full code   ALLERGIES:   Allergies   Allergen Reactions     Adhesive Tape Other (See Comments)     As on band-aids;  Causes skin tearing/wounds.     Adhesive [Cyanoacrylate] Other (See Comments)     As on band-aids;  Causes skin tearing/wounds.     Banana Unknown     She avoids due to Latex allergy.  If she eats them 3 days in a row, gets stomach cramps and dark stool.  TBrinkMD - 4/10/15     Food Unknown     Bananas, grapes, pine nuts     Grape [Grape (Artificial) Flavor] Unknown     She avoids due to Latex allergy.  If she eats lots of them, and she likes them, stomach gets a little queasy.  TBrinkMD - 4/10/15     Morphine (Pf) [Morphine] Other (See Comments)     Pt has not reacted to this med herself, but mother has anaphylactic reaction and other family members get nausea/vomiting.     Other Environmental Allergy Swelling     leather     Pine      Pseudoephedrine Other (See Comments)     Insomnia     Pseudoephedrine Cold/Allergy [Chlorpheniramine-Pseudoeph] Other (See Comments)     Keeps pt awake up to 24 hours      Latex Rash     Hands get red from rubber gloves, but okay if she washes them right away.  Hands get red from rubber gloves, but  okay if she washes them right away.     Other Food Allergy Rash     Pine trees, leather .       PAST MEDICAL HISTORY:   Past Medical History:   Diagnosis Date     Aseptic necrosis of femoral head (H)     LEFT     Bacteremia due to group B Streptococcus      Cellulitis of right lower extremity      DJD (degenerative joint disease)      DVT, bilateral lower limbs (H) 10/2014     Edema 5/22/2015     Essential hypertension with goal blood pressure less than 140/90      Failure to thrive      History of blood clots      Hypokalemia 10/15/2014     Lung mass 10/18/2014     Morbid obesity (H) 4/10/2015    Had formal nutrition consult at [facility].  RD reports that she is not willing to commit to the program outlined.  See scanned document.  12/15/2015 - Marcio Quinonez MD        Nocturnal hypoxemia - probable sleep apnea - had overnight pulse oximetry, April, 2015. 5/22/2015     Obesity 4/10/2015     LOPEZ (obstructive sleep apnea) 5/22/2015     Osteoarthritis      Peripheral vascular disease (H)      Pleural effusion      Pressure ulcer of foot      Rheumatoid arthritis (H) 12/30/2014    seronegative     Sepsis (H)      Seropositive rheumatoid arthritis (H) 1/19/2016     Vitamin D deficiency 8/26/2015      PAST SURGICAL HISTORY:   has a past surgical history that includes Total Knee Arthroplasty (Left, 2006); joint replacement; Total Hip Arthroplasty (Left, 10/18/2016); and Peripherally Inserted Central Catheter Insertion (Right, 07/14/2019).  FAMILY HISTORY: family history includes Arthritis in her mother; Breast Cancer in her cousin; Breast Cancer (age of onset: 50.00) in her maternal grandmother; Breast Cancer (age of onset: 54.00) in her maternal aunt and mother; Cancer in her mother; Cataracts in her father and mother; Deep Vein Thrombosis in her father; Heart Disease in her maternal grandmother; Multiple myeloma in her father; No Known Problems in her sister; Other - See Comments in her brother; Rheumatoid Arthritis in  her maternal grandmother and paternal aunt; Sleep Apnea in her brother; Snoring in her father.  SOCIAL HISTORY:   reports that she has quit smoking. Her smoking use included cigarettes. She has a 30.00 pack-year smoking history. She has never used smokeless tobacco. She reports that she does not currently use alcohol. She reports that she does not use drugs.  Patient's living condition: lives in a skilled nursing facility    Post Discharge Medication Reconciliation Status:   MED REC REQUIRED  Post Medication Reconciliation Status:  Discharge medications reconciled and changed, see notes/orders         Current Outpatient Medications   Medication Sig     acetaminophen (TYLENOL) 500 MG tablet Take 2 tablets (1,000 mg) by mouth every 6 hours as needed for mild pain     adalimumab (HUMIRA *CF*) 40 MG/0.4ML prefilled syringe kit Inject 0.4 mLs (40 mg) Subcutaneous every 14 days     amoxicillin (AMOXIL) 500 MG capsule Take 2 capsules (1,000 mg) by mouth every 8 hours for 10 days Infection of skin     ascorbic acid, vitamin C, (ASCORBIC ACID WITH KAEL HIPS) 500 MG tablet [ASCORBIC ACID, VITAMIN C, (ASCORBIC ACID WITH KAEL HIPS) 500 MG TABLET] Take 500 mg by mouth 2 (two) times a day.     cholecalciferol 50 MCG (2000 UT) tablet Take 1 tablet (50 mcg) by mouth daily     DULoxetine (CYMBALTA) 20 MG capsule [DULOXETINE (CYMBALTA) 20 MG CAPSULE] Take 1 capsule (20 mg total) by mouth daily.     folic acid (FOLVITE) 1 MG tablet Take 1 mg by mouth     gabapentin (NEURONTIN) 300 MG capsule Take 1 capsule (300 mg) by mouth 3 times daily     hydroCHLOROthiazide (HYDRODIURIL) 25 MG tablet [HYDROCHLOROTHIAZIDE (HYDRODIURIL) 25 MG TABLET] Take 25 mg by mouth daily.     ibuprofen (ADVIL/MOTRIN) 400 MG tablet Take 1 tablet (400 mg) by mouth every 6 hours as needed for moderate pain (4-6)     loratadine (CLARITIN) 10 MG tablet Take 1 tablet (10 mg) by mouth daily     methotrexate sodium 10 MG TABS Take 10 mg by mouth once a week     NEW MED  "Take 1 capsule by mouth daily Lymphatic formula     NYAMYC 065174 UNIT/GM external powder 2 times daily Apply to rash areas under brest and under stomach     rivaroxaban ANTICOAGULANT (XARELTO) 20 MG TABS tablet Take 20 mg by mouth daily     sennosides (SENOKOT) 8.6 MG tablet Take 1 tablet by mouth 2 times daily as needed for constipation     sulfamethoxazole-trimethoprim (BACTRIM DS) 800-160 MG tablet Take 2 tablets by mouth 2 times daily for 10 days X 7 days.     cyclobenzaprine (FLEXERIL) 10 MG tablet Take 1 tablet (10 mg) by mouth every 8 hours as needed for muscle spasms     No current facility-administered medications for this visit.       ROS:  10 point ROS of systems including Constitutional, Eyes, Respiratory, Cardiovascular, Gastroenterology, Genitourinary, Integumentary, Musculoskeletal, Psychiatric were all negative except for pertinent positives noted in my HPI.    Vitals:  /85   Pulse 60   Temp 98.5  F (36.9  C)   Resp 20   Ht 1.575 m (5' 2\")   Wt 148.3 kg (327 lb)   SpO2 97%   BMI 59.81 kg/m    Exam:  GENERAL APPEARANCE:  Alert, in no distress, oriented, morbidly obese, cooperative  ENT:  Mouth and posterior oropharynx normal, moist mucous membranes, [place]  EYES:  EOM, conjunctivae, lids, pupils and irises normal  NECK:  No adenopathy,masses or thyromegaly  RESP:  respiratory effort and palpation of chest normal, lungs clear to auscultation , no respiratory distress  CV:  Palpation and auscultation of heart done , regular rate and rhythm, no murmur, rub, or gallop, peripheral edema 1+ in BLE  ABDOMEN:  normal bowel sounds, soft, nontender, no hepatosplenomegaly or other masses, no guarding or rebound  M/S:   Ambulates short distance in room. Wheelchair for long distance needs.   SKIN:  dry skin present. Redness present but slowly improving today. Mild drainage. Mild pain complaints. see photos  NEURO:   Cranial nerves 2-12 are normal tested and grossly at patient's baseline, no purposeful " "movement in upper and lower extremities  PSYCH:  oriented X 3, normal insight, judgement and memory, affect and mood normal    Lab/Diagnostic data:    Most Recent 3 CBC's:  Recent Labs   Lab Test 03/14/23  0732 12/22/22  1710 11/29/22  0820   WBC 6.5 7.3 8.9   HGB 12.5 12.3 10.8*   MCV 93 96 91    345 303     Most Recent 3 BMP's:  Recent Labs   Lab Test 03/14/23  0732 12/22/22  1710 11/29/22  0820 11/22/22  0654     --  136 137   POTASSIUM 4.0  --  3.9 4.4   CHLORIDE 102  --  96* 100   CO2 27  --  28 26   BUN 17.0  --  13.7 15.8   CR 0.56 0.45* 0.50* 0.74   ANIONGAP 11  --  12 11   WILVER 9.4  --  8.8 9.1   GLC 89  --  97 114*     Most Recent 2 LFT's:  Recent Labs   Lab Test 03/14/23  0732 12/22/22  1710 01/13/22  1234 01/04/22  0439 09/10/19  1409 07/25/19  1215   AST  --   --   --  21  20  --  23   ALT 6* 21   < > 23  23  23   < > 22   ALKPHOS  --   --   --  70  72  --  69   BILITOTAL  --   --   --  0.3  0.4  --  0.3    < > = values in this interval not displayed.     Most Recent Cholesterol Panel:  Recent Labs   Lab Test 12/02/21  1155   CHOL 221*   *   HDL 65   TRIG 101     Most Recent ESR & CRP:  Recent Labs   Lab Test 11/29/22  0820 11/22/22  0654 12/21/20  0942   CRP  --   --  1.3*   CRPI 100.00*   < >  --     < > = values in this interval not displayed.     Most Recent Anemia Panel:  Recent Labs   Lab Test 03/14/23  0732   WBC 6.5   HGB 12.5   HCT 40.4   MCV 93          ASSESSMENT/PLAN  (M05.79) Seropositive rheumatoid arthritis of multiple sites (H)  (primary encounter diagnosis)  (R76.8) Cyclic citrullinated peptide (CCP) antibody positive  (M15.9) Primary osteoarthritis involving multiple joints  (E55.9) Vitamin D deficiency  Comment: Chronic. Managed by rheumatology. c/o posterior neck pain as well as \"all over pain\" that is most likely neuropathic based on description. No longer use PRN oxycodone- Last used in Jan 2023 therefore this medication was stopped  Plan:   -Continue " methotrexate 10mg weekly as directed and folic acid 1mg daily  -Continue Tylenol 1000mg every 6 hours PRN  -PRN flexeril  -Continue gabapentin 300mg TID  -Ibuprofen PRN  -Continue humira 40mg injection every 2 weeks as directed.   -Continue Duloxetine 20mg daily as directed. If worsening pain, would recommend dose increase.   -Continue vitamin D 2000U daily  -Follow up with rheumatology as directed. Scheduled for 9/25/23  -CMP, CBC and CRP due 5/4/23     (I89.0) Lymphedema of both lower extremities  (I73.9) PAD (peripheral artery disease) (H)  (L03.90) Cellulitis  Comment: Chronic. No claudication complaints. Recent infection with recent hospitalization on 4/19/23. Received IV vancomycin MRI negative for bony involvement or abscess per documentation. Vascular following with recent visit on 12/7/22. Recommendations for lymphedema therapist for MLD, compression and possibly pump therapy in near future. Noted on 3/10/23-Nurse called today to report: Increased drainage and redness to bilateral lower extremities wounds. Followed by in house wound provider as well. 10 day course of doxycycline started. Improvement to skin assessment today. See photos above.   Plan:   -Follow up with vascular clinic as directed. Schedule for f/u appt within 6 months with Dr Zhao. Due June 2023  -Continue dual antibiotic therapy per ID reports of: Amoxicillin 1000mg every 8 hours and bactrim DS BID as directed (recommend a minium of 2 week course or until cellulitis resolved, currently on until 5/4/23. Will continue for another week until 5/12/23  -HOLD methotrexate and humira while on antibiotic. Resume Humira 5/19 and methotrexate 5/15  -Continue lyphmhatic formula 1 capsule daily per vascular recommendations  -Continue on site wound care provider for ongoing bilateral lower extremities wound care needs  -Monitor for worsening s/sx of concerns  -Staff to provide appropriate skin hygiene needs as directed  -Elevate bilateral lower  extremities as much as possible  -Velcro wraps to bilateral lower extremities daily. On in AM and off at HS  -CMP, CBC and CRP due 5/4/23     (K59.01) Constipation  Comment: Acute on chronic. Not at goal. Reports mild constipation today  Plan:  -Monitor BM patterns  -senna 1 tab BID PRN  -CMP, CBC and CRP due 5/4/23     (I10) Essential hypertension with goal blood pressure less than 140/90  (Z86.718) History of DVT of lower extremity  (E78.00) High blood cholesterol  Comment: Chronic. Based on JNC-8 goals,  patients age of 57 year old, no presence of diabetes or CKD, and goals of care goal BP is <150/90 mm Hg. Patient is stable with current plan of care and routine assessment. History of DVT to LLE. SBP readings remaining 130s-150s.   Plan:   -Monitor BP and HR  -Continue hydrochlorothiazide 25mg daily.   -Continue xarelto 20mg daily  -CMP, CBC and CRP due 5/4/23     (E66.01) Morbid obesity (H)  (G47.33) Obstructive sleep apnea syndrome  Comment: Chronic. Hx of LOPEZ, does not have CPAP as this causes sinus problems per patient. Patient with frequent desaturations while sleeping during history of hospital admission. Recommend follow up with sleep medicine provider to provide recommendations for management which she last saw at [facility] location last in 2016  Plan:   -Monitor sleeping patterns  -Monitor respiratory status  -Oxygen used at night- 2L  -Follow up with sleep medicine as directed. Scheduled for 6/28/23  -CMP, CBC and CRP due 5/4/23     (D64.9) Anemia, unspecified type  Comment: Acute on chronic. Baseline hgb around 12  Plan:   -Monitor bleeding risks  -Monitor for worsening s/sx of concerns  -CMP, CBC and CRP due 5/4/23     (R09.82) Post nasal drip  Comment: Acute on chronic. Reports irritative throat cough with ear fullness.   Plan:  -Start claritin 10mg daily  -Monitor for worsening s/sx of concerns  -CMP, CBC and CRP due 5/4/23     Electronically signed by:  Arlen Carlson DNP,  KRISTIN                           Sincerely,        KRISTIN Rodriges CNP

## 2023-05-03 ENCOUNTER — LAB REQUISITION (OUTPATIENT)
Dept: LAB | Facility: CLINIC | Age: 58
End: 2023-05-03
Payer: COMMERCIAL

## 2023-05-03 DIAGNOSIS — I10 ESSENTIAL (PRIMARY) HYPERTENSION: ICD-10-CM

## 2023-05-04 LAB
ALBUMIN SERPL BCG-MCNC: 3 G/DL (ref 3.5–5.2)
ALP SERPL-CCNC: 67 U/L (ref 35–104)
ALT SERPL W P-5'-P-CCNC: 6 U/L (ref 10–35)
ANION GAP SERPL CALCULATED.3IONS-SCNC: 9 MMOL/L (ref 7–15)
AST SERPL W P-5'-P-CCNC: 21 U/L (ref 10–35)
BILIRUB SERPL-MCNC: 0.2 MG/DL
BUN SERPL-MCNC: 13.2 MG/DL (ref 8–23)
CALCIUM SERPL-MCNC: 9 MG/DL (ref 8.6–10)
CHLORIDE SERPL-SCNC: 100 MMOL/L (ref 98–107)
CREAT SERPL-MCNC: 0.7 MG/DL (ref 0.51–0.95)
CRP SERPL-MCNC: 47.8 MG/L
DEPRECATED HCO3 PLAS-SCNC: 28 MMOL/L (ref 22–29)
ERYTHROCYTE [DISTWIDTH] IN BLOOD BY AUTOMATED COUNT: 15.2 % (ref 10–15)
GFR SERPL CREATININE-BSD FRML MDRD: >90 ML/MIN/1.73M2
GLUCOSE SERPL-MCNC: 86 MG/DL (ref 70–99)
HCT VFR BLD AUTO: 38.8 % (ref 35–47)
HGB BLD-MCNC: 11.7 G/DL (ref 11.7–15.7)
MCH RBC QN AUTO: 28 PG (ref 26.5–33)
MCHC RBC AUTO-ENTMCNC: 30.2 G/DL (ref 31.5–36.5)
MCV RBC AUTO: 93 FL (ref 78–100)
PLATELET # BLD AUTO: 277 10E3/UL (ref 150–450)
POTASSIUM SERPL-SCNC: 4.2 MMOL/L (ref 3.4–5.3)
PROT SERPL-MCNC: 7.8 G/DL (ref 6.4–8.3)
RBC # BLD AUTO: 4.18 10E6/UL (ref 3.8–5.2)
SODIUM SERPL-SCNC: 137 MMOL/L (ref 136–145)
WBC # BLD AUTO: 6.1 10E3/UL (ref 4–11)

## 2023-05-04 PROCEDURE — 36415 COLL VENOUS BLD VENIPUNCTURE: CPT | Mod: ORL | Performed by: NURSE PRACTITIONER

## 2023-05-04 PROCEDURE — P9604 ONE-WAY ALLOW PRORATED TRIP: HCPCS | Mod: ORL | Performed by: NURSE PRACTITIONER

## 2023-05-04 PROCEDURE — 80053 COMPREHEN METABOLIC PANEL: CPT | Mod: ORL | Performed by: NURSE PRACTITIONER

## 2023-05-04 PROCEDURE — 86140 C-REACTIVE PROTEIN: CPT | Mod: ORL | Performed by: NURSE PRACTITIONER

## 2023-05-04 PROCEDURE — 85027 COMPLETE CBC AUTOMATED: CPT | Mod: ORL | Performed by: NURSE PRACTITIONER

## 2023-05-08 NOTE — PROGRESS NOTES
[facility] GERIATRICS    Chief Complaint   Patient presents with     RECHECK     HPI:  María Elena Saab is a 57 year old  (1965), who is being seen today for an episodic care visit at: [facility] () [[id number]]. Today's concern is: The primary encounter diagnosis was Post-nasal drip. Diagnoses of Anemia, unspecified type, Obstructive sleep apnea syndrome, Morbid obesity (H), History of DVT of lower extremity, High blood cholesterol, Essential hypertension with goal blood pressure less than 140/90, Constipation, unspecified constipation type, Lymphedema of both lower extremities, PAD (peripheral artery disease) (H), Cellulitis, unspecified cellulitis site, Primary osteoarthritis involving multiple joints, Polyarthralgia, Seropositive rheumatoid arthritis of multiple sites (H), Cyclic citrullinated peptide (CCP) antibody positive, and Vitamin D deficiency were also pertinent to this visit.    Met with patient who denies any chest pain, palpitations, shortness of breath, ESPITIA, lightheadedness, dizziness, or cough. Denies any abdominal discomfort. Denies N&V. Denies B&B concerns. Denies dysuria or frequency. Denies loose or constipation. Appetite good. Sleeping well. Reports pain to bilateral lower extremities which is chronic, but she reports pain has improved a little more lately after antibiotic treatment needs. Skin appears to be still reddened but no further drainage. Scabbing and drying present. Wound provider on site also following as well. Nursing denies any acute concerns.     BP Readings from Last 3 Encounters:   05/09/23 122/63   05/02/23 133/85   04/18/23 121/60     Wt Readings from Last 5 Encounters:   05/09/23 149.3 kg (329 lb 3.2 oz)   05/02/23 148.3 kg (327 lb)   04/18/23 (!) 150.6 kg (332 lb)   03/22/23 (!) 150.7 kg (332 lb 3.2 oz)   03/13/23 (!) 154.2 kg (340 lb)     Allergies, and PMH/PSH reviewed in EPIC today.  REVIEW OF SYSTEMS:  10 point ROS of systems including  "Constitutional, Eyes, Respiratory, Cardiovascular, Gastroenterology, Genitourinary, Integumentary, Musculoskeletal, Psychiatric were all negative except for pertinent positives noted in my HPI.    Objective:   /63   Pulse 81   Temp 98.3  F (36.8  C)   Resp 18   Ht 1.575 m (5' 2\")   Wt 149.3 kg (329 lb 3.2 oz)   SpO2 95%   BMI 60.21 kg/m    GENERAL APPEARANCE:  Alert, in no distress, oriented, morbidly obese, cooperative  ENT:  Mouth and posterior oropharynx normal, moist mucous membranes, [place]  EYES:  EOM, conjunctivae, lids, pupils and irises normal  NECK:  No adenopathy,masses or thyromegaly  RESP:  respiratory effort and palpation of chest normal, lungs clear to auscultation , no respiratory distress  CV:  Palpation and auscultation of heart done , regular rate and rhythm, no murmur, rub, or gallop, peripheral edema 1+ in BLE  ABDOMEN:  normal bowel sounds, soft, nontender, no hepatosplenomegaly or other masses, no guarding or rebound  M/S:   Wheelchair bound. Mostily isolates herself in room often.   SKIN:  reddened skin but drying apperance. No drainage or open areas at this time  NEURO:   Cranial nerves 2-12 are normal tested and grossly at patient's baseline, no purposeful movement in upper and lower extremities  PSYCH:  oriented X 3, normal insight, judgement and memory, affect and mood normal                                  Most Recent 3 CBC's:  Recent Labs   Lab Test 05/04/23  0855 03/14/23  0732 12/22/22  1710   WBC 6.1 6.5 7.3   HGB 11.7 12.5 12.3   MCV 93 93 96    295 345     Most Recent 3 BMP's:  Recent Labs   Lab Test 05/04/23  0855 03/14/23  0732 12/22/22  1710 11/29/22  0820    140  --  136   POTASSIUM 4.2 4.0  --  3.9   CHLORIDE 100 102  --  96*   CO2 28 27  --  28   BUN 13.2 17.0  --  13.7   CR 0.70 0.56 0.45* 0.50*   ANIONGAP 9 11  --  12   WILVER 9.0 9.4  --  8.8   GLC 86 89  --  97     Most Recent 2 LFT's:  Recent Labs   Lab Test 05/04/23  0855 03/14/23  0732 " "01/13/22  1234 01/04/22  0439   AST 21  --   --  21  20   ALT 6* 6*   < > 23 23 23   ALKPHOS 67  --   --  70  72   BILITOTAL 0.2  --   --  0.3  0.4    < > = values in this interval not displayed.     Most Recent ESR & CRP:  Recent Labs   Lab Test 05/04/23  0855 11/22/22  0654 12/21/20  0942   CRP  --   --  1.3*   CRPI 47.80*   < >  --     < > = values in this interval not displayed.     Most Recent Anemia Panel:  Recent Labs   Lab Test 05/04/23  0855   WBC 6.1   HGB 11.7   HCT 38.8   MCV 93             ASSESSMENT/PLAN  (M05.79) Seropositive rheumatoid arthritis of multiple sites (H)  (primary encounter diagnosis)  (R76.8) Cyclic citrullinated peptide (CCP) antibody positive  (M15.9) Primary osteoarthritis involving multiple joints  (E55.9) Vitamin D deficiency  Comment: Chronic. Managed by rheumatology. c/o posterior neck pain as well as \"all over pain\" that is most likely neuropathic based on description. No longer use PRN oxycodone- Last used in Jan 2023 therefore this medication was stopped  Plan:   -Continue methotrexate 10mg weekly as directed and folic acid 1mg daily  -Continue Tylenol 1000mg every 6 hours PRN  -PRN flexeril  -Continue gabapentin 300mg TID  -Ibuprofen PRN  -Continue humira 40mg injection every 2 weeks as directed.   -Continue Duloxetine 20mg daily as directed. If worsening pain, would recommend dose increase.   -Continue vitamin D 2000U daily  -Follow up with rheumatology as directed. Scheduled for 9/25/23  -BMP and CBC due 5/16/23     (I89.0) Lymphedema of both lower extremities  (I73.9) PAD (peripheral artery disease) (H)  (L03.90) Cellulitis  Comment: Chronic. No claudication complaints. Recent infection with recent hospitalization on 4/19/23. Received IV vancomycin MRI negative for bony involvement or abscess per documentation. Vascular following with recent visit on 12/7/22. Recommendations for lymphedema therapist for MLD, compression and possibly pump therapy in near " future. Noted on 3/10/23-Nurse called today to report: Increased drainage and redness to bilateral lower extremities wounds. Followed by in house wound provider as well. 10 day course of doxycycline started. Improvement to skin assessment today. See photos above.   Plan:   -Follow up with vascular clinic as directed. Schedule for f/u appt within 6 months with Dr Zhao. Due June 2023  -Continue dual antibiotic therapy per ID reports of: Amoxicillin 1000mg every 8 hours and bactrim DS BID as directed (recommend a minium of 2 week course or until cellulitis resolved, Continued for another week until 5/12/23  -HOLD methotrexate and humira while on antibiotic. Resume Humira 5/19 and methotrexate 5/15  -Continue lyphmhatic formula 1 capsule daily per vascular recommendations  -Continue on site wound care provider for ongoing bilateral lower extremities wound care needs  -Monitor for worsening s/sx of concerns  -Staff to provide appropriate skin hygiene needs as directed  -Elevate bilateral lower extremities as much as possible  -Velcro wraps to bilateral lower extremities daily. On in AM and off at HS  -BMP and CBC due 5/16/23     (K59.01) Constipation  Comment: Acute on chronic. Stable  Plan:  -Monitor BM patterns  -senna 1 tab BID PRN  -BMP and CBC due 5/16/23     (I10) Essential hypertension with goal blood pressure less than 140/90  (Z86.718) History of DVT of lower extremity  (E78.00) High blood cholesterol  Comment: Chronic. Based on JNC-8 goals,  patients age of 57 year old, no presence of diabetes or CKD, and goals of care goal BP is <150/90 mm Hg. Patient is stable with current plan of care and routine assessment. History of DVT to LLE. SBP readings remaining 130s-150s.   Plan:   -Monitor BP and HR  -Continue hydrochlorothiazide 25mg daily.   -Continue xarelto 20mg daily  -BMP and CBC due 5/16/23     (E66.01) Morbid obesity (H)  (G47.33) Obstructive sleep apnea syndrome  Comment: Chronic. Hx of LOPEZ, does not  have CPAP as this causes sinus problems per patient. Patient with frequent desaturations while sleeping during history of hospital admission. Recommend follow up with sleep medicine provider to provide recommendations for management which she last saw at [facility] location last in 2016  Plan:   -Monitor sleeping patterns  -Monitor respiratory status  -Oxygen used at night- 2L  -Follow up with sleep medicine as directed. Scheduled for 6/28/23  -BMP and CBC due 5/16/23     (D64.9) Anemia, unspecified type  Comment: Acute on chronic. Baseline hgb around 12  Plan:   -Monitor bleeding risks  -Monitor for worsening s/sx of concerns  -BMP and CBC due 5/16/23     (R09.82) Post nasal drip  Comment: Acute on chronic. History of reports of irritative throat cough with ear fullness. Improving with current regimen.   Plan:  -Continue claritin 10mg daily  -Monitor for worsening s/sx of concerns  -BMP and CBC due 5/16/23     Electronically signed by:  Arlen Carlson DNP, APRN

## 2023-05-09 ENCOUNTER — NURSING HOME VISIT (OUTPATIENT)
Dept: GERIATRICS | Facility: CLINIC | Age: 58
End: 2023-05-09
Payer: COMMERCIAL

## 2023-05-09 VITALS
HEART RATE: 81 BPM | WEIGHT: 293 LBS | DIASTOLIC BLOOD PRESSURE: 63 MMHG | HEIGHT: 62 IN | RESPIRATION RATE: 18 BRPM | SYSTOLIC BLOOD PRESSURE: 122 MMHG | OXYGEN SATURATION: 95 % | TEMPERATURE: 98.3 F | BODY MASS INDEX: 53.92 KG/M2

## 2023-05-09 DIAGNOSIS — L03.90 CELLULITIS, UNSPECIFIED CELLULITIS SITE: ICD-10-CM

## 2023-05-09 DIAGNOSIS — G47.33 OBSTRUCTIVE SLEEP APNEA SYNDROME: ICD-10-CM

## 2023-05-09 DIAGNOSIS — M15.0 PRIMARY OSTEOARTHRITIS INVOLVING MULTIPLE JOINTS: ICD-10-CM

## 2023-05-09 DIAGNOSIS — D64.9 ANEMIA, UNSPECIFIED TYPE: ICD-10-CM

## 2023-05-09 DIAGNOSIS — R09.82 POST-NASAL DRIP: Primary | ICD-10-CM

## 2023-05-09 DIAGNOSIS — E55.9 VITAMIN D DEFICIENCY: ICD-10-CM

## 2023-05-09 DIAGNOSIS — K59.00 CONSTIPATION, UNSPECIFIED CONSTIPATION TYPE: ICD-10-CM

## 2023-05-09 DIAGNOSIS — E78.00 HIGH BLOOD CHOLESTEROL: ICD-10-CM

## 2023-05-09 DIAGNOSIS — M05.79 SEROPOSITIVE RHEUMATOID ARTHRITIS OF MULTIPLE SITES (H): ICD-10-CM

## 2023-05-09 DIAGNOSIS — I89.0 LYMPHEDEMA OF BOTH LOWER EXTREMITIES: ICD-10-CM

## 2023-05-09 DIAGNOSIS — I10 ESSENTIAL HYPERTENSION WITH GOAL BLOOD PRESSURE LESS THAN 140/90: ICD-10-CM

## 2023-05-09 DIAGNOSIS — Z86.718 HISTORY OF DVT OF LOWER EXTREMITY: ICD-10-CM

## 2023-05-09 DIAGNOSIS — M25.50 POLYARTHRALGIA: ICD-10-CM

## 2023-05-09 DIAGNOSIS — I73.9 PAD (PERIPHERAL ARTERY DISEASE) (H): ICD-10-CM

## 2023-05-09 DIAGNOSIS — R76.8 CYCLIC CITRULLINATED PEPTIDE (CCP) ANTIBODY POSITIVE: ICD-10-CM

## 2023-05-09 DIAGNOSIS — E66.01 MORBID OBESITY (H): ICD-10-CM

## 2023-05-09 PROBLEM — Z99.3 WHEELCHAIR DEPENDENCE: Status: ACTIVE | Noted: 2023-05-01

## 2023-05-09 PROCEDURE — 99309 SBSQ NF CARE MODERATE MDM 30: CPT | Performed by: NURSE PRACTITIONER

## 2023-05-09 RX ORDER — AMOXICILLIN 500 MG/1
1000 CAPSULE ORAL EVERY 8 HOURS
Qty: 18 CAPSULE | Refills: 0 | Status: SHIPPED | OUTPATIENT
Start: 2023-05-09 | End: 2023-05-12

## 2023-05-09 RX ORDER — SULFAMETHOXAZOLE/TRIMETHOPRIM 800-160 MG
2 TABLET ORAL 2 TIMES DAILY
Qty: 12 TABLET | Refills: 0 | Status: SHIPPED | OUTPATIENT
Start: 2023-05-09 | End: 2023-05-12

## 2023-05-09 NOTE — LETTER
5/9/2023        RE: María Elena Saab  [facility]  [address]  [place] [address]        [facility]    Chief Complaint   Patient presents with     RECHECK     HPI:  María Elena Saab is a 57 year old  (1965), who is being seen today for an episodic care visit at: [facility] () [[id number]]. Today's concern is: The primary encounter diagnosis was Post-nasal drip. Diagnoses of Anemia, unspecified type, Obstructive sleep apnea syndrome, Morbid obesity (H), History of DVT of lower extremity, High blood cholesterol, Essential hypertension with goal blood pressure less than 140/90, Constipation, unspecified constipation type, Lymphedema of both lower extremities, PAD (peripheral artery disease) (H), Cellulitis, unspecified cellulitis site, Primary osteoarthritis involving multiple joints, Polyarthralgia, Seropositive rheumatoid arthritis of multiple sites (H), Cyclic citrullinated peptide (CCP) antibody positive, and Vitamin D deficiency were also pertinent to this visit.    Met with patient who denies any chest pain, palpitations, shortness of breath, ESPITIA, lightheadedness, dizziness, or cough. Denies any abdominal discomfort. Denies N&V. Denies B&B concerns. Denies dysuria or frequency. Denies loose or constipation. Appetite good. Sleeping well. Reports pain to bilateral lower extremities which is chronic, but she reports pain has improved a little more lately after antibiotic treatment needs. Skin appears to be still reddened but no further drainage. Scabbing and drying present. Wound provider on site also following as well. Nursing denies any acute concerns.     BP Readings from Last 3 Encounters:   05/09/23 122/63   05/02/23 133/85   04/18/23 121/60     Wt Readings from Last 5 Encounters:   05/09/23 149.3 kg (329 lb 3.2 oz)   05/02/23 148.3 kg (327 lb)   04/18/23 (!) 150.6 kg (332 lb)   03/22/23 (!) 150.7 kg (332 lb 3.2 oz)   03/13/23 (!) 154.2 kg (340  "lb)     Allergies, and PMH/PSH reviewed in [facility] today.  REVIEW OF SYSTEMS:  10 point ROS of systems including Constitutional, Eyes, Respiratory, Cardiovascular, Gastroenterology, Genitourinary, Integumentary, Musculoskeletal, Psychiatric were all negative except for pertinent positives noted in my HPI.    Objective:   /63   Pulse 81   Temp 98.3  F (36.8  C)   Resp 18   Ht 1.575 m (5' 2\")   Wt 149.3 kg (329 lb 3.2 oz)   SpO2 95%   BMI 60.21 kg/m    GENERAL APPEARANCE:  Alert, in no distress, oriented, morbidly obese, cooperative  ENT:  Mouth and posterior oropharynx normal, moist mucous membranes, "[employer]  EYES:  EOM, conjunctivae, lids, pupils and irises normal  NECK:  No adenopathy,masses or thyromegaly  RESP:  respiratory effort and palpation of chest normal, lungs clear to auscultation , no respiratory distress  CV:  Palpation and auscultation of heart done , regular rate and rhythm, no murmur, rub, or gallop, peripheral edema 1+ in BLE  ABDOMEN:  normal bowel sounds, soft, nontender, no hepatosplenomegaly or other masses, no guarding or rebound  M/S:   Wheelchair bound. Mostily isolates herself in room often.   SKIN:  reddened skin but drying apperance. No drainage or open areas at this time  NEURO:   Cranial nerves 2-12 are normal tested and grossly at patient's baseline, no purposeful movement in upper and lower extremities  PSYCH:  oriented X 3, normal insight, judgement and memory, affect and mood normal      Most Recent 3 CBC's:  Recent Labs   Lab Test 05/04/23  0855 03/14/23  0732 12/22/22  1710   WBC 6.1 6.5 7.3   HGB 11.7 12.5 12.3   MCV 93 93 96    295 345     Most Recent 3 BMP's:  Recent Labs   Lab Test 05/04/23  0855 03/14/23  0732 12/22/22  1710 11/29/22  0820    140  --  136   POTASSIUM 4.2 4.0  --  3.9   CHLORIDE 100 102  --  96*   CO2 28 27  --  28   BUN 13.2 17.0  --  13.7   CR 0.70 0.56 0.45* 0.50*   ANIONGAP 9 11  --  12   WILVER 9.0 9.4  --  8.8   GLC 86 89  --  97     Most " "Recent 2 LFT's:  Recent Labs   Lab Test 05/04/23  0855 03/14/23  0732 01/13/22  1234 01/04/22  0439   AST 21  --   --  21  20   ALT 6* 6*   < > 23 23 23   ALKPHOS 67  --   --  70  72   BILITOTAL 0.2  --   --  0.3  0.4    < > = values in this interval not displayed.     Most Recent ESR & CRP:  Recent Labs   Lab Test 05/04/23  0855 11/22/22  0654 12/21/20  0942   CRP  --   --  1.3*   CRPI 47.80*   < >  --     < > = values in this interval not displayed.     Most Recent Anemia Panel:  Recent Labs   Lab Test 05/04/23  0855   WBC 6.1   HGB 11.7   HCT 38.8   MCV 93             ASSESSMENT/PLAN  (M05.79) Seropositive rheumatoid arthritis of multiple sites (H)  (primary encounter diagnosis)  (R76.8) Cyclic citrullinated peptide (CCP) antibody positive  (M15.9) Primary osteoarthritis involving multiple joints  (E55.9) Vitamin D deficiency  Comment: Chronic. Managed by rheumatology. c/o posterior neck pain as well as \"all over pain\" that is most likely neuropathic based on description. No longer use PRN oxycodone- Last used in Jan 2023 therefore this medication was stopped  Plan:   -Continue methotrexate 10mg weekly as directed and folic acid 1mg daily  -Continue Tylenol 1000mg every 6 hours PRN  -PRN flexeril  -Continue gabapentin 300mg TID  -Ibuprofen PRN  -Continue humira 40mg injection every 2 weeks as directed.   -Continue Duloxetine 20mg daily as directed. If worsening pain, would recommend dose increase.   -Continue vitamin D 2000U daily  -Follow up with rheumatology as directed. Scheduled for 9/25/23  -BMP and CBC due 5/16/23     (I89.0) Lymphedema of both lower extremities  (I73.9) PAD (peripheral artery disease) (H)  (L03.90) Cellulitis  Comment: Chronic. No claudication complaints. Recent infection with recent hospitalization on 4/19/23. Received IV vancomycin MRI negative for bony involvement or abscess per documentation. Vascular following with recent visit on 12/7/22. Recommendations for lymphedema " therapist for MLD, compression and possibly pump therapy in near future. Noted on 3/10/23-Nurse called today to report: Increased drainage and redness to bilateral lower extremities wounds. Followed by in house wound provider as well. 10 day course of doxycycline started. Improvement to skin assessment today. See photos above.   Plan:   -Follow up with vascular clinic as directed. Schedule for f/u appt within 6 months with Dr Zhao. Due June 2023  -Continue dual antibiotic therapy per ID reports of: Amoxicillin 1000mg every 8 hours and bactrim DS BID as directed (recommend a minium of 2 week course or until cellulitis resolved, Continued for another week until 5/12/23  -HOLD methotrexate and humira while on antibiotic. Resume Humira 5/19 and methotrexate 5/15  -Continue lyphmhatic formula 1 capsule daily per vascular recommendations  -Continue on site wound care provider for ongoing bilateral lower extremities wound care needs  -Monitor for worsening s/sx of concerns  -Staff to provide appropriate skin hygiene needs as directed  -Elevate bilateral lower extremities as much as possible  -Velcro wraps to bilateral lower extremities daily. On in AM and off at HS  -BMP and CBC due 5/16/23     (K59.01) Constipation  Comment: Acute on chronic. Stable  Plan:  -Monitor BM patterns  -senna 1 tab BID PRN  -BMP and CBC due 5/16/23     (I10) Essential hypertension with goal blood pressure less than 140/90  (Z86.718) History of DVT of lower extremity  (E78.00) High blood cholesterol  Comment: Chronic. Based on JNC-8 goals,  patients age of 57 year old, no presence of diabetes or CKD, and goals of care goal BP is <150/90 mm Hg. Patient is stable with current plan of care and routine assessment. History of DVT to LLE. SBP readings remaining 130s-150s.   Plan:   -Monitor BP and HR  -Continue hydrochlorothiazide 25mg daily.   -Continue xarelto 20mg daily  -BMP and CBC due 5/16/23     (E66.01) Morbid obesity (H)  (G47.33)  Obstructive sleep apnea syndrome  Comment: Chronic. Hx of LOPEZ, does not have CPAP as this causes sinus problems per patient. Patient with frequent desaturations while sleeping during history of hospital admission. Recommend follow up with sleep medicine provider to provide recommendations for management which she last saw at [facility] location last in 2016  Plan:   -Monitor sleeping patterns  -Monitor respiratory status  -Oxygen used at night- 2L  -Follow up with sleep medicine as directed. Scheduled for 6/28/23  -BMP and CBC due 5/16/23     (D64.9) Anemia, unspecified type  Comment: Acute on chronic. Baseline hgb around 12  Plan:   -Monitor bleeding risks  -Monitor for worsening s/sx of concerns  -BMP and CBC due 5/16/23     (R09.82) Post nasal drip  Comment: Acute on chronic. History of reports of irritative throat cough with ear fullness. Improving with current regimen.   Plan:  -Continue claritin 10mg daily  -Monitor for worsening s/sx of concerns  -BMP and CBC due 5/16/23     Electronically signed by:  Arlen Carlson DNP, APRN             Sincerely,        KRISTIN Rodriges CNP

## 2023-05-17 ENCOUNTER — LAB REQUISITION (OUTPATIENT)
Dept: LAB | Facility: CLINIC | Age: 58
End: 2023-05-17
Payer: COMMERCIAL

## 2023-05-17 DIAGNOSIS — I10 ESSENTIAL (PRIMARY) HYPERTENSION: ICD-10-CM

## 2023-05-18 LAB
ANION GAP SERPL CALCULATED.3IONS-SCNC: 12 MMOL/L (ref 7–15)
BUN SERPL-MCNC: 15.3 MG/DL (ref 8–23)
CALCIUM SERPL-MCNC: 9.2 MG/DL (ref 8.6–10)
CHLORIDE SERPL-SCNC: 100 MMOL/L (ref 98–107)
CREAT SERPL-MCNC: 0.5 MG/DL (ref 0.51–0.95)
DEPRECATED HCO3 PLAS-SCNC: 29 MMOL/L (ref 22–29)
ERYTHROCYTE [DISTWIDTH] IN BLOOD BY AUTOMATED COUNT: 15.8 % (ref 10–15)
GFR SERPL CREATININE-BSD FRML MDRD: >90 ML/MIN/1.73M2
GLUCOSE SERPL-MCNC: 96 MG/DL (ref 70–99)
HCT VFR BLD AUTO: 39.7 % (ref 35–47)
HGB BLD-MCNC: 11.7 G/DL (ref 11.7–15.7)
MCH RBC QN AUTO: 27.9 PG (ref 26.5–33)
MCHC RBC AUTO-ENTMCNC: 29.5 G/DL (ref 31.5–36.5)
MCV RBC AUTO: 95 FL (ref 78–100)
PLATELET # BLD AUTO: 251 10E3/UL (ref 150–450)
POTASSIUM SERPL-SCNC: 4 MMOL/L (ref 3.4–5.3)
RBC # BLD AUTO: 4.19 10E6/UL (ref 3.8–5.2)
SODIUM SERPL-SCNC: 141 MMOL/L (ref 136–145)
WBC # BLD AUTO: 5 10E3/UL (ref 4–11)

## 2023-05-18 PROCEDURE — P9604 ONE-WAY ALLOW PRORATED TRIP: HCPCS | Mod: ORL | Performed by: NURSE PRACTITIONER

## 2023-05-18 PROCEDURE — 36415 COLL VENOUS BLD VENIPUNCTURE: CPT | Mod: ORL | Performed by: NURSE PRACTITIONER

## 2023-05-18 PROCEDURE — 85027 COMPLETE CBC AUTOMATED: CPT | Mod: ORL | Performed by: NURSE PRACTITIONER

## 2023-05-18 PROCEDURE — 80048 BASIC METABOLIC PNL TOTAL CA: CPT | Mod: ORL | Performed by: NURSE PRACTITIONER

## 2023-05-23 DIAGNOSIS — L30.9 DERMATITIS: Primary | ICD-10-CM

## 2023-05-23 RX ORDER — ACETAMINOPHEN 325 MG/1
650 TABLET ORAL EVERY 6 HOURS PRN
COMMUNITY

## 2023-05-23 RX ORDER — NYSTATIN 100000 [USP'U]/G
POWDER TOPICAL
Qty: 120 G | Refills: 11 | Status: SHIPPED | OUTPATIENT
Start: 2023-05-23 | End: 2023-06-13 | Stop reason: ALTCHOICE

## 2023-05-23 NOTE — PROGRESS NOTES
"[facility] GERIATRICS  REGULATORY VISIT  May 24, 2023    [facility] Medical Record Number:  [id number]  Place of Service where encounter took place:  [facility] () [[id number]]    Chief Complaint   Patient presents with     jail Regulatory       HPI:    María Elena Saab is a 57 year old  (1965), who is being seen today for a federally mandated E/M visit at [facility] where she has resided since March 2016. HPI information obtained from: facility chart records, facility staff, patient report and [facility] chart review.    Today, Ms. Saab is seen in her room laying in bed with the lights off.  She was sleeping when I walked in, but woke up to me calling her name.  She asked what time it is that she does not like visitors before 10 AM.  I informed her it was 10:25 AM and she said \"okay\".  I introduced myself and asked if she had any questions or concerns that she last saw her PCP.  She replied no and that she lets them know when she would like to see her PCP.  No concerns today per discussion with nursing.    ALLERGIES:    Allergies   Allergen Reactions     Adhesive Tape Other (See Comments)     As on band-aids;  Causes skin tearing/wounds.     Adhesive [Cyanoacrylate] Other (See Comments)     As on band-aids;  Causes skin tearing/wounds.     Banana Unknown     She avoids due to Latex allergy.  If she eats them 3 days in a row, gets stomach cramps and dark stool.  TBrinkMD - 4/10/15     Food Unknown     Bananas, grapes, pine nuts     Grape [Grape (Artificial) Flavor] Unknown     She avoids due to Latex allergy.  If she eats lots of them, and she likes them, stomach gets a little queasy.  TBrinkMD - 4/10/15     Morphine (Pf) [Morphine] Other (See Comments)     Pt has not reacted to this med herself, but mother has anaphylactic reaction and other family members get nausea/vomiting.     Other Environmental Allergy Swelling     leather     Pine      Pseudoephedrine " Other (See Comments)     Insomnia     Pseudoephedrine Cold/Allergy [Chlorpheniramine-Pseudoeph] Other (See Comments)     Keeps pt awake up to 24 hours      Latex Rash     Hands get red from rubber gloves, but okay if she washes them right away.  Hands get red from rubber gloves, but okay if she washes them right away.     Other Food Allergy Rash     Pine trees, leather .         Past Medical, Surgical, Family and Social History: Reviewed and updated in EPIC.    MEDICATIONS:  Current Outpatient Medications   Medication Sig Dispense Refill     acetaminophen (TYLENOL) 325 MG tablet Take 650 mg by mouth every 6 hours as needed for mild pain       adalimumab (HUMIRA *CF*) 40 MG/0.4ML prefilled syringe kit Inject 0.4 mLs (40 mg) Subcutaneous every 14 days 0.8 mL 11     ascorbic acid, vitamin C, (ASCORBIC ACID WITH KAEL HIPS) 500 MG tablet [ASCORBIC ACID, VITAMIN C, (ASCORBIC ACID WITH KAEL HIPS) 500 MG TABLET] Take 500 mg by mouth 2 (two) times a day.       cholecalciferol 50 MCG (2000 UT) tablet Take 1 tablet (50 mcg) by mouth daily 30 tablet 11     cyclobenzaprine (FLEXERIL) 10 MG tablet Take 1 tablet (10 mg) by mouth every 8 hours as needed for muscle spasms 30 tablet 5     DULoxetine (CYMBALTA) 20 MG capsule [DULOXETINE (CYMBALTA) 20 MG CAPSULE] Take 1 capsule (20 mg total) by mouth daily. 90 capsule 2     folic acid (FOLVITE) 1 MG tablet Take 1 mg by mouth       gabapentin (NEURONTIN) 300 MG capsule Take 1 capsule (300 mg) by mouth 3 times daily 90 capsule 11     hydroCHLOROthiazide (HYDRODIURIL) 25 MG tablet [HYDROCHLOROTHIAZIDE (HYDRODIURIL) 25 MG TABLET] Take 25 mg by mouth daily.       ibuprofen (ADVIL/MOTRIN) 400 MG tablet Take 1 tablet (400 mg) by mouth every 6 hours as needed for moderate pain (4-6) 30 tablet 11     loratadine (CLARITIN) 10 MG tablet Take 1 tablet (10 mg) by mouth daily 30 tablet 11     methotrexate sodium 10 MG TABS Take 10 mg by mouth once a week 16 tablet 0     NEW MED Take 1 capsule by  "mouth daily Lymphatic formula       nystatin (NYAMYC) 645576 UNIT/GM external powder Apply to rash areas under breast and under stomach BID and BID  g 11     rivaroxaban ANTICOAGULANT (XARELTO) 20 MG TABS tablet Take 20 mg by mouth daily       sennosides (SENOKOT) 8.6 MG tablet Take 1 tablet by mouth 2 times daily as needed for constipation       Medications reviewed:  Medications reconciled to facility chart and changes were made to reflect current medications as identified as above med list. Below are the changes that were made:   Medications stopped since last EPIC medication reconciliation:   Medications Discontinued During This Encounter   Medication Reason     acetaminophen (TYLENOL) 500 MG tablet Med Rec(No AVS / No eCancel)     Medications started since last [facility] medication reconciliation:  Orders Placed This Encounter   Medications     acetaminophen (TYLENOL) 325 MG tablet     Sig: Take 650 mg by mouth every 6 hours as needed for mild pain       REVIEW OF SYSTEMS:  4 point ROS neg other than the symptoms noted above in the HPI.    PHYSICAL EXAM:  /62   Pulse 77   Temp 98.2  F (36.8  C)   Resp 18   Ht 1.575 m (5' 2\")   Wt 149.3 kg (329 lb 3.2 oz)   SpO2 95%   BMI 60.21 kg/m    Gen: laying in bed alert, cooperative and in no acute distress  Resp: breathing non labored, no tachypnea  Ext: bilateral lower extremity lymphedema with stasis changes; legs with an opaque powder versus lotion over the stasis changes  Neuro: CX II-XII grossly in tact; ROM in all four extremities grossly in tact  Psych: alert and oriented x3; normal affect      LABS/IMAGING: Reviewed as per Epic and/or [facility]    ASSESSMENT / PLAN:    HTN  SBPs 110s-130s, most 120s. Weight is down about 10 lbs over the past year. BMP was OK in May.   -- hydrochlorothiazide 25 mg daily   -- follow BPs and adjust medications as needed    Rheumatoid Arthritis  Follows with rheumatology.   -- Humira subQ every 14 days  -- " methotrexate 10 mg weekly and folic acid 1 mg daily   -- duloxetine 20 mg daily, gabapentin 300 mg TID   -- APAP 650 mg q6h PRN, cyclobenzaprine 10 mg q8h PRN, ibuprofen 400 mg q6h PRN,  -- labs and follow up per rheumatology     History of Lower Exremity DVT  -- rivaroxaban 20 mg daily        Electronically signed by  Urvashi River MD

## 2023-05-24 ENCOUNTER — NURSING HOME VISIT (OUTPATIENT)
Dept: GERIATRICS | Facility: CLINIC | Age: 58
End: 2023-05-24
Payer: COMMERCIAL

## 2023-05-24 VITALS
TEMPERATURE: 98.2 F | SYSTOLIC BLOOD PRESSURE: 129 MMHG | BODY MASS INDEX: 53.92 KG/M2 | DIASTOLIC BLOOD PRESSURE: 62 MMHG | OXYGEN SATURATION: 95 % | HEART RATE: 77 BPM | WEIGHT: 293 LBS | RESPIRATION RATE: 18 BRPM | HEIGHT: 62 IN

## 2023-05-24 DIAGNOSIS — M05.79 SEROPOSITIVE RHEUMATOID ARTHRITIS OF MULTIPLE SITES (H): ICD-10-CM

## 2023-05-24 DIAGNOSIS — Z86.718 HISTORY OF DVT OF LOWER EXTREMITY: ICD-10-CM

## 2023-05-24 DIAGNOSIS — I10 PRIMARY HYPERTENSION: Primary | ICD-10-CM

## 2023-05-24 PROCEDURE — 99309 SBSQ NF CARE MODERATE MDM 30: CPT | Performed by: INTERNAL MEDICINE

## 2023-05-24 NOTE — LETTER
"    5/24/2023        RE: María Elena Saab  [facility] [address]  [place] [address]        [facility] GERIATRICS  REGULATORY VISIT  May 24, 2023    [facility] Medical Record Number:  [id number]  Place of Service where encounter took place:  [facility] () [[id number]]    Chief Complaint   Patient presents with     alf Regulatory       HPI:    María Elena Saab is a 57 year old  (1965), who is being seen today for a federally mandated E/M visit at [facility] where she has resided since March 2016. HPI information obtained from: facility chart records, facility staff, patient report and [facility] chart review.    Today, Ms. Saab is seen in her room laying in bed with the lights off.  She was sleeping when I walked in, but woke up to me calling her name.  She asked what time it is that she does not like visitors before 10 AM.  I informed her it was 10:25 AM and she said \"okay\".  I introduced myself and asked if she had any questions or concerns that she last saw her PCP.  She replied no and that she lets them know when she would like to see her PCP.  No concerns today per discussion with nursing.    ALLERGIES:    Allergies   Allergen Reactions     Adhesive Tape Other (See Comments)     As on band-aids;  Causes skin tearing/wounds.     Adhesive [Cyanoacrylate] Other (See Comments)     As on band-aids;  Causes skin tearing/wounds.     Banana Unknown     She avoids due to Latex allergy.  If she eats them 3 days in a row, gets stomach cramps and dark stool.  TBrinkMD - 4/10/15     Food Unknown     Bananas, grapes, pine nuts     Grape [Grape (Artificial) Flavor] Unknown     She avoids due to Latex allergy.  If she eats lots of them, and she likes them, stomach gets a little queasy.  TBrinkMD - 4/10/15     Morphine (Pf) [Morphine] Other (See Comments)     Pt has not reacted to this med herself, but mother has anaphylactic reaction and other " family members get nausea/vomiting.     Other Environmental Allergy Swelling     leather     Pine      Pseudoephedrine Other (See Comments)     Insomnia     Pseudoephedrine Cold/Allergy [Chlorpheniramine-Pseudoeph] Other (See Comments)     Keeps pt awake up to 24 hours      Latex Rash     Hands get red from rubber gloves, but okay if she washes them right away.  Hands get red from rubber gloves, but okay if she washes them right away.     Other Food Allergy Rash     Pine trees, leather .         Past Medical, Surgical, Family and Social History: Reviewed and updated in EPIC.    MEDICATIONS:  Current Outpatient Medications   Medication Sig Dispense Refill     acetaminophen (TYLENOL) 325 MG tablet Take 650 mg by mouth every 6 hours as needed for mild pain       adalimumab (HUMIRA *CF*) 40 MG/0.4ML prefilled syringe kit Inject 0.4 mLs (40 mg) Subcutaneous every 14 days 0.8 mL 11     ascorbic acid, vitamin C, (ASCORBIC ACID WITH KAEL HIPS) 500 MG tablet [ASCORBIC ACID, VITAMIN C, (ASCORBIC ACID WITH KAEL HIPS) 500 MG TABLET] Take 500 mg by mouth 2 (two) times a day.       cholecalciferol 50 MCG (2000 UT) tablet Take 1 tablet (50 mcg) by mouth daily 30 tablet 11     cyclobenzaprine (FLEXERIL) 10 MG tablet Take 1 tablet (10 mg) by mouth every 8 hours as needed for muscle spasms 30 tablet 5     DULoxetine (CYMBALTA) 20 MG capsule [DULOXETINE (CYMBALTA) 20 MG CAPSULE] Take 1 capsule (20 mg total) by mouth daily. 90 capsule 2     folic acid (FOLVITE) 1 MG tablet Take 1 mg by mouth       gabapentin (NEURONTIN) 300 MG capsule Take 1 capsule (300 mg) by mouth 3 times daily 90 capsule 11     hydroCHLOROthiazide (HYDRODIURIL) 25 MG tablet [HYDROCHLOROTHIAZIDE (HYDRODIURIL) 25 MG TABLET] Take 25 mg by mouth daily.       ibuprofen (ADVIL/MOTRIN) 400 MG tablet Take 1 tablet (400 mg) by mouth every 6 hours as needed for moderate pain (4-6) 30 tablet 11     loratadine (CLARITIN) 10 MG tablet Take 1 tablet (10 mg) by mouth daily 30  "tablet 11     methotrexate sodium 10 MG TABS Take 10 mg by mouth once a week 16 tablet 0     NEW MED Take 1 capsule by mouth daily Lymphatic formula       nystatin (NYAMYC) 007481 UNIT/GM external powder Apply to rash areas under breast and under stomach BID and BID  g 11     rivaroxaban ANTICOAGULANT (XARELTO) 20 MG TABS tablet Take 20 mg by mouth daily       sennosides (SENOKOT) 8.6 MG tablet Take 1 tablet by mouth 2 times daily as needed for constipation       Medications reviewed:  Medications reconciled to facility chart and changes were made to reflect current medications as identified as above med list. Below are the changes that were made:   Medications stopped since last EPIC medication reconciliation:   Medications Discontinued During This Encounter   Medication Reason     acetaminophen (TYLENOL) 500 MG tablet Med Rec(No AVS / No eCancel)     Medications started since last [facility] medication reconciliation:  Orders Placed This Encounter   Medications     acetaminophen (TYLENOL) 325 MG tablet     Sig: Take 650 mg by mouth every 6 hours as needed for mild pain       REVIEW OF SYSTEMS:  4 point ROS neg other than the symptoms noted above in the HPI.    PHYSICAL EXAM:  /62   Pulse 77   Temp 98.2  F (36.8  C)   Resp 18   Ht 1.575 m (5' 2\")   Wt 149.3 kg (329 lb 3.2 oz)   SpO2 95%   BMI 60.21 kg/m    Gen: laying in bed alert, cooperative and in no acute distress  Resp: breathing non labored, no tachypnea  Ext: bilateral lower extremity lymphedema with stasis changes; legs with an opaque powder versus lotion over the stasis changes  Neuro: CX II-XII grossly in tact; ROM in all four extremities grossly in tact  Psych: alert and oriented x3; normal affect      LABS/IMAGING: Reviewed as per Epic and/or [facility]    ASSESSMENT / PLAN:    HTN  SBPs 110s-130s, most 120s. Weight is down about 10 lbs over the past year. BMP was OK in May.   -- hydrochlorothiazide 25 mg daily   -- follow BPs and " adjust medications as needed    Rheumatoid Arthritis  Follows with rheumatology.   -- Humira subQ every 14 days  -- methotrexate 10 mg weekly and folic acid 1 mg daily   -- duloxetine 20 mg daily, gabapentin 300 mg TID   -- APAP 650 mg q6h PRN, cyclobenzaprine 10 mg q8h PRN, ibuprofen 400 mg q6h PRN,  -- labs and follow up per rheumatology     History of Lower Exremity DVT  -- rivaroxaban 20 mg daily        Electronically signed by  Urvashi River MD              Sincerely,        Urvashi River MD

## 2023-06-13 ENCOUNTER — TELEPHONE (OUTPATIENT)
Dept: GERIATRICS | Facility: CLINIC | Age: 58
End: 2023-06-13
Payer: COMMERCIAL

## 2023-06-13 VITALS
DIASTOLIC BLOOD PRESSURE: 82 MMHG | HEIGHT: 62 IN | OXYGEN SATURATION: 90 % | TEMPERATURE: 97.5 F | SYSTOLIC BLOOD PRESSURE: 126 MMHG | WEIGHT: 293 LBS | BODY MASS INDEX: 53.92 KG/M2 | RESPIRATION RATE: 18 BRPM | HEART RATE: 90 BPM

## 2023-06-13 DIAGNOSIS — B35.9 TINEA: Primary | ICD-10-CM

## 2023-06-13 DIAGNOSIS — B36.9 FUNGAL DERMATITIS: ICD-10-CM

## 2023-06-13 RX ORDER — KETOCONAZOLE 20 MG/ML
SHAMPOO TOPICAL DAILY PRN
Qty: 120 ML | Refills: 11 | Status: SHIPPED | OUTPATIENT
Start: 2023-06-13 | End: 2023-06-28

## 2023-06-13 RX ORDER — FLUCONAZOLE 200 MG/1
200 TABLET ORAL
Qty: 2 TABLET | Refills: 0 | Status: SHIPPED | OUTPATIENT
Start: 2023-06-13 | End: 2023-06-28 | Stop reason: ALTCHOICE

## 2023-06-13 RX ORDER — KETOCONAZOLE 20 MG/G
CREAM TOPICAL
Qty: 120 G | Refills: 11 | Status: SHIPPED | OUTPATIENT
Start: 2023-06-13 | End: 2023-06-28

## 2023-06-13 NOTE — TELEPHONE ENCOUNTER
[facility] TELEPHONE ENCOUNTER    Chief Complaint   Patient presents with     Derm Problem       María Elena Saab is a 57 year old  (1965),Nurse called today to report: Increased worsening fungal dermatitis noted to bilateral groin, under abdominal folds and under breast. No relief from nystatin powder application. She is noncompliant with showers. Has been accepting bed baths primarily.     ASSESSMENT/PLAN      Discontinue nystatin powder    Start ketoconazole 2% cream BID to affected areas    Start ketoconazole 2% shampoo on body during shower days x 8 weeks then daily PRN    Fluconazole 200mg once a week x 2 weeks    Monitor for worsening s/sx of concerns    Electronically signed by:   KRISTIN Rodriges CNP

## 2023-06-13 NOTE — PROGRESS NOTES
[facility] GERIATRICS    Chief Complaint   Patient presents with     RECHECK     HPI:  María Elena Saab is a 57 year old  (1965), who is being seen today for an episodic care visit at: [facility] () [[id number]]. Today's concern is: The primary encounter diagnosis was Tinea. Diagnoses of Fungal dermatitis, Anemia, unspecified type, Obstructive sleep apnea syndrome, Morbid obesity (H), High blood cholesterol, Essential hypertension with goal blood pressure less than 140/90, History of DVT of lower extremity, Constipation, unspecified constipation type, Lymphedema of both lower extremities, PAD (peripheral artery disease) (H), Primary osteoarthritis involving multiple joints, Seropositive rheumatoid arthritis of multiple sites (H), Polyarthralgia, Cyclic citrullinated peptide (CCP) antibody positive, and Vitamin D deficiency were also pertinent to this visit.    Noted on 6/13/23: Nurse called today to report: Increased worsening fungal dermatitis noted to bilateral groin, under abdominal folds and under breast. No relief from nystatin powder application. She is noncompliant with showers. Has been accepting bed baths primarily. Alternative topical agents ordered along with medicated shampoo.     Met with patient who was up on bedside commode. She denies any chest pain, palpitations, ESPITIA, lightheadedness, dizziness, or cough. Some mild shortness of breath complaints with diminished lungs present. Denies any abdominal discomfort. Denies N&V. Denies B&B concerns. Denies dysuria or frequency. Denies loose or constipation. Appetite good. Sleeping well. Noted increased severe redness and fungal odor noted to bilateral breast and under abdominal folds. No complaints of itch noted. Mild pain with touch to areas. Also noted increased redness to left leg that is going upward onto mid inner thigh. No complaints of pain noted to these areas but history of off/on cellulitis. Will start treatment course  "as advised below. Has vascular appt scheduled for Friday for follow up regarding chronic leg concerns.     BP Readings from Last 3 Encounters:   06/13/23 126/82   05/24/23 129/62   05/09/23 122/63     Wt Readings from Last 5 Encounters:   06/13/23 149.3 kg (329 lb 3.2 oz)   05/24/23 149.3 kg (329 lb 3.2 oz)   05/09/23 149.3 kg (329 lb 3.2 oz)   05/02/23 148.3 kg (327 lb)   04/18/23 (!) 150.6 kg (332 lb)     Allergies, and PMH/PSH reviewed in EPIC today.  REVIEW OF SYSTEMS:  4 point ROS including Respiratory, CV, GI and , other than that noted in the HPI,  is negative    Objective:   /82   Pulse 90   Temp 97.5  F (36.4  C)   Resp 18   Ht 1.575 m (5' 2\")   Wt 149.3 kg (329 lb 3.2 oz)   SpO2 90%   BMI 60.21 kg/m    GENERAL APPEARANCE:  Alert, in no distress, oriented, morbidly obese, cooperative  ENT:  Mouth and posterior oropharynx normal, moist mucous membranes, [place]  EYES:  EOM, conjunctivae, lids, pupils and irises normal  NECK:  No adenopathy,masses or thyromegaly  RESP:  respiratory effort and palpation of chest normal, no respiratory distress, diminished breath sounds bibasilar  CV:  Palpation and auscultation of heart done , regular rate and rhythm, no murmur, rub, or gallop, peripheral edema 1-2+ in BLE  CHEST (BREASTS):  No masses noted to breast however severe fungal rash noted to bilateral breast.   ABDOMEN:  normal bowel sounds, soft, nontender, no hepatosplenomegaly or other masses, no guarding or rebound  M/S:   Minimal ambulation short distance in room only. Otherwise she mostly isolates herself in her room in bed  SKIN:  severe fungal rash noted to bilateral breast and bilateral groin along with worsening appearance of cellulitis to BLE but especially to left leg. See photo below.   NEURO:   Cranial nerves 2-12 are normal tested and grossly at patient's baseline, no purposeful movement in upper and lower extremities  PSYCH:  oriented X 3, normal insight, judgement and memory, affect and " "mood normal                              Most Recent 3 CBC's:  Recent Labs   Lab Test 05/18/23  0935 05/04/23  0855 03/14/23  0732   WBC 5.0 6.1 6.5   HGB 11.7 11.7 12.5   MCV 95 93 93    277 295     Most Recent 3 BMP's:  Recent Labs   Lab Test 05/18/23  0935 05/04/23  0855 03/14/23  0732    137 140   POTASSIUM 4.0 4.2 4.0   CHLORIDE 100 100 102   CO2 29 28 27   BUN 15.3 13.2 17.0   CR 0.50* 0.70 0.56   ANIONGAP 12 9 11   WILVER 9.2 9.0 9.4   GLC 96 86 89     Most Recent 2 LFT's:  Recent Labs   Lab Test 05/04/23  0855 03/14/23  0732 01/13/22  1234 01/04/22  0439   AST 21  --   --  21  20   ALT 6* 6*   < > 23  23  23   ALKPHOS 67  --   --  70  72   BILITOTAL 0.2  --   --  0.3  0.4    < > = values in this interval not displayed.     Most Recent Cholesterol Panel:  Recent Labs   Lab Test 12/02/21  1155   CHOL 221*   *   HDL 65   TRIG 101     Most Recent Anemia Panel:  Recent Labs   Lab Test 05/18/23  0935   WBC 5.0   HGB 11.7   HCT 39.7   MCV 95          ASSESSMENT/PLAN  (M05.79) Seropositive rheumatoid arthritis of multiple sites (H)  (primary encounter diagnosis)  (R76.8) Cyclic citrullinated peptide (CCP) antibody positive  (M15.9) Primary osteoarthritis involving multiple joints  (E55.9) Vitamin D deficiency  Comment: Chronic. Managed by rheumatology. c/o posterior neck pain as well as \"all over pain\" that is most likely neuropathic based on description. No longer use PRN oxycodone- Last used in Jan 2023 therefore this medication was stopped  Plan:   -Continue methotrexate 10mg weekly as directed and folic acid 1mg daily, HOWEVER methotrexate currently on HOLD due to restarting antibiotic course. May resume agent after 7/1/23.   -Continue Tylenol 1000mg every 6 hours PRN  -PRN flexeril  -Continue gabapentin 300mg TID  -Ibuprofen PRN  -Continue humira 40mg injection every 2 weeks as directed. --HOWEVER currently on hold for now while on ANTIBIOTIC until after 7/1/23.   -Continue " Duloxetine 20mg daily as directed. If worsening pain, would recommend dose increase.   -Continue vitamin D 2000U daily  -Follow up with rheumatology as directed. Scheduled for 9/25/23  -BMP, CBC, CRP, and procalcitonin due 6/15/23     (I89.0) Lymphedema of both lower extremities  (I73.9) PAD (peripheral artery disease) (H)  (L03.90) Cellulitis  Comment: Chronic. No claudication complaints. Recent infection with recent hospitalization on 4/19/23. Received IV vancomycin MRI negative for bony involvement or abscess per documentation. Vascular following with recent visit on 12/7/22. Recommendations for lymphedema therapist for MLD, compression and possibly pump therapy in near future. She was discharged previously on dual therapy of amxocillin + bactrim for coverage. This will be restarted due to suspected worsening cellulitis to bilateral lower extremities especially LLE.   Plan:   -Follow up with vascular clinic as directed. Schedule 6/16/23  -Continue lyphmhatic formula 1 capsule daily per vascular recommendations  -Continue on site wound care provider for ongoing bilateral lower extremities wound care needs  -Monitor for worsening s/sx of concerns  -Staff to provide appropriate skin hygiene needs as directed  -Elevate bilateral lower extremities as much as possible  -Velcro wraps to bilateral lower extremities daily. On in AM and off at HS  -Start amoxicillin 1000mg every 8 hours x 14 days  -Start bactrim DS 2 tabs BID x 14 days  -Start prednisone 40mg daily x 5 days  -BMP, CBC, CRP, and procalcitonin due 6/15/23     (I10) Essential hypertension with goal blood pressure less than 140/90  (Z86.718) History of DVT of lower extremity  (E78.00) High blood cholesterol  Comment: Chronic. Based on JNC-8 goals,  patients age of 57 year old, no presence of diabetes or CKD, and goals of care goal BP is <150/90 mm Hg. Patient is stable with current plan of care and routine assessment. History of DVT to LLE. SBP readings  remaining 130s-150s.   Plan:   -Monitor BP and HR  -Continue hydrochlorothiazide 25mg daily.   -Continue xarelto 20mg daily  -BMP, CBC, CRP, and procalcitonin due 6/15/23     (E66.01) Morbid obesity (H)  (G47.33) Obstructive sleep apnea syndrome  Comment: Chronic. Hx of LOPEZ, does not have CPAP as this causes sinus problems per patient. Patient with frequent desaturations while sleeping during history of hospital admission. Recommend follow up with sleep medicine provider to provide recommendations for management which she last saw at [facility] location last in 2016  Plan:   -Monitor sleeping patterns  -Monitor respiratory status  -Oxygen used at night- 2L  -Encourage cough and deep breathing. Encourage IS  -Follow up with sleep medicine as directed. Scheduled for 6/28/23  -BMP, CBC, CRP, and procalcitonin due 6/15/23     (D64.9) Anemia, unspecified type  Comment: Acute on chronic. 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 Noted on 6/13/23: Nurse called to report: Increased worsening fungal dermatitis noted to bilateral groin, under abdominal folds and under breast. No relief from nystatin powder application. She is noncompliant with showers. Has been accepting bed baths primarily. Alternative topical agents ordered along with medicated shampoo. Pungent fungal yeast odor present in room.   Plan:  -Continue claritin 10mg daily as directed  -Continue WITHOUT nystatin powder for now  -Continue ketoconazole cream BID as directed  -Continue ketoconazole shampoo on shower days as directed  -Continue fluconazole oral once a week x 2 week treatment   -Prednisone 40mg daily x 5 days  -Encourage interdry and/olr pillow cases under areas to prevent moisture build up concerns  -Dermatology PRN if worsens or no relief  -BMP, CBC, CRP, and procalcitonin  due 6/15/23     Electronically signed by:  Arlen Carlson DNP, APRN

## 2023-06-14 ENCOUNTER — NURSING HOME VISIT (OUTPATIENT)
Dept: GERIATRICS | Facility: CLINIC | Age: 58
End: 2023-06-14
Payer: COMMERCIAL

## 2023-06-14 DIAGNOSIS — I73.9 PAD (PERIPHERAL ARTERY DISEASE) (H): ICD-10-CM

## 2023-06-14 DIAGNOSIS — B36.9 FUNGAL DERMATITIS: ICD-10-CM

## 2023-06-14 DIAGNOSIS — E66.01 MORBID OBESITY (H): ICD-10-CM

## 2023-06-14 DIAGNOSIS — E55.9 VITAMIN D DEFICIENCY: ICD-10-CM

## 2023-06-14 DIAGNOSIS — B35.9 TINEA: Primary | ICD-10-CM

## 2023-06-14 DIAGNOSIS — R76.8 CYCLIC CITRULLINATED PEPTIDE (CCP) ANTIBODY POSITIVE: ICD-10-CM

## 2023-06-14 DIAGNOSIS — E78.00 HIGH BLOOD CHOLESTEROL: ICD-10-CM

## 2023-06-14 DIAGNOSIS — M05.79 SEROPOSITIVE RHEUMATOID ARTHRITIS OF MULTIPLE SITES (H): ICD-10-CM

## 2023-06-14 DIAGNOSIS — M15.0 PRIMARY OSTEOARTHRITIS INVOLVING MULTIPLE JOINTS: ICD-10-CM

## 2023-06-14 DIAGNOSIS — M25.50 POLYARTHRALGIA: ICD-10-CM

## 2023-06-14 DIAGNOSIS — L03.90 CELLULITIS, UNSPECIFIED CELLULITIS SITE: ICD-10-CM

## 2023-06-14 DIAGNOSIS — Z86.718 HISTORY OF DVT OF LOWER EXTREMITY: ICD-10-CM

## 2023-06-14 DIAGNOSIS — D64.9 ANEMIA, UNSPECIFIED TYPE: ICD-10-CM

## 2023-06-14 DIAGNOSIS — K59.00 CONSTIPATION, UNSPECIFIED CONSTIPATION TYPE: ICD-10-CM

## 2023-06-14 DIAGNOSIS — I89.0 LYMPHEDEMA OF BOTH LOWER EXTREMITIES: ICD-10-CM

## 2023-06-14 DIAGNOSIS — G47.33 OBSTRUCTIVE SLEEP APNEA SYNDROME: ICD-10-CM

## 2023-06-14 DIAGNOSIS — I10 ESSENTIAL HYPERTENSION WITH GOAL BLOOD PRESSURE LESS THAN 140/90: ICD-10-CM

## 2023-06-14 PROCEDURE — 99309 SBSQ NF CARE MODERATE MDM 30: CPT | Performed by: NURSE PRACTITIONER

## 2023-06-14 RX ORDER — SULFAMETHOXAZOLE/TRIMETHOPRIM 800-160 MG
2 TABLET ORAL 2 TIMES DAILY
Qty: 16 TABLET | Refills: 0 | Status: SHIPPED | OUTPATIENT
Start: 2023-06-14 | End: 2023-06-18

## 2023-06-14 RX ORDER — PREDNISONE 20 MG/1
40 TABLET ORAL DAILY
Qty: 10 TABLET | Refills: 0 | Status: SHIPPED | OUTPATIENT
Start: 2023-06-14 | End: 2023-06-19

## 2023-06-14 RX ORDER — AMOXICILLIN 500 MG/1
1000 CAPSULE ORAL EVERY 8 HOURS
Qty: 84 CAPSULE | Refills: 0 | Status: SHIPPED | OUTPATIENT
Start: 2023-06-14 | End: 2023-06-28

## 2023-06-14 NOTE — LETTER
6/14/2023        RE: María Elena Saab  [facility]  [address]  [place] [address]        [facility]    Chief Complaint   Patient presents with     RECHECK     HPI:  María Elena Saab is a 57 year old  (1965), who is being seen today for an episodic care visit at: [facility] () [[id number]]. Today's concern is: The primary encounter diagnosis was Tinea. Diagnoses of Fungal dermatitis, Anemia, unspecified type, Obstructive sleep apnea syndrome, Morbid obesity (H), High blood cholesterol, Essential hypertension with goal blood pressure less than 140/90, History of DVT of lower extremity, Constipation, unspecified constipation type, Lymphedema of both lower extremities, PAD (peripheral artery disease) (H), Primary osteoarthritis involving multiple joints, Seropositive rheumatoid arthritis of multiple sites (H), Polyarthralgia, Cyclic citrullinated peptide (CCP) antibody positive, and Vitamin D deficiency were also pertinent to this visit.    Noted on 6/13/23: Nurse called today to report: Increased worsening fungal dermatitis noted to bilateral groin, under abdominal folds and under breast. No relief from nystatin powder application. She is noncompliant with showers. Has been accepting bed baths primarily. Alternative topical agents ordered along with medicated shampoo.     Met with patient who was up on bedside commode. She denies any chest pain, palpitations, ESPITIA, lightheadedness, dizziness, or cough. Some mild shortness of breath complaints with diminished lungs present. Denies any abdominal discomfort. Denies N&V. Denies B&B concerns. Denies dysuria or frequency. Denies loose or constipation. Appetite good. Sleeping well. Noted increased severe redness and fungal odor noted to bilateral breast and under abdominal folds. No complaints of itch noted. Mild pain with touch to areas. Also noted increased redness to left leg that is going upward onto mid  "inner thigh. No complaints of pain noted to these areas but history of off/on cellulitis. Will start treatment course as advised below. Has vascular appt scheduled for Friday for follow up regarding chronic leg concerns.     BP Readings from Last 3 Encounters:   06/13/23 126/82   05/24/23 129/62   05/09/23 122/63     Wt Readings from Last 5 Encounters:   06/13/23 149.3 kg (329 lb 3.2 oz)   05/24/23 149.3 kg (329 lb 3.2 oz)   05/09/23 149.3 kg (329 lb 3.2 oz)   05/02/23 148.3 kg (327 lb)   04/18/23 (!) 150.6 kg (332 lb)     Allergies, and PMH/PSH reviewed in EPIC today.  REVIEW OF SYSTEMS:  4 point ROS including Respiratory, CV, GI and , other than that noted in the HPI,  is negative    Objective:   /82   Pulse 90   Temp 97.5  F (36.4  C)   Resp 18   Ht 1.575 m (5' 2\")   Wt 149.3 kg (329 lb 3.2 oz)   SpO2 90%   BMI 60.21 kg/m    GENERAL APPEARANCE:  Alert, in no distress, oriented, morbidly obese, cooperative  ENT:  Mouth and posterior oropharynx normal, moist mucous membranes, [place]  EYES:  EOM, conjunctivae, lids, pupils and irises normal  NECK:  No adenopathy,masses or thyromegaly  RESP:  respiratory effort and palpation of chest normal, no respiratory distress, diminished breath sounds bibasilar  CV:  Palpation and auscultation of heart done , regular rate and rhythm, no murmur, rub, or gallop, peripheral edema 1-2+ in BLE  CHEST (BREASTS):  No masses noted to breast however severe fungal rash noted to bilateral breast.   ABDOMEN:  normal bowel sounds, soft, nontender, no hepatosplenomegaly or other masses, no guarding or rebound  M/S:   Minimal ambulation short distance in room only. Otherwise she mostly isolates herself in her room in bed  SKIN:  severe fungal rash noted to bilateral breast and bilateral groin along with worsening appearance of cellulitis to BLE but especially to left leg. See photo below.   NEURO:   Cranial nerves 2-12 are normal tested and grossly at patient's baseline, no " "purposeful movement in upper and lower extremities  PSYCH:  oriented X 3, normal insight, judgement and memory, affect and mood normal      Most Recent 3 CBC's:  Recent Labs   Lab Test 05/18/23  0935 05/04/23  0855 03/14/23  0732   WBC 5.0 6.1 6.5   HGB 11.7 11.7 12.5   MCV 95 93 93    277 295     Most Recent 3 BMP's:  Recent Labs   Lab Test 05/18/23  0935 05/04/23  0855 03/14/23  0732    137 140   POTASSIUM 4.0 4.2 4.0   CHLORIDE 100 100 102   CO2 29 28 27   BUN 15.3 13.2 17.0   CR 0.50* 0.70 0.56   ANIONGAP 12 9 11   WILVER 9.2 9.0 9.4   GLC 96 86 89     Most Recent 2 LFT's:  Recent Labs   Lab Test 05/04/23  0855 03/14/23  0732 01/13/22  1234 01/04/22  0439   AST 21  --   --  21  20   ALT 6* 6*   < > 23 23 23   ALKPHOS 67  --   --  70  72   BILITOTAL 0.2  --   --  0.3  0.4    < > = values in this interval not displayed.     Most Recent Cholesterol Panel:  Recent Labs   Lab Test 12/02/21  1155   CHOL 221*   *   HDL 65   TRIG 101     Most Recent Anemia Panel:  Recent Labs   Lab Test 05/18/23  0935   WBC 5.0   HGB 11.7   HCT 39.7   MCV 95          ASSESSMENT/PLAN  (M05.79) Seropositive rheumatoid arthritis of multiple sites (H)  (primary encounter diagnosis)  (R76.8) Cyclic citrullinated peptide (CCP) antibody positive  (M15.9) Primary osteoarthritis involving multiple joints  (E55.9) Vitamin D deficiency  Comment: Chronic. Managed by rheumatology. c/o posterior neck pain as well as \"all over pain\" that is most likely neuropathic based on description. No longer use PRN oxycodone- Last used in Jan 2023 therefore this medication was stopped  Plan:   -Continue methotrexate 10mg weekly as directed and folic acid 1mg daily, HOWEVER methotrexate currently on HOLD due to restarting antibiotic course. May resume agent after 7/1/23.   -Continue Tylenol 1000mg every 6 hours PRN  -PRN flexeril  -Continue gabapentin 300mg TID  -Ibuprofen PRN  -Continue humira 40mg injection every 2 weeks as " directed. --HOWEVER currently on hold for now while on ANTIBIOTIC until after 7/1/23.   -Continue Duloxetine 20mg daily as directed. If worsening pain, would recommend dose increase.   -Continue vitamin D 2000U daily  -Follow up with rheumatology as directed. Scheduled for 9/25/23  -BMP, CBC, CRP, and procalcitonin due 6/15/23     (I89.0) Lymphedema of both lower extremities  (I73.9) PAD (peripheral artery disease) (H)  (L03.90) Cellulitis  Comment: Chronic. No claudication complaints. Recent infection with recent hospitalization on 4/19/23. Received IV vancomycin MRI negative for bony involvement or abscess per documentation. Vascular following with recent visit on 12/7/22. Recommendations for lymphedema therapist for MLD, compression and possibly pump therapy in near future. She was discharged previously on dual therapy of amxocillin + bactrim for coverage. This will be restarted due to suspected worsening cellulitis to bilateral lower extremities especially LLE.   Plan:   -Follow up with vascular clinic as directed. Schedule 6/16/23  -Continue lyphmhatic formula 1 capsule daily per vascular recommendations  -Continue on site wound care provider for ongoing bilateral lower extremities wound care needs  -Monitor for worsening s/sx of concerns  -Staff to provide appropriate skin hygiene needs as directed  -Elevate bilateral lower extremities as much as possible  -Velcro wraps to bilateral lower extremities daily. On in AM and off at HS  -Start amoxicillin 1000mg every 8 hours x 14 days  -Start bactrim DS 2 tabs BID x 14 days  -Start prednisone 40mg daily x 5 days  -BMP, CBC, CRP, and procalcitonin due 6/15/23     (I10) Essential hypertension with goal blood pressure less than 140/90  (Z86.718) History of DVT of lower extremity  (E78.00) High blood cholesterol  Comment: Chronic. Based on JNC-8 goals,  patients age of 57 year old, no presence of diabetes or CKD, and goals of care goal BP is <150/90 mm Hg. Patient is  stable with current plan of care and routine assessment. History of DVT to LLE. SBP readings remaining 130s-150s.   Plan:   -Monitor BP and HR  -Continue hydrochlorothiazide 25mg daily.   -Continue xarelto 20mg daily  -BMP, CBC, CRP, and procalcitonin due 6/15/23     (E66.01) Morbid obesity (H)  (G47.33) Obstructive sleep apnea syndrome  Comment: Chronic. Hx of LOPEZ, does not have CPAP as this causes sinus problems per patient. Patient with frequent desaturations while sleeping during history of hospital admission. Recommend follow up with sleep medicine provider to provide recommendations for management which she last saw at [facility] location last in 2016  Plan:   -Monitor sleeping patterns  -Monitor respiratory status  -Oxygen used at night- 2L  -Encourage cough and deep breathing. Encourage IS  -Follow up with sleep medicine as directed. Scheduled for 6/28/23  -BMP, CBC, CRP, and procalcitonin due 6/15/23     (D64.9) Anemia, unspecified type  Comment: Acute on chronic. 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 Noted on 6/13/23: Nurse called to report: Increased worsening fungal dermatitis noted to bilateral groin, under abdominal folds and under breast. No relief from nystatin powder application. She is noncompliant with showers. Has been accepting bed baths primarily. Alternative topical agents ordered along with medicated shampoo. Pungent fungal yeast odor present in room.   Plan:  -Continue claritin 10mg daily as directed  -Continue WITHOUT nystatin powder for now  -Continue ketoconazole cream BID as directed  -Continue ketoconazole shampoo on shower days as directed  -Continue fluconazole oral once a week x 2 week treatment   -Prednisone 40mg daily x 5 days  -Encourage interdry and/olr pillow cases under areas to prevent moisture  build up concerns  -Dermatology PRN if worsens or no relief  -BMP, CBC, CRP, and procalcitonin due 6/15/23     Electronically signed by:  Arlen Carlson DNP, APRN      Sincerely,        Arlen Carlson, KRISTIN CNP

## 2023-06-19 ENCOUNTER — LAB REQUISITION (OUTPATIENT)
Dept: LAB | Facility: CLINIC | Age: 58
End: 2023-06-19
Payer: COMMERCIAL

## 2023-06-19 DIAGNOSIS — M06.09 RHEUMATOID ARTHRITIS WITHOUT RHEUMATOID FACTOR, MULTIPLE SITES (H): ICD-10-CM

## 2023-06-22 LAB
ALBUMIN SERPL BCG-MCNC: 3.3 G/DL (ref 3.5–5.2)
ALT SERPL W P-5'-P-CCNC: 12 U/L (ref 0–50)
CREAT SERPL-MCNC: 0.73 MG/DL (ref 0.51–0.95)
ERYTHROCYTE [DISTWIDTH] IN BLOOD BY AUTOMATED COUNT: 15.9 % (ref 10–15)
GFR SERPL CREATININE-BSD FRML MDRD: >90 ML/MIN/1.73M2
HCT VFR BLD AUTO: 41.8 % (ref 35–47)
HGB BLD-MCNC: 13 G/DL (ref 11.7–15.7)
MCH RBC QN AUTO: 28.8 PG (ref 26.5–33)
MCHC RBC AUTO-ENTMCNC: 31.1 G/DL (ref 31.5–36.5)
MCV RBC AUTO: 93 FL (ref 78–100)
PLATELET # BLD AUTO: 355 10E3/UL (ref 150–450)
RBC # BLD AUTO: 4.51 10E6/UL (ref 3.8–5.2)
WBC # BLD AUTO: 5.6 10E3/UL (ref 4–11)

## 2023-06-22 PROCEDURE — 82040 ASSAY OF SERUM ALBUMIN: CPT | Mod: ORL | Performed by: INTERNAL MEDICINE

## 2023-06-22 PROCEDURE — 36415 COLL VENOUS BLD VENIPUNCTURE: CPT | Mod: ORL | Performed by: INTERNAL MEDICINE

## 2023-06-22 PROCEDURE — 84460 ALANINE AMINO (ALT) (SGPT): CPT | Mod: ORL | Performed by: INTERNAL MEDICINE

## 2023-06-22 PROCEDURE — 85027 COMPLETE CBC AUTOMATED: CPT | Mod: ORL | Performed by: INTERNAL MEDICINE

## 2023-06-22 PROCEDURE — 82565 ASSAY OF CREATININE: CPT | Mod: ORL | Performed by: INTERNAL MEDICINE

## 2023-06-22 PROCEDURE — P9604 ONE-WAY ALLOW PRORATED TRIP: HCPCS | Mod: ORL | Performed by: INTERNAL MEDICINE

## 2023-06-27 ENCOUNTER — OFFICE VISIT (OUTPATIENT)
Dept: OTHER | Facility: CLINIC | Age: 58
End: 2023-06-27
Attending: INTERNAL MEDICINE
Payer: COMMERCIAL

## 2023-06-27 VITALS
DIASTOLIC BLOOD PRESSURE: 80 MMHG | BODY MASS INDEX: 60.72 KG/M2 | WEIGHT: 293 LBS | OXYGEN SATURATION: 96 % | SYSTOLIC BLOOD PRESSURE: 125 MMHG | HEART RATE: 77 BPM

## 2023-06-27 DIAGNOSIS — G47.33 OSA (OBSTRUCTIVE SLEEP APNEA): ICD-10-CM

## 2023-06-27 DIAGNOSIS — Z86.718 HISTORY OF DEEP VENOUS THROMBOSIS: ICD-10-CM

## 2023-06-27 DIAGNOSIS — M05.79 SEROPOSITIVE RHEUMATOID ARTHRITIS OF MULTIPLE SITES (H): ICD-10-CM

## 2023-06-27 DIAGNOSIS — L03.119 CELLULITIS OF LOWER EXTREMITY, UNSPECIFIED LATERALITY: ICD-10-CM

## 2023-06-27 DIAGNOSIS — I89.0 LYMPHEDEMA: Primary | ICD-10-CM

## 2023-06-27 DIAGNOSIS — E66.01 MORBID OBESITY (H): ICD-10-CM

## 2023-06-27 PROCEDURE — 99215 OFFICE O/P EST HI 40 MIN: CPT | Performed by: INTERNAL MEDICINE

## 2023-06-27 PROCEDURE — G0463 HOSPITAL OUTPT CLINIC VISIT: HCPCS

## 2023-06-27 NOTE — PATIENT INSTRUCTIONS
Continue local wound care and follow edema therapist recommendations    Continue lymphatic formula same     Discuss with hematologist for  RA medication management     Take antibiotics as prescribed     Follow up in 6 months

## 2023-06-27 NOTE — PROGRESS NOTES
[facility] GERIATRICS    PRIMARY CARE PROVIDER AND CLINIC:  Arlen Carlson, KRISTIN CNP, [address] / [place] [address]  Chief Complaint   Patient presents with     Hospital /U      [facility] Record Number:  [id number]  Place of Service where encounter took place:  [facility], [place] () [[id number]]    María Elena Saab  is a 58 year old  (1965), returned to the above facility from  [facility] . Hospital stay 6/14/23 - 6/20/23. .   HPI:    58 y.o. old female with history of chronic cellulitis of lower extremity, history Bactrim, MRSA, current long-term use of anticoagulation with history of DVT on Xarelto, hypertension, depression LOPEZ on CPAP, PAD, rheumatoid arthritis on immunosuppressant and wheelchair dependence who was admitted for LLE Cellulitis.     The primary encounter diagnosis was Tinea. Diagnoses of Fungal dermatitis, Anemia, unspecified type, Obstructive sleep apnea syndrome, Morbid obesity (H), High blood cholesterol, Essential hypertension with goal blood pressure less than 140/90, History of DVT of lower extremity, PAD (peripheral artery disease) (H), Cellulitis, unspecified cellulitis site, Primary osteoarthritis involving multiple joints, Seropositive rheumatoid arthritis of multiple sites (H), Polyarthralgia, Cyclic citrullinated peptide (CCP) antibody positive, Vitamin D deficiency, Lymphedema of both lower extremities, and Slow transit constipation were also pertinent to this visit.    Met with patient who denies any chest pain, palpitations, shortness of breath, ESPITIA, lightheadedness, dizziness, or cough. Wears oxygen at night only. Denies any abdominal discomfort. Denies N&V. Denies B&B concerns. Denies dysuria or frequency. Denies loose or constipation. Appetite good. Sleeping well. Reports pain stable with current regimen. She reports being told that her antibiotic should be going for 14 days, however she was discharged with only 7 days worth. Will  extend antibiotic course for another 10 days. She also is wanting to stop her rheumatology agents due to immunosuppressant concerns with recurrent cellulitis concerns. Mood stable. Nursing denies any acute concerns.     BP Readings from Last 3 Encounters:   06/28/23 (!) 155/87   06/27/23 125/80   06/13/23 126/82     Wt Readings from Last 5 Encounters:   06/28/23 149.3 kg (329 lb 3.2 oz)   06/27/23 (!) 150.6 kg (332 lb)   06/13/23 149.3 kg (329 lb 3.2 oz)   05/24/23 149.3 kg (329 lb 3.2 oz)   05/09/23 149.3 kg (329 lb 3.2 oz)     CODE STATUS/ADVANCE DIRECTIVES DISCUSSION:  Full Code  CPR/Full code   ALLERGIES:   Allergies   Allergen Reactions     Adhesive Tape Other (See Comments)     As on band-aids;  Causes skin tearing/wounds.     Adhesive [Cyanoacrylate] Other (See Comments)     As on band-aids;  Causes skin tearing/wounds.     Banana Unknown     She avoids due to Latex allergy.  If she eats them 3 days in a row, gets stomach cramps and dark stool.  TBrinkMD - 4/10/15     Food Unknown     Bananas, grapes, pine nuts     Grape [Grape (Artificial) Flavor] Unknown     She avoids due to Latex allergy.  If she eats lots of them, and she likes them, stomach gets a little queasy.  TBrinkMD - 4/10/15     Morphine (Pf) [Morphine] Other (See Comments)     Pt has not reacted to this med herself, but mother has anaphylactic reaction and other family members get nausea/vomiting.     Other Environmental Allergy Swelling     leather     Pine      Pseudoephedrine Other (See Comments)     Insomnia     Pseudoephedrine Cold/Allergy [Chlorpheniramine-Pseudoeph] Other (See Comments)     Keeps pt awake up to 24 hours      Latex Rash     Hands get red from rubber gloves, but okay if she washes them right away.  Hands get red from rubber gloves, but okay if she washes them right away.     Other Food Allergy Rash     Pine trees, leather .       PAST MEDICAL HISTORY:   Past Medical History:   Diagnosis Date     Aseptic necrosis of femoral  head (H)     LEFT     Bacteremia due to group B Streptococcus      Cellulitis of right lower extremity      DJD (degenerative joint disease)      DVT, bilateral lower limbs (H) 10/2014     Edema 5/22/2015     Essential hypertension with goal blood pressure less than 140/90      Failure to thrive      History of blood clots      Hypokalemia 10/15/2014     Lung mass 10/18/2014     Morbid obesity (H) 4/10/2015    Had formal nutrition consult at [facility].  RD reports that she is not willing to commit to the program outlined.  See scanned document.  12/15/2015 - Marcio Quinonez MD        Nocturnal hypoxemia - probable sleep apnea - had overnight pulse oximetry, April, 2015. 5/22/2015     Obesity 4/10/2015     LOPEZ (obstructive sleep apnea) 5/22/2015     Osteoarthritis      Peripheral vascular disease (H)      Pleural effusion      Pressure ulcer of foot      Rheumatoid arthritis (H) 12/30/2014    seronegative     Sepsis (H)      Seropositive rheumatoid arthritis (H) 1/19/2016     Vitamin D deficiency 8/26/2015      PAST SURGICAL HISTORY:   has a past surgical history that includes Total Knee Arthroplasty (Left, 2006); joint replacement; Total Hip Arthroplasty (Left, 10/18/2016); and Peripherally Inserted Central Catheter Insertion (Right, 07/14/2019).  FAMILY HISTORY: family history includes Arthritis in her mother; Breast Cancer in her cousin; Breast Cancer (age of onset: 50.00) in her maternal grandmother; Breast Cancer (age of onset: 54.00) in her maternal aunt and mother; Cancer in her mother; Cataracts in her father and mother; Deep Vein Thrombosis in her father; Heart Disease in her maternal grandmother; Multiple myeloma in her father; No Known Problems in her sister; Other - See Comments in her brother; Rheumatoid Arthritis in her maternal grandmother and paternal aunt; Sleep Apnea in her brother; Snoring in her father.  SOCIAL HISTORY:   reports that she has quit smoking. Her smoking use included cigarettes. She  has a 30.00 pack-year smoking history. She has never used smokeless tobacco. She reports that she does not currently use alcohol. She reports that she does not use drugs.  Patient's living condition: lives in a skilled nursing facility    Post Discharge Medication Reconciliation Status:   MED REC REQUIRED  Post Medication Reconciliation Status:  Discharge medications reconciled and changed, see notes/orders         Current Outpatient Medications   Medication Sig     acetaminophen (TYLENOL) 325 MG tablet Take 650 mg by mouth every 6 hours as needed for mild pain     amoxicillin (AMOXIL) 500 MG capsule Take 2 capsules (1,000 mg) by mouth every 8 hours for 10 days     ascorbic acid, vitamin C, (ASCORBIC ACID WITH KAEL HIPS) 500 MG tablet [ASCORBIC ACID, VITAMIN C, (ASCORBIC ACID WITH KAEL HIPS) 500 MG TABLET] Take 500 mg by mouth 2 (two) times a day.     cholecalciferol 50 MCG (2000 UT) tablet Take 1 tablet (50 mcg) by mouth daily     cyclobenzaprine (FLEXERIL) 10 MG tablet Take 1 tablet (10 mg) by mouth every 8 hours as needed for muscle spasms     DULoxetine (CYMBALTA) 20 MG capsule [DULOXETINE (CYMBALTA) 20 MG CAPSULE] Take 1 capsule (20 mg total) by mouth daily.     folic acid (FOLVITE) 1 MG tablet Take 1 mg by mouth     gabapentin (NEURONTIN) 300 MG capsule Take 1 capsule (300 mg) by mouth 3 times daily     hydroCHLOROthiazide (HYDRODIURIL) 25 MG tablet [HYDROCHLOROTHIAZIDE (HYDRODIURIL) 25 MG TABLET] Take 25 mg by mouth daily.     ibuprofen (ADVIL/MOTRIN) 400 MG tablet Take 1 tablet (400 mg) by mouth every 6 hours as needed for moderate pain (4-6)     ketoconazole (NIZORAL) 2 % external cream Apply to all reddened areas to under breast and abdominal folds BID     ketoconazole (NIZORAL) 2 % external shampoo Apply topically daily as needed for itching or irritation Apply to body daily PRN on shower days x 8 weeks. Then change to daily PRN thereafter     loratadine (CLARITIN) 10 MG tablet Take 1 tablet (10 mg) by  "mouth daily     NEW MED Take 1 capsule by mouth daily Lymphatic formula     rivaroxaban ANTICOAGULANT (XARELTO) 20 MG TABS tablet Take 20 mg by mouth daily     sennosides (SENOKOT) 8.6 MG tablet Take 1 tablet by mouth 2 times daily as needed for constipation     sulfamethoxazole-trimethoprim (BACTRIM DS) 800-160 MG tablet Take 2 tablets by mouth 2 times daily for 10 days     tolnaftate (TINACTIN) 1 % external cream Apply topically daily as needed for irritation Apply topically daily prn for skin breakdown btwn toes. Wash and dry first.     No current facility-administered medications for this visit.       ROS:  10 point ROS of systems including Constitutional, Eyes, Respiratory, Cardiovascular, Gastroenterology, Genitourinary, Integumentary, Musculoskeletal, Psychiatric were all negative except for pertinent positives noted in my HPI.    Vitals:  BP (!) 155/87   Pulse 75   Temp 98.2  F (36.8  C)   Resp 16   Ht 1.575 m (5' 2\")   Wt 149.3 kg (329 lb 3.2 oz)   SpO2 98%   BMI 60.21 kg/m    Exam:  GENERAL APPEARANCE:  Alert, in no distress, morbidly obese, cooperative  ENT:  Mouth and posterior oropharynx normal, moist mucous membranes, normal hearing acuity  EYES:  EOM, conjunctivae, lids, pupils and irises normal  NECK:  No adenopathy,masses or thyromegaly  RESP:  respiratory effort and palpation of chest normal, lungs clear to auscultation , no respiratory distress  CV:  Palpation and auscultation of heart done , regular rate and rhythm, no murmur, rub, or gallop, peripheral edema 1+ in BLE  ABDOMEN:  normal bowel sounds, soft, nontender, no hepatosplenomegaly or other masses, no guarding or rebound  M/S:   Stand pivot transfers. Wheelchair bound. Staff to assist for all ADLs, transfers and cares  SKIN:  dermatitis remains to under breast and abdominal folds but appears to be slowly improving. Does not appear to be angry red anymore. No open areas noted to BLE. Dry plague areas present. Redness resolving to " these areas. See photo  NEURO:   Cranial nerves 2-12 are normal tested and grossly at patient's baseline, no purposeful movement in upper and lower extremities  PSYCH:  oriented X 3, normal insight, judgement and memory, affect and mood normal          Lab/Diagnostic data:    Most Recent 3 CBC's:  Recent Labs   Lab Test 06/22/23  1110 05/18/23  0935 05/04/23  0855   WBC 5.6 5.0 6.1   HGB 13.0 11.7 11.7   MCV 93 95 93    251 277     Most Recent 3 BMP's:  Recent Labs   Lab Test 06/22/23  1110 05/18/23  0935 05/04/23  0855 03/14/23  0732   NA  --  141 137 140   POTASSIUM  --  4.0 4.2 4.0   CHLORIDE  --  100 100 102   CO2  --  29 28 27   BUN  --  15.3 13.2 17.0   CR 0.73 0.50* 0.70 0.56   ANIONGAP  --  12 9 11   WILVER  --  9.2 9.0 9.4   GLC  --  96 86 89     Most Recent 2 LFT's:  Recent Labs   Lab Test 06/22/23  1110 05/04/23  0855 01/13/22  1234 01/04/22  0439   AST  --  21  --  21  20   ALT 12 6*   < > 23  23  23   ALKPHOS  --  67  --  70  72   BILITOTAL  --  0.2  --  0.3  0.4    < > = values in this interval not displayed.     Most Recent Cholesterol Panel:  Recent Labs   Lab Test 12/02/21  1155   CHOL 221*   *   HDL 65   TRIG 101     Most Recent ESR & CRP:  Recent Labs   Lab Test 05/04/23  0855 11/22/22  0654 12/21/20  0942   CRP  --   --  1.3*   CRPI 47.80*   < >  --     < > = values in this interval not displayed.     Most Recent Anemia Panel:  Recent Labs   Lab Test 06/22/23  1110   WBC 5.6   HGB 13.0   HCT 41.8   MCV 93        Vitamin D Deficiency Screening Results:  Lab Results   Component Value Date    VITDT 50 01/04/2022    VITDT 70 12/02/2021       ASSESSMENT/PLAN  (M05.79) Seropositive rheumatoid arthritis of multiple sites (H)  (primary encounter diagnosis)  (R76.8) Cyclic citrullinated peptide (CCP) antibody positive  (M15.9) Primary osteoarthritis involving multiple joints  (E55.9) Vitamin D deficiency  Comment: Chronic. Managed by rheumatology. c/o posterior neck pain as well as  "\"all over pain\" that is most likely neuropathic based on description. No longer use PRN oxycodone- Last used in Jan 2023 therefore this medication was stopped. Suspect increased immunosuppression response given recurrent cellulitis concerns  Plan:   -Continue to HOLD methotrexate for now until rheumatology follow up  -Continue to HOLD folic acid daily for now until rheumatology follow up    -Continue Tylenol 1000mg every 6 hours PRN  -PRN flexeril  -Continue gabapentin 300mg TID  -Ibuprofen PRN  -Continue to HOLD humira 40mg injection every 2 weeks as directed for now until rheumatology follow up.   -Continue amoxicillin 1000mg TID x 10 days  -Continue bactrim -160mg 2 tabs BID x 10 days  -Continue tolnaftate daily PRN for skin breakdown between the toes. Wash and dry first prior to use.   -Continue Duloxetine 20mg daily as directed. If worsening pain, would recommend dose increase.   -Continue vitamin D 2000U daily  -Follow up with rheumatology as directed. Scheduled for 9/25/23  -CMP, CRP CBC and procalcitonin due 7/6/23     (I89.0) Lymphedema of both lower extremities  (I73.9) PAD (peripheral artery disease) (H)  (L03.90) Cellulitis  Comment: Chronic. No claudication complaints. Multiple hospitalizations given recurrent cellulitis concerns. Vascular following with recent visit on 6/27/22. Recommendations for lymphedema therapist for MLD, compression and possibly pump therapy in near future. On arrival febrile, tachy with elevated wbc/CRP, MRSA+. CT tibia fibula and femur consistent with cellulitis. No evidence deep space infection. Recently hospitalized for RLEcellulitis and discharged on Bactrim/amoxicillin. Hospitalized with recurrence, improved on IV vanco and discharged on Bactrim/amox again (one week). Seen by ID. Suspect her immune suppression from RA meds is contributing to recurrent infections, along with morbid obesity, lymphedema, and tinea pedis (also discharged with antifungal topical). To HOLD " immunosuppressants until having completed antibiotics and confirmed resolving infection by MD (at TCU vs PCP).   Plan:   -Hold rheumatology agents as directed above  -Follow up with vascular clinic as directed in 6 months as directed. Due Dec 2023.   -Monitor weights weekly. Recent weight done at vascular appt which was 332# on 6/27/23.   -Continue lyphmhatic formula 1 capsule daily per vascular recommendations  -Follow up with lymphedema therapy on site  -Continue on site wound care provider for ongoing bilateral lower extremities wound care needs  -Monitor for worsening s/sx of concerns  -Follow up with infectious disease as directed. Scheduled for 7/27/23  -Elevate bilateral lower extremities as much as possible  -Velcro wraps to bilateral lower extremities daily. On in AM and off at HS  -CMP, CRP CBC and procalcitonin due 7/6/23  -Continue wound care to bilateral lower extremities as directed  *WOUND/DRESSING INSTRUCTIONS: Type of wound: Lymphademic Location of wound: BLE (R medial leg, L medial leg, L lateral leg, R shin) Instruction for Irrigation/Cleaning: Cleanse with warm water and wash cloth -Apply house barrier cream     (I10) Essential hypertension with goal blood pressure less than 140/90  (Z86.718) History of DVT of lower extremity  (E78.00) High blood cholesterol  Comment: Chronic. Based on JNC-8 goals,  patients age of 57 year old, no presence of diabetes or CKD, and goals of care goal BP is <150/90 mm Hg. Patient is stable with current plan of care and routine assessment. History of DVT to LLE. SBP readings remaining 130s-150s.   Plan:   -Monitor BP and HR  -Continue hydrochlorothiazide 25mg daily.   -Continue xarelto 20mg daily  -CMP, CRP CBC and procalcitonin due 7/6/23     (E66.01) Morbid obesity (H)  (G47.33) Obstructive sleep apnea syndrome  Comment: Chronic. Hx of LOPEZ, does not have CPAP as this causes sinus problems per patient. Patient with frequent desaturations while sleeping  during history of hospital admission. Recommend follow up with sleep medicine provider to provide recommendations for management which she last saw at [facility] location last in 2016  Plan:   -Monitor sleeping patterns  -Monitor respiratory status  -Oxygen used at night- 2L  -Encourage cough and deep breathing. Encourage IS  -Follow up with sleep medicine as directed. Originally scheduled for 6/28/23 but was cancelled. Would recommend to reschedule when able  -CMP, CRP CBC and procalcitonin due 7/6/23     (D64.9) Anemia, unspecified type  Comment: Acute on chronic. Baseline hgb around 12  Plan:   -Monitor bleeding risks  -Monitor for worsening s/sx of concerns  -CMP, CRP CBC and procalcitonin due 7/6/23     (B35.9) Tinea  (B35.9) Fungal dermatitis  Comment: Acute on chronic. Noted on 6/13/23: Nurse called to report: Increased worsening fungal dermatitis noted to bilateral groin, under abdominal folds and under breast. No relief from nystatin powder application. She is noncompliant with showers. Has been accepting bed baths primarily. Alternative topical agents ordered along with medicated shampoo. Slowly improving noted today  Plan:  -Continue claritin 10mg daily as directed  -Continue ketoconazole cream BID as directed  -Continue ketoconazole shampoo on shower days as directed  -Encourage interdry and/olr pillow cases under areas to prevent moisture build up concerns  -Dermatology PRN if worsens or no relief  -CMP, CRP CBC and procalcitonin due 7/6/23     Electronically signed by:  Arlen Carlson DNP, APRN

## 2023-06-27 NOTE — PROGRESS NOTES
[facility] VASCULAR MEDICINE FOLLOW UP VISIT      PRIMARY HEALTH CARE PROVIDER:  Arlen Calrson APRN CNP      REASON FOR VISIT:    Hospital discharge F/U  Hx Recurrent cellulitis   Off of RA Meds     She was initally seen few months ago for Evaluation and management of chronic bilateral lower extremity lymphedema in a morbidly obese female BMI greater than 60 lives in a facility with history of multiple comorbidities and recurrent cellulitis of legs.      HPI: María Elena Saab is a 58 year old very pleasant female morbidly obese BMI greater than 56.5, history of DVT, recurrent cellulitis of bilateral lower extremities, known history of bilateral lower extremity lymphedema, obstructive sleep apnea and uses nocturnal oxygen but not using any CPAP machine, rheumatoid arthritis on treatment, DJD of major joints here today for evaluation and management of bilateral lower extremity lymphedema.  She is trying to lose wraps in the facility but they are not helping.  Recently developed right lower extremity cellulitis treated then followed by left lower extremity cellulitis she also has a history of MRSA.  Completed course of Bactrim and clindamycin etc..  She denies any fever, chills  She is chronically on anticoagulation previously warfarin for many years then followed by currently Xarelto 20 mg daily tolerating without any problems    Legs looks better than recent hospitalization   Taking abx  No fever   Getting local wound care     PAST MEDICAL HISTORY  Past Medical History:   Diagnosis Date     Aseptic necrosis of femoral head (H)     LEFT     Bacteremia due to group B Streptococcus      Cellulitis of right lower extremity      DJD (degenerative joint disease)      DVT, bilateral lower limbs (H) 10/2014     Edema 5/22/2015     Essential hypertension with goal blood pressure less than 140/90      Failure to thrive      History of blood clots      Hypokalemia 10/15/2014     Lung mass  10/18/2014     Morbid obesity (H) 4/10/2015    Had formal nutrition consult at [facility].  RD reports that she is not willing to commit to the program outlined.  See scanned document.  12/15/2015 - Marcio Quinonez MD        Nocturnal hypoxemia - probable sleep apnea - had overnight pulse oximetry, April, 2015. 5/22/2015     Obesity 4/10/2015     LOPEZ (obstructive sleep apnea) 5/22/2015     Osteoarthritis      Peripheral vascular disease (H)      Pleural effusion      Pressure ulcer of foot      Rheumatoid arthritis (H) 12/30/2014    seronegative     Sepsis (H)      Seropositive rheumatoid arthritis (H) 1/19/2016     Vitamin D deficiency 8/26/2015       CURRENT MEDICATIONS  acetaminophen (TYLENOL) 325 MG tablet, Take 650 mg by mouth every 6 hours as needed for mild pain  adalimumab (HUMIRA *CF*) 40 MG/0.4ML prefilled syringe kit, Inject 0.4 mLs (40 mg) Subcutaneous every 14 days  amoxicillin (AMOXIL) 500 MG capsule, Take 2 capsules (1,000 mg) by mouth every 8 hours for 14 days  ascorbic acid, vitamin C, (ASCORBIC ACID WITH KAEL HIPS) 500 MG tablet, [ASCORBIC ACID, VITAMIN C, (ASCORBIC ACID WITH KAEL HIPS) 500 MG TABLET] Take 500 mg by mouth 2 (two) times a day.  cholecalciferol 50 MCG (2000 UT) tablet, Take 1 tablet (50 mcg) by mouth daily  cyclobenzaprine (FLEXERIL) 10 MG tablet, Take 1 tablet (10 mg) by mouth every 8 hours as needed for muscle spasms  DULoxetine (CYMBALTA) 20 MG capsule, [DULOXETINE (CYMBALTA) 20 MG CAPSULE] Take 1 capsule (20 mg total) by mouth daily.  fluconazole (DIFLUCAN) 200 MG tablet, Take 1 tablet (200 mg) by mouth every 7 days for 14 days Give at HS on tuesdays  folic acid (FOLVITE) 1 MG tablet, Take 1 mg by mouth  gabapentin (NEURONTIN) 300 MG capsule, Take 1 capsule (300 mg) by mouth 3 times daily  hydroCHLOROthiazide (HYDRODIURIL) 25 MG tablet, [HYDROCHLOROTHIAZIDE (HYDRODIURIL) 25 MG TABLET] Take 25 mg by mouth daily.  ibuprofen (ADVIL/MOTRIN) 400 MG tablet, Take 1 tablet (400 mg) by  mouth every 6 hours as needed for moderate pain (4-6)  ketoconazole (NIZORAL) 2 % external cream, Apply to all reddened areas to under breast and abdominal folds BID  ketoconazole (NIZORAL) 2 % external shampoo, Apply topically daily as needed for itching or irritation Apply to body daily PRN on shower days x 8 weeks. Then change to daily PRN thereafter  loratadine (CLARITIN) 10 MG tablet, Take 1 tablet (10 mg) by mouth daily  methotrexate sodium 10 MG TABS, Take 10 mg by mouth once a week  NEW MED, Take 1 capsule by mouth daily Lymphatic formula  rivaroxaban ANTICOAGULANT (XARELTO) 20 MG TABS tablet, Take 20 mg by mouth daily  sennosides (SENOKOT) 8.6 MG tablet, Take 1 tablet by mouth 2 times daily as needed for constipation    No current facility-administered medications on file prior to visit.      PAST SURGICAL HISTORY:  Past Surgical History:   Procedure Laterality Date     JOINT REPLACEMENT      knee     PERIPHERALLY INSERTED CENTRAL CATHETER INSERTION Right 07/14/2019    4fr/44cm/Rt Cephalic.lowSVC.MGlav     TOTAL HIP ARTHROPLASTY Left 10/18/2016    Procedure: LEFT HIP TOTAL ARTHROPLASTY;  Surgeon: London Goss MD;  Location: [facility];  Service:      TOTAL KNEE ARTHROPLASTY Left 2006       ALLERGIES     Allergies   Allergen Reactions     Adhesive Tape Other (See Comments)     As on band-aids;  Causes skin tearing/wounds.     Adhesive [Cyanoacrylate] Other (See Comments)     As on band-aids;  Causes skin tearing/wounds.     Banana Unknown     She avoids due to Latex allergy.  If she eats them 3 days in a row, gets stomach cramps and dark stool.  TBrinkMD - 4/10/15     Food Unknown     Bananas, grapes, pine nuts     Grape [Grape (Artificial) Flavor] Unknown     She avoids due to Latex allergy.  If she eats lots of them, and she likes them, stomach gets a little queasy.  TBrinkMD - 4/10/15     Morphine (Pf) [Morphine] Other (See Comments)     Pt has not reacted to this med herself, but mother has  anaphylactic reaction and other family members get nausea/vomiting.     Other Environmental Allergy Swelling     leather     Pine      Pseudoephedrine Other (See Comments)     Insomnia     Pseudoephedrine Cold/Allergy [Chlorpheniramine-Pseudoeph] Other (See Comments)     Keeps pt awake up to 24 hours      Latex Rash     Hands get red from rubber gloves, but okay if she washes them right away.  Hands get red from rubber gloves, but okay if she washes them right away.     Other Food Allergy Rash     Pine trees, leather .        FAMILY HISTORY  Family History   Problem Relation Age of Onset     Multiple myeloma Father      Deep Vein Thrombosis Father      Cataracts Father      Snoring Father      Other - See Comments Brother         Blood withdrawn from time to time.  Sounds like hemochromatosis.     Sleep Apnea Brother      Cancer Mother         Uterine CA     Arthritis Mother      Cataracts Mother      Breast Cancer Mother 54.00     No Known Problems Sister      Rheumatoid Arthritis Maternal Grandmother      Breast Cancer Maternal Grandmother 50.00     Heart Disease Maternal Grandmother      Rheumatoid Arthritis Paternal Aunt      Breast Cancer Maternal Aunt 54.00     Breast Cancer Cousin      Clotting Disorder No family hx of        VASCULAR FAMILY HISTORY  1st order relative with atherosclerotic PAD: No  1st order relative with AAA: No  Family history of Familial Hyperlipidemia No  Family History of Hypercoagulable state:No    VASCULAR RISK FACTORS  1. Diabetes:No   2. Smoking: has never smoked.  3. HTN: controlled  4.Hyperlipidemia: No      SOCIAL HISTORY  Social History     Socioeconomic History     Marital status: Single     Spouse name: Not on file     Number of children: Not on file     Years of education: Not on file     Highest education level: Not on file   Occupational History     Not on file   Tobacco Use     Smoking status: Former     Packs/day: 1.00     Years: 30.00     Pack years: 30.00     Types:  Cigarettes     Smokeless tobacco: Never   Substance and Sexual Activity     Alcohol use: Not Currently     Comment: Alcoholic Drinks/day: 3-4 times a year she will have 1 or 2.     Drug use: No     Sexual activity: Never     Partners: Female   Other Topics Concern     Not on file   Social History Narrative    Long term  At [facility] to rehab to level allowing surgery on deteriorated joints  LTC12/2015     Social Determinants of Health     Financial Resource Strain: Not on file   Food Insecurity: Not on file   Transportation Needs: Not on file   Physical Activity: Not on file   Stress: Not on file   Social Connections: Not on file   Intimate Partner Violence: Not on file   Housing Stability: Not on file       ROS:   General: No change in weight, sleep or appetite.  Normal energy.  No fever or chills  Eyes: Negative for vision changes or eye problems  ENT: No problems with ears, nose or throat.  No difficulty swallowing.  Resp: No coughing, wheezing or shortness of breath  CV: No chest pains or palpitations  GI: No nausea, vomiting,  heartburn, abdominal pain, diarrhea, constipation or change in bowel habits  : No urinary frequency or dysuria, bladder or kidney problems  Musculoskeletal:  BLE swelling  Neurologic: No headaches, numbness, tingling, weakness, problems with balance or coordination  Psychiatric: No problems with anxiety, depression or mental health  Heme/immune/allergy: No history of bleeding or clotting problems or anemia.  No allergies or immune system problems  Endocrine: No history of thyroid disease, diabetes or other endocrine disorders  Skin: No rashes,worrisome lesions or skin problems  Vascular: History of DVT chronically on anticoagulation  Bilateral lower extremity recurrent cellulitis  History of lymphedema in both legs  EXAM:  /80 (BP Location: Other (Comment), Patient Position: Chair, Cuff Size: Adult Large)   Pulse 77   Wt (!) 332 lb (150.6 kg)   SpO2 96%   BMI 60.72 kg/m     In general, the patient is a pleasant female in no apparent distress.    HEENT: NC/AT.  PERRLA.  EOMI.  Sclerae white, not injected.  Nares clear.  Pharynx without erythema or exudate.  Dentition intact.    Neck: No adenopathy.  No thyromegaly. Carotids +2/2 bilaterally without bruits.  No jugular venous distension.   Heart: RRR. Normal S1, S2 splits physiologically. No murmur, rub, click, or gallop. The PMI is in the 5th ICS in the midclavicular line. There is no heave.    Lungs: CTA.  No ronchi, wheezes, rales.  No dullness to percussion.   Abdomen: Soft, nontender, nondistended. No organomegaly. No AAA.  No bruits.   Extremities: Vascular:  She has a bilateral lower extremity classical lymphedema with squaring of the toes, positive Stemmer sign, thickening of the skin with keratosis in the lower part of the dorsal hump of the foot bilaterally.  Difficult to palpate pulses but able to Doppler  resolving  cellulitis on today's exam     Labs:  LIPID RESULTS:  Lab Results   Component Value Date    CHOL 221 (H) 12/02/2021    HDL 65 12/02/2021     (H) 12/02/2021    TRIG 101 12/02/2021       LIVER ENZYME RESULTS:  Lab Results   Component Value Date    AST 21 05/04/2023    ALT 12 06/22/2023       CBC RESULTS:  Lab Results   Component Value Date    WBC 5.6 06/22/2023    RBC 4.51 06/22/2023    HGB 13.0 06/22/2023    HCT 41.8 06/22/2023    MCV 93 06/22/2023    MCH 28.8 06/22/2023    MCHC 31.1 (L) 06/22/2023    RDW 15.9 (H) 06/22/2023     06/22/2023       BMP RESULTS:  Lab Results   Component Value Date     05/18/2023    POTASSIUM 4.0 05/18/2023    POTASSIUM 3.6 01/04/2022    CHLORIDE 100 05/18/2023    CHLORIDE 99 01/04/2022    CO2 29 05/18/2023    CO2 27 01/04/2022    ANIONGAP 12 05/18/2023    ANIONGAP 13 01/04/2022    GLC 96 05/18/2023    GLC 82 01/04/2022    BUN 15.3 05/18/2023    BUN 15 01/04/2022    CR 0.73 06/22/2023    GFRESTIMATED >90 06/22/2023    GFRESTIMATED >60 04/20/2021    GFRESTBLACK >60  04/20/2021    WILVER 9.2 05/18/2023        A1C RESULTS:  Lab Results   Component Value Date    A1C 5.3 12/02/2021         Procedures:     BILATERAL Venous Insufficiency Ultrasound (Date: 08/11/21)    BILATERAL Lower Extremity          Indication:  SURVEILLANCE BILATERAL LEGS VARICOSE VEINS, PAIN AND SWELLING.      Previous: NONE     Patient History: Swelling and Morbid Obesity     Presenting Symptoms:  Swelling, Varicose Veins and Pain     Technique:   Supine and Upright Ultrasound of the Deep and Superficial Veins with Valsalva and Compression Augmentation Maneuvers. Duplex Imaging is performed utilizing gray-scale, Two-dimensional images, color-flow imaging, Doppler waveform analysis, and Spectral doppler imaging done with provacative maneuvers.      Incompetency Criteria:  Deep vein reflux reported when greater than 1000 msec flow reversal. Superficial vein reflux reported when equal to or greater than 600 msec flow reversal.  vein reflux reported as greater than 350 msec flow reversal.      Right  Leg Deep Veins    CFV SFJ PFV PROX FV   PROX FV MID FV DIST POP V. PERON.   V. PTV'S   Compressibility  (FC,PC,NC) FC FC FC FC FC FC FC FC FC   Reflux -   - - - - +   -         Right Leg Saphenous Veins  Location SFJ PROX THIGH KNEE MID CALF SPJ PROX CALF MID CALF   RT GSV (mm) 6.9 6.8 6.7 3.3         Reflux - - - -         RT SSV (mm)         1.9 1.8 2.0   Reflux         - - -         Left Leg Deep Veins    CFV SFJ PFV PROX SFV PROX SFV MID SFV DIST POP V. PERON. V. PTV'S   Compressibility  (FC,PC,NC) FC FC FC FC FC FC FC FC FC   Reflux -   - - - - +   -         Lt Leg Saphenous Veins   Location SFJ PROX THIGH KNEE MID CALF SPJ PROX CALF MID CALF   LT GSV (mm) 8.0 8.2 4.7 5.4         Reflux - - - -         LT SSV (mm)         2.3 3.0 2.2   Reflux         - - -               Impression:       Right Deep Vein Findings: Patent deep venous system without reflux or DVT     Left Deep Vein Findings: Patent deep venous  system without reflux or DVT     Superficial Vein Findings:      Right Greater Saphenous vein: Patent Greater Saphenous Vein without evidence of reflux.     Right Small Saphenous Vein: Patent Small Saphenous Vein without evidence of reflux.     Left Greater Saphenous Vein: Patent Greater Saphenous Vein without evidence of reflux.     Left Small Saphenous Vein: Patent Small Saphenous Vein without evidence of reflux.           Reference:     Compressibility: FC= Fully compressible, PC= Partially compressible, NC= Non-compressible, NV= Not Visualized     Reflux: (+) Incompetent  (-) Competent, (NV) = Not Visualized     Interpretation criteria:          Duration of Retrograde flow (milliseconds)  Category Deep Veins Superficial Veins  veins   Competent < 1000ms < 600ms < 350ms   Incompetent > 1000ms > 600ms > 350ms            Assessment and Plan:       1. Lymphedema    2.  recurrent Cellulitis of  Bilateral lower extremity, treated recently     3. Morbid obesity (H)    4. Seropositive rheumatoid arthritis of multiple sites (H)    5. History of deep venous thrombosis, on chronic anticoagulation with Xarelto previously treated with warfarin    6. LOPEZ (obstructive sleep apnea) wears nocturnal oxygen       This is a very pleasant 58-year-old female morbidly obese BMI greater than 56 with classical features of bilateral lower extremity lymphedema and recurrent cellulitis and skin keratosis and also history of a DVT chronically on anticoagulation.  She lives in a facility currently not using any Velcro wraps and seen lymphedema therapist since last visit.  She has a history of obstructive sleep apnea unable to tolerate CPAP machine but she wears the nocturnal oxygen.  History of rheumatoid arthritis on treatment.  Multiple other comorbidities.  admitted to hospital for cellulitis on abx improving continue same   Local wound care   Take lymphatic formula on pill a day     Had a lengthy discussion with the patient  importance of compression, wraps, weight loss, elevation of the legs skin care etc..    Suggested patient to talk to the primary care provider and see at medical weight loss clinic    AVS with written instructions given and she brought facility papers which were handwritten copy sent to media and original given to the patient    Return to clinic in 6 months    This note was dictated by utilizing Dragon software      More than 40 minutes spent on the date of the encounter doing chart review, history and exam, documentation, and further activities as noted above.    Reviewed recent hospitalization records      Camilo hZao MD, FAHA, FSVM, FNLA, FACP  Vascular medicine

## 2023-06-27 NOTE — PROGRESS NOTES
[facility]        Patient is here for a  follow up.      Pt is currently taking Xarelto.    /80 (BP Location: Other (Comment), Patient Position: Chair, Cuff Size: Adult Large)   Pulse 77   Wt (!) 332 lb (150.6 kg)   SpO2 96%   BMI 60.72 kg/m      The provider has been notified that the patient has no concerns.     Questions patient would like addressed today are: N/A.    Refills are needed: N/A    Has homecare services and agency name:  Enma Mahajan MA

## 2023-06-28 ENCOUNTER — NURSING HOME VISIT (OUTPATIENT)
Dept: GERIATRICS | Facility: CLINIC | Age: 58
End: 2023-06-28
Payer: COMMERCIAL

## 2023-06-28 ENCOUNTER — TELEPHONE (OUTPATIENT)
Dept: RHEUMATOLOGY | Facility: CLINIC | Age: 58
End: 2023-06-28

## 2023-06-28 VITALS
DIASTOLIC BLOOD PRESSURE: 87 MMHG | BODY MASS INDEX: 53.92 KG/M2 | OXYGEN SATURATION: 98 % | HEART RATE: 75 BPM | SYSTOLIC BLOOD PRESSURE: 155 MMHG | TEMPERATURE: 98.2 F | RESPIRATION RATE: 16 BRPM | HEIGHT: 62 IN | WEIGHT: 293 LBS

## 2023-06-28 DIAGNOSIS — E78.00 HIGH BLOOD CHOLESTEROL: ICD-10-CM

## 2023-06-28 DIAGNOSIS — I89.0 LYMPHEDEMA OF BOTH LOWER EXTREMITIES: ICD-10-CM

## 2023-06-28 DIAGNOSIS — M25.50 POLYARTHRALGIA: ICD-10-CM

## 2023-06-28 DIAGNOSIS — K59.01 SLOW TRANSIT CONSTIPATION: ICD-10-CM

## 2023-06-28 DIAGNOSIS — E55.9 VITAMIN D DEFICIENCY: ICD-10-CM

## 2023-06-28 DIAGNOSIS — M15.0 PRIMARY OSTEOARTHRITIS INVOLVING MULTIPLE JOINTS: ICD-10-CM

## 2023-06-28 DIAGNOSIS — Z86.718 HISTORY OF DVT OF LOWER EXTREMITY: ICD-10-CM

## 2023-06-28 DIAGNOSIS — M05.79 SEROPOSITIVE RHEUMATOID ARTHRITIS OF MULTIPLE SITES (H): ICD-10-CM

## 2023-06-28 DIAGNOSIS — D64.9 ANEMIA, UNSPECIFIED TYPE: ICD-10-CM

## 2023-06-28 DIAGNOSIS — E66.01 MORBID OBESITY (H): ICD-10-CM

## 2023-06-28 DIAGNOSIS — L03.90 CELLULITIS, UNSPECIFIED CELLULITIS SITE: ICD-10-CM

## 2023-06-28 DIAGNOSIS — M05.79 SEROPOSITIVE RHEUMATOID ARTHRITIS OF MULTIPLE SITES (H): Primary | ICD-10-CM

## 2023-06-28 DIAGNOSIS — B36.9 FUNGAL DERMATITIS: ICD-10-CM

## 2023-06-28 DIAGNOSIS — G47.33 OBSTRUCTIVE SLEEP APNEA SYNDROME: ICD-10-CM

## 2023-06-28 DIAGNOSIS — I10 ESSENTIAL HYPERTENSION WITH GOAL BLOOD PRESSURE LESS THAN 140/90: ICD-10-CM

## 2023-06-28 DIAGNOSIS — R76.8 CYCLIC CITRULLINATED PEPTIDE (CCP) ANTIBODY POSITIVE: ICD-10-CM

## 2023-06-28 DIAGNOSIS — B35.9 TINEA: Primary | ICD-10-CM

## 2023-06-28 DIAGNOSIS — I73.9 PAD (PERIPHERAL ARTERY DISEASE) (H): ICD-10-CM

## 2023-06-28 PROCEDURE — 99309 SBSQ NF CARE MODERATE MDM 30: CPT | Performed by: NURSE PRACTITIONER

## 2023-06-28 RX ORDER — KETOCONAZOLE 20 MG/G
CREAM TOPICAL
Qty: 180 G | Refills: 11 | Status: SHIPPED | OUTPATIENT
Start: 2023-06-28 | End: 2023-07-25

## 2023-06-28 RX ORDER — THERMOMETER, ELECTRONIC,ORAL
EACH MISCELLANEOUS DAILY PRN
COMMUNITY
End: 2023-11-09

## 2023-06-28 RX ORDER — SENNOSIDES 8.6 MG
1 TABLET ORAL 2 TIMES DAILY PRN
Start: 2023-06-28 | End: 2023-07-25

## 2023-06-28 RX ORDER — SULFAMETHOXAZOLE/TRIMETHOPRIM 800-160 MG
2 TABLET ORAL 2 TIMES DAILY
Qty: 40 TABLET | Refills: 0 | Status: SHIPPED | OUTPATIENT
Start: 2023-06-28 | End: 2023-07-08

## 2023-06-28 RX ORDER — KETOCONAZOLE 20 MG/ML
SHAMPOO TOPICAL DAILY PRN
Qty: 120 ML | Refills: 11 | Status: SHIPPED | OUTPATIENT
Start: 2023-06-28 | End: 2024-05-21

## 2023-06-28 RX ORDER — AMOXICILLIN 500 MG/1
1000 CAPSULE ORAL EVERY 8 HOURS
Qty: 60 CAPSULE | Refills: 0 | Status: SHIPPED | OUTPATIENT
Start: 2023-06-28 | End: 2023-07-08

## 2023-06-28 NOTE — LETTER
6/28/2023        RE: María Elena Saab  [place]  [address]  [place] [address]        [facility] GERIATRICS    PRIMARY CARE PROVIDER AND CLINIC:  Arlen Carlson, APRN CNP, [address] / [place] [address]  Chief Complaint   Patient presents with     Hospital F/U      [facility] Record Number:  [id number]  [facility] [[id number]    María Elena Saab  is a 58 year old  (1965), returned to the above facility from  [facility] . Hospital stay 6/14/23 - 6/20/23. .   HPI:    58 y.o. old female with history of chronic cellulitis of lower extremity, history Bactrim, MRSA, current long-term use of anticoagulation with history of DVT on Xarelto, hypertension, depression LOPEZ on CPAP, PAD, rheumatoid arthritis on immunosuppressant and wheelchair dependence who was admitted for LLE Cellulitis.     The primary encounter diagnosis was Tinea. Diagnoses of Fungal dermatitis, Anemia, unspecified type, Obstructive sleep apnea syndrome, Morbid obesity (H), High blood cholesterol, Essential hypertension with goal blood pressure less than 140/90, History of DVT of lower extremity, PAD (peripheral artery disease) (H), Cellulitis, unspecified cellulitis site, Primary osteoarthritis involving multiple joints, Seropositive rheumatoid arthritis of multiple sites (H), Polyarthralgia, Cyclic citrullinated peptide (CCP) antibody positive, Vitamin D deficiency, Lymphedema of both lower extremities, and Slow transit constipation were also pertinent to this visit.    Met with patient who denies any chest pain, palpitations, shortness of breath, ESPITIA, lightheadedness, dizziness, or cough. Wears oxygen at night only. Denies any abdominal discomfort. Denies N&V. Denies B&B concerns. Denies dysuria or frequency. Denies loose or constipation. Appetite good. Sleeping well. Reports pain stable with current regimen. She  reports being told that her antibiotic should be going for 14 days, however she was discharged with only 7 days worth. Will extend antibiotic course for another 10 days. She also is wanting to stop her rheumatology agents due to immunosuppressant concerns with recurrent cellulitis concerns. Mood stable. Nursing denies any acute concerns.     BP Readings from Last 3 Encounters:   06/28/23 (!) 155/87   06/27/23 125/80   06/13/23 126/82     Wt Readings from Last 5 Encounters:   06/28/23 149.3 kg (329 lb 3.2 oz)   06/27/23 (!) 150.6 kg (332 lb)   06/13/23 149.3 kg (329 lb 3.2 oz)   05/24/23 149.3 kg (329 lb 3.2 oz)   05/09/23 149.3 kg (329 lb 3.2 oz)     CODE STATUS/ADVANCE DIRECTIVES DISCUSSION:  Full Code  CPR/Full code   ALLERGIES:   Allergies   Allergen Reactions     Adhesive Tape Other (See Comments)     As on band-aids;  Causes skin tearing/wounds.     Adhesive [Cyanoacrylate] Other (See Comments)     As on band-aids;  Causes skin tearing/wounds.     Banana Unknown     She avoids due to Latex allergy.  If she eats them 3 days in a row, gets stomach cramps and dark stool.  TBrinkMD - 4/10/15     Food Unknown     Bananas, grapes, pine nuts     Grape [Grape (Artificial) Flavor] Unknown     She avoids due to Latex allergy.  If she eats lots of them, and she likes them, stomach gets a little queasy.  TBrinkMD - 4/10/15     Morphine (Pf) [Morphine] Other (See Comments)     Pt has not reacted to this med herself, but mother has anaphylactic reaction and other family members get nausea/vomiting.     Other Environmental Allergy Swelling     leather     Pine      Pseudoephedrine Other (See Comments)     Insomnia     Pseudoephedrine Cold/Allergy [Chlorpheniramine-Pseudoeph] Other (See Comments)     Keeps pt awake up to 24 hours      Latex Rash     Hands get red from rubber gloves, but okay if she washes them right away.  Hands get red from rubber gloves, but okay if she washes them right away.     Other Food Allergy Rash      Pine trees, leather .       PAST MEDICAL HISTORY:   Past Medical History:   Diagnosis Date     Aseptic necrosis of femoral head (H)     LEFT     Bacteremia due to group B Streptococcus      Cellulitis of right lower extremity      DJD (degenerative joint disease)      DVT, bilateral lower limbs (H) 10/2014     Edema 5/22/2015     Essential hypertension with goal blood pressure less than 140/90      Failure to thrive      History of blood clots      Hypokalemia 10/15/2014     Lung mass 10/18/2014     Morbid obesity (H) 4/10/2015    Had formal nutrition consult at [facility].  RD reports that she is not willing to commit to the program outlined.  See scanned document.  12/15/2015 - Marcio Quinonez MD        Nocturnal hypoxemia - probable sleep apnea - had overnight pulse oximetry, April, 2015. 5/22/2015     Obesity 4/10/2015     LOPEZ (obstructive sleep apnea) 5/22/2015     Osteoarthritis      Peripheral vascular disease (H)      Pleural effusion      Pressure ulcer of foot      Rheumatoid arthritis (H) 12/30/2014    seronegative     Sepsis (H)      Seropositive rheumatoid arthritis (H) 1/19/2016     Vitamin D deficiency 8/26/2015      PAST SURGICAL HISTORY:   has a past surgical history that includes Total Knee Arthroplasty (Left, 2006); joint replacement; Total Hip Arthroplasty (Left, 10/18/2016); and Peripherally Inserted Central Catheter Insertion (Right, 07/14/2019).  FAMILY HISTORY: family history includes Arthritis in her mother; Breast Cancer in her cousin; Breast Cancer (age of onset: 50.00) in her maternal grandmother; Breast Cancer (age of onset: 54.00) in her maternal aunt and mother; Cancer in her mother; Cataracts in her father and mother; Deep Vein Thrombosis in her father; Heart Disease in her maternal grandmother; Multiple myeloma in her father; No Known Problems in her sister; Other - See Comments in her brother; Rheumatoid Arthritis in her maternal grandmother and paternal aunt; Sleep Apnea in her  brother; Snoring in her father.  SOCIAL HISTORY:   reports that she has quit smoking. Her smoking use included cigarettes. She has a 30.00 pack-year smoking history. She has never used smokeless tobacco. She reports that she does not currently use alcohol. She reports that she does not use drugs.  Patient's living condition: lives in a skilled nursing facility    Post Discharge Medication Reconciliation Status:   MED REC REQUIRED  Post Medication Reconciliation Status:  Discharge medications reconciled and changed, see notes/orders         Current Outpatient Medications   Medication Sig     acetaminophen (TYLENOL) 325 MG tablet Take 650 mg by mouth every 6 hours as needed for mild pain     amoxicillin (AMOXIL) 500 MG capsule Take 2 capsules (1,000 mg) by mouth every 8 hours for 10 days     ascorbic acid, vitamin C, (ASCORBIC ACID WITH KAEL HIPS) 500 MG tablet [ASCORBIC ACID, VITAMIN C, (ASCORBIC ACID WITH KAEL HIPS) 500 MG TABLET] Take 500 mg by mouth 2 (two) times a day.     cholecalciferol 50 MCG (2000 UT) tablet Take 1 tablet (50 mcg) by mouth daily     cyclobenzaprine (FLEXERIL) 10 MG tablet Take 1 tablet (10 mg) by mouth every 8 hours as needed for muscle spasms     DULoxetine (CYMBALTA) 20 MG capsule [DULOXETINE (CYMBALTA) 20 MG CAPSULE] Take 1 capsule (20 mg total) by mouth daily.     folic acid (FOLVITE) 1 MG tablet Take 1 mg by mouth     gabapentin (NEURONTIN) 300 MG capsule Take 1 capsule (300 mg) by mouth 3 times daily     hydroCHLOROthiazide (HYDRODIURIL) 25 MG tablet [HYDROCHLOROTHIAZIDE (HYDRODIURIL) 25 MG TABLET] Take 25 mg by mouth daily.     ibuprofen (ADVIL/MOTRIN) 400 MG tablet Take 1 tablet (400 mg) by mouth every 6 hours as needed for moderate pain (4-6)     ketoconazole (NIZORAL) 2 % external cream Apply to all reddened areas to under breast and abdominal folds BID     ketoconazole (NIZORAL) 2 % external shampoo Apply topically daily as needed for itching or irritation Apply to body daily PRN  "on shower days x 8 weeks. Then change to daily PRN thereafter     loratadine (CLARITIN) 10 MG tablet Take 1 tablet (10 mg) by mouth daily     NEW MED Take 1 capsule by mouth daily Lymphatic formula     rivaroxaban ANTICOAGULANT (XARELTO) 20 MG TABS tablet Take 20 mg by mouth daily     sennosides (SENOKOT) 8.6 MG tablet Take 1 tablet by mouth 2 times daily as needed for constipation     sulfamethoxazole-trimethoprim (BACTRIM DS) 800-160 MG tablet Take 2 tablets by mouth 2 times daily for 10 days     tolnaftate (TINACTIN) 1 % external cream Apply topically daily as needed for irritation Apply topically daily prn for skin breakdown btwn toes. Wash and dry first.     No current facility-administered medications for this visit.       ROS:  10 point ROS of systems including Constitutional, Eyes, Respiratory, Cardiovascular, Gastroenterology, Genitourinary, Integumentary, Musculoskeletal, Psychiatric were all negative except for pertinent positives noted in my HPI.    Vitals:  BP (!) 155/87   Pulse 75   Temp 98.2  F (36.8  C)   Resp 16   Ht 1.575 m (5' 2\")   Wt 149.3 kg (329 lb 3.2 oz)   SpO2 98%   BMI 60.21 kg/m    Exam:  GENERAL APPEARANCE:  Alert, in no distress, morbidly obese, cooperative  ENT:  Mouth and posterior oropharynx normal, moist mucous membranes, normal hearing acuity  EYES:  EOM, conjunctivae, lids, pupils and irises normal  NECK:  No adenopathy,masses or thyromegaly  RESP:  respiratory effort and palpation of chest normal, lungs clear to auscultation , no respiratory distress  CV:  Palpation and auscultation of heart done , regular rate and rhythm, no murmur, rub, or gallop, peripheral edema 1+ in BLE  ABDOMEN:  normal bowel sounds, soft, nontender, no hepatosplenomegaly or other masses, no guarding or rebound  M/S:   Stand pivot transfers. Wheelchair bound. Staff to assist for all ADLs, transfers and cares  SKIN:  dermatitis remains to under breast and abdominal folds but appears to be slowly " improving. Does not appear to be angry red anymore. No open areas noted to BLE. Dry plague areas present. Redness resolving to these areas. See photo  NEURO:   Cranial nerves 2-12 are normal tested and grossly at patient's baseline, no purposeful movement in upper and lower extremities  PSYCH:  oriented X 3, normal insight, judgement and memory, affect and mood normal    Lab/Diagnostic data:    Most Recent 3 CBC's:  Recent Labs   Lab Test 06/22/23  1110 05/18/23  0935 05/04/23  0855   WBC 5.6 5.0 6.1   HGB 13.0 11.7 11.7   MCV 93 95 93    251 277     Most Recent 3 BMP's:  Recent Labs   Lab Test 06/22/23  1110 05/18/23  0935 05/04/23  0855 03/14/23  0732   NA  --  141 137 140   POTASSIUM  --  4.0 4.2 4.0   CHLORIDE  --  100 100 102   CO2  --  29 28 27   BUN  --  15.3 13.2 17.0   CR 0.73 0.50* 0.70 0.56   ANIONGAP  --  12 9 11   WILVER  --  9.2 9.0 9.4   GLC  --  96 86 89     Most Recent 2 LFT's:  Recent Labs   Lab Test 06/22/23  1110 05/04/23  0855 01/13/22  1234 01/04/22  0439   AST  --  21  --  21  20   ALT 12 6*   < > 23  23  23   ALKPHOS  --  67  --  70  72   BILITOTAL  --  0.2  --  0.3  0.4    < > = values in this interval not displayed.     Most Recent Cholesterol Panel:  Recent Labs   Lab Test 12/02/21  1155   CHOL 221*   *   HDL 65   TRIG 101     Most Recent ESR & CRP:  Recent Labs   Lab Test 05/04/23  0855 11/22/22  0654 12/21/20  0942   CRP  --   --  1.3*   CRPI 47.80*   < >  --     < > = values in this interval not displayed.     Most Recent Anemia Panel:  Recent Labs   Lab Test 06/22/23  1110   WBC 5.6   HGB 13.0   HCT 41.8   MCV 93        Vitamin D Deficiency Screening Results:  Lab Results   Component Value Date    VITDT 50 01/04/2022    VITDT 70 12/02/2021       ASSESSMENT/PLAN  (M05.79) Seropositive rheumatoid arthritis of multiple sites (H)  (primary encounter diagnosis)  (R76.8) Cyclic citrullinated peptide (CCP) antibody positive  (M15.9) Primary osteoarthritis involving  "multiple joints  (E55.9) Vitamin D deficiency  Comment: Chronic. Managed by rheumatology. c/o posterior neck pain as well as \"all over pain\" that is most likely neuropathic based on description. No longer use PRN oxycodone- Last used in Jan 2023 therefore this medication was stopped. Suspect increased immunosuppression response given recurrent cellulitis concerns  Plan:   -Continue to HOLD methotrexate for now until rheumatology follow up  -Continue to HOLD folic acid daily for now until rheumatology follow up    -Continue Tylenol 1000mg every 6 hours PRN  -PRN flexeril  -Continue gabapentin 300mg TID  -Ibuprofen PRN  -Continue to HOLD humira 40mg injection every 2 weeks as directed for now until rheumatology follow up.   -Continue amoxicillin 1000mg TID x 10 days  -Continue bactrim -160mg 2 tabs BID x 10 days  -Continue tolnaftate daily PRN for skin breakdown between the toes. Wash and dry first prior to use.   -Continue Duloxetine 20mg daily as directed. If worsening pain, would recommend dose increase.   -Continue vitamin D 2000U daily  -Follow up with rheumatology as directed. Scheduled for 9/25/23  -CMP, CRP CBC and procalcitonin due 7/6/23     (I89.0) Lymphedema of both lower extremities  (I73.9) PAD (peripheral artery disease) (H)  (L03.90) Cellulitis  Comment: Chronic. No claudication complaints. Multiple hospitalizations given recurrent cellulitis concerns. Vascular following with recent visit on 6/27/22. Recommendations for lymphedema therapist for MLD, compression and possibly pump therapy in near future. On arrival febrile, tachy with elevated wbc/CRP, MRSA+. CT tibia fibula and femur consistent with cellulitis. No evidence deep space infection. Recently hospitalized for RLEcellulitis and discharged on Bactrim/amoxicillin. Hospitalized with recurrence, improved on IV vanco and discharged on Bactrim/amox again (one week). Seen by ID. Suspect her immune suppression from RA meds is contributing to " recurrent infections, along with morbid obesity, lymphedema, and tinea pedis (also discharged with antifungal topical). To HOLD immunosuppressants until having completed antibiotics and confirmed resolving infection by MD (at TCU vs PCP).   Plan:   -Hold rheumatology agents as directed above  -Follow up with vascular clinic as directed in 6 months as directed. Due Dec 2023.   -Monitor weights weekly. Recent weight done at vascular appt which was 332# on 6/27/23.   -Continue lyphmhatic formula 1 capsule daily per vascular recommendations  -Follow up with lymphedema therapy on site  -Continue on site wound care provider for ongoing bilateral lower extremities wound care needs  -Monitor for worsening s/sx of concerns  -Follow up with infectious disease as directed. Scheduled for 7/27/23  -Elevate bilateral lower extremities as much as possible  -Velcro wraps to bilateral lower extremities daily. On in AM and off at HS  -CMP, CRP CBC and procalcitonin due 7/6/23  -Continue wound care to bilateral lower extremities as directed  *WOUND/DRESSING INSTRUCTIONS: Type of wound: Lymphademic Location of wound: BLE (R medial leg, L medial leg, L lateral leg, R shin) Instruction for Irrigation/Cleaning: Cleanse with warm water and wash cloth -Apply house barrier cream     (I10) Essential hypertension with goal blood pressure less than 140/90  (Z86.718) History of DVT of lower extremity  (E78.00) High blood cholesterol  Comment: Chronic. Based on JNC-8 goals,  patients age of 57 year old, no presence of diabetes or CKD, and goals of care goal BP is <150/90 mm Hg. Patient is stable with current plan of care and routine assessment. History of DVT to LLE. SBP readings remaining 130s-150s.   Plan:   -Monitor BP and HR  -Continue hydrochlorothiazide 25mg daily.   -Continue xarelto 20mg daily  -CMP, CRP CBC and procalcitonin due 7/6/23     (E66.01) Morbid obesity (H)  (G47.33) Obstructive sleep apnea syndrome  Comment: Chronic. Hx of  LOPEZ, does not have CPAP as this causes sinus problems per patient. Patient with frequent desaturations while sleeping during history of hospital admission. Recommend follow up with sleep medicine provider to provide recommendations for management which she last saw at [facility] location last in 2016  Plan:   -Monitor sleeping patterns  -Monitor respiratory status  -Oxygen used at night- 2L  -Encourage cough and deep breathing. Encourage IS  -Follow up with sleep medicine as directed. Originally scheduled for 6/28/23 but was cancelled. Would recommend to reschedule when able  -CMP, CRP CBC and procalcitonin due 7/6/23     (D64.9) Anemia, unspecified type  Comment: Acute on chronic. Baseline hgb around 12  Plan:   -Monitor bleeding risks  -Monitor for worsening s/sx of concerns  -CMP, CRP CBC and procalcitonin due 7/6/23     (B35.9) Tinea  (B35.9) Fungal dermatitis  Comment: Acute on chronic. Noted on 6/13/23: Nurse called to report: Increased worsening fungal dermatitis noted to bilateral groin, under abdominal folds and under breast. No relief from nystatin powder application. She is noncompliant with showers. Has been accepting bed baths primarily. Alternative topical agents ordered along with medicated shampoo. Slowly improving noted today  Plan:  -Continue claritin 10mg daily as directed  -Continue ketoconazole cream BID as directed  -Continue ketoconazole shampoo on shower days as directed  -Encourage interdry and/olr pillow cases under areas to prevent moisture build up concerns  -Dermatology PRN if worsens or no relief  -CMP, CRP CBC and procalcitonin due 7/6/23     Electronically signed by:  Arlen Carlson DNP, APRN    Sincerely,        Arlen Carlson, KRISTIN CNP

## 2023-06-28 NOTE — TELEPHONE ENCOUNTER
[facility] Call Center    Phone Message    May a detailed message be left on voicemail: no     Reason for Call: Medication Question or concern regarding medication   Prescription Clarification  Name of Medication: Humira and methotrexate  Prescribing Provider: Dr. Humphrey    Pharmacy:    What on the order needs clarification?       Joy with [facility] calling pt's pcp had discontinued her meds due to her getting  Cellulitis two weeks after.           Action Taken: Other: [facility]    Travel Screening: Not Applicable

## 2023-06-28 NOTE — TELEPHONE ENCOUNTER
Spoke to Joy- nurse at [facility], at last ov with Dr Humphrey in March 2023- pt had been holding Humira due to having Cellulitis. Pt had went back on Humira with methotrexate and developed Cellulitis again. They again held methotrexate and Humira until Cellulitis was resolved, pt resumed above medications and again developed Cellulitis. Joy states it seems when pt resumes these meds, she develops Cellulitis within 1-2 weeks, most recently pt ended up in the hospital with Cellulitis and Sepsis. Hospital notes are in chart. Pt is currently off Humira and methotrexate, she is on Amoxicillin and Bactrim until 7/8/23. Joy states pt is saying her RA symptoms are about the same, she has joint pain and trouble moving but this is about the same as her baseline.    Will have Dr Humphrey advise.   F/u appt 9/25/23

## 2023-06-29 RX ORDER — SULFASALAZINE 500 MG/1
TABLET, DELAYED RELEASE ORAL
Qty: 120 TABLET | Refills: 0 | Status: SHIPPED | OUTPATIENT
Start: 2023-06-29 | End: 2023-11-09

## 2023-06-29 NOTE — TELEPHONE ENCOUNTER
Spoke to pts nurse on 2nd floor- informed of med change per Dr Humphrey- sulfasalazine and pt to remain off methotrexate and Humira. Will fax sulfasalazine to [facility] pharmacy and faxed lab orders to [facility] at Fax# [phone].

## 2023-06-29 NOTE — TELEPHONE ENCOUNTER
Left message for Joy- nurse at [facility] to call back     Per Dr Humphrey- recommends pt try sulfasalazine 500mg twice daily for 1 week then increase to 1000mg twice daily, have labs checked in 4 weeks. Dr Humphrey recommends discontinuing methotrexate and Humira due to recurrent Cellulitis and recent Sepsis.

## 2023-07-03 ENCOUNTER — LAB REQUISITION (OUTPATIENT)
Dept: LAB | Facility: CLINIC | Age: 58
End: 2023-07-03
Payer: COMMERCIAL

## 2023-07-03 DIAGNOSIS — D64.9 ANEMIA, UNSPECIFIED: ICD-10-CM

## 2023-07-11 LAB
ALBUMIN SERPL BCG-MCNC: 3.3 G/DL (ref 3.5–5.2)
ALP SERPL-CCNC: 73 U/L (ref 35–104)
ALT SERPL W P-5'-P-CCNC: 9 U/L (ref 0–50)
ANION GAP SERPL CALCULATED.3IONS-SCNC: 11 MMOL/L (ref 7–15)
AST SERPL W P-5'-P-CCNC: 25 U/L (ref 0–45)
BILIRUB SERPL-MCNC: 0.2 MG/DL
BUN SERPL-MCNC: 15.8 MG/DL (ref 8–23)
CALCIUM SERPL-MCNC: 9.1 MG/DL (ref 8.8–10.2)
CHLORIDE SERPL-SCNC: 100 MMOL/L (ref 98–107)
CREAT SERPL-MCNC: 0.76 MG/DL (ref 0.51–0.95)
CRP SERPL-MCNC: 13.1 MG/L
DEPRECATED HCO3 PLAS-SCNC: 28 MMOL/L (ref 22–29)
ERYTHROCYTE [DISTWIDTH] IN BLOOD BY AUTOMATED COUNT: 16 % (ref 10–15)
GFR SERPL CREATININE-BSD FRML MDRD: 89 ML/MIN/1.73M2
GLUCOSE SERPL-MCNC: 81 MG/DL (ref 70–99)
HCT VFR BLD AUTO: 39.7 % (ref 35–47)
HGB BLD-MCNC: 12 G/DL (ref 11.7–15.7)
MCH RBC QN AUTO: 28.4 PG (ref 26.5–33)
MCHC RBC AUTO-ENTMCNC: 30.2 G/DL (ref 31.5–36.5)
MCV RBC AUTO: 94 FL (ref 78–100)
PLATELET # BLD AUTO: 197 10E3/UL (ref 150–450)
POTASSIUM SERPL-SCNC: 3.9 MMOL/L (ref 3.4–5.3)
PROCALCITONIN SERPL IA-MCNC: 0.04 NG/ML
PROT SERPL-MCNC: 8 G/DL (ref 6.4–8.3)
RBC # BLD AUTO: 4.23 10E6/UL (ref 3.8–5.2)
SODIUM SERPL-SCNC: 139 MMOL/L (ref 136–145)
WBC # BLD AUTO: 4.6 10E3/UL (ref 4–11)

## 2023-07-11 PROCEDURE — 86140 C-REACTIVE PROTEIN: CPT | Mod: ORL | Performed by: NURSE PRACTITIONER

## 2023-07-11 PROCEDURE — 36415 COLL VENOUS BLD VENIPUNCTURE: CPT | Mod: ORL | Performed by: NURSE PRACTITIONER

## 2023-07-11 PROCEDURE — 85027 COMPLETE CBC AUTOMATED: CPT | Mod: ORL | Performed by: NURSE PRACTITIONER

## 2023-07-11 PROCEDURE — P9604 ONE-WAY ALLOW PRORATED TRIP: HCPCS | Mod: ORL | Performed by: NURSE PRACTITIONER

## 2023-07-11 PROCEDURE — 84145 PROCALCITONIN (PCT): CPT | Mod: ORL | Performed by: NURSE PRACTITIONER

## 2023-07-11 PROCEDURE — 80053 COMPREHEN METABOLIC PANEL: CPT | Mod: ORL | Performed by: NURSE PRACTITIONER

## 2023-07-11 NOTE — PROGRESS NOTES
[facility] GERIATRICS    Chief Complaint   Patient presents with     RECHECK     HPI:  María Elena Saab is a 58 year old  (1965), who is being seen today for an episodic care visit at: [facility] () [[id number]]. Today's concern is: The primary encounter diagnosis was Tinea. Diagnoses of Fungal dermatitis, Anemia, unspecified type, Obstructive sleep apnea syndrome, Morbid obesity (H), High blood cholesterol, Essential hypertension with goal blood pressure less than 140/90, History of DVT of lower extremity, PAD (peripheral artery disease) (H), Cellulitis, unspecified cellulitis site, Primary osteoarthritis involving multiple joints, Seropositive rheumatoid arthritis of multiple sites (H), Polyarthralgia, Cyclic citrullinated peptide (CCP) antibody positive, Vitamin D deficiency, and Lymphedema of both lower extremities were also pertinent to this visit.    Met with patient who denies any chest pain, palpitations, shortness of breath, ESPITIA, lightheadedness, dizziness, or cough. Continues to use oxygen at HS. Denies any abdominal discomfort. Denies N&V. Denies B&B concerns. Denies dysuria or frequency. Denies loose or constipation. Appetite good. Sleeping well. Tolerating new rheumatology agent without concerns. Mood stable. Nursing denies any acute concerns except for running out of cream supplies due to her morbid size. She reports her pain is stable with current regimen.     BP Readings from Last 3 Encounters:   07/12/23 (!) 163/91   06/28/23 (!) 155/87   06/27/23 125/80     Wt Readings from Last 5 Encounters:   07/12/23 149.3 kg (329 lb 3.2 oz)   06/28/23 149.3 kg (329 lb 3.2 oz)   06/27/23 (!) 150.6 kg (332 lb)   06/13/23 149.3 kg (329 lb 3.2 oz)   05/24/23 149.3 kg (329 lb 3.2 oz)     Allergies, and PMH/PSH reviewed in [facility] today.  REVIEW OF SYSTEMS:  10 point ROS of systems including Constitutional, Eyes, Respiratory, Cardiovascular, Gastroenterology, Genitourinary, Integumentary,  "Musculoskeletal, Psychiatric were all negative except for pertinent positives noted in my HPI.    Objective:   BP (!) 163/91   Pulse 81   Temp 98.3  F (36.8  C)   Resp 18   Ht 1.575 m (5' 2\")   Wt 149.3 kg (329 lb 3.2 oz)   SpO2 98%   BMI 60.21 kg/m    GENERAL APPEARANCE:  Alert, in no distress, oriented, morbidly obese, cooperative  ENT:  Mouth and posterior oropharynx normal, moist mucous membranes, [place]  EYES:  EOM, conjunctivae, lids, pupils and irises normal  NECK:  No adenopathy,masses or thyromegaly  RESP:  respiratory effort and palpation of chest normal, lungs clear to auscultation , no respiratory distress  CV:  Palpation and auscultation of heart done , regular rate and rhythm, no murmur, rub, or gallop, peripheral edema 1-2+ in BLE  ABDOMEN:  normal bowel sounds, soft, nontender, no hepatosplenomegaly or other masses, no guarding or rebound  M/S:   Wheelchair bound. Often isolates herself in her room  SKIN:  Inspection of skin and subcutaneous tissue baseline, wound healing well, no signs of infection slowly improving  NEURO:   Cranial nerves 2-12 are normal tested and grossly at patient's baseline, no purposeful movement in upper and lower extremities  PSYCH:  oriented X 3, normal insight, judgement and memory, affect and mood normal                                Most Recent 3 CBC's:  Recent Labs   Lab Test 07/11/23 0825 06/22/23  1110 05/18/23  0935   WBC 4.6 5.6 5.0   HGB 12.0 13.0 11.7   MCV 94 93 95    355 251     Most Recent 3 BMP's:  Recent Labs   Lab Test 07/11/23  0825 06/22/23  1110 05/18/23  0935 05/04/23  0855     --  141 137   POTASSIUM 3.9  --  4.0 4.2   CHLORIDE 100  --  100 100   CO2 28  --  29 28   BUN 15.8  --  15.3 13.2   CR 0.76 0.73 0.50* 0.70   ANIONGAP 11  --  12 9   WILVER 9.1  --  9.2 9.0   GLC 81  --  96 86     Most Recent 2 LFT's:  Recent Labs   Lab Test 07/11/23  0825 06/22/23  1110 05/04/23  0855   AST 25  --  21   ALT 9 12 6*   ALKPHOS 73  --  67 " "  BILITOTAL 0.2  --  0.2     Most Recent ESR & CRP:  Recent Labs   Lab Test 07/11/23  0825 11/22/22  0654 12/21/20  0942   CRP  --   --  1.3*   CRPI 13.10*   < >  --     < > = values in this interval not displayed.     Most Recent Anemia Panel:  Recent Labs   Lab Test 07/11/23  0825   WBC 4.6   HGB 12.0   HCT 39.7   MCV 94          ASSESSMENT/PLAN  (M05.79) Seropositive rheumatoid arthritis of multiple sites (H)  (primary encounter diagnosis)  (R76.8) Cyclic citrullinated peptide (CCP) antibody positive  (M15.9) Primary osteoarthritis involving multiple joints  (E55.9) Vitamin D deficiency  Comment: Chronic. Managed by rheumatology. c/o posterior neck pain as well as \"all over pain\" that is most likely neuropathic based on description. No longer use PRN oxycodone- Last used in Jan 2023 therefore this medication was stopped. Suspect increased immunosuppression response given recurrent cellulitis concerns--completed dual therapy of amoxicillin and bactrim as directed.   Plan:   -Continue Sulfasalazine 500mg BID for one week then increase to 1000mg BID ongoing per rheumatology recommendations on 6/28/23.   -Continue Tylenol 1000mg every 6 hours PRN  -PRN flexeril  -Continue gabapentin 300mg TID  -Ibuprofen PRN  -Continue WITHOUT humira and methotrexate due to recurrent cellulitis and recent sepsis.   -Continue tolnaftate daily PRN for skin breakdown between the toes. Wash and dry first prior to use.   -Continue Duloxetine 20mg daily as directed. If worsening pain, would recommend dose increase.   -Continue vitamin D 2000U daily  -Follow up with rheumatology as directed. Scheduled for 9/25/23  -ALT, creatinine, CBC and albumin level scheduled for 7/27/23 per rheumatology. Will add BMP too     (I89.0) Lymphedema of both lower extremities  (I73.9) PAD (peripheral artery disease) (H)  (L03.90) Cellulitis  Comment: Chronic. No claudication complaints. Multiple hospitalizations given recurrent cellulitis concerns. " Vascular following with recent visit on 6/27/22. Recommendations for lymphedema therapist for MLD, compression and possibly pump therapy in near future. On arrival febrile, tachy with elevated wbc/CRP, MRSA+. CT tibia fibula and femur consistent with cellulitis. No evidence deep space infection. Recently hospitalized for RLEcellulitis and discharged on Bactrim/amoxicillin. Hospitalized with recurrence, improved on IV vanco and discharged on Bactrim/amox again (one week). Seen by ID. Suspect her immune suppression from RA meds is contributing to recurrent infections, along with morbid obesity, lymphedema, and tinea pedis (also discharged with antifungal topical).   Plan:   -Follow up with vascular clinic as directed in 6 months as directed. Due Dec 2023.   -Monitor weights weekly. Recent weight done at vascular appt which was 332# on 6/27/23. She has often refused weights to be done on site.   -Continue lyphmhatic formula 1 capsule daily per vascular recommendations  -Follow up with lymphedema therapy on site  -Continue on site wound care provider for ongoing bilateral lower extremities wound care needs  -Monitor for worsening s/sx of concerns  -Follow up with infectious disease as directed. Scheduled for 7/27/23  -Elevate bilateral lower extremities as much as possible  -Velcro wraps to bilateral lower extremities daily. On in AM and off at HS  -ALT, creatinine, CBC and albumin level scheduled for 7/27/23 per rheumatology. Will add BMP too  -Continue wound care to bilateral lower extremities as directed  *WOUND/DRESSING INSTRUCTIONS: Type of wound: Lymphademic Location of wound: BLE (R medial leg, L medial leg, L lateral leg, R shin) Instruction for Irrigation/Cleaning: Cleanse with warm water and wash cloth -Apply Eucerin cream     (I10) Essential hypertension with goal blood pressure less than 140/90  (Z86.718) History of DVT of lower extremity  (E78.00) High blood cholesterol  Comment: Chronic. Based on JNC-8  goals,  patients age of 57 year old, no presence of diabetes or CKD, and goals of care goal BP is <150/90 mm Hg. Patient is stable with current plan of care and routine assessment. History of DVT to LLE. SBP readings remaining 130s-150s.   Plan:   -Monitor BP and HR. If SBP levels remain elevated on next assessment, would recommend medication adjustment.   -Continue hydrochlorothiazide 25mg daily.   -Continue xarelto 20mg daily  -ALT, creatinine, CBC and albumin level scheduled for 7/27/23 per rheumatology. Will add BMP too     (E66.01) Morbid obesity (H)  (G47.33) Obstructive sleep apnea syndrome  Comment: Chronic. Hx of LOPEZ, does not have CPAP as this causes sinus problems per patient. Patient with frequent desaturations while sleeping during history of hospital admission. Recommend follow up with sleep medicine provider to provide recommendations for management which she last saw at [facility] location last in 2016. History of failed sinus cavity which therefore CPAP use is not effective for this. Tolerating oxygen at HS well without concerns.   Plan:   -Monitor sleeping patterns  -Monitor respiratory status  -Oxygen used at night- 2L  -Encourage cough and deep breathing. Encourage IS  -ALT, creatinine, CBC and albumin level scheduled for 7/27/23 per rheumatology. Will add BMP too     (D64.9) Anemia, unspecified type  Comment: Acute on chronic. Baseline hgb around 12  Plan:   -Monitor bleeding risks  -Monitor for worsening s/sx of concerns  -ALT, creatinine, CBC and albumin level scheduled for 7/27/23 per rheumatology. 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 Noted on 6/13/23: Nurse called to report: Increased worsening fungal dermatitis noted to bilateral groin, under abdominal folds and under breast. No relief from nystatin powder application. She is noncompliant with showers. Has been accepting bed baths primarily. Alternative topical agents ordered along  with medicated shampoo. Slowly improving noted today  Plan:  -Continue claritin 10mg daily as directed  -Continue ketoconazole cream BID as directed  -Continue ketoconazole shampoo on shower days as directed  -Encourage interdry and/olr pillow cases under areas to prevent moisture build up concerns  -Dermatology PRN if worsens or no relief  -ALT, creatinine, CBC and albumin level scheduled for 7/27/23 per rheumatology. Will add BMP too     Electronically signed by:  Arlen Carlson DNP, APRN

## 2023-07-12 ENCOUNTER — NURSING HOME VISIT (OUTPATIENT)
Dept: GERIATRICS | Facility: CLINIC | Age: 58
End: 2023-07-12
Payer: COMMERCIAL

## 2023-07-12 VITALS
DIASTOLIC BLOOD PRESSURE: 91 MMHG | HEIGHT: 62 IN | RESPIRATION RATE: 18 BRPM | SYSTOLIC BLOOD PRESSURE: 163 MMHG | TEMPERATURE: 98.3 F | OXYGEN SATURATION: 98 % | BODY MASS INDEX: 53.92 KG/M2 | HEART RATE: 81 BPM | WEIGHT: 293 LBS

## 2023-07-12 DIAGNOSIS — M25.50 POLYARTHRALGIA: ICD-10-CM

## 2023-07-12 DIAGNOSIS — R76.8 CYCLIC CITRULLINATED PEPTIDE (CCP) ANTIBODY POSITIVE: ICD-10-CM

## 2023-07-12 DIAGNOSIS — E55.9 VITAMIN D DEFICIENCY: ICD-10-CM

## 2023-07-12 DIAGNOSIS — B36.9 FUNGAL DERMATITIS: ICD-10-CM

## 2023-07-12 DIAGNOSIS — L03.90 CELLULITIS, UNSPECIFIED CELLULITIS SITE: ICD-10-CM

## 2023-07-12 DIAGNOSIS — I73.9 PAD (PERIPHERAL ARTERY DISEASE) (H): ICD-10-CM

## 2023-07-12 DIAGNOSIS — I10 ESSENTIAL HYPERTENSION WITH GOAL BLOOD PRESSURE LESS THAN 140/90: ICD-10-CM

## 2023-07-12 DIAGNOSIS — G47.33 OBSTRUCTIVE SLEEP APNEA SYNDROME: ICD-10-CM

## 2023-07-12 DIAGNOSIS — I89.0 LYMPHEDEMA OF BOTH LOWER EXTREMITIES: ICD-10-CM

## 2023-07-12 DIAGNOSIS — B35.9 TINEA: Primary | ICD-10-CM

## 2023-07-12 DIAGNOSIS — D64.9 ANEMIA, UNSPECIFIED TYPE: ICD-10-CM

## 2023-07-12 DIAGNOSIS — M15.0 PRIMARY OSTEOARTHRITIS INVOLVING MULTIPLE JOINTS: ICD-10-CM

## 2023-07-12 DIAGNOSIS — Z86.718 HISTORY OF DVT OF LOWER EXTREMITY: ICD-10-CM

## 2023-07-12 DIAGNOSIS — E78.00 HIGH BLOOD CHOLESTEROL: ICD-10-CM

## 2023-07-12 DIAGNOSIS — M05.79 SEROPOSITIVE RHEUMATOID ARTHRITIS OF MULTIPLE SITES (H): ICD-10-CM

## 2023-07-12 DIAGNOSIS — E66.01 MORBID OBESITY (H): ICD-10-CM

## 2023-07-12 PROBLEM — A41.9 SEPSIS (H): Status: ACTIVE | Noted: 2023-06-14

## 2023-07-12 PROCEDURE — 99310 SBSQ NF CARE HIGH MDM 45: CPT | Performed by: NURSE PRACTITIONER

## 2023-07-12 NOTE — LETTER
"    7/12/2023        RE: María Elena Saab  [facility]  [address]  [place] [address]        [facility]    No chief complaint on file.    HPI:  María Elena Saab is a 58 year old  (1965), who is being seen today for an episodic care visit at: No question data found.. Today's concern is: The primary encounter diagnosis was Tinea. Diagnoses of Fungal dermatitis, Anemia, unspecified type, Obstructive sleep apnea syndrome, Morbid obesity (H), High blood cholesterol, Essential hypertension with goal blood pressure less than 140/90, History of DVT of lower extremity, PAD (peripheral artery disease) (H), Cellulitis, unspecified cellulitis site, Primary osteoarthritis involving multiple joints, Seropositive rheumatoid arthritis of multiple sites (H), Polyarthralgia, Cyclic citrullinated peptide (CCP) antibody positive, Vitamin D deficiency, and Lymphedema of both lower extremities were also pertinent to this visit.    ***    BP Readings from Last 3 Encounters:   06/28/23 (!) 155/87   06/27/23 125/80   06/13/23 126/82     Wt Readings from Last 5 Encounters:   06/28/23 149.3 kg (329 lb 3.2 oz)   06/27/23 (!) 150.6 kg (332 lb)   06/13/23 149.3 kg (329 lb 3.2 oz)   05/24/23 149.3 kg (329 lb 3.2 oz)   05/09/23 149.3 kg (329 lb 3.2 oz)     Allergies, and PMH/PSH reviewed in [facility] today.  REVIEW OF SYSTEMS:  10 point ROS of systems including Constitutional, Eyes, Respiratory, Cardiovascular, Gastroenterology, Genitourinary, Integumentary, Musculoskeletal, Psychiatric were all negative except for pertinent positives noted in my HPI.    Objective:   There were no vitals taken for this visit.  GENERAL APPEARANCE:  {[facility] GENERAL APPEARANCE:[id number]}  ENT:  {[facility] ENT PE:685141::\"Mouth and posterior oropharynx normal, moist mucous membranes\"}  EYES:  {[facility] EYES PE :790652::\"EOM, conjunctivae, lids, pupils and irises normal\"}  NECK:  {[facility] N[facility] PE:046288::\"No " "adenopathy,masses or thyromegaly\"}  RESP:  {[facility] RESP PE:675736}  CV:  {[facility] CV PE:462261::\"Palpation and auscultation of heart done \",\"regular rate and rhythm, no murmur, rub, or gallop\"}  ABDOMEN:  {[facility] GI PE:401509::\"normal bowel sounds, soft, nontender, no hepatosplenomegaly or other masses\"}  M/S:   {[facility] M/S PE:091349}  SKIN:  {[facility] SK[facility] PE:707317}  NEURO:   {[facility] NEURO PE:944764}  PSYCH:  {[facility] PSYCH PE:650976}      Most Recent 3 CBC's:Recent Labs   Lab Test 07/11/23  0825 06/22/23  1110 05/18/23  0935   WBC 4.6 5.6 5.0   HGB 12.0 13.0 11.7   MCV 94 93 95    355 251     Most Recent 3 BMP's:Recent Labs   Lab Test 07/11/23  0825 06/22/23  1110 05/18/23  0935 05/04/23  0855     --  141 137   POTASSIUM 3.9  --  4.0 4.2   CHLORIDE 100  --  100 100   CO2 28  --  29 28   BUN 15.8  --  15.3 13.2   CR 0.76 0.73 0.50* 0.70   ANIONGAP 11  --  12 9   WILVER 9.1  --  9.2 9.0   GLC 81  --  96 86     Most Recent 2 LFT's:Recent Labs   Lab Test 07/11/23  0825 06/22/23  1110 05/04/23  0855   AST 25  --  21   ALT 9 12 6*   ALKPHOS 73  --  67   BILITOTAL 0.2  --  0.2     Most Recent ESR & CRP:Recent Labs   Lab Test 07/11/23  0825 11/22/22  0654 12/21/20  0942   CRP  --   --  1.3*   CRPI 13.10*   < >  --     < > = values in this interval not displayed.     Most Recent Anemia Panel:Recent Labs   Lab Test 07/11/23  0825   WBC 4.6   HGB 12.0   HCT 39.7   MCV 94          ASSESSMENT/PLAN  (M05.79) Seropositive rheumatoid arthritis of multiple sites (H)  (primary encounter diagnosis)  (R76.8) Cyclic citrullinated peptide (CCP) antibody positive  (M15.9) Primary osteoarthritis involving multiple joints  (E55.9) Vitamin D deficiency  Comment: Chronic. Managed by rheumatology. c/o posterior neck pain as well as \"all over pain\" that is most likely neuropathic based on description. No longer use PRN oxycodone- Last used in Jan 2023 therefore this medication was " stopped. Suspect increased immunosuppression response given recurrent cellulitis concerns--completed dual therapy of amoxicillin and bactrim as directed.   Plan:   -Continue Sulfasalazine 500mg BID for one week then increase to 1000mg BID ongoing per rheumatology recommendations on 6/28/23.   -Continue Tylenol 1000mg every 6 hours PRN  -PRN flexeril  -Continue gabapentin 300mg TID  -Ibuprofen PRN  -Continue WITHOUT humira and methotrexate due to recurrent cellulitis and recent sepsis.   -Continue tolnaftate daily PRN for skin breakdown between the toes. Wash and dry first prior to use.   -Continue Duloxetine 20mg daily as directed. If worsening pain, would recommend dose increase.   -Continue vitamin D 2000U daily  -Follow up with rheumatology as directed. Scheduled for 9/25/23  -ALT, creatinine, CBC and albumin level scheduled for 7/27/23 per rheumatology. Will add BMP too     (I89.0) Lymphedema of both lower extremities  (I73.9) PAD (peripheral artery disease) (H)  (L03.90) Cellulitis  Comment: Chronic. No claudication complaints. Multiple hospitalizations given recurrent cellulitis concerns. Vascular following with recent visit on 6/27/22. Recommendations for lymphedema therapist for MLD, compression and possibly pump therapy in near future. On arrival febrile, tachy with elevated wbc/CRP, MRSA+. CT tibia fibula and femur consistent with cellulitis. No evidence deep space infection. Recently hospitalized for RLEcellulitis and discharged on Bactrim/amoxicillin. Hospitalized with recurrence, improved on IV vanco and discharged on Bactrim/amox again (one week). Seen by ID. Suspect her immune suppression from RA meds is contributing to recurrent infections, along with morbid obesity, lymphedema, and tinea pedis (also discharged with antifungal topical).   Plan:   -Follow up with vascular clinic as directed in 6 months as directed. Due Dec 2023.   -Monitor weights weekly. Recent weight done at vascular appt which was  332# on 6/27/23. She has often refused weights to be done on site.   -Continue lyphmhatic formula 1 capsule daily per vascular recommendations  -Follow up with lymphedema therapy on site  -Continue on site wound care provider for ongoing bilateral lower extremities wound care needs  -Monitor for worsening s/sx of concerns  -Follow up with infectious disease as directed. Scheduled for 7/27/23  -Elevate bilateral lower extremities as much as possible  -Velcro wraps to bilateral lower extremities daily. On in AM and off at HS  -ALT, creatinine, CBC and albumin level scheduled for 7/27/23 per rheumatology. Will add BMP too  -Continue wound care to bilateral lower extremities as directed  *WOUND/DRESSING INSTRUCTIONS: Type of wound: Lymphademic Location of wound: BLE (R medial leg, L medial leg, L lateral leg, R shin) Instruction for Irrigation/Cleaning: Cleanse with warm water and wash cloth -Apply house barrier cream     (I10) Essential hypertension with goal blood pressure less than 140/90  (Z86.718) History of DVT of lower extremity  (E78.00) High blood cholesterol  Comment: Chronic. Based on JNC-8 goals,  patients age of 57 year old, no presence of diabetes or CKD, and goals of care goal BP is <150/90 mm Hg. Patient is stable with current plan of care and routine assessment. History of DVT to LLE. SBP readings remaining 130s-150s.   Plan:   -Monitor BP and HR  -Continue hydrochlorothiazide 25mg daily.   -Continue xarelto 20mg daily  -ALT, creatinine, CBC and albumin level scheduled for 7/27/23 per rheumatology. Will add BMP too     (E66.01) Morbid obesity (H)  (G47.33) Obstructive sleep apnea syndrome  Comment: Chronic. Hx of LOPEZ, does not have CPAP as this causes sinus problems per patient. Patient with frequent desaturations while sleeping during history of hospital admission. Recommend follow up with sleep medicine provider to provide recommendations for management which she last saw at [facility]  last in 2016  Plan:   -Monitor sleeping patterns  -Monitor respiratory status  -Oxygen used at night- 2L  -Encourage cough and deep breathing. Encourage IS  -Follow up with sleep medicine as directed. Originally scheduled for 6/28/23 but was cancelled. Would recommend to reschedule when able  -ALT, creatinine, CBC and albumin level scheduled for 7/27/23 per rheumatology. Will add BMP too     (D64.9) Anemia, unspecified type  Comment: Acute on chronic. Baseline hgb around 12  Plan:   -Monitor bleeding risks  -Monitor for worsening s/sx of concerns  -ALT, creatinine, CBC and albumin level scheduled for 7/27/23 per rheumatology. 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 Noted on 6/13/23: Nurse called to report: Increased worsening fungal dermatitis noted to bilateral groin, under abdominal folds and under breast. No relief from nystatin powder application. She is noncompliant with showers. Has been accepting bed baths primarily. Alternative topical agents ordered along with medicated shampoo. Slowly improving noted today  Plan:  -Continue claritin 10mg daily as directed  -Continue ketoconazole cream BID as directed  -Continue ketoconazole shampoo on shower days as directed  -Encourage interdry and/olr pillow cases under areas to prevent moisture build up concerns  -Dermatology PRN if worsens or no relief  -ALT, creatinine, CBC and albumin level scheduled for 7/27/23 per rheumatology. Will add BMP too     Electronically signed by:  Arlen Carlson DNP, KRISTIN RIOS [facility] GERIATRICS    Chief Complaint   Patient presents with    RECHECK     HPI:  María Elena Saab is a 58 year old  (1965), who is being seen today for an episodic care visit at: [facility] () [[id number]]. Today's concern is: The primary encounter diagnosis was Tinea. Diagnoses of Fungal dermatitis, Anemia, unspecified type, Obstructive sleep apnea syndrome, Morbid obesity (H),  "High blood cholesterol, Essential hypertension with goal blood pressure less than 140/90, History of DVT of lower extremity, PAD (peripheral artery disease) (H), Cellulitis, unspecified cellulitis site, Primary osteoarthritis involving multiple joints, Seropositive rheumatoid arthritis of multiple sites (H), Polyarthralgia, Cyclic citrullinated peptide (CCP) antibody positive, Vitamin D deficiency, and Lymphedema of both lower extremities were also pertinent to this visit.    ***    BP Readings from Last 3 Encounters:   07/12/23 (!) 163/91   06/28/23 (!) 155/87   06/27/23 125/80     Wt Readings from Last 5 Encounters:   07/12/23 149.3 kg (329 lb 3.2 oz)   06/28/23 149.3 kg (329 lb 3.2 oz)   06/27/23 (!) 150.6 kg (332 lb)   06/13/23 149.3 kg (329 lb 3.2 oz)   05/24/23 149.3 kg (329 lb 3.2 oz)     Allergies, and PMH/PSH reviewed in [facility] today.  REVIEW OF SYSTEMS:  10 point ROS of systems including Constitutional, Eyes, Respiratory, Cardiovascular, Gastroenterology, Genitourinary, Integumentary, Musculoskeletal, Psychiatric were all negative except for pertinent positives noted in my HPI.    Objective:   BP (!) 163/91   Pulse 81   Temp 98.3  F (36.8  C)   Resp 18   Ht 1.575 m (5' 2\")   Wt 149.3 kg (329 lb 3.2 oz)   SpO2 98%   BMI 60.21 kg/m    GENERAL APPEARANCE:  {[facility] GENERAL APPEARANCE:[id number]}  ENT:  {[facility] ENT PE:190547::\"Mouth and posterior oropharynx normal, moist mucous membranes\"}  EYES:  {[facility] EYES PE :723759::\"EOM, conjunctivae, lids, pupils and irises normal\"}  NECK:  {[facility] NEC[facility] PE:116901::\"No adenopathy,masses or thyromegaly\"}  RESP:  {[facility] RESP PE:695838}  CV:  {[facility] CV PE:515648::\"Palpation and auscultation of heart done \",\"regular rate and rhythm, no murmur, rub, or gallop\"}  ABDOMEN:  {[facility] GI PE:494661::\"normal bowel sounds, soft, nontender, no hepatosplenomegaly or other masses\"}  M/S:   {[facility] M/S PE:841354}  SKIN:  {[facility] " "SKIN PE:785831}  NEURO:   {[facility] NEURO PE:836544}  PSYCH:  {[facility] PSYCH PE:003888}      Most Recent 3 CBC's:  Recent Labs   Lab Test 07/11/23  0825 06/22/23  1110 05/18/23  0935   WBC 4.6 5.6 5.0   HGB 12.0 13.0 11.7   MCV 94 93 95    355 251     Most Recent 3 BMP's:  Recent Labs   Lab Test 07/11/23  0825 06/22/23  1110 05/18/23  0935 05/04/23  0855     --  141 137   POTASSIUM 3.9  --  4.0 4.2   CHLORIDE 100  --  100 100   CO2 28  --  29 28   BUN 15.8  --  15.3 13.2   CR 0.76 0.73 0.50* 0.70   ANIONGAP 11  --  12 9   WILVER 9.1  --  9.2 9.0   GLC 81  --  96 86     Most Recent 2 LFT's:  Recent Labs   Lab Test 07/11/23  0825 06/22/23  1110 05/04/23  0855   AST 25  --  21   ALT 9 12 6*   ALKPHOS 73  --  67   BILITOTAL 0.2  --  0.2     Most Recent ESR & CRP:  Recent Labs   Lab Test 07/11/23  0825 11/22/22  0654 12/21/20  0942   CRP  --   --  1.3*   CRPI 13.10*   < >  --     < > = values in this interval not displayed.     Most Recent Anemia Panel:  Recent Labs   Lab Test 07/11/23  0825   WBC 4.6   HGB 12.0   HCT 39.7   MCV 94          ASSESSMENT/PLAN  (M05.79) Seropositive rheumatoid arthritis of multiple sites (H)  (primary encounter diagnosis)  (R76.8) Cyclic citrullinated peptide (CCP) antibody positive  (M15.9) Primary osteoarthritis involving multiple joints  (E55.9) Vitamin D deficiency  Comment: Chronic. Managed by rheumatology. c/o posterior neck pain as well as \"all over pain\" that is most likely neuropathic based on description. No longer use PRN oxycodone- Last used in Jan 2023 therefore this medication was stopped. Suspect increased immunosuppression response given recurrent cellulitis concerns--completed dual therapy of amoxicillin and bactrim as directed.   Plan:   -Continue Sulfasalazine 500mg BID for one week then increase to 1000mg BID ongoing per rheumatology recommendations on 6/28/23.   -Continue Tylenol 1000mg every 6 hours PRN  -PRN flexeril  -Continue gabapentin " 300mg TID  -Ibuprofen PRN  -Continue WITHOUT humira and methotrexate due to recurrent cellulitis and recent sepsis.   -Continue tolnaftate daily PRN for skin breakdown between the toes. Wash and dry first prior to use.   -Continue Duloxetine 20mg daily as directed. If worsening pain, would recommend dose increase.   -Continue vitamin D 2000U daily  -Follow up with rheumatology as directed. Scheduled for 9/25/23  -ALT, creatinine, CBC and albumin level scheduled for 7/27/23 per rheumatology. Will add BMP too     (I89.0) Lymphedema of both lower extremities  (I73.9) PAD (peripheral artery disease) (H)  (L03.90) Cellulitis  Comment: Chronic. No claudication complaints. Multiple hospitalizations given recurrent cellulitis concerns. Vascular following with recent visit on 6/27/22. Recommendations for lymphedema therapist for MLD, compression and possibly pump therapy in near future. On arrival febrile, tachy with elevated wbc/CRP, MRSA+. CT tibia fibula and femur consistent with cellulitis. No evidence deep space infection. Recently hospitalized for RLEcellulitis and discharged on Bactrim/amoxicillin. Hospitalized with recurrence, improved on IV vanco and discharged on Bactrim/amox again (one week). Seen by ID. Suspect her immune suppression from RA meds is contributing to recurrent infections, along with morbid obesity, lymphedema, and tinea pedis (also discharged with antifungal topical).   Plan:   -Follow up with vascular clinic as directed in 6 months as directed. Due Dec 2023.   -Monitor weights weekly. Recent weight done at vascular appt which was 332# on 6/27/23. She has often refused weights to be done on site.   -Continue lyphmhatic formula 1 capsule daily per vascular recommendations  -Follow up with lymphedema therapy on site  -Continue on site wound care provider for ongoing bilateral lower extremities wound care needs  -Monitor for worsening s/sx of concerns  -Follow up with infectious disease as directed.  Scheduled for 7/27/23  -Elevate bilateral lower extremities as much as possible  -Velcro wraps to bilateral lower extremities daily. On in AM and off at HS  -ALT, creatinine, CBC and albumin level scheduled for 7/27/23 per rheumatology. Will add BMP too  -Continue wound care to bilateral lower extremities as directed  *WOUND/DRESSING INSTRUCTIONS: Type of wound: Lymphademic Location of wound: BLE (R medial leg, L medial leg, L lateral leg, R shin) Instruction for Irrigation/Cleaning: Cleanse with warm water and wash cloth -Apply house barrier cream     (I10) Essential hypertension with goal blood pressure less than 140/90  (Z86.718) History of DVT of lower extremity  (E78.00) High blood cholesterol  Comment: Chronic. Based on JNC-8 goals,  patients age of 57 year old, no presence of diabetes or CKD, and goals of care goal BP is <150/90 mm Hg. Patient is stable with current plan of care and routine assessment. History of DVT to LLE. SBP readings remaining 130s-150s.   Plan:   -Monitor BP and HR  -Continue hydrochlorothiazide 25mg daily.   -Continue xarelto 20mg daily  -ALT, creatinine, CBC and albumin level scheduled for 7/27/23 per rheumatology. Will add BMP too     (E66.01) Morbid obesity (H)  (G47.33) Obstructive sleep apnea syndrome  Comment: Chronic. Hx of LOPEZ, does not have CPAP as this causes sinus problems per patient. Patient with frequent desaturations while sleeping during history of hospital admission. Recommend follow up with sleep medicine provider to provide recommendations for management which she last saw at [facility] location last in 2016  Plan:   -Monitor sleeping patterns  -Monitor respiratory status  -Oxygen used at night- 2L  -Encourage cough and deep breathing. Encourage IS  -Follow up with sleep medicine as directed. Originally scheduled for 6/28/23 but was cancelled. Would recommend to reschedule when able  -ALT, creatinine, CBC and albumin level scheduled for 7/27/23 per rheumatology.  Will add BMP too     (D64.9) Anemia, unspecified type  Comment: Acute on chronic. Baseline hgb around 12  Plan:   -Monitor bleeding risks  -Monitor for worsening s/sx of concerns  -ALT, creatinine, CBC and albumin level scheduled for 7/27/23 per rheumatology. 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 Noted on 6/13/23: Nurse called to report: Increased worsening fungal dermatitis noted to bilateral groin, under abdominal folds and under breast. No relief from nystatin powder application. She is noncompliant with showers. Has been accepting bed baths primarily. Alternative topical agents ordered along with medicated shampoo. Slowly improving noted today  Plan:  -Continue claritin 10mg daily as directed  -Continue ketoconazole cream BID as directed  -Continue ketoconazole shampoo on shower days as directed  -Encourage interdry and/olr pillow cases under areas to prevent moisture build up concerns  -Dermatology PRN if worsens or no relief  -ALT, creatinine, CBC and albumin level scheduled for 7/27/23 per rheumatology. Will add BMP too     Electronically signed by:  Arlen Carlson DNP, APRN          Sincerely,        Arlen Carlson, APRN CNP

## 2023-07-12 NOTE — LETTER
7/12/2023        RE: María Elena Saab  [facility] [place]  [address]  [place] [address]        [facility]    Chief Complaint   Patient presents with     RECHECK     HPI:  María Elena Saab is a 58 year old  (1965), who is being seen today for an episodic care visit at: [facility] () [[id number]]. Today's concern is: The primary encounter diagnosis was Tinea. Diagnoses of Fungal dermatitis, Anemia, unspecified type, Obstructive sleep apnea syndrome, Morbid obesity (H), High blood cholesterol, Essential hypertension with goal blood pressure less than 140/90, History of DVT of lower extremity, PAD (peripheral artery disease) (H), Cellulitis, unspecified cellulitis site, Primary osteoarthritis involving multiple joints, Seropositive rheumatoid arthritis of multiple sites (H), Polyarthralgia, Cyclic citrullinated peptide (CCP) antibody positive, Vitamin D deficiency, and Lymphedema of both lower extremities were also pertinent to this visit.    Met with patient who denies any chest pain, palpitations, shortness of breath, ESPITIA, lightheadedness, dizziness, or cough. Continues to use oxygen at HS. Denies any abdominal discomfort. Denies N&V. Denies B&B concerns. Denies dysuria or frequency. Denies loose or constipation. Appetite good. Sleeping well. Tolerating new rheumatology agent without concerns. Mood stable. Nursing denies any acute concerns except for running out of cream supplies due to her morbid size. She reports her pain is stable with current regimen.     BP Readings from Last 3 Encounters:   07/12/23 (!) 163/91   06/28/23 (!) 155/87   06/27/23 125/80     Wt Readings from Last 5 Encounters:   07/12/23 149.3 kg (329 lb 3.2 oz)   06/28/23 149.3 kg (329 lb 3.2 oz)   06/27/23 (!) 150.6 kg (332 lb)   06/13/23 149.3 kg (329 lb 3.2 oz)   05/24/23 149.3 kg (329 lb 3.2 oz)     Allergies, and PMH/PSH reviewed in EPIC today.  REVIEW OF SYSTEMS:  10 point ROS of  "systems including Constitutional, Eyes, Respiratory, Cardiovascular, Gastroenterology, Genitourinary, Integumentary, Musculoskeletal, Psychiatric were all negative except for pertinent positives noted in my HPI.    Objective:   BP (!) 163/91   Pulse 81   Temp 98.3  F (36.8  C)   Resp 18   Ht 1.575 m (5' 2\")   Wt 149.3 kg (329 lb 3.2 oz)   SpO2 98%   BMI 60.21 kg/m    GENERAL APPEARANCE:  Alert, in no distress, oriented, morbidly obese, cooperative  ENT:  Mouth and posterior oropharynx normal, moist mucous membranes, [place]  EYES:  EOM, conjunctivae, lids, pupils and irises normal  NECK:  No adenopathy,masses or thyromegaly  RESP:  respiratory effort and palpation of chest normal, lungs clear to auscultation , no respiratory distress  CV:  Palpation and auscultation of heart done , regular rate and rhythm, no murmur, rub, or gallop, peripheral edema 1-2+ in BLE  ABDOMEN:  normal bowel sounds, soft, nontender, no hepatosplenomegaly or other masses, no guarding or rebound  M/S:   Wheelchair bound. Often isolates herself in her room  SKIN:  Inspection of skin and subcutaneous tissue baseline, wound healing well, no signs of infection slowly improving  NEURO:   Cranial nerves 2-12 are normal tested and grossly at patient's baseline, no purposeful movement in upper and lower extremities  PSYCH:  oriented X 3, normal insight, judgement and memory, affect and mood normal      Most Recent 3 CBC's:  Recent Labs   Lab Test 07/11/23  0825 06/22/23  1110 05/18/23  0935   WBC 4.6 5.6 5.0   HGB 12.0 13.0 11.7   MCV 94 93 95    355 251     Most Recent 3 BMP's:  Recent Labs   Lab Test 07/11/23  0825 06/22/23  1110 05/18/23  0935 05/04/23  0855     --  141 137   POTASSIUM 3.9  --  4.0 4.2   CHLORIDE 100  --  100 100   CO2 28  --  29 28   BUN 15.8  --  15.3 13.2   CR 0.76 0.73 0.50* 0.70   ANIONGAP 11  --  12 9   WILVER 9.1  --  9.2 9.0   GLC 81  --  96 86     Most Recent 2 LFT's:  Recent Labs   Lab Test 07/11/23  0825 " "06/22/23  1110 05/04/23  0855   AST 25  --  21   ALT 9 12 6*   ALKPHOS 73  --  67   BILITOTAL 0.2  --  0.2     Most Recent ESR & CRP:  Recent Labs   Lab Test 07/11/23  0825 11/22/22  0654 12/21/20  0942   CRP  --   --  1.3*   CRPI 13.10*   < >  --     < > = values in this interval not displayed.     Most Recent Anemia Panel:  Recent Labs   Lab Test 07/11/23  0825   WBC 4.6   HGB 12.0   HCT 39.7   MCV 94          ASSESSMENT/PLAN  (M05.79) Seropositive rheumatoid arthritis of multiple sites (H)  (primary encounter diagnosis)  (R76.8) Cyclic citrullinated peptide (CCP) antibody positive  (M15.9) Primary osteoarthritis involving multiple joints  (E55.9) Vitamin D deficiency  Comment: Chronic. Managed by rheumatology. c/o posterior neck pain as well as \"all over pain\" that is most likely neuropathic based on description. No longer use PRN oxycodone- Last used in Jan 2023 therefore this medication was stopped. Suspect increased immunosuppression response given recurrent cellulitis concerns--completed dual therapy of amoxicillin and bactrim as directed.   Plan:   -Continue Sulfasalazine 500mg BID for one week then increase to 1000mg BID ongoing per rheumatology recommendations on 6/28/23.   -Continue Tylenol 1000mg every 6 hours PRN  -PRN flexeril  -Continue gabapentin 300mg TID  -Ibuprofen PRN  -Continue WITHOUT humira and methotrexate due to recurrent cellulitis and recent sepsis.   -Continue tolnaftate daily PRN for skin breakdown between the toes. Wash and dry first prior to use.   -Continue Duloxetine 20mg daily as directed. If worsening pain, would recommend dose increase.   -Continue vitamin D 2000U daily  -Follow up with rheumatology as directed. Scheduled for 9/25/23  -ALT, creatinine, CBC and albumin level scheduled for 7/27/23 per rheumatology. Will add BMP too     (I89.0) Lymphedema of both lower extremities  (I73.9) PAD (peripheral artery disease) (H)  (L03.90) Cellulitis  Comment: Chronic. No " claudication complaints. Multiple hospitalizations given recurrent cellulitis concerns. Vascular following with recent visit on 6/27/22. Recommendations for lymphedema therapist for MLD, compression and possibly pump therapy in near future. On arrival febrile, tachy with elevated wbc/CRP, MRSA+. CT tibia fibula and femur consistent with cellulitis. No evidence deep space infection. Recently hospitalized for RLEcellulitis and discharged on Bactrim/amoxicillin. Hospitalized with recurrence, improved on IV vanco and discharged on Bactrim/amox again (one week). Seen by ID. Suspect her immune suppression from RA meds is contributing to recurrent infections, along with morbid obesity, lymphedema, and tinea pedis (also discharged with antifungal topical).   Plan:   -Follow up with vascular clinic as directed in 6 months as directed. Due Dec 2023.   -Monitor weights weekly. Recent weight done at vascular appt which was 332# on 6/27/23. She has often refused weights to be done on site.   -Continue lyphmhatic formula 1 capsule daily per vascular recommendations  -Follow up with lymphedema therapy on site  -Continue on site wound care provider for ongoing bilateral lower extremities wound care needs  -Monitor for worsening s/sx of concerns  -Follow up with infectious disease as directed. Scheduled for 7/27/23  -Elevate bilateral lower extremities as much as possible  -Velcro wraps to bilateral lower extremities daily. On in AM and off at HS  -ALT, creatinine, CBC and albumin level scheduled for 7/27/23 per rheumatology. Will add BMP too  -Continue wound care to bilateral lower extremities as directed  *WOUND/DRESSING INSTRUCTIONS: Type of wound: Lymphademic Location of wound: BLE (R medial leg, L medial leg, L lateral leg, R shin) Instruction for Irrigation/Cleaning: Cleanse with warm water and wash cloth -Apply Eucerin cream     (I10) Essential hypertension with goal blood pressure less than 140/90  (Z86.718) History of DVT  of lower extremity  (E78.00) High blood cholesterol  Comment: Chronic. Based on JNC-8 goals,  patients age of 57 year old, no presence of diabetes or CKD, and goals of care goal BP is <150/90 mm Hg. Patient is stable with current plan of care and routine assessment. History of DVT to LLE. SBP readings remaining 130s-150s.   Plan:   -Monitor BP and HR. If SBP levels remain elevated on next assessment, would recommend medication adjustment.   -Continue hydrochlorothiazide 25mg daily.   -Continue xarelto 20mg daily  -ALT, creatinine, CBC and albumin level scheduled for 7/27/23 per rheumatology. Will add BMP too     (E66.01) Morbid obesity (H)  (G47.33) Obstructive sleep apnea syndrome  Comment: Chronic. Hx of LOPEZ, does not have CPAP as this causes sinus problems per patient. Patient with frequent desaturations while sleeping during history of hospital admission. Recommend follow up with sleep medicine provider to provide recommendations for management which she last saw at [facility] location last in 2016. History of failed sinus cavity which therefore CPAP use is not effective for this. Tolerating oxygen at HS well without concerns.   Plan:   -Monitor sleeping patterns  -Monitor respiratory status  -Oxygen used at night- 2L  -Encourage cough and deep breathing. Encourage IS  -ALT, creatinine, CBC and albumin level scheduled for 7/27/23 per rheumatology. Will add BMP too     (D64.9) Anemia, unspecified type  Comment: Acute on chronic. Baseline hgb around 12  Plan:   -Monitor bleeding risks  -Monitor for worsening s/sx of concerns  -ALT, creatinine, CBC and albumin level scheduled for 7/27/23 per rheumatology. 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 Noted on 6/13/23: Nurse called to report: Increased worsening fungal dermatitis noted to bilateral groin, under abdominal folds and under breast. No relief from nystatin powder application. She is noncompliant with  showers. Has been accepting bed baths primarily. Alternative topical agents ordered along with medicated shampoo. Slowly improving noted today  Plan:  -Continue claritin 10mg daily as directed  -Continue ketoconazole cream BID as directed  -Continue ketoconazole shampoo on shower days as directed  -Encourage interdry and/olr pillow cases under areas to prevent moisture build up concerns  -Dermatology PRN if worsens or no relief  -ALT, creatinine, CBC and albumin level scheduled for 7/27/23 per rheumatology. Will add BMP too     Electronically signed by:  Arlen Carlson DNP, APRN          Sincerely,        Arlen Carlson, KRISTIN CNP

## 2023-07-24 VITALS
TEMPERATURE: 98.7 F | BODY MASS INDEX: 53.92 KG/M2 | HEIGHT: 62 IN | RESPIRATION RATE: 17 BRPM | WEIGHT: 293 LBS | DIASTOLIC BLOOD PRESSURE: 82 MMHG | HEART RATE: 85 BPM | OXYGEN SATURATION: 95 % | SYSTOLIC BLOOD PRESSURE: 147 MMHG

## 2023-07-24 RX ORDER — AMOXICILLIN 500 MG/1
1000 CAPSULE ORAL 2 TIMES DAILY
COMMUNITY
End: 2023-07-29

## 2023-07-24 RX ORDER — FLUCONAZOLE 150 MG/1
150 TABLET ORAL DAILY
COMMUNITY
Start: 2023-07-20 | End: 2023-07-27

## 2023-07-24 NOTE — PROGRESS NOTES
[facility]IATR[facility]    PRIMARY CARE PROVIDER AND CLINIC:  KRISTIN Rodriges CNP, [address] / [place] [address]  Chief Complaint   Patient presents with    Hospital F/U    [facility]      [facility] Medical Record Number:  [id number]  Place of Service where encounter took place:  [facility] () [[id number]]    María Elena Saab  is a 58 year old  (1965), admitted to the above facility from  [facility] . Hospital stay 7/17/23 through 7/20/23..   HPI:    Admitted into hospital due to worsening of LLE cellulitis. Completed course of treatment with vancomycin and zosyn initially, which was narrowed to ceftriaxone. Some initial concern for sepsis syndrome, but patient rapidly improved. Findings seem more consistent with some stasis dermatitis, and had more of a strep appearance. No abscess identified. Although MRSA screen positive, did not have purulence to abscess. She discharged on oral antifungal along with oral antibiotic for one week.     The primary encounter diagnosis was Tinea. Diagnoses of Fungal dermatitis, Anemia, unspecified type, Obstructive sleep apnea syndrome, Morbid obesity (H), High blood cholesterol, Essential hypertension with goal blood pressure less than 140/90, History of DVT of lower extremity, PAD (peripheral artery disease) (H), Cellulitis, unspecified cellulitis site, Primary osteoarthritis involving multiple joints, Seropositive rheumatoid arthritis of multiple sites (H), Polyarthralgia, Cyclic citrullinated peptide (CCP) antibody positive, Vitamin D deficiency, Lymphedema of both lower extremities, Slow transit constipation, Personal history of DVT (deep vein thrombosis), and Recurrent major depressive disorder, in partial remission (H) were also pertinent to this visit.    Met with patient who denies any chest pain, palpitations, shortness of breath, ESPITIA, lightheadedness, dizziness, or cough. Denies any abdominal  discomfort. Denies N&V. Denies B&B concerns. Denies dysuria or frequency. Denies loose or constipation. Appetite good. Sleeping well. Minimal complaints of pain to bilateral lower extremities. Fungal dermatitis improving to armpits and abdominal folds/groin today. Mood stable. Nursing denies any acute concerns.     BP Readings from Last 3 Encounters:   07/24/23 (!) 147/82   07/12/23 (!) 163/91   06/28/23 (!) 155/87     Wt Readings from Last 5 Encounters:   07/24/23 149.3 kg (329 lb 3.2 oz)   07/12/23 149.3 kg (329 lb 3.2 oz)   06/28/23 149.3 kg (329 lb 3.2 oz)   06/27/23 (!) 150.6 kg (332 lb)   06/13/23 149.3 kg (329 lb 3.2 oz)     CODE STATUS/ADVANCE DIRECTIVES DISCUSSION:  Full Code  CPR/Full code   ALLERGIES:   Allergies   Allergen Reactions    Adhesive Tape Other (See Comments)     As on band-aids;  Causes skin tearing/wounds.    Adhesive [Cyanoacrylate] Other (See Comments)     As on band-aids;  Causes skin tearing/wounds.    Banana Unknown     She avoids due to Latex allergy.  If she eats them 3 days in a row, gets stomach cramps and dark stool.  TBrinkMD - 4/10/15    Food Unknown and GI Disturbance     Bananas, grapes, pine nuts    Artifical sweetner    Grape [Grape (Artificial) Flavor] Unknown     She avoids due to Latex allergy.  If she eats lots of them, and she likes them, stomach gets a little queasy.  TBrinkMD - 4/10/15    Morphine (Pf) [Morphine] Other (See Comments)     Pt has not reacted to this med herself, but mother has anaphylactic reaction and other family members get nausea/vomiting.    Other Environmental Allergy Swelling     leather    Pine     Pseudoephedrine Other (See Comments)     Insomnia    Pseudoephedrine Cold/Allergy [Chlorpheniramine-Pseudoeph] Other (See Comments)     Keeps pt awake up to 24 hours     Latex Rash     Hands get red from rubber gloves, but okay if she washes them right away.  Hands get red from rubber gloves, but okay if she washes them right away.    Other Food  Allergy Rash     Pine trees, leather .       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  RD reports that she is not willing to commit to the program outlined.  See scanned document.  12/15/2015 - Marcio Quinonez MD       Nocturnal hypoxemia - probable sleep apnea - had overnight pulse oximetry, April, 2015. 5/22/2015    Obesity 4/10/2015    LOPEZ (obstructive sleep apnea) 5/22/2015    Osteoarthritis     Peripheral vascular disease (H)     Pleural effusion     Pressure ulcer of foot     Rheumatoid arthritis (H) 12/30/2014    seronegative    Sepsis (H)     Seropositive rheumatoid arthritis (H) 1/19/2016    Vitamin D deficiency 8/26/2015      PAST SURGICAL HISTORY:   has a past surgical history that includes Total Knee Arthroplasty (Left, 2006); joint replacement; Total Hip Arthroplasty (Left, 10/18/2016); and Peripherally Inserted Central Catheter Insertion (Right, 07/14/2019).  FAMILY HISTORY: family history includes Arthritis in her mother; Breast Cancer in her cousin; Breast Cancer (age of onset: 50.00) in her maternal grandmother; Breast Cancer (age of onset: 54.00) in her maternal aunt and mother; Cancer in her mother; Cataracts in her father and mother; Deep Vein Thrombosis in her father; Heart Disease in her maternal grandmother; Multiple myeloma in her father; No Known Problems in her sister; Other - See Comments in her brother; Rheumatoid Arthritis in her maternal grandmother and paternal aunt; Sleep Apnea in her brother;  Snoring in her father.  SOCIAL HISTORY:   reports that she has quit smoking. Her smoking use included cigarettes. She has a 30.00 pack-year smoking history. She has never used smokeless tobacco. She reports that she does not currently use alcohol. She reports that she does not use drugs.  Patient's living condition: lives in a skilled nursing facility    Post Discharge Medication Reconciliation Status:   MED REC REQUIRED  Post Medication Reconciliation Status:  Discharge medications reconciled and changed, see notes/orders       Current Outpatient Medications   Medication Sig    acetaminophen (TYLENOL) 325 MG tablet Take 650 mg by mouth every 6 hours as needed for mild pain    amoxicillin (AMOXIL) 500 MG capsule Take 1,000 mg by mouth 2 times daily    ascorbic acid, vitamin C, (ASCORBIC ACID WITH KAEL HIPS) 500 MG tablet [ASCORBIC ACID, VITAMIN C, (ASCORBIC ACID WITH KAEL HIPS) 500 MG TABLET] Take 500 mg by mouth 2 (two) times a day.    cholecalciferol 50 MCG (2000 UT) tablet Take 1 tablet (50 mcg) by mouth daily    cyclobenzaprine (FLEXERIL) 10 MG tablet Take 1 tablet (10 mg) by mouth every 8 hours as needed for muscle spasms    DULoxetine (CYMBALTA) 20 MG capsule [DULOXETINE (CYMBALTA) 20 MG CAPSULE] Take 1 capsule (20 mg total) by mouth daily.    fluconazole (DIFLUCAN) 150 MG tablet Take 150 mg by mouth daily    gabapentin (NEURONTIN) 300 MG capsule Take 1 capsule (300 mg) by mouth 3 times daily    hydroCHLOROthiazide (HYDRODIURIL) 25 MG tablet [HYDROCHLOROTHIAZIDE (HYDRODIURIL) 25 MG TABLET] Take 25 mg by mouth daily.    ketoconazole (NIZORAL) 2 % external cream Apply to all reddened areas to under breast and abdominal folds BID    ketoconazole (NIZORAL) 2 % external shampoo Apply topically daily as needed for itching or irritation Apply to body daily PRN on shower days x 8 weeks. Then change to daily PRN thereafter    loratadine (CLARITIN) 10 MG tablet Take 1 tablet (10 mg) by mouth daily    miconazole  (MICATIN) 2 % external powder Apply to bilateral breast, abdominal folds BID    NEW MED Take 1 capsule by mouth daily Lymphatic formula    rivaroxaban ANTICOAGULANT (XARELTO) 20 MG TABS tablet Take 20 mg by mouth daily    sulfaSALAzine ER (AZULFIDINE EN) 500 MG EC tablet Take 500mg po twice daily for one week then 1000mg po twice daily    tolnaftate (TINACTIN) 1 % external cream Apply topically daily as needed for irritation Apply topically daily prn for skin breakdown btwn toes. Wash and dry first.    folic acid (FOLVITE) 1 MG tablet Take 1 mg by mouth (Patient not taking: Reported on 7/12/2023)    ibuprofen (ADVIL/MOTRIN) 400 MG tablet Take 1 tablet (400 mg) by mouth every 6 hours as needed for moderate pain (4-6) (Patient not taking: Reported on 7/24/2023)    miconazole (MICATIN) 2 % external powder Apply topically 4 times daily as needed (dermatitis) (Patient not taking: Reported on 7/24/2023)    mineral oil-white petrolatum (EUCERIN/MINERIN) cream Apply to bilateral lower extremities daily. (Patient not taking: Reported on 7/24/2023)    sennosides (SENOKOT) 8.6 MG tablet Take 1 tablet by mouth 2 times daily as needed for constipation (Patient not taking: Reported on 7/12/2023)     No current facility-administered medications for this visit.     Case Management:  I have reviewed the facility/SNF care plan/MDS which was done  , including the falls risk, nutrition and pain screening. I also reviewed the current immunizations, and preventive care.. Future cancer screening is not clinically indicated secondary to age/goals of care.   Patient's desire to return to the community is not present.    Advance Directive Discussion:    I reviewed the current advanced directives as reflected in EPIC, the POLST and the facility chart, and verified the congruency of orders.  I contacted the first party [facility] and left a message regarding the plan of Care. I did review the advance directives with the resident.     Team  "Discussion:  I communicated with the appropriate disciplines involved with the Plan of Care:   Nursing  , PT  , OT  ,   , and Dietitian      Patient Goal:  Patient's goal is pain control and comfort.    Immunizations:  Most Recent Immunizations   Administered Date(s) Administered    COVID-19 Bivalent 12+ (Pfizer) 11/21/2022    COVID-19 MONOVALENT 12+ (Pfizer) 05/20/2021    COVID-19 Monovalent 18+ (Moderna) 08/04/2022    COVID-19 Monovalent Booster 18+ (Moderna) 11/30/2021    Flu, Unspecified 10/10/2019    Influenza (High Dose) 3 valent vaccine 10/19/2021    Influenza Vaccine 65+ (Fluzone HD) 10/19/2022    Influenza Vaccine >6 months (Alfuria,Fluzone) 10/16/2014    Pneumo Conj 13-V (2010&after) 04/10/2015    TD,PF 7+ (Tenivac) 04/19/2004    TDAP (Adacel,Boostrix) 04/10/2015    TDAP Vaccine (Boostrix) 04/10/2015    Td (Adult), Adsorbed 12/05/1990     Above immunizations pulled from [facility]. [facility] and facility records also reconciled. Outstanding information sent to  to update [facility].  Future immunizations are not needed at this point as all recommended immunizations are up to date. /due to refusal.   Information reviewed:  Medications, vital signs, orders, and nursing notes.    ROS:  10 point ROS of systems including Constitutional, Eyes, Respiratory, Cardiovascular, Gastroenterology, Genitourinary, Integumentary, Musculoskeletal, Psychiatric were all negative except for pertinent positives noted in my HPI.    Vitals:  BP (!) 147/82   Pulse 85   Temp 98.7  F (37.1  C)   Resp 17   Ht 1.575 m (5' 2\")   Wt 149.3 kg (329 lb 3.2 oz)   SpO2 95%   BMI 60.21 kg/m    Exam:  GENERAL APPEARANCE:  Alert, in no distress, oriented, morbidly obese, cooperative  ENT:  Mouth and posterior oropharynx normal, moist mucous membranes, normal hearing acuity  EYES:  EOM, conjunctivae, lids, pupils and irises normal  RESP:  respiratory effort and palpation of chest normal, lungs clear to " auscultation , no respiratory distress  CV:  Palpation and auscultation of heart done , regular rate and rhythm, no murmur, rub, or gallop, peripheral edema 1+ in BLE  ABDOMEN:  normal bowel sounds, soft, nontender, no hepatosplenomegaly or other masses, no guarding or rebound  M/S:   Wheelchair bound. Often isolates herself in room  SKIN:  fungal dermatitis noted to right armpit and abdominal folds-slow improving today. Macerated skin noted to bilateral lower extremities with minimal redness present. Minimal pain complaints. See photo  NEURO:   Cranial nerves 2-12 are normal tested and grossly at patient's baseline, no purposeful movement in upper and lower extremities  PSYCH:  oriented X 3, normal insight, judgement and memory, affect and mood normal      Lab/Diagnostic data:  Most Recent 3 CBC's:  Recent Labs   Lab Test 07/11/23  0825 06/22/23  1110 05/18/23  0935   WBC 4.6 5.6 5.0   HGB 12.0 13.0 11.7   MCV 94 93 95    355 251     Most Recent 3 BMP's:  Recent Labs   Lab Test 07/11/23  0825 06/22/23  1110 05/18/23  0935 05/04/23  0855     --  141 137   POTASSIUM 3.9  --  4.0 4.2   CHLORIDE 100  --  100 100   CO2 28  --  29 28   BUN 15.8  --  15.3 13.2   CR 0.76 0.73 0.50* 0.70   ANIONGAP 11  --  12 9   WILVER 9.1  --  9.2 9.0   GLC 81  --  96 86     Most Recent 2 LFT's:  Recent Labs   Lab Test 07/11/23  0825 06/22/23  1110 05/04/23  0855   AST 25  --  21   ALT 9 12 6*   ALKPHOS 73  --  67   BILITOTAL 0.2  --  0.2     Most Recent Cholesterol Panel:  Recent Labs   Lab Test 12/02/21  1155   CHOL 221*   *   HDL 65   TRIG 101     Most Recent Hemoglobin A1c:  Recent Labs   Lab Test 12/02/21  1155   A1C 5.3     Most Recent ESR & CRP:  Recent Labs   Lab Test 07/11/23  0825 11/22/22  0654 12/21/20  0942   CRP  --   --  1.3*   CRPI 13.10*   < >  --     < > = values in this interval not displayed.     Most Recent Anemia Panel:  Recent Labs   Lab Test 07/11/23  0825   WBC 4.6   HGB 12.0   HCT 39.7   MCV 94  "       Vitamin D Deficiency Screening Results:  Lab Results   Component Value Date    VITDT 50 01/04/2022    VITDT 70 12/02/2021       ASSESSMENT/PLAN  (M05.79) Seropositive rheumatoid arthritis of multiple sites (H)  (primary encounter diagnosis)  (R76.8) Cyclic citrullinated peptide (CCP) antibody positive  (M15.9) Primary osteoarthritis involving multiple joints  (E55.9) Vitamin D deficiency  Comment: Chronic. Managed by rheumatology. c/o posterior neck pain as well as \"all over pain\" that is most likely neuropathic based on description. No longer use PRN oxycodone- Last used in Jan 2023 therefore this medication was stopped. Suspect increased immunosuppression response given recurrent cellulitis concerns    Plan:   -Continue Sulfasalazine 1000mg BID ongoing per rheumatology recommendations on 6/28/23.   -Continue Tylenol 1000mg every 6 hours PRN  -PRN flexeril  -Continue gabapentin 300mg TID  -Ibuprofen PRN  -Continue WITHOUT humira and methotrexate due to recurrent cellulitis and recent sepsis.   -Continue tolnaftate daily PRN for skin breakdown between the toes. Wash and dry first prior to use.   -Continue Duloxetine 20mg daily as directed. If worsening pain, would recommend dose increase.   -Continue vitamin D 2000U daily  -Follow up with rheumatology as directed. Scheduled for 9/25/23  -BMP, CBC, CRP, albumin, and procalcitonin due 7/27/23     (I89.0) Lymphedema of both lower extremities  (I73.9) PAD (peripheral artery disease) (H)  (L03.90) Cellulitis  Comment: Chronic. No claudication complaints. Multiple hospitalizations this year due to recurrent cellulitis. During recent hospital stay she completed course of treatment with vancomycin and zosyn initially, which was narrowed to ceftriaxone. Some initial concern for sepsis syndrome, but patient rapidly improved. Findings seem more consistent with some stasis dermatitis, and had more of a strep appearance. No abscess identified. Although MRSA screen " positive, did not have purulence to abscess.  Plan:   -Follow up with vascular clinic as directed in 6 months as directed. Due Dec 2023.   -Continue amoxicillin BID as directed. End date on 7/29/23  -Continue ongoing wound management. In house wound provider to follow as directed  -Monitor weights weekly. Recent weight done at vascular appt which was 332# on 6/27/23. She has often refused weights to be done on site.   -Continue lyphmhatic formula 1 capsule daily per vascular recommendations  -HOLD off on lymphedema needs given current infection risks for now. Recommend to restart once wound heals.   -Monitor for worsening s/sx of concerns  -Follow up with infectious disease as directed. Scheduled for 7/27/23  -Elevate bilateral lower extremities as much as possible  -BMP, CBC, CRP, albumin, and procalcitonin due 7/27/23  -Continue wound care to bilateral lower extremities as directed  *WOUND/DRESSING INSTRUCTIONS: Type of wound: Lymphademic Location of wound: BLE (R medial leg, L medial leg, L lateral leg, R shin) Instruction for Irrigation/Cleaning: Cleanse with warm water and wash cloth -Apply Eucerin cream     (I10) Essential hypertension with goal blood pressure less than 140/90  (Z86.718) History of DVT of lower extremity  (E78.00) High blood cholesterol  Comment: Chronic. Based on JNC-8 goals,  patients age of 57 year old, no presence of diabetes or CKD, and goals of care goal BP is <150/90 mm Hg. Patient is stable with current plan of care and routine assessment. History of DVT to LLE. SBP readings remaining 130s-150s.   Plan:   -Monitor BP and HR. If SBP levels remain elevated on next assessment, would recommend medication adjustment.   -Continue hydrochlorothiazide 25mg daily.   -Continue xarelto 20mg daily  -BMP, CBC, CRP, albumin, and procalcitonin due 7/27/23     (E66.01) Morbid obesity (H)  (G47.33) Obstructive sleep apnea syndrome  Comment: Chronic. Hx of LOPEZ, does not have CPAP as this causes sinus  problems per patient. Patient with frequent desaturations while sleeping during history of hospital admission. Recommend follow up with sleep medicine provider to provide recommendations for management which she last saw at [facility] location last in 2016. History of failed sinus cavity which therefore CPAP use is not effective for this. Tolerating oxygen at HS well without concerns.   Plan:   -Monitor sleeping patterns  -Monitor respiratory status  -Oxygen used at night- 2L  -Encourage cough and deep breathing. Encourage IS  -BMP, CBC, CRP, albumin, and procalcitonin due 7/27/23     (D64.9) Anemia, unspecified type  Comment: Acute on chronic. Baseline hgb around 12  Plan:   -Monitor bleeding risks  -Monitor for worsening s/sx of concerns  -BMP, CBC, CRP, albumin, and procalcitonin due 7/27/23     (B35.9) Tinea  (B35.9) Fungal dermatitis  Comment: Acute on chronic. Noted on 6/13/23: Nurse called to report: Increased worsening fungal dermatitis noted to bilateral groin, under abdominal folds and under breast. No relief from nystatin powder application. She is noncompliant with showers. Has been accepting bed baths primarily. Alternative topical agents ordered along with medicated shampoo. Slowly improving noted today  Plan:  -Continue claritin 10mg daily as directed  -Continue diflucan as directed. Due to stop 7/27  -Cleanse skin with ketoconazole shampoo to breast and abdominal folds BID. Dry thoroughly, Then apply miconazole powder to areas and place interdry to wick away moisture. Perform BID and PRN until resolves.   -Encourage interdry and/olr pillow cases under areas to prevent moisture build up concerns  -Dermatology PRN if worsens or no relief  -BMP, CBC, CRP, albumin, and procalcitonin due 7/27/23     Electronically signed by:  Arlen Carlson DNP, APRN

## 2023-07-25 ENCOUNTER — NURSING HOME VISIT (OUTPATIENT)
Dept: GERIATRICS | Facility: CLINIC | Age: 58
End: 2023-07-25
Payer: COMMERCIAL

## 2023-07-25 DIAGNOSIS — B36.9 FUNGAL DERMATITIS: ICD-10-CM

## 2023-07-25 DIAGNOSIS — I73.9 PAD (PERIPHERAL ARTERY DISEASE) (H): ICD-10-CM

## 2023-07-25 DIAGNOSIS — R76.8 CYCLIC CITRULLINATED PEPTIDE (CCP) ANTIBODY POSITIVE: ICD-10-CM

## 2023-07-25 DIAGNOSIS — E55.9 VITAMIN D DEFICIENCY: ICD-10-CM

## 2023-07-25 DIAGNOSIS — D64.9 ANEMIA, UNSPECIFIED TYPE: ICD-10-CM

## 2023-07-25 DIAGNOSIS — Z86.718 HISTORY OF DVT OF LOWER EXTREMITY: ICD-10-CM

## 2023-07-25 DIAGNOSIS — K59.01 SLOW TRANSIT CONSTIPATION: ICD-10-CM

## 2023-07-25 DIAGNOSIS — M05.79 SEROPOSITIVE RHEUMATOID ARTHRITIS OF MULTIPLE SITES (H): ICD-10-CM

## 2023-07-25 DIAGNOSIS — M15.0 PRIMARY OSTEOARTHRITIS INVOLVING MULTIPLE JOINTS: ICD-10-CM

## 2023-07-25 DIAGNOSIS — L03.90 CELLULITIS, UNSPECIFIED CELLULITIS SITE: ICD-10-CM

## 2023-07-25 DIAGNOSIS — E66.01 MORBID OBESITY (H): ICD-10-CM

## 2023-07-25 DIAGNOSIS — B35.9 TINEA: Primary | ICD-10-CM

## 2023-07-25 DIAGNOSIS — I10 ESSENTIAL HYPERTENSION WITH GOAL BLOOD PRESSURE LESS THAN 140/90: ICD-10-CM

## 2023-07-25 DIAGNOSIS — I89.0 LYMPHEDEMA OF BOTH LOWER EXTREMITIES: ICD-10-CM

## 2023-07-25 DIAGNOSIS — F33.41 RECURRENT MAJOR DEPRESSIVE DISORDER, IN PARTIAL REMISSION (H): ICD-10-CM

## 2023-07-25 DIAGNOSIS — G47.33 OBSTRUCTIVE SLEEP APNEA SYNDROME: ICD-10-CM

## 2023-07-25 DIAGNOSIS — Z86.718 PERSONAL HISTORY OF DVT (DEEP VEIN THROMBOSIS): ICD-10-CM

## 2023-07-25 DIAGNOSIS — M25.50 POLYARTHRALGIA: ICD-10-CM

## 2023-07-25 DIAGNOSIS — E78.00 HIGH BLOOD CHOLESTEROL: ICD-10-CM

## 2023-07-25 PROCEDURE — 99309 SBSQ NF CARE MODERATE MDM 30: CPT | Performed by: NURSE PRACTITIONER

## 2023-07-25 RX ORDER — IBUPROFEN 400 MG/1
400 TABLET, FILM COATED ORAL EVERY 6 HOURS PRN
Qty: 30 TABLET | Refills: 11
Start: 2023-07-25 | End: 2024-05-21

## 2023-07-25 NOTE — LETTER
7/25/2023        RE: María Elena Saab  [facility] [address]  [address]  [place] [address]        [facility] GERIATRICS    PRIMARY CARE PROVIDER AND CLINIC:  Arlen Carlson, KRISTIN CNP, [address] / [place] [address]  Chief Complaint   Patient presents with     Hospital F/U     Annual [facility] Nursing Home      [facility] Medical Record Number:  [id number]  Place of Service where encounter took place:  [facility] () [[id number]]    María Elena Saab  is a 58 year old  (1965), admitted to the above facility from  [facility] . Hospital stay 7/17/23 through 7/20/23..   HPI:    Admitted into hospital due to worsening of LLE cellulitis. Completed course of treatment with vancomycin and zosyn initially, which was narrowed to ceftriaxone. Some initial concern for sepsis syndrome, but patient rapidly improved. Findings seem more consistent with some stasis dermatitis, and had more of a strep appearance. No abscess identified. Although MRSA screen positive, did not have purulence to abscess. She discharged on oral antifungal along with oral antibiotic for one week.     The primary encounter diagnosis was Tinea. Diagnoses of Fungal dermatitis, Anemia, unspecified type, Obstructive sleep apnea syndrome, Morbid obesity (H), High blood cholesterol, Essential hypertension with goal blood pressure less than 140/90, History of DVT of lower extremity, PAD (peripheral artery disease) (H), Cellulitis, unspecified cellulitis site, Primary osteoarthritis involving multiple joints, Seropositive rheumatoid arthritis of multiple sites (H), Polyarthralgia, Cyclic citrullinated peptide (CCP) antibody positive, Vitamin D deficiency, Lymphedema of both lower extremities, Slow transit constipation, Personal history of DVT (deep vein thrombosis), and Recurrent major depressive disorder, in partial remission (H) were also pertinent to this visit.    Met with patient who denies  any chest pain, palpitations, shortness of breath, ESPITIA, lightheadedness, dizziness, or cough. Denies any abdominal discomfort. Denies N&V. Denies B&B concerns. Denies dysuria or frequency. Denies loose or constipation. Appetite good. Sleeping well. Minimal complaints of pain to bilateral lower extremities. Fungal dermatitis improving to armpits and abdominal folds/groin today. Mood stable. Nursing denies any acute concerns.     BP Readings from Last 3 Encounters:   07/24/23 (!) 147/82   07/12/23 (!) 163/91   06/28/23 (!) 155/87     Wt Readings from Last 5 Encounters:   07/24/23 149.3 kg (329 lb 3.2 oz)   07/12/23 149.3 kg (329 lb 3.2 oz)   06/28/23 149.3 kg (329 lb 3.2 oz)   06/27/23 (!) 150.6 kg (332 lb)   06/13/23 149.3 kg (329 lb 3.2 oz)     CODE STATUS/ADVANCE DIRECTIVES DISCUSSION:  Full Code  CPR/Full code   ALLERGIES:   Allergies   Allergen Reactions     Adhesive Tape Other (See Comments)     As on band-aids;  Causes skin tearing/wounds.     Adhesive [Cyanoacrylate] Other (See Comments)     As on band-aids;  Causes skin tearing/wounds.     Banana Unknown     She avoids due to Latex allergy.  If she eats them 3 days in a row, gets stomach cramps and dark stool.  TBrinkMD - 4/10/15     Food Unknown and GI Disturbance     Bananas, grapes, pine nuts    Artifical sweetner     Grape [Grape (Artificial) Flavor] Unknown     She avoids due to Latex allergy.  If she eats lots of them, and she likes them, stomach gets a little queasy.  TBrinkMD - 4/10/15     Morphine (Pf) [Morphine] Other (See Comments)     Pt has not reacted to this med herself, but mother has anaphylactic reaction and other family members get nausea/vomiting.     Other Environmental Allergy Swelling     leather     Pine      Pseudoephedrine Other (See Comments)     Insomnia     Pseudoephedrine Cold/Allergy [Chlorpheniramine-Pseudoeph] Other (See Comments)     Keeps pt awake up to 24 hours      Latex Rash     Hands get red from rubber gloves, but okay  if she washes them right away.  Hands get red from rubber gloves, but okay if she washes them right away.     Other Food Allergy Rash     Pine trees, leather .       PAST MEDICAL HISTORY:   Past Medical History:   Diagnosis Date     Aseptic necrosis of femoral head (H)     LEFT     Bacteremia due to group B Streptococcus      Cellulitis of right lower extremity      DJD (degenerative joint disease)      DVT, bilateral lower limbs (H) 10/2014     Edema 5/22/2015     Essential hypertension with goal blood pressure less than 140/90      Failure to thrive      History of blood clots      Hypokalemia 10/15/2014     Lung mass 10/18/2014     Morbid obesity (H) 4/10/2015    Had formal nutrition consult at [facility].  RD reports that she is not willing to commit to the program outlined.  See scanned document.  12/15/2015 - Marcio Quinonez MD        Nocturnal hypoxemia - probable sleep apnea - had overnight pulse oximetry, April, 2015. 5/22/2015     Obesity 4/10/2015     LOPEZ (obstructive sleep apnea) 5/22/2015     Osteoarthritis      Peripheral vascular disease (H)      Pleural effusion      Pressure ulcer of foot      Rheumatoid arthritis (H) 12/30/2014    seronegative     Sepsis (H)      Seropositive rheumatoid arthritis (H) 1/19/2016     Vitamin D deficiency 8/26/2015      PAST SURGICAL HISTORY:   has a past surgical history that includes Total Knee Arthroplasty (Left, 2006); joint replacement; Total Hip Arthroplasty (Left, 10/18/2016); and Peripherally Inserted Central Catheter Insertion (Right, 07/14/2019).  FAMILY HISTORY: family history includes Arthritis in her mother; Breast Cancer in her cousin; Breast Cancer (age of onset: 50.00) in her maternal grandmother; Breast Cancer (age of onset: 54.00) in her maternal aunt and mother; Cancer in her mother; Cataracts in her father and mother; Deep Vein Thrombosis in her father; Heart Disease in her maternal grandmother; Multiple myeloma in her father; No Known Problems in  her sister; Other - See Comments in her brother; Rheumatoid Arthritis in her maternal grandmother and paternal aunt; Sleep Apnea in her brother; Snoring in her father.  SOCIAL HISTORY:   reports that she has quit smoking. Her smoking use included cigarettes. She has a 30.00 pack-year smoking history. She has never used smokeless tobacco. She reports that she does not currently use alcohol. She reports that she does not use drugs.  Patient's living condition: lives in a skilled nursing facility    Post Discharge Medication Reconciliation Status:   MED REC REQUIRED  Post Medication Reconciliation Status:  Discharge medications reconciled and changed, see notes/orders       Current Outpatient Medications   Medication Sig     acetaminophen (TYLENOL) 325 MG tablet Take 650 mg by mouth every 6 hours as needed for mild pain     amoxicillin (AMOXIL) 500 MG capsule Take 1,000 mg by mouth 2 times daily     ascorbic acid, vitamin C, (ASCORBIC ACID WITH KAEL HIPS) 500 MG tablet [ASCORBIC ACID, VITAMIN C, (ASCORBIC ACID WITH KAEL HIPS) 500 MG TABLET] Take 500 mg by mouth 2 (two) times a day.     cholecalciferol 50 MCG (2000 UT) tablet Take 1 tablet (50 mcg) by mouth daily     cyclobenzaprine (FLEXERIL) 10 MG tablet Take 1 tablet (10 mg) by mouth every 8 hours as needed for muscle spasms     DULoxetine (CYMBALTA) 20 MG capsule [DULOXETINE (CYMBALTA) 20 MG CAPSULE] Take 1 capsule (20 mg total) by mouth daily.     fluconazole (DIFLUCAN) 150 MG tablet Take 150 mg by mouth daily     gabapentin (NEURONTIN) 300 MG capsule Take 1 capsule (300 mg) by mouth 3 times daily     hydroCHLOROthiazide (HYDRODIURIL) 25 MG tablet [HYDROCHLOROTHIAZIDE (HYDRODIURIL) 25 MG TABLET] Take 25 mg by mouth daily.     ketoconazole (NIZORAL) 2 % external cream Apply to all reddened areas to under breast and abdominal folds BID     ketoconazole (NIZORAL) 2 % external shampoo Apply topically daily as needed for itching or irritation Apply to body daily PRN on  shower days x 8 weeks. Then change to daily PRN thereafter     loratadine (CLARITIN) 10 MG tablet Take 1 tablet (10 mg) by mouth daily     miconazole (MICATIN) 2 % external powder Apply to bilateral breast, abdominal folds BID     NEW MED Take 1 capsule by mouth daily Lymphatic formula     rivaroxaban ANTICOAGULANT (XARELTO) 20 MG TABS tablet Take 20 mg by mouth daily     sulfaSALAzine ER (AZULFIDINE EN) 500 MG EC tablet Take 500mg po twice daily for one week then 1000mg po twice daily     tolnaftate (TINACTIN) 1 % external cream Apply topically daily as needed for irritation Apply topically daily prn for skin breakdown btwn toes. Wash and dry first.     folic acid (FOLVITE) 1 MG tablet Take 1 mg by mouth (Patient not taking: Reported on 7/12/2023)     ibuprofen (ADVIL/MOTRIN) 400 MG tablet Take 1 tablet (400 mg) by mouth every 6 hours as needed for moderate pain (4-6) (Patient not taking: Reported on 7/24/2023)     miconazole (MICATIN) 2 % external powder Apply topically 4 times daily as needed (dermatitis) (Patient not taking: Reported on 7/24/2023)     mineral oil-white petrolatum (EUCERIN/MINERIN) cream Apply to bilateral lower extremities daily. (Patient not taking: Reported on 7/24/2023)     sennosides (SENOKOT) 8.6 MG tablet Take 1 tablet by mouth 2 times daily as needed for constipation (Patient not taking: Reported on 7/12/2023)     No current facility-administered medications for this visit.     Case Management:  I have reviewed the facility/SNF care plan/MDS which was done  , including the falls risk, nutrition and pain screening. I also reviewed the current immunizations, and preventive care.. Future cancer screening is not clinically indicated secondary to age/goals of care.   Patient's desire to return to the community is not present.    Advance Directive Discussion:    I reviewed the current advanced directives as reflected in EPIC, the POLST and the facility chart, and verified the congruency of  "orders.  I contacted the first party Yenny and left a message regarding the plan of Care. I did review the advance directives with the resident.     Team Discussion:  I communicated with the appropriate disciplines involved with the Plan of Care:   Nursing  , PT  , OT  ,   , and Dietitian      Patient Goal:  Patient's goal is pain control and comfort.    Immunizations:  Most Recent Immunizations   Administered Date(s) Administered     COVID-19 Bivalent 12+ (Pfizer) 11/21/2022     COVID-19 MONOVALENT 12+ (Pfizer) 05/20/2021     COVID-19 Monovalent 18+ (Moderna) 08/04/2022     COVID-19 Monovalent Booster 18+ (Moderna) 11/30/2021     Flu, Unspecified 10/10/2019     Influenza (High Dose) 3 valent vaccine 10/19/2021     Influenza Vaccine 65+ (Fluzone HD) 10/19/2022     Influenza Vaccine >6 months (Alfuria,Fluzone) 10/16/2014     Pneumo Conj 13-V (2010&after) 04/10/2015     TD,PF 7+ (Tenivac) 04/19/2004     TDAP (Adacel,Boostrix) 04/10/2015     TDAP Vaccine (Boostrix) 04/10/2015     Td (Adult), Adsorbed 12/05/1990     Above immunizations pulled from [employer]. [facility] and facility records also reconciled. Outstanding information sent to  to update [facility].  Future immunizations are not needed at this point as all recommended immunizations are up to date. /due to refusal.   Information reviewed:  Medications, vital signs, orders, and nursing notes.    ROS:  10 point ROS of systems including Constitutional, Eyes, Respiratory, Cardiovascular, Gastroenterology, Genitourinary, Integumentary, Musculoskeletal, Psychiatric were all negative except for pertinent positives noted in my HPI.    Vitals:  BP (!) 147/82   Pulse 85   Temp 98.7  F (37.1  C)   Resp 17   Ht 1.575 m (5' 2\")   Wt 149.3 kg (329 lb 3.2 oz)   SpO2 95%   BMI 60.21 kg/m    Exam:  GENERAL APPEARANCE:  Alert, in no distress, oriented, morbidly obese, cooperative  ENT:  Mouth and posterior oropharynx normal, moist mucous " membranes, normal hearing acuity  EYES:  EOM, conjunctivae, lids, pupils and irises normal  RESP:  respiratory effort and palpation of chest normal, lungs clear to auscultation , no respiratory distress  CV:  Palpation and auscultation of heart done , regular rate and rhythm, no murmur, rub, or gallop, peripheral edema 1+ in BLE  ABDOMEN:  normal bowel sounds, soft, nontender, no hepatosplenomegaly or other masses, no guarding or rebound  M/S:   Wheelchair bound. Often isolates herself in room  SKIN:  fungal dermatitis noted to right armpit and abdominal folds-slow improving today. Macerated skin noted to bilateral lower extremities with minimal redness present. Minimal pain complaints. See photo  NEURO:   Cranial nerves 2-12 are normal tested and grossly at patient's baseline, no purposeful movement in upper and lower extremities  PSYCH:  oriented X 3, normal insight, judgement and memory, affect and mood normal    Lab/Diagnostic data:  Most Recent 3 CBC's:  Recent Labs   Lab Test 07/11/23  0825 06/22/23  1110 05/18/23  0935   WBC 4.6 5.6 5.0   HGB 12.0 13.0 11.7   MCV 94 93 95    355 251     Most Recent 3 BMP's:  Recent Labs   Lab Test 07/11/23  0825 06/22/23  1110 05/18/23  0935 05/04/23  0855     --  141 137   POTASSIUM 3.9  --  4.0 4.2   CHLORIDE 100  --  100 100   CO2 28  --  29 28   BUN 15.8  --  15.3 13.2   CR 0.76 0.73 0.50* 0.70   ANIONGAP 11  --  12 9   WILVER 9.1  --  9.2 9.0   GLC 81  --  96 86     Most Recent 2 LFT's:  Recent Labs   Lab Test 07/11/23  0825 06/22/23  1110 05/04/23  0855   AST 25  --  21   ALT 9 12 6*   ALKPHOS 73  --  67   BILITOTAL 0.2  --  0.2     Most Recent Cholesterol Panel:  Recent Labs   Lab Test 12/02/21  1155   CHOL 221*   *   HDL 65   TRIG 101     Most Recent Hemoglobin A1c:  Recent Labs   Lab Test 12/02/21  1155   A1C 5.3     Most Recent ESR & CRP:  Recent Labs   Lab Test 07/11/23  0825 11/22/22  0654 12/21/20  0942   CRP  --   --  1.3*   CRPI 13.10*   < >   "--     < > = values in this interval not displayed.     Most Recent Anemia Panel:  Recent Labs   Lab Test 07/11/23  0825   WBC 4.6   HGB 12.0   HCT 39.7   MCV 94        Vitamin D Deficiency Screening Results:  Lab Results   Component Value Date    VITDT 50 01/04/2022    VITDT 70 12/02/2021       ASSESSMENT/PLAN  (M05.79) Seropositive rheumatoid arthritis of multiple sites (H)  (primary encounter diagnosis)  (R76.8) Cyclic citrullinated peptide (CCP) antibody positive  (M15.9) Primary osteoarthritis involving multiple joints  (E55.9) Vitamin D deficiency  Comment: Chronic. Managed by rheumatology. c/o posterior neck pain as well as \"all over pain\" that is most likely neuropathic based on description. No longer use PRN oxycodone- Last used in Jan 2023 therefore this medication was stopped. Suspect increased immunosuppression response given recurrent cellulitis concerns    Plan:   -Continue Sulfasalazine 1000mg BID ongoing per rheumatology recommendations on 6/28/23.   -Continue Tylenol 1000mg every 6 hours PRN  -PRN flexeril  -Continue gabapentin 300mg TID  -Ibuprofen PRN  -Continue WITHOUT humira and methotrexate due to recurrent cellulitis and recent sepsis.   -Continue tolnaftate daily PRN for skin breakdown between the toes. Wash and dry first prior to use.   -Continue Duloxetine 20mg daily as directed. If worsening pain, would recommend dose increase.   -Continue vitamin D 2000U daily  -Follow up with rheumatology as directed. Scheduled for 9/25/23  -BMP, CBC, CRP, albumin, and procalcitonin due 7/27/23     (I89.0) Lymphedema of both lower extremities  (I73.9) PAD (peripheral artery disease) (H)  (L03.90) Cellulitis  Comment: Chronic. No claudication complaints. Multiple hospitalizations this year due to recurrent cellulitis. During recent hospital stay she completed course of treatment with vancomycin and zosyn initially, which was narrowed to ceftriaxone. Some initial concern for sepsis syndrome, but " patient rapidly improved. Findings seem more consistent with some stasis dermatitis, and had more of a strep appearance. No abscess identified. Although MRSA screen positive, did not have purulence to abscess.  Plan:   -Follow up with vascular clinic as directed in 6 months as directed. Due Dec 2023.   -Continue amoxicillin BID as directed. End date on 7/29/23  -Continue ongoing wound management. In house wound provider to follow as directed  -Monitor weights weekly. Recent weight done at vascular appt which was 332# on 6/27/23. She has often refused weights to be done on site.   -Continue lyphmhatic formula 1 capsule daily per vascular recommendations  -HOLD off on lymphedema needs given current infection risks for now. Recommend to restart once wound heals.   -Monitor for worsening s/sx of concerns  -Follow up with infectious disease as directed. Scheduled for 7/27/23  -Elevate bilateral lower extremities as much as possible  -BMP, CBC, CRP, albumin, and procalcitonin due 7/27/23  -Continue wound care to bilateral lower extremities as directed  *WOUND/DRESSING INSTRUCTIONS: Type of wound: Lymphademic Location of wound: BLE (R medial leg, L medial leg, L lateral leg, R shin) Instruction for Irrigation/Cleaning: Cleanse with warm water and wash cloth -Apply Eucerin cream     (I10) Essential hypertension with goal blood pressure less than 140/90  (Z86.718) History of DVT of lower extremity  (E78.00) High blood cholesterol  Comment: Chronic. Based on JNC-8 goals,  patients age of 57 year old, no presence of diabetes or CKD, and goals of care goal BP is <150/90 mm Hg. Patient is stable with current plan of care and routine assessment. History of DVT to LLE. SBP readings remaining 130s-150s.   Plan:   -Monitor BP and HR. If SBP levels remain elevated on next assessment, would recommend medication adjustment.   -Continue hydrochlorothiazide 25mg daily.   -Continue xarelto 20mg daily  -BMP, CBC, CRP, albumin, and  procalcitonin due 7/27/23     (E66.01) Morbid obesity (H)  (G47.33) Obstructive sleep apnea syndrome  Comment: Chronic. Hx of LOPEZ, does not have CPAP as this causes sinus problems per patient. Patient with frequent desaturations while sleeping during history of hospital admission. Recommend follow up with sleep medicine provider to provide recommendations for management which she last saw at [facility] location last in 2016. History of failed sinus cavity which therefore CPAP use is not effective for this. Tolerating oxygen at HS well without concerns.   Plan:   -Monitor sleeping patterns  -Monitor respiratory status  -Oxygen used at night- 2L  -Encourage cough and deep breathing. Encourage IS  -BMP, CBC, CRP, albumin, and procalcitonin due 7/27/23     (D64.9) Anemia, unspecified type  Comment: Acute on chronic. Baseline hgb around 12  Plan:   -Monitor bleeding risks  -Monitor for worsening s/sx of concerns  -BMP, CBC, CRP, albumin, and procalcitonin due 7/27/23     (B35.9) Tinea  (B35.9) Fungal dermatitis  Comment: Acute on chronic. Noted on 6/13/23: Nurse called to report: Increased worsening fungal dermatitis noted to bilateral groin, under abdominal folds and under breast. No relief from nystatin powder application. She is noncompliant with showers. Has been accepting bed baths primarily. Alternative topical agents ordered along with medicated shampoo. Slowly improving noted today  Plan:  -Continue claritin 10mg daily as directed  -Continue diflucan as directed. Due to stop 7/27  -Cleanse skin with ketoconazole shampoo to breast and abdominal folds BID. Dry thoroughly, Then apply miconazole powder to areas and place interdry to wick away moisture. Perform BID and PRN until resolves.   -Encourage interdry and/olr pillow cases under areas to prevent moisture build up concerns  -Dermatology PRN if worsens or no relief  -BMP, CBC, CRP, albumin, and procalcitonin due 7/27/23     Electronically signed by:  Arlen  ANNE Carlson DNP, APRN                    Sincerely,        Arlen Carlson, APRN CNP

## 2023-07-26 ENCOUNTER — LAB REQUISITION (OUTPATIENT)
Dept: LAB | Facility: CLINIC | Age: 58
End: 2023-07-26
Payer: COMMERCIAL

## 2023-07-26 DIAGNOSIS — L03.116 CELLULITIS OF LEFT LOWER LIMB: ICD-10-CM

## 2023-07-26 DIAGNOSIS — M05.79 RHEUMATOID ARTHRITIS WITH RHEUMATOID FACTOR OF MULTIPLE SITES WITHOUT ORGAN OR SYSTEMS INVOLVEMENT (H): ICD-10-CM

## 2023-07-27 LAB
ALBUMIN SERPL BCG-MCNC: 3.5 G/DL (ref 3.5–5.2)
ALT SERPL W P-5'-P-CCNC: 6 U/L (ref 0–50)
ANION GAP SERPL CALCULATED.3IONS-SCNC: 16 MMOL/L (ref 7–15)
BUN SERPL-MCNC: 16.7 MG/DL (ref 8–23)
CALCIUM SERPL-MCNC: 9.5 MG/DL (ref 8.8–10.2)
CHLORIDE SERPL-SCNC: 100 MMOL/L (ref 98–107)
CREAT SERPL-MCNC: 0.55 MG/DL (ref 0.51–0.95)
CRP SERPL-MCNC: 26.8 MG/L
DEPRECATED HCO3 PLAS-SCNC: 23 MMOL/L (ref 22–29)
ERYTHROCYTE [DISTWIDTH] IN BLOOD BY AUTOMATED COUNT: 15.3 % (ref 10–15)
ERYTHROCYTE [SEDIMENTATION RATE] IN BLOOD BY WESTERGREN METHOD: 45 MM/HR (ref 0–30)
GFR SERPL CREATININE-BSD FRML MDRD: >90 ML/MIN/1.73M2
GLUCOSE SERPL-MCNC: 70 MG/DL (ref 70–99)
HCT VFR BLD AUTO: 43.2 % (ref 35–47)
HGB BLD-MCNC: 13.1 G/DL (ref 11.7–15.7)
MCH RBC QN AUTO: 28.5 PG (ref 26.5–33)
MCHC RBC AUTO-ENTMCNC: 30.3 G/DL (ref 31.5–36.5)
MCV RBC AUTO: 94 FL (ref 78–100)
PLATELET # BLD AUTO: 328 10E3/UL (ref 150–450)
POTASSIUM SERPL-SCNC: 4.8 MMOL/L (ref 3.4–5.3)
PROCALCITONIN SERPL IA-MCNC: 0.03 NG/ML
RBC # BLD AUTO: 4.59 10E6/UL (ref 3.8–5.2)
SODIUM SERPL-SCNC: 139 MMOL/L (ref 136–145)
WBC # BLD AUTO: 5.4 10E3/UL (ref 4–11)

## 2023-07-27 PROCEDURE — 86140 C-REACTIVE PROTEIN: CPT | Mod: ORL | Performed by: NURSE PRACTITIONER

## 2023-07-27 PROCEDURE — P9604 ONE-WAY ALLOW PRORATED TRIP: HCPCS | Mod: ORL | Performed by: NURSE PRACTITIONER

## 2023-07-27 PROCEDURE — 84460 ALANINE AMINO (ALT) (SGPT): CPT | Mod: ORL | Performed by: NURSE PRACTITIONER

## 2023-07-27 PROCEDURE — 36415 COLL VENOUS BLD VENIPUNCTURE: CPT | Mod: ORL | Performed by: NURSE PRACTITIONER

## 2023-07-27 PROCEDURE — 84145 PROCALCITONIN (PCT): CPT | Mod: ORL | Performed by: NURSE PRACTITIONER

## 2023-07-27 PROCEDURE — 80048 BASIC METABOLIC PNL TOTAL CA: CPT | Mod: ORL | Performed by: NURSE PRACTITIONER

## 2023-07-27 PROCEDURE — 82040 ASSAY OF SERUM ALBUMIN: CPT | Mod: ORL | Performed by: NURSE PRACTITIONER

## 2023-07-27 PROCEDURE — 85652 RBC SED RATE AUTOMATED: CPT | Mod: ORL | Performed by: NURSE PRACTITIONER

## 2023-07-27 PROCEDURE — 85027 COMPLETE CBC AUTOMATED: CPT | Mod: ORL | Performed by: NURSE PRACTITIONER

## 2023-07-31 NOTE — PROGRESS NOTES
[facility] GERIATRICS    Chief Complaint   Patient presents with    RECHECK     HPI:  María Elena Saab is a 58 year old  (1965), who is being seen today for an episodic care visit at: [facility] () [[id number]]. Today's concern is: The primary encounter diagnosis was Tinea. Diagnoses of Fungal dermatitis, Anemia, unspecified type, Obstructive sleep apnea syndrome, Morbid obesity (H), High blood cholesterol, History of DVT of lower extremity, Essential hypertension with goal blood pressure less than 140/90, PAD (peripheral artery disease) (H), Cellulitis, unspecified cellulitis site, Primary osteoarthritis involving multiple joints, Seropositive rheumatoid arthritis of multiple sites (H), Polyarthralgia, Cyclic citrullinated peptide (CCP) antibody positive, Slow transit constipation, Vitamin D deficiency, Lymphedema of both lower extremities, Personal history of DVT (deep vein thrombosis), Recurrent major depressive disorder, in partial remission (H), Constipation, unspecified constipation type, Primary hypertension, and Dermatitis were also pertinent to this visit.    Met with patient who denies any chest pain, palpitations, shortness of breath, ESPITIA, lightheadedness, dizziness, or cough. Denies any abdominal discomfort. Denies N&V. Denies B&B concerns. Denies dysuria or frequency. Denies loose or constipation. Appetite good. Sleeping well. Mild improvement noted to bilateral lower extremities today. She remains noncompliant with compression wraps. She likes using her velcro wraps but also wants air to be able to breathe through. She has history of using tubi- but she doesn't like them as they are very constricting and she was told facility is not able to order larger bariatric size. I recommend staff to order appropriate size and to use daily to assist with edema control which in turn will hopefully help with her recurrent cellulitis to bilateral lower extremities concerns. She is  "open to this idea. Her fungal dermatitis under breast has improved with current powder application and interdry, however under abdominal folds has increased redness. No improvement to this area with powder, and per patient has actually caused it to be more reddened, therefore she would like to switch back to cream formula instead. No pain complaints. Nursing denies any acute concerns.     BP Readings from Last 3 Encounters:   08/01/23 (!) 141/83   07/24/23 (!) 147/82   07/12/23 (!) 163/91     Wt Readings from Last 5 Encounters:   08/01/23 149.3 kg (329 lb 3.2 oz)   07/24/23 149.3 kg (329 lb 3.2 oz)   07/12/23 149.3 kg (329 lb 3.2 oz)   06/28/23 149.3 kg (329 lb 3.2 oz)   06/27/23 (!) 150.6 kg (332 lb)     Allergies, and PMH/PSH reviewed in EPIC today.  REVIEW OF SYSTEMS:  10 point ROS of systems including Constitutional, Eyes, Respiratory, Cardiovascular, Gastroenterology, Genitourinary, Integumentary, Musculoskeletal, Psychiatric were all negative except for pertinent positives noted in my HPI.    Objective:   BP (!) 141/83   Pulse 52   Temp 98.3  F (36.8  C)   Resp 18   Ht 1.575 m (5' 2\")   Wt 149.3 kg (329 lb 3.2 oz)   SpO2 94%   BMI 60.21 kg/m    GENERAL APPEARANCE:  Alert, in no distress, oriented, morbidly obese, cooperative  ENT:  Mouth and posterior oropharynx normal, moist mucous membranes, normal hearing acuity  EYES:  EOM, conjunctivae, lids, pupils and irises normal  NECK:  No adenopathy,masses or thyromegaly  RESP:  respiratory effort and palpation of chest normal, lungs clear to auscultation   CV:  Palpation and auscultation of heart done , regular rate and rhythm, no murmur, rub, or gallop, peripheral edema 1+ in BLE  ABDOMEN:  normal bowel sounds, soft, nontender, no hepatosplenomegaly or other masses, no guarding or rebound  M/S:   Wheelchair bound. Often isolates herself in room. Staff to assist for ADLs, transfers and cares  SKIN:  Inspection of skin and subcutaneous tissue baseline, wound " "healing well, no signs of infection see photos  NEURO:   Cranial nerves 2-12 are normal tested and grossly at patient's baseline, no purposeful movement in upper and lower extremities  PSYCH:  oriented X 3, normal insight, judgement and memory, affect and mood normal      Most Recent 3 CBC's:  Recent Labs   Lab Test 07/27/23  1345 07/11/23  0825 06/22/23  1110   WBC 5.4 4.6 5.6   HGB 13.1 12.0 13.0   MCV 94 94 93    197 355     Most Recent 3 BMP's:  Recent Labs   Lab Test 07/27/23  1345 07/11/23  0825 06/22/23  1110 05/18/23  0935    139  --  141   POTASSIUM 4.8 3.9  --  4.0   CHLORIDE 100 100  --  100   CO2 23 28  --  29   BUN 16.7 15.8  --  15.3   CR 0.55 0.76 0.73 0.50*   ANIONGAP 16* 11  --  12   WILVER 9.5 9.1  --  9.2   GLC 70 81  --  96     Most Recent 2 LFT's:  Recent Labs   Lab Test 07/27/23  1345 07/11/23  0825 06/22/23  1110 05/04/23  0855   AST  --  25  --  21   ALT 6 9   < > 6*   ALKPHOS  --  73  --  67   BILITOTAL  --  0.2  --  0.2    < > = values in this interval not displayed.     Most Recent ESR & CRP:  Recent Labs   Lab Test 07/27/23  1345 11/22/22  0654 12/21/20  0942   SED 45*  --   --    CRP  --   --  1.3*   CRPI 26.80*   < >  --     < > = values in this interval not displayed.     Most Recent Anemia Panel:  Recent Labs   Lab Test 07/27/23  1345   WBC 5.4   HGB 13.1   HCT 43.2   MCV 94          ASSESSMENT/PLAN  (M05.79) Seropositive rheumatoid arthritis of multiple sites (H)  (primary encounter diagnosis)  (R76.8) Cyclic citrullinated peptide (CCP) antibody positive  (M15.9) Primary osteoarthritis involving multiple joints  (E55.9) Vitamin D deficiency  Comment: Chronic. Managed by rheumatology. c/o posterior neck pain as well as \"all over pain\" that is most likely neuropathic based on description. No longer use PRN oxycodone- Last used in Jan 2023 therefore this medication was stopped. Suspect increased immunosuppression response given recurrent cellulitis concerns    Plan: "   -Continue Sulfasalazine 1000mg BID ongoing per rheumatology recommendations on 6/28/23.   -Continue Tylenol 1000mg every 6 hours PRN  -Continue flexeril 10mg TID PRN. Discontinue 5mg PRN orders  -Continue gabapentin 300mg TID  -Ibuprofen PRN  -Continue WITHOUT humira and methotrexate due to recurrent cellulitis and recent sepsis.   -Continue tolnaftate daily PRN for skin breakdown between the toes. Wash and dry first prior to use.   -Continue Duloxetine 20mg daily as directed. If worsening pain, would recommend dose increase.   -Continue vitamin D 2000U daily  -Follow up with rheumatology as directed. Scheduled for 9/25/23  -BMP, CBC, and CRP due 8/10/23     (I89.0) Lymphedema of both lower extremities  (I73.9) PAD (peripheral artery disease) (H)  (L03.90) Cellulitis  Comment: Chronic. No claudication complaints. Multiple hospitalizations this year due to recurrent cellulitis. During recent hospital stay she completed course of treatment with vancomycin and zosyn initially, which was narrowed to ceftriaxone. Some initial concern for sepsis syndrome, but patient rapidly improved. Findings seem more consistent with some stasis dermatitis, and had more of a strep appearance. No abscess identified. Although MRSA screen positive, did not have purulence to abscess.  Plan:   -Follow up with vascular clinic as directed in 6 months as directed. Due Dec 2023.   -Continue ongoing wound management. In house wound provider to follow as directed  -Monitor weights weekly. Recent weight done at vascular appt which was 332# on 6/27/23. She has often refused weights to be done on site.   -Continue lyphmhatic formula 1 capsule daily per vascular recommendations  -Restart lymphedema therapy needs for ongoing edema concerns  -Staff/facility to order larger tubigrip size and apply knee high to bilateral lower extremities daily. On in am and off at hs.   -HOLD off on lymphedema needs given current infection risks for now. Recommend to  restart once wound heals.   -Monitor for worsening s/sx of concerns  -Follow up with infectious disease as directed within 4 weeks. please call the appointment center at [phone].   -Elevate bilateral lower extremities as much as possible  -Continue wound care to bilateral lower extremities as directed  *WOUND/DRESSING INSTRUCTIONS: Type of wound: Lymphademic Location of wound: BLE (R medial leg, L medial leg, L lateral leg, R shin) Instruction for Irrigation/Cleaning: Cleanse with warm water and wash cloth -Apply house cavilon cream   -BMP, CBC, and CRP due 8/10/23     (I10) Essential hypertension with goal blood pressure less than 140/90  (Z86.718) History of DVT of lower extremity  (E78.00) High blood cholesterol  Comment: Chronic. Based on JNC-8 goals,  patients age of 57 year old, no presence of diabetes or CKD, and goals of care goal BP is <150/90 mm Hg. Patient is stable with current plan of care and routine assessment. History of DVT to LLE. SBP readings remaining 130s-150s.   Plan:   -Monitor BP and HR. If SBP levels remain elevated on next assessment, would recommend medication adjustment.   -Continue hydrochlorothiazide 25mg daily.   -Continue xarelto 20mg daily  -BMP, CBC, and CRP due 8/10/23     (E66.01) Morbid obesity (H)  (G47.33) Obstructive sleep apnea syndrome  Comment: Chronic. Hx of LOPEZ, does not have CPAP as this causes sinus problems per patient. Patient with frequent desaturations while sleeping during history of hospital admission. Recommend follow up with sleep medicine provider to provide recommendations for management which she last saw at [facility] location last in 2016. History of failed sinus cavity which therefore CPAP use is not effective for this. Tolerating oxygen at HS well without concerns.   Plan:   -Monitor sleeping patterns  -Monitor respiratory status  -Oxygen used at night- 2L  -Encourage cough and deep breathing. Encourage IS  -BMP, CBC, and CRP due 8/10/23      (D64.9) Anemia, unspecified type  Comment: Acute on chronic. 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 She is noncompliant with showers. Has been accepting bed baths primarily. Alternative topical agents ordered along with medicated shampoo. Slowly improving noted today under breast. No improvement to abdominal folds.   Plan:  -Continue claritin 10mg daily as directed  -Cleanse skin with ketoconazole shampoo to breast and abdominal folds BID. Dry thoroughly, Then apply miconazole cream  to abdominal folds. Continue miconazole powder to breast and place interdry to wick away moisture. Perform BID and PRN until resolves.   -Encourage interdry and/olr pillow cases under areas to prevent moisture build up concerns  -Dermatology PRN if worsens or no relief  -BMP, CBC, and CRP due 8/10/23     Electronically signed by:  Arlen Carlson DNP, APRN

## 2023-08-01 ENCOUNTER — NURSING HOME VISIT (OUTPATIENT)
Dept: GERIATRICS | Facility: CLINIC | Age: 58
End: 2023-08-01
Payer: COMMERCIAL

## 2023-08-01 VITALS
SYSTOLIC BLOOD PRESSURE: 141 MMHG | RESPIRATION RATE: 18 BRPM | WEIGHT: 293 LBS | TEMPERATURE: 98.3 F | BODY MASS INDEX: 53.92 KG/M2 | HEART RATE: 52 BPM | HEIGHT: 62 IN | DIASTOLIC BLOOD PRESSURE: 83 MMHG | OXYGEN SATURATION: 94 %

## 2023-08-01 DIAGNOSIS — F33.41 RECURRENT MAJOR DEPRESSIVE DISORDER, IN PARTIAL REMISSION (H): ICD-10-CM

## 2023-08-01 DIAGNOSIS — L03.90 CELLULITIS, UNSPECIFIED CELLULITIS SITE: ICD-10-CM

## 2023-08-01 DIAGNOSIS — Z86.718 HISTORY OF DVT OF LOWER EXTREMITY: ICD-10-CM

## 2023-08-01 DIAGNOSIS — M15.0 PRIMARY OSTEOARTHRITIS INVOLVING MULTIPLE JOINTS: ICD-10-CM

## 2023-08-01 DIAGNOSIS — D64.9 ANEMIA, UNSPECIFIED TYPE: ICD-10-CM

## 2023-08-01 DIAGNOSIS — L30.9 DERMATITIS: ICD-10-CM

## 2023-08-01 DIAGNOSIS — Z86.718 PERSONAL HISTORY OF DVT (DEEP VEIN THROMBOSIS): ICD-10-CM

## 2023-08-01 DIAGNOSIS — B36.9 FUNGAL DERMATITIS: ICD-10-CM

## 2023-08-01 DIAGNOSIS — E78.00 HIGH BLOOD CHOLESTEROL: ICD-10-CM

## 2023-08-01 DIAGNOSIS — M05.79 SEROPOSITIVE RHEUMATOID ARTHRITIS OF MULTIPLE SITES (H): ICD-10-CM

## 2023-08-01 DIAGNOSIS — I89.0 LYMPHEDEMA OF BOTH LOWER EXTREMITIES: ICD-10-CM

## 2023-08-01 DIAGNOSIS — E55.9 VITAMIN D DEFICIENCY: ICD-10-CM

## 2023-08-01 DIAGNOSIS — I73.9 PAD (PERIPHERAL ARTERY DISEASE) (H): ICD-10-CM

## 2023-08-01 DIAGNOSIS — G47.33 OBSTRUCTIVE SLEEP APNEA SYNDROME: ICD-10-CM

## 2023-08-01 DIAGNOSIS — I10 PRIMARY HYPERTENSION: ICD-10-CM

## 2023-08-01 DIAGNOSIS — B35.9 TINEA: Primary | ICD-10-CM

## 2023-08-01 DIAGNOSIS — K59.00 CONSTIPATION, UNSPECIFIED CONSTIPATION TYPE: ICD-10-CM

## 2023-08-01 DIAGNOSIS — M25.50 POLYARTHRALGIA: ICD-10-CM

## 2023-08-01 DIAGNOSIS — R76.8 CYCLIC CITRULLINATED PEPTIDE (CCP) ANTIBODY POSITIVE: ICD-10-CM

## 2023-08-01 DIAGNOSIS — K59.01 SLOW TRANSIT CONSTIPATION: ICD-10-CM

## 2023-08-01 DIAGNOSIS — E66.01 MORBID OBESITY (H): ICD-10-CM

## 2023-08-01 DIAGNOSIS — I10 ESSENTIAL HYPERTENSION WITH GOAL BLOOD PRESSURE LESS THAN 140/90: ICD-10-CM

## 2023-08-01 PROCEDURE — 99309 SBSQ NF CARE MODERATE MDM 30: CPT | Performed by: NURSE PRACTITIONER

## 2023-08-01 RX ORDER — MICONAZOLE NITRATE 20 MG/G
CREAM TOPICAL
Qty: 594 G | Refills: 11 | Status: SHIPPED | OUTPATIENT
Start: 2023-08-01 | End: 2023-11-09

## 2023-08-01 NOTE — LETTER
8/1/2023        RE: María Elena Saab  [facility]  [address]  [place] [address]        [facility]    Chief Complaint   Patient presents with     RECHECK     HPI:  María Elena Saab is a 58 year old  (1965), who is being seen today for an episodic care visit at: [facility] () [[id number]]. Today's concern is: The primary encounter diagnosis was Tinea. Diagnoses of Fungal dermatitis, Anemia, unspecified type, Obstructive sleep apnea syndrome, Morbid obesity (H), High blood cholesterol, History of DVT of lower extremity, Essential hypertension with goal blood pressure less than 140/90, PAD (peripheral artery disease) (H), Cellulitis, unspecified cellulitis site, Primary osteoarthritis involving multiple joints, Seropositive rheumatoid arthritis of multiple sites (H), Polyarthralgia, Cyclic citrullinated peptide (CCP) antibody positive, Slow transit constipation, Vitamin D deficiency, Lymphedema of both lower extremities, Personal history of DVT (deep vein thrombosis), Recurrent major depressive disorder, in partial remission (H), Constipation, unspecified constipation type, Primary hypertension, and Dermatitis were also pertinent to this visit.    Met with patient who denies any chest pain, palpitations, shortness of breath, ESPITIA, lightheadedness, dizziness, or cough. Denies any abdominal discomfort. Denies N&V. Denies B&B concerns. Denies dysuria or frequency. Denies loose or constipation. Appetite good. Sleeping well. Mild improvement noted to bilateral lower extremities today. She remains noncompliant with compression wraps. She likes using her velcro wraps but also wants air to be able to breathe through. She has history of using tubi- but she doesn't like them as they are very constricting and she was told facility is not able to order larger bariatric size. I recommend staff to order appropriate size and to use daily to assist with edema  "control which in turn will hopefully help with her recurrent cellulitis to bilateral lower extremities concerns. She is open to this idea. Her fungal dermatitis under breast has improved with current powder application and interdry, however under abdominal folds has increased redness. No improvement to this area with powder, and per patient has actually caused it to be more reddened, therefore she would like to switch back to cream formula instead. No pain complaints. Nursing denies any acute concerns.     BP Readings from Last 3 Encounters:   08/01/23 (!) 141/83   07/24/23 (!) 147/82   07/12/23 (!) 163/91     Wt Readings from Last 5 Encounters:   08/01/23 149.3 kg (329 lb 3.2 oz)   07/24/23 149.3 kg (329 lb 3.2 oz)   07/12/23 149.3 kg (329 lb 3.2 oz)   06/28/23 149.3 kg (329 lb 3.2 oz)   06/27/23 (!) 150.6 kg (332 lb)     Allergies, and PMH/PSH reviewed in EPIC today.  REVIEW OF SYSTEMS:  10 point ROS of systems including Constitutional, Eyes, Respiratory, Cardiovascular, Gastroenterology, Genitourinary, Integumentary, Musculoskeletal, Psychiatric were all negative except for pertinent positives noted in my HPI.    Objective:   BP (!) 141/83   Pulse 52   Temp 98.3  F (36.8  C)   Resp 18   Ht 1.575 m (5' 2\")   Wt 149.3 kg (329 lb 3.2 oz)   SpO2 94%   BMI 60.21 kg/m    GENERAL APPEARANCE:  Alert, in no distress, oriented, morbidly obese, cooperative  ENT:  Mouth and posterior oropharynx normal, moist mucous membranes, normal hearing acuity  EYES:  EOM, conjunctivae, lids, pupils and irises normal  NECK:  No adenopathy,masses or thyromegaly  RESP:  respiratory effort and palpation of chest normal, lungs clear to auscultation   CV:  Palpation and auscultation of heart done , regular rate and rhythm, no murmur, rub, or gallop, peripheral edema 1+ in BLE  ABDOMEN:  normal bowel sounds, soft, nontender, no hepatosplenomegaly or other masses, no guarding or rebound  M/S:   Wheelchair bound. Often isolates herself in " "room. Staff to assist for ADLs, transfers and cares  SKIN:  Inspection of skin and subcutaneous tissue baseline, wound healing well, no signs of infection see photos  NEURO:   Cranial nerves 2-12 are normal tested and grossly at patient's baseline, no purposeful movement in upper and lower extremities  PSYCH:  oriented X 3, normal insight, judgement and memory, affect and mood normal    Most Recent 3 CBC's:  Recent Labs   Lab Test 07/27/23  1345 07/11/23  0825 06/22/23  1110   WBC 5.4 4.6 5.6   HGB 13.1 12.0 13.0   MCV 94 94 93    197 355     Most Recent 3 BMP's:  Recent Labs   Lab Test 07/27/23  1345 07/11/23  0825 06/22/23  1110 05/18/23  0935    139  --  141   POTASSIUM 4.8 3.9  --  4.0   CHLORIDE 100 100  --  100   CO2 23 28  --  29   BUN 16.7 15.8  --  15.3   CR 0.55 0.76 0.73 0.50*   ANIONGAP 16* 11  --  12   WILVER 9.5 9.1  --  9.2   GLC 70 81  --  96     Most Recent 2 LFT's:  Recent Labs   Lab Test 07/27/23  1345 07/11/23  0825 06/22/23  1110 05/04/23  0855   AST  --  25  --  21   ALT 6 9   < > 6*   ALKPHOS  --  73  --  67   BILITOTAL  --  0.2  --  0.2    < > = values in this interval not displayed.     Most Recent ESR & CRP:  Recent Labs   Lab Test 07/27/23  1345 11/22/22  0654 12/21/20  0942   SED 45*  --   --    CRP  --   --  1.3*   CRPI 26.80*   < >  --     < > = values in this interval not displayed.     Most Recent Anemia Panel:  Recent Labs   Lab Test 07/27/23  1345   WBC 5.4   HGB 13.1   HCT 43.2   MCV 94          ASSESSMENT/PLAN  (M05.79) Seropositive rheumatoid arthritis of multiple sites (H)  (primary encounter diagnosis)  (R76.8) Cyclic citrullinated peptide (CCP) antibody positive  (M15.9) Primary osteoarthritis involving multiple joints  (E55.9) Vitamin D deficiency  Comment: Chronic. Managed by rheumatology. c/o posterior neck pain as well as \"all over pain\" that is most likely neuropathic based on description. No longer use PRN oxycodone- Last used in Jan 2023 therefore this " medication was stopped. Suspect increased immunosuppression response given recurrent cellulitis concerns    Plan:   -Continue Sulfasalazine 1000mg BID ongoing per rheumatology recommendations on 6/28/23.   -Continue Tylenol 1000mg every 6 hours PRN  -Continue flexeril 10mg TID PRN. Discontinue 5mg PRN orders  -Continue gabapentin 300mg TID  -Ibuprofen PRN  -Continue WITHOUT humira and methotrexate due to recurrent cellulitis and recent sepsis.   -Continue tolnaftate daily PRN for skin breakdown between the toes. Wash and dry first prior to use.   -Continue Duloxetine 20mg daily as directed. If worsening pain, would recommend dose increase.   -Continue vitamin D 2000U daily  -Follow up with rheumatology as directed. Scheduled for 9/25/23  -BMP, CBC, and CRP due 8/10/23     (I89.0) Lymphedema of both lower extremities  (I73.9) PAD (peripheral artery disease) (H)  (L03.90) Cellulitis  Comment: Chronic. No claudication complaints. Multiple hospitalizations this year due to recurrent cellulitis. During recent hospital stay she completed course of treatment with vancomycin and zosyn initially, which was narrowed to ceftriaxone. Some initial concern for sepsis syndrome, but patient rapidly improved. Findings seem more consistent with some stasis dermatitis, and had more of a strep appearance. No abscess identified. Although MRSA screen positive, did not have purulence to abscess.  Plan:   -Follow up with vascular clinic as directed in 6 months as directed. Due Dec 2023.   -Continue ongoing wound management. In house wound provider to follow as directed  -Monitor weights weekly. Recent weight done at vascular appt which was 332# on 6/27/23. She has often refused weights to be done on site.   -Continue lyphmhatic formula 1 capsule daily per vascular recommendations  -Restart lymphedema therapy needs for ongoing edema concerns  -Staff/facility to order larger tubigrip size and apply knee high to bilateral lower extremities  daily. On in am and off at hs.   -HOLD off on lymphedema needs given current infection risks for now. Recommend to restart once wound heals.   -Monitor for worsening s/sx of concerns  -Follow up with infectious disease as directed within 4 weeks. please call the appointment center at [phone].   -Elevate bilateral lower extremities as much as possible  -Continue wound care to bilateral lower extremities as directed  *WOUND/DRESSING INSTRUCTIONS: Type of wound: Lymphademic Location of wound: BLE (R medial leg, L medial leg, L lateral leg, R shin) Instruction for Irrigation/Cleaning: Cleanse with warm water and wash cloth -Apply house cavilon cream   -BMP, CBC, and CRP due 8/10/23     (I10) Essential hypertension with goal blood pressure less than 140/90  (Z86.718) History of DVT of lower extremity  (E78.00) High blood cholesterol  Comment: Chronic. Based on JNC-8 goals,  patients age of 57 year old, no presence of diabetes or CKD, and goals of care goal BP is <150/90 mm Hg. Patient is stable with current plan of care and routine assessment. History of DVT to LLE. SBP readings remaining 130s-150s.   Plan:   -Monitor BP and HR. If SBP levels remain elevated on next assessment, would recommend medication adjustment.   -Continue hydrochlorothiazide 25mg daily.   -Continue xarelto 20mg daily  -BMP, CBC, and CRP due 8/10/23     (E66.01) Morbid obesity (H)  (G47.33) Obstructive sleep apnea syndrome  Comment: Chronic. Hx of LOPEZ, does not have CPAP as this causes sinus problems per patient. Patient with frequent desaturations while sleeping during history of hospital admission. Recommend follow up with sleep medicine provider to provide recommendations for management which she last saw at [facility] location last in 2016. History of failed sinus cavity which therefore CPAP use is not effective for this. Tolerating oxygen at  well without concerns.   Plan:   -Monitor sleeping patterns  -Monitor respiratory  status  -Oxygen used at night- 2L  -Encourage cough and deep breathing. Encourage IS  -BMP, CBC, and CRP due 8/10/23     (D64.9) Anemia, unspecified type  Comment: Acute on chronic. 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 She is noncompliant with showers. Has been accepting bed baths primarily. Alternative topical agents ordered along with medicated shampoo. Slowly improving noted today under breast. No improvement to abdominal folds.   Plan:  -Continue claritin 10mg daily as directed  -Cleanse skin with ketoconazole shampoo to breast and abdominal folds BID. Dry thoroughly, Then apply miconazole cream  to abdominal folds. Continue miconazole powder to breast and place interdry to wick away moisture. Perform BID and PRN until resolves.   -Encourage interdry and/olr pillow cases under areas to prevent moisture build up concerns  -Dermatology PRN if worsens or no relief  -BMP, CBC, and CRP due 8/10/23     Electronically signed by:  Arlen Carlson DNP, APRN        Sincerely,        KRISTIN Rodriges CNP

## 2023-08-09 ENCOUNTER — LAB REQUISITION (OUTPATIENT)
Dept: LAB | Facility: CLINIC | Age: 58
End: 2023-08-09
Payer: COMMERCIAL

## 2023-08-09 DIAGNOSIS — L03.116 CELLULITIS OF LEFT LOWER LIMB: ICD-10-CM

## 2023-08-09 DIAGNOSIS — I89.0 LYMPHEDEMA, NOT ELSEWHERE CLASSIFIED: ICD-10-CM

## 2023-08-09 DIAGNOSIS — Z86.14 PERSONAL HISTORY OF METHICILLIN RESISTANT STAPHYLOCOCCUS AUREUS INFECTION: ICD-10-CM

## 2023-08-10 LAB
ANION GAP SERPL CALCULATED.3IONS-SCNC: 9 MMOL/L (ref 7–15)
BUN SERPL-MCNC: 14.9 MG/DL (ref 8–23)
CALCIUM SERPL-MCNC: 9.1 MG/DL (ref 8.8–10.2)
CHLORIDE SERPL-SCNC: 102 MMOL/L (ref 98–107)
CREAT SERPL-MCNC: 0.59 MG/DL (ref 0.51–0.95)
CRP SERPL-MCNC: 16.9 MG/L
DEPRECATED HCO3 PLAS-SCNC: 28 MMOL/L (ref 22–29)
ERYTHROCYTE [DISTWIDTH] IN BLOOD BY AUTOMATED COUNT: 14.7 % (ref 10–15)
GFR SERPL CREATININE-BSD FRML MDRD: >90 ML/MIN/1.73M2
GLUCOSE SERPL-MCNC: 89 MG/DL (ref 70–99)
HCT VFR BLD AUTO: 40.8 % (ref 35–47)
HGB BLD-MCNC: 12.5 G/DL (ref 11.7–15.7)
MCH RBC QN AUTO: 28.5 PG (ref 26.5–33)
MCHC RBC AUTO-ENTMCNC: 30.6 G/DL (ref 31.5–36.5)
MCV RBC AUTO: 93 FL (ref 78–100)
PLATELET # BLD AUTO: 200 10E3/UL (ref 150–450)
POTASSIUM SERPL-SCNC: 3.9 MMOL/L (ref 3.4–5.3)
RBC # BLD AUTO: 4.39 10E6/UL (ref 3.8–5.2)
SODIUM SERPL-SCNC: 139 MMOL/L (ref 136–145)
WBC # BLD AUTO: 4.7 10E3/UL (ref 4–11)

## 2023-08-10 PROCEDURE — 80048 BASIC METABOLIC PNL TOTAL CA: CPT | Mod: ORL | Performed by: INTERNAL MEDICINE

## 2023-08-10 PROCEDURE — 85027 COMPLETE CBC AUTOMATED: CPT | Mod: ORL | Performed by: INTERNAL MEDICINE

## 2023-08-10 PROCEDURE — 86140 C-REACTIVE PROTEIN: CPT | Mod: ORL | Performed by: INTERNAL MEDICINE

## 2023-08-10 PROCEDURE — 36415 COLL VENOUS BLD VENIPUNCTURE: CPT | Mod: ORL | Performed by: INTERNAL MEDICINE

## 2023-08-10 PROCEDURE — P9604 ONE-WAY ALLOW PRORATED TRIP: HCPCS | Mod: ORL | Performed by: INTERNAL MEDICINE

## 2023-09-05 ENCOUNTER — TELEPHONE (OUTPATIENT)
Dept: GERIATRICS | Facility: CLINIC | Age: 58
End: 2023-09-05
Payer: COMMERCIAL

## 2023-09-05 RX ORDER — NYSTATIN AND TRIAMCINOLONE ACETONIDE 100000; 1 [USP'U]/G; MG/G
CREAM TOPICAL 2 TIMES DAILY
COMMUNITY
End: 2024-06-18

## 2023-09-05 NOTE — TELEPHONE ENCOUNTER
[facility] Geriatrics Triage Nurse Telephone Encounter    Provider: KRISTIN Wilder CNP   Facility: [facility] Facility Type:  [facility]    Caller: Purvi  Call Back Number: [phone]    Allergies:    Allergies   Allergen Reactions    Adhesive Tape Other (See Comments)     As on band-aids;  Causes skin tearing/wounds.    Adhesive [Cyanoacrylate] Other (See Comments)     As on band-aids;  Causes skin tearing/wounds.    Banana Unknown     She avoids due to Latex allergy.  If she eats them 3 days in a row, gets stomach cramps and dark stool.  TBrinkMD - 4/10/15    Food Unknown and GI Disturbance     Bananas, grapes, pine nuts    Artifical sweetner    Grape [Grape (Artificial) Flavor] Unknown     She avoids due to Latex allergy.  If she eats lots of them, and she likes them, stomach gets a little queasy.  TBrinkMD - 4/10/15    Morphine (Pf) [Morphine] Other (See Comments)     Pt has not reacted to this med herself, but mother has anaphylactic reaction and other family members get nausea/vomiting.    Other Environmental Allergy Swelling     leather    Pine     Pseudoephedrine Other (See Comments)     Insomnia    Pseudoephedrine Cold/Allergy [Chlorpheniramine-Pseudoeph] Other (See Comments)     Keeps pt awake up to 24 hours     Latex Rash     Hands get red from rubber gloves, but okay if she washes them right away.  Hands get red from rubber gloves, but okay if she washes them right away.    Other Food Allergy Rash     Pine trees, leather .         Reason for call:   Pt has a red rash in her bilateral axilla and back, nurse says it looks like hives or possible heat rash. no itching at this time.     Verbal Order/Direction given by Provider:   Mycolog 2 cream BID until clear, update provider if no improvement after 7 days.    Provider giving Order:  KRISTIN Wilder CNP     Verbal Order given to: Purvi Blue RN

## 2023-09-13 ENCOUNTER — NURSING HOME VISIT (OUTPATIENT)
Dept: GERIATRICS | Facility: CLINIC | Age: 58
End: 2023-09-13
Payer: COMMERCIAL

## 2023-09-13 VITALS
BODY MASS INDEX: 53.92 KG/M2 | WEIGHT: 293 LBS | RESPIRATION RATE: 18 BRPM | HEART RATE: 80 BPM | TEMPERATURE: 98 F | HEIGHT: 62 IN | DIASTOLIC BLOOD PRESSURE: 77 MMHG | SYSTOLIC BLOOD PRESSURE: 122 MMHG | OXYGEN SATURATION: 95 %

## 2023-09-13 DIAGNOSIS — I10 ESSENTIAL HYPERTENSION WITH GOAL BLOOD PRESSURE LESS THAN 140/90: ICD-10-CM

## 2023-09-13 DIAGNOSIS — Z86.718 HISTORY OF DVT OF LOWER EXTREMITY: ICD-10-CM

## 2023-09-13 DIAGNOSIS — M05.79 SEROPOSITIVE RHEUMATOID ARTHRITIS OF MULTIPLE SITES (H): Primary | ICD-10-CM

## 2023-09-13 PROCEDURE — 99309 SBSQ NF CARE MODERATE MDM 30: CPT | Performed by: INTERNAL MEDICINE

## 2023-09-13 NOTE — PROGRESS NOTES
[facility] GERIATRICS  REGULATORY VISIT  September 13, 2023      [facility] Medical Record Number:  [id number]  Place of Service where encounter took place:  [facility] () [[id number]]    Chief Complaint   Patient presents with    FDC Regulatory       HPI:    María Elena Saab is a 58 year old  (1965), who is being seen today for a federally mandated E/M visit at [facility] where she has resided since March 2016. HPI information obtained from: facility chart records, facility staff, patient report and [facility] chart review.     She was hospitalized in June with LLE cellulitis and again in July. She had follow up with ID in July.     Today, Ms. Saab is seen in her room. She was waiting for morning cares for her legs and expressed concern about ability of pharmacy to deliver her meds in a timely manner. Overall she's doing OK. Continues to have tinea cruris that ebbs and flows. No acute concerns today per discussion with nursing.     ALLERGIES:    Allergies   Allergen Reactions    Adhesive Tape Other (See Comments)     As on band-aids;  Causes skin tearing/wounds.    Adhesive [Cyanoacrylate] Other (See Comments)     As on band-aids;  Causes skin tearing/wounds.    Banana Unknown     She avoids due to Latex allergy.  If she eats them 3 days in a row, gets stomach cramps and dark stool.  TBrinkMD - 4/10/15    Food Unknown and GI Disturbance     Bananas, grapes, pine nuts    Artifical sweetner    Grape [Grape (Artificial) Flavor] Unknown     She avoids due to Latex allergy.  If she eats lots of them, and she likes them, stomach gets a little queasy.  TBrinkMD - 4/10/15    Morphine (Pf) [Morphine] Other (See Comments)     Pt has not reacted to this med herself, but mother has anaphylactic reaction and other family members get nausea/vomiting.    Other Environmental Allergy Swelling     leather    Pine     Pseudoephedrine Other (See Comments)     Insomnia     Pseudoephedrine Cold/Allergy [Chlorpheniramine-Pseudoeph] Other (See Comments)     Keeps pt awake up to 24 hours     Latex Rash     Hands get red from rubber gloves, but okay if she washes them right away.  Hands get red from rubber gloves, but okay if she washes them right away.    Other Food Allergy Rash     Pine trees, leather .         Past Medical, Surgical, Family and Social History: Reviewed and updated in EPIC.    MEDICATIONS:  Current Outpatient Medications   Medication Sig Dispense Refill    acetaminophen (TYLENOL) 325 MG tablet Take 650 mg by mouth every 6 hours as needed for mild pain      ascorbic acid, vitamin C, (ASCORBIC ACID WITH KAEL HIPS) 500 MG tablet [ASCORBIC ACID, VITAMIN C, (ASCORBIC ACID WITH KAEL HIPS) 500 MG TABLET] Take 500 mg by mouth 2 (two) times a day.      cholecalciferol 50 MCG (2000 UT) tablet Take 1 tablet (50 mcg) by mouth daily 30 tablet 11    cyclobenzaprine (FLEXERIL) 10 MG tablet Take 1 tablet (10 mg) by mouth every 8 hours as needed for muscle spasms 30 tablet 5    DULoxetine (CYMBALTA) 20 MG capsule [DULOXETINE (CYMBALTA) 20 MG CAPSULE] Take 1 capsule (20 mg total) by mouth daily. 90 capsule 2    gabapentin (NEURONTIN) 300 MG capsule Take 1 capsule (300 mg) by mouth 3 times daily 90 capsule 11    hydroCHLOROthiazide (HYDRODIURIL) 25 MG tablet [HYDROCHLOROTHIAZIDE (HYDRODIURIL) 25 MG TABLET] Take 25 mg by mouth daily.      ibuprofen (ADVIL/MOTRIN) 400 MG tablet Take 1 tablet (400 mg) by mouth every 6 hours as needed for moderate pain 30 tablet 11    ketoconazole (NIZORAL) 2 % external shampoo Apply topically daily as needed for itching or irritation Apply to body daily PRN on shower days x 8 weeks. Then change to daily PRN thereafter 120 mL 11    loratadine (CLARITIN) 10 MG tablet Take 1 tablet (10 mg) by mouth daily 30 tablet 11    miconazole (MICATIN) 2 % external cream Apply to abdominal folds BID and BID  g 11    miconazole (MICATIN) 2 % external powder Apply to  "bilateral breast, abdominal folds  g 11    mineral oil-white petrolatum (EUCERIN/MINERIN) cream Apply to bilateral lower extremities daily. 480 g 11    NEW MED Take 1 capsule by mouth daily Lymphatic formula      nystatin-triamcinolone (MYCOLOG II) 589989-9.1 UNIT/GM-% external cream Apply topically 2 times daily Until clear      rivaroxaban ANTICOAGULANT (XARELTO) 20 MG TABS tablet Take 20 mg by mouth daily      sulfaSALAzine ER (AZULFIDINE EN) 500 MG EC tablet Take 500mg po twice daily for one week then 1000mg po twice daily 120 tablet 0    tolnaftate (TINACTIN) 1 % external cream Apply topically daily as needed for irritation Apply topically daily prn for skin breakdown btwn toes. Wash and dry first.       Medications reviewed:  Medications reconciled to facility chart and changes were made to reflect current medications as identified as above med list. Below are the changes that were made:   Medications stopped since last EPIC medication reconciliation:   There are no discontinued medications.  Medications started since last [facility] medication reconciliation:  No orders of the defined types were placed in this encounter.      REVIEW OF SYSTEMS:  4 point ROS neg other than the symptoms noted above in the HPI.    PHYSICAL EXAM:  /77   Pulse 80   Temp 98  F (36.7  C)   Resp 18   Ht 1.575 m (5' 2\")   Wt (!) 150.3 kg (331 lb 6.4 oz)   SpO2 95%   BMI 60.61 kg/m    Gen: laying in bed, alert, cooperative and in no acute distress  Resp: breathing non labored   Neuro: CX II-XII grossly in tact; ROM in all four extremities grossly in tact  Psych: alert and oriented x3; normal affect    LABS/IMAGING: Reviewed as per Epic and/or [facility]    ASSESSMENT / PLAN:      History of Lower Exremity DVT  -- rivaroxaban 20 mg daily      HTN  SBPs 120s. Weight is stable around 330 lbs. BMP was OK last month  -- hydrochlorothiazide 25 mg daily   -- follow BPs and adjust medications as needed    Rheumatoid " Arthritis  Follows with rheumatology. No longer on methotrexate or Humira since I last saw her - change made in June - due to recurrent cellulitis.  -- sulfasalazine 1000 mg BID   -- duloxetine 20 mg daily, gabapentin 300 mg TID   -- APAP 650 mg q6h PRN, cyclobenzaprine 10 mg q8h PRN, gabapentin 300 mg TID, ibuprofen 400 mg q6h PRN  -- labs and follow up per rheumatology       Electronically signed by  Urvashi River MD

## 2023-09-13 NOTE — LETTER
9/13/2023        RE: María Elena Saab  [facility] [address]  [address]  [place] [address]        [facility] GERIATRICS  REGULATORY VISIT  September 13, 2023      [facility] Medical Record Number:  [id number]  Place of Service where encounter took place:  [facility] () [[id number]]    Chief Complaint   Patient presents with     alf Regulatory       HPI:    María Elena Saab is a 58 year old  (1965), who is being seen today for a federally mandated E/M visit at [facility] where she has resided since March 2016. HPI information obtained from: facility chart records, facility staff, patient report and [facility] chart review.     She was hospitalized in June with LLE cellulitis and again in July. She had follow up with ID in July.     Today, Ms. Saab is seen in her room. She was waiting for morning cares for her legs and expressed concern about ability of pharmacy to deliver her meds in a timely manner. Overall she's doing OK. Continues to have tinea cruris that ebbs and flows. No acute concerns today per discussion with nursing.     ALLERGIES:    Allergies   Allergen Reactions     Adhesive Tape Other (See Comments)     As on band-aids;  Causes skin tearing/wounds.     Adhesive [Cyanoacrylate] Other (See Comments)     As on band-aids;  Causes skin tearing/wounds.     Banana Unknown     She avoids due to Latex allergy.  If she eats them 3 days in a row, gets stomach cramps and dark stool.  TBrinkMD - 4/10/15     Food Unknown and GI Disturbance     Bananas, grapes, pine nuts    Artifical sweetner     Grape [Grape (Artificial) Flavor] Unknown     She avoids due to Latex allergy.  If she eats lots of them, and she likes them, stomach gets a little queasy.  TBrinkMD - 4/10/15     Morphine (Pf) [Morphine] Other (See Comments)     Pt has not reacted to this med herself, but mother has anaphylactic reaction and other family members get nausea/vomiting.      Other Environmental Allergy Swelling     leather     Pine      Pseudoephedrine Other (See Comments)     Insomnia     Pseudoephedrine Cold/Allergy [Chlorpheniramine-Pseudoeph] Other (See Comments)     Keeps pt awake up to 24 hours      Latex Rash     Hands get red from rubber gloves, but okay if she washes them right away.  Hands get red from rubber gloves, but okay if she washes them right away.     Other Food Allergy Rash     Pine trees, leather .         Past Medical, Surgical, Family and Social History: Reviewed and updated in EPIC.    MEDICATIONS:  Current Outpatient Medications   Medication Sig Dispense Refill     acetaminophen (TYLENOL) 325 MG tablet Take 650 mg by mouth every 6 hours as needed for mild pain       ascorbic acid, vitamin C, (ASCORBIC ACID WITH KAEL HIPS) 500 MG tablet [ASCORBIC ACID, VITAMIN C, (ASCORBIC ACID WITH KAEL HIPS) 500 MG TABLET] Take 500 mg by mouth 2 (two) times a day.       cholecalciferol 50 MCG (2000 UT) tablet Take 1 tablet (50 mcg) by mouth daily 30 tablet 11     cyclobenzaprine (FLEXERIL) 10 MG tablet Take 1 tablet (10 mg) by mouth every 8 hours as needed for muscle spasms 30 tablet 5     DULoxetine (CYMBALTA) 20 MG capsule [DULOXETINE (CYMBALTA) 20 MG CAPSULE] Take 1 capsule (20 mg total) by mouth daily. 90 capsule 2     gabapentin (NEURONTIN) 300 MG capsule Take 1 capsule (300 mg) by mouth 3 times daily 90 capsule 11     hydroCHLOROthiazide (HYDRODIURIL) 25 MG tablet [HYDROCHLOROTHIAZIDE (HYDRODIURIL) 25 MG TABLET] Take 25 mg by mouth daily.       ibuprofen (ADVIL/MOTRIN) 400 MG tablet Take 1 tablet (400 mg) by mouth every 6 hours as needed for moderate pain 30 tablet 11     ketoconazole (NIZORAL) 2 % external shampoo Apply topically daily as needed for itching or irritation Apply to body daily PRN on shower days x 8 weeks. Then change to daily PRN thereafter 120 mL 11     loratadine (CLARITIN) 10 MG tablet Take 1 tablet (10 mg) by mouth daily 30 tablet 11     miconazole  "(MICATIN) 2 % external cream Apply to abdominal folds BID and BID  g 11     miconazole (MICATIN) 2 % external powder Apply to bilateral breast, abdominal folds  g 11     mineral oil-white petrolatum (EUCERIN/MINERIN) cream Apply to bilateral lower extremities daily. 480 g 11     NEW MED Take 1 capsule by mouth daily Lymphatic formula       nystatin-triamcinolone (MYCOLOG II) 958406-1.1 UNIT/GM-% external cream Apply topically 2 times daily Until clear       rivaroxaban ANTICOAGULANT (XARELTO) 20 MG TABS tablet Take 20 mg by mouth daily       sulfaSALAzine ER (AZULFIDINE EN) 500 MG EC tablet Take 500mg po twice daily for one week then 1000mg po twice daily 120 tablet 0     tolnaftate (TINACTIN) 1 % external cream Apply topically daily as needed for irritation Apply topically daily prn for skin breakdown btwn toes. Wash and dry first.       Medications reviewed:  Medications reconciled to facility chart and changes were made to reflect current medications as identified as above med list. Below are the changes that were made:   Medications stopped since last EPIC medication reconciliation:   There are no discontinued medications.  Medications started since last [facility] medication reconciliation:  No orders of the defined types were placed in this encounter.      REVIEW OF SYSTEMS:  4 point ROS neg other than the symptoms noted above in the HPI.    PHYSICAL EXAM:  /77   Pulse 80   Temp 98  F (36.7  C)   Resp 18   Ht 1.575 m (5' 2\")   Wt (!) 150.3 kg (331 lb 6.4 oz)   SpO2 95%   BMI 60.61 kg/m    Gen: laying in bed, alert, cooperative and in no acute distress  Resp: breathing non labored   Neuro: CX II-XII grossly in tact; ROM in all four extremities grossly in tact  Psych: alert and oriented x3; normal affect    LABS/IMAGING: Reviewed as per Epic and/or [facility]    ASSESSMENT / PLAN:      History of Lower Exremity DVT  -- rivaroxaban 20 mg daily      HTN  SBPs 120s. Weight is stable around " 330 lbs. BMP was OK last month  -- hydrochlorothiazide 25 mg daily   -- follow BPs and adjust medications as needed    Rheumatoid Arthritis  Follows with rheumatology. No longer on methotrexate or Humira since I last saw her - change made in June - due to recurrent cellulitis.  -- sulfasalazine 1000 mg BID   -- duloxetine 20 mg daily, gabapentin 300 mg TID   -- APAP 650 mg q6h PRN, cyclobenzaprine 10 mg q8h PRN, gabapentin 300 mg TID, ibuprofen 400 mg q6h PRN  -- labs and follow up per rheumatology       Electronically signed by  Urvashi River MD              Sincerely,        Urvashi River MD

## 2023-09-19 ENCOUNTER — DOCUMENTATION ONLY (OUTPATIENT)
Dept: GERIATRICS | Facility: CLINIC | Age: 58
End: 2023-09-19
Payer: COMMERCIAL

## 2023-09-19 ENCOUNTER — LAB REQUISITION (OUTPATIENT)
Dept: LAB | Facility: CLINIC | Age: 58
End: 2023-09-19
Payer: COMMERCIAL

## 2023-09-19 DIAGNOSIS — M06.09 RHEUMATOID ARTHRITIS WITHOUT RHEUMATOID FACTOR, MULTIPLE SITES (H): ICD-10-CM

## 2023-09-21 LAB
ALBUMIN SERPL BCG-MCNC: 3.4 G/DL (ref 3.5–5.2)
ALT SERPL W P-5'-P-CCNC: 7 U/L (ref 0–50)
CREAT SERPL-MCNC: 0.45 MG/DL (ref 0.51–0.95)
EGFRCR SERPLBLD CKD-EPI 2021: >90 ML/MIN/1.73M2
ERYTHROCYTE [DISTWIDTH] IN BLOOD BY AUTOMATED COUNT: 15.2 % (ref 10–15)
HCT VFR BLD AUTO: 40 % (ref 35–47)
HGB BLD-MCNC: 12.5 G/DL (ref 11.7–15.7)
MCH RBC QN AUTO: 28.3 PG (ref 26.5–33)
MCHC RBC AUTO-ENTMCNC: 31.3 G/DL (ref 31.5–36.5)
MCV RBC AUTO: 91 FL (ref 78–100)
PLATELET # BLD AUTO: 215 10E3/UL (ref 150–450)
RBC # BLD AUTO: 4.42 10E6/UL (ref 3.8–5.2)
WBC # BLD AUTO: 6.1 10E3/UL (ref 4–11)

## 2023-09-21 PROCEDURE — P9604 ONE-WAY ALLOW PRORATED TRIP: HCPCS | Mod: ORL | Performed by: INTERNAL MEDICINE

## 2023-09-21 PROCEDURE — 84460 ALANINE AMINO (ALT) (SGPT): CPT | Mod: ORL | Performed by: INTERNAL MEDICINE

## 2023-09-21 PROCEDURE — 85027 COMPLETE CBC AUTOMATED: CPT | Mod: ORL | Performed by: INTERNAL MEDICINE

## 2023-09-21 PROCEDURE — 82040 ASSAY OF SERUM ALBUMIN: CPT | Mod: ORL | Performed by: INTERNAL MEDICINE

## 2023-09-21 PROCEDURE — 36415 COLL VENOUS BLD VENIPUNCTURE: CPT | Mod: ORL | Performed by: INTERNAL MEDICINE

## 2023-09-21 PROCEDURE — 82565 ASSAY OF CREATININE: CPT | Mod: ORL | Performed by: INTERNAL MEDICINE

## 2023-10-14 ENCOUNTER — TELEPHONE (OUTPATIENT)
Dept: GERIATRICS | Facility: CLINIC | Age: 58
End: 2023-10-14
Payer: COMMERCIAL

## 2023-10-14 NOTE — TELEPHONE ENCOUNTER
Resident tested + for COVID and so ordered molnupiravir 800mg po BID x5 days.    KRISTIN Hamilton CNP

## 2023-11-09 ENCOUNTER — NURSING HOME VISIT (OUTPATIENT)
Dept: GERIATRICS | Facility: CLINIC | Age: 58
End: 2023-11-09
Payer: COMMERCIAL

## 2023-11-09 VITALS
TEMPERATURE: 98 F | DIASTOLIC BLOOD PRESSURE: 68 MMHG | HEART RATE: 78 BPM | RESPIRATION RATE: 20 BRPM | SYSTOLIC BLOOD PRESSURE: 138 MMHG | OXYGEN SATURATION: 98 %

## 2023-11-09 DIAGNOSIS — M06.9 RHEUMATOID ARTHRITIS, RHEUMATOID FACTOR STATUS UNKNOWN (H): ICD-10-CM

## 2023-11-09 DIAGNOSIS — I10 PRIMARY HYPERTENSION: Primary | ICD-10-CM

## 2023-11-09 DIAGNOSIS — Z86.718 HISTORY OF DEEP VENOUS THROMBOSIS: ICD-10-CM

## 2023-11-09 PROCEDURE — 99309 SBSQ NF CARE MODERATE MDM 30: CPT | Performed by: NURSE PRACTITIONER

## 2023-11-09 RX ORDER — SULFASALAZINE 500 MG/1
1000 TABLET, DELAYED RELEASE ORAL 2 TIMES DAILY
Start: 2023-11-09 | End: 2024-06-25

## 2023-11-09 NOTE — PROGRESS NOTES
[facility] GERIATRICS  Chief Complaint   Patient presents with    group home Regulatory     [facility] Medical Record Number:  [id number]  Place of Service where encounter took place:  [facility] () [[id number]]    HPI:    María Elena Saab is 58 year old (1965) female, who is being seen today for a federally mandated E/M visit.    Today's concerns:  Primary hypertension  History of deep venous thrombosis  Rheumatoid arthritis, rheumatoid factor status unknown (H)    Patient resting in bed still this AM. She declined visit. Nursing notes reviewed, no acute concerns. She was Covid+ 10/16. VS have remained stable.      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  RD reports that she is not willing to commit to the program outlined.  See scanned document.  12/15/2015 - Marcio Quinonez MD       Nocturnal hypoxemia - probable sleep apnea - had overnight pulse oximetry, April, 2015. 5/22/2015    Obesity 4/10/2015    LOPEZ (obstructive sleep apnea) 5/22/2015    Osteoarthritis     Peripheral vascular disease (H)     Pleural effusion     Pressure ulcer of foot     Rheumatoid arthritis (H) 12/30/2014    seronegative    Sepsis (H)     Seropositive  rheumatoid arthritis (H) 1/19/2016    Vitamin D deficiency 8/26/2015     PAST SURGICAL HISTORY:   has a past surgical history that includes Total Knee Arthroplasty (Left, 2006); joint replacement; Total Hip Arthroplasty (Left, 10/18/2016); and Peripherally Inserted Central Catheter Insertion (Right, 07/14/2019).  FAMILY HISTORY: family history includes Arthritis in her mother; Breast Cancer in her cousin; Breast Cancer (age of onset: 50.00) in her maternal grandmother; Breast Cancer (age of onset: 54.00) in her maternal aunt and mother; Cancer in her mother; Cataracts in her father and mother; Deep Vein Thrombosis in her father; Heart Disease in her maternal grandmother; Multiple myeloma in her father; No Known Problems in her sister; Other - See Comments in her brother; Rheumatoid Arthritis in her maternal grandmother and paternal aunt; Sleep Apnea in her brother; Snoring in her father.  SOCIAL HISTORY:  reports that she has quit smoking. Her smoking use included cigarettes. She has a 30.00 pack-year smoking history. She has never used smokeless tobacco. She reports that she does not currently use alcohol. She reports that she does not use drugs.    MEDICATIONS:  Current Outpatient Medications   Medication Sig Dispense Refill    acetaminophen (TYLENOL) 325 MG tablet Take 650 mg by mouth every 6 hours as needed for mild pain      ascorbic acid, vitamin C, (ASCORBIC ACID WITH KAEL HIPS) 500 MG tablet [ASCORBIC ACID, VITAMIN C, (ASCORBIC ACID WITH KAEL HIPS) 500 MG TABLET] Take 500 mg by mouth 2 (two) times a day.      cholecalciferol 50 MCG (2000 UT) tablet Take 1 tablet (50 mcg) by mouth daily 30 tablet 11    DULoxetine (CYMBALTA) 20 MG capsule [DULOXETINE (CYMBALTA) 20 MG CAPSULE] Take 1 capsule (20 mg total) by mouth daily. 90 capsule 2    gabapentin (NEURONTIN) 300 MG capsule Take 1 capsule (300 mg) by mouth 3 times daily 90 capsule 11    hydroCHLOROthiazide (HYDRODIURIL) 25 MG tablet [HYDROCHLOROTHIAZIDE  (HYDRODIURIL) 25 MG TABLET] Take 25 mg by mouth daily.      ibuprofen (ADVIL/MOTRIN) 400 MG tablet Take 1 tablet (400 mg) by mouth every 6 hours as needed for moderate pain 30 tablet 11    ketoconazole (NIZORAL) 2 % external shampoo Apply topically daily as needed for itching or irritation Apply to body daily PRN on shower days x 8 weeks. Then change to daily PRN thereafter 120 mL 11    loratadine (CLARITIN) 10 MG tablet Take 1 tablet (10 mg) by mouth daily 30 tablet 11    miconazole (MICATIN) 2 % external powder Apply to bilateral breast, abdominal folds  g 11    NEW MED Take 1 capsule by mouth daily Lymphatic formula      nystatin-triamcinolone (MYCOLOG II) 084801-2.1 UNIT/GM-% external cream Apply topically 2 times daily Until clear      rivaroxaban ANTICOAGULANT (XARELTO) 20 MG TABS tablet Take 20 mg by mouth daily      sulfaSALAzine ER (AZULFIDINE EN) 500 MG EC tablet Take 2 tablets (1,000 mg) by mouth 2 times daily           Case Management: Information reviewed:  Medications, vital signs, orders, and nursing notes.    ROS:  Unobtainable due to patient declining visit today.     Vitals:  /68   Pulse 78   Temp 98  F (36.7  C)   Resp 20   SpO2 98%   There is no height or weight on file to calculate BMI.    Exam:  GENERAL APPEARANCE:  Alert, in no distress  PSYCH:  oriented X 3, irritable    Lab/Diagnostic data:   Recent labs in [facility] reviewed by me today.     ASSESSMENT/PLAN  (I10) Primary hypertension  (primary encounter diagnosis)  Comment: Chronic, controlled. -150/70-80, HR 70-90.  Plan: Continue hydrochlorothiazide.     (Z86.718) History of deep venous thrombosis  Plan: Continue xarelto. Monitor for s/sx bleeding    (M06.9) Rheumatoid arthritis, rheumatoid factor status unknown (H)  Comment: Chronic. No reports of uncontrolled pain  Plan: Follows with rheumatology. Continue sulfasalazine, duloxetine, gabapentin, APAP 650 mg, and ibuprofen.      -No new orders  today        Electronically signed by:  Hanh Joe, [employer] Student, [facility]    I was present with the student who participated in the service and in the documentation of the note. I have verified the history and personally performed the physical exam and medical decision-making. I agree with the assessment and plan of care as documented in the note.  Electronically signed by:   KRISTIN Conte CNP on 11/9/2023 at 7:55 PM

## 2023-11-09 NOTE — LETTER
11/9/2023        RE: María Elena Saab  [facility]  [address]  [place] [address]        M [facility] GERIATRICS  Chief Complaint   Patient presents with     [facility]     [place] Medical Record Number:  [id number]  Place of Service where encounter took place:  [facility] () [[id number]]    HPI:    María Elena Saab is 58 year old (1965) female, who is being seen today for a federally mandated E/M visit.    Today's concerns:  Primary hypertension  History of deep venous thrombosis  Rheumatoid arthritis, rheumatoid factor status unknown (H)    Patient resting in bed still this AM. She declined visit. Nursing notes reviewed, no acute concerns. She was Covid+ 10/16. VS have remained stable.      ALLERGIES:Adhesive tape, Adhesive [cyanoacrylate], Banana, Food, Grape [grape (artificial) flavor], Morphine (pf) [morphine], Other environmental allergy, Pine, Pseudoephedrine, Pseudoephedrine cold/allergy [chlorpheniramine-pseudoeph], Latex, and Other food allergy  PAST MEDICAL HISTORY:   Past Medical History:   Diagnosis Date     Aseptic necrosis of femoral head (H)     LEFT     Bacteremia due to group B Streptococcus      Cellulitis of right lower extremity      DJD (degenerative joint disease)      DVT, bilateral lower limbs (H) 10/2014     Edema 5/22/2015     Essential hypertension with goal blood pressure less than 140/90      Failure to thrive      History of blood clots      Hypokalemia 10/15/2014     Lung mass 10/18/2014     Morbid obesity (H) 4/10/2015    Had formal nutrition consult at [facility].  RD reports that she is not willing to commit to the program outlined.  See scanned document.  12/15/2015 - Marcio Quinonez MD        Nocturnal hypoxemia - probable sleep apnea - had overnight pulse oximetry, April, 2015. 5/22/2015     Obesity 4/10/2015     LOPEZ (obstructive sleep apnea) 5/22/2015     Osteoarthritis      Peripheral vascular disease (H)       Pleural effusion      Pressure ulcer of foot      Rheumatoid arthritis (H) 12/30/2014    seronegative     Sepsis (H)      Seropositive rheumatoid arthritis (H) 1/19/2016     Vitamin D deficiency 8/26/2015     PAST SURGICAL HISTORY:   has a past surgical history that includes Total Knee Arthroplasty (Left, 2006); joint replacement; Total Hip Arthroplasty (Left, 10/18/2016); and Peripherally Inserted Central Catheter Insertion (Right, 07/14/2019).  FAMILY HISTORY: family history includes Arthritis in her mother; Breast Cancer in her cousin; Breast Cancer (age of onset: 50.00) in her maternal grandmother; Breast Cancer (age of onset: 54.00) in her maternal aunt and mother; Cancer in her mother; Cataracts in her father and mother; Deep Vein Thrombosis in her father; Heart Disease in her maternal grandmother; Multiple myeloma in her father; No Known Problems in her sister; Other - See Comments in her brother; Rheumatoid Arthritis in her maternal grandmother and paternal aunt; Sleep Apnea in her brother; Snoring in her father.  SOCIAL HISTORY:  reports that she has quit smoking. Her smoking use included cigarettes. She has a 30.00 pack-year smoking history. She has never used smokeless tobacco. She reports that she does not currently use alcohol. She reports that she does not use drugs.    MEDICATIONS:  Current Outpatient Medications   Medication Sig Dispense Refill     acetaminophen (TYLENOL) 325 MG tablet Take 650 mg by mouth every 6 hours as needed for mild pain       ascorbic acid, vitamin C, (ASCORBIC ACID WITH KAEL HIPS) 500 MG tablet [ASCORBIC ACID, VITAMIN C, (ASCORBIC ACID WITH KAEL HIPS) 500 MG TABLET] Take 500 mg by mouth 2 (two) times a day.       cholecalciferol 50 MCG (2000 UT) tablet Take 1 tablet (50 mcg) by mouth daily 30 tablet 11     DULoxetine (CYMBALTA) 20 MG capsule [DULOXETINE (CYMBALTA) 20 MG CAPSULE] Take 1 capsule (20 mg total) by mouth daily. 90 capsule 2     gabapentin (NEURONTIN) 300 MG  capsule Take 1 capsule (300 mg) by mouth 3 times daily 90 capsule 11     hydroCHLOROthiazide (HYDRODIURIL) 25 MG tablet [HYDROCHLOROTHIAZIDE (HYDRODIURIL) 25 MG TABLET] Take 25 mg by mouth daily.       ibuprofen (ADVIL/MOTRIN) 400 MG tablet Take 1 tablet (400 mg) by mouth every 6 hours as needed for moderate pain 30 tablet 11     ketoconazole (NIZORAL) 2 % external shampoo Apply topically daily as needed for itching or irritation Apply to body daily PRN on shower days x 8 weeks. Then change to daily PRN thereafter 120 mL 11     loratadine (CLARITIN) 10 MG tablet Take 1 tablet (10 mg) by mouth daily 30 tablet 11     miconazole (MICATIN) 2 % external powder Apply to bilateral breast, abdominal folds  g 11     NEW MED Take 1 capsule by mouth daily Lymphatic formula       nystatin-triamcinolone (MYCOLOG II) 655317-9.1 UNIT/GM-% external cream Apply topically 2 times daily Until clear       rivaroxaban ANTICOAGULANT (XARELTO) 20 MG TABS tablet Take 20 mg by mouth daily       sulfaSALAzine ER (AZULFIDINE EN) 500 MG EC tablet Take 2 tablets (1,000 mg) by mouth 2 times daily           Case Management: Information reviewed:  Medications, vital signs, orders, and nursing notes.    ROS:  Unobtainable due to patient declining visit today.     Vitals:  /68   Pulse 78   Temp 98  F (36.7  C)   Resp 20   SpO2 98%   There is no height or weight on file to calculate BMI.    Exam:  GENERAL APPEARANCE:  Alert, in no distress  PSYCH:  oriented X 3, irritable    Lab/Diagnostic data:   Recent labs in [facility] reviewed by me today.     ASSESSMENT/PLAN  (I10) Primary hypertension  (primary encounter diagnosis)  Comment: Chronic, controlled. -150/70-80, HR 70-90.  Plan: Continue hydrochlorothiazide.     (Z86.718) History of deep venous thrombosis  Plan: Continue xarelto. Monitor for s/sx bleeding    (M06.9) Rheumatoid arthritis, rheumatoid factor status unknown (H)  Comment: Chronic. No reports of uncontrolled pain  Plan:  Follows with rheumatology. Continue sulfasalazine, duloxetine, gabapentin, APAP 650 mg, and ibuprofen.      -No new orders today        Electronically signed by:  Hanh Joe, [employer] Student, [facility]    I was present with the student who participated in the service and in the documentation of the note. I have verified the history and personally performed the physical exam and medical decision-making. I agree with the assessment and plan of care as documented in the note.  Electronically signed by:   KRISTIN Conte CNP on 11/9/2023 at 7:55 PM        Sincerely,        KRISTIN Conte CNP

## 2023-11-16 ENCOUNTER — OFFICE VISIT (OUTPATIENT)
Dept: RHEUMATOLOGY | Facility: CLINIC | Age: 58
End: 2023-11-16
Payer: COMMERCIAL

## 2023-11-16 ENCOUNTER — LAB (OUTPATIENT)
Dept: LAB | Facility: CLINIC | Age: 58
End: 2023-11-16
Payer: COMMERCIAL

## 2023-11-16 VITALS
OXYGEN SATURATION: 91 % | SYSTOLIC BLOOD PRESSURE: 128 MMHG | RESPIRATION RATE: 18 BRPM | BODY MASS INDEX: 60.72 KG/M2 | WEIGHT: 293 LBS | DIASTOLIC BLOOD PRESSURE: 80 MMHG | TEMPERATURE: 98.3 F | HEART RATE: 80 BPM

## 2023-11-16 DIAGNOSIS — M15.0 PRIMARY OSTEOARTHRITIS INVOLVING MULTIPLE JOINTS: ICD-10-CM

## 2023-11-16 DIAGNOSIS — R76.8 CYCLIC CITRULLINATED PEPTIDE (CCP) ANTIBODY POSITIVE: ICD-10-CM

## 2023-11-16 DIAGNOSIS — M05.79 SEROPOSITIVE RHEUMATOID ARTHRITIS OF MULTIPLE SITES (H): ICD-10-CM

## 2023-11-16 DIAGNOSIS — M05.79 SEROPOSITIVE RHEUMATOID ARTHRITIS OF MULTIPLE SITES (H): Primary | ICD-10-CM

## 2023-11-16 DIAGNOSIS — Z79.899 HIGH RISK MEDICATION USE: ICD-10-CM

## 2023-11-16 DIAGNOSIS — M25.50 POLYARTHRALGIA: ICD-10-CM

## 2023-11-16 LAB
ALBUMIN SERPL BCG-MCNC: 3.6 G/DL (ref 3.5–5.2)
ALT SERPL W P-5'-P-CCNC: 11 U/L (ref 0–50)
CREAT SERPL-MCNC: 0.48 MG/DL (ref 0.51–0.95)
EGFRCR SERPLBLD CKD-EPI 2021: >90 ML/MIN/1.73M2
ERYTHROCYTE [DISTWIDTH] IN BLOOD BY AUTOMATED COUNT: 14.7 % (ref 10–15)
HCT VFR BLD AUTO: 41.6 % (ref 35–47)
HGB BLD-MCNC: 13.4 G/DL (ref 11.7–15.7)
MCH RBC QN AUTO: 28.9 PG (ref 26.5–33)
MCHC RBC AUTO-ENTMCNC: 32.2 G/DL (ref 31.5–36.5)
MCV RBC AUTO: 90 FL (ref 78–100)
PLATELET # BLD AUTO: 217 10E3/UL (ref 150–450)
RBC # BLD AUTO: 4.63 10E6/UL (ref 3.8–5.2)
WBC # BLD AUTO: 5.6 10E3/UL (ref 4–11)

## 2023-11-16 PROCEDURE — 84460 ALANINE AMINO (ALT) (SGPT): CPT

## 2023-11-16 PROCEDURE — 82040 ASSAY OF SERUM ALBUMIN: CPT

## 2023-11-16 PROCEDURE — 85027 COMPLETE CBC AUTOMATED: CPT

## 2023-11-16 PROCEDURE — 36415 COLL VENOUS BLD VENIPUNCTURE: CPT

## 2023-11-16 PROCEDURE — 99214 OFFICE O/P EST MOD 30 MIN: CPT | Performed by: INTERNAL MEDICINE

## 2023-11-16 PROCEDURE — 82565 ASSAY OF CREATININE: CPT

## 2023-11-16 RX ORDER — CYCLOBENZAPRINE HCL 5 MG
10 TABLET ORAL 3 TIMES DAILY PRN
COMMUNITY
Start: 2023-07-03

## 2023-11-16 RX ORDER — NYSTATIN 100000 [USP'U]/G
POWDER TOPICAL 2 TIMES DAILY
COMMUNITY
Start: 2023-11-09

## 2023-11-16 ASSESSMENT — PAIN SCALES - GENERAL: PAINLEVEL: NO PAIN (0)

## 2023-11-16 NOTE — PROGRESS NOTES
Rheumatology follow-up visit note     María Elena is a 58 year old female presents today for follow-up.    María Elena was seen today for recheck.    Diagnoses and all orders for this visit:    Seropositive rheumatoid arthritis of multiple sites (H)    Cyclic citrullinated peptide (CCP) antibody positive    Primary osteoarthritis involving multiple joints    High risk medication use    Polyarthralgia        This patient seropositive rheumatoid arthritis appears to remain under good control with sulfasalazine that she has tolerated well, she is due for labs to be done today.  We will meet here in 6 months continue sulfasalazine in the interim.  She has but would appear to be severe eczema of her hands I have suggested that she considers primary physician or dermatology review.    Follow up in 6 months.    HPI    María Elena Saab is a 58 year old female is here for follow-up of  rheumatoid arthritis longstanding, polyarticular, seropositive.  She has osteoarthritis.  Status post left knee arthroplasty.  She has chronic lymphedema.  While her RA appears to be under good with the current regimen of sulfasalazine.  She was previously on Humira, methotrexate however repeated cellulitis of the lower extremity led to discontinuation of that.  She is seems to have continued to do well with sulfasalazine alone.  She did not have the list of medication from her nursing home however.  We will check labs today.         DETAILED EXAMINATION  11/16/23  :    Vitals:    11/16/23 1245   BP: 128/80   Pulse: 80   Resp: 18   Temp: 98.3  F (36.8  C)   TempSrc: Oral   SpO2: 91%   Weight: (!) 150.6 kg (332 lb)     Alert oriented. Head including the face is examined for malar rash, heliotropes, scarring, lupus pernio. Eyes examined for redness such as in episcleritis/scleritis, periorbital lesions.   Neck/ Face examined for parotid gland swelling, range of motion of neck.  Left upper and lower and right upper and lower extremities examined for  tenderness, swelling, warmth of the appendicular joints, range of motion, edema, rash.  Some of the important findings included: she does not have evidence of synovitis in the palpable joints of the upper extremities.  No significant deformities of the digits.  no Heberden nodes.  Range of motion of the shoulders   show full abduction.  No JLT effusion or warmth of the knees.  she does not have dactylitis of the digits.  She has rash on her palms and the dorsum of the hands consistent with the severe eczematous lesions.     Patient Active Problem List    Diagnosis Date Noted    Sepsis (H) 06/14/2023     Priority: Medium    Wheelchair dependence 05/01/2023     Priority: Medium    History of MRSA infection 04/26/2023     Priority: Medium    Elevated C-reactive protein (CRP) 04/26/2023     Priority: Medium    Cellulitis of right lower limb 04/25/2023     Priority: Medium    Chronic pulmonary embolism (H) 04/20/2023     Priority: Medium    MRSA (methicillin resistant Staphylococcus aureus) 06/28/2022     Priority: Medium     Formatting of this note might be different from the original.  +tissue right foot 6/25/22  +Swab right ankle 6/24/22      Chronic acquired lymphedema 06/24/2022     Priority: Medium    Current long-term use of anticoagulant medication with history of deep venous thrombosis (DVT) 06/24/2022     Priority: Medium    Hx of bacteremia 06/24/2022     Priority: Medium     Formatting of this note might be different from the original.  2019 - GBS bacteremia and cellulitis      Major depressive disorder, recurrent (H24) 06/24/2022     Priority: Medium    Cellulitis 06/23/2022     Priority: Medium     Formatting of this note might be different from the original.  Added automatically from request for surgery [id number]      Open wound of left lower extremity 03/21/2022     Priority: Medium    Bacteremia due to group B Streptococcus 07/13/2019     Priority: Medium    Primary osteoarthritis involving multiple joints  04/26/2019     Priority: Medium    History of total left knee replacement (TKR) 04/26/2019     Priority: Medium    High risk medication use 02/13/2018     Priority: Medium    Physical debility 11/28/2017     Priority: Medium    Polyarthralgia 07/21/2017     Priority: Medium    H/O total hip arthroplasty 11/17/2016     Priority: Medium    Anemia 10/20/2016     Priority: Medium    Essential hypertension with goal blood pressure less than 140/90      Priority: Medium    Degenerative joint disease (DJD) of hip 10/18/2016     Priority: Medium    History of DVT of lower extremity      Priority: Medium    Radiculopathy of leg 07/20/2016     Priority: Medium    Sleep apnea 07/20/2016     Priority: Medium    Pain management 04/26/2016     Priority: Medium    Seropositive rheumatoid arthritis of multiple sites (H) 03/21/2016     Priority: Medium    PAD (peripheral artery disease) (H24) 03/21/2016     Priority: Medium    Right shoulder tendinitis 01/19/2016     Priority: Medium    Cyclic citrullinated peptide (CCP) antibody positive 09/16/2015     Priority: Medium    Vitamin D deficiency 08/26/2015     Priority: Medium    Edema - swelling of the legs after blood clots 05/22/2015     Priority: Medium    Morbid obesity (H) 04/10/2015     Priority: Medium     Had formal nutrition consult at [facility].  RD reports that she is not   willing to commit to the program outlined.  See scanned document.    12/15/2015 - Marcio Quinonez MD        DVT of lower extremity, bilateral - residual clot disease on repeat US in NEW location - after six months of warfarin. 10/15/2014     Priority: Medium     Past Surgical History:   Procedure Laterality Date    JOINT REPLACEMENT      knee    PERIPHERALLY INSERTED CENTRAL CATHETER INSERTION Right 07/14/2019    4fr/44cm/Rt Cephalic.lowSVC.MGlav    TOTAL HIP ARTHROPLASTY Left 10/18/2016    Procedure: LEFT HIP TOTAL ARTHROPLASTY;  Surgeon: London Goss MD;  Location: [facility] OR;  Service:      TOTAL KNEE ARTHROPLASTY Left 2006      Past Medical History:   Diagnosis Date    Aseptic necrosis of femoral head (H)     LEFT    Bacteremia due to group B Streptococcus     Cellulitis of right lower extremity     DJD (degenerative joint disease)     DVT, bilateral lower limbs (H) 10/2014    Edema 5/22/2015    Essential hypertension with goal blood pressure less than 140/90     Failure to thrive     History of blood clots     Hypokalemia 10/15/2014    Lung mass 10/18/2014    Morbid obesity (H) 4/10/2015    Had formal nutrition consult at [facility].  RD reports that she is not willing to commit to the program outlined.  See scanned document.  12/15/2015 - Marcio Quinonez MD       Nocturnal hypoxemia - probable sleep apnea - had overnight pulse oximetry, April, 2015. 5/22/2015    Obesity 4/10/2015    LOPEZ (obstructive sleep apnea) 5/22/2015    Osteoarthritis     Peripheral vascular disease (H24)     Pleural effusion     Pressure ulcer of foot     Rheumatoid arthritis (H) 12/30/2014    seronegative    Sepsis (H)     Seropositive rheumatoid arthritis (H) 1/19/2016    Vitamin D deficiency 8/26/2015     Allergies   Allergen Reactions    Adhesive Tape Other (See Comments)     As on band-aids;  Causes skin tearing/wounds.    Adhesive [Cyanoacrylate] Other (See Comments)     As on band-aids;  Causes skin tearing/wounds.    Banana Unknown     She avoids due to Latex allergy.  If she eats them 3 days in a row, gets stomach cramps and dark stool.  TBrinkMD - 4/10/15    Food Unknown and GI Disturbance     Bananas, grapes, pine nuts    Artifical sweetner    Grape [Grape (Artificial) Flavor] Unknown     She avoids due to Latex allergy.  If she eats lots of them, and she likes them, stomach gets a little queasy.  TBrinkMD - 4/10/15    Morphine (Pf) [Morphine] Other (See Comments)     Pt has not reacted to this med herself, but mother has anaphylactic reaction and other family members get nausea/vomiting.    Other Environmental  Allergy Swelling     leather    Pine     Pseudoephedrine Other (See Comments)     Insomnia    Pseudoephedrine Cold/Allergy [Chlorpheniramine-Pseudoeph] Other (See Comments)     Keeps pt awake up to 24 hours     Latex Rash     Hands get red from rubber gloves, but okay if she washes them right away.  Hands get red from rubber gloves, but okay if she washes them right away.    Other Food Allergy Rash     Pine trees, leather .      Current Outpatient Medications   Medication Sig Dispense Refill    acetaminophen (TYLENOL) 325 MG tablet Take 650 mg by mouth every 6 hours as needed for mild pain      ascorbic acid, vitamin C, (ASCORBIC ACID WITH KAEL HIPS) 500 MG tablet [ASCORBIC ACID, VITAMIN C, (ASCORBIC ACID WITH KAEL HIPS) 500 MG TABLET] Take 500 mg by mouth 2 (two) times a day.      cholecalciferol 50 MCG (2000 UT) tablet Take 1 tablet (50 mcg) by mouth daily 30 tablet 11    DULoxetine (CYMBALTA) 20 MG capsule [DULOXETINE (CYMBALTA) 20 MG CAPSULE] Take 1 capsule (20 mg total) by mouth daily. 90 capsule 2    gabapentin (NEURONTIN) 300 MG capsule Take 1 capsule (300 mg) by mouth 3 times daily 90 capsule 11    hydroCHLOROthiazide (HYDRODIURIL) 25 MG tablet [HYDROCHLOROTHIAZIDE (HYDRODIURIL) 25 MG TABLET] Take 25 mg by mouth daily.      ibuprofen (ADVIL/MOTRIN) 400 MG tablet Take 1 tablet (400 mg) by mouth every 6 hours as needed for moderate pain 30 tablet 11    ketoconazole (NIZORAL) 2 % external shampoo Apply topically daily as needed for itching or irritation Apply to body daily PRN on shower days x 8 weeks. Then change to daily PRN thereafter 120 mL 11    loratadine (CLARITIN) 10 MG tablet Take 1 tablet (10 mg) by mouth daily 30 tablet 11    miconazole (MICATIN) 2 % external powder Apply to bilateral breast, abdominal folds  g 11    NEW MED Take 1 capsule by mouth daily Lymphatic formula      nystatin-triamcinolone (MYCOLOG II) 161208-1.1 UNIT/GM-% external cream Apply topically 2 times daily Until clear       rivaroxaban ANTICOAGULANT (XARELTO) 20 MG TABS tablet Take 20 mg by mouth daily      sulfaSALAzine ER (AZULFIDINE EN) 500 MG EC tablet Take 2 tablets (1,000 mg) by mouth 2 times daily       family history includes Arthritis in her mother; Breast Cancer in her cousin; Breast Cancer (age of onset: 50.00) in her maternal grandmother; Breast Cancer (age of onset: 54.00) in her maternal aunt and mother; Cancer in her mother; Cataracts in her father and mother; Deep Vein Thrombosis in her father; Heart Disease in her maternal grandmother; Multiple myeloma in her father; No Known Problems in her sister; Other - See Comments in her brother; Rheumatoid Arthritis in her maternal grandmother and paternal aunt; Sleep Apnea in her brother; Snoring in her father.  Social Connections: Not on file          WBC Count   Date Value Ref Range Status   09/21/2023 6.1 4.0 - 11.0 10e3/uL Final     RBC Count   Date Value Ref Range Status   09/21/2023 4.42 3.80 - 5.20 10e6/uL Final     Hemoglobin   Date Value Ref Range Status   09/21/2023 12.5 11.7 - 15.7 g/dL Final     Hematocrit   Date Value Ref Range Status   09/21/2023 40.0 35.0 - 47.0 % Final     MCV   Date Value Ref Range Status   09/21/2023 91 78 - 100 fL Final     MCH   Date Value Ref Range Status   09/21/2023 28.3 26.5 - 33.0 pg Final     Platelet Count   Date Value Ref Range Status   09/21/2023 215 150 - 450 10e3/uL Final     % Lymphocytes   Date Value Ref Range Status   01/13/2022 30 % Final     AST   Date Value Ref Range Status   07/11/2023 25 0 - 45 U/L Final     Comment:     Reference intervals for this test were updated on 6/12/2023 to more accurately reflect our healthy population. There may be differences in the flagging of prior results with similar values performed with this method. Interpretation of those prior results can be made in the context of the updated reference intervals.     ALT   Date Value Ref Range Status   09/21/2023 7 0 - 50 U/L Final     Comment:      Reference intervals for this test were updated on 6/12/2023 to more accurately reflect our healthy population. There may be differences in the flagging of prior results with similar values performed with this method. Interpretation of those prior results can be made in the context of the updated reference intervals.       Albumin   Date Value Ref Range Status   09/21/2023 3.4 (L) 3.5 - 5.2 g/dL Final   04/12/2022 3.1 (L) 3.5 - 5.0 g/dL Final     Alkaline Phosphatase   Date Value Ref Range Status   07/11/2023 73 35 - 104 U/L Final     Creatinine   Date Value Ref Range Status   09/21/2023 0.45 (L) 0.51 - 0.95 mg/dL Final     GFR Estimate   Date Value Ref Range Status   09/21/2023 >90 >60 mL/min/1.73m2 Final   04/20/2021 >60 >60 mL/min/1.73m2 Final     GFR Estimate If Black   Date Value Ref Range Status   04/20/2021 >60 >60 mL/min/1.73m2 Final     Erythrocyte Sedimentation Rate   Date Value Ref Range Status   07/27/2023 45 (H) 0 - 30 mm/hr Final     CRP   Date Value Ref Range Status   12/21/2020 1.3 (H) 0.0 - 0.8 mg/dL Final

## 2023-12-18 ENCOUNTER — TELEPHONE (OUTPATIENT)
Dept: GERIATRICS | Facility: CLINIC | Age: 58
End: 2023-12-18
Payer: COMMERCIAL

## 2023-12-18 RX ORDER — SULFAMETHOXAZOLE/TRIMETHOPRIM 800-160 MG
1 TABLET ORAL 2 TIMES DAILY
COMMUNITY
Start: 2023-12-18 | End: 2023-12-25

## 2023-12-18 NOTE — TELEPHONE ENCOUNTER
[facility] Geriatrics Triage Nurse Telephone Encounter    Provider: KRISTIN Wilder CNP   Facility: [facility] Facility Type:  [facility]    Caller: Codie  Call Back Number: [phone]    Allergies:    Allergies   Allergen Reactions    Adhesive Tape Other (See Comments)     As on band-aids;  Causes skin tearing/wounds.    Adhesive [Cyanoacrylate] Other (See Comments)     As on band-aids;  Causes skin tearing/wounds.    Banana Unknown     She avoids due to Latex allergy.  If she eats them 3 days in a row, gets stomach cramps and dark stool.  TBrinkMD - 4/10/15    Food Unknown and GI Disturbance     Bananas, grapes, pine nuts    Artifical sweetner    Grape [Grape (Artificial) Flavor] Unknown     She avoids due to Latex allergy.  If she eats lots of them, and she likes them, stomach gets a little queasy.  TBrinkMD - 4/10/15    Morphine (Pf) [Morphine] Other (See Comments)     Pt has not reacted to this med herself, but mother has anaphylactic reaction and other family members get nausea/vomiting.    Other Environmental Allergy Swelling     leather    Pine     Pseudoephedrine Other (See Comments)     Insomnia    Pseudoephedrine Cold/Allergy [Chlorpheniramine-Pseudoeph] Other (See Comments)     Keeps pt awake up to 24 hours     Latex Rash     Hands get red from rubber gloves, but okay if she washes them right away.  Hands get red from rubber gloves, but okay if she washes them right away.    Other Food Allergy Rash     Pine trees, leather .         Reason for call: Nurse is calling to report that patient came to her with c/o pain rated 6/10 to bilateral LE's.  Patient has wounds to bilateral LE's.  Upon assessment, both legs are very red and warm to touch.  Patient does follow with the in-house wound MD.  BP is elevated at 174/100, however other VS are stable.      Verbal Order/Direction given by Provider: Rocephin 1g IM with lidocaine x 1 dose now.  Starting 12/19/23, take Bactrim DS---1 tab BID x 7 days for  bilateral LE cellulitis.  Patient needs to see the in-house wound MD this week.      Provider giving Order:  KRISTIN Wilder CNP     Verbal Order given to: Codie Easton RN

## 2023-12-18 NOTE — TELEPHONE ENCOUNTER
[facility] ORDER  ENCOUNTER    María Elena Saab is a 58 year old  (1965),    ORDER given to Codie HAMILTON/SNF from [place] Shelley NP :  Cyclobenzaprine 10 mg PO Q8H PRN      Electronically signed by:   Jo Ann Watson RN

## 2023-12-19 ENCOUNTER — NURSING HOME VISIT (OUTPATIENT)
Dept: GERIATRICS | Facility: CLINIC | Age: 58
End: 2023-12-19
Payer: COMMERCIAL

## 2023-12-19 VITALS
OXYGEN SATURATION: 98 % | TEMPERATURE: 98 F | BODY MASS INDEX: 53.92 KG/M2 | RESPIRATION RATE: 18 BRPM | SYSTOLIC BLOOD PRESSURE: 127 MMHG | HEART RATE: 60 BPM | WEIGHT: 293 LBS | HEIGHT: 62 IN | DIASTOLIC BLOOD PRESSURE: 60 MMHG

## 2023-12-19 DIAGNOSIS — L03.115 BILATERAL LOWER LEG CELLULITIS: Primary | ICD-10-CM

## 2023-12-19 DIAGNOSIS — L03.116 BILATERAL LOWER LEG CELLULITIS: Primary | ICD-10-CM

## 2023-12-19 PROCEDURE — 99308 SBSQ NF CARE LOW MDM 20: CPT | Performed by: NURSE PRACTITIONER

## 2023-12-19 NOTE — PROGRESS NOTES
"[facility] GERIATRICS    Chief Complaint   Patient presents with    Nursing Home Acute     HPI:  María Elena Saab is a 58 year old  (1965), who is being seen today for an episodic care visit at: [facility] () [[id number]].     Today's concern is:   Patient is not well known to this provider, but nursing staff called on 12/18 to report s/sx cellulitis in legs. This has been an issue for her in the past and it has progressed quickly, requiring hospitalization. Order was given for 1g IM Rocephin that day and to start Bactrim today. Patient reports that her legs are quite painful. They are more red than usual, but the last time she went to the hospital, the erythema and pain spread within hours up to her groin. Provider advises that if at any point, the pain gets severe or the infection appears to be worsening, she can request to go to the ED.     Allergies, and PMH/PSH reviewed in EPIC today.  REVIEW OF SYSTEMS:  4 point ROS including Respiratory, CV, GI and , other than that noted in the HPI,  is negative    Objective:   /60   Pulse 60   Temp 98  F (36.7  C)   Resp 18   Ht 1.575 m (5' 2\")   Wt (!) 154.6 kg (340 lb 12.8 oz)   SpO2 98%   BMI 62.33 kg/m    GENERAL APPEARANCE:  Alert, in no distress, morbidly obese  RESP:  no respiratory distress  SKIN:  bilateral lower legs red, warm. Skin with several areas of scabbing. Thick, scaly skin on feet.   PSYCH:  oriented X 3    Recent labs in [facility] reviewed by me today.     Assessment/Plan:  (L03.116,  L03.115) Bilateral lower leg cellulitis  (primary encounter diagnosis)  Comment: Patient is high risk for infection due to open skin and PAD. Legs do appear consistent with cellulitis. Hopefully it was caught early enough to treat at the nursing home. She is followed by the in-house wound MD who will see her tomorrow  Plan: Continue current POC with no changes at this time and adjustments as needed.      Electronically signed by: [place]" Marycruz Shelley, APRN CNP

## 2023-12-19 NOTE — LETTER
"    12/19/2023        RE: María Elena Saab  [facility]  [address]  [place] [address]        [facility]    Chief Complaint   Patient presents with     Nursing Home Acute     HPI:  María Elena Saab is a 58 year old  (1965), who is being seen today for an episodic care visit at: [facility] () [[id number]].     Today's concern is:   Patient is not well known to this provider, but nursing staff called on 12/18 to report s/sx cellulitis in legs. This has been an issue for her in the past and it has progressed quickly, requiring hospitalization. Order was given for 1g IM Rocephin that day and to start Bactrim today. Patient reports that her legs are quite painful. They are more red than usual, but the last time she went to the hospital, the erythema and pain spread within hours up to her groin. Provider advises that if at any point, the pain gets severe or the infection appears to be worsening, she can request to go to the ED.     Allergies, and PMH/PSH reviewed in [employer] today.  REVIEW OF SYSTEMS:  4 point ROS including Respiratory, CV, GI and , other than that noted in the HPI,  is negative    Objective:   /60   Pulse 60   Temp 98  F (36.7  C)   Resp 18   Ht 1.575 m (5' 2\")   Wt (!) 154.6 kg (340 lb 12.8 oz)   SpO2 98%   BMI 62.33 kg/m    GENERAL APPEARANCE:  Alert, in no distress, morbidly obese  RESP:  no respiratory distress  SKIN:  bilateral lower legs red, warm. Skin with several areas of scabbing. Thick, scaly skin on feet.   PSYCH:  oriented X 3    Recent labs in EPIC reviewed by me today.     Assessment/Plan:  (L03.116,  L03.115) Bilateral lower leg cellulitis  (primary encounter diagnosis)  Comment: Patient is high risk for infection due to open skin and PAD. Legs do appear consistent with cellulitis. Hopefully it was caught early enough to treat at the nursing home. She is followed by the in-house wound MD who will see her " tomorrow  Plan: Continue current POC with no changes at this time and adjustments as needed.      Electronically signed by: KRISTIN Conte CNP           Sincerely,        KRISTIN Conte CNP

## 2024-01-17 ENCOUNTER — NURSING HOME VISIT (OUTPATIENT)
Dept: GERIATRICS | Facility: CLINIC | Age: 59
End: 2024-01-17
Payer: COMMERCIAL

## 2024-01-17 VITALS
OXYGEN SATURATION: 95 % | TEMPERATURE: 98 F | RESPIRATION RATE: 16 BRPM | HEIGHT: 62 IN | HEART RATE: 85 BPM | WEIGHT: 293 LBS | SYSTOLIC BLOOD PRESSURE: 138 MMHG | DIASTOLIC BLOOD PRESSURE: 75 MMHG | BODY MASS INDEX: 53.92 KG/M2

## 2024-01-17 DIAGNOSIS — I10 PRIMARY HYPERTENSION: Primary | ICD-10-CM

## 2024-01-17 DIAGNOSIS — Z86.718 HISTORY OF DVT OF LOWER EXTREMITY: ICD-10-CM

## 2024-01-17 DIAGNOSIS — M06.9 RHEUMATOID ARTHRITIS, RHEUMATOID FACTOR STATUS UNKNOWN (H): ICD-10-CM

## 2024-01-17 PROCEDURE — 99207 PR NO CHARGE LOS: CPT | Performed by: INTERNAL MEDICINE

## 2024-01-17 NOTE — PROGRESS NOTES
[facility] GERIATRICS  REGULATORY VISIT  January 17, 2024      [facility] Medical Record Number:  [id number]  Place of Service where encounter took place:  [facility] () [[id number]]    Chief Complaint   Patient presents with    longterm Regulatory       HPI:    María Elena Saab is a 58 year old  (1965), who is due for a federally mandated E/M visit. at [facility] where she has resided since March 2016.     I tried to visit with Ms. Saab today around 1100 and she was getting ready for the day, standing at her walker with a nursing assistant helping her. She asked me to come back. Unfortunately, I could not return late enough for her to be ready for the day.     I reviewed Epic. She was treated for a cellulitis in mid December. VS reviewed in Matrix. I reconciled medications between Matrix and Epic. No concerns today per nursing.     No charge LOS    ALLERGIES:    Allergies   Allergen Reactions    Adhesive Tape Other (See Comments)     As on band-aids;  Causes skin tearing/wounds.    Adhesive [Cyanoacrylate] Other (See Comments)     As on band-aids;  Causes skin tearing/wounds.    Banana Unknown     She avoids due to Latex allergy.  If she eats them 3 days in a row, gets stomach cramps and dark stool.  TBrinkMD - 4/10/15    Food Unknown and GI Disturbance     Bananas, grapes, pine nuts    Artifical sweetner    Grape [Grape (Artificial) Flavor] Unknown     She avoids due to Latex allergy.  If she eats lots of them, and she likes them, stomach gets a little queasy.  TBrinkMD - 4/10/15    Morphine (Pf) [Morphine] Other (See Comments)     Pt has not reacted to this med herself, but mother has anaphylactic reaction and other family members get nausea/vomiting.    Other Environmental Allergy Swelling     leather    Pine     Pseudoephedrine Other (See Comments)     Insomnia    Pseudoephedrine Cold/Allergy [Chlorpheniramine-Pseudoeph] Other (See Comments)     Keeps pt awake up  to 24 hours     Latex Rash     Hands get red from rubber gloves, but okay if she washes them right away.  Hands get red from rubber gloves, but okay if she washes them right away.    Other Food Allergy Rash     Pine trees, leather .         Past Medical, Surgical, Family and Social History: Reviewed and updated in EPIC.    MEDICATIONS:  Current Outpatient Medications   Medication Sig Dispense Refill    acetaminophen (TYLENOL) 325 MG tablet Take 650 mg by mouth every 6 hours as needed for mild pain      ascorbic acid, vitamin C, (ASCORBIC ACID WITH KAEL HIPS) 500 MG tablet [ASCORBIC ACID, VITAMIN C, (ASCORBIC ACID WITH KAEL HIPS) 500 MG TABLET] Take 500 mg by mouth 2 (two) times a day.      cholecalciferol 50 MCG (2000 UT) tablet Take 1 tablet (50 mcg) by mouth daily 30 tablet 11    cyclobenzaprine (FLEXERIL) 5 MG tablet Take 10 mg by mouth 3 times daily as needed      DULoxetine (CYMBALTA) 20 MG capsule [DULOXETINE (CYMBALTA) 20 MG CAPSULE] Take 1 capsule (20 mg total) by mouth daily. 90 capsule 2    gabapentin (NEURONTIN) 300 MG capsule Take 1 capsule (300 mg) by mouth 3 times daily 90 capsule 11    hydroCHLOROthiazide (HYDRODIURIL) 25 MG tablet [HYDROCHLOROTHIAZIDE (HYDRODIURIL) 25 MG TABLET] Take 25 mg by mouth daily.      ibuprofen (ADVIL/MOTRIN) 400 MG tablet Take 1 tablet (400 mg) by mouth every 6 hours as needed for moderate pain 30 tablet 11    ketoconazole (NIZORAL) 2 % external shampoo Apply topically daily as needed for itching or irritation Apply to body daily PRN on shower days x 8 weeks. Then change to daily PRN thereafter 120 mL 11    loratadine (CLARITIN) 10 MG tablet Take 1 tablet (10 mg) by mouth daily 30 tablet 11    NEW MED Take 1 capsule by mouth daily Lymphatic formula      nystatin (MYCOSTATIN) 524990 UNIT/GM external powder Apply topically 2 times daily      nystatin-triamcinolone (MYCOLOG II) 632286-2.1 UNIT/GM-% external cream Apply topically 2 times daily Until clear      rivaroxaban  ANTICOAGULANT (XARELTO) 20 MG TABS tablet Take 20 mg by mouth daily      sulfaSALAzine ER (AZULFIDINE EN) 500 MG EC tablet Take 2 tablets (1,000 mg) by mouth 2 times daily       Medications reviewed:  Medications reconciled to facility chart and changes were made to reflect current medications as identified as above med list. Below are the changes that were made:   Medications stopped since last EPIC medication reconciliation:   There are no discontinued medications.  Medications started since last [facility] medication reconciliation:  No orders of the defined types were placed in this encounter.    LABS/IMAGING: Reviewed as per Epic and/or [facility]    ASSESSMENT / PLAN:    HTN  SBPs 120s-130s. Weight is stable around 340 lbs. BMP was OK August and Cr was OK in Nov.   -- hydrochlorothiazide 25 mg daily   -- follow BPs and adjust medications as needed     Rheumatoid Arthritis  Follows with rheumatology. No longer on methotrexate or Humira since I last saw her - change made in June - due to recurrent cellulitis.  -- sulfasalazine 1000 mg BID   -- duloxetine 20 mg daily, gabapentin 300 mg TID   -- APAP 650 mg q6h PRN, cyclobenzaprine 10 mg q8h PRN, gabapentin 300 mg TID, ibuprofen 400 mg q6h PRN  -- labs and follow up per rheumatology     History of Lower Exremity DVT  -- rivaroxaban 20 mg daily                  Electronically signed by  Urvashi River MD

## 2024-01-17 NOTE — LETTER
1/17/2024        RE: María Elena Saab  [facility]  [address]  [place] [address]        [facility] GERIATRICS  REGULATORY VISIT  January 17, 2024      [facility] Medical Record Number:  [id number]  Place of Service where encounter took place:  [facility] () [[id number]]    Chief Complaint   Patient presents with     FDC Regulatory       HPI:    María Elena Saab is a 58 year old  (1965), who is due for a federally mandated E/M visit. at [facility] where she has resided since March 2016.     I tried to visit with Ms. Saab today around 1100 and she was getting ready for the day, standing at her walker with a nursing assistant helping her. She asked me to come back. Unfortunately, I could not return late enough for her to be ready for the day.     I reviewed Epic. She was treated for a cellulitis in mid December. VS reviewed in Matrix. I reconciled medications between Matrix and Epic. No concerns today per nursing.     No charge LOS    ALLERGIES:    Allergies   Allergen Reactions     Adhesive Tape Other (See Comments)     As on band-aids;  Causes skin tearing/wounds.     Adhesive [Cyanoacrylate] Other (See Comments)     As on band-aids;  Causes skin tearing/wounds.     Banana Unknown     She avoids due to Latex allergy.  If she eats them 3 days in a row, gets stomach cramps and dark stool.  TBrinkMD - 4/10/15     Food Unknown and GI Disturbance     Bananas, grapes, pine nuts    Artifical sweetner     Grape [Grape (Artificial) Flavor] Unknown     She avoids due to Latex allergy.  If she eats lots of them, and she likes them, stomach gets a little queasy.  TBrinkMD - 4/10/15     Morphine (Pf) [Morphine] Other (See Comments)     Pt has not reacted to this med herself, but mother has anaphylactic reaction and other family members get nausea/vomiting.     Other Environmental Allergy Swelling     leather     Pine      Pseudoephedrine Other  (See Comments)     Insomnia     Pseudoephedrine Cold/Allergy [Chlorpheniramine-Pseudoeph] Other (See Comments)     Keeps pt awake up to 24 hours      Latex Rash     Hands get red from rubber gloves, but okay if she washes them right away.  Hands get red from rubber gloves, but okay if she washes them right away.     Other Food Allergy Rash     Pine trees, leather .         Past Medical, Surgical, Family and Social History: Reviewed and updated in EPIC.    MEDICATIONS:  Current Outpatient Medications   Medication Sig Dispense Refill     acetaminophen (TYLENOL) 325 MG tablet Take 650 mg by mouth every 6 hours as needed for mild pain       ascorbic acid, vitamin C, (ASCORBIC ACID WITH KAEL HIPS) 500 MG tablet [ASCORBIC ACID, VITAMIN C, (ASCORBIC ACID WITH KAEL HIPS) 500 MG TABLET] Take 500 mg by mouth 2 (two) times a day.       cholecalciferol 50 MCG (2000 UT) tablet Take 1 tablet (50 mcg) by mouth daily 30 tablet 11     cyclobenzaprine (FLEXERIL) 5 MG tablet Take 10 mg by mouth 3 times daily as needed       DULoxetine (CYMBALTA) 20 MG capsule [DULOXETINE (CYMBALTA) 20 MG CAPSULE] Take 1 capsule (20 mg total) by mouth daily. 90 capsule 2     gabapentin (NEURONTIN) 300 MG capsule Take 1 capsule (300 mg) by mouth 3 times daily 90 capsule 11     hydroCHLOROthiazide (HYDRODIURIL) 25 MG tablet [HYDROCHLOROTHIAZIDE (HYDRODIURIL) 25 MG TABLET] Take 25 mg by mouth daily.       ibuprofen (ADVIL/MOTRIN) 400 MG tablet Take 1 tablet (400 mg) by mouth every 6 hours as needed for moderate pain 30 tablet 11     ketoconazole (NIZORAL) 2 % external shampoo Apply topically daily as needed for itching or irritation Apply to body daily PRN on shower days x 8 weeks. Then change to daily PRN thereafter 120 mL 11     loratadine (CLARITIN) 10 MG tablet Take 1 tablet (10 mg) by mouth daily 30 tablet 11     NEW MED Take 1 capsule by mouth daily Lymphatic formula       nystatin (MYCOSTATIN) 531626 UNIT/GM external powder Apply topically 2 times  daily       nystatin-triamcinolone (MYCOLOG II) 087276-6.1 UNIT/GM-% external cream Apply topically 2 times daily Until clear       rivaroxaban ANTICOAGULANT (XARELTO) 20 MG TABS tablet Take 20 mg by mouth daily       sulfaSALAzine ER (AZULFIDINE EN) 500 MG EC tablet Take 2 tablets (1,000 mg) by mouth 2 times daily       Medications reviewed:  Medications reconciled to facility chart and changes were made to reflect current medications as identified as above med list. Below are the changes that were made:   Medications stopped since last EPIC medication reconciliation:   There are no discontinued medications.  Medications started since last [facility] medication reconciliation:  No orders of the defined types were placed in this encounter.    LABS/IMAGING: Reviewed as per Epic and/or [facility]    ASSESSMENT / PLAN:    HTN  SBPs 120s-130s. Weight is stable around 340 lbs. BMP was OK August and Cr was OK in Nov.   -- hydrochlorothiazide 25 mg daily   -- follow BPs and adjust medications as needed     Rheumatoid Arthritis  Follows with rheumatology. No longer on methotrexate or Humira since I last saw her - change made in June - due to recurrent cellulitis.  -- sulfasalazine 1000 mg BID   -- duloxetine 20 mg daily, gabapentin 300 mg TID   -- APAP 650 mg q6h PRN, cyclobenzaprine 10 mg q8h PRN, gabapentin 300 mg TID, ibuprofen 400 mg q6h PRN  -- labs and follow up per rheumatology     History of Lower Exremity DVT  -- rivaroxaban 20 mg daily                  Electronically signed by  Urvashi River MD              Sincerely,        Urvashi River MD

## 2024-01-18 ENCOUNTER — DOCUMENTATION ONLY (OUTPATIENT)
Dept: GERIATRICS | Facility: CLINIC | Age: 59
End: 2024-01-18
Payer: COMMERCIAL

## 2024-02-11 ENCOUNTER — LAB REQUISITION (OUTPATIENT)
Dept: LAB | Facility: CLINIC | Age: 59
End: 2024-02-11
Payer: COMMERCIAL

## 2024-02-11 DIAGNOSIS — E66.01 MORBID (SEVERE) OBESITY DUE TO EXCESS CALORIES (H): ICD-10-CM

## 2024-02-11 DIAGNOSIS — M05.79 RHEUMATOID ARTHRITIS WITH RHEUMATOID FACTOR OF MULTIPLE SITES WITHOUT ORGAN OR SYSTEMS INVOLVEMENT (H): ICD-10-CM

## 2024-02-11 DIAGNOSIS — I73.9 PERIPHERAL VASCULAR DISEASE, UNSPECIFIED (H): ICD-10-CM

## 2024-02-11 DIAGNOSIS — I89.0 LYMPHEDEMA, NOT ELSEWHERE CLASSIFIED: ICD-10-CM

## 2024-02-13 LAB
ALBUMIN SERPL BCG-MCNC: 3.5 G/DL (ref 3.5–5.2)
ALT SERPL W P-5'-P-CCNC: 9 U/L (ref 0–50)
CREAT SERPL-MCNC: 0.49 MG/DL (ref 0.51–0.95)
EGFRCR SERPLBLD CKD-EPI 2021: >90 ML/MIN/1.73M2
ERYTHROCYTE [DISTWIDTH] IN BLOOD BY AUTOMATED COUNT: 14.7 % (ref 10–15)
HCT VFR BLD AUTO: 40.7 % (ref 35–47)
HGB BLD-MCNC: 12.4 G/DL (ref 11.7–15.7)
MCH RBC QN AUTO: 27.8 PG (ref 26.5–33)
MCHC RBC AUTO-ENTMCNC: 30.5 G/DL (ref 31.5–36.5)
MCV RBC AUTO: 91 FL (ref 78–100)
PLATELET # BLD AUTO: 226 10E3/UL (ref 150–450)
RBC # BLD AUTO: 4.46 10E6/UL (ref 3.8–5.2)
WBC # BLD AUTO: 6.1 10E3/UL (ref 4–11)

## 2024-02-13 PROCEDURE — 36415 COLL VENOUS BLD VENIPUNCTURE: CPT | Mod: ORL | Performed by: INTERNAL MEDICINE

## 2024-02-13 PROCEDURE — 85027 COMPLETE CBC AUTOMATED: CPT | Mod: ORL | Performed by: INTERNAL MEDICINE

## 2024-02-13 PROCEDURE — 82565 ASSAY OF CREATININE: CPT | Mod: ORL | Performed by: INTERNAL MEDICINE

## 2024-02-13 PROCEDURE — 84460 ALANINE AMINO (ALT) (SGPT): CPT | Mod: ORL | Performed by: INTERNAL MEDICINE

## 2024-02-13 PROCEDURE — 82040 ASSAY OF SERUM ALBUMIN: CPT | Mod: ORL | Performed by: INTERNAL MEDICINE

## 2024-02-13 PROCEDURE — P9604 ONE-WAY ALLOW PRORATED TRIP: HCPCS | Mod: ORL | Performed by: INTERNAL MEDICINE

## 2024-03-07 ENCOUNTER — TELEPHONE (OUTPATIENT)
Dept: GERIATRICS | Facility: CLINIC | Age: 59
End: 2024-03-07
Payer: COMMERCIAL

## 2024-03-07 RX ORDER — SULFAMETHOXAZOLE/TRIMETHOPRIM 800-160 MG
1 TABLET ORAL 2 TIMES DAILY
COMMUNITY
Start: 2024-03-08 | End: 2024-03-15

## 2024-03-07 NOTE — TELEPHONE ENCOUNTER
[facility] Geriatrics Triage Nurse Telephone Encounter    Provider: KRISTIN Wilder CNP   Facility: [facility] Facility Type:  [facility]    Caller: Marlin  Call Back Number: [phone]    Allergies:    Allergies   Allergen Reactions    Adhesive Tape Other (See Comments)     As on band-aids;  Causes skin tearing/wounds.    Adhesive [Cyanoacrylate] Other (See Comments)     As on band-aids;  Causes skin tearing/wounds.    Banana Unknown     She avoids due to Latex allergy.  If she eats them 3 days in a row, gets stomach cramps and dark stool.  TBrinkMD - 4/10/15    Food Unknown and GI Disturbance     Bananas, grapes, pine nuts    Artifical sweetner    Grape [Grape (Artificial) Flavor] Unknown     She avoids due to Latex allergy.  If she eats lots of them, and she likes them, stomach gets a little queasy.  TBrinkMD - 4/10/15    Morphine (Pf) [Morphine] Other (See Comments)     Pt has not reacted to this med herself, but mother has anaphylactic reaction and other family members get nausea/vomiting.    Other Environmental Allergy Swelling     leather    Pine     Pseudoephedrine Other (See Comments)     Insomnia    Pseudoephedrine Cold/Allergy [Chlorpheniramine-Pseudoeph] Other (See Comments)     Keeps pt awake up to 24 hours     Latex Rash     Hands get red from rubber gloves, but okay if she washes them right away.  Hands get red from rubber gloves, but okay if she washes them right away.    Other Food Allergy Rash     Pine trees, leather .         Reason for call: Pt was treated for LE cellulitis in December. Today LLE appears to be reddened from outer ankle up to the thigh, warm to touch and painful. Denies itching, swelling at baseline. No fever.  /69 HR 96 RR 22 O2 sat 95% Temp 98.0     Verbal Order/Direction given by Provider:   - Rocephin 1gm with lidocaine IM x1 now  - On 3/8/24 start Bactrim DS 1 tab PO BID for 7 days  - See in house wound MD next visit    Provider giving Order:  Urvashi River  MD    Verbal Order given to: Marlin Montana, RN

## 2024-03-12 ENCOUNTER — NURSING HOME VISIT (OUTPATIENT)
Dept: GERIATRICS | Facility: CLINIC | Age: 59
End: 2024-03-12
Payer: COMMERCIAL

## 2024-03-12 VITALS
BODY MASS INDEX: 53.92 KG/M2 | HEART RATE: 76 BPM | RESPIRATION RATE: 18 BRPM | TEMPERATURE: 98 F | OXYGEN SATURATION: 96 % | DIASTOLIC BLOOD PRESSURE: 73 MMHG | WEIGHT: 293 LBS | SYSTOLIC BLOOD PRESSURE: 140 MMHG | HEIGHT: 62 IN

## 2024-03-12 DIAGNOSIS — M05.79 SEROPOSITIVE RHEUMATOID ARTHRITIS OF MULTIPLE SITES (H): ICD-10-CM

## 2024-03-12 DIAGNOSIS — I10 ESSENTIAL HYPERTENSION WITH GOAL BLOOD PRESSURE LESS THAN 140/90: ICD-10-CM

## 2024-03-12 DIAGNOSIS — L03.119 CELLULITIS OF LOWER EXTREMITY, UNSPECIFIED LATERALITY: Primary | ICD-10-CM

## 2024-03-12 PROCEDURE — 99309 SBSQ NF CARE MODERATE MDM 30: CPT | Performed by: NURSE PRACTITIONER

## 2024-03-12 RX ORDER — TRAMADOL HYDROCHLORIDE 50 MG/1
50 TABLET ORAL EVERY 6 HOURS PRN
COMMUNITY
End: 2024-04-25

## 2024-03-12 NOTE — LETTER
3/12/2024        RE: María Elena Saab  [facility] [place]  [address]  [place] [address]        [facility] GERIATRICS  Chief Complaint   Patient presents with     group home Regulatory     [place] Medical Record Number:  [id number]  Place of Service where encounter took place:  [facility] () [[id number]]    HPI:    María Elena Saab  is 58 year old (1965), who is being seen today for a federally mandated E/M visit.     Today's concerns are:  Cellulitis of lower extremity, unspecified laterality  Essential hypertension with goal blood pressure less than 140/90  Seropositive rheumatoid arthritis of multiple sites (H)    Patient was started on a course of antibiotics again last week for bilateral cellulitis. She is not happy to be woken up, asks what time it is, it is currently 9:30am. She says her orders say she shouldn't be woken until 10am. Provider apologizes and notes that there was not a sign on the door. When asked how her legs are doing, she says better. 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  RD reports that she is not willing  to commit to the program outlined.  See scanned document.  12/15/2015 - Marcio Quinonez MD        Nocturnal hypoxemia - probable sleep apnea - had overnight pulse oximetry, April, 2015. 5/22/2015     Obesity 4/10/2015     LOPEZ (obstructive sleep apnea) 5/22/2015     Osteoarthritis      Peripheral vascular disease (H24)      Pleural effusion      Pressure ulcer of foot      Rheumatoid arthritis (H) 12/30/2014    seronegative     Sepsis (H)      Seropositive rheumatoid arthritis (H) 1/19/2016     Vitamin D deficiency 8/26/2015     PAST SURGICAL HISTORY:   has a past surgical history that includes Total Knee Arthroplasty (Left, 2006); joint replacement; Total Hip Arthroplasty (Left, 10/18/2016); and Peripherally Inserted Central Catheter Insertion (Right, 07/14/2019).  FAMILY HISTORY: family history includes Arthritis in her mother; Breast Cancer in her cousin; Breast Cancer (age of onset: 50.00) in her maternal grandmother; Breast Cancer (age of onset: 54.00) in her maternal aunt and mother; Cancer in her mother; Cataracts in her father and mother; Deep Vein Thrombosis in her father; Heart Disease in her maternal grandmother; Multiple myeloma in her father; No Known Problems in her sister; Other - See Comments in her brother; Rheumatoid Arthritis in her maternal grandmother and paternal aunt; Sleep Apnea in her brother; Snoring in her father.  SOCIAL HISTORY:  reports that she has quit smoking. Her smoking use included cigarettes. She has a 30 pack-year smoking history. She has never used smokeless tobacco. She reports that she does not currently use alcohol. She reports that she does not use drugs.    MEDICATIONS:  Current Outpatient Medications   Medication Sig Dispense Refill     acetaminophen (TYLENOL) 325 MG tablet Take 650 mg by mouth every 6 hours as needed for mild pain       ascorbic acid, vitamin C, (ASCORBIC ACID WITH KAEL HIPS) 500 MG tablet [ASCORBIC ACID, VITAMIN C, (ASCORBIC ACID WITH KAEL HIPS) 500 MG  TABLET] Take 500 mg by mouth 2 (two) times a day.       cholecalciferol 50 MCG (2000 UT) tablet Take 1 tablet (50 mcg) by mouth daily 30 tablet 11     cyclobenzaprine (FLEXERIL) 5 MG tablet Take 10 mg by mouth 3 times daily as needed       DULoxetine (CYMBALTA) 20 MG capsule [DULOXETINE (CYMBALTA) 20 MG CAPSULE] Take 1 capsule (20 mg total) by mouth daily. 90 capsule 2     gabapentin (NEURONTIN) 300 MG capsule Take 1 capsule (300 mg) by mouth 3 times daily 90 capsule 11     hydroCHLOROthiazide (HYDRODIURIL) 25 MG tablet [HYDROCHLOROTHIAZIDE (HYDRODIURIL) 25 MG TABLET] Take 25 mg by mouth daily.       ibuprofen (ADVIL/MOTRIN) 400 MG tablet Take 1 tablet (400 mg) by mouth every 6 hours as needed for moderate pain 30 tablet 11     ketoconazole (NIZORAL) 2 % external shampoo Apply topically daily as needed for itching or irritation Apply to body daily PRN on shower days x 8 weeks. Then change to daily PRN thereafter 120 mL 11     loratadine (CLARITIN) 10 MG tablet Take 1 tablet (10 mg) by mouth daily 30 tablet 11     NEW MED Take 1 capsule by mouth daily Lymphatic formula       nystatin (MYCOSTATIN) 046311 UNIT/GM external powder Apply topically 2 times daily       nystatin-triamcinolone (MYCOLOG II) 568934-5.1 UNIT/GM-% external cream Apply topically 2 times daily Until clear       rivaroxaban ANTICOAGULANT (XARELTO) 20 MG TABS tablet Take 20 mg by mouth daily       sulfamethoxazole-trimethoprim (BACTRIM DS) 800-160 MG tablet Take 1 tablet by mouth 2 times daily For 7 days       sulfaSALAzine ER (AZULFIDINE EN) 500 MG EC tablet Take 2 tablets (1,000 mg) by mouth 2 times daily       traMADol (ULTRAM) 50 MG tablet Take 50 mg by mouth every 6 hours as needed for severe pain         Case Management:  I have reviewed the care plan and MDS and do agree with the plan. Information reviewed:  Medications, vital signs, orders, and nursing notes.    ROS:  4 point ROS including Respiratory, CV, GI and , other than that noted in  "the HPI,  is negative    Vitals:  BP (!) 140/73   Pulse 76   Temp 98  F (36.7  C)   Resp 18   Ht 1.575 m (5' 2\")   Wt (!) 159.4 kg (351 lb 6.4 oz)   SpO2 96%   BMI 64.27 kg/m    Body mass index is 64.27 kg/m .  Exam:  GENERAL APPEARANCE:  Alert, in no distress, morbidly obese  RESP:  no respiratory distress  CV:  at least 2+ edema to BLE  SKIN:  difficult to view as the lights are off, bilateral lower legs appear red    Lab/Diagnostic data:   Recent labs in Results Scorecard reviewed by me today.     ASSESSMENT/PLAN  (L03.119) Cellulitis of lower extremity, unspecified laterality  (primary encounter diagnosis)  Comment: Difficult to assess today. Patient seems to be a good  of when her legs are getting infected.   Plan: Continue current POC with no changes at this time and adjustments as needed.    (I10) Essential hypertension with goal blood pressure less than 140/90  Comment: Chronic, controlled  Plan: Continue current POC with no changes at this time and adjustments as needed.    (M05.79) Seropositive rheumatoid arthritis of multiple sites (H)  Comment: Patient is off immunosuppressants due to recurrent cellulitis. Tramadol recently started for leg pain. No complaints today  Plan: Continue current POC with no changes at this time and adjustments as needed.        Electronically signed by:  KRISTIN Conte CNP            Sincerely,        KRISTIN Conte CNP      "

## 2024-03-12 NOTE — PROGRESS NOTES
[facility] GERIATRICS  Chief Complaint   Patient presents with    assisted Regulatory     [place] Medical Record Number:  [id number]  Place of Service where encounter took place:  [facility] () [[id number]]    HPI:    María Elena Saab  is 58 year old (1965), who is being seen today for a federally mandated E/M visit.     Today's concerns are:  Cellulitis of lower extremity, unspecified laterality  Essential hypertension with goal blood pressure less than 140/90  Seropositive rheumatoid arthritis of multiple sites (H)    Patient was started on a course of antibiotics again last week for bilateral cellulitis. She is not happy to be woken up, asks what time it is, it is currently 9:30am. She says her orders say she shouldn't be woken until 10am. Provider apologizes and notes that there was not a sign on the door. When asked how her legs are doing, she says better. She says she does not need anything  -140s/70s  HR 70-80s      ALLERGIES:Adhesive tape, Adhesive [cyanoacrylate], Banana, Food, Grape [grape (artificial) flavor], Morphine (pf) [morphine], Other environmental allergy, Pine, Pseudoephedrine, Pseudoephedrine cold/allergy [chlorpheniramine-pseudoeph], Latex, and Other food allergy  PAST MEDICAL HISTORY:   Past Medical History:   Diagnosis Date    Aseptic necrosis of femoral head (H)     LEFT    Bacteremia due to group B Streptococcus     Cellulitis of right lower extremity     DJD (degenerative joint disease)     DVT, bilateral lower limbs (H) 10/2014    Edema 5/22/2015    Essential hypertension with goal blood pressure less than 140/90     Failure to thrive     History of blood clots     Hypokalemia 10/15/2014    Lung mass 10/18/2014    Morbid obesity (H) 4/10/2015    Had formal nutrition consult at [facility].  RD reports that she is not willing to commit to the program outlined.  See scanned document.  12/15/2015 - Marcio Quinonez MD       Nocturnal hypoxemia - probable  sleep apnea - had overnight pulse oximetry, April, 2015. 5/22/2015    Obesity 4/10/2015    LOPEZ (obstructive sleep apnea) 5/22/2015    Osteoarthritis     Peripheral vascular disease (H24)     Pleural effusion     Pressure ulcer of foot     Rheumatoid arthritis (H) 12/30/2014    seronegative    Sepsis (H)     Seropositive rheumatoid arthritis (H) 1/19/2016    Vitamin D deficiency 8/26/2015     PAST SURGICAL HISTORY:   has a past surgical history that includes Total Knee Arthroplasty (Left, 2006); joint replacement; Total Hip Arthroplasty (Left, 10/18/2016); and Peripherally Inserted Central Catheter Insertion (Right, 07/14/2019).  FAMILY HISTORY: family history includes Arthritis in her mother; Breast Cancer in her cousin; Breast Cancer (age of onset: 50.00) in her maternal grandmother; Breast Cancer (age of onset: 54.00) in her maternal aunt and mother; Cancer in her mother; Cataracts in her father and mother; Deep Vein Thrombosis in her father; Heart Disease in her maternal grandmother; Multiple myeloma in her father; No Known Problems in her sister; Other - See Comments in her brother; Rheumatoid Arthritis in her maternal grandmother and paternal aunt; Sleep Apnea in her brother; Snoring in her father.  SOCIAL HISTORY:  reports that she has quit smoking. Her smoking use included cigarettes. She has a 30 pack-year smoking history. She has never used smokeless tobacco. She reports that she does not currently use alcohol. She reports that she does not use drugs.    MEDICATIONS:  Current Outpatient Medications   Medication Sig Dispense Refill    acetaminophen (TYLENOL) 325 MG tablet Take 650 mg by mouth every 6 hours as needed for mild pain      ascorbic acid, vitamin C, (ASCORBIC ACID WITH KAEL HIPS) 500 MG tablet [ASCORBIC ACID, VITAMIN C, (ASCORBIC ACID WITH KAEL HIPS) 500 MG TABLET] Take 500 mg by mouth 2 (two) times a day.      cholecalciferol 50 MCG (2000 UT) tablet Take 1 tablet (50 mcg) by mouth daily 30 tablet  "11    cyclobenzaprine (FLEXERIL) 5 MG tablet Take 10 mg by mouth 3 times daily as needed      DULoxetine (CYMBALTA) 20 MG capsule [DULOXETINE (CYMBALTA) 20 MG CAPSULE] Take 1 capsule (20 mg total) by mouth daily. 90 capsule 2    gabapentin (NEURONTIN) 300 MG capsule Take 1 capsule (300 mg) by mouth 3 times daily 90 capsule 11    hydroCHLOROthiazide (HYDRODIURIL) 25 MG tablet [HYDROCHLOROTHIAZIDE (HYDRODIURIL) 25 MG TABLET] Take 25 mg by mouth daily.      ibuprofen (ADVIL/MOTRIN) 400 MG tablet Take 1 tablet (400 mg) by mouth every 6 hours as needed for moderate pain 30 tablet 11    ketoconazole (NIZORAL) 2 % external shampoo Apply topically daily as needed for itching or irritation Apply to body daily PRN on shower days x 8 weeks. Then change to daily PRN thereafter 120 mL 11    loratadine (CLARITIN) 10 MG tablet Take 1 tablet (10 mg) by mouth daily 30 tablet 11    NEW MED Take 1 capsule by mouth daily Lymphatic formula      nystatin (MYCOSTATIN) 762193 UNIT/GM external powder Apply topically 2 times daily      nystatin-triamcinolone (MYCOLOG II) 271176-8.1 UNIT/GM-% external cream Apply topically 2 times daily Until clear      rivaroxaban ANTICOAGULANT (XARELTO) 20 MG TABS tablet Take 20 mg by mouth daily      sulfamethoxazole-trimethoprim (BACTRIM DS) 800-160 MG tablet Take 1 tablet by mouth 2 times daily For 7 days      sulfaSALAzine ER (AZULFIDINE EN) 500 MG EC tablet Take 2 tablets (1,000 mg) by mouth 2 times daily      traMADol (ULTRAM) 50 MG tablet Take 50 mg by mouth every 6 hours as needed for severe pain         Case Management:  I have reviewed the care plan and MDS and do agree with the plan. Information reviewed:  Medications, vital signs, orders, and nursing notes.    ROS:  4 point ROS including Respiratory, CV, GI and , other than that noted in the HPI,  is negative    Vitals:  BP (!) 140/73   Pulse 76   Temp 98  F (36.7  C)   Resp 18   Ht 1.575 m (5' 2\")   Wt (!) 159.4 kg (351 lb 6.4 oz)   " SpO2 96%   BMI 64.27 kg/m    Body mass index is 64.27 kg/m .  Exam:  GENERAL APPEARANCE:  Alert, in no distress, morbidly obese  RESP:  no respiratory distress  CV:  at least 2+ edema to BLE  SKIN:  difficult to view as the lights are off, bilateral lower legs appear red    Lab/Diagnostic data:   Recent labs in [facility] reviewed by me today.     ASSESSMENT/PLAN  (L03.119) Cellulitis of lower extremity, unspecified laterality  (primary encounter diagnosis)  Comment: Difficult to assess today. Patient seems to be a good  of when her legs are getting infected.   Plan: Continue current POC with no changes at this time and adjustments as needed.    (I10) Essential hypertension with goal blood pressure less than 140/90  Comment: Chronic, controlled  Plan: Continue current POC with no changes at this time and adjustments as needed.    (M05.79) Seropositive rheumatoid arthritis of multiple sites (H)  Comment: Patient is off immunosuppressants due to recurrent cellulitis. Tramadol recently started for leg pain. No complaints today  Plan: Continue current POC with no changes at this time and adjustments as needed.        Electronically signed by:  KRISTIN Conte CNP

## 2024-03-20 ENCOUNTER — LAB REQUISITION (OUTPATIENT)
Dept: LAB | Facility: CLINIC | Age: 59
End: 2024-03-20
Payer: COMMERCIAL

## 2024-03-20 DIAGNOSIS — M05.79 RHEUMATOID ARTHRITIS WITH RHEUMATOID FACTOR OF MULTIPLE SITES WITHOUT ORGAN OR SYSTEMS INVOLVEMENT (H): ICD-10-CM

## 2024-03-21 LAB
ALBUMIN SERPL BCG-MCNC: 3.2 G/DL (ref 3.5–5.2)
ALT SERPL W P-5'-P-CCNC: 10 U/L (ref 0–50)
CREAT SERPL-MCNC: 0.5 MG/DL (ref 0.51–0.95)
EGFRCR SERPLBLD CKD-EPI 2021: >90 ML/MIN/1.73M2
ERYTHROCYTE [DISTWIDTH] IN BLOOD BY AUTOMATED COUNT: 14.1 % (ref 10–15)
HCT VFR BLD AUTO: 37.3 % (ref 35–47)
HGB BLD-MCNC: 11.6 G/DL (ref 11.7–15.7)
MCH RBC QN AUTO: 27.6 PG (ref 26.5–33)
MCHC RBC AUTO-ENTMCNC: 31.1 G/DL (ref 31.5–36.5)
MCV RBC AUTO: 89 FL (ref 78–100)
PLATELET # BLD AUTO: 319 10E3/UL (ref 150–450)
RBC # BLD AUTO: 4.21 10E6/UL (ref 3.8–5.2)
WBC # BLD AUTO: 5.5 10E3/UL (ref 4–11)

## 2024-03-21 PROCEDURE — 82040 ASSAY OF SERUM ALBUMIN: CPT | Mod: ORL | Performed by: INTERNAL MEDICINE

## 2024-03-21 PROCEDURE — 85027 COMPLETE CBC AUTOMATED: CPT | Mod: ORL | Performed by: INTERNAL MEDICINE

## 2024-03-21 PROCEDURE — P9603 ONE-WAY ALLOW PRORATED MILES: HCPCS | Mod: ORL | Performed by: INTERNAL MEDICINE

## 2024-03-21 PROCEDURE — 36415 COLL VENOUS BLD VENIPUNCTURE: CPT | Mod: ORL | Performed by: INTERNAL MEDICINE

## 2024-03-21 PROCEDURE — 84460 ALANINE AMINO (ALT) (SGPT): CPT | Mod: ORL | Performed by: INTERNAL MEDICINE

## 2024-03-21 PROCEDURE — 82565 ASSAY OF CREATININE: CPT | Mod: ORL | Performed by: INTERNAL MEDICINE

## 2024-04-25 DIAGNOSIS — R52 PAIN: Primary | ICD-10-CM

## 2024-04-25 RX ORDER — TRAMADOL HYDROCHLORIDE 50 MG/1
50 TABLET ORAL EVERY 6 HOURS PRN
Qty: 60 TABLET | Refills: 0 | Status: SHIPPED | OUTPATIENT
Start: 2024-04-25 | End: 2024-05-21

## 2024-05-13 ENCOUNTER — LAB REQUISITION (OUTPATIENT)
Dept: LAB | Facility: CLINIC | Age: 59
End: 2024-05-13
Payer: COMMERCIAL

## 2024-05-13 DIAGNOSIS — M05.79 RHEUMATOID ARTHRITIS WITH RHEUMATOID FACTOR OF MULTIPLE SITES WITHOUT ORGAN OR SYSTEMS INVOLVEMENT (H): ICD-10-CM

## 2024-05-14 LAB
ALBUMIN SERPL BCG-MCNC: 3.5 G/DL (ref 3.5–5.2)
ALT SERPL W P-5'-P-CCNC: 7 U/L (ref 0–50)
CREAT SERPL-MCNC: 0.62 MG/DL (ref 0.51–0.95)
EGFRCR SERPLBLD CKD-EPI 2021: >90 ML/MIN/1.73M2
ERYTHROCYTE [DISTWIDTH] IN BLOOD BY AUTOMATED COUNT: 15.3 % (ref 10–15)
HCT VFR BLD AUTO: 42.6 % (ref 35–47)
HGB BLD-MCNC: 13 G/DL (ref 11.7–15.7)
MCH RBC QN AUTO: 26.7 PG (ref 26.5–33)
MCHC RBC AUTO-ENTMCNC: 30.5 G/DL (ref 31.5–36.5)
MCV RBC AUTO: 88 FL (ref 78–100)
PLATELET # BLD AUTO: 345 10E3/UL (ref 150–450)
RBC # BLD AUTO: 4.87 10E6/UL (ref 3.8–5.2)
WBC # BLD AUTO: 14.7 10E3/UL (ref 4–11)

## 2024-05-14 PROCEDURE — 36415 COLL VENOUS BLD VENIPUNCTURE: CPT | Mod: ORL | Performed by: INTERNAL MEDICINE

## 2024-05-14 PROCEDURE — P9604 ONE-WAY ALLOW PRORATED TRIP: HCPCS | Mod: ORL | Performed by: INTERNAL MEDICINE

## 2024-05-14 PROCEDURE — 82565 ASSAY OF CREATININE: CPT | Mod: ORL | Performed by: INTERNAL MEDICINE

## 2024-05-14 PROCEDURE — 84460 ALANINE AMINO (ALT) (SGPT): CPT | Mod: ORL | Performed by: INTERNAL MEDICINE

## 2024-05-14 PROCEDURE — 82040 ASSAY OF SERUM ALBUMIN: CPT | Mod: ORL | Performed by: INTERNAL MEDICINE

## 2024-05-14 PROCEDURE — 85027 COMPLETE CBC AUTOMATED: CPT | Mod: ORL | Performed by: INTERNAL MEDICINE

## 2024-05-21 ENCOUNTER — NURSING HOME VISIT (OUTPATIENT)
Dept: GERIATRICS | Facility: CLINIC | Age: 59
End: 2024-05-21
Payer: COMMERCIAL

## 2024-05-21 DIAGNOSIS — L03.119 CELLULITIS OF LOWER EXTREMITY, UNSPECIFIED LATERALITY: Primary | ICD-10-CM

## 2024-05-21 PROCEDURE — 99308 SBSQ NF CARE LOW MDM 20: CPT | Performed by: NURSE PRACTITIONER

## 2024-05-21 NOTE — PROGRESS NOTES
[facility] GERIATRICS    PRIMARY CARE PROVIDER AND CLINIC:  Indiana Shelley, APRN CNP, [address] [facility] [address]  Chief Complaint   Patient presents with    Hospital F/U      [place] Medical Record Number:  [id number]  Place of Service where encounter took place:  [facility] () [[id number]]    María Elena Saab  is a 58 year old  (1965), returned to the above facility from  [facility] . Hospital stay 5/14/24 - 5/20/24. Patient has a history of recurrent BLE cellulitis that can worsen quickly. She was sent to the ED due to 2 days of fatigue, nausea, fever, chills, and noted redness all the way up both legs, R>L on day of admission. She met sepsis criteria and was admitted. She was treated with Zosyn/Vancomycin to cefazolin to oral Keflex. CT without deep infection.     HPI obtained from patient visit, review of nursing home record, discussion with facility staff, and Epic review.     HPI:    Patient is sleeping soundly in bed. She generally does not get up until 10am, refuses any interaction before that time. Provider unfortunately will not be in the building after 10. Attempted to wake her up, she mumbles, come back later. Her nurse does not have any acute concerns. He says her legs look better    CODE STATUS/ADVANCE DIRECTIVES DISCUSSION:  Full Code    ALLERGIES:   Allergies   Allergen Reactions    Adhesive Tape Other (See Comments)     As on band-aids;  Causes skin tearing/wounds.    Adhesive [Cyanoacrylate] Other (See Comments)     As on band-aids;  Causes skin tearing/wounds.    Banana Unknown     She avoids due to Latex allergy.  If she eats them 3 days in a row, gets stomach cramps and dark stool.  TBrinkMD - 4/10/15    Food Unknown and GI Disturbance     Bananas, grapes, pine nuts    Artifical sweetner    Grape [Grape (Artificial) Flavor] Unknown     She avoids due to Latex allergy.  If she eats lots of them, and she likes them, stomach gets a little queasy.   TBrinkMD - 4/10/15    Morphine (Pf) [Morphine] Other (See Comments)     Pt has not reacted to this med herself, but mother has anaphylactic reaction and other family members get nausea/vomiting.    Other Environmental Allergy Swelling     leather    Pine     Pseudoephedrine Other (See Comments)     Insomnia    Pseudoephedrine Cold/Allergy [Chlorpheniramine-Pseudoeph] Other (See Comments)     Keeps pt awake up to 24 hours     Latex Rash     Hands get red from rubber gloves, but okay if she washes them right away.  Hands get red from rubber gloves, but okay if she washes them right away.    Other Food Allergy Rash     Pine trees, leather .       PAST MEDICAL HISTORY:   Past Medical History:   Diagnosis Date    Aseptic necrosis of femoral head (H)     LEFT    Bacteremia due to group B Streptococcus     Cellulitis of right lower extremity     DJD (degenerative joint disease)     DVT, bilateral lower limbs (H) 10/2014    Edema 5/22/2015    Essential hypertension with goal blood pressure less than 140/90     Failure to thrive     History of blood clots     Hypokalemia 10/15/2014    Lung mass 10/18/2014    Morbid obesity (H) 4/10/2015    Had formal nutrition consult at [facility].  RD reports that she is not willing to commit to the program outlined.  See scanned document.  12/15/2015 - Marcio Quinonez MD       Nocturnal hypoxemia - probable sleep apnea - had overnight pulse oximetry, April, 2015. 5/22/2015    Obesity 4/10/2015    LOPEZ (obstructive sleep apnea) 5/22/2015    Osteoarthritis     Peripheral vascular disease (H24)     Pleural effusion     Pressure ulcer of foot     Rheumatoid arthritis (H) 12/30/2014    seronegative    Sepsis (H)     Seropositive rheumatoid arthritis (H) 1/19/2016    Vitamin D deficiency 8/26/2015      PAST SURGICAL HISTORY:   has a past surgical history that includes Total Knee Arthroplasty (Left, 2006); joint replacement; Total Hip Arthroplasty (Left, 10/18/2016); and Peripherally Inserted  Central Catheter Insertion (Right, 07/14/2019).  FAMILY HISTORY: family history includes Arthritis in her mother; Breast Cancer in her cousin; Breast Cancer (age of onset: 50.00) in her maternal grandmother; Breast Cancer (age of onset: 54.00) in her maternal aunt and mother; Cancer in her mother; Cataracts in her father and mother; Deep Vein Thrombosis in her father; Heart Disease in her maternal grandmother; Multiple myeloma in her father; No Known Problems in her sister; Other - See Comments in her brother; Rheumatoid Arthritis in her maternal grandmother and paternal aunt; Sleep Apnea in her brother; Snoring in her father.  SOCIAL HISTORY:   reports that she has quit smoking. Her smoking use included cigarettes. She has a 30 pack-year smoking history. She has never used smokeless tobacco. She reports that she does not currently use alcohol. She reports that she does not use drugs.  Patient's living condition: lives in a nursing home    Post Discharge Medication Reconciliation Status:   MED REC REQUIRED  Post Medication Reconciliation Status:  Discharge medications reconciled, continue medications without change       Current Outpatient Medications   Medication Sig Dispense Refill    acetaminophen (TYLENOL) 325 MG tablet Take 650 mg by mouth every 6 hours as needed for mild pain      ascorbic acid, vitamin C, (ASCORBIC ACID WITH KAEL HIPS) 500 MG tablet [ASCORBIC ACID, VITAMIN C, (ASCORBIC ACID WITH KAEL HIPS) 500 MG TABLET] Take 500 mg by mouth 2 (two) times a day.      cholecalciferol 50 MCG (2000 UT) tablet Take 1 tablet (50 mcg) by mouth daily 30 tablet 11    cyclobenzaprine (FLEXERIL) 5 MG tablet Take 10 mg by mouth 3 times daily as needed      DULoxetine (CYMBALTA) 20 MG capsule [DULOXETINE (CYMBALTA) 20 MG CAPSULE] Take 1 capsule (20 mg total) by mouth daily. 90 capsule 2    gabapentin (NEURONTIN) 300 MG capsule Take 1 capsule (300 mg) by mouth 3 times daily 90 capsule 11    hydroCHLOROthiazide  (HYDRODIURIL) 25 MG tablet Take 50 mg by mouth daily      loratadine (CLARITIN) 10 MG tablet Take 1 tablet (10 mg) by mouth daily 30 tablet 11    NEW MED Take 1 capsule by mouth daily Lymphatic formula      nystatin (MYCOSTATIN) 625134 UNIT/GM external powder Apply topically 2 times daily      nystatin-triamcinolone (MYCOLOG II) 510524-3.1 UNIT/GM-% external cream Apply topically 2 times daily Until clear      rivaroxaban ANTICOAGULANT (XARELTO) 20 MG TABS tablet Take 20 mg by mouth daily      sulfaSALAzine ER (AZULFIDINE EN) 500 MG EC tablet Take 2 tablets (1,000 mg) by mouth 2 times daily       No current facility-administered medications for this visit.       ROS:  Unobtainable secondary to cooperation.     Vitals:  There were no vitals taken for this visit.  Exam:  GENERAL APPEARANCE:  Sleeping soundly, awakens briefly to touch  RESP:  no respiratory distress  SKIN:  difficult to assess legs due to darkened room, they both appear pink, not red    Lab/Diagnostic data:  Recent labs in [facility] reviewed by me today.     ASSESSMENT/PLAN:  (L03.119) Cellulitis of lower extremity, unspecified laterality  (primary encounter diagnosis)  Comment: Improving based on reports, difficult to assess today. Will try again 5/23 at a later time if possible.  Plan: Continue current POC with no changes at this time and adjustments as needed.      Electronically signed by:  KRISTIN Conte CNP

## 2024-05-21 NOTE — LETTER
5/21/2024        RE: María Elena Saab  [address]  [address]  [place] [address]        [facility] GERIATRICS    PRIMARY CARE PROVIDER AND CLINIC:  Indiana Shelley, KRISTIN CNP, [address] [facility] [address]  Chief Complaint   Patient presents with     Hospital F/U      [place] Medical Record Number:  [id number]  Place of Service where encounter took place:  [facility] () [[id number]]    María Elena Saab  is a 58 year old  (1965), returned to the above facility from  [facility] . Hospital stay 5/14/24 - 5/20/24. Patient has a history of recurrent BLE cellulitis that can worsen quickly. She was sent to the ED due to 2 days of fatigue, nausea, fever, chills, and noted redness all the way up both legs, R>L on day of admission. She met sepsis criteria and was admitted. She was treated with Zosyn/Vancomycin to cefazolin to oral Keflex. CT without deep infection.     HPI obtained from patient visit, review of nursing home record, discussion with facility staff, and Epic review.     HPI:    Patient is sleeping soundly in bed. She generally does not get up until 10am, refuses any interaction before that time. Provider unfortunately will not be in the building after 10. Attempted to wake her up, she mumbles, come back later. Her nurse does not have any acute concerns. He says her legs look better    CODE STATUS/ADVANCE DIRECTIVES DISCUSSION:  Full Code    ALLERGIES:   Allergies   Allergen Reactions     Adhesive Tape Other (See Comments)     As on band-aids;  Causes skin tearing/wounds.     Adhesive [Cyanoacrylate] Other (See Comments)     As on band-aids;  Causes skin tearing/wounds.     Banana Unknown     She avoids due to Latex allergy.  If she eats them 3 days in a row, gets stomach cramps and dark stool.  TBrinkMD - 4/10/15     Food Unknown and GI Disturbance     Bananas, grapes, pine nuts    Artifical sweetner     Grape [Grape (Artificial)  Flavor] Unknown     She avoids due to Latex allergy.  If she eats lots of them, and she likes them, stomach gets a little queasy.  TBrinkMD - 4/10/15     Morphine (Pf) [Morphine] Other (See Comments)     Pt has not reacted to this med herself, but mother has anaphylactic reaction and other family members get nausea/vomiting.     Other Environmental Allergy Swelling     leather     Pine      Pseudoephedrine Other (See Comments)     Insomnia     Pseudoephedrine Cold/Allergy [Chlorpheniramine-Pseudoeph] Other (See Comments)     Keeps pt awake up to 24 hours      Latex Rash     Hands get red from rubber gloves, but okay if she washes them right away.  Hands get red from rubber gloves, but okay if she washes them right away.     Other Food Allergy Rash     Pine trees, leather .       PAST MEDICAL HISTORY:   Past Medical History:   Diagnosis Date     Aseptic necrosis of femoral head (H)     LEFT     Bacteremia due to group B Streptococcus      Cellulitis of right lower extremity      DJD (degenerative joint disease)      DVT, bilateral lower limbs (H) 10/2014     Edema 5/22/2015     Essential hypertension with goal blood pressure less than 140/90      Failure to thrive      History of blood clots      Hypokalemia 10/15/2014     Lung mass 10/18/2014     Morbid obesity (H) 4/10/2015    Had formal nutrition consult at [facility].  RD reports that she is not willing to commit to the program outlined.  See scanned document.  12/15/2015 - Marcio Quinonez MD        Nocturnal hypoxemia - probable sleep apnea - had overnight pulse oximetry, April, 2015. 5/22/2015     Obesity 4/10/2015     LOPEZ (obstructive sleep apnea) 5/22/2015     Osteoarthritis      Peripheral vascular disease (H24)      Pleural effusion      Pressure ulcer of foot      Rheumatoid arthritis (H) 12/30/2014    seronegative     Sepsis (H)      Seropositive rheumatoid arthritis (H) 1/19/2016     Vitamin D deficiency 8/26/2015      PAST SURGICAL HISTORY:   has a past  surgical history that includes Total Knee Arthroplasty (Left, 2006); joint replacement; Total Hip Arthroplasty (Left, 10/18/2016); and Peripherally Inserted Central Catheter Insertion (Right, 07/14/2019).  FAMILY HISTORY: family history includes Arthritis in her mother; Breast Cancer in her cousin; Breast Cancer (age of onset: 50.00) in her maternal grandmother; Breast Cancer (age of onset: 54.00) in her maternal aunt and mother; Cancer in her mother; Cataracts in her father and mother; Deep Vein Thrombosis in her father; Heart Disease in her maternal grandmother; Multiple myeloma in her father; No Known Problems in her sister; Other - See Comments in her brother; Rheumatoid Arthritis in her maternal grandmother and paternal aunt; Sleep Apnea in her brother; Snoring in her father.  SOCIAL HISTORY:   reports that she has quit smoking. Her smoking use included cigarettes. She has a 30 pack-year smoking history. She has never used smokeless tobacco. She reports that she does not currently use alcohol. She reports that she does not use drugs.  Patient's living condition: lives in a nursing home    Post Discharge Medication Reconciliation Status:   MED REC REQUIRED  Post Medication Reconciliation Status:  Discharge medications reconciled, continue medications without change       Current Outpatient Medications   Medication Sig Dispense Refill     acetaminophen (TYLENOL) 325 MG tablet Take 650 mg by mouth every 6 hours as needed for mild pain       ascorbic acid, vitamin C, (ASCORBIC ACID WITH KAEL HIPS) 500 MG tablet [ASCORBIC ACID, VITAMIN C, (ASCORBIC ACID WITH KAEL HIPS) 500 MG TABLET] Take 500 mg by mouth 2 (two) times a day.       cholecalciferol 50 MCG (2000 UT) tablet Take 1 tablet (50 mcg) by mouth daily 30 tablet 11     cyclobenzaprine (FLEXERIL) 5 MG tablet Take 10 mg by mouth 3 times daily as needed       DULoxetine (CYMBALTA) 20 MG capsule [DULOXETINE (CYMBALTA) 20 MG CAPSULE] Take 1 capsule (20 mg total) by  mouth daily. 90 capsule 2     gabapentin (NEURONTIN) 300 MG capsule Take 1 capsule (300 mg) by mouth 3 times daily 90 capsule 11     hydroCHLOROthiazide (HYDRODIURIL) 25 MG tablet Take 50 mg by mouth daily       loratadine (CLARITIN) 10 MG tablet Take 1 tablet (10 mg) by mouth daily 30 tablet 11     NEW MED Take 1 capsule by mouth daily Lymphatic formula       nystatin (MYCOSTATIN) 185325 UNIT/GM external powder Apply topically 2 times daily       nystatin-triamcinolone (MYCOLOG II) 066548-6.1 UNIT/GM-% external cream Apply topically 2 times daily Until clear       rivaroxaban ANTICOAGULANT (XARELTO) 20 MG TABS tablet Take 20 mg by mouth daily       sulfaSALAzine ER (AZULFIDINE EN) 500 MG EC tablet Take 2 tablets (1,000 mg) by mouth 2 times daily       No current facility-administered medications for this visit.       ROS:  Unobtainable secondary to cooperation.     Vitals:  There were no vitals taken for this visit.  Exam:  GENERAL APPEARANCE:  Sleeping soundly, awakens briefly to touch  RESP:  no respiratory distress  SKIN:  difficult to assess legs due to darkened room, they both appear pink, not red    Lab/Diagnostic data:  Recent labs in [facility] reviewed by me today.     ASSESSMENT/PLAN:  (L03.119) Cellulitis of lower extremity, unspecified laterality  (primary encounter diagnosis)  Comment: Improving based on reports, difficult to assess today. Will try again 5/23 at a later time if possible.  Plan: Continue current POC with no changes at this time and adjustments as needed.      Electronically signed by:  KRISTIN Conte CNP                     Sincerely,        KRISTIN Conte CNP

## 2024-05-23 ENCOUNTER — NURSING HOME VISIT (OUTPATIENT)
Dept: GERIATRICS | Facility: CLINIC | Age: 59
End: 2024-05-23
Payer: COMMERCIAL

## 2024-05-23 VITALS
HEART RATE: 82 BPM | DIASTOLIC BLOOD PRESSURE: 64 MMHG | TEMPERATURE: 98.4 F | SYSTOLIC BLOOD PRESSURE: 109 MMHG | WEIGHT: 293 LBS | HEIGHT: 65 IN | RESPIRATION RATE: 20 BRPM | OXYGEN SATURATION: 97 % | BODY MASS INDEX: 48.82 KG/M2

## 2024-05-23 DIAGNOSIS — M06.9 RHEUMATOID ARTHRITIS, RHEUMATOID FACTOR STATUS UNKNOWN (H): Primary | ICD-10-CM

## 2024-05-23 DIAGNOSIS — M25.50 POLYARTHRALGIA: Primary | ICD-10-CM

## 2024-05-23 DIAGNOSIS — M05.79 SEROPOSITIVE RHEUMATOID ARTHRITIS OF MULTIPLE SITES (H): ICD-10-CM

## 2024-05-23 PROCEDURE — 99308 SBSQ NF CARE LOW MDM 20: CPT | Performed by: NURSE PRACTITIONER

## 2024-05-23 RX ORDER — TRAMADOL HYDROCHLORIDE 50 MG/1
50 TABLET ORAL EVERY 6 HOURS PRN
Qty: 240 TABLET | Refills: 0 | Status: SHIPPED | OUTPATIENT
Start: 2024-05-23

## 2024-05-23 NOTE — PROGRESS NOTES
"[facility] GERIATRICS    Chief Complaint   Patient presents with    Nursing Home Acute     HPI:  María Elena Saab is a 58 year old  (1965), who is being seen today for an episodic care visit at: [facility] () [[id number]].     Today's concern is:   Patient does wake up and visit with provider today despite the earlier hour. She says that her legs are looking much better. They are all wrapped up right now, so provider does not remove the wraps. Dr. Mckinley saw her yesterday. She reinstated the order for ibuprofen, but did not have e-prescribing to do the tramadol. It is not clear why these orders were removed from her discharge orders from the hospital, but she is having pain and would like the tramadol back    Allergies, and PMH/PSH reviewed in [facility] today.  REVIEW OF SYSTEMS:  4 point ROS including Respiratory, CV, GI and , other than that noted in the HPI,  is negative    Objective:   /64   Pulse 82   Temp 98.4  F (36.9  C)   Resp 20   Ht 1.651 m (5' 5\")   Wt (!) 159.4 kg (351 lb 6.4 oz)   SpO2 97%   BMI 58.48 kg/m    GENERAL APPEARANCE:  Alert, in no distress  RESP:  no respiratory distress  PSYCH:  oriented X 3    Recent labs in [facility] reviewed by me today.     Assessment/Plan:  (M25.50) Polyarthralgia  (primary encounter diagnosis)  (M05.79) Seropositive rheumatoid arthritis of multiple sites (H)  Comment: Patient with chronic, severe pain. It has been well controlled in the past. There is no indication that she cannot have tramadol. She is educated on the risks of using ibuprofen chronically, GI bleeding, kidney injury      Orders:  Tramadol 50mg q6h prn      Electronically signed by: Indiana Shelley, KRISTIN CNP       "

## 2024-05-23 NOTE — LETTER
"    5/23/2024        RE: María Elena Saab  [facility] [place]  [address]  [place] [address]        [facility]    Chief Complaint   Patient presents with     Nursing Home Acute     HPI:  María Elena Saab is a 58 year old  (1965), who is being seen today for an episodic care visit at: [facility] () [[id number]].     Today's concern is:   Patient does wake up and visit with provider today despite the earlier hour. She says that her legs are looking much better. They are all wrapped up right now, so provider does not remove the wraps. Dr. Mckinley saw her yesterday. She reinstated the order for ibuprofen, but did not have e-prescribing to do the tramadol. It is not clear why these orders were removed from her discharge orders from the hospital, but she is having pain and would like the tramadol back    Allergies, and PMH/PSH reviewed in [facility] today.  REVIEW OF SYSTEMS:  4 point ROS including Respiratory, CV, GI and , other than that noted in the HPI,  is negative    Objective:   /64   Pulse 82   Temp 98.4  F (36.9  C)   Resp 20   Ht 1.651 m (5' 5\")   Wt (!) 159.4 kg (351 lb 6.4 oz)   SpO2 97%   BMI 58.48 kg/m    GENERAL APPEARANCE:  Alert, in no distress  RESP:  no respiratory distress  PSYCH:  oriented X 3    Recent labs in [facility] reviewed by me today.     Assessment/Plan:  (M25.50) Polyarthralgia  (primary encounter diagnosis)  (M05.79) Seropositive rheumatoid arthritis of multiple sites (H)  Comment: Patient with chronic, severe pain. It has been well controlled in the past. There is no indication that she cannot have tramadol. She is educated on the risks of using ibuprofen chronically, GI bleeding, kidney injury      Orders:  Tramadol 50mg q6h prn      Electronically signed by: KRISTIN Conte CNP           Sincerely,        KRISTIN Conte CNP      "

## 2024-06-18 RX ORDER — THERMOMETER, ELECTRONIC,ORAL
EACH MISCELLANEOUS DAILY PRN
COMMUNITY

## 2024-06-18 RX ORDER — KETOCONAZOLE 20 MG/G
CREAM TOPICAL 2 TIMES DAILY
COMMUNITY

## 2024-06-18 RX ORDER — IBUPROFEN 400 MG/1
400 TABLET, FILM COATED ORAL EVERY 8 HOURS PRN
COMMUNITY

## 2024-06-18 NOTE — PROGRESS NOTES
[facility] GERIATRICS  REGULATORY VISIT  June 19, 2024      [facility] Medical Record Number:  [id number]  Place of Service where encounter took place:  [facility] () [[id number]]    Chief Complaint   Patient presents with    alf Regulatory       HPI:    María Elena Saab is a 59 year old  (1965), who is being seen today for a federally mandated E/M visit at [facility] where she has resided since March 2016. HPI information obtained from: facility chart records, facility staff, patient report, [facility] chart review, and Care Everywhere [facility] chart review.    She was hospitalized at [facility] from 5/14/24 to 5/20/24 with fevers, expanding LE erythema, nausea and decreased PO intake and was admitted with sepsis secondary to recurrent LE cellulitis. Imaging without deep infection. Blood cultures without growth. ID consulted and she was discharged to complete abx with cephalexin. PTA hydrochlorothiazide was increased     Today, Ms Saab was sound asleep at 0950 when I peeked in her room. She has a sign on her door asking not to be disturbed prior to 1000 - today, my schedule did not permit me to see her later in the morning. She was snoring, sound asleep. I talked to her nurse, who knows her well. No acute concerns today. Legs are improving over time and no concern today for cellulitis.     ALLERGIES:    Allergies   Allergen Reactions    Adhesive Tape Other (See Comments)     As on band-aids;  Causes skin tearing/wounds.    Adhesive [Cyanoacrylate] Other (See Comments)     As on band-aids;  Causes skin tearing/wounds.    Banana Unknown     She avoids due to Latex allergy.  If she eats them 3 days in a row, gets stomach cramps and dark stool.  TBrinkMD - 4/10/15    Food Unknown and GI Disturbance     Bananas, grapes, pine nuts    Artifical sweetner    Grape [Grape (Artificial) Flavor] Unknown     She avoids due to Latex allergy.  If she eats lots of them, and she  likes them, stomach gets a little queasy.  TBrinkMD - 4/10/15    Morphine (Pf) [Morphine] Other (See Comments)     Pt has not reacted to this med herself, but mother has anaphylactic reaction and other family members get nausea/vomiting.    Other Environmental Allergy Swelling     leather    Pine     Pseudoephedrine Other (See Comments)     Insomnia    Pseudoephedrine Cold/Allergy [Chlorpheniramine-Pseudoeph] Other (See Comments)     Keeps pt awake up to 24 hours     Latex Rash     Hands get red from rubber gloves, but okay if she washes them right away.  Hands get red from rubber gloves, but okay if she washes them right away.    Other Food Allergy Rash     Pine trees, leather .         Past Medical, Surgical, Family and Social History: Reviewed and updated in EPIC.    MEDICATIONS:  Post Discharge Medication Reconciliation Status: discharge medications reconciled and changed, per note/orders.   Current Outpatient Medications   Medication Sig Dispense Refill    acetaminophen (TYLENOL) 325 MG tablet Take 650 mg by mouth every 6 hours as needed for mild pain      ascorbic acid, vitamin C, (ASCORBIC ACID WITH KAEL HIPS) 500 MG tablet [ASCORBIC ACID, VITAMIN C, (ASCORBIC ACID WITH KAEL HIPS) 500 MG TABLET] Take 500 mg by mouth 2 (two) times a day.      cholecalciferol 50 MCG (2000 UT) tablet Take 1 tablet (50 mcg) by mouth daily 30 tablet 11    cyclobenzaprine (FLEXERIL) 5 MG tablet Take 10 mg by mouth 3 times daily as needed      DULoxetine (CYMBALTA) 20 MG capsule [DULOXETINE (CYMBALTA) 20 MG CAPSULE] Take 1 capsule (20 mg total) by mouth daily. 90 capsule 2    gabapentin (NEURONTIN) 300 MG capsule Take 1 capsule (300 mg) by mouth 3 times daily 90 capsule 11    hydroCHLOROthiazide (HYDRODIURIL) 25 MG tablet Take 50 mg by mouth daily      ibuprofen (ADVIL/MOTRIN) 400 MG tablet Take 400 mg by mouth every 8 hours as needed for moderate pain      ketoconazole (NIZORAL) 2 % external cream Apply topically 2 times daily       "loratadine (CLARITIN) 10 MG tablet Take 1 tablet (10 mg) by mouth daily 30 tablet 11    NEW MED Take 1 capsule by mouth daily Lymphatic formula      nystatin (MYCOSTATIN) 718874 UNIT/GM external powder Apply topically 2 times daily      rivaroxaban ANTICOAGULANT (XARELTO) 20 MG TABS tablet Take 20 mg by mouth daily      sulfaSALAzine ER (AZULFIDINE EN) 500 MG EC tablet Take 2 tablets (1,000 mg) by mouth 2 times daily      tolnaftate (TINACTIN) 1 % external cream Apply topically daily as needed for irritation      traMADol (ULTRAM) 50 MG tablet Take 1 tablet (50 mg) by mouth every 6 hours as needed for severe pain 240 tablet 0     Medications reviewed:  Medications reconciled to facility chart and changes were made to reflect current medications as identified as above med list. Below are the changes that were made:   Medications stopped since last EPIC medication reconciliation:   Medications Discontinued During This Encounter   Medication Reason    nystatin-triamcinolone (MYCOLOG II) 661215-6.1 UNIT/GM-% external cream Med Rec(No AVS / No eCancel)     Medications started since last [facility] medication reconciliation:  Orders Placed This Encounter   Medications    ibuprofen (ADVIL/MOTRIN) 400 MG tablet     Sig: Take 400 mg by mouth every 8 hours as needed for moderate pain    ketoconazole (NIZORAL) 2 % external cream     Sig: Apply topically 2 times daily    tolnaftate (TINACTIN) 1 % external cream     Sig: Apply topically daily as needed for irritation     REVIEW OF SYSTEMS:  Unable to be obtained as she was sound asleep    PHYSICAL EXAM:  /71   Pulse 76   Temp 97.7  F (36.5  C)   Resp 20   Ht 1.651 m (5' 5\")   Wt (!) 159.4 kg (351 lb 6.4 oz)   SpO2 96%   BMI 58.48 kg/m    Gen: laying in bed, sound asleep and in no acute distress  Resp: breathing non labored, no tachypnea   Ext: bilateral LE lymphedema with ACE wraps in place     LABS/IMAGING: Reviewed as per Epic and/or [facility]    ASSESSMENT / " PLAN:    Bilateral LE Lymphedema  Recurrent LE Cellulitis, Resolved  Is followed by in house wound team. Recent admission to [facility] w/ sepsis secondary to recurrent cellulitis.   -- wound cares as ordered  -- compression, lymphedema therapies, good skin care     Rheumatoid Arthritis  Chronic Pain Syndrome   Follows with rheumatology.   -- sulfasalazine 1000 mg BID   -- duloxetine 20 mg daily, gabapentin 300 mg TID   -- APAP 650 mg q6h PRN, cyclobenzaprine 10 mg q8h PRN, gabapentin 300 mg TID, ibuprofen 400 mg q8h PRN and tramadol 50 mg q6h PRN   -- labs and follow up per rheumatology     HTN  SBPs 110s-130s, most 110s. Hydrochlorothiazide increased from 25 mg at [facility] last month. Weight trend 331 (Sept) --> 340 (Dec) --> 351 (March) - none more recent.. BMP was OK in May.   -- hydrochlorothiazide 50 mg daily   -- follow BPs and adjust medications as needed    History of Lower Exremity DVT  -- rivaroxaban 20 mg daily     Electronically signed by  Urvashi River MD

## 2024-06-19 ENCOUNTER — NURSING HOME VISIT (OUTPATIENT)
Dept: GERIATRICS | Facility: CLINIC | Age: 59
End: 2024-06-19
Payer: COMMERCIAL

## 2024-06-19 VITALS
RESPIRATION RATE: 20 BRPM | WEIGHT: 293 LBS | OXYGEN SATURATION: 96 % | DIASTOLIC BLOOD PRESSURE: 71 MMHG | SYSTOLIC BLOOD PRESSURE: 134 MMHG | TEMPERATURE: 97.7 F | HEART RATE: 76 BPM | BODY MASS INDEX: 48.82 KG/M2 | HEIGHT: 65 IN

## 2024-06-19 DIAGNOSIS — Z86.718 HISTORY OF DVT OF LOWER EXTREMITY: ICD-10-CM

## 2024-06-19 DIAGNOSIS — M06.9 RHEUMATOID ARTHRITIS, RHEUMATOID FACTOR STATUS UNKNOWN (H): ICD-10-CM

## 2024-06-19 DIAGNOSIS — L03.119 CELLULITIS OF LOWER EXTREMITY, UNSPECIFIED LATERALITY: Primary | ICD-10-CM

## 2024-06-19 DIAGNOSIS — I10 ESSENTIAL HYPERTENSION WITH GOAL BLOOD PRESSURE LESS THAN 140/90: ICD-10-CM

## 2024-06-19 DIAGNOSIS — G89.4 CHRONIC PAIN SYNDROME: ICD-10-CM

## 2024-06-19 DIAGNOSIS — I10 PRIMARY HYPERTENSION: ICD-10-CM

## 2024-06-19 PROCEDURE — 99309 SBSQ NF CARE MODERATE MDM 30: CPT | Performed by: INTERNAL MEDICINE

## 2024-06-19 NOTE — LETTER
6/19/2024      María Elena Saab  [facility] [place]  [address]  [place] [address]        [facility] GERIATRICS  REGULATORY VISIT  June 19, 2024      [facility] Medical Record Number:  [id number]  Place of Service where encounter took place:  [facility] () [[id number]]    Chief Complaint   Patient presents with     care home Regulatory       HPI:    María Elena Saab is a 59 year old  (1965), who is being [facility]. HPI information obtained from: facility chart records, facility staff, patient report, [facility] chart review, and Care Everywhere [facility] chart review.    She was hospitalized at [facility] from 5/14/24 to 5/20/24 with fevers, expanding LE erythema, nausea and decreased PO intake and was admitted with sepsis secondary to recurrent LE cellulitis. Imaging without deep infection. Blood cultures without growth. ID consulted and she was discharged to complete abx with cephalexin. PTA hydrochlorothiazide was increased     Today, Ms Saab was sound asleep at 0950 when I peeked in her room. She has a sign on her door asking not to be disturbed prior to 1000 - today, my schedule did not permit me to see her later in the morning. She was snoring, sound asleep. I talked to her nurse, who knows her well. No acute concerns today. Legs are improving over time and no concern today for cellulitis.     ALLERGIES:    Allergies   Allergen Reactions     Adhesive Tape Other (See Comments)     As on band-aids;  Causes skin tearing/wounds.     Adhesive [Cyanoacrylate] Other (See Comments)     As on band-aids;  Causes skin tearing/wounds.     Banana Unknown     She avoids due to Latex allergy.  If she eats them 3 days in a row, gets stomach cramps and dark stool.  TBrinkMD - 4/10/15     Food Unknown and GI Disturbance     Bananas, grapes, pine nuts    Artifical sweetner      Grape [Grape (Artificial) Flavor] Unknown     She avoids due to Latex allergy.  If she eats lots of them, and she likes them, stomach gets a little queasy.  TBrinkMD - 4/10/15     Morphine (Pf) [Morphine] Other (See Comments)     Pt has not reacted to this med herself, but mother has anaphylactic reaction and other family members get nausea/vomiting.     Other Environmental Allergy Swelling     leather     Pine      Pseudoephedrine Other (See Comments)     Insomnia     Pseudoephedrine Cold/Allergy [Chlorpheniramine-Pseudoeph] Other (See Comments)     Keeps pt awake up to 24 hours      Latex Rash     Hands get red from rubber gloves, but okay if she washes them right away.  Hands get red from rubber gloves, but okay if she washes them right away.     Other Food Allergy Rash     Pine trees, leather .         Past Medical, Surgical, Family and Social History: Reviewed and updated in EPIC.    MEDICATIONS:  Post Discharge Medication Reconciliation Status: discharge medications reconciled and changed, per note/orders.   Current Outpatient Medications   Medication Sig Dispense Refill     acetaminophen (TYLENOL) 325 MG tablet Take 650 mg by mouth every 6 hours as needed for mild pain       ascorbic acid, vitamin C, (ASCORBIC ACID WITH KAEL HIPS) 500 MG tablet [ASCORBIC ACID, VITAMIN C, (ASCORBIC ACID WITH KAEL HIPS) 500 MG TABLET] Take 500 mg by mouth 2 (two) times a day.       cholecalciferol 50 MCG (2000 UT) tablet Take 1 tablet (50 mcg) by mouth daily 30 tablet 11     cyclobenzaprine (FLEXERIL) 5 MG tablet Take 10 mg by mouth 3 times daily as needed       DULoxetine (CYMBALTA) 20 MG capsule [DULOXETINE (CYMBALTA) 20 MG CAPSULE] Take 1 capsule (20 mg total) by mouth daily. 90 capsule 2     gabapentin (NEURONTIN) 300 MG capsule Take 1 capsule (300 mg) by mouth 3 times daily 90 capsule 11     hydroCHLOROthiazide (HYDRODIURIL) 25 MG tablet Take 50 mg by mouth daily       ibuprofen (ADVIL/MOTRIN) 400 MG tablet Take 400 mg by  "mouth every 8 hours as needed for moderate pain       ketoconazole (NIZORAL) 2 % external cream Apply topically 2 times daily       loratadine (CLARITIN) 10 MG tablet Take 1 tablet (10 mg) by mouth daily 30 tablet 11     NEW MED Take 1 capsule by mouth daily Lymphatic formula       nystatin (MYCOSTATIN) 953161 UNIT/GM external powder Apply topically 2 times daily       rivaroxaban ANTICOAGULANT (XARELTO) 20 MG TABS tablet Take 20 mg by mouth daily       sulfaSALAzine ER (AZULFIDINE EN) 500 MG EC tablet Take 2 tablets (1,000 mg) by mouth 2 times daily       tolnaftate (TINACTIN) 1 % external cream Apply topically daily as needed for irritation       traMADol (ULTRAM) 50 MG tablet Take 1 tablet (50 mg) by mouth every 6 hours as needed for severe pain 240 tablet 0     Medications reviewed:  Medications reconciled to facility chart and changes were made to reflect current medications as identified as above med list. Below are the changes that were made:   Medications stopped since last EPIC medication reconciliation:   Medications Discontinued During This Encounter   Medication Reason     nystatin-triamcinolone (MYCOLOG II) 829129-3.1 UNIT/GM-% external cream Med Rec(No AVS / No eCancel)     Medications started since last [facility] medication reconciliation:  Orders Placed This Encounter   Medications     ibuprofen (ADVIL/MOTRIN) 400 MG tablet     Sig: Take 400 mg by mouth every 8 hours as needed for moderate pain     ketoconazole (NIZORAL) 2 % external cream     Sig: Apply topically 2 times daily     tolnaftate (TINACTIN) 1 % external cream     Sig: Apply topically daily as needed for irritation     REVIEW OF SYSTEMS:  Unable to be obtained as she was sound asleep    PHYSICAL EXAM:  /71   Pulse 76   Temp 97.7  F (36.5  C)   Resp 20   Ht 1.651 m (5' 5\")   Wt (!) 159.4 kg (351 lb 6.4 oz)   SpO2 96%   BMI 58.48 kg/m    Gen: laying in bed, sound asleep and in no acute distress  Resp: breathing non labored, no " tachypnea   Ext: bilateral LE lymphedema with ACE wraps in place     LABS/IMAGING: Reviewed as per Epic and/or [facility]    ASSESSMENT / PLAN:    Bilateral LE Lymphedema  Recurrent LE Cellulitis, Resolved  Is followed by in house wound team. Recent admission to [facility] w/ sepsis secondary to recurrent cellulitis.   -- wound cares as ordered  -- compression, lymphedema therapies, good skin care     Rheumatoid Arthritis  Chronic Pain Syndrome   Follows with rheumatology.   -- sulfasalazine 1000 mg BID   -- duloxetine 20 mg daily, gabapentin 300 mg TID   -- APAP 650 mg q6h PRN, cyclobenzaprine 10 mg q8h PRN, gabapentin 300 mg TID, ibuprofen 400 mg q8h PRN and tramadol 50 mg q6h PRN   -- labs and follow up per rheumatology     HTN  SBPs 110s-130s, most 110s. Hydrochlorothiazide increased from 25 mg at [facility] last month. Weight trend 331 (Sept) --> 340 (Dec) --> 351 (March) - none more recent.. BMP was OK in May.   -- hydrochlorothiazide 50 mg daily   -- follow BPs and adjust medications as needed    History of Lower Exremity DVT  -- rivaroxaban 20 mg daily     Electronically signed by  Urvashi River MD              Sincerely,        Urvashi River MD

## 2024-06-25 ENCOUNTER — OFFICE VISIT (OUTPATIENT)
Dept: RHEUMATOLOGY | Facility: CLINIC | Age: 59
End: 2024-06-25
Payer: COMMERCIAL

## 2024-06-25 ENCOUNTER — MEDICAL CORRESPONDENCE (OUTPATIENT)
Dept: HEALTH INFORMATION MANAGEMENT | Facility: CLINIC | Age: 59
End: 2024-06-25

## 2024-06-25 VITALS
SYSTOLIC BLOOD PRESSURE: 138 MMHG | DIASTOLIC BLOOD PRESSURE: 86 MMHG | BODY MASS INDEX: 58.16 KG/M2 | HEART RATE: 68 BPM | WEIGHT: 293 LBS

## 2024-06-25 DIAGNOSIS — R76.8 CYCLIC CITRULLINATED PEPTIDE (CCP) ANTIBODY POSITIVE: ICD-10-CM

## 2024-06-25 DIAGNOSIS — M15.0 PRIMARY OSTEOARTHRITIS INVOLVING MULTIPLE JOINTS: ICD-10-CM

## 2024-06-25 DIAGNOSIS — M05.79 SEROPOSITIVE RHEUMATOID ARTHRITIS OF MULTIPLE SITES (H): Primary | ICD-10-CM

## 2024-06-25 DIAGNOSIS — Z79.899 HIGH RISK MEDICATION USE: ICD-10-CM

## 2024-06-25 PROCEDURE — G2211 COMPLEX E/M VISIT ADD ON: HCPCS | Performed by: INTERNAL MEDICINE

## 2024-06-25 PROCEDURE — 99214 OFFICE O/P EST MOD 30 MIN: CPT | Performed by: INTERNAL MEDICINE

## 2024-06-25 RX ORDER — SULFASALAZINE 500 MG/1
1000 TABLET, DELAYED RELEASE ORAL 2 TIMES DAILY
Qty: 360 TABLET | Refills: 0 | Status: SHIPPED | OUTPATIENT
Start: 2024-06-25 | End: 2024-09-23

## 2024-06-25 RX ORDER — DULOXETIN HYDROCHLORIDE 20 MG/1
20 CAPSULE, DELAYED RELEASE ORAL DAILY
Qty: 90 CAPSULE | Refills: 2 | Status: SHIPPED | OUTPATIENT
Start: 2024-06-25

## 2024-06-25 NOTE — PROGRESS NOTES
Rheumatology follow-up visit note     María Elena is a 59 year old female presents today for follow-up.    María Elena was seen today for recheck.    Diagnoses and all orders for this visit:    Seropositive rheumatoid arthritis of multiple sites (H)  -     sulfaSALAzine ER (AZULFIDINE EN) 500 MG EC tablet; Take 2 tablets (1,000 mg) by mouth 2 times daily for 90 days    Cyclic citrullinated peptide (CCP) antibody positive  -     sulfaSALAzine ER (AZULFIDINE EN) 500 MG EC tablet; Take 2 tablets (1,000 mg) by mouth 2 times daily for 90 days    Primary osteoarthritis involving multiple joints    High risk medication use        The longitudinal plan of care for the diagnosis(es)/condition(s) as documented were addressed during this visit. Due to the added complexity in care, I will continue to support María Elena in the subsequent management and with ongoing continuity of care.    Well-controlled rheumatoid arthritis, osteoarthritis, continue sulfasalazine, duloxetine.  She is also been started on tramadol at her nursing home.  Follow up in 6 months.    HPI    María Elena LAMB Juanredd is a 59 year old female is here for follow-up of  rheumatoid arthritis longstanding, polyarticular, seropositive.  She has osteoarthritis.  Status post left knee arthroplasty.  She has chronic lymphedema.  While her RA appears to be under good with the current regimen of sulfasalazine.  She was previously on Humira, methotrexate however repeated cellulitis of the lower extremity led to discontinuation of that.  She is seems to have continued to do well with sulfasalazine alone.  The frequency of her cellulitis is gone down significantly she had only 1 episode during the last 12 months and was in the hospital recently the open sores and her legs are healed.  She wondered about cramping that will last for a minute or so in the right hand.  Various experiences of my other patients were shared with her.    DETAILED EXAMINATION  06/25/24  :    Vitals:     06/25/24 1044   BP: 138/86   BP Location: Right arm   Patient Position: Sitting   Cuff Size: Adult Large   Pulse: 68   Weight: (!) 158.5 kg (349 lb 8 oz)     Alert oriented. Head including the face is examined for malar rash, heliotropes, scarring, lupus pernio. Eyes examined for redness such as in episcleritis/scleritis, periorbital lesions.   Neck/ Face examined for parotid gland swelling, range of motion of neck.  Left upper and lower and right upper and lower extremities examined for tenderness, swelling, warmth of the appendicular joints, range of motion, edema, rash.  Some of the important findings included: she does not have evidence of synovitis in the palpable joints of the upper extremities.  Ulnar Deviation of both eyes worse on the right.  small [place] nodes.  Range of motion of the shoulders  show painful abduction 110.  No JLT effusion or warmth of the knees, left TKA edema lower extremities..  she does not have dactylitis of the digits.     Patient Active Problem List    Diagnosis Date Noted    Sepsis (H) 06/14/2023     Priority: Medium    Wheelchair dependence 05/01/2023     Priority: Medium    History of MRSA infection 04/26/2023     Priority: Medium    Elevated C-reactive protein (CRP) 04/26/2023     Priority: Medium    Cellulitis of right lower limb 04/25/2023     Priority: Medium    Chronic pulmonary embolism (H) 04/20/2023     Priority: Medium    MRSA (methicillin resistant Staphylococcus aureus) 06/28/2022     Priority: Medium     Formatting of this note might be different from the original.  +tissue right foot 6/25/22  +Swab right ankle 6/24/22      Chronic acquired lymphedema 06/24/2022     Priority: Medium    Current long-term use of anticoagulant medication with history of deep venous thrombosis (DVT) 06/24/2022     Priority: Medium    Hx of bacteremia 06/24/2022     Priority: Medium     Formatting of this note might be different from the original.  2019 - GBS bacteremia and cellulitis       Major depressive disorder, recurrent (H24) 06/24/2022     Priority: Medium    Cellulitis 06/23/2022     Priority: Medium     Formatting of this note might be different from the original.  Added automatically from request for surgery [id number]      Open wound of left lower extremity 03/21/2022     Priority: Medium    Bacteremia due to group B Streptococcus 07/13/2019     Priority: Medium    Primary osteoarthritis involving multiple joints 04/26/2019     Priority: Medium    History of total left knee replacement (TKR) 04/26/2019     Priority: Medium    High risk medication use 02/13/2018     Priority: Medium    Physical debility 11/28/2017     Priority: Medium    Polyarthralgia 07/21/2017     Priority: Medium    H/O total hip arthroplasty 11/17/2016     Priority: Medium    Anemia 10/20/2016     Priority: Medium    Essential hypertension with goal blood pressure less than 140/90      Priority: Medium    Degenerative joint disease (DJD) of hip 10/18/2016     Priority: Medium    History of DVT of lower extremity      Priority: Medium    Radiculopathy of leg 07/20/2016     Priority: Medium    Sleep apnea 07/20/2016     Priority: Medium    Pain management 04/26/2016     Priority: Medium    Seropositive rheumatoid arthritis of multiple sites (H) 03/21/2016     Priority: Medium    PAD (peripheral artery disease) (H24) 03/21/2016     Priority: Medium    Right shoulder tendinitis 01/19/2016     Priority: Medium    Cyclic citrullinated peptide (CCP) antibody positive 09/16/2015     Priority: Medium    Vitamin D deficiency 08/26/2015     Priority: Medium    Edema - swelling of the legs after blood clots 05/22/2015     Priority: Medium    Morbid obesity (H) 04/10/2015     Priority: Medium     Had formal nutrition consult at [facility].  RD reports that she is not   willing to commit to the program outlined.  See scanned document.    12/15/2015 - Marcio Quinonez MD        DVT of lower extremity, bilateral - residual clot disease  on repeat US in NEW location - after six months of warfarin. 10/15/2014     Priority: Medium     Past Surgical History:   Procedure Laterality Date    JOINT REPLACEMENT      knee    PERIPHERALLY INSERTED CENTRAL CATHETER INSERTION Right 07/14/2019    4fr/44cm/Rt Cephalic.lowSVC.MGlav    TOTAL HIP ARTHROPLASTY Left 10/18/2016    Procedure: LEFT HIP TOTAL ARTHROPLASTY;  Surgeon: London Goss MD;  Location: [facility];  Service:     TOTAL KNEE ARTHROPLASTY Left 2006      Past Medical History:   Diagnosis Date    Aseptic necrosis of femoral head (H)     LEFT    Bacteremia due to group B Streptococcus     Cellulitis of right lower extremity     DJD (degenerative joint disease)     DVT, bilateral lower limbs (H) 10/2014    Edema 5/22/2015    Essential hypertension with goal blood pressure less than 140/90     Failure to thrive     History of blood clots     Hypokalemia 10/15/2014    Lung mass 10/18/2014    Morbid obesity (H) 4/10/2015    Had formal nutrition consult at [facility].  RD reports that she is not willing to commit to the program outlined.  See scanned document.  12/15/2015 - Marcio Quinonez MD       Nocturnal hypoxemia - probable sleep apnea - had overnight pulse oximetry, April, 2015. 5/22/2015    Obesity 4/10/2015    LOPEZ (obstructive sleep apnea) 5/22/2015    Osteoarthritis     Peripheral vascular disease (H24)     Pleural effusion     Pressure ulcer of foot     Rheumatoid arthritis (H) 12/30/2014    seronegative    Sepsis (H)     Seropositive rheumatoid arthritis (H) 1/19/2016    Vitamin D deficiency 8/26/2015     Allergies   Allergen Reactions    Adhesive Tape Other (See Comments)     As on band-aids;  Causes skin tearing/wounds.    Adhesive [Cyanoacrylate] Other (See Comments)     As on band-aids;  Causes skin tearing/wounds.    Banana Unknown     She avoids due to Latex allergy.  If she eats them 3 days in a row, gets stomach cramps and dark stool.  TBrinkMD - 4/10/15    Food Unknown and GI  Disturbance     Bananas, grapes, pine nuts    Artifical sweetner    Grape [Grape (Artificial) Flavor] Unknown     She avoids due to Latex allergy.  If she eats lots of them, and she likes them, stomach gets a little queasy.  TBrinkMD - 4/10/15    Morphine (Pf) [Morphine] Other (See Comments)     Pt has not reacted to this med herself, but mother has anaphylactic reaction and other family members get nausea/vomiting.    Other Environmental Allergy Swelling     leather    Pine     Pseudoephedrine Other (See Comments)     Insomnia    Pseudoephedrine Cold/Allergy [Chlorpheniramine-Pseudoeph] Other (See Comments)     Keeps pt awake up to 24 hours     Latex Rash     Hands get red from rubber gloves, but okay if she washes them right away.  Hands get red from rubber gloves, but okay if she washes them right away.    Other Food Allergy Rash     Pine trees, leather .      Current Outpatient Medications   Medication Sig Dispense Refill    acetaminophen (TYLENOL) 325 MG tablet Take 650 mg by mouth every 6 hours as needed for mild pain      ascorbic acid, vitamin C, (ASCORBIC ACID WITH KAEL HIPS) 500 MG tablet [ASCORBIC ACID, VITAMIN C, (ASCORBIC ACID WITH KAEL HIPS) 500 MG TABLET] Take 500 mg by mouth 2 (two) times a day.      cholecalciferol 50 MCG (2000 UT) tablet Take 1 tablet (50 mcg) by mouth daily 30 tablet 11    cyclobenzaprine (FLEXERIL) 5 MG tablet Take 10 mg by mouth 3 times daily as needed      DULoxetine (CYMBALTA) 20 MG capsule [DULOXETINE (CYMBALTA) 20 MG CAPSULE] Take 1 capsule (20 mg total) by mouth daily. 90 capsule 2    gabapentin (NEURONTIN) 300 MG capsule Take 1 capsule (300 mg) by mouth 3 times daily 90 capsule 11    hydroCHLOROthiazide (HYDRODIURIL) 25 MG tablet Take 50 mg by mouth daily      ibuprofen (ADVIL/MOTRIN) 400 MG tablet Take 400 mg by mouth every 8 hours as needed for moderate pain      ketoconazole (NIZORAL) 2 % external cream Apply topically 2 times daily      loratadine (CLARITIN) 10 MG  tablet Take 1 tablet (10 mg) by mouth daily 30 tablet 11    NEW MED Take 1 capsule by mouth daily Lymphatic formula      nystatin (MYCOSTATIN) 630150 UNIT/GM external powder Apply topically 2 times daily      rivaroxaban ANTICOAGULANT (XARELTO) 20 MG TABS tablet Take 20 mg by mouth daily      sulfaSALAzine ER (AZULFIDINE EN) 500 MG EC tablet Take 2 tablets (1,000 mg) by mouth 2 times daily      tolnaftate (TINACTIN) 1 % external cream Apply topically daily as needed for irritation      traMADol (ULTRAM) 50 MG tablet Take 1 tablet (50 mg) by mouth every 6 hours as needed for severe pain 240 tablet 0     family history includes Arthritis in her mother; Breast Cancer in her cousin; Breast Cancer (age of onset: 50.00) in her maternal grandmother; Breast Cancer (age of onset: 54.00) in her maternal aunt and mother; Cancer in her mother; Cataracts in her father and mother; Deep Vein Thrombosis in her father; Heart Disease in her maternal grandmother; Multiple myeloma in her father; No Known Problems in her sister; Other - See Comments in her brother; Rheumatoid Arthritis in her maternal grandmother and paternal aunt; Sleep Apnea in her brother; Snoring in her father.  Social Connections: Not on file          WBC Count   Date Value Ref Range Status   05/14/2024 14.7 (H) 4.0 - 11.0 10e3/uL Final     RBC Count   Date Value Ref Range Status   05/14/2024 4.87 3.80 - 5.20 10e6/uL Final     Hemoglobin   Date Value Ref Range Status   05/14/2024 13.0 11.7 - 15.7 g/dL Final     Hematocrit   Date Value Ref Range Status   05/14/2024 42.6 35.0 - 47.0 % Final     MCV   Date Value Ref Range Status   05/14/2024 88 78 - 100 fL Final     MCH   Date Value Ref Range Status   05/14/2024 26.7 26.5 - 33.0 pg Final     Platelet Count   Date Value Ref Range Status   05/14/2024 345 150 - 450 10e3/uL Final     % Lymphocytes   Date Value Ref Range Status   01/13/2022 30 % Final     AST   Date Value Ref Range Status   07/11/2023 25 0 - 45 U/L Final      Comment:     Reference intervals for this test were updated on 6/12/2023 to more accurately reflect our healthy population. There may be differences in the flagging of prior results with similar values performed with this method. Interpretation of those prior results can be made in the context of the updated reference intervals.     ALT   Date Value Ref Range Status   05/14/2024 7 0 - 50 U/L Final     Albumin   Date Value Ref Range Status   05/14/2024 3.5 3.5 - 5.2 g/dL Final   04/12/2022 3.1 (L) 3.5 - 5.0 g/dL Final     Alkaline Phosphatase   Date Value Ref Range Status   07/11/2023 73 35 - 104 U/L Final     Creatinine   Date Value Ref Range Status   05/14/2024 0.62 0.51 - 0.95 mg/dL Final     GFR Estimate   Date Value Ref Range Status   05/14/2024 >90 >60 mL/min/1.73m2 Final   04/20/2021 >60 >60 mL/min/1.73m2 Final     GFR Estimate If Black   Date Value Ref Range Status   04/20/2021 >60 >60 mL/min/1.73m2 Final     Erythrocyte Sedimentation Rate   Date Value Ref Range Status   07/27/2023 45 (H) 0 - 30 mm/hr Final     CRP   Date Value Ref Range Status   12/21/2020 1.3 (H) 0.0 - 0.8 mg/dL Final

## 2024-08-13 ENCOUNTER — NURSING HOME VISIT (OUTPATIENT)
Dept: GERIATRICS | Facility: CLINIC | Age: 59
End: 2024-08-13
Payer: COMMERCIAL

## 2024-08-13 VITALS — HEIGHT: 65 IN | BODY MASS INDEX: 48.82 KG/M2 | WEIGHT: 293 LBS

## 2024-08-13 DIAGNOSIS — M05.79 SEROPOSITIVE RHEUMATOID ARTHRITIS OF MULTIPLE SITES (H): ICD-10-CM

## 2024-08-13 DIAGNOSIS — E66.01 MORBID OBESITY (H): ICD-10-CM

## 2024-08-13 DIAGNOSIS — M15.0 PRIMARY OSTEOARTHRITIS INVOLVING MULTIPLE JOINTS: Primary | ICD-10-CM

## 2024-08-13 DIAGNOSIS — I73.9 PAD (PERIPHERAL ARTERY DISEASE) (H): ICD-10-CM

## 2024-08-13 DIAGNOSIS — I10 ESSENTIAL HYPERTENSION WITH GOAL BLOOD PRESSURE LESS THAN 140/90: ICD-10-CM

## 2024-08-13 PROCEDURE — 99309 SBSQ NF CARE MODERATE MDM 30: CPT | Performed by: NURSE PRACTITIONER

## 2024-08-13 NOTE — LETTER
8/13/2024      María Elena Saab  [facility] [address]  [address]  [place] [address]        [facility] GERIATR[facility]  Chief Complaint   Patient presents with     penitentiary Regulatory     [facility] Medical Record Number:  [id number]  Place of Service where encounter took place:  [facility] () [[id number]]    HPI:    María Elena Saab  is 59 year old (1965), who is being seen today for a federally mandated E/M visit.     Today's concerns are:  Primary osteoarthritis involving multiple joints  Seropositive rheumatoid arthritis of multiple sites (H)  Essential hypertension with goal blood pressure less than 140/90  Morbid obesity (H)  PAD (peripheral artery disease) (H24)  Patient refuses to be woken up before 10am. Provider went there just after 10, she was sound asleep and would not wake to voice or light touch. Provider went again around 10:30 and she had woken up on her own. She does complain that she feels like she never sees her providers. When asked if we may have permission to wake her before 10, she says no. Provider let's her know that it certainly makes it more challenging to provide care. Nursing staff are able to update us as needed. If she were to have any acute concerns at any point, provider can come see her as needed.   She is being followed by the in-house wound MD again due to open areas on lower legs. She says she does not have any infection currently. Her legs are wrapped for both the wounds and lymphedema. She only has a couple of concerns today. She wants nystatin powder instead of cream and wants it ongoing indefinitely. She also wants to make sure her prn ibuprofen and flexeril are never stopped. She does use these from time to time.       ALLERGIES:Adhesive tape, Adhesive [cyanoacrylate], Banana, Food, Grape [grape (artificial) flavor], Morphine (pf) [morphine], Other environmental allergy, Pine, Pseudoephedrine, Pseudoephedrine cold/allergy  [chlorpheniramine-pseudoeph], Latex, and Other food allergy  PAST MEDICAL HISTORY:   Past Medical History:   Diagnosis Date     Aseptic necrosis of femoral head (H)     LEFT     Bacteremia due to group B Streptococcus      Cellulitis of right lower extremity      DJD (degenerative joint disease)      DVT, bilateral lower limbs (H) 10/2014     Edema 5/22/2015     Essential hypertension with goal blood pressure less than 140/90      Failure to thrive      History of blood clots      Hypokalemia 10/15/2014     Lung mass 10/18/2014     Morbid obesity (H) 4/10/2015    Had formal nutrition consult at [facility].  RD reports that she is not willing to commit to the program outlined.  See scanned document.  12/15/2015 - Marcio Quinonez MD        Nocturnal hypoxemia - probable sleep apnea - had overnight pulse oximetry, April, 2015. 5/22/2015     Obesity 4/10/2015     LOPEZ (obstructive sleep apnea) 5/22/2015     Osteoarthritis      Peripheral vascular disease (H24)      Pleural effusion      Pressure ulcer of foot      Rheumatoid arthritis (H) 12/30/2014    seronegative     Sepsis (H)      Seropositive rheumatoid arthritis (H) 1/19/2016     Vitamin D deficiency 8/26/2015     PAST SURGICAL HISTORY:   has a past surgical history that includes Total Knee Arthroplasty (Left, 2006); joint replacement; Total Hip Arthroplasty (Left, 10/18/2016); and Peripherally Inserted Central Catheter Insertion (Right, 07/14/2019).  FAMILY HISTORY: family history includes Arthritis in her mother; Breast Cancer in her cousin; Breast Cancer (age of onset: 50.00) in her maternal grandmother; Breast Cancer (age of onset: 54.00) in her maternal aunt and mother; Cancer in her mother; Cataracts in her father and mother; Deep Vein Thrombosis in her father; Heart Disease in her maternal grandmother; Multiple myeloma in her father; No Known Problems in her sister; Other - See Comments in her brother; Rheumatoid Arthritis in her maternal grandmother and  paternal aunt; Sleep Apnea in her brother; Snoring in her father.  SOCIAL HISTORY:  reports that she has quit smoking. Her smoking use included cigarettes. She has a 30 pack-year smoking history. She has never used smokeless tobacco. She reports that she does not currently use alcohol. She reports that she does not use drugs.    MEDICATIONS:  Current Outpatient Medications   Medication Sig Dispense Refill     acetaminophen (TYLENOL) 325 MG tablet Take 650 mg by mouth every 6 hours as needed for mild pain       ascorbic acid, vitamin C, (ASCORBIC ACID WITH KAEL HIPS) 500 MG tablet [ASCORBIC ACID, VITAMIN C, (ASCORBIC ACID WITH KAEL HIPS) 500 MG TABLET] Take 500 mg by mouth 2 (two) times a day.       cholecalciferol 50 MCG (2000 UT) tablet Take 1 tablet (50 mcg) by mouth daily 30 tablet 11     cyclobenzaprine (FLEXERIL) 5 MG tablet Take 10 mg by mouth 3 times daily as needed       DULoxetine (CYMBALTA) 20 MG capsule Take 1 capsule (20 mg) by mouth daily 90 capsule 2     gabapentin (NEURONTIN) 300 MG capsule Take 1 capsule (300 mg) by mouth 3 times daily 90 capsule 11     hydroCHLOROthiazide (HYDRODIURIL) 25 MG tablet Take 50 mg by mouth daily       ibuprofen (ADVIL/MOTRIN) 400 MG tablet Take 400 mg by mouth every 8 hours as needed for moderate pain       ketoconazole (NIZORAL) 2 % external cream Apply topically 2 times daily       loratadine (CLARITIN) 10 MG tablet Take 1 tablet (10 mg) by mouth daily 30 tablet 11     NEW MED Take 1 capsule by mouth daily Lymphatic formula       nystatin (MYCOSTATIN) 840607 UNIT/GM external powder Apply topically 2 times daily       rivaroxaban ANTICOAGULANT (XARELTO) 20 MG TABS tablet Take 20 mg by mouth daily       sulfaSALAzine ER (AZULFIDINE EN) 500 MG EC tablet Take 2 tablets (1,000 mg) by mouth 2 times daily for 90 days 360 tablet 0     tolnaftate (TINACTIN) 1 % external cream Apply topically daily as needed for irritation       traMADol (ULTRAM) 50 MG tablet Take 1 tablet (50  mg) by mouth every 6 hours as needed for severe pain 240 tablet 0         Case Management:  I have reviewed the care plan and MDS and do agree with the plan. Information reviewed:  Medications, vital signs, orders, and nursing notes.    ROS:  4 point ROS including Respiratory, CV, GI and , other than that noted in the HPI,  is negative    Exam:  GENERAL APPEARANCE:  Alert, in no distress, morbidly obese  ENT:  Mouth and posterior oropharynx normal, moist mucous membranes, normal hearing acuity  EYES:  EOM normal, conjunctiva and lids normal  RESP:  no respiratory distress  SKIN:  BLE wrapped, did not remove this  NEURO:   Cranial nerves 2-12 are normal tested and grossly at patient's baseline  PSYCH:  oriented X 3    Lab/Diagnostic data:   Recent labs in [facility] reviewed by me today.     ASSESSMENT/PLAN  (M15.9) Primary osteoarthritis involving multiple joints  (primary encounter diagnosis)  (M05.79) Seropositive rheumatoid arthritis of multiple sites (H)  Comment: Wheelchair dependent. Chronic joint pain.   Plan: Continue current POC with no changes at this time and adjustments as needed.    (I10) Essential hypertension with goal blood pressure less than 140/90  Comment: Well controlled with most BP 120s/60s  Plan: Continue current POC with no changes at this time and adjustments as needed.    (E66.01) Morbid obesity (H)  Comment: Body mass index is 58.41 kg/m . BMI>40 is consistent with morbid obesity. This is clinically significant due to increased nursing cares, use of resources and specialty equipment.     (I73.9) PAD (peripheral artery disease) (H24)  Comment: Chronic with recurrent wounds and cellulitis. When she gets cellulitis, it tends to worsen very rapidly. Recommend starting abx at the first sign      Electronically signed by:  KRISTIN Conte CNP            Sincerely,        KRISTIN Conte CNP

## 2024-08-13 NOTE — PROGRESS NOTES
[facility] GERIATRICS  Chief Complaint   Patient presents with    correction Regulatory     [place] Medical Record Number:  [id number]  Place of Service where encounter took place:  [facility] () [[id number]]    HPI:    María Elena Saab  is 59 year old (1965), who is being seen today for a federally mandated E/M visit.     Today's concerns are:  Primary osteoarthritis involving multiple joints  Seropositive rheumatoid arthritis of multiple sites (H)  Essential hypertension with goal blood pressure less than 140/90  Morbid obesity (H)  PAD (peripheral artery disease) (H24)  Patient refuses to be woken up before 10am. Provider went there just after 10, she was sound asleep and would not wake to voice or light touch. Provider went again around 10:30 and she had woken up on her own. She does complain that she feels like she never sees her providers. When asked if we may have permission to wake her before 10, she says no. Provider let's her know that it certainly makes it more challenging to provide care. Nursing staff are able to update us as needed. If she were to have any acute concerns at any point, provider can come see her as needed.   She is being followed by the in-house wound MD again due to open areas on lower legs. She says she does not have any infection currently. Her legs are wrapped for both the wounds and lymphedema. She only has a couple of concerns today. She wants nystatin powder instead of cream and wants it ongoing indefinitely. She also wants to make sure her prn ibuprofen and flexeril are never stopped. She does use these from time to time.       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  RD reports that she is not willing to commit to the program outlined.  See scanned document.  12/15/2015 - Marcio Quinonez MD       Nocturnal hypoxemia - probable sleep apnea - had overnight pulse oximetry, April, 2015. 5/22/2015    Obesity 4/10/2015    LOPEZ (obstructive sleep apnea) 5/22/2015    Osteoarthritis     Peripheral vascular disease (H24)     Pleural effusion     Pressure ulcer of foot     Rheumatoid arthritis (H) 12/30/2014    seronegative    Sepsis (H)     Seropositive rheumatoid arthritis (H) 1/19/2016    Vitamin D deficiency 8/26/2015     PAST SURGICAL HISTORY:   has a past surgical history that includes Total Knee Arthroplasty (Left, 2006); joint replacement; Total Hip Arthroplasty (Left, 10/18/2016); and Peripherally Inserted Central Catheter Insertion (Right, 07/14/2019).  FAMILY HISTORY: family history includes Arthritis in her mother; Breast Cancer in her cousin; Breast Cancer (age of onset: 50.00) in her maternal grandmother; Breast Cancer (age of onset: 54.00) in her maternal aunt and mother; Cancer in her mother; Cataracts in her father and mother; Deep Vein Thrombosis in her father; Heart Disease in her maternal grandmother; Multiple myeloma in her father; No Known Problems in her sister; Other - See Comments in her brother; Rheumatoid Arthritis in her maternal grandmother and paternal aunt; Sleep Apnea in her brother; Snoring in her father.  SOCIAL HISTORY:  reports that she has quit smoking. Her smoking use included  cigarettes. She has a 30 pack-year smoking history. She has never used smokeless tobacco. She reports that she does not currently use alcohol. She reports that she does not use drugs.    MEDICATIONS:  Current Outpatient Medications   Medication Sig Dispense Refill    acetaminophen (TYLENOL) 325 MG tablet Take 650 mg by mouth every 6 hours as needed for mild pain      ascorbic acid, vitamin C, (ASCORBIC ACID WITH KAEL HIPS) 500 MG tablet [ASCORBIC ACID, VITAMIN C, (ASCORBIC ACID WITH KAEL HIPS) 500 MG TABLET] Take 500 mg by mouth 2 (two) times a day.      cholecalciferol 50 MCG (2000 UT) tablet Take 1 tablet (50 mcg) by mouth daily 30 tablet 11    cyclobenzaprine (FLEXERIL) 5 MG tablet Take 10 mg by mouth 3 times daily as needed      DULoxetine (CYMBALTA) 20 MG capsule Take 1 capsule (20 mg) by mouth daily 90 capsule 2    gabapentin (NEURONTIN) 300 MG capsule Take 1 capsule (300 mg) by mouth 3 times daily 90 capsule 11    hydroCHLOROthiazide (HYDRODIURIL) 25 MG tablet Take 50 mg by mouth daily      ibuprofen (ADVIL/MOTRIN) 400 MG tablet Take 400 mg by mouth every 8 hours as needed for moderate pain      ketoconazole (NIZORAL) 2 % external cream Apply topically 2 times daily      loratadine (CLARITIN) 10 MG tablet Take 1 tablet (10 mg) by mouth daily 30 tablet 11    NEW MED Take 1 capsule by mouth daily Lymphatic formula      nystatin (MYCOSTATIN) 071525 UNIT/GM external powder Apply topically 2 times daily      rivaroxaban ANTICOAGULANT (XARELTO) 20 MG TABS tablet Take 20 mg by mouth daily      sulfaSALAzine ER (AZULFIDINE EN) 500 MG EC tablet Take 2 tablets (1,000 mg) by mouth 2 times daily for 90 days 360 tablet 0    tolnaftate (TINACTIN) 1 % external cream Apply topically daily as needed for irritation      traMADol (ULTRAM) 50 MG tablet Take 1 tablet (50 mg) by mouth every 6 hours as needed for severe pain 240 tablet 0         Case Management:  I have reviewed the care plan and MDS and do agree with the plan.  Information reviewed:  Medications, vital signs, orders, and nursing notes.    ROS:  4 point ROS including Respiratory, CV, GI and , other than that noted in the HPI,  is negative    Exam:  GENERAL APPEARANCE:  Alert, in no distress, morbidly obese  ENT:  Mouth and posterior oropharynx normal, moist mucous membranes, normal hearing acuity  EYES:  EOM normal, conjunctiva and lids normal  RESP:  no respiratory distress  SKIN:  BLE wrapped, did not remove this  NEURO:   Cranial nerves 2-12 are normal tested and grossly at patient's baseline  PSYCH:  oriented X 3    Lab/Diagnostic data:   Recent labs in [facility] reviewed by me today.     ASSESSMENT/PLAN  (M15.9) Primary osteoarthritis involving multiple joints  (primary encounter diagnosis)  (M05.79) Seropositive rheumatoid arthritis of multiple sites (H)  Comment: Wheelchair dependent. Chronic joint pain.   Plan: Continue current POC with no changes at this time and adjustments as needed.    (I10) Essential hypertension with goal blood pressure less than 140/90  Comment: Well controlled with most BP 120s/60s  Plan: Continue current POC with no changes at this time and adjustments as needed.    (E66.01) Morbid obesity (H)  Comment: Body mass index is 58.41 kg/m . BMI>40 is consistent with morbid obesity. This is clinically significant due to increased nursing cares, use of resources and specialty equipment.     (I73.9) PAD (peripheral artery disease) (H24)  Comment: Chronic with recurrent wounds and cellulitis. When she gets cellulitis, it tends to worsen very rapidly. Recommend starting abx at the first sign      Electronically signed by:  KRISTIN Conte CNP

## 2024-08-14 NOTE — PROGRESS NOTES
[facility]    Facility:   [facility] [[id number]]   Code Status: UNKNOWN      CHIEF COMPLAINT/REASON FOR VISIT:  Chief Complaint   Patient presents with     Review Of Multiple Medical Conditions     History of unresolved DVT currently on Eliquis which recently switched from Coumadin, polyarthralgia, morbid obesity, history of aseptic necrosis of the femoral head, bacteremia in the past, cellulitis lower extremity in the past, lymphedema, essential hypertension, history of hypokalemia, obstructive sleep apnea.       HISTORY:      HPI: María Elena is a 55 y.o. female who resides here at the [facility] for multiple medical problems.  She currently has a history of DVT which is chronic and she was on chronic anticoagulation recently switched to Xarelto which I do agree with.  She does have polyarthralgia and osteoarthritis which seems to be doing okay and severe lower extremity edema.  Her weight is stayed relatively stable at this time is unclear because it does fluctuate.  Her recent COVID test was negative according to the charts and she also has a history of aseptic necrosis of the femoral head and bacteremia due to B Streptococcus.  She also has recurrent cellulitis lower extremity but she claims none at this time.    Patient states she is doing okay and has no real new complaints at this time.  She does not appreciate the humidity at this time but otherwise she is doing okay.    I did review her chart and since she is not a geriatric patient she is 57 years old she would qualify for cancer screening maintenance which include colonoscopy Pap smear OB/GYN consult as well as breast exam and pelvic exam.  I do not see those in the chart that they have been done.    Nurses have no new concerns at this time.    Past Medical History:   Diagnosis Date     Aseptic necrosis of femoral head (H)     LEFT     Bacteremia due to group B Streptococcus      Cellulitis of right  lower extremity      DJD (degenerative joint disease)      DVT, bilateral lower limbs (H) 10/2014     Edema - swelling of the legs after blood clots 5/22/2015     Essential hypertension with goal blood pressure less than 140/90      Failure to thrive      History of blood clots      Hypokalemia 10/15/2014     Lung mass 10/18/2014     Morbid obesity (H) 4/10/2015    Had formal nutrition consult at [facility].  RD reports that she is not willing to commit to the program outlined.  See scanned document.  12/15/2015 - Marcio Quinonez MD        Obesity - although she was not weighed today, she is clearly obese on inspection. 4/10/2015     LOPEZ (obstructive sleep apnea) - abnormal overnight pulse oximetry 5/22/2015     Osteoarthritis      Peripheral vascular disease (H)      Pleural effusion      Pressure ulcer of foot      Rheumatoid arthritis (H) 12/30/2014    seronegative     Sepsis (H)      Seropositive rheumatoid arthritis (H) 1/19/2016     Vitamin D deficiency 8/26/2015             Family History   Problem Relation Age of Onset     Multiple myeloma Father      Deep vein thrombosis Father      Cataracts Father      Snoring Father      Other Brother         Blood withdrawn from time to time.  Sounds like hemochromatosis.     Sleep apnea Brother      Cancer Mother         Uterine CA     Arthritis Mother      Cataracts Mother      Breast cancer Mother 54     No Medical Problems Sister      Rheum arthritis Maternal Grandmother      Breast cancer Maternal Grandmother 50     Heart disease Maternal Grandmother      Rheum arthritis Paternal Aunt      Breast cancer Maternal Aunt 54     Breast cancer Cousin      Clotting disorder Neg Hx      Social History     Socioeconomic History     Marital status: Single     Spouse name: None     Number of children: None     Years of education: None     Highest education level: None   Occupational History     None   Social Needs     Financial resource strain: None     Food insecurity      Worry: None     Inability: None     Transportation needs     Medical: None     Non-medical: None   Tobacco Use     Smoking status: Former Smoker     Packs/day: 1.00     Years: 30.00     Pack years: 30.00     Types: Cigarettes     Smokeless tobacco: Never Used   Substance and Sexual Activity     Alcohol use: Yes     Comment: 3-4 times a year she will have 1 or 2.     Drug use: No     Sexual activity: Never     Partners: Female   Lifestyle     Physical activity     Days per week: None     Minutes per session: None     Stress: None   Relationships     Social connections     Talks on phone: None     Gets together: None     Attends Restorationism service: None     Active member of club or organization: None     Attends meetings of clubs or organizations: None     Relationship status: None     Intimate partner violence     Fear of current or ex partner: None     Emotionally abused: None     Physically abused: None     Forced sexual activity: None   Other Topics Concern     None   Social History Narrative    Long term  At [facility] to rehab to level allowing surgery on deteriorated joints  LTC12/2015         Review of Systems   Constitutional:        Patient denies any pain fevers chills nausea vomit diarrhea change in vision hearing taste or smell weakness 1 side of the chest pain shortness of breath.  She denies incontinence stool polyphagia polydipsia polyuria depression or anxiety and the remainder review of systems is negative.       Vitals:    06/24/20 1113   BP: 121/71   Pulse: 68   Resp: 20   Temp: 97.8  F (36.6  C)   SpO2: 93%       Physical Exam  Constitutional:       General: She is not in acute distress.  HENT:      Head: Normocephalic and atraumatic.   Eyes:      General:         Right eye: No discharge.         Left eye: No discharge.   Pulmonary:      Effort: Pulmonary effort is normal. No respiratory distress.   Abdominal:      General: There is distension.   Musculoskeletal:      Right lower leg: Edema  present.      Left lower leg: Edema present.   Neurological:      Mental Status: She is alert. Mental status is at baseline.   Psychiatric:         Mood and Affect: Mood normal.         Behavior: Behavior normal.           LABS: Reviewed.      ASSESSMENT:      ICD-10-CM    1. Seropositive rheumatoid arthritis of multiple sites (H)  M05.79    2. Deep vein thrombosis (DVT) of both lower extremities, unspecified chronicity, unspecified vein (H)  I82.403    3. Vitamin D deficiency  E55.9    4. Pain management  R52    5. Morbid obesity (H)  E66.01    6. Physical debility  R53.81    7. Essential hypertension with goal blood pressure less than 140/90  I10    8. Polyarthralgia  M25.50        PLAN: Plan at this time I do agree with the Xarelto she is doing okay on this at this time and taken her medications.  I did med reconciliation and updated best I could at this point.  I did add the methotrexate to our epic medications.    Reviewing her health maintenance it is indicated that she be treated less of a geriatric patient here and more as an adult medicine.  Which require her to have a colonoscopy I do not see 1 on the chart at this time and also pelvic exam with Pap smear as well as thorough breast exam.    I will continue to monitor above medical problems and no other changes to care plan at this time.  Given the fact that patient has no sense of COVID-19 and no need to test.  Given that we are still in a health care facility patient was seen with myself wearing the appropriate PPE which mean face shield as well as mask and handwashing entering the room and exiting.  Physical exam was not done at this time with the exception of observation and review of systems was done.  No other changes to care plan at this time.      Total  minutes of which % was spent counseling and coordination of care of the above plan.    Electronically signed by: Og Connelly DO   Walk in

## 2024-10-16 ENCOUNTER — NURSING HOME VISIT (OUTPATIENT)
Dept: GERIATRICS | Facility: CLINIC | Age: 59
End: 2024-10-16

## 2024-10-16 VITALS
BODY MASS INDEX: 48.82 KG/M2 | SYSTOLIC BLOOD PRESSURE: 154 MMHG | TEMPERATURE: 98.3 F | HEIGHT: 65 IN | RESPIRATION RATE: 18 BRPM | HEART RATE: 77 BPM | OXYGEN SATURATION: 96 % | DIASTOLIC BLOOD PRESSURE: 85 MMHG | WEIGHT: 293 LBS

## 2024-10-16 DIAGNOSIS — I10 ESSENTIAL HYPERTENSION WITH GOAL BLOOD PRESSURE LESS THAN 140/90: ICD-10-CM

## 2024-10-16 DIAGNOSIS — M05.79 SEROPOSITIVE RHEUMATOID ARTHRITIS OF MULTIPLE SITES (H): ICD-10-CM

## 2024-10-16 DIAGNOSIS — Z86.718 HISTORY OF DVT OF LOWER EXTREMITY: ICD-10-CM

## 2024-10-16 DIAGNOSIS — R21 RASH: Primary | ICD-10-CM

## 2024-10-16 PROCEDURE — 99309 SBSQ NF CARE MODERATE MDM 30: CPT | Performed by: INTERNAL MEDICINE

## 2024-10-16 RX ORDER — NYSTATIN AND TRIAMCINOLONE ACETONIDE 100000; 1 [USP'U]/G; MG/G
CREAM TOPICAL 2 TIMES DAILY
Qty: 30 G | Refills: 2 | Status: SHIPPED | OUTPATIENT
Start: 2024-10-16

## 2024-10-16 RX ORDER — NYSTATIN 100000 U/G
CREAM TOPICAL 2 TIMES DAILY
Qty: 30 G | Refills: 3 | Status: SHIPPED | OUTPATIENT
Start: 2024-10-16

## 2024-10-16 RX ORDER — SULFASALAZINE 500 MG/1
1000 TABLET ORAL 2 TIMES DAILY
COMMUNITY

## 2024-10-16 NOTE — LETTER
10/16/2024      María Elena Saab  [facility] [address]  [address]  [place] [address]        [facility] GERIATRICS  REGULATORY VISIT  October 16, 2024      [facility] Medical Record Number:  [id number]  Place of Service where encounter took place:  [facility] () [[id number]]    Chief Complaint   Patient presents with     halfway Regulatory       HPI:    María Elena Saab is a 59 year old  (1965), who is being seen today for a federally mandated E/M visit  at [facility] where she has resided since March 2016. HPI information obtained from: facility chart records, facility staff, patient report, [facility] chart review, and Care Everywhere [facility] chart review.     She saw Dr. Humphrey with rheumatology in June. No med changes. Follow up in 6 mos.     Today, Ms. Saab is seen in her room laying in bed. Nursing was getting her ready for the day. She has a yeast infection under her R breast. They are using a triamcinalone + nystatin cream with good success. Nursing requesting a refill of this and also just plain nystatin cream. Overall Ms Saab is doing OK. No acute concerns today from her or nursing.     ALLERGIES:    Allergies   Allergen Reactions     Adhesive Tape Other (See Comments)     As on band-aids;  Causes skin tearing/wounds.     Adhesive [Cyanoacrylate] Other (See Comments)     As on band-aids;  Causes skin tearing/wounds.     Banana Unknown     She avoids due to Latex allergy.  If she eats them 3 days in a row, gets stomach cramps and dark stool.  TBrinkMD - 4/10/15     Food Unknown and GI Disturbance     Bananas, grapes, pine nuts    Artifical sweetner     Grape [Grape (Artificial) Flavor] Unknown     She avoids due to Latex allergy.  If she eats lots of them, and she likes them, stomach gets a little queasy.  TBrinkMD - 4/10/15     Morphine (Pf) [Morphine] Other (See Comments)     Pt has not reacted to this med herself, but mother has  anaphylactic reaction and other family members get nausea/vomiting.     Other Environmental Allergy Swelling     leather     Pine      Pseudoephedrine Other (See Comments)     Insomnia     Pseudoephedrine Cold/Allergy [Chlorpheniramine-Pseudoeph] Other (See Comments)     Keeps pt awake up to 24 hours      Latex Rash     Hands get red from rubber gloves, but okay if she washes them right away.  Hands get red from rubber gloves, but okay if she washes them right away.     Other Food Allergy Rash     Pine trees, leather .         Past Medical, Surgical, Family and Social History: Reviewed and updated in EPIC.    MEDICATIONS:  Current Outpatient Medications   Medication Sig Dispense Refill     acetaminophen (TYLENOL) 325 MG tablet Take 650 mg by mouth every 6 hours as needed for mild pain       ascorbic acid, vitamin C, (ASCORBIC ACID WITH KAEL HIPS) 500 MG tablet [ASCORBIC ACID, VITAMIN C, (ASCORBIC ACID WITH KAEL HIPS) 500 MG TABLET] Take 500 mg by mouth 2 (two) times a day.       cholecalciferol 50 MCG (2000 UT) tablet Take 1 tablet (50 mcg) by mouth daily 30 tablet 11     cyclobenzaprine (FLEXERIL) 5 MG tablet Take 10 mg by mouth 3 times daily as needed       DULoxetine (CYMBALTA) 20 MG capsule Take 1 capsule (20 mg) by mouth daily 90 capsule 2     gabapentin (NEURONTIN) 300 MG capsule Take 1 capsule (300 mg) by mouth 3 times daily 90 capsule 11     hydroCHLOROthiazide (HYDRODIURIL) 25 MG tablet Take 50 mg by mouth daily       ibuprofen (ADVIL/MOTRIN) 400 MG tablet Take 400 mg by mouth every 8 hours as needed for moderate pain       ketoconazole (NIZORAL) 2 % external cream Apply topically 2 times daily       loratadine (CLARITIN) 10 MG tablet Take 1 tablet (10 mg) by mouth daily 30 tablet 11     NEW MED Take 1 capsule by mouth daily Lymphatic formula       nystatin (MYCOSTATIN) 529685 UNIT/GM external cream Apply topically 2 times daily. 30 g 3     nystatin (MYCOSTATIN) 324973 UNIT/GM external powder Apply  "topically 2 times daily       nystatin-triamcinolone (MYCOLOG II) 591123-5.1 UNIT/GM-% external cream Apply topically 2 times daily. 30 g 2     rivaroxaban ANTICOAGULANT (XARELTO) 20 MG TABS tablet Take 20 mg by mouth daily       sulfaSALAzine (AZULFIDINE) 500 MG tablet Take 1,000 mg by mouth 2 times daily.       tolnaftate (TINACTIN) 1 % external cream Apply topically daily as needed for irritation       Medications reviewed:  Medications reconciled to facility chart and changes were made to reflect current medications as identified as above med list. Below are the changes that were made:   Medications stopped since last EPIC medication reconciliation:   Medications Discontinued During This Encounter   Medication Reason     traMADol (ULTRAM) 50 MG tablet Med Rec(No AVS / No eCancel)     Medications started since last [facility] medication reconciliation:  Orders Placed This Encounter   Medications     nystatin-triamcinolone (MYCOLOG II) 933272-2.1 UNIT/GM-% external cream     Sig: Apply topically 2 times daily.     Dispense:  30 g     Refill:  2     nystatin (MYCOSTATIN) 050979 UNIT/GM external cream     Sig: Apply topically 2 times daily.     Dispense:  30 g     Refill:  3     sulfaSALAzine (AZULFIDINE) 500 MG tablet     Sig: Take 1,000 mg by mouth 2 times daily.     REVIEW OF SYSTEMS:  2 point ROS neg other than the symptoms noted above in the HPI.    PHYSICAL EXAM:  BP (!) 154/85   Pulse 77   Temp 98.3  F (36.8  C)   Resp 18   Ht 1.651 m (5' 5\")   Wt (!) 158.8 kg (350 lb 3.2 oz)   SpO2 96%   BMI 58.28 kg/m    Gen: laying in bed, alert, cooperative and in no acute distress  Resp: breathing non labored, no tachypnea   Ext: chronic bilateral LE lymphedema  Neuro: CX II-XII grossly in tact; ROM in all four extremities grossly in tact  Psych: alert and oriented x3; normal affect   Skin: candida under R breast     [facility]    ASSESSMENT / PLAN:    Rheumatoid " Arthritis  Chronic Pain Syndrome   Follows with rheumatology - most recent visit late June.   -- sulfasalazine 1000 mg BID   -- duloxetine 20 mg daily, gabapentin 300 mg TID   -- APAP 650 mg q6h PRN, cyclobenzaprine 10 mg q8h PRN, gabapentin 300 mg TID, ibuprofen 400 mg q8h PRN   -- labs and follow up per rheumatology      HTN  SBPs 120s-140s, most 120s. Hydrochlorothiazide increased from 25 mg at [facility] this spring. Weight trend 331 (Sept) --> 340 (Dec) --> 351 (March to August) - none more recent..   -- hydrochlorothiazide 50 mg daily   -- follow BPs and adjust medications as needed     History of Lower Exremity DVT  -- rivaroxaban 20 mg daily     Electronically signed by  Urvashi River MD              Sincerely,        Urvashi River MD

## 2024-10-16 NOTE — PROGRESS NOTES
[facility] GERIATRICS  REGULATORY VISIT  October 16, 2024      [facility] Medical Record Number:  [id number]  Place of Service where encounter took place:  [facility] () [[id number]]    Chief Complaint   Patient presents with    correction Regulatory       HPI:    María Elena Saab is a 59 year old  (1965), who is being seen today for a federally mandated E/M visit  at [facility] where she has resided since March 2016. HPI information obtained from: facility chart records, facility staff, patient report, [place] Epic chart review, and Care Everywhere Epic chart review.     She saw Dr. Humphrey with rheumatology in June. No med changes. Follow up in 6 mos.     Today, Ms. Saab is seen in her room laying in bed. Nursing was getting her ready for the day. She has a yeast infection under her R breast. They are using a triamcinalone + nystatin cream with good success. Nursing requesting a refill of this and also just plain nystatin cream. Overall Ms Saab is doing OK. No acute concerns today from her or nursing.     ALLERGIES:    Allergies   Allergen Reactions    Adhesive Tape Other (See Comments)     As on band-aids;  Causes skin tearing/wounds.    Adhesive [Cyanoacrylate] Other (See Comments)     As on band-aids;  Causes skin tearing/wounds.    Banana Unknown     She avoids due to Latex allergy.  If she eats them 3 days in a row, gets stomach cramps and dark stool.  TBrinkMD - 4/10/15    Food Unknown and GI Disturbance     Bananas, grapes, pine nuts    Artifical sweetner    Grape [Grape (Artificial) Flavor] Unknown     She avoids due to Latex allergy.  If she eats lots of them, and she likes them, stomach gets a little queasy.  TBrinkMD - 4/10/15    Morphine (Pf) [Morphine] Other (See Comments)     Pt has not reacted to this med herself, but mother has anaphylactic reaction and other family members get nausea/vomiting.    Other Environmental Allergy Swelling     leather     Pine     Pseudoephedrine Other (See Comments)     Insomnia    Pseudoephedrine Cold/Allergy [Chlorpheniramine-Pseudoeph] Other (See Comments)     Keeps pt awake up to 24 hours     Latex Rash     Hands get red from rubber gloves, but okay if she washes them right away.  Hands get red from rubber gloves, but okay if she washes them right away.    Other Food Allergy Rash     Pine trees, leather .         Past Medical, Surgical, Family and Social History: Reviewed and updated in EPIC.    MEDICATIONS:  Current Outpatient Medications   Medication Sig Dispense Refill    acetaminophen (TYLENOL) 325 MG tablet Take 650 mg by mouth every 6 hours as needed for mild pain      ascorbic acid, vitamin C, (ASCORBIC ACID WITH KAEL HIPS) 500 MG tablet [ASCORBIC ACID, VITAMIN C, (ASCORBIC ACID WITH KAEL HIPS) 500 MG TABLET] Take 500 mg by mouth 2 (two) times a day.      cholecalciferol 50 MCG (2000 UT) tablet Take 1 tablet (50 mcg) by mouth daily 30 tablet 11    cyclobenzaprine (FLEXERIL) 5 MG tablet Take 10 mg by mouth 3 times daily as needed      DULoxetine (CYMBALTA) 20 MG capsule Take 1 capsule (20 mg) by mouth daily 90 capsule 2    gabapentin (NEURONTIN) 300 MG capsule Take 1 capsule (300 mg) by mouth 3 times daily 90 capsule 11    hydroCHLOROthiazide (HYDRODIURIL) 25 MG tablet Take 50 mg by mouth daily      ibuprofen (ADVIL/MOTRIN) 400 MG tablet Take 400 mg by mouth every 8 hours as needed for moderate pain      ketoconazole (NIZORAL) 2 % external cream Apply topically 2 times daily      loratadine (CLARITIN) 10 MG tablet Take 1 tablet (10 mg) by mouth daily 30 tablet 11    NEW MED Take 1 capsule by mouth daily Lymphatic formula      nystatin (MYCOSTATIN) 888668 UNIT/GM external cream Apply topically 2 times daily. 30 g 3    nystatin (MYCOSTATIN) 781620 UNIT/GM external powder Apply topically 2 times daily      nystatin-triamcinolone (MYCOLOG II) 231671-1.1 UNIT/GM-% external cream Apply topically 2 times daily. 30 g 2     "rivaroxaban ANTICOAGULANT (XARELTO) 20 MG TABS tablet Take 20 mg by mouth daily      sulfaSALAzine (AZULFIDINE) 500 MG tablet Take 1,000 mg by mouth 2 times daily.      tolnaftate (TINACTIN) 1 % external cream Apply topically daily as needed for irritation       Medications reviewed:  Medications reconciled to facility chart and changes were made to reflect current medications as identified as above med list. Below are the changes that were made:   Medications stopped since last EPIC medication reconciliation:   Medications Discontinued During This Encounter   Medication Reason    traMADol (ULTRAM) 50 MG tablet Med Rec(No AVS / No eCancel)     Medications started since last [facility] medication reconciliation:  Orders Placed This Encounter   Medications    nystatin-triamcinolone (MYCOLOG II) 121194-0.1 UNIT/GM-% external cream     Sig: Apply topically 2 times daily.     Dispense:  30 g     Refill:  2    nystatin (MYCOSTATIN) 292011 UNIT/GM external cream     Sig: Apply topically 2 times daily.     Dispense:  30 g     Refill:  3    sulfaSALAzine (AZULFIDINE) 500 MG tablet     Sig: Take 1,000 mg by mouth 2 times daily.     REVIEW OF SYSTEMS:  2 point ROS neg other than the symptoms noted above in the HPI.    PHYSICAL EXAM:  BP (!) 154/85   Pulse 77   Temp 98.3  F (36.8  C)   Resp 18   Ht 1.651 m (5' 5\")   Wt (!) 158.8 kg (350 lb 3.2 oz)   SpO2 96%   BMI 58.28 kg/m    Gen: laying in bed, alert, cooperative and in no acute distress  Resp: breathing non labored, no tachypnea   Ext: chronic bilateral LE lymphedema  Neuro: CX II-XII grossly in tact; ROM in all four extremities grossly in tact  Psych: alert and oriented x3; normal affect   Skin: candida under R breast     LABS/IMAGING: Reviewed as per Epic and/or [facility]    ASSESSMENT / PLAN:    Rheumatoid Arthritis  Chronic Pain Syndrome   Follows with rheumatology - most recent visit late June.   -- sulfasalazine 1000 mg BID   -- duloxetine 20 mg daily, " gabapentin 300 mg TID   -- APAP 650 mg q6h PRN, cyclobenzaprine 10 mg q8h PRN, gabapentin 300 mg TID, ibuprofen 400 mg q8h PRN   -- labs and follow up per rheumatology      HTN  SBPs 120s-140s, most 120s. Hydrochlorothiazide increased from 25 mg at [facility] this spring. Weight trend 331 (Sept) --> 340 (Dec) --> 351 (March to August) - none more recent..   -- hydrochlorothiazide 50 mg daily   -- follow BPs and adjust medications as needed     History of Lower Exremity DVT  -- rivaroxaban 20 mg daily     Electronically signed by  Urvashi River MD

## 2024-10-31 ENCOUNTER — NURSING HOME VISIT (OUTPATIENT)
Dept: GERIATRICS | Facility: CLINIC | Age: 59
End: 2024-10-31

## 2024-10-31 VITALS
SYSTOLIC BLOOD PRESSURE: 146 MMHG | BODY MASS INDEX: 48.82 KG/M2 | RESPIRATION RATE: 18 BRPM | WEIGHT: 293 LBS | TEMPERATURE: 97.9 F | HEART RATE: 90 BPM | HEIGHT: 65 IN | OXYGEN SATURATION: 96 % | DIASTOLIC BLOOD PRESSURE: 81 MMHG

## 2024-10-31 DIAGNOSIS — D84.9 IMMUNOSUPPRESSION (H): ICD-10-CM

## 2024-10-31 DIAGNOSIS — I10 ESSENTIAL HYPERTENSION WITH GOAL BLOOD PRESSURE LESS THAN 140/90: ICD-10-CM

## 2024-10-31 DIAGNOSIS — L03.116 CELLULITIS OF LEFT LOWER EXTREMITY: Primary | ICD-10-CM

## 2024-10-31 DIAGNOSIS — I73.9 PAD (PERIPHERAL ARTERY DISEASE) (H): ICD-10-CM

## 2024-10-31 RX ORDER — CEFPODOXIME PROXETIL 200 MG/1
200 TABLET, FILM COATED ORAL 2 TIMES DAILY
COMMUNITY
Start: 2024-10-30 | End: 2024-11-11

## 2024-10-31 NOTE — LETTER
10/31/2024      María Elena Saab  [facility]  [address]  [place] [address]        [facility] GERIATRICS    PRIMARY CARE PROVIDER AND CLINIC:  Indiana Shelley, APRN CNP, [address] / [place] [address]  Chief Complaint   Patient presents with     Hospital F/U      [place] Medical Record Number:  [id number]  Place of Service where encounter took place[facility] () [[id number]]    María Elena Saab  is a 59 year old  (1965), returned to the above facility from  [facility] . Hospital stay 10/28/24 through 10/30/24. Patient was admitted for cellulitis of LLE.    HPI obtained from patient visit, review of nursing home record, discussion with facility staff, and Epic review.     HPI:    Patient has a history of recurrent cellulitis that progresses very quickly. It is difficult to start oral antibiotics soon enough to prevent hospitalization. She reports that she is doing well, her leg is getting better. She is followed closely by the in-house wound MD for her chronic BLE wounds.     CODE STATUS/ADVANCE DIRECTIVES DISCUSSION:  Full Code    ALLERGIES:   Allergies   Allergen Reactions     Adhesive Tape Other (See Comments)     As on band-aids;  Causes skin tearing/wounds.     Adhesive [Cyanoacrylate] Other (See Comments)     As on band-aids;  Causes skin tearing/wounds.     Banana Unknown     She avoids due to Latex allergy.  If she eats them 3 days in a row, gets stomach cramps and dark stool.  TBrinkMD - 4/10/15     Food Unknown and GI Disturbance     Bananas, grapes, pine nuts    Artifical sweetner     Grape [Grape (Artificial) Flavor] Unknown     She avoids due to Latex allergy.  If she eats lots of them, and she likes them, stomach gets a little queasy.  TBrinkMD - 4/10/15     Morphine (Pf) [Morphine] Other (See Comments)     Pt has not reacted to this med herself, but mother has anaphylactic reaction and other family members get  nausea/vomiting.     Other Environmental Allergy Swelling     leather     Pine      Pseudoephedrine Other (See Comments)     Insomnia     Pseudoephedrine Cold/Allergy [Chlorpheniramine-Pseudoeph] Other (See Comments)     Keeps pt awake up to 24 hours      Latex Rash     Hands get red from rubber gloves, but okay if she washes them right away.  Hands get red from rubber gloves, but okay if she washes them right away.     Other Food Allergy Rash     Pine trees, leather .       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  RD reports that she is not willing to commit to the program outlined.  See scanned document.  12/15/2015 - Marcio Quinonez MD        Nocturnal hypoxemia - probable sleep apnea - had overnight pulse oximetry, April, 2015. 5/22/2015     Obesity 4/10/2015     LOPEZ (obstructive sleep apnea) 5/22/2015     Osteoarthritis      Peripheral vascular disease (H)      Pleural effusion      Pressure ulcer of foot      Rheumatoid arthritis (H) 12/30/2014    seronegative     Sepsis (H)      Seropositive rheumatoid arthritis (H) 1/19/2016     Vitamin D deficiency 8/26/2015      PAST SURGICAL HISTORY:   has a past surgical history that includes Total Knee Arthroplasty (Left, 2006); joint replacement; Total Hip Arthroplasty (Left, 10/18/2016); and Peripherally Inserted Central Catheter Insertion (Right, 07/14/2019).  FAMILY HISTORY: family history includes Arthritis in her mother; Breast Cancer in her cousin; Breast  Cancer (age of onset: 50.00) in her maternal grandmother; Breast Cancer (age of onset: 54.00) in her maternal aunt and mother; Cancer in her mother; Cataracts in her father and mother; Deep Vein Thrombosis in her father; Heart Disease in her maternal grandmother; Multiple myeloma in her father; No Known Problems in her sister; Other - See Comments in her brother; Rheumatoid Arthritis in her maternal grandmother and paternal aunt; Sleep Apnea in her brother; Snoring in her father.  SOCIAL HISTORY:   reports that she has quit smoking. Her smoking use included cigarettes. She has a 30 pack-year smoking history. She has never used smokeless tobacco. She reports that she does not currently use alcohol. She reports that she does not use drugs.  Patient's living condition: lives in a nursing home    Post Discharge Medication Reconciliation Status:   MED REC REQUIRED  Post Medication Reconciliation Status:  Discharge medications reconciled, continue medications without change       Current Outpatient Medications   Medication Sig Dispense Refill     acetaminophen (TYLENOL) 325 MG tablet Take 650 mg by mouth every 6 hours as needed for mild pain       ascorbic acid, vitamin C, (ASCORBIC ACID WITH KAEL HIPS) 500 MG tablet [ASCORBIC ACID, VITAMIN C, (ASCORBIC ACID WITH KAEL HIPS) 500 MG TABLET] Take 500 mg by mouth 2 (two) times a day.       cefpodoxime (VANTIN) 200 MG tablet Take 200 mg by mouth 2 times daily.       cholecalciferol 50 MCG (2000 UT) tablet Take 1 tablet (50 mcg) by mouth daily 30 tablet 11     cyclobenzaprine (FLEXERIL) 5 MG tablet Take 10 mg by mouth 3 times daily as needed       DULoxetine (CYMBALTA) 20 MG capsule Take 1 capsule (20 mg) by mouth daily 90 capsule 2     gabapentin (NEURONTIN) 300 MG capsule Take 1 capsule (300 mg) by mouth 3 times daily 90 capsule 11     hydroCHLOROthiazide (HYDRODIURIL) 25 MG tablet Take 25 mg by mouth daily.       ibuprofen (ADVIL/MOTRIN) 400 MG tablet Take 400 mg by mouth  "every 8 hours as needed for moderate pain       ketoconazole (NIZORAL) 2 % external cream Apply topically 2 times daily       loratadine (CLARITIN) 10 MG tablet Take 1 tablet (10 mg) by mouth daily 30 tablet 11     nystatin (MYCOSTATIN) 643900 UNIT/GM external cream Apply topically 2 times daily. 30 g 3     nystatin-triamcinolone (MYCOLOG II) 634914-6.1 UNIT/GM-% external cream Apply topically 2 times daily. 30 g 2     rivaroxaban ANTICOAGULANT (XARELTO) 20 MG TABS tablet Take 20 mg by mouth daily       sulfaSALAzine (AZULFIDINE) 500 MG tablet Take 1,000 mg by mouth 2 times daily.       tolnaftate (TINACTIN) 1 % external cream Apply topically daily as needed for irritation       No current facility-administered medications for this visit.       ROS:  4 point ROS including Respiratory, CV, GI and , other than that noted in the HPI,  is negative    Vitals:  BP (!) 146/81   Pulse 90   Temp 97.9  F (36.6  C)   Resp 18   Ht 1.651 m (5' 5\")   Wt (!) 158.8 kg (350 lb 3.2 oz)   SpO2 96%   BMI 58.28 kg/m    Exam:  GENERAL APPEARANCE:  Alert, in no distress, morbidly obese  RESP:  no respiratory distress  SKIN:  lower legs with area of dark red skin, scabby, some superficial open areas. Outlined area on left leg to upper thigh is slightly pink  PSYCH:  oriented X 3, affect and mood normal    Lab/Diagnostic data:  Recent labs in [facility] reviewed by me today.     ASSESSMENT/PLAN:  (L03.116) Cellulitis of left lower extremity  (primary encounter diagnosis)  Comment: Improving  Plan: PT/OT eval and treat, discharge planning per their recommendations.    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 No changes today. Will continue to monitor  Plan: Continue current POC with no changes at this time and adjustments as needed.    (I73.9) PAD (peripheral artery disease) (H)  Comment: Chronic. " Wounds are very difficult to heal due to poor circulation  Plan: Continue current POC with no changes at this time and adjustments as needed.        Electronically signed by:  KRISTIN Conte CNP                     Sincerely,        KRISTIN Conte CNP

## 2024-10-31 NOTE — PROGRESS NOTES
[facility] GERIATRICS    PRIMARY CARE PROVIDER AND CLINIC:  Indiana Shelley, APRN CNP, [address] [facility] [address]  Chief Complaint   Patient presents with    Hospital F/U      [place] Medical Record Number:  [id number]  Place of Service where encounter took place:  [facility] () [[id number]]    María Elena Saab  is a 59 year old  (1965), returned to the above facility from  [facility] . Hospital stay 10/28/24 through 10/30/24. Patient was admitted for cellulitis of LLE.    HPI obtained from patient visit, review of nursing home record, discussion with facility staff, and Epic review.     HPI:    Patient has a history of recurrent cellulitis that progresses very quickly. It is difficult to start oral antibiotics soon enough to prevent hospitalization. She reports that she is doing well, her leg is getting better. She is followed closely by the in-house wound MD for her chronic BLE wounds.     CODE STATUS/ADVANCE DIRECTIVES DISCUSSION:  Full Code    ALLERGIES:   Allergies   Allergen Reactions    Adhesive Tape Other (See Comments)     As on band-aids;  Causes skin tearing/wounds.    Adhesive [Cyanoacrylate] Other (See Comments)     As on band-aids;  Causes skin tearing/wounds.    Banana Unknown     She avoids due to Latex allergy.  If she eats them 3 days in a row, gets stomach cramps and dark stool.  TBrinkMD - 4/10/15    Food Unknown and GI Disturbance     Bananas, grapes, pine nuts    Artifical sweetner    Grape [Grape (Artificial) Flavor] Unknown     She avoids due to Latex allergy.  If she eats lots of them, and she likes them, stomach gets a little queasy.  TBrinkMD - 4/10/15    Morphine (Pf) [Morphine] Other (See Comments)     Pt has not reacted to this med herself, but mother has anaphylactic reaction and other family members get nausea/vomiting.    Other Environmental Allergy Swelling     leather    Pine     Pseudoephedrine Other (See Comments)     Insomnia     Pseudoephedrine Cold/Allergy [Chlorpheniramine-Pseudoeph] Other (See Comments)     Keeps pt awake up to 24 hours     Latex Rash     Hands get red from rubber gloves, but okay if she washes them right away.  Hands get red from rubber gloves, but okay if she washes them right away.    Other Food Allergy Rash     Pine trees, leather .       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  RD reports that she is not willing to commit to the program outlined.  See scanned document.  12/15/2015 - Marcio Quinonez MD       Nocturnal hypoxemia - probable sleep apnea - had overnight pulse oximetry, April, 2015. 5/22/2015    Obesity 4/10/2015    LOPEZ (obstructive sleep apnea) 5/22/2015    Osteoarthritis     Peripheral vascular disease (H)     Pleural effusion     Pressure ulcer of foot     Rheumatoid arthritis (H) 12/30/2014    seronegative    Sepsis (H)     Seropositive rheumatoid arthritis (H) 1/19/2016    Vitamin D deficiency 8/26/2015      PAST SURGICAL HISTORY:   has a past surgical history that includes Total Knee Arthroplasty (Left, 2006); joint replacement; Total Hip Arthroplasty (Left, 10/18/2016); and Peripherally Inserted Central Catheter Insertion (Right, 07/14/2019).  FAMILY HISTORY: family history includes Arthritis in her mother; Breast Cancer in her cousin; Breast Cancer (age of onset: 50.00) in her maternal grandmother; Breast Cancer (age of onset: 54.00) in her maternal aunt and mother; Cancer in her mother; Cataracts in her  father and mother; Deep Vein Thrombosis in her father; Heart Disease in her maternal grandmother; Multiple myeloma in her father; No Known Problems in her sister; Other - See Comments in her brother; Rheumatoid Arthritis in her maternal grandmother and paternal aunt; Sleep Apnea in her brother; Snoring in her father.  SOCIAL HISTORY:   reports that she has quit smoking. Her smoking use included cigarettes. She has a 30 pack-year smoking history. She has never used smokeless tobacco. She reports that she does not currently use alcohol. She reports that she does not use drugs.  Patient's living condition: lives in a nursing home    Post Discharge Medication Reconciliation Status:   MED REC REQUIRED  Post Medication Reconciliation Status:  Discharge medications reconciled, continue medications without change       Current Outpatient Medications   Medication Sig Dispense Refill    acetaminophen (TYLENOL) 325 MG tablet Take 650 mg by mouth every 6 hours as needed for mild pain      ascorbic acid, vitamin C, (ASCORBIC ACID WITH KAEL HIPS) 500 MG tablet [ASCORBIC ACID, VITAMIN C, (ASCORBIC ACID WITH KAEL HIPS) 500 MG TABLET] Take 500 mg by mouth 2 (two) times a day.      cefpodoxime (VANTIN) 200 MG tablet Take 200 mg by mouth 2 times daily.      cholecalciferol 50 MCG (2000 UT) tablet Take 1 tablet (50 mcg) by mouth daily 30 tablet 11    cyclobenzaprine (FLEXERIL) 5 MG tablet Take 10 mg by mouth 3 times daily as needed      DULoxetine (CYMBALTA) 20 MG capsule Take 1 capsule (20 mg) by mouth daily 90 capsule 2    gabapentin (NEURONTIN) 300 MG capsule Take 1 capsule (300 mg) by mouth 3 times daily 90 capsule 11    hydroCHLOROthiazide (HYDRODIURIL) 25 MG tablet Take 25 mg by mouth daily.      ibuprofen (ADVIL/MOTRIN) 400 MG tablet Take 400 mg by mouth every 8 hours as needed for moderate pain      ketoconazole (NIZORAL) 2 % external cream Apply topically 2 times daily      loratadine (CLARITIN) 10 MG tablet Take 1 tablet (10  "mg) by mouth daily 30 tablet 11    nystatin (MYCOSTATIN) 417909 UNIT/GM external cream Apply topically 2 times daily. 30 g 3    nystatin-triamcinolone (MYCOLOG II) 003440-1.1 UNIT/GM-% external cream Apply topically 2 times daily. 30 g 2    rivaroxaban ANTICOAGULANT (XARELTO) 20 MG TABS tablet Take 20 mg by mouth daily      sulfaSALAzine (AZULFIDINE) 500 MG tablet Take 1,000 mg by mouth 2 times daily.      tolnaftate (TINACTIN) 1 % external cream Apply topically daily as needed for irritation       No current facility-administered medications for this visit.       ROS:  4 point ROS including Respiratory, CV, GI and , other than that noted in the HPI,  is negative    Vitals:  BP (!) 146/81   Pulse 90   Temp 97.9  F (36.6  C)   Resp 18   Ht 1.651 m (5' 5\")   Wt (!) 158.8 kg (350 lb 3.2 oz)   SpO2 96%   BMI 58.28 kg/m    Exam:  GENERAL APPEARANCE:  Alert, in no distress, morbidly obese  RESP:  no respiratory distress  SKIN:  lower legs with area of dark red skin, scabby, some superficial open areas. Outlined area on left leg to upper thigh is slightly pink  PSYCH:  oriented X 3, affect and mood normal    Lab/Diagnostic data:  Recent labs in [facility] reviewed by me today.     ASSESSMENT/PLAN:  (L03.116) Cellulitis of left lower extremity  (primary encounter diagnosis)  Comment: Improving  Plan: PT/OT eval and treat, discharge planning per their recommendations.    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 No changes today. Will continue to monitor  Plan: Continue current POC with no changes at this time and adjustments as needed.    (I73.9) PAD (peripheral artery disease) (H)  Comment: Chronic. Wounds are very difficult to heal due to poor circulation  Plan: Continue current POC with no changes at this time and adjustments as needed.        Electronically signed by:  Indiana Joel " Daphney, KRISTIN CNP

## 2024-11-03 PROBLEM — D84.9 IMMUNOSUPPRESSION (H): Status: ACTIVE | Noted: 2024-11-03

## 2024-11-04 ENCOUNTER — TELEPHONE (OUTPATIENT)
Dept: GERIATRICS | Facility: CLINIC | Age: 59
End: 2024-11-04

## 2024-11-04 NOTE — TELEPHONE ENCOUNTER
[facility] Geriatrics Triage Nurse Telephone Encounter    Provider: KRISTIN Wilder CNP   Facility: [facility] Facility Type:  [facility]    Caller: Angeli  Call Back Number: [phone]    Allergies:    Allergies   Allergen Reactions    Adhesive Tape Other (See Comments)     As on band-aids;  Causes skin tearing/wounds.    Adhesive [Cyanoacrylate] Other (See Comments)     As on band-aids;  Causes skin tearing/wounds.    Banana Unknown     She avoids due to Latex allergy.  If she eats them 3 days in a row, gets stomach cramps and dark stool.  TBrinkMD - 4/10/15    Food Unknown and GI Disturbance     Bananas, grapes, pine nuts    Artifical sweetner    Grape [Grape (Artificial) Flavor] Unknown     She avoids due to Latex allergy.  If she eats lots of them, and she likes them, stomach gets a little queasy.  TBrinkMD - 4/10/15    Morphine (Pf) [Morphine] Other (See Comments)     Pt has not reacted to this med herself, but mother has anaphylactic reaction and other family members get nausea/vomiting.    Other Environmental Allergy Swelling     leather    Pine     Pseudoephedrine Other (See Comments)     Insomnia    Pseudoephedrine Cold/Allergy [Chlorpheniramine-Pseudoeph] Other (See Comments)     Keeps pt awake up to 24 hours     Latex Rash     Hands get red from rubber gloves, but okay if she washes them right away.  Hands get red from rubber gloves, but okay if she washes them right away.    Other Food Allergy Rash     Pine trees, leather .         Reason for call: Pt has a skin tear on her back measuring 6 x 1 cm. Pt is unsure how she got it. No pain or signs of infection. PCP updated.    RUTH given by Provider: Cleanse with wound cleanser and pat dry. Cover with island dressing. Change daily. Report any s/s of infection immediately.     Provider giving Order:  KRISTIN Wilder CNP     Verbal Order given to: Angeli Watson RN

## 2024-12-09 ENCOUNTER — TELEPHONE (OUTPATIENT)
Dept: RHEUMATOLOGY | Facility: CLINIC | Age: 59
End: 2024-12-09

## 2024-12-09 DIAGNOSIS — M05.79 SEROPOSITIVE RHEUMATOID ARTHRITIS OF MULTIPLE SITES (H): Primary | ICD-10-CM

## 2024-12-09 NOTE — TELEPHONE ENCOUNTER
[facility] Call [facility]    Phone Message    May a detailed message be left on voicemail: yes     Reason for Call: Other: Pt's nursing home calling wanting to know if lab order could be completed there, if so please fx orders to: [phone].       Action Taken: Other: rheum     Travel Screening: Not Applicable     Date of Service:

## 2024-12-10 ENCOUNTER — DOCUMENTATION ONLY (OUTPATIENT)
Dept: GERIATRICS | Facility: CLINIC | Age: 59
End: 2024-12-10

## 2024-12-10 ENCOUNTER — LAB REQUISITION (OUTPATIENT)
Dept: LAB | Facility: CLINIC | Age: 59
End: 2024-12-10
Payer: MEDICAID

## 2024-12-10 ENCOUNTER — NURSING HOME VISIT (OUTPATIENT)
Dept: GERIATRICS | Facility: CLINIC | Age: 59
End: 2024-12-10

## 2024-12-10 VITALS
HEART RATE: 78 BPM | BODY MASS INDEX: 48.82 KG/M2 | SYSTOLIC BLOOD PRESSURE: 134 MMHG | OXYGEN SATURATION: 97 % | HEIGHT: 65 IN | TEMPERATURE: 97.6 F | DIASTOLIC BLOOD PRESSURE: 76 MMHG | WEIGHT: 293 LBS | RESPIRATION RATE: 18 BRPM

## 2024-12-10 DIAGNOSIS — I10 ESSENTIAL HYPERTENSION WITH GOAL BLOOD PRESSURE LESS THAN 140/90: ICD-10-CM

## 2024-12-10 DIAGNOSIS — M05.79 RHEUMATOID ARTHRITIS WITH RHEUMATOID FACTOR OF MULTIPLE SITES WITHOUT ORGAN OR SYSTEMS INVOLVEMENT (H): ICD-10-CM

## 2024-12-10 DIAGNOSIS — M05.79 SEROPOSITIVE RHEUMATOID ARTHRITIS OF MULTIPLE SITES (H): ICD-10-CM

## 2024-12-10 DIAGNOSIS — I89.0 LYMPHEDEMA: Primary | ICD-10-CM

## 2024-12-10 DIAGNOSIS — B36.9 FUNGAL DERMATITIS: ICD-10-CM

## 2024-12-10 DIAGNOSIS — D84.9 IMMUNOSUPPRESSION (H): ICD-10-CM

## 2024-12-10 DIAGNOSIS — I73.9 PAD (PERIPHERAL ARTERY DISEASE) (H): Primary | ICD-10-CM

## 2024-12-10 PROCEDURE — 99309 SBSQ NF CARE MODERATE MDM 30: CPT | Performed by: NURSE PRACTITIONER

## 2024-12-10 NOTE — LETTER
12/10/2024      María Elena Saab  [address]  [address]  [place] [address]        [facility] G[facility]ATR[facility]  Chief Complaint   Patient presents with     [facility] Regulatory     [place] Medical Record Number:  [id number]  Place of Service where encounter took place:  [facility] () [[id number]]    HPI:    María Elena Saab  is 59 year old (1965), who is being seen today for a federally mandated E/M visit.     Today's concerns are:  PAD (peripheral artery disease) (H)  Essential hypertension with goal blood pressure less than 140/90  Seropositive rheumatoid arthritis of multiple sites (H)  Immunosuppression (H)  Fungal dermatitis  Patient is awake in bed, getting her BLE dressings changed by the nurse. She is followed by the in-house wound MD for these. She gets cellulitis intermittently that will travel up her thighs rapidly and had led to sepsis in the past. She is having some pain on the back of her right calf, asks how this looks. She is advised there are 2 very small, very superficial areas missing just the first layer of skin. She says that leg weeps, but only after she has been up all day. There are no new concerns with her legs today. She is concerned about the rash under her abdominal fold. They are using cream instead of powder and is seems to be getting worse.       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  RD reports that she is not willing to commit to the program outlined.  See scanned document.  12/15/2015 - Marcio Quinonez MD        Nocturnal hypoxemia - probable sleep apnea - had overnight pulse oximetry, April, 2015. 5/22/2015     Obesity 4/10/2015     LOPEZ (obstructive sleep apnea) 5/22/2015     Osteoarthritis      Peripheral vascular disease (H)      Pleural effusion      Pressure ulcer of foot      Rheumatoid arthritis (H) 12/30/2014    seronegative     Sepsis (H)      Seropositive rheumatoid arthritis (H) 1/19/2016     Vitamin D deficiency 8/26/2015     PAST SURGICAL HISTORY:   has a past surgical history that includes Total Knee Arthroplasty (Left, 2006); joint replacement; Total Hip Arthroplasty (Left, 10/18/2016); and Peripherally Inserted Central Catheter Insertion (Right, 07/14/2019).  FAMILY HISTORY: family history includes Arthritis in her mother; Breast Cancer in her cousin; Breast Cancer (age of onset: 50.00) in her maternal grandmother; Breast Cancer (age of onset: 54.00) in her maternal aunt and mother; Cancer in her mother; Cataracts in her father and mother; Deep Vein Thrombosis in her father; Heart Disease in her maternal grandmother; Multiple myeloma in her father; No Known Problems in her sister; Other - See Comments in her brother; Rheumatoid Arthritis in her maternal grandmother and paternal aunt; Sleep Apnea in her brother; Snoring in her father.  SOCIAL HISTORY:  reports that she has quit smoking. Her smoking use included cigarettes. She has a 30 pack-year smoking history. She has never used smokeless tobacco. She reports that she does not currently use alcohol. She reports that she does not use drugs.    MEDICATIONS:  Current Outpatient Medications   Medication Sig  "Dispense Refill     acetaminophen (TYLENOL) 325 MG tablet Take 650 mg by mouth every 6 hours as needed for mild pain       ascorbic acid, vitamin C, (ASCORBIC ACID WITH KAEL HIPS) 500 MG tablet [ASCORBIC ACID, VITAMIN C, (ASCORBIC ACID WITH KAEL HIPS) 500 MG TABLET] Take 500 mg by mouth 2 (two) times a day.       cholecalciferol 50 MCG (2000 UT) tablet Take 1 tablet (50 mcg) by mouth daily 30 tablet 11     cyclobenzaprine (FLEXERIL) 5 MG tablet Take 10 mg by mouth 3 times daily as needed       DULoxetine (CYMBALTA) 20 MG capsule Take 1 capsule (20 mg) by mouth daily 90 capsule 2     gabapentin (NEURONTIN) 300 MG capsule Take 1 capsule (300 mg) by mouth 3 times daily 90 capsule 11     hydroCHLOROthiazide (HYDRODIURIL) 25 MG tablet Take 25 mg by mouth daily.       ibuprofen (ADVIL/MOTRIN) 400 MG tablet Take 400 mg by mouth every 8 hours as needed for moderate pain       ketoconazole (NIZORAL) 2 % external cream Apply topically 2 times daily       loratadine (CLARITIN) 10 MG tablet Take 1 tablet (10 mg) by mouth daily 30 tablet 11     nystatin (MYCOSTATIN) 588947 UNIT/GM external cream Apply topically 2 times daily. 30 g 3     rivaroxaban ANTICOAGULANT (XARELTO) 20 MG TABS tablet Take 20 mg by mouth daily       sulfaSALAzine (AZULFIDINE) 500 MG tablet Take 1,000 mg by mouth 2 times daily.       tolnaftate (TINACTIN) 1 % external cream Apply topically daily as needed for irritation           Case Management:  I have reviewed the care plan and MDS and do agree with the plan. Information reviewed:  Medications, vital signs, orders, and nursing notes.    ROS:  4 point ROS including Respiratory, CV, GI and , other than that noted in the HPI,  is negative    Vitals:  /76   Pulse 78   Temp 97.6  F (36.4  C)   Resp 18   Ht 1.651 m (5' 5\")   Wt (!) 160.8 kg (354 lb 6.4 oz)   SpO2 97%   BMI 58.98 kg/m    Body mass index is 58.98 kg/m .  Exam:  GENERAL APPEARANCE:  Alert, in no distress, morbidly obese  EYES:  EOM " normal, conjunctiva and lids normal  RESP:  no respiratory distress  CV:  dependent edema in BLE  SKIN:  bilateral lower legs red, scattered, very superficial open areas, skin under abdomen with patches of dark red    Lab/Diagnostic data:   Recent labs in [facility] reviewed by me today.     ASSESSMENT/PLAN  (I73.9) PAD (peripheral artery disease) (H)  (primary encounter diagnosis)  Comment: Related to lymphedema, obesity. High risk for infection if open areas.   Plan: Continue current POC with no changes at this time and adjustments as needed.    (I10) Essential hypertension with goal blood pressure less than 140/90  Comment: Chronic, controlled  Plan: Continue current POC with no changes at this time and adjustments as needed.    (M05.79) Seropositive rheumatoid arthritis of multiple sites (H)  Comment: Pain controlled. Chronic debility  Plan: Continue current POC with no changes at this time and adjustments as needed.    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 Spoke with nurse manager. Will try nystatin powder and interdry        Electronically signed by:  Indiana Shelley, KRISTIN CNP            Sincerely,        Indiana Shelley, KRISTIN CNP

## 2024-12-10 NOTE — PROGRESS NOTES
[facility] GERIATRICS  Chief Complaint   Patient presents with    long term Regulatory     [place] Medical Record Number:  [id number]  Place of Service where encounter took place:  [facility] () [[id number]]    HPI:    María Elena Saab  is 59 year old (1965), who is being seen today for a federally mandated E/M visit.     Today's concerns are:  PAD (peripheral artery disease) (H)  Essential hypertension with goal blood pressure less than 140/90  Seropositive rheumatoid arthritis of multiple sites (H)  Immunosuppression (H)  Fungal dermatitis  Patient is awake in bed, getting her BLE dressings changed by the nurse. She is followed by the in-house wound MD for these. She gets cellulitis intermittently that will travel up her thighs rapidly and had led to sepsis in the past. She is having some pain on the back of her right calf, asks how this looks. She is advised there are 2 very small, very superficial areas missing just the first layer of skin. She says that leg weeps, but only after she has been up all day. There are no new concerns with her legs today. She is concerned about the rash under her abdominal fold. They are using cream instead of powder and is seems to be getting worse.       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  RD reports that she is not willing to commit to the program outlined.  See scanned document.  12/15/2015 - Marcio Quinonez MD       Nocturnal hypoxemia - probable sleep apnea - had overnight pulse oximetry, April, 2015. 5/22/2015    Obesity 4/10/2015    LOPEZ (obstructive sleep apnea) 5/22/2015    Osteoarthritis     Peripheral vascular disease (H)     Pleural effusion     Pressure ulcer of foot     Rheumatoid arthritis (H) 12/30/2014    seronegative    Sepsis (H)     Seropositive rheumatoid arthritis (H) 1/19/2016    Vitamin D deficiency 8/26/2015     PAST SURGICAL HISTORY:   has a past surgical history that includes Total Knee Arthroplasty (Left, 2006); joint replacement; Total Hip Arthroplasty (Left, 10/18/2016); and Peripherally Inserted Central Catheter Insertion (Right, 07/14/2019).  FAMILY HISTORY: family history includes Arthritis in her mother; Breast Cancer in her cousin; Breast Cancer (age of onset: 50.00) in her maternal grandmother; Breast Cancer (age of onset: 54.00) in her maternal aunt and mother; Cancer in her mother; Cataracts in her father and mother; Deep Vein Thrombosis in her father; Heart Disease in her maternal grandmother; Multiple myeloma in her father; No Known Problems in her sister; Other - See Comments in her brother; Rheumatoid Arthritis in her maternal grandmother and paternal aunt; Sleep Apnea in her brother; Snoring in her father.  SOCIAL HISTORY:  reports that she has quit smoking. Her smoking use included cigarettes. She has a 30 pack-year smoking history. She has never used smokeless tobacco. She reports that she does not currently use alcohol. She reports that she does not use drugs.    MEDICATIONS:  Current Outpatient Medications   Medication Sig Dispense Refill    acetaminophen (TYLENOL) 325 MG tablet Take 650 mg by mouth every 6 hours as needed for mild pain      ascorbic acid,  "vitamin C, (ASCORBIC ACID WITH KAEL HIPS) 500 MG tablet [ASCORBIC ACID, VITAMIN C, (ASCORBIC ACID WITH KAEL HIPS) 500 MG TABLET] Take 500 mg by mouth 2 (two) times a day.      cholecalciferol 50 MCG (2000 UT) tablet Take 1 tablet (50 mcg) by mouth daily 30 tablet 11    cyclobenzaprine (FLEXERIL) 5 MG tablet Take 10 mg by mouth 3 times daily as needed      DULoxetine (CYMBALTA) 20 MG capsule Take 1 capsule (20 mg) by mouth daily 90 capsule 2    gabapentin (NEURONTIN) 300 MG capsule Take 1 capsule (300 mg) by mouth 3 times daily 90 capsule 11    hydroCHLOROthiazide (HYDRODIURIL) 25 MG tablet Take 25 mg by mouth daily.      ibuprofen (ADVIL/MOTRIN) 400 MG tablet Take 400 mg by mouth every 8 hours as needed for moderate pain      ketoconazole (NIZORAL) 2 % external cream Apply topically 2 times daily      loratadine (CLARITIN) 10 MG tablet Take 1 tablet (10 mg) by mouth daily 30 tablet 11    nystatin (MYCOSTATIN) 453591 UNIT/GM external cream Apply topically 2 times daily. 30 g 3    rivaroxaban ANTICOAGULANT (XARELTO) 20 MG TABS tablet Take 20 mg by mouth daily      sulfaSALAzine (AZULFIDINE) 500 MG tablet Take 1,000 mg by mouth 2 times daily.      tolnaftate (TINACTIN) 1 % external cream Apply topically daily as needed for irritation           Case Management:  I have reviewed the care plan and MDS and do agree with the plan. Information reviewed:  Medications, vital signs, orders, and nursing notes.    ROS:  4 point ROS including Respiratory, CV, GI and , other than that noted in the HPI,  is negative    Vitals:  /76   Pulse 78   Temp 97.6  F (36.4  C)   Resp 18   Ht 1.651 m (5' 5\")   Wt (!) 160.8 kg (354 lb 6.4 oz)   SpO2 97%   BMI 58.98 kg/m    Body mass index is 58.98 kg/m .  Exam:  GENERAL APPEARANCE:  Alert, in no distress, morbidly obese  EYES:  EOM normal, conjunctiva and lids normal  RESP:  no respiratory distress  CV:  dependent edema in BLE  SKIN:  bilateral lower legs red, scattered, very " superficial open areas, skin under abdomen with patches of dark red    Lab/Diagnostic data:   Recent labs in [facility] reviewed by me today.     ASSESSMENT/PLAN  (I73.9) PAD (peripheral artery disease) (H)  (primary encounter diagnosis)  Comment: Related to lymphedema, obesity. High risk for infection if open areas.   Plan: Continue current POC with no changes at this time and adjustments as needed.    (I10) Essential hypertension with goal blood pressure less than 140/90  Comment: Chronic, controlled  Plan: Continue current POC with no changes at this time and adjustments as needed.    (M05.79) Seropositive rheumatoid arthritis of multiple sites (H)  Comment: Pain controlled. Chronic debility  Plan: Continue current POC with no changes at this time and adjustments as needed.    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 Spoke with nurse manager. Will try nystatin powder and interdry        Electronically signed by:  KRISTIN Conte CNP

## 2024-12-12 LAB
ALBUMIN SERPL BCG-MCNC: 3.4 G/DL (ref 3.5–5.2)
ALT SERPL W P-5'-P-CCNC: 10 U/L (ref 0–50)
CREAT SERPL-MCNC: 0.54 MG/DL (ref 0.51–0.95)
EGFRCR SERPLBLD CKD-EPI 2021: >90 ML/MIN/1.73M2
ERYTHROCYTE [DISTWIDTH] IN BLOOD BY AUTOMATED COUNT: 16 % (ref 10–15)
HCT VFR BLD AUTO: 40.7 % (ref 35–47)
HGB BLD-MCNC: 12.3 G/DL (ref 11.7–15.7)
MCH RBC QN AUTO: 27.3 PG (ref 26.5–33)
MCHC RBC AUTO-ENTMCNC: 30.2 G/DL (ref 31.5–36.5)
MCV RBC AUTO: 90 FL (ref 78–100)
PLATELET # BLD AUTO: 257 10E3/UL (ref 150–450)
RBC # BLD AUTO: 4.5 10E6/UL (ref 3.8–5.2)
WBC # BLD AUTO: 7 10E3/UL (ref 4–11)

## 2024-12-12 PROCEDURE — 82040 ASSAY OF SERUM ALBUMIN: CPT | Mod: ORL | Performed by: INTERNAL MEDICINE

## 2024-12-12 PROCEDURE — 36415 COLL VENOUS BLD VENIPUNCTURE: CPT | Mod: ORL | Performed by: INTERNAL MEDICINE

## 2024-12-12 PROCEDURE — P9604 ONE-WAY ALLOW PRORATED TRIP: HCPCS | Mod: ORL | Performed by: INTERNAL MEDICINE

## 2024-12-12 PROCEDURE — 82565 ASSAY OF CREATININE: CPT | Mod: ORL | Performed by: INTERNAL MEDICINE

## 2024-12-12 PROCEDURE — 85027 COMPLETE CBC AUTOMATED: CPT | Mod: ORL | Performed by: INTERNAL MEDICINE

## 2024-12-12 PROCEDURE — 84460 ALANINE AMINO (ALT) (SGPT): CPT | Mod: ORL | Performed by: INTERNAL MEDICINE

## 2024-12-12 NOTE — PROGRESS NOTES
[facility] GERIATRICS  Face to Face and Medical Necessity Statement for DME Provider visit    Patient: María Elena Saab  Gender: female  YOB: 1965  [place] Medical Record Number: [id number]  [facility] [address]  [place] [address]  [phone] (home) [phone] (work)  Social Security Number: [id number]  Primary Care Provider: Indiana Shelley  Insurance: No coverage found.      HPI: María Elena Saab is a 59 year old (1965), who is being seen today for a face to face provider visit at [facility]. Medical necessity statement for DME included.     This patient requires the following: DME Ordered and Medical Necessity Statement   Lymphedema velcro wraps - 2 sets    Patient requires lymphedema velcro wraps for lower legs and feet for chronic (I89.0) Lymphedema. Without these compression garments, she is at risk for worsening edema leading to wounds and cellulitis. She requires 2 sets of wraps due to chronic weeping from legs. She needs 1 to wear while the other is washed.     Pt needing above DME with expected length of need of 99 months due to medical necessity associated with following diagnosis:  Lymphedema      Past Medical History:   has a past medical history of Aseptic necrosis of femoral head (H), Bacteremia due to group B Streptococcus, Cellulitis of right lower extremity, DJD (degenerative joint disease), DVT, bilateral lower limbs (H) (10/2014), Edema (5/22/2015), Essential hypertension with goal blood pressure less than 140/90, Failure to thrive, History of blood clots, Hypokalemia (10/15/2014), Lung mass (10/18/2014), Morbid obesity (H) (4/10/2015), Nocturnal hypoxemia - probable sleep apnea - had overnight pulse oximetry, April, 2015. (5/22/2015), Obesity (4/10/2015), LOPEZ (obstructive sleep apnea) (5/22/2015), Osteoarthritis, Peripheral vascular disease (H), Pleural effusion, Pressure ulcer of foot, Rheumatoid arthritis  "(H) (12/30/2014), Sepsis (H), Seropositive rheumatoid arthritis (H) (1/19/2016), and Vitamin D deficiency (8/26/2015).    Review of Systems:  4 point ROS including Respiratory, CV, GI and , other than that noted in the HPI,  is negative    Exam:  Vitals:  /76   Pulse 78   Temp 97.6  F (36.4  C)   Resp 18   Ht 1.651 m (5' 5\")   Wt (!) 160.8 kg (354 lb 6.4 oz)   SpO2 97%   BMI 58.98 kg/m    Body mass index is 58.98 kg/m .  Exam:  GENERAL APPEARANCE:  Alert, in no distress, morbidly obese  EYES:  EOM normal, conjunctiva and lids normal  RESP:  no respiratory distress  CV:  2+ non-pitting edema in BLE  SKIN:  bilateral lower legs red, scattered, very superficial open areas, skin under abdomen with patches of dark red       Assessment/Plan:  (I89.0) Lymphedema  (primary encounter diagnosis)  Comment: Chronic due to obesity and venous insufficiency      Orders:  1. Facility staff/TC to contact DME company to get their order form for provider to fill out    ELECTRONICALLY SIGNED BY ANUEL CERTIFIED PROVIDER: KRISTIN Conte CNP   NPI: [id number]  [facility] [address] [place] [address]    "

## 2024-12-18 ENCOUNTER — MEDICAL CORRESPONDENCE (OUTPATIENT)
Dept: HEALTH INFORMATION MANAGEMENT | Facility: CLINIC | Age: 59
End: 2024-12-18

## 2024-12-18 ENCOUNTER — OFFICE VISIT (OUTPATIENT)
Dept: RHEUMATOLOGY | Facility: CLINIC | Age: 59
End: 2024-12-18

## 2024-12-18 VITALS — SYSTOLIC BLOOD PRESSURE: 120 MMHG | OXYGEN SATURATION: 93 % | HEART RATE: 74 BPM | DIASTOLIC BLOOD PRESSURE: 68 MMHG

## 2024-12-18 DIAGNOSIS — M15.0 PRIMARY OSTEOARTHRITIS INVOLVING MULTIPLE JOINTS: ICD-10-CM

## 2024-12-18 DIAGNOSIS — M05.79 SEROPOSITIVE RHEUMATOID ARTHRITIS OF MULTIPLE SITES (H): Primary | ICD-10-CM

## 2024-12-18 DIAGNOSIS — Z79.899 HIGH RISK MEDICATION USE: ICD-10-CM

## 2024-12-18 DIAGNOSIS — M25.50 POLYARTHRALGIA: ICD-10-CM

## 2024-12-18 DIAGNOSIS — R76.8 CYCLIC CITRULLINATED PEPTIDE (CCP) ANTIBODY POSITIVE: ICD-10-CM

## 2024-12-18 PROCEDURE — G2211 COMPLEX E/M VISIT ADD ON: HCPCS | Performed by: INTERNAL MEDICINE

## 2024-12-18 PROCEDURE — 99214 OFFICE O/P EST MOD 30 MIN: CPT | Performed by: INTERNAL MEDICINE

## 2024-12-18 RX ORDER — DULOXETIN HYDROCHLORIDE 20 MG/1
20 CAPSULE, DELAYED RELEASE ORAL 2 TIMES DAILY
Qty: 180 CAPSULE | Refills: 0 | Status: SHIPPED | OUTPATIENT
Start: 2024-12-18 | End: 2025-03-18

## 2024-12-18 RX ORDER — SULFASALAZINE 500 MG/1
1000 TABLET ORAL 2 TIMES DAILY
Qty: 360 TABLET | Refills: 0 | Status: SHIPPED | OUTPATIENT
Start: 2024-12-18 | End: 2025-03-18

## 2024-12-18 NOTE — PROGRESS NOTES
Rheumatology follow-up visit note     María Elena is a 59 year old female presents today for follow-up.    María Elena was seen today for recheck.    Diagnoses and all orders for this visit:    Seropositive rheumatoid arthritis of multiple sites (H)  -     sulfaSALAzine (AZULFIDINE) 500 MG tablet; Take 2 tablets (1,000 mg) by mouth 2 times daily.    Cyclic citrullinated peptide (CCP) antibody positive  -     sulfaSALAzine (AZULFIDINE) 500 MG tablet; Take 2 tablets (1,000 mg) by mouth 2 times daily.    Primary osteoarthritis involving multiple joints  -     DULoxetine (CYMBALTA) 20 MG capsule; Take 1 capsule (20 mg) by mouth 2 times daily.    High risk medication use    Polyarthralgia      Her rheumatoid arthritis control appears to be stable, is good response with sulfasalazine, she is on duloxetine only on 20 mg daily that she has tolerated nicely.   She does have residual pain such as the right knee, the left middle finger PIP.   1 option would be to increase the frequency.  The longitudinal plan of care for the diagnosis(es)/condition(s) as documented were addressed during this visit. Due to the added complexity in care, I will continue to support María Elena in the subsequent management and with ongoing continuity of care.Follow up in 6 months.    HPI    María Elena Saab is a 59 year old female is here for follow-up of    rheumatoid arthritis longstanding, polyarticular, seropositive.  She has osteoarthritis.  Status post left knee arthroplasty.  She has chronic lymphedema.  While her RA appears to be under good with the current regimen of sulfasalazine.  She was previously on Humira, methotrexate however repeated cellulitis of the lower extremity led to discontinuation of that.  She is seems to have continued to do well with sulfasalazine alone.  The frequency of her cellulitis is gone down significantly she had only 1 episode during the last 12 months and was in the hospital recently the open sores and her legs are  "healed.  She wondered about cramping that will last for a minute or so in the right hand.  Various experiences of my other patients were shared with her.  She has noted cramping sensation in her digits especially the left where she can have difficult time \"opening\" the joints for 20 to 30 seconds.  She has tolerated the combination of sulfasalazine, duloxetine.    DETAILED EXAMINATION  12/18/24  :    Vitals:    12/18/24 1251   BP: 120/68   Pulse: 74   SpO2: 93%     Alert oriented. Head including the face is examined for malar rash, heliotropes, scarring, lupus pernio. Eyes examined for redness such as in episcleritis/scleritis, periorbital lesions.   Neck/ Face examined for parotid gland swelling, range of motion of neck.  Left upper and lower and right upper and lower extremities examined for tenderness, swelling, warmth of the appendicular joints, range of motion, edema, rash.  Some of the important findings included: she does not have evidence of synovitis in the palpable joints of the upper extremities.  No significant deformities of the digits.  + Heberden nodes. She has left TKA right knee joint line tenderness.  She is in a wheelchair.  She has edema of the lower extremities.     Patient Active Problem List    Diagnosis Date Noted    Immunosuppression (H) 11/03/2024     Priority: Medium    Sepsis (H) 06/14/2023     Priority: Medium    Wheelchair dependence 05/01/2023     Priority: Medium    History of MRSA infection 04/26/2023     Priority: Medium    Elevated C-reactive protein (CRP) 04/26/2023     Priority: Medium    Cellulitis of right lower limb 04/25/2023     Priority: Medium    Chronic pulmonary embolism (H) 04/20/2023     Priority: Medium    MRSA (methicillin resistant Staphylococcus aureus) 06/28/2022     Priority: Medium     Formatting of this note might be different from the original.  +tissue right foot 6/25/22  +Swab right ankle 6/24/22      Chronic acquired lymphedema 06/24/2022     Priority: " Medium    Current long-term use of anticoagulant medication with history of deep venous thrombosis (DVT) 06/24/2022     Priority: Medium    Hx of bacteremia 06/24/2022     Priority: Medium     Formatting of this note might be different from the original.  2019 - GBS bacteremia and cellulitis      Major depressive disorder, recurrent (H) 06/24/2022     Priority: Medium    Cellulitis 06/23/2022     Priority: Medium     Formatting of this note might be different from the original.  Added automatically from request for surgery [id number]      Open wound of left lower extremity 03/21/2022     Priority: Medium    Bacteremia due to group B Streptococcus 07/13/2019     Priority: Medium    Primary osteoarthritis involving multiple joints 04/26/2019     Priority: Medium    History of total left knee replacement (TKR) 04/26/2019     Priority: Medium    High risk medication use 02/13/2018     Priority: Medium    Physical debility 11/28/2017     Priority: Medium    Polyarthralgia 07/21/2017     Priority: Medium    H/O total hip arthroplasty 11/17/2016     Priority: Medium    Anemia 10/20/2016     Priority: Medium    Essential hypertension with goal blood pressure less than 140/90      Priority: Medium    Degenerative joint disease (DJD) of hip 10/18/2016     Priority: Medium    History of DVT of lower extremity      Priority: Medium    Radiculopathy of leg 07/20/2016     Priority: Medium    Sleep apnea 07/20/2016     Priority: Medium    Pain management 04/26/2016     Priority: Medium    Seropositive rheumatoid arthritis of multiple sites (H) 03/21/2016     Priority: Medium    PAD (peripheral artery disease) (H) 03/21/2016     Priority: Medium    Right shoulder tendinitis 01/19/2016     Priority: Medium    Cyclic citrullinated peptide (CCP) antibody positive 09/16/2015     Priority: Medium    Vitamin D deficiency 08/26/2015     Priority: Medium    Edema - swelling of the legs after blood clots 05/22/2015     Priority: Medium     Morbid obesity (H) 04/10/2015     Priority: Medium     Had formal nutrition consult at [facility].  RD reports that she is not   willing to commit to the program outlined.  See scanned document.    12/15/2015 - Marcio Quinonez MD        DVT of lower extremity, bilateral - residual clot disease on repeat US in NEW location - after six months of warfarin. 10/15/2014     Priority: Medium     Past Surgical History:   Procedure Laterality Date    JOINT REPLACEMENT      knee    PERIPHERALLY INSERTED CENTRAL CATHETER INSERTION Right 07/14/2019    4fr/44cm/Rt Cephalic.lowSVC.MGlav    TOTAL HIP ARTHROPLASTY Left 10/18/2016    Procedure: LEFT HIP TOTAL ARTHROPLASTY;  Surgeon: London Goss MD;  Location: [facility];  Service:     TOTAL KNEE ARTHROPLASTY Left 2006      Past Medical History:   Diagnosis Date    Aseptic necrosis of femoral head (H)     LEFT    Bacteremia due to group B Streptococcus     Cellulitis of right lower extremity     DJD (degenerative joint disease)     DVT, bilateral lower limbs (H) 10/2014    Edema 5/22/2015    Essential hypertension with goal blood pressure less than 140/90     Failure to thrive     History of blood clots     Hypokalemia 10/15/2014    Lung mass 10/18/2014    Morbid obesity (H) 4/10/2015    Had formal nutrition consult at [facility].  RD reports that she is not willing to commit to the program outlined.  See scanned document.  12/15/2015 - Marcio Quinonez MD       Nocturnal hypoxemia - probable sleep apnea - had overnight pulse oximetry, April, 2015. 5/22/2015    Obesity 4/10/2015    LOPEZ (obstructive sleep apnea) 5/22/2015    Osteoarthritis     Peripheral vascular disease (H)     Pleural effusion     Pressure ulcer of foot     Rheumatoid arthritis (H) 12/30/2014    seronegative    Sepsis (H)     Seropositive rheumatoid arthritis (H) 1/19/2016    Vitamin D deficiency 8/26/2015     Allergies   Allergen Reactions    Adhesive Tape Other (See Comments)     As on band-aids;  Causes  skin tearing/wounds.    Adhesive [Cyanoacrylate] Other (See Comments)     As on band-aids;  Causes skin tearing/wounds.    Banana Unknown     She avoids due to Latex allergy.  If she eats them 3 days in a row, gets stomach cramps and dark stool.  TBrinkMD - 4/10/15    Food Unknown and GI Disturbance     Bananas, grapes, pine nuts    Artifical sweetner    Grape [Grape (Artificial) Flavoring Agent (Non-Screening)] Unknown     She avoids due to Latex allergy.  If she eats lots of them, and she likes them, stomach gets a little queasy.  TBrinkMD - 4/10/15    Morphine (Pf) [Morphine] Other (See Comments)     Pt has not reacted to this med herself, but mother has anaphylactic reaction and other family members get nausea/vomiting.    Other Environmental Allergy Swelling     leather    Pine     Pseudoephedrine Other (See Comments)     Insomnia    Pseudoephedrine Cold/Allergy [Chlorpheniramine-Pseudoeph] Other (See Comments)     Keeps pt awake up to 24 hours     Latex Rash     Hands get red from rubber gloves, but okay if she washes them right away.  Hands get red from rubber gloves, but okay if she washes them right away.    Other Food Allergy Rash     Pine trees, leather .      Current Outpatient Medications   Medication Sig Dispense Refill    acetaminophen (TYLENOL) 325 MG tablet Take 650 mg by mouth every 6 hours as needed for mild pain      ascorbic acid, vitamin C, (ASCORBIC ACID WITH KAEL HIPS) 500 MG tablet [ASCORBIC ACID, VITAMIN C, (ASCORBIC ACID WITH KAEL HIPS) 500 MG TABLET] Take 500 mg by mouth 2 (two) times a day.      cholecalciferol 50 MCG (2000 UT) tablet Take 1 tablet (50 mcg) by mouth daily 30 tablet 11    cyclobenzaprine (FLEXERIL) 5 MG tablet Take 10 mg by mouth 3 times daily as needed      DULoxetine (CYMBALTA) 20 MG capsule Take 1 capsule (20 mg) by mouth daily 90 capsule 2    gabapentin (NEURONTIN) 300 MG capsule Take 1 capsule (300 mg) by mouth 3 times daily 90 capsule 11    hydroCHLOROthiazide  (HYDRODIURIL) 25 MG tablet Take 25 mg by mouth daily.      ibuprofen (ADVIL/MOTRIN) 400 MG tablet Take 400 mg by mouth every 8 hours as needed for moderate pain      ketoconazole (NIZORAL) 2 % external cream Apply topically 2 times daily      loratadine (CLARITIN) 10 MG tablet Take 1 tablet (10 mg) by mouth daily 30 tablet 11    nystatin (MYCOSTATIN) 459133 UNIT/GM external cream Apply topically 2 times daily. 30 g 3    rivaroxaban ANTICOAGULANT (XARELTO) 20 MG TABS tablet Take 20 mg by mouth daily      sulfaSALAzine (AZULFIDINE) 500 MG tablet Take 1,000 mg by mouth 2 times daily.      tolnaftate (TINACTIN) 1 % external cream Apply topically daily as needed for irritation       family history includes Arthritis in her mother; Breast Cancer in her cousin; Breast Cancer (age of onset: 50.00) in her maternal grandmother; Breast Cancer (age of onset: 54.00) in her maternal aunt and mother; Cancer in her mother; Cataracts in her father and mother; Deep Vein Thrombosis in her father; Heart Disease in her maternal grandmother; Multiple myeloma in her father; No Known Problems in her sister; Other - See Comments in her brother; Rheumatoid Arthritis in her maternal grandmother and paternal aunt; Sleep Apnea in her brother; Snoring in her father.  Social Connections: Not on file          WBC Count   Date Value Ref Range Status   12/12/2024 7.0 4.0 - 11.0 10e3/uL Final     RBC Count   Date Value Ref Range Status   12/12/2024 4.50 3.80 - 5.20 10e6/uL Final     Hemoglobin   Date Value Ref Range Status   12/12/2024 12.3 11.7 - 15.7 g/dL Final     Hematocrit   Date Value Ref Range Status   12/12/2024 40.7 35.0 - 47.0 % Final     MCV   Date Value Ref Range Status   12/12/2024 90 78 - 100 fL Final     MCH   Date Value Ref Range Status   12/12/2024 27.3 26.5 - 33.0 pg Final     Platelet Count   Date Value Ref Range Status   12/12/2024 257 150 - 450 10e3/uL Final     % Lymphocytes   Date Value Ref Range Status   01/13/2022 30 % Final      AST   Date Value Ref Range Status   07/11/2023 25 0 - 45 U/L Final     Comment:     Reference intervals for this test were updated on 6/12/2023 to more accurately reflect our healthy population. There may be differences in the flagging of prior results with similar values performed with this method. Interpretation of those prior results can be made in the context of the updated reference intervals.     ALT   Date Value Ref Range Status   12/12/2024 10 0 - 50 U/L Final     Albumin   Date Value Ref Range Status   12/12/2024 3.4 (L) 3.5 - 5.2 g/dL Final   04/12/2022 3.1 (L) 3.5 - 5.0 g/dL Final     Alkaline Phosphatase   Date Value Ref Range Status   07/11/2023 73 35 - 104 U/L Final     Creatinine   Date Value Ref Range Status   12/12/2024 0.54 0.51 - 0.95 mg/dL Final     GFR Estimate   Date Value Ref Range Status   12/12/2024 >90 >60 mL/min/1.73m2 Final     Comment:     eGFR calculated using 2021 CKD-EPI equation.   04/20/2021 >60 >60 mL/min/1.73m2 Final     GFR Estimate If Black   Date Value Ref Range Status   04/20/2021 >60 >60 mL/min/1.73m2 Final     Erythrocyte Sedimentation Rate   Date Value Ref Range Status   07/27/2023 45 (H) 0 - 30 mm/hr Final     CRP   Date Value Ref Range Status   12/21/2020 1.3 (H) 0.0 - 0.8 mg/dL Final

## 2025-01-28 ENCOUNTER — NURSING HOME VISIT (OUTPATIENT)
Dept: GERIATRICS | Facility: CLINIC | Age: 60
End: 2025-01-28

## 2025-01-28 VITALS
TEMPERATURE: 98.7 F | BODY MASS INDEX: 48.82 KG/M2 | WEIGHT: 293 LBS | DIASTOLIC BLOOD PRESSURE: 81 MMHG | OXYGEN SATURATION: 90 % | HEIGHT: 65 IN | RESPIRATION RATE: 18 BRPM | SYSTOLIC BLOOD PRESSURE: 151 MMHG | HEART RATE: 96 BPM

## 2025-01-28 DIAGNOSIS — I73.9 PAD (PERIPHERAL ARTERY DISEASE): ICD-10-CM

## 2025-01-28 DIAGNOSIS — R53.81 PHYSICAL DECONDITIONING: Primary | ICD-10-CM

## 2025-01-28 DIAGNOSIS — I10 ESSENTIAL HYPERTENSION WITH GOAL BLOOD PRESSURE LESS THAN 140/90: ICD-10-CM

## 2025-01-28 RX ORDER — AMOXICILLIN 500 MG/1
1000 CAPSULE ORAL 3 TIMES DAILY
COMMUNITY
Start: 2025-01-27 | End: 2025-02-06

## 2025-01-28 NOTE — LETTER
1/28/2025      María Elena Saab  [facility]  [address]  [place] [address]        [facility] GERIATRICS    PRIMARY CARE PROVIDER AND CLINIC:  Indiana Shelley, APRN CNP, [address] / [place] [address]  Chief Complaint   Patient presents with     Hospital F/U      [facility] Record Number:  [id number]  [facility] [[id number]]    María Elena Saab  is a 59 year old  (1965), returned to the above facility from  [facility] . Hospital stay 1/22/25 - 1/27/25.  Patient with history of PVD, lymphedema, morbid obesity and recurrent cellulitis was sent to the ED due to fever, chills, and nausea. She was admitted with sepsis due to LLE cellulitis.     HPI obtained from patient visit, review of nursing home record, discussion with facility staff, and Epic review.     HPI:    Patient does not allow anyone to wake her up before 10am. Shortly after that time, she was busy in the gym with OT. Provider said hello briefly. She said she was feeling fine.     CODE STATUS/ADVANCE DIRECTIVES DISCUSSION:  Full Code    ALLERGIES:   Allergies   Allergen Reactions     Adhesive Tape Other (See Comments)     As on band-aids;  Causes skin tearing/wounds.     Adhesive [Cyanoacrylate] Other (See Comments)     As on band-aids;  Causes skin tearing/wounds.     Banana Unknown     She avoids due to Latex allergy.  If she eats them 3 days in a row, gets stomach cramps and dark stool.  TBrinkMD - 4/10/15     Food Unknown and GI Disturbance     Bananas, grapes, pine nuts    Artifical sweetner     Grape [Grape (Artificial) Flavoring Agent (Non-Screening)] Unknown     She avoids due to Latex allergy.  If she eats lots of them, and she likes them, stomach gets a little queasy.  TBrinkMD - 4/10/15     Morphine (Pf) [Morphine] Other (See Comments)     Pt has not reacted to this med herself, but mother has anaphylactic reaction and  other family members get nausea/vomiting.     Other Environmental Allergy Swelling     leather     Pine      Pseudoephedrine Other (See Comments)     Insomnia     Pseudoephedrine Cold/Allergy [Chlorpheniramine-Pseudoeph] Other (See Comments)     Keeps pt awake up to 24 hours      Latex Rash     Hands get red from rubber gloves, but okay if she washes them right away.  Hands get red from rubber gloves, but okay if she washes them right away.     Other Food Allergy Rash     Pine trees, leather .       PAST MEDICAL HISTORY:   Past Medical History:   Diagnosis Date     Aseptic necrosis of femoral head (H)     LEFT     Bacteremia due to group B Streptococcus      Cellulitis of right lower extremity      DJD (degenerative joint disease)      DVT, bilateral lower limbs (H) 10/2014     Edema 5/22/2015     Essential hypertension with goal blood pressure less than 140/90      Failure to thrive      History of blood clots      Hypokalemia 10/15/2014     Lung mass 10/18/2014     Morbid obesity (H) 4/10/2015    Had formal nutrition consult at [facility].  RD reports that she is not willing to commit to the program outlined.  See scanned document.  12/15/2015 - Marcio Quinonez MD        Nocturnal hypoxemia - probable sleep apnea - had overnight pulse oximetry, April, 2015. 5/22/2015     Obesity 4/10/2015     LOPEZ (obstructive sleep apnea) 5/22/2015     Osteoarthritis      Peripheral vascular disease      Pleural effusion      Pressure ulcer of foot      Rheumatoid arthritis (H) 12/30/2014    seronegative     Sepsis (H)      Seropositive rheumatoid arthritis (H) 1/19/2016     Vitamin D deficiency 8/26/2015      PAST SURGICAL HISTORY:   has a past surgical history that includes Total Knee Arthroplasty (Left, 2006); joint replacement; Total Hip Arthroplasty (Left, 10/18/2016); and Peripherally Inserted Central Catheter Insertion (Right, 07/14/2019).  FAMILY HISTORY: family history includes Arthritis in her mother; Breast Cancer in her  cousin; Breast Cancer (age of onset: 50.00) in her maternal grandmother; Breast Cancer (age of onset: 54.00) in her maternal aunt and mother; Cancer in her mother; Cataracts in her father and mother; Deep Vein Thrombosis in her father; Heart Disease in her maternal grandmother; Multiple myeloma in her father; No Known Problems in her sister; Other - See Comments in her brother; Rheumatoid Arthritis in her maternal grandmother and paternal aunt; Sleep Apnea in her brother; Snoring in her father.  SOCIAL HISTORY:   reports that she has quit smoking. Her smoking use included cigarettes. She has a 30 pack-year smoking history. She has never used smokeless tobacco. She reports that she does not currently use alcohol. She reports that she does not use drugs.  Patient's living condition: lives in a nursing home    Post Discharge Medication Reconciliation Status:   MED REC REQUIRED  Post Medication Reconciliation Status:  Discharge medications reconciled, continue medications without change       Current Outpatient Medications   Medication Sig Dispense Refill     acetaminophen (TYLENOL) 325 MG tablet Take 650 mg by mouth daily as needed for mild pain.       amoxicillin (AMOXIL) 500 MG capsule Take 1,000 mg by mouth 3 times daily.       ascorbic acid, vitamin C, (ASCORBIC ACID WITH KAEL HIPS) 500 MG tablet [ASCORBIC ACID, VITAMIN C, (ASCORBIC ACID WITH KAEL HIPS) 500 MG TABLET] Take 500 mg by mouth 2 (two) times a day.       cholecalciferol 50 MCG (2000 UT) tablet Take 1 tablet (50 mcg) by mouth daily 30 tablet 11     cyclobenzaprine (FLEXERIL) 5 MG tablet Take 10 mg by mouth 3 times daily as needed       DULoxetine (CYMBALTA) 20 MG capsule Take 1 capsule (20 mg) by mouth 2 times daily. 180 capsule 0     gabapentin (NEURONTIN) 300 MG capsule Take 1 capsule (300 mg) by mouth 3 times daily 90 capsule 11     hydroCHLOROthiazide (HYDRODIURIL) 25 MG tablet Take 25 mg by mouth daily.       ibuprofen (ADVIL/MOTRIN) 400 MG tablet  "Take 400 mg by mouth every 8 hours as needed for moderate pain       ketoconazole (NIZORAL) 2 % external cream Apply topically 2 times daily       loratadine (CLARITIN) 10 MG tablet Take 1 tablet (10 mg) by mouth daily 30 tablet 11     nystatin (MYCOSTATIN) 683019 UNIT/GM external cream Apply topically 2 times daily. 30 g 3     rivaroxaban ANTICOAGULANT (XARELTO) 20 MG TABS tablet Take 20 mg by mouth daily       sulfaSALAzine (AZULFIDINE) 500 MG tablet Take 2 tablets (1,000 mg) by mouth 2 times daily. 360 tablet 0     tolnaftate (TINACTIN) 1 % external cream Apply topically daily as needed for irritation       No current facility-administered medications for this visit.       ROS:  4 point ROS including Respiratory, CV, GI and , other than that noted in the HPI,  is negative    Vitals:  BP (!) 151/81   Pulse 96   Temp 98.7  F (37.1  C)   Resp 18   Ht 1.651 m (5' 5\")   Wt (!) 160.8 kg (354 lb 6.4 oz)   SpO2 (!) 90%   BMI 58.98 kg/m    Exam:  GENERAL APPEARANCE:  Alert, in no distress  RESP:  no respiratory distress    Lab/Diagnostic data:  Recent labs in [facility] reviewed by me today.     ASSESSMENT/PLAN:  (R53.81) Physical deconditioning  (primary encounter diagnosis)  Comment: Due to acute illness  Plan: PT/OT eval and treat    (I73.9) PAD (peripheral artery disease)  Comment: Chronic with recurrent wounds. Historically cellulitis develops very rapidly leading to sudden onset of acute illness. It is rarely possible to catch early enough to treat at the nursing home.   Plan: Continue current POC with no changes at this time and adjustments as needed.    (I10) Essential hypertension with goal blood pressure less than 140/90  Comment: BP elevated today. Will continue to monitor for now due to recent illness  Plan: Continue current POC with no changes at this time and adjustments as needed.        Electronically signed by:  KRISTIN Conte CNP                     Sincerely,        KRISTIN Conte " CNP    Electronically signed

## 2025-01-28 NOTE — PROGRESS NOTES
[facility] GERIATRICS    PRIMARY CARE PROVIDER AND CLINIC:  Indiana Shelley, APRN CNP, [address] [facility] [address]  Chief Complaint   Patient presents with    Hospital F/U      [facility] Medical Record Number:  [id number]  Place of Service where encounter took place:  [facility] () [[id number]]    María Elena Saab  is a 59 year old  (1965), returned to the above facility from  [facility] . Hospital stay 1/22/25 - 1/27/25.  Patient with history of PVD, lymphedema, morbid obesity and recurrent cellulitis was sent to the ED due to fever, chills, and nausea. She was admitted with sepsis due to LLE cellulitis.     HPI obtained from patient visit, review of nursing home record, discussion with facility staff, and Epic review.     HPI:    Patient does not allow anyone to wake her up before 10am. Shortly after that time, she was busy in the gym with OT. Provider said hello briefly. She said she was feeling fine.     CODE STATUS/ADVANCE DIRECTIVES DISCUSSION:  Full Code    ALLERGIES:   Allergies   Allergen Reactions    Adhesive Tape Other (See Comments)     As on band-aids;  Causes skin tearing/wounds.    Adhesive [Cyanoacrylate] Other (See Comments)     As on band-aids;  Causes skin tearing/wounds.    Banana Unknown     She avoids due to Latex allergy.  If she eats them 3 days in a row, gets stomach cramps and dark stool.  TBrinkMD - 4/10/15    Food Unknown and GI Disturbance     Bananas, grapes, pine nuts    Artifical sweetner    Grape [Grape (Artificial) Flavoring Agent (Non-Screening)] Unknown     She avoids due to Latex allergy.  If she eats lots of them, and she likes them, stomach gets a little queasy.  TBrinkMD - 4/10/15    Morphine (Pf) [Morphine] Other (See Comments)     Pt has not reacted to this med herself, but mother has anaphylactic reaction and other family members get nausea/vomiting.    Other Environmental Allergy Swelling     leather    [place]      Pseudoephedrine Other (See Comments)     Insomnia    Pseudoephedrine Cold/Allergy [Chlorpheniramine-Pseudoeph] Other (See Comments)     Keeps pt awake up to 24 hours     Latex Rash     Hands get red from rubber gloves, but okay if she washes them right away.  Hands get red from rubber gloves, but okay if she washes them right away.    Other Food Allergy Rash     Pine trees, leather .       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  RD reports that she is not willing to commit to the program outlined.  See scanned document.  12/15/2015 - Marcio Quinonez MD       Nocturnal hypoxemia - probable sleep apnea - had overnight pulse oximetry, April, 2015. 5/22/2015    Obesity 4/10/2015    LOPEZ (obstructive sleep apnea) 5/22/2015    Osteoarthritis     Peripheral vascular disease     Pleural effusion     Pressure ulcer of foot     Rheumatoid arthritis (H) 12/30/2014    seronegative    Sepsis (H)     Seropositive rheumatoid arthritis (H) 1/19/2016    Vitamin D deficiency 8/26/2015      PAST SURGICAL HISTORY:   has a past surgical history that includes Total Knee Arthroplasty (Left, 2006); joint replacement; Total Hip Arthroplasty (Left, 10/18/2016); and Peripherally Inserted Central Catheter Insertion (Right, 07/14/2019).  FAMILY HISTORY: family history includes Arthritis in her mother; Breast Cancer in her cousin; Breast Cancer (age of onset: 50.00) in her maternal grandmother; Breast Cancer (age of onset: 54.00) in her maternal aunt and  mother; Cancer in her mother; Cataracts in her father and mother; Deep Vein Thrombosis in her father; Heart Disease in her maternal grandmother; Multiple myeloma in her father; No Known Problems in her sister; Other - See Comments in her brother; Rheumatoid Arthritis in her maternal grandmother and paternal aunt; Sleep Apnea in her brother; Snoring in her father.  SOCIAL HISTORY:   reports that she has quit smoking. Her smoking use included cigarettes. She has a 30 pack-year smoking history. She has never used smokeless tobacco. She reports that she does not currently use alcohol. She reports that she does not use drugs.  Patient's living condition: lives in a nursing home    Post Discharge Medication Reconciliation Status:   MED REC REQUIRED  Post Medication Reconciliation Status:  Discharge medications reconciled, continue medications without change       Current Outpatient Medications   Medication Sig Dispense Refill    acetaminophen (TYLENOL) 325 MG tablet Take 650 mg by mouth daily as needed for mild pain.      amoxicillin (AMOXIL) 500 MG capsule Take 1,000 mg by mouth 3 times daily.      ascorbic acid, vitamin C, (ASCORBIC ACID WITH KAEL HIPS) 500 MG tablet [ASCORBIC ACID, VITAMIN C, (ASCORBIC ACID WITH KAEL HIPS) 500 MG TABLET] Take 500 mg by mouth 2 (two) times a day.      cholecalciferol 50 MCG (2000 UT) tablet Take 1 tablet (50 mcg) by mouth daily 30 tablet 11    cyclobenzaprine (FLEXERIL) 5 MG tablet Take 10 mg by mouth 3 times daily as needed      DULoxetine (CYMBALTA) 20 MG capsule Take 1 capsule (20 mg) by mouth 2 times daily. 180 capsule 0    gabapentin (NEURONTIN) 300 MG capsule Take 1 capsule (300 mg) by mouth 3 times daily 90 capsule 11    hydroCHLOROthiazide (HYDRODIURIL) 25 MG tablet Take 25 mg by mouth daily.      ibuprofen (ADVIL/MOTRIN) 400 MG tablet Take 400 mg by mouth every 8 hours as needed for moderate pain      ketoconazole (NIZORAL) 2 % external cream Apply topically 2 times daily       "loratadine (CLARITIN) 10 MG tablet Take 1 tablet (10 mg) by mouth daily 30 tablet 11    nystatin (MYCOSTATIN) 682059 UNIT/GM external cream Apply topically 2 times daily. 30 g 3    rivaroxaban ANTICOAGULANT (XARELTO) 20 MG TABS tablet Take 20 mg by mouth daily      sulfaSALAzine (AZULFIDINE) 500 MG tablet Take 2 tablets (1,000 mg) by mouth 2 times daily. 360 tablet 0    tolnaftate (TINACTIN) 1 % external cream Apply topically daily as needed for irritation       No current facility-administered medications for this visit.       ROS:  4 point ROS including Respiratory, CV, GI and , other than that noted in the HPI,  is negative    Vitals:  BP (!) 151/81   Pulse 96   Temp 98.7  F (37.1  C)   Resp 18   Ht 1.651 m (5' 5\")   Wt (!) 160.8 kg (354 lb 6.4 oz)   SpO2 (!) 90%   BMI 58.98 kg/m    Exam:  GENERAL APPEARANCE:  Alert, in no distress  RESP:  no respiratory distress    Lab/Diagnostic data:  Recent labs in [employer] reviewed by me today.     ASSESSMENT/PLAN:  (R53.81) Physical deconditioning  (primary encounter diagnosis)  Comment: Due to acute illness  Plan: PT/OT eval and treat    (I73.9) PAD (peripheral artery disease)  Comment: Chronic with recurrent wounds. Historically cellulitis develops very rapidly leading to sudden onset of acute illness. It is rarely possible to catch early enough to treat at the nursing home.   Plan: Continue current POC with no changes at this time and adjustments as needed.    (I10) Essential hypertension with goal blood pressure less than 140/90  Comment: BP elevated today. Will continue to monitor for now due to recent illness  Plan: Continue current POC with no changes at this time and adjustments as needed.        Electronically signed by:  KRISTIN Conte CNP                 "

## 2025-02-09 NOTE — PROGRESS NOTES
[facility] GERIATRICS  REGULATORY VISIT  February 10, 2025      [facility] Medical Record Number:  [id number]  Place of Service where encounter took place:  [facility] () [[id number]]    Chief Complaint   Patient presents with    [facility] Regulatory       HPI:    María Elena Saab is a 59 year old  (1965), who is being seen today for a federally mandated E/M visit at [facility] where she has resided since March 2016. HPI information obtained from: facility chart records, facility staff, patient report, [facility] chart review, and Care Everywhere Epic chart review.     She has had two hospitalization since I last saw her:  **[facility] in late October with bilateral LE cellulitis   **[facility] 1/22/25 to 1/27/25 - presented with fevers/chills/nausea - found to have Group A strep bacteremia with sepsis - presumed source LLE cellulitis. She was discharged to complete a course of amoxicillin. Also encouraged to see pulm given hypercapnia and concern for obesity hypoventilation syndrome and pulmonary HTN.    She saw rheum on 12/18 - no change to Rx with SSA.     Today, Ms. Saab ws sound asleep, sonorous, late morning. I did not wake her as this is her request to be left alone to sleep. No acute concerns today per nursing.       ALLERGIES:    Allergies   Allergen Reactions    Adhesive Tape Other (See Comments)     As on band-aids;  Causes skin tearing/wounds.    Adhesive [Cyanoacrylate] Other (See Comments)     As on band-aids;  Causes skin tearing/wounds.    Banana Unknown     She avoids due to Latex allergy.  If she eats them 3 days in a row, gets stomach cramps and dark stool.  TBrinkMD - 4/10/15    Food Unknown and GI Disturbance     Bananas, grapes, pine nuts    Artifical sweetner    Grape [Grape (Artificial) Flavoring Agent (Non-Screening)] Unknown     She avoids due to Latex allergy.  If she eats lots of them, and she likes them, stomach gets a little queasy.  TBrinkMD  - 4/10/15    Morphine (Pf) [Morphine] Other (See Comments)     Pt has not reacted to this med herself, but mother has anaphylactic reaction and other family members get nausea/vomiting.    Other Environmental Allergy Swelling     leather    [place]     Pseudoephedrine Other (See Comments)     Insomnia    Pseudoephedrine Cold/Allergy [Chlorpheniramine-Pseudoeph] Other (See Comments)     Keeps pt awake up to 24 hours     Latex Rash     Hands get red from rubber gloves, but okay if she washes them right away.  Hands get red from rubber gloves, but okay if she washes them right away.    Other Food Allergy Rash     Pine trees, leather .         Past Medical, Surgical, Family and Social History: Reviewed and updated in EPIC.    MEDICATIONS:  Post Discharge Medication Reconciliation Status: discharge medications reconciled and changed, per note/orders.     Current Outpatient Medications   Medication Sig Dispense Refill    acetaminophen (TYLENOL) 325 MG tablet Take 650 mg by mouth daily as needed for mild pain.      ascorbic acid, vitamin C, (ASCORBIC ACID WITH KAEL HIPS) 500 MG tablet [ASCORBIC ACID, VITAMIN C, (ASCORBIC ACID WITH KEAL HIPS) 500 MG TABLET] Take 500 mg by mouth 2 (two) times a day.      cholecalciferol 50 MCG (2000 UT) tablet Take 1 tablet (50 mcg) by mouth daily 30 tablet 11    cyclobenzaprine (FLEXERIL) 5 MG tablet Take 10 mg by mouth 3 times daily as needed      DULoxetine (CYMBALTA) 20 MG capsule Take 1 capsule (20 mg) by mouth 2 times daily. 180 capsule 0    gabapentin (NEURONTIN) 300 MG capsule Take 1 capsule (300 mg) by mouth 3 times daily 90 capsule 11    hydroCHLOROthiazide (HYDRODIURIL) 25 MG tablet Take 25 mg by mouth daily.      ibuprofen (ADVIL/MOTRIN) 400 MG tablet Take 400 mg by mouth every 8 hours as needed for moderate pain      ketoconazole (NIZORAL) 2 % external cream Apply topically 2 times daily      loratadine (CLARITIN) 10 MG tablet Take 1 tablet (10 mg) by mouth daily 30 tablet 11     "nystatin (MYCOSTATIN) 881769 UNIT/GM external cream Apply topically 2 times daily. 30 g 3    rivaroxaban ANTICOAGULANT (XARELTO) 20 MG TABS tablet Take 20 mg by mouth daily      sulfaSALAzine (AZULFIDINE) 500 MG tablet Take 2 tablets (1,000 mg) by mouth 2 times daily. 360 tablet 0    tolnaftate (TINACTIN) 1 % external cream Apply topically daily as needed for irritation       Medications reviewed:  Medications reconciled to facility chart and changes were made to reflect current medications as identified as above med list. Below are the changes that were made:   Medications stopped since last EPIC medication reconciliation:   There are no discontinued medications.  Medications started since last [facility] medication reconciliation:  No orders of the defined types were placed in this encounter.      REVIEW OF SYSTEMS:  Unable to be obtained as she was sound asleep late morning    PHYSICAL EXAM:  /79   Pulse 74   Temp 97.8  F (36.6  C)   Resp 17   Ht 1.676 m (5' 6\")   Wt (!) 160.8 kg (354 lb 6.4 oz)   SpO2 93%   BMI 57.20 kg/m    Gen: laying in bed, sound asleep, sonorous, in no acute distress  Resp: breathing non labored, no tachypnea; nasal cannula in place     LAB[facility]IMAGING: [facility]    ASSESSMENT / PLAN:      Bilateral LE Lymphedema  Recurrent LE Cellulitis, Resolved  Recent admission to [facility] w/ sepsis secondary to recurrent cellulitis. Complicated due to immunosuppression.   -- wound cares as ordered  -- compression, lymphedema therapies, good skin care     Rheumatoid Arthritis  Chronic Pain Syndrome   Follows with rheumatology - most recent visit 12/18.   -- sulfasalazine 1000 mg BID   -- duloxetine 20 mg daily, gabapentin 300 mg TID   -- APAP 650 mg daily PRN, cyclobenzaprine 10 mg TID PRN, ibuprofen 400 mg q8h PRN   -- labs and follow up per rheumatology     HTN  SBPs 120s-130s.   -- hydrochlorothiazide 25 mg daily   -- follow BPs and adjust medications as needed   "   History of Lower Exremity DVT  -- rivaroxaban 20 mg daily     Electronically signed by  Urvashi River MD

## 2025-02-10 ENCOUNTER — NURSING HOME VISIT (OUTPATIENT)
Dept: GERIATRICS | Facility: CLINIC | Age: 60
End: 2025-02-10
Payer: MEDICAID

## 2025-02-10 VITALS
OXYGEN SATURATION: 93 % | HEART RATE: 74 BPM | TEMPERATURE: 97.8 F | BODY MASS INDEX: 47.09 KG/M2 | SYSTOLIC BLOOD PRESSURE: 128 MMHG | RESPIRATION RATE: 17 BRPM | HEIGHT: 66 IN | WEIGHT: 293 LBS | DIASTOLIC BLOOD PRESSURE: 79 MMHG

## 2025-02-10 DIAGNOSIS — M05.79 SEROPOSITIVE RHEUMATOID ARTHRITIS OF MULTIPLE SITES (H): ICD-10-CM

## 2025-02-10 DIAGNOSIS — Z86.718 HISTORY OF DVT OF LOWER EXTREMITY: ICD-10-CM

## 2025-02-10 DIAGNOSIS — I89.0 LYMPHEDEMA: Primary | ICD-10-CM

## 2025-02-10 DIAGNOSIS — I10 PRIMARY HYPERTENSION: ICD-10-CM

## 2025-02-10 PROCEDURE — 99309 SBSQ NF CARE MODERATE MDM 30: CPT | Performed by: INTERNAL MEDICINE

## 2025-02-10 NOTE — LETTER
2/10/2025      María Elena Saab  [facility] [address]  [address]  [place] [address]        [facility] GERIATRICS  REGULATORY VISIT  February 10, 2025      [facility] Medical Record Number:  [id number]  Place of Service where encounter took place:  [facility] () [[id number]]    Chief Complaint   Patient presents with     skilled nursing Regulatory       HPI:    María Elena Saab is a 59 year old  (1965), who is being seen today for a federally mandated E/M visit at [facility] where she has resided since March 2016. HPI information obtained from: facility chart records, facility staff, patient report, [facility] chart review, and Care Everywhere [facility] chart review.     She has had two hospitalization since I last saw her:  **[facility] in late October with bilateral LE cellulitis   **[facility] 1/22/25 to 1/27/25 - presented with fevers/chills/nausea - found to have Group A strep bacteremia with sepsis - presumed source LLE cellulitis. She was discharged to complete a course of amoxicillin. Also encouraged to see pulm given hypercapnia and concern for obesity hypoventilation syndrome and pulmonary HTN.    She saw rheum on 12/18 - no change to Rx with [facility]SA.     Today, Ms. Saab ws sound asleep, sonorous, late morning. I did not wake her as this is her request to be left alone to sleep. No acute concerns today per nursing.       ALLERGIES:    Allergies   Allergen Reactions     Adhesive Tape Other (See Comments)     As on band-aids;  Causes skin tearing/wounds.     Adhesive [Cyanoacrylate] Other (See Comments)     As on band-aids;  Causes skin tearing/wounds.     Banana Unknown     She avoids due to Latex allergy.  If she eats them 3 days in a row, gets stomach cramps and dark stool.  TBrinkMD - 4/10/15     Food Unknown and GI Disturbance     Bananas, grapes, pine nuts    Artifical sweetner     Grape [Grape (Artificial) Flavoring Agent (Non-Screening)] Unknown     She  avoids due to Latex allergy.  If she eats lots of them, and she likes them, stomach gets a little queasy.  TBrinkMD - 4/10/15     Morphine (Pf) [Morphine] Other (See Comments)     Pt has not reacted to this med herself, but mother has anaphylactic reaction and other family members get nausea/vomiting.     Other Environmental Allergy Swelling     leather     [place]      Pseudoephedrine Other (See Comments)     Insomnia     Pseudoephedrine Cold/Allergy [Chlorpheniramine-Pseudoeph] Other (See Comments)     Keeps pt awake up to 24 hours      Latex Rash     Hands get red from rubber gloves, but okay if she washes them right away.  Hands get red from rubber gloves, but okay if she washes them right away.     Other Food Allergy Rash     Pine trees, leather .         Past Medical, Surgical, Family and Social History: Reviewed and updated in EPIC.    MEDICATIONS:  Post Discharge Medication Reconciliation Status: discharge medications reconciled and changed, per note/orders.     Current Outpatient Medications   Medication Sig Dispense Refill     acetaminophen (TYLENOL) 325 MG tablet Take 650 mg by mouth daily as needed for mild pain.       ascorbic acid, vitamin C, (ASCORBIC ACID WITH KAEL HIPS) 500 MG tablet [ASCORBIC ACID, VITAMIN C, (ASCORBIC ACID WITH KAEL HIPS) 500 MG TABLET] Take 500 mg by mouth 2 (two) times a day.       cholecalciferol 50 MCG (2000 UT) tablet Take 1 tablet (50 mcg) by mouth daily 30 tablet 11     cyclobenzaprine (FLEXERIL) 5 MG tablet Take 10 mg by mouth 3 times daily as needed       DULoxetine (CYMBALTA) 20 MG capsule Take 1 capsule (20 mg) by mouth 2 times daily. 180 capsule 0     gabapentin (NEURONTIN) 300 MG capsule Take 1 capsule (300 mg) by mouth 3 times daily 90 capsule 11     hydroCHLOROthiazide (HYDRODIURIL) 25 MG tablet Take 25 mg by mouth daily.       ibuprofen (ADVIL/MOTRIN) 400 MG tablet Take 400 mg by mouth every 8 hours as needed for moderate pain       ketoconazole (NIZORAL) 2 % external  "cream Apply topically 2 times daily       loratadine (CLARITIN) 10 MG tablet Take 1 tablet (10 mg) by mouth daily 30 tablet 11     nystatin (MYCOSTATIN) 718131 UNIT/GM external cream Apply topically 2 times daily. 30 g 3     rivaroxaban ANTICOAGULANT (XARELTO) 20 MG TABS tablet Take 20 mg by mouth daily       sulfaSALAzine (AZULFIDINE) 500 MG tablet Take 2 tablets (1,000 mg) by mouth 2 times daily. 360 tablet 0     tolnaftate (TINACTIN) 1 % external cream Apply topically daily as needed for irritation       Medications reviewed:  Medications reconciled to facility chart and changes were made to reflect current medications as identified as above med list. Below are the changes that were made:   Medications stopped since last EPIC medication reconciliation:   There are no discontinued medications.  Medications started since last [facility] medication reconciliation:  No orders of the defined types were placed in this encounter.      REVIEW OF SYSTEMS:  Unable to be obtained as she was sound asleep late morning    PHYSICAL EXAM:  /79   Pulse 74   Temp 97.8  F (36.6  C)   Resp 17   Ht 1.676 m (5' 6\")   Wt (!) 160.8 kg (354 lb 6.4 oz)   SpO2 93%   BMI 57.20 kg/m    Gen: laying in bed, sound asleep, sonorous, in no acute distress  Resp: breathing non labored, no tachypnea; nasal cannula in place     LABS[facility]IMAGING: [facility]    ASSESSMENT / PLAN:      Bilateral LE Lymphedema  Recurrent LE Cellulitis, Resolved  Recent admission to [facility] w/ sepsis secondary to recurrent cellulitis. Complicated due to immunosuppression.   -- wound cares as ordered  -- compression, lymphedema therapies, good skin care     Rheumatoid Arthritis  Chronic Pain Syndrome   Follows with rheumatology - most recent visit 12/18.   -- sulfasalazine 1000 mg BID   -- duloxetine 20 mg daily, gabapentin 300 mg TID   -- APAP 650 mg daily PRN, cyclobenzaprine 10 mg TID PRN, ibuprofen 400 mg q8h PRN   -- labs and follow " up per rheumatology     HTN  SBPs 120s-130s.   -- hydrochlorothiazide 25 mg daily   -- follow BPs and adjust medications as needed     History of Lower Exremity DVT  -- rivaroxaban 20 mg daily     Electronically signed by  Urvashi River MD              Sincerely,        Urvashi River MD    Electronically signed

## 2025-03-12 ENCOUNTER — MEDICAL CORRESPONDENCE (OUTPATIENT)
Dept: HEALTH INFORMATION MANAGEMENT | Facility: CLINIC | Age: 60
End: 2025-03-12
Payer: MEDICAID

## 2025-04-22 ENCOUNTER — NURSING HOME VISIT (OUTPATIENT)
Dept: GERIATRICS | Facility: CLINIC | Age: 60
End: 2025-04-22
Payer: MEDICAID

## 2025-04-22 VITALS
BODY MASS INDEX: 47.09 KG/M2 | RESPIRATION RATE: 18 BRPM | HEIGHT: 66 IN | OXYGEN SATURATION: 94 % | TEMPERATURE: 98.3 F | HEART RATE: 81 BPM | WEIGHT: 293 LBS | DIASTOLIC BLOOD PRESSURE: 76 MMHG | SYSTOLIC BLOOD PRESSURE: 152 MMHG

## 2025-04-22 DIAGNOSIS — I89.0 LYMPHEDEMA OF BOTH LOWER EXTREMITIES: Primary | ICD-10-CM

## 2025-04-22 DIAGNOSIS — G89.4 CHRONIC PAIN SYNDROME: ICD-10-CM

## 2025-04-22 DIAGNOSIS — E66.01 MORBID OBESITY (H): ICD-10-CM

## 2025-04-22 DIAGNOSIS — D84.9 IMMUNOSUPPRESSION: ICD-10-CM

## 2025-04-22 PROCEDURE — 99309 SBSQ NF CARE MODERATE MDM 30: CPT | Performed by: NURSE PRACTITIONER

## 2025-04-22 RX ORDER — SULFASALAZINE 500 MG/1
1000 TABLET ORAL 2 TIMES DAILY
COMMUNITY

## 2025-04-22 NOTE — PROGRESS NOTES
[facility] GERIATRICS  Chief Complaint   Patient presents with    assisted Regulatory     [place] Medical Record Number:  [id number]  Place of Service where encounter took place:  [facility] () [[id number]]    HPI:    María Elena Saab  is 59 year old (1965), who is being seen today for a federally mandated E/M visit.     Today's concerns are:  Lymphedema of both lower extremities  Immunosuppression  Chronic pain syndrome  Morbid obesity (H)  Patient is seen laying in bed shortly after 10am. She requests that she not be disturbed until after 10. She has no acute concerns today. Her legs are stable, no sign of infection at this time. Pain is controlled.      ALLERGIES:Adhesive tape, Adhesive [cyanoacrylate], Banana, Food, Grape [grape (artificial) flavoring agent (non-screening)], Morphine (pf) [morphine], Other environmental allergy, Pine, Pseudoephedrine, Pseudoephedrine cold/allergy [chlorpheniramine-pseudoeph], Latex, and Other food allergy  PAST MEDICAL HISTORY:   Past Medical History:   Diagnosis Date    Aseptic necrosis of femoral head (H)     LEFT    Bacteremia due to group B Streptococcus     Cellulitis of right lower extremity     DJD (degenerative joint disease)     DVT, bilateral lower limbs (H) 10/2014    Edema 5/22/2015    Essential hypertension with goal blood pressure less than 140/90     Failure to thrive     History of blood clots     Hypokalemia 10/15/2014    Lung mass 10/18/2014    Morbid obesity (H) 4/10/2015    Had formal nutrition consult at [facility].  RD reports that she is not willing to commit to the program outlined.  See scanned document.  12/15/2015 - Marcio Quinonez MD       Nocturnal hypoxemia - probable sleep apnea - had overnight pulse oximetry, April, 2015. 5/22/2015    Obesity 4/10/2015    LOPEZ (obstructive sleep apnea) 5/22/2015    Osteoarthritis     Peripheral vascular disease     Pleural effusion     Pressure ulcer of foot     Rheumatoid arthritis  (H) 12/30/2014    seronegative    Sepsis (H)     Seropositive rheumatoid arthritis (H) 1/19/2016    Vitamin D deficiency 8/26/2015     PAST SURGICAL HISTORY:   has a past surgical history that includes Total Knee Arthroplasty (Left, 2006); joint replacement; Total Hip Arthroplasty (Left, 10/18/2016); and Peripherally Inserted Central Catheter Insertion (Right, 07/14/2019).  FAMILY HISTORY: family history includes Arthritis in her mother; Breast Cancer in her cousin; Breast Cancer (age of onset: 50.00) in her maternal grandmother; Breast Cancer (age of onset: 54.00) in her maternal aunt and mother; Cancer in her mother; Cataracts in her father and mother; Deep Vein Thrombosis in her father; Heart Disease in her maternal grandmother; Multiple myeloma in her father; No Known Problems in her sister; Other - See Comments in her brother; Rheumatoid Arthritis in her maternal grandmother and paternal aunt; Sleep Apnea in her brother; Snoring in her father.  SOCIAL HISTORY:  reports that she has quit smoking. Her smoking use included cigarettes. She has a 30 pack-year smoking history. She has never used smokeless tobacco. She reports that she does not currently use alcohol. She reports that she does not use drugs.    MEDICATIONS:  Current Outpatient Medications   Medication Sig Dispense Refill    acetaminophen (TYLENOL) 325 MG tablet Take 650 mg by mouth daily as needed for mild pain.      ascorbic acid, vitamin C, (ASCORBIC ACID WITH KAEL HIPS) 500 MG tablet [ASCORBIC ACID, VITAMIN C, (ASCORBIC ACID WITH KAEL HIPS) 500 MG TABLET] Take 500 mg by mouth 2 (two) times a day.      cholecalciferol 50 MCG (2000 UT) tablet Take 1 tablet (50 mcg) by mouth daily 30 tablet 11    cyclobenzaprine (FLEXERIL) 5 MG tablet Take 10 mg by mouth 3 times daily as needed      DULoxetine (CYMBALTA) 20 MG capsule Take 1 capsule (20 mg) by mouth 2 times daily. 180 capsule 0    gabapentin (NEURONTIN) 300 MG capsule Take 1 capsule (300 mg) by mouth 3  "times daily 90 capsule 11    hydroCHLOROthiazide (HYDRODIURIL) 25 MG tablet Take 25 mg by mouth daily.      ibuprofen (ADVIL/MOTRIN) 400 MG tablet Take 400 mg by mouth every 8 hours as needed for moderate pain      ketoconazole (NIZORAL) 2 % external cream Apply topically 2 times daily      loratadine (CLARITIN) 10 MG tablet Take 1 tablet (10 mg) by mouth daily 30 tablet 11    nystatin (MYCOSTATIN) 984937 UNIT/GM external cream Apply topically 2 times daily. 30 g 3    rivaroxaban ANTICOAGULANT (XARELTO) 20 MG TABS tablet Take 20 mg by mouth daily      sulfaSALAzine (AZULFIDINE) 500 MG tablet Take 1,000 mg by mouth 2 times daily.      tolnaftate (TINACTIN) 1 % external cream Apply topically daily as needed for irritation           Case Management:  I have reviewed the care plan and MDS and do agree with the plan. Information reviewed:  Medications, vital signs, orders, and nursing notes.    ROS:  4 point ROS including Respiratory, CV, GI and , other than that noted in the HPI,  is negative    Vitals:  BP (!) 152/76   Pulse 81   Temp 98.3  F (36.8  C)   Resp 18   Ht 1.676 m (5' 6\")   Wt (!) 160.8 kg (354 lb 8 oz)   SpO2 94%   BMI 57.22 kg/m    Body mass index is 57.22 kg/m .  Exam:  GENERAL APPEARANCE:  Alert, in no distress, morbidly obese  RESP:  no respiratory distress  CV:  did not palpate legs, chronic edema is present  SKIN:  lower legs covered with ACE wraps and dressings, no erythema above wraps  PSYCH:  oriented X 3, affect and mood normal    Lab/Diagnostic data:   Recent labs in [facility] reviewed by me today.     ASSESSMENT/PLAN  (I89.0) Lymphedema of both lower extremities  (primary encounter diagnosis)  Comment: Controlled with compression, no worsening currently  Plan: Continue current POC with no changes at this time and adjustments as needed.    (D84.9) Immunosuppression  Comment: History of recurrent cellulitis that spreads rapidly and has led to sepsis. Occasionally can be caught early enough to " treat with oral antibiotics, but if she develops fever or redness up into thighs, she prefers to be hospitalized  Plan: Continue current POC with no changes at this time and adjustments as needed.    (G89.4) Chronic pain syndrome  Comment: Chronic condition being managed with medications and is currently asymptomatic. She intermittently uses the flexeril and prefers that it not be discontinued if she goes over a month without use.  Plan: Continue current POC with no changes at this time and adjustments as needed.    (E66.01) Morbid obesity (H)  Comment: BMI>40 is consistent with morbid obesity. This is clinically significant due to increased nursing cares, use of resources and specialty equipment.       Electronically signed by:  KRISTIN Conte CNP

## 2025-04-22 NOTE — LETTER
4/22/2025      María Elena Saab  [facility]  [address]  [place] [address]        [facility] GERIATRICS  Chief Complaint   Patient presents with     half-way Regulatory     [place] Medical Record Number:  [id number]  Place of Service where encounter took place:  [facility] () [[id number]]    HPI:    María Elena Saab  is 59 year old (1965), who is being seen today for a federally mandated E/M visit.     Today's concerns are:  Lymphedema of both lower extremities  Immunosuppression  Chronic pain syndrome  Morbid obesity (H)  Patient is seen laying in bed shortly after 10am. She requests that she not be disturbed until after 10. She has no acute concerns today. Her legs are stable, no sign of infection at this time. Pain is controlled.      ALLERGIES:Adhesive tape, Adhesive [cyanoacrylate], Banana, Food, Grape [grape (artificial) flavoring agent (non-screening)], Morphine (pf) [morphine], Other environmental allergy, Pine, Pseudoephedrine, Pseudoephedrine cold/allergy [chlorpheniramine-pseudoeph], Latex, and Other food allergy  PAST MEDICAL HISTORY:   Past Medical History:   Diagnosis Date     Aseptic necrosis of femoral head (H)     LEFT     Bacteremia due to group B Streptococcus      Cellulitis of right lower extremity      DJD (degenerative joint disease)      DVT, bilateral lower limbs (H) 10/2014     Edema 5/22/2015     Essential hypertension with goal blood pressure less than 140/90      Failure to thrive      History of blood clots      Hypokalemia 10/15/2014     Lung mass 10/18/2014     Morbid obesity (H) 4/10/2015    Had formal nutrition consult at [facility].  RD reports that she is not willing to commit to the program outlined.  See scanned document.  12/15/2015 - Marcio Quinonez MD        Nocturnal hypoxemia - probable sleep apnea - had overnight pulse oximetry, April, 2015. 5/22/2015     Obesity 4/10/2015     LOPEZ (obstructive sleep apnea)  5/22/2015     Osteoarthritis      Peripheral vascular disease      Pleural effusion      Pressure ulcer of foot      Rheumatoid arthritis (H) 12/30/2014    seronegative     Sepsis (H)      Seropositive rheumatoid arthritis (H) 1/19/2016     Vitamin D deficiency 8/26/2015     PAST SURGICAL HISTORY:   has a past surgical history that includes Total Knee Arthroplasty (Left, 2006); joint replacement; Total Hip Arthroplasty (Left, 10/18/2016); and Peripherally Inserted Central Catheter Insertion (Right, 07/14/2019).  FAMILY HISTORY: family history includes Arthritis in her mother; Breast Cancer in her cousin; Breast Cancer (age of onset: 50.00) in her maternal grandmother; Breast Cancer (age of onset: 54.00) in her maternal aunt and mother; Cancer in her mother; Cataracts in her father and mother; Deep Vein Thrombosis in her father; Heart Disease in her maternal grandmother; Multiple myeloma in her father; No Known Problems in her sister; Other - See Comments in her brother; Rheumatoid Arthritis in her maternal grandmother and paternal aunt; Sleep Apnea in her brother; Snoring in her father.  SOCIAL HISTORY:  reports that she has quit smoking. Her smoking use included cigarettes. She has a 30 pack-year smoking history. She has never used smokeless tobacco. She reports that she does not currently use alcohol. She reports that she does not use drugs.    MEDICATIONS:  Current Outpatient Medications   Medication Sig Dispense Refill     acetaminophen (TYLENOL) 325 MG tablet Take 650 mg by mouth daily as needed for mild pain.       ascorbic acid, vitamin C, (ASCORBIC ACID WITH KAEL HIPS) 500 MG tablet [ASCORBIC ACID, VITAMIN C, (ASCORBIC ACID WITH KAEL HIPS) 500 MG TABLET] Take 500 mg by mouth 2 (two) times a day.       cholecalciferol 50 MCG (2000 UT) tablet Take 1 tablet (50 mcg) by mouth daily 30 tablet 11     cyclobenzaprine (FLEXERIL) 5 MG tablet Take 10 mg by mouth 3 times daily as needed       DULoxetine (CYMBALTA) 20  "MG capsule Take 1 capsule (20 mg) by mouth 2 times daily. 180 capsule 0     gabapentin (NEURONTIN) 300 MG capsule Take 1 capsule (300 mg) by mouth 3 times daily 90 capsule 11     hydroCHLOROthiazide (HYDRODIURIL) 25 MG tablet Take 25 mg by mouth daily.       ibuprofen (ADVIL/MOTRIN) 400 MG tablet Take 400 mg by mouth every 8 hours as needed for moderate pain       ketoconazole (NIZORAL) 2 % external cream Apply topically 2 times daily       loratadine (CLARITIN) 10 MG tablet Take 1 tablet (10 mg) by mouth daily 30 tablet 11     nystatin (MYCOSTATIN) 380373 UNIT/GM external cream Apply topically 2 times daily. 30 g 3     rivaroxaban ANTICOAGULANT (XARELTO) 20 MG TABS tablet Take 20 mg by mouth daily       sulfaSALAzine (AZULFIDINE) 500 MG tablet Take 1,000 mg by mouth 2 times daily.       tolnaftate (TINACTIN) 1 % external cream Apply topically daily as needed for irritation           Case Management:  I have reviewed the care plan and MDS and do agree with the plan. Information reviewed:  Medications, vital signs, orders, and nursing notes.    ROS:  4 point ROS including Respiratory, CV, GI and , other than that noted in the HPI,  is negative    Vitals:  BP (!) 152/76   Pulse 81   Temp 98.3  F (36.8  C)   Resp 18   Ht 1.676 m (5' 6\")   Wt (!) 160.8 kg (354 lb 8 oz)   SpO2 94%   BMI 57.22 kg/m    Body mass index is 57.22 kg/m .  Exam:  GENERAL APPEARANCE:  Alert, in no distress, morbidly obese  RESP:  no respiratory distress  CV:  did not palpate legs, chronic edema is present  SKIN:  lower legs covered with ACE wraps and dressings, no erythema above wraps  PSYCH:  oriented X 3, affect and mood normal    Lab/Diagnostic data:   Recent labs in [facility] reviewed by me today.     ASSESSMENT/PLAN  (I89.0) Lymphedema of both lower extremities  (primary encounter diagnosis)  Comment: Controlled with compression, no worsening currently  Plan: Continue current POC with no changes at this time and adjustments as " needed.    (D84.9) Immunosuppression  Comment: History of recurrent cellulitis that spreads rapidly and has led to sepsis. Occasionally can be caught early enough to treat with oral antibiotics, but if she develops fever or redness up into thighs, she prefers to be hospitalized  Plan: Continue current POC with no changes at this time and adjustments as needed.    (G89.4) Chronic pain syndrome  Comment: Chronic condition being managed with medications and is currently asymptomatic. She intermittently uses the flexeril and prefers that it not be discontinued if she goes over a month without use.  Plan: Continue current POC with no changes at this time and adjustments as needed.    (E66.01) Morbid obesity (H)  Comment: BMI>40 is consistent with morbid obesity. This is clinically significant due to increased nursing cares, use of resources and specialty equipment.       Electronically signed by:  KRISTIN Conte CNP            Sincerely,        KRISTIN Conte CNP    Electronically signed

## 2025-06-24 ENCOUNTER — OFFICE VISIT (OUTPATIENT)
Dept: RHEUMATOLOGY | Facility: CLINIC | Age: 60
End: 2025-06-24
Payer: COMMERCIAL

## 2025-06-24 ENCOUNTER — LAB (OUTPATIENT)
Dept: LAB | Facility: CLINIC | Age: 60
End: 2025-06-24
Payer: COMMERCIAL

## 2025-06-24 ENCOUNTER — MEDICAL CORRESPONDENCE (OUTPATIENT)
Dept: HEALTH INFORMATION MANAGEMENT | Facility: CLINIC | Age: 60
End: 2025-06-24

## 2025-06-24 VITALS — HEART RATE: 73 BPM | SYSTOLIC BLOOD PRESSURE: 150 MMHG | OXYGEN SATURATION: 96 % | DIASTOLIC BLOOD PRESSURE: 80 MMHG

## 2025-06-24 DIAGNOSIS — R76.8 CYCLIC CITRULLINATED PEPTIDE (CCP) ANTIBODY POSITIVE: ICD-10-CM

## 2025-06-24 DIAGNOSIS — M05.79 SEROPOSITIVE RHEUMATOID ARTHRITIS OF MULTIPLE SITES (H): ICD-10-CM

## 2025-06-24 DIAGNOSIS — M15.0 PRIMARY OSTEOARTHRITIS INVOLVING MULTIPLE JOINTS: ICD-10-CM

## 2025-06-24 DIAGNOSIS — M65.332 TRIGGER FINGER, LEFT MIDDLE FINGER: ICD-10-CM

## 2025-06-24 DIAGNOSIS — M05.79 SEROPOSITIVE RHEUMATOID ARTHRITIS OF MULTIPLE SITES (H): Primary | ICD-10-CM

## 2025-06-24 DIAGNOSIS — Z79.899 HIGH RISK MEDICATION USE: ICD-10-CM

## 2025-06-24 LAB
ALBUMIN SERPL BCG-MCNC: 3.6 G/DL (ref 3.5–5.2)
ALT SERPL W P-5'-P-CCNC: 10 U/L (ref 0–50)
CREAT SERPL-MCNC: 0.48 MG/DL (ref 0.51–0.95)
EGFRCR SERPLBLD CKD-EPI 2021: >90 ML/MIN/1.73M2
ERYTHROCYTE [DISTWIDTH] IN BLOOD BY AUTOMATED COUNT: 15.8 % (ref 10–15)
HCT VFR BLD AUTO: 41.7 % (ref 35–47)
HGB BLD-MCNC: 13.1 G/DL (ref 11.7–15.7)
MCH RBC QN AUTO: 27.3 PG (ref 26.5–33)
MCHC RBC AUTO-ENTMCNC: 31.4 G/DL (ref 31.5–36.5)
MCV RBC AUTO: 87 FL (ref 78–100)
PLATELET # BLD AUTO: 196 10E3/UL (ref 150–450)
RBC # BLD AUTO: 4.8 10E6/UL (ref 3.8–5.2)
WBC # BLD AUTO: 5.8 10E3/UL (ref 4–11)

## 2025-06-24 PROCEDURE — 3079F DIAST BP 80-89 MM HG: CPT | Performed by: INTERNAL MEDICINE

## 2025-06-24 PROCEDURE — 3077F SYST BP >= 140 MM HG: CPT | Performed by: INTERNAL MEDICINE

## 2025-06-24 PROCEDURE — 20550 NJX 1 TENDON SHEATH/LIGAMENT: CPT | Mod: F2 | Performed by: INTERNAL MEDICINE

## 2025-06-24 PROCEDURE — 85027 COMPLETE CBC AUTOMATED: CPT

## 2025-06-24 PROCEDURE — 36415 COLL VENOUS BLD VENIPUNCTURE: CPT

## 2025-06-24 PROCEDURE — 84460 ALANINE AMINO (ALT) (SGPT): CPT

## 2025-06-24 PROCEDURE — 82040 ASSAY OF SERUM ALBUMIN: CPT

## 2025-06-24 PROCEDURE — 82565 ASSAY OF CREATININE: CPT

## 2025-06-24 PROCEDURE — 99214 OFFICE O/P EST MOD 30 MIN: CPT | Mod: 25 | Performed by: INTERNAL MEDICINE

## 2025-06-24 RX ORDER — LISINOPRIL 2.5 MG/1
2.5 TABLET ORAL DAILY
COMMUNITY

## 2025-06-24 RX ORDER — TRIAMCINOLONE ACETONIDE 40 MG/ML
20 INJECTION, SUSPENSION INTRA-ARTICULAR; INTRAMUSCULAR ONCE
Status: COMPLETED | OUTPATIENT
Start: 2025-06-24 | End: 2025-06-24

## 2025-06-24 RX ADMIN — TRIAMCINOLONE ACETONIDE 20 MG: 40 INJECTION, SUSPENSION INTRA-ARTICULAR; INTRAMUSCULAR at 13:25

## 2025-06-24 NOTE — PROGRESS NOTES
Rheumatology follow-up visit note     María Elena is a 60 year old female presents today for follow-up.    María Elena was seen today for recheck.    Diagnoses and all orders for this visit:    Seropositive rheumatoid arthritis of multiple sites (H)  -     triamcinolone (KENALOG-40) injection 20 mg  -     INJECTION SINGLE TENDON SHEATH/LIGAMENT    Cyclic citrullinated peptide (CCP) antibody positive    Primary osteoarthritis involving multiple joints    High risk medication use    Trigger finger, left middle finger  -     triamcinolone (KENALOG-40) injection 20 mg  -     INJECTION SINGLE TENDON SHEATH/LIGAMENT          Well-controlled RA on sulfasalazine.  Check labs today if within acceptable range continue sulfasalazine.  Once again the nursing home is advised to check her labs every 3 months at least.  After pros and cons were discussed including risk of infection, bleeding, skin changes including thinning, pigmentary alteration and scarring to name a few, left middle finger flexor tendon sheath injected at A10 injected as noted in the orders section. This was done with nontouch technique.  The patient tolerated the procedure well and had a brisk Marcaine effect.  The postinjection care was discussed.      Follow up in 3 months.    HPI    María Elena Saab is a 60 year old female is here for follow-up of   rheumatoid arthritis longstanding, polyarticular, seropositive.  She has osteoarthritis.  Status post left knee arthroplasty.  She has chronic lymphedema.  While her RA appears to be under good with the current regimen of sulfasalazine.  She was previously on Humira, methotrexate however repeated cellulitis of the lower extremity led to discontinuation of that.  She is seems to have continued to do well with sulfasalazine alone.  She has noted pain in her left hand, pointing to the middle finger where she locks and clicks.  This is going on for the past several weeks.  Unfortunately she has not been able to have her  blood drawn despite her apparently reminding the nursing home staff for sulfasalazine monitoring.  This will be done today.        DETAILED EXAMINATION  06/24/25  :    Vitals:    06/24/25 1225   BP: (!) 150/80   BP Location: Right arm   Pulse: 73   SpO2: 96%     Alert oriented.  She is in the wheelchair.  Head including the face is examined for malar rash, heliotropes, scarring, lupus pernio. Eyes examined for redness such as in episcleritis/scleritis, periorbital lesions.   Neck/ Face examined for parotid gland swelling, range of motion of neck.  Left upper and lower and right upper and lower extremities examined for tenderness, swelling, warmth of the appendicular joints, range of motion, edema, rash.  Some of the important findings included: she does not have evidence of synovitis in the palpable joints of the upper extremities.  She has tenderness along the flexor tendon of the middle finger at the A1 level  Patient Active Problem List    Diagnosis Date Noted    Immunosuppression 11/03/2024     Priority: Medium    Sepsis (H) 06/14/2023     Priority: Medium    Wheelchair dependence 05/01/2023     Priority: Medium    History of MRSA infection 04/26/2023     Priority: Medium    Elevated C-reactive protein (CRP) 04/26/2023     Priority: Medium    Cellulitis of right lower limb 04/25/2023     Priority: Medium    Chronic pulmonary embolism (H) 04/20/2023     Priority: Medium    MRSA (methicillin resistant Staphylococcus aureus) 06/28/2022     Priority: Medium     Formatting of this note might be different from the original.  +tissue right foot 6/25/22  +Swab right ankle 6/24/22      Chronic acquired lymphedema 06/24/2022     Priority: Medium    Current long-term use of anticoagulant medication with history of deep venous thrombosis (DVT) 06/24/2022     Priority: Medium    Hx of bacteremia 06/24/2022     Priority: Medium     Formatting of this note might be different from the original.  2019 - GBS bacteremia and  cellulitis      Major depressive disorder, recurrent 06/24/2022     Priority: Medium    Cellulitis 06/23/2022     Priority: Medium     Formatting of this note might be different from the original.  Added automatically from request for surgery [id number]      Open wound of left lower extremity 03/21/2022     Priority: Medium    Bacteremia due to group B Streptococcus 07/13/2019     Priority: Medium    Primary osteoarthritis involving multiple joints 04/26/2019     Priority: Medium    History of total left knee replacement (TKR) 04/26/2019     Priority: Medium    High risk medication use 02/13/2018     Priority: Medium    Physical debility 11/28/2017     Priority: Medium    Polyarthralgia 07/21/2017     Priority: Medium    H/O total hip arthroplasty 11/17/2016     Priority: Medium    Anemia 10/20/2016     Priority: Medium    Essential hypertension with goal blood pressure less than 140/90      Priority: Medium    Degenerative joint disease (DJD) of hip 10/18/2016     Priority: Medium    History of DVT of lower extremity      Priority: Medium    Radiculopathy of leg 07/20/2016     Priority: Medium    Sleep apnea 07/20/2016     Priority: Medium    Pain management 04/26/2016     Priority: Medium    Seropositive rheumatoid arthritis of multiple sites (H) 03/21/2016     Priority: Medium    PAD (peripheral artery disease) 03/21/2016     Priority: Medium    Right shoulder tendinitis 01/19/2016     Priority: Medium    Cyclic citrullinated peptide (CCP) antibody positive 09/16/2015     Priority: Medium    Vitamin D deficiency 08/26/2015     Priority: Medium    Edema - swelling of the legs after blood clots 05/22/2015     Priority: Medium    Morbid obesity (H) 04/10/2015     Priority: Medium     Had formal nutrition consult at [facility].  RD reports that she is not   willing to commit to the program outlined.  See scanned document.    12/15/2015 - Marcio Quinonez MD        DVT of lower extremity, bilateral - residual clot disease  on repeat US in NEW location - after six months of warfarin. 10/15/2014     Priority: Medium     Past Surgical History:   Procedure Laterality Date    JOINT REPLACEMENT      knee    PERIPHERALLY INSERTED CENTRAL CATHETER INSERTION Right 07/14/2019    4fr/44cm/Rt Cephalic.lowSVC.MGlav    TOTAL HIP ARTHROPLASTY Left 10/18/2016    Procedure: LEFT HIP TOTAL ARTHROPLASTY;  Surgeon: London Goss MD;  Location: [facility];  Service:     TOTAL KNEE ARTHROPLASTY Left 2006      Past Medical History:   Diagnosis Date    Aseptic necrosis of femoral head (H)     LEFT    Bacteremia due to group B Streptococcus     Cellulitis of right lower extremity     DJD (degenerative joint disease)     DVT, bilateral lower limbs (H) 10/2014    Edema 5/22/2015    Essential hypertension with goal blood pressure less than 140/90     Failure to thrive     History of blood clots     Hypokalemia 10/15/2014    Lung mass 10/18/2014    Morbid obesity (H) 4/10/2015    Had formal nutrition consult at [facility].  RD reports that she is not willing to commit to the program outlined.  See scanned document.  12/15/2015 - Marcio Quinonez MD       Nocturnal hypoxemia - probable sleep apnea - had overnight pulse oximetry, April, 2015. 5/22/2015    Obesity 4/10/2015    LOPEZ (obstructive sleep apnea) 5/22/2015    Osteoarthritis     Peripheral vascular disease     Pleural effusion     Pressure ulcer of foot     Rheumatoid arthritis (H) 12/30/2014    seronegative    Sepsis (H)     Seropositive rheumatoid arthritis (H) 1/19/2016    Vitamin D deficiency 8/26/2015     Allergies   Allergen Reactions    Adhesive Tape Other (See Comments)     As on band-aids;  Causes skin tearing/wounds.    Adhesive [Cyanoacrylate] Other (See Comments)     As on band-aids;  Causes skin tearing/wounds.    Banana Unknown     She avoids due to Latex allergy.  If she eats them 3 days in a row, gets stomach cramps and dark stool.  TBrinkMD - 4/10/15    Food Unknown and GI  Disturbance     Bananas, grapes, pine nuts    Artifical sweetner    Grape [Grape (Artificial) Flavoring Agent (Non-Screening)] Unknown     She avoids due to Latex allergy.  If she eats lots of them, and she likes them, stomach gets a little queasy.  TBrinkMD - 4/10/15    Morphine (Pf) [Morphine] Other (See Comments)     Pt has not reacted to this med herself, but mother has anaphylactic reaction and other family members get nausea/vomiting.    Other Environmental Allergy Swelling     leather    Pine     Pseudoephedrine Other (See Comments)     Insomnia    Pseudoephedrine Cold/Allergy [Chlorpheniramine-Pseudoeph] Other (See Comments)     Keeps pt awake up to 24 hours     Latex Rash     Hands get red from rubber gloves, but okay if she washes them right away.  Hands get red from rubber gloves, but okay if she washes them right away.    Other Food Allergy Rash     Pine trees, leather .      Current Outpatient Medications   Medication Sig Dispense Refill    acetaminophen (TYLENOL) 325 MG tablet Take 650 mg by mouth daily as needed for mild pain.      ascorbic acid, vitamin C, (ASCORBIC ACID WITH KAEL HIPS) 500 MG tablet [ASCORBIC ACID, VITAMIN C, (ASCORBIC ACID WITH KAEL HIPS) 500 MG TABLET] Take 500 mg by mouth 2 (two) times a day.      cholecalciferol 50 MCG (2000 UT) tablet Take 1 tablet (50 mcg) by mouth daily 30 tablet 11    cyclobenzaprine (FLEXERIL) 5 MG tablet Take 10 mg by mouth 3 times daily as needed      DULoxetine (CYMBALTA) 20 MG capsule Take 1 capsule (20 mg) by mouth 2 times daily. 180 capsule 0    gabapentin (NEURONTIN) 300 MG capsule Take 1 capsule (300 mg) by mouth 3 times daily 90 capsule 11    hydroCHLOROthiazide (HYDRODIURIL) 25 MG tablet Take 25 mg by mouth daily.      ibuprofen (ADVIL/MOTRIN) 400 MG tablet Take 400 mg by mouth every 8 hours as needed for moderate pain      ketoconazole (NIZORAL) 2 % external cream Apply topically 2 times daily      loratadine (CLARITIN) 10 MG tablet Take 1 tablet  (10 mg) by mouth daily 30 tablet 11    nystatin (MYCOSTATIN) 172471 UNIT/GM external cream Apply topically 2 times daily. 30 g 3    rivaroxaban ANTICOAGULANT (XARELTO) 20 MG TABS tablet Take 20 mg by mouth daily      sulfaSALAzine (AZULFIDINE) 500 MG tablet Take 1,000 mg by mouth 2 times daily.      tolnaftate (TINACTIN) 1 % external cream Apply topically daily as needed for irritation       family history includes Arthritis in her mother; Breast Cancer in her cousin; Breast Cancer (age of onset: 50.00) in her maternal grandmother; Breast Cancer (age of onset: 54.00) in her maternal aunt and mother; Cancer in her mother; Cataracts in her father and mother; Deep Vein Thrombosis in her father; Heart Disease in her maternal grandmother; Multiple myeloma in her father; No Known Problems in her sister; Other - See Comments in her brother; Rheumatoid Arthritis in her maternal grandmother and paternal aunt; Sleep Apnea in her brother; Snoring in her father.  Social Connections: Not on file          WBC Count   Date Value Ref Range Status   12/12/2024 7.0 4.0 - 11.0 10e3/uL Final     RBC Count   Date Value Ref Range Status   12/12/2024 4.50 3.80 - 5.20 10e6/uL Final     Hemoglobin   Date Value Ref Range Status   12/12/2024 12.3 11.7 - 15.7 g/dL Final     Hematocrit   Date Value Ref Range Status   12/12/2024 40.7 35.0 - 47.0 % Final     MCV   Date Value Ref Range Status   12/12/2024 90 78 - 100 fL Final     MCH   Date Value Ref Range Status   12/12/2024 27.3 26.5 - 33.0 pg Final     Platelet Count   Date Value Ref Range Status   12/12/2024 257 150 - 450 10e3/uL Final     % Lymphocytes   Date Value Ref Range Status   01/13/2022 30 % Final     AST   Date Value Ref Range Status   07/11/2023 25 0 - 45 U/L Final     Comment:     Reference intervals for this test were updated on 6/12/2023 to more accurately reflect our healthy population. There may be differences in the flagging of prior results with similar values performed with  this method. Interpretation of those prior results can be made in the context of the updated reference intervals.     ALT   Date Value Ref Range Status   12/12/2024 10 0 - 50 U/L Final     Albumin   Date Value Ref Range Status   12/12/2024 3.4 (L) 3.5 - 5.2 g/dL Final   04/12/2022 3.1 (L) 3.5 - 5.0 g/dL Final     Alkaline Phosphatase   Date Value Ref Range Status   07/11/2023 73 35 - 104 U/L Final     Creatinine   Date Value Ref Range Status   12/12/2024 0.54 0.51 - 0.95 mg/dL Final     GFR Estimate   Date Value Ref Range Status   12/12/2024 >90 >60 mL/min/1.73m2 Final     Comment:     eGFR calculated using 2021 CKD-EPI equation.   04/20/2021 >60 >60 mL/min/1.73m2 Final     GFR Estimate If Black   Date Value Ref Range Status   04/20/2021 >60 >60 mL/min/1.73m2 Final     Erythrocyte Sedimentation Rate   Date Value Ref Range Status   07/27/2023 45 (H) 0 - 30 mm/hr Final     CRP   Date Value Ref Range Status   12/21/2020 1.3 (H) 0.0 - 0.8 mg/dL Final

## 2025-06-25 ENCOUNTER — TELEPHONE (OUTPATIENT)
Dept: RHEUMATOLOGY | Facility: CLINIC | Age: 60
End: 2025-06-25
Payer: COMMERCIAL

## 2025-06-25 ENCOUNTER — RESULTS FOLLOW-UP (OUTPATIENT)
Dept: RHEUMATOLOGY | Facility: CLINIC | Age: 60
End: 2025-06-25

## 2025-06-25 NOTE — TELEPHONE ENCOUNTER
Well-controlled RA on sulfasalazine. Check labs today if within acceptable range continue sulfasalazine. Once again the nursing home is advised to check her labs every 3 months at least.

## 2025-06-25 NOTE — TELEPHONE ENCOUNTER
Called LVM for mariah regarding patients labs. Ok for patient to continue on sulfasalazine per MD.

## 2025-06-25 NOTE — TELEPHONE ENCOUNTER
[facility] Call Center    Phone Message    May a detailed message be left on voicemail: yes     Reason for Call: Medication Question or concern regarding medication   Prescription Clarification    Name of Medication:   Sulfasalazine     Prescribing Provider: Kam     Pharmacy: n/a     What on the order needs clarification? Maris states patient was seen yesterday, 6/24/2025 and received orders to hold off on the medication. Maris states patient had lab work done and the results are back. Maris states she is wondering if patient should restart the medication or not. Maris is wanting to get a call back to further discuss. Please advise.       Action Taken: Message routed to:  [facility] ([facility] Rheum    Travel Screening: Not Applicable     Date of Service:

## 2025-06-25 NOTE — TELEPHONE ENCOUNTER
Component  Ref Range & Units 1 d ago  (6/24/25) 6 mo ago  (12/12/24) 1 yr ago  (5/14/24) 1 yr ago  (3/21/24) 1 yr ago  (2/13/24) 1 yr ago  (11/16/23) 1 yr ago  (9/21/23)     Creatinine  0.51 - 0.95 mg/dL 0.48 Low  0.54 0.62 0.50 Low  0.49 Low  0.48 Low  0.45 Low     GFR Estimate  >60 mL/min/1.73m2 >90 >90 CM >90 >90 >90 >90 >90

## 2025-07-02 ENCOUNTER — LAB REQUISITION (OUTPATIENT)
Dept: LAB | Facility: CLINIC | Age: 60
End: 2025-07-02
Payer: COMMERCIAL

## 2025-07-02 DIAGNOSIS — I10 ESSENTIAL (PRIMARY) HYPERTENSION: ICD-10-CM

## 2025-07-03 LAB
ANION GAP SERPL CALCULATED.3IONS-SCNC: 9 MMOL/L (ref 7–15)
BUN SERPL-MCNC: 13.9 MG/DL (ref 8–23)
CALCIUM SERPL-MCNC: 9.2 MG/DL (ref 8.8–10.4)
CHLORIDE SERPL-SCNC: 99 MMOL/L (ref 98–107)
CREAT SERPL-MCNC: 0.5 MG/DL (ref 0.51–0.95)
EGFRCR SERPLBLD CKD-EPI 2021: >90 ML/MIN/1.73M2
GLUCOSE SERPL-MCNC: 81 MG/DL (ref 70–99)
HCO3 SERPL-SCNC: 31 MMOL/L (ref 22–29)
POTASSIUM SERPL-SCNC: 4.1 MMOL/L (ref 3.4–5.3)
SODIUM SERPL-SCNC: 139 MMOL/L (ref 135–145)

## 2025-07-03 PROCEDURE — 80048 BASIC METABOLIC PNL TOTAL CA: CPT | Mod: ORL | Performed by: NURSE PRACTITIONER

## 2025-07-03 PROCEDURE — P9604 ONE-WAY ALLOW PRORATED TRIP: HCPCS | Mod: ORL | Performed by: NURSE PRACTITIONER

## 2025-07-03 PROCEDURE — 36415 COLL VENOUS BLD VENIPUNCTURE: CPT | Mod: ORL | Performed by: NURSE PRACTITIONER
